# Patient Record
Sex: MALE | Race: OTHER | HISPANIC OR LATINO | URBAN - METROPOLITAN AREA
[De-identification: names, ages, dates, MRNs, and addresses within clinical notes are randomized per-mention and may not be internally consistent; named-entity substitution may affect disease eponyms.]

---

## 2017-05-12 ENCOUNTER — EMERGENCY (EMERGENCY)
Facility: HOSPITAL | Age: 52
LOS: 1 days | Discharge: HOME | End: 2017-05-12
Attending: EMERGENCY MEDICINE
Payer: MEDICARE

## 2017-05-12 VITALS
TEMPERATURE: 98 F | DIASTOLIC BLOOD PRESSURE: 75 MMHG | OXYGEN SATURATION: 96 % | HEART RATE: 95 BPM | SYSTOLIC BLOOD PRESSURE: 115 MMHG | RESPIRATION RATE: 21 BRPM

## 2017-05-12 VITALS
OXYGEN SATURATION: 95 % | HEART RATE: 109 BPM | RESPIRATION RATE: 18 BRPM | TEMPERATURE: 99 F | DIASTOLIC BLOOD PRESSURE: 71 MMHG | SYSTOLIC BLOOD PRESSURE: 112 MMHG | HEIGHT: 70 IN | WEIGHT: 190.04 LBS

## 2017-05-12 DIAGNOSIS — Z91.018 ALLERGY TO OTHER FOODS: ICD-10-CM

## 2017-05-12 DIAGNOSIS — R69 ILLNESS, UNSPECIFIED: ICD-10-CM

## 2017-05-12 DIAGNOSIS — R07.81 PLEURODYNIA: ICD-10-CM

## 2017-05-12 DIAGNOSIS — Z88.6 ALLERGY STATUS TO ANALGESIC AGENT: ICD-10-CM

## 2017-05-12 DIAGNOSIS — I50.9 HEART FAILURE, UNSPECIFIED: ICD-10-CM

## 2017-05-12 DIAGNOSIS — Z91.013 ALLERGY TO SEAFOOD: ICD-10-CM

## 2017-05-12 DIAGNOSIS — F06.2 PSYCHOTIC DISORDER WITH DELUSIONS DUE TO KNOWN PHYSIOLOGICAL CONDITION: ICD-10-CM

## 2017-05-12 LAB
ALBUMIN SERPL ELPH-MCNC: 3.8 G/DL — SIGNIFICANT CHANGE UP (ref 3.3–5.2)
ALP SERPL-CCNC: 97 U/L — SIGNIFICANT CHANGE UP (ref 40–120)
ALT FLD-CCNC: <4 U/L — SIGNIFICANT CHANGE UP
ANION GAP SERPL CALC-SCNC: 12 MMOL/L — SIGNIFICANT CHANGE UP (ref 5–17)
APTT BLD: 27.7 SEC — SIGNIFICANT CHANGE UP (ref 27.5–37.4)
AST SERPL-CCNC: 13 U/L — SIGNIFICANT CHANGE UP
BASOPHILS # BLD AUTO: 0 K/UL — SIGNIFICANT CHANGE UP (ref 0–0.2)
BASOPHILS NFR BLD AUTO: 0.4 % — SIGNIFICANT CHANGE UP (ref 0–2)
BILIRUB SERPL-MCNC: 0.2 MG/DL — LOW (ref 0.4–2)
BUN SERPL-MCNC: 12 MG/DL — SIGNIFICANT CHANGE UP (ref 8–20)
CALCIUM SERPL-MCNC: 9 MG/DL — SIGNIFICANT CHANGE UP (ref 8.6–10.2)
CHLORIDE SERPL-SCNC: 99 MMOL/L — SIGNIFICANT CHANGE UP (ref 98–107)
CK SERPL-CCNC: 72 U/L — SIGNIFICANT CHANGE UP (ref 30–200)
CK SERPL-CCNC: 75 U/L — SIGNIFICANT CHANGE UP (ref 30–200)
CO2 SERPL-SCNC: 29 MMOL/L — SIGNIFICANT CHANGE UP (ref 22–29)
CREAT SERPL-MCNC: 0.8 MG/DL — SIGNIFICANT CHANGE UP (ref 0.5–1.3)
EOSINOPHIL # BLD AUTO: 0.1 K/UL — SIGNIFICANT CHANGE UP (ref 0–0.5)
EOSINOPHIL NFR BLD AUTO: 1.6 % — SIGNIFICANT CHANGE UP (ref 0–5)
GLUCOSE SERPL-MCNC: 118 MG/DL — HIGH (ref 70–115)
HCT VFR BLD CALC: 36 % — LOW (ref 42–52)
HGB BLD-MCNC: 12.2 G/DL — LOW (ref 14–18)
INR BLD: 0.95 RATIO — SIGNIFICANT CHANGE UP (ref 0.88–1.16)
LYMPHOCYTES # BLD AUTO: 1.3 K/UL — SIGNIFICANT CHANGE UP (ref 1–4.8)
LYMPHOCYTES # BLD AUTO: 23.6 % — SIGNIFICANT CHANGE UP (ref 20–55)
MCHC RBC-ENTMCNC: 32.4 PG — HIGH (ref 27–31)
MCHC RBC-ENTMCNC: 33.9 G/DL — SIGNIFICANT CHANGE UP (ref 32–36)
MCV RBC AUTO: 95.7 FL — HIGH (ref 80–94)
MONOCYTES # BLD AUTO: 0.4 K/UL — SIGNIFICANT CHANGE UP (ref 0–0.8)
MONOCYTES NFR BLD AUTO: 7 % — SIGNIFICANT CHANGE UP (ref 3–10)
NEUTROPHILS # BLD AUTO: 3.7 K/UL — SIGNIFICANT CHANGE UP (ref 1.8–8)
NEUTROPHILS NFR BLD AUTO: 67 % — SIGNIFICANT CHANGE UP (ref 37–73)
NT-PROBNP SERPL-SCNC: 26 PG/ML — SIGNIFICANT CHANGE UP (ref 0–300)
PLATELET # BLD AUTO: 200 K/UL — SIGNIFICANT CHANGE UP (ref 150–400)
POTASSIUM SERPL-MCNC: 3.5 MMOL/L — SIGNIFICANT CHANGE UP (ref 3.5–5.3)
POTASSIUM SERPL-SCNC: 3.5 MMOL/L — SIGNIFICANT CHANGE UP (ref 3.5–5.3)
PROT SERPL-MCNC: 6.5 G/DL — LOW (ref 6.6–8.7)
PROTHROM AB SERPL-ACNC: 10.4 SEC — SIGNIFICANT CHANGE UP (ref 9.8–12.7)
RBC # BLD: 3.76 M/UL — LOW (ref 4.6–6.2)
RBC # FLD: 13.4 % — SIGNIFICANT CHANGE UP (ref 11–15.6)
SODIUM SERPL-SCNC: 140 MMOL/L — SIGNIFICANT CHANGE UP (ref 135–145)
TROPONIN T SERPL-MCNC: <0.01 NG/ML — SIGNIFICANT CHANGE UP (ref 0–0.06)
TROPONIN T SERPL-MCNC: <0.01 NG/ML — SIGNIFICANT CHANGE UP (ref 0–0.06)
WBC # BLD: 5.5 K/UL — SIGNIFICANT CHANGE UP (ref 4.8–10.8)
WBC # FLD AUTO: 5.5 K/UL — SIGNIFICANT CHANGE UP (ref 4.8–10.8)

## 2017-05-12 PROCEDURE — 80053 COMPREHEN METABOLIC PANEL: CPT

## 2017-05-12 PROCEDURE — 83880 ASSAY OF NATRIURETIC PEPTIDE: CPT

## 2017-05-12 PROCEDURE — 85610 PROTHROMBIN TIME: CPT

## 2017-05-12 PROCEDURE — 86140 C-REACTIVE PROTEIN: CPT

## 2017-05-12 PROCEDURE — 85027 COMPLETE CBC AUTOMATED: CPT

## 2017-05-12 PROCEDURE — 99285 EMERGENCY DEPT VISIT HI MDM: CPT | Mod: GC

## 2017-05-12 PROCEDURE — 71045 X-RAY EXAM CHEST 1 VIEW: CPT

## 2017-05-12 PROCEDURE — 36000 PLACE NEEDLE IN VEIN: CPT

## 2017-05-12 PROCEDURE — 99284 EMERGENCY DEPT VISIT MOD MDM: CPT

## 2017-05-12 PROCEDURE — 84484 ASSAY OF TROPONIN QUANT: CPT

## 2017-05-12 PROCEDURE — 93010 ELECTROCARDIOGRAM REPORT: CPT

## 2017-05-12 PROCEDURE — 85379 FIBRIN DEGRADATION QUANT: CPT

## 2017-05-12 PROCEDURE — 85730 THROMBOPLASTIN TIME PARTIAL: CPT

## 2017-05-12 PROCEDURE — 71010: CPT | Mod: 26

## 2017-05-12 PROCEDURE — 80061 LIPID PANEL: CPT

## 2017-05-12 PROCEDURE — 93005 ELECTROCARDIOGRAM TRACING: CPT

## 2017-05-12 PROCEDURE — 99283 EMERGENCY DEPT VISIT LOW MDM: CPT | Mod: 25

## 2017-05-12 PROCEDURE — 82550 ASSAY OF CK (CPK): CPT

## 2017-05-12 RX ORDER — CARBIDOPA AND LEVODOPA 25; 100 MG/1; MG/1
1 TABLET ORAL ONCE
Qty: 0 | Refills: 0 | Status: COMPLETED | OUTPATIENT
Start: 2017-05-12 | End: 2017-05-12

## 2017-05-12 RX ORDER — ACETAMINOPHEN 500 MG
650 TABLET ORAL EVERY 6 HOURS
Qty: 0 | Refills: 0 | Status: DISCONTINUED | OUTPATIENT
Start: 2017-05-12 | End: 2017-05-16

## 2017-05-12 RX ORDER — ACETAMINOPHEN 500 MG
650 TABLET ORAL ONCE
Qty: 0 | Refills: 0 | Status: COMPLETED | OUTPATIENT
Start: 2017-05-12 | End: 2017-05-12

## 2017-05-12 RX ORDER — PANTOPRAZOLE SODIUM 20 MG/1
40 TABLET, DELAYED RELEASE ORAL ONCE
Qty: 0 | Refills: 0 | Status: COMPLETED | OUTPATIENT
Start: 2017-05-12 | End: 2017-05-12

## 2017-05-12 RX ORDER — RASAGILINE 0.5 MG/1
1 TABLET ORAL ONCE
Qty: 0 | Refills: 0 | Status: COMPLETED | OUTPATIENT
Start: 2017-05-12 | End: 2017-05-12

## 2017-05-12 RX ADMIN — PANTOPRAZOLE SODIUM 40 MILLIGRAM(S): 20 TABLET, DELAYED RELEASE ORAL at 07:31

## 2017-05-12 RX ADMIN — Medication 650 MILLIGRAM(S): at 02:52

## 2017-05-12 RX ADMIN — Medication 650 MILLIGRAM(S): at 02:22

## 2017-05-12 RX ADMIN — RASAGILINE 1 MILLIGRAM(S): 0.5 TABLET ORAL at 13:06

## 2017-05-12 RX ADMIN — CARBIDOPA AND LEVODOPA 1 TABLET(S): 25; 100 TABLET ORAL at 02:22

## 2017-05-12 RX ADMIN — Medication 650 MILLIGRAM(S): at 06:32

## 2017-05-12 RX ADMIN — CARBIDOPA AND LEVODOPA 1 TABLET(S): 25; 100 TABLET ORAL at 13:06
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
## 2017-05-12 NOTE — ED PROVIDER NOTE - SECONDARY DIAGNOSIS.
Congestive heart failure, unspecified congestive heart failure chronicity, unspecified congestive heart failure type Herniated cervical disc

## 2017-05-12 NOTE — ED PROVIDER NOTE - MUSCULOSKELETAL, MLM
Spine appears normal, range of motion is not limited, no muscle or joint tenderness; +1 pitting edema b/l LE

## 2017-05-12 NOTE — ED PROVIDER NOTE - PMH
Congestive heart failure, unspecified congestive heart failure chronicity, unspecified congestive heart failure type    Herniated cervical disc    Parkinson disease

## 2017-05-12 NOTE — ED ADULT NURSE NOTE - OBJECTIVE STATEMENT
patient states that he was transferred from a nursing home in the [place] to a nursing home in [place]. Patient states that he began to have chest pain while he was going thru the transfer. Patient now states that he has chest pain, has a history of chf
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
## 2017-05-12 NOTE — ED PROVIDER NOTE - CARE PLAN
Principal Discharge DX:	Pleuritic chest pain  Goal:	resolved  Instructions for follow-up, activity and diet:	Continue pain medications as needed and follow up with your PMD; if any problems return to ER  Secondary Diagnosis:	Congestive heart failure, unspecified congestive heart failure chronicity, unspecified congestive heart failure type  Secondary Diagnosis:	Herniated cervical disc

## 2017-05-12 NOTE — ED PROVIDER NOTE - PLAN OF CARE
resolved Continue pain medications as needed and follow up with your PMD; if any problems return to ER
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
## 2017-05-12 NOTE — ED BEHAVIORAL HEALTH ASSESSMENT NOTE - DESCRIPTION
Parkinson's, CHF, and herniated cervical discs workup for MI disabled d/t Parkinson's, lives in nursing home

## 2017-05-12 NOTE — ED BEHAVIORAL HEALTH ASSESSMENT NOTE - SUMMARY
50 yo M here for chest pain.  Patient currently A&0 X 4 and cooperative during our encounter.  He would like to be transferred to nursing home in the [place] to be closer to his siblings when medically cleared for discharge.  Plan for SW to arrange discharge plans with legal guardian. Patient has been determined by court to be incapacitated and guardian appointed for decision making on his behalf.

## 2017-05-12 NOTE — ED PROVIDER NOTE - MEDICAL DECISION MAKING DETAILS
r/o ACS - CP is pleuritic, atypical in nature resolved w/ PO Tylenol, EKG unremarkable, and cardiac enzymes unremarkable x1, and undocumented hx of CHF; pt refusing repeat labs but top differential is atypical CP secondary possible to a URI or GERD.

## 2017-05-12 NOTE — ED BEHAVIORAL HEALTH NOTE - BEHAVIORAL HEALTH NOTE
Van: pt medically cleared for d/c. Transportation assistance requested by pt's nurse(Andra). [facility] called(Chaya) harpal arranged ETA within one hour and a half. No other concerns reported at this moment.

## 2017-05-12 NOTE — ED PROVIDER NOTE - ATTENDING CONTRIBUTION TO CARE
52 yo M presents to ED from NH c/o sharp, L ant chest wall pain which worsens with movement and respiration and reproducible to palpation of chest wall.  On exam pt appears uncomfortable, Cor Reg, Lungs clear b/l, + reproducible L upper ant chest wall.  EKG sinus with no acute changes.  Pt allergic to NSAID's.  Rx Tylenol, chec labs including CE, CXR and re-eval
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
## 2017-06-08 ENCOUNTER — EMERGENCY (EMERGENCY)
Facility: HOSPITAL | Age: 52
LOS: 1 days | Discharge: OTHER INSTITUTION | End: 2017-06-08
Attending: EMERGENCY MEDICINE
Payer: MEDICARE

## 2017-06-08 VITALS
OXYGEN SATURATION: 98 % | DIASTOLIC BLOOD PRESSURE: 85 MMHG | WEIGHT: 190.04 LBS | HEIGHT: 70 IN | RESPIRATION RATE: 18 BRPM | SYSTOLIC BLOOD PRESSURE: 134 MMHG | HEART RATE: 106 BPM | TEMPERATURE: 98 F

## 2017-06-08 VITALS
TEMPERATURE: 99 F | RESPIRATION RATE: 20 BRPM | HEART RATE: 97 BPM | SYSTOLIC BLOOD PRESSURE: 137 MMHG | OXYGEN SATURATION: 98 %

## 2017-06-08 DIAGNOSIS — I50.9 HEART FAILURE, UNSPECIFIED: ICD-10-CM

## 2017-06-08 DIAGNOSIS — Z88.5 ALLERGY STATUS TO NARCOTIC AGENT: ICD-10-CM

## 2017-06-08 DIAGNOSIS — F31.9 BIPOLAR DISORDER, UNSPECIFIED: ICD-10-CM

## 2017-06-08 DIAGNOSIS — Z91.013 ALLERGY TO SEAFOOD: ICD-10-CM

## 2017-06-08 DIAGNOSIS — R07.9 CHEST PAIN, UNSPECIFIED: ICD-10-CM

## 2017-06-08 DIAGNOSIS — Z79.899 OTHER LONG TERM (CURRENT) DRUG THERAPY: ICD-10-CM

## 2017-06-08 DIAGNOSIS — F29 UNSPECIFIED PSYCHOSIS NOT DUE TO A SUBSTANCE OR KNOWN PHYSIOLOGICAL CONDITION: ICD-10-CM

## 2017-06-08 DIAGNOSIS — R10.13 EPIGASTRIC PAIN: ICD-10-CM

## 2017-06-08 DIAGNOSIS — Z88.6 ALLERGY STATUS TO ANALGESIC AGENT: ICD-10-CM

## 2017-06-08 DIAGNOSIS — G20 PARKINSON'S DISEASE: ICD-10-CM

## 2017-06-08 LAB
ALBUMIN SERPL ELPH-MCNC: 4.6 G/DL — SIGNIFICANT CHANGE UP (ref 3.3–5.2)
ALP SERPL-CCNC: 85 U/L — SIGNIFICANT CHANGE UP (ref 40–120)
ALT FLD-CCNC: <4 U/L — SIGNIFICANT CHANGE UP
ANION GAP SERPL CALC-SCNC: 13 MMOL/L — SIGNIFICANT CHANGE UP (ref 5–17)
AST SERPL-CCNC: 13 U/L — SIGNIFICANT CHANGE UP
BILIRUB SERPL-MCNC: 0.4 MG/DL — SIGNIFICANT CHANGE UP (ref 0.4–2)
BUN SERPL-MCNC: 11 MG/DL — SIGNIFICANT CHANGE UP (ref 8–20)
CALCIUM SERPL-MCNC: 9.2 MG/DL — SIGNIFICANT CHANGE UP (ref 8.6–10.2)
CHLORIDE SERPL-SCNC: 103 MMOL/L — SIGNIFICANT CHANGE UP (ref 98–107)
CK MB CFR SERPL CALC: 2.9 NG/ML — SIGNIFICANT CHANGE UP (ref 0–6.7)
CK SERPL-CCNC: 280 U/L — HIGH (ref 30–200)
CO2 SERPL-SCNC: 27 MMOL/L — SIGNIFICANT CHANGE UP (ref 22–29)
CREAT SERPL-MCNC: 0.78 MG/DL — SIGNIFICANT CHANGE UP (ref 0.5–1.3)
GLUCOSE SERPL-MCNC: 94 MG/DL — SIGNIFICANT CHANGE UP (ref 70–115)
HCT VFR BLD CALC: 41.4 % — LOW (ref 42–52)
HGB BLD-MCNC: 13.4 G/DL — LOW (ref 14–18)
LIDOCAIN IGE QN: 21 U/L — LOW (ref 22–51)
MCHC RBC-ENTMCNC: 30.9 PG — SIGNIFICANT CHANGE UP (ref 27–31)
MCHC RBC-ENTMCNC: 32.4 G/DL — SIGNIFICANT CHANGE UP (ref 32–36)
MCV RBC AUTO: 95.6 FL — HIGH (ref 80–94)
PLATELET # BLD AUTO: 207 K/UL — SIGNIFICANT CHANGE UP (ref 150–400)
POTASSIUM SERPL-MCNC: 3.6 MMOL/L — SIGNIFICANT CHANGE UP (ref 3.5–5.3)
POTASSIUM SERPL-SCNC: 3.6 MMOL/L — SIGNIFICANT CHANGE UP (ref 3.5–5.3)
PROT SERPL-MCNC: 7.1 G/DL — SIGNIFICANT CHANGE UP (ref 6.6–8.7)
RBC # BLD: 4.33 M/UL — LOW (ref 4.6–6.2)
RBC # FLD: 13.4 % — SIGNIFICANT CHANGE UP (ref 11–15.6)
SODIUM SERPL-SCNC: 143 MMOL/L — SIGNIFICANT CHANGE UP (ref 135–145)
TROPONIN T SERPL-MCNC: <0.01 NG/ML — SIGNIFICANT CHANGE UP (ref 0–0.06)
WBC # BLD: 7 K/UL — SIGNIFICANT CHANGE UP (ref 4.8–10.8)
WBC # FLD AUTO: 7 K/UL — SIGNIFICANT CHANGE UP (ref 4.8–10.8)

## 2017-06-08 PROCEDURE — 84484 ASSAY OF TROPONIN QUANT: CPT

## 2017-06-08 PROCEDURE — 82553 CREATINE MB FRACTION: CPT

## 2017-06-08 PROCEDURE — 74176 CT ABD & PELVIS W/O CONTRAST: CPT | Mod: 26

## 2017-06-08 PROCEDURE — 93010 ELECTROCARDIOGRAM REPORT: CPT

## 2017-06-08 PROCEDURE — 74176 CT ABD & PELVIS W/O CONTRAST: CPT

## 2017-06-08 PROCEDURE — 80053 COMPREHEN METABOLIC PANEL: CPT

## 2017-06-08 PROCEDURE — 99285 EMERGENCY DEPT VISIT HI MDM: CPT

## 2017-06-08 PROCEDURE — 82550 ASSAY OF CK (CPK): CPT

## 2017-06-08 PROCEDURE — 93005 ELECTROCARDIOGRAM TRACING: CPT

## 2017-06-08 PROCEDURE — 85027 COMPLETE CBC AUTOMATED: CPT

## 2017-06-08 PROCEDURE — 99284 EMERGENCY DEPT VISIT MOD MDM: CPT

## 2017-06-08 PROCEDURE — 83690 ASSAY OF LIPASE: CPT

## 2017-06-08 PROCEDURE — 99285 EMERGENCY DEPT VISIT HI MDM: CPT | Mod: 25

## 2017-06-08 RX ORDER — SODIUM CHLORIDE 9 MG/ML
1000 INJECTION INTRAMUSCULAR; INTRAVENOUS; SUBCUTANEOUS ONCE
Qty: 0 | Refills: 0 | Status: COMPLETED | OUTPATIENT
Start: 2017-06-08 | End: 2017-06-08

## 2017-06-08 RX ORDER — CARBIDOPA, LEVODOPA, AND ENTACAPONE 50; 200; 200 MG/1; MG/1; MG/1
1 TABLET, FILM COATED ORAL THREE TIMES A DAY
Qty: 0 | Refills: 0 | Status: DISCONTINUED | OUTPATIENT
Start: 2017-06-08 | End: 2017-06-08

## 2017-06-08 RX ORDER — RASAGILINE 0.5 MG/1
1 TABLET ORAL DAILY
Qty: 0 | Refills: 0 | Status: DISCONTINUED | OUTPATIENT
Start: 2017-06-08 | End: 2017-06-12

## 2017-06-08 RX ORDER — CARBIDOPA, LEVODOPA, AND ENTACAPONE 50; 200; 200 MG/1; MG/1; MG/1
1 TABLET, FILM COATED ORAL
Qty: 0 | Refills: 0 | Status: DISCONTINUED | OUTPATIENT
Start: 2017-06-08 | End: 2017-06-12

## 2017-06-08 RX ORDER — CARBIDOPA AND LEVODOPA 25; 100 MG/1; MG/1
1 TABLET ORAL
Qty: 0 | Refills: 0 | Status: DISCONTINUED | OUTPATIENT
Start: 2017-06-08 | End: 2017-06-12

## 2017-06-08 RX ADMIN — SODIUM CHLORIDE 500 MILLILITER(S): 9 INJECTION INTRAMUSCULAR; INTRAVENOUS; SUBCUTANEOUS at 02:42

## 2017-06-08 NOTE — ED PROVIDER NOTE - SHIFT CHANGE DETAILS
signed out pending second set ce if neg pt can be cleared for psych eval and dispo signed out pending psych eval and dispo

## 2017-06-08 NOTE — ED PROVIDER NOTE - CARE PLAN
Principal Discharge DX:	Chest pain  Secondary Diagnosis:	Parkinson disease  Secondary Diagnosis:	Bipolar 1 disorder

## 2017-06-08 NOTE — ED ADULT NURSE NOTE - OBJECTIVE STATEMENT
Patient drowsy, asleep, refuses to open eyes & answer questions. Negative obvious pain, distress, or discomfort. Respirations even & unlabored, full ROM x4. Per triage RN, patient was complaining of abdominal pain. From [facility]. Patient drowsy, asleep, arouses to voice, refuses to open eyes & answer questions. Negative obvious pain, distress, or discomfort. Respirations even & unlabored, full ROM x4. Per triage RN, patient was complaining of abdominal pain. From [facility].

## 2017-06-08 NOTE — ED BEHAVIORAL HEALTH ASSESSMENT NOTE - CURRENT MEDICATION
see [facility] list  refusing Seroquel  on sinemet Stalevo Azilect

## 2017-06-08 NOTE — ED BEHAVIORAL HEALTH ASSESSMENT NOTE - SUMMARY
50 y/o male BIBA from [facility] last night c/o cp and abdominal pain. Last night pt experienced recurrent behavior felt to be a danger, became verbally and physically aggressive with staff, danger to self and others. Currently the patient is uncooperative due to sedation of unknown origin.

## 2017-06-08 NOTE — ED ADULT NURSE REASSESSMENT NOTE - GENERAL PATIENT STATE
Refused bloodwork earlier, refused medication again, refused meals, encouraged compliance./resting/sleeping

## 2017-06-08 NOTE — ED ADULT NURSE NOTE - NS ED NURSE RECORDS SENT
Call to patient.  She sounded as if she was crying on the phone.  She states that she is not doing well and has been crying for several hours a day and is having bad thoughts.      Asked if she had thoughts of self harm or harming others and patient stated yes.  Discussed that she is home alone and she is going to call her Dad to come with her.     Crisis # given.  Discussed going to ER and also discussed [facility].     Call to [facility] and they will contact patient directly to address.        Medical Records sent

## 2017-06-08 NOTE — ED PROVIDER NOTE - OBJECTIVE STATEMENT
52 yo male sent from nh for evaluation after he called 911 because he claimed he had cp and abdominal pain  as per transfer noted from nh pt has been combative and threateing to others and staff and himself so sent also for psych eval , pt with h/o bipolar disorder

## 2017-06-08 NOTE — ED BEHAVIORAL HEALTH ASSESSMENT NOTE - DESCRIPTION
Parkinson's, CHF, and herniated cervical discs disabled d/t Parkinson's, lives in nursing home [facility]

## 2017-06-08 NOTE — ED BEHAVIORAL HEALTH ASSESSMENT NOTE - DETAILS
Last night pt had an episode of verbal and physical aggression towards [facility] staff NA insomnia fatigue admissions Humberto OTOOLE

## 2017-06-08 NOTE — ED BEHAVIORAL HEALTH NOTE - BEHAVIORAL HEALTH NOTE
SW Note: Pt will be transferred to [facility] for inpt psychiatric tx. 9.37 legals completed. Accepting MD, Dr. Valenzuela. [facility] and [facility] did not have an appropriate bed for him today. Notified pts legal guardian, Barb Tabares [phone]. Notified [facility]. Spoke with admissions coordinator Taylor [phone]. She also informed me of pts legals guardian as . Pt has medicare and medicaid. No auth needed. Ambulance arranged with [facility]

## 2017-06-08 NOTE — ED ADULT NURSE REASSESSMENT NOTE - NS ED NURSE REASSESS COMMENT FT1
Patient agitated, refusing to allow blood draw, unable to redirect or reassure. Dr. Albarran made aware. Patient calmed down once was notified that there would be no blood draw.
Patient received at 0740, assist of three to transfer to stretcher, eyes closed, no complaints of pain, sleeps when undisturbed. Fall Risk.
Refused all medications
Resumed care from [facility], Report given to [facility]
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
## 2017-06-09 ENCOUNTER — EMERGENCY (EMERGENCY)
Facility: HOSPITAL | Age: 52
LOS: 1 days | Discharge: OTHER INSTITUTION | End: 2017-06-09
Attending: EMERGENCY MEDICINE
Payer: MEDICARE

## 2017-06-09 VITALS
SYSTOLIC BLOOD PRESSURE: 128 MMHG | RESPIRATION RATE: 16 BRPM | HEART RATE: 76 BPM | TEMPERATURE: 98 F | OXYGEN SATURATION: 96 % | DIASTOLIC BLOOD PRESSURE: 82 MMHG | WEIGHT: 244.93 LBS

## 2017-06-09 DIAGNOSIS — F41.9 ANXIETY DISORDER, UNSPECIFIED: ICD-10-CM

## 2017-06-09 DIAGNOSIS — Z91.013 ALLERGY TO SEAFOOD: ICD-10-CM

## 2017-06-09 DIAGNOSIS — Z79.899 OTHER LONG TERM (CURRENT) DRUG THERAPY: ICD-10-CM

## 2017-06-09 DIAGNOSIS — Z88.5 ALLERGY STATUS TO NARCOTIC AGENT: ICD-10-CM

## 2017-06-09 DIAGNOSIS — G20 PARKINSON'S DISEASE: ICD-10-CM

## 2017-06-09 DIAGNOSIS — I50.9 HEART FAILURE, UNSPECIFIED: ICD-10-CM

## 2017-06-09 DIAGNOSIS — R45.1 RESTLESSNESS AND AGITATION: ICD-10-CM

## 2017-06-09 DIAGNOSIS — Z91.018 ALLERGY TO OTHER FOODS: ICD-10-CM

## 2017-06-09 DIAGNOSIS — Z88.8 ALLERGY STATUS TO OTHER DRUGS, MEDICAMENTS AND BIOLOGICAL SUBSTANCES STATUS: ICD-10-CM

## 2017-06-09 LAB
ANION GAP SERPL CALC-SCNC: 14 MMOL/L — SIGNIFICANT CHANGE UP (ref 5–17)
APAP SERPL-MCNC: <7.5 UG/ML — LOW (ref 10–26)
BASOPHILS # BLD AUTO: 0 K/UL — SIGNIFICANT CHANGE UP (ref 0–0.2)
BASOPHILS NFR BLD AUTO: 0.2 % — SIGNIFICANT CHANGE UP (ref 0–2)
BUN SERPL-MCNC: 11 MG/DL — SIGNIFICANT CHANGE UP (ref 8–20)
CALCIUM SERPL-MCNC: 9.1 MG/DL — SIGNIFICANT CHANGE UP (ref 8.6–10.2)
CHLORIDE SERPL-SCNC: 100 MMOL/L — SIGNIFICANT CHANGE UP (ref 98–107)
CO2 SERPL-SCNC: 26 MMOL/L — SIGNIFICANT CHANGE UP (ref 22–29)
CREAT SERPL-MCNC: 0.94 MG/DL — SIGNIFICANT CHANGE UP (ref 0.5–1.3)
EOSINOPHIL # BLD AUTO: 0.1 K/UL — SIGNIFICANT CHANGE UP (ref 0–0.5)
EOSINOPHIL NFR BLD AUTO: 1.5 % — SIGNIFICANT CHANGE UP (ref 0–5)
GLUCOSE SERPL-MCNC: 105 MG/DL — SIGNIFICANT CHANGE UP (ref 70–115)
HCT VFR BLD CALC: 39 % — LOW (ref 42–52)
HGB BLD-MCNC: 12.7 G/DL — LOW (ref 14–18)
LYMPHOCYTES # BLD AUTO: 1.4 K/UL — SIGNIFICANT CHANGE UP (ref 1–4.8)
LYMPHOCYTES # BLD AUTO: 26.1 % — SIGNIFICANT CHANGE UP (ref 20–55)
MCHC RBC-ENTMCNC: 31 PG — SIGNIFICANT CHANGE UP (ref 27–31)
MCHC RBC-ENTMCNC: 32.6 G/DL — SIGNIFICANT CHANGE UP (ref 32–36)
MCV RBC AUTO: 95.1 FL — HIGH (ref 80–94)
MONOCYTES # BLD AUTO: 0.5 K/UL — SIGNIFICANT CHANGE UP (ref 0–0.8)
MONOCYTES NFR BLD AUTO: 9.4 % — SIGNIFICANT CHANGE UP (ref 3–10)
NEUTROPHILS # BLD AUTO: 3.3 K/UL — SIGNIFICANT CHANGE UP (ref 1.8–8)
NEUTROPHILS NFR BLD AUTO: 62.6 % — SIGNIFICANT CHANGE UP (ref 37–73)
PLATELET # BLD AUTO: 202 K/UL — SIGNIFICANT CHANGE UP (ref 150–400)
POTASSIUM SERPL-MCNC: 4.2 MMOL/L — SIGNIFICANT CHANGE UP (ref 3.5–5.3)
POTASSIUM SERPL-SCNC: 4.2 MMOL/L — SIGNIFICANT CHANGE UP (ref 3.5–5.3)
RBC # BLD: 4.1 M/UL — LOW (ref 4.6–6.2)
RBC # FLD: 13.4 % — SIGNIFICANT CHANGE UP (ref 11–15.6)
SALICYLATES SERPL-MCNC: <2 MG/DL — LOW (ref 10–20)
SODIUM SERPL-SCNC: 140 MMOL/L — SIGNIFICANT CHANGE UP (ref 135–145)
TSH SERPL-MCNC: 0.71 UIU/ML — SIGNIFICANT CHANGE UP (ref 0.27–4.2)
WBC # BLD: 5.2 K/UL — SIGNIFICANT CHANGE UP (ref 4.8–10.8)
WBC # FLD AUTO: 5.2 K/UL — SIGNIFICANT CHANGE UP (ref 4.8–10.8)

## 2017-06-09 PROCEDURE — 93010 ELECTROCARDIOGRAM REPORT: CPT

## 2017-06-09 PROCEDURE — 99284 EMERGENCY DEPT VISIT MOD MDM: CPT

## 2017-06-09 PROCEDURE — 99285 EMERGENCY DEPT VISIT HI MDM: CPT

## 2017-06-09 RX ORDER — CARBIDOPA, LEVODOPA, AND ENTACAPONE 50; 200; 200 MG/1; MG/1; MG/1
1 TABLET, FILM COATED ORAL ONCE
Qty: 0 | Refills: 0 | Status: COMPLETED | OUTPATIENT
Start: 2017-06-09 | End: 2017-06-09

## 2017-06-09 RX ORDER — CARBIDOPA AND LEVODOPA 25; 100 MG/1; MG/1
1 TABLET ORAL ONCE
Qty: 0 | Refills: 0 | Status: COMPLETED | OUTPATIENT
Start: 2017-06-09 | End: 2017-06-09

## 2017-06-09 RX ORDER — QUETIAPINE FUMARATE 200 MG/1
50 TABLET, FILM COATED ORAL ONCE
Qty: 0 | Refills: 0 | Status: COMPLETED | OUTPATIENT
Start: 2017-06-09 | End: 2017-06-09

## 2017-06-09 RX ORDER — CARBIDOPA, LEVODOPA, AND ENTACAPONE 50; 200; 200 MG/1; MG/1; MG/1
1 TABLET, FILM COATED ORAL ONCE
Qty: 0 | Refills: 0 | Status: DISCONTINUED | OUTPATIENT
Start: 2017-06-09 | End: 2017-06-09

## 2017-06-09 RX ORDER — ACETAMINOPHEN 500 MG
650 TABLET ORAL ONCE
Qty: 0 | Refills: 0 | Status: COMPLETED | OUTPATIENT
Start: 2017-06-09 | End: 2017-06-09

## 2017-06-09 RX ADMIN — CARBIDOPA, LEVODOPA, AND ENTACAPONE 1 TABLET(S): 50; 200; 200 TABLET, FILM COATED ORAL at 16:01

## 2017-06-09 RX ADMIN — CARBIDOPA, LEVODOPA, AND ENTACAPONE 1 TABLET(S): 50; 200; 200 TABLET, FILM COATED ORAL at 22:39

## 2017-06-09 RX ADMIN — Medication 650 MILLIGRAM(S): at 20:54

## 2017-06-09 RX ADMIN — CARBIDOPA AND LEVODOPA 1 TABLET(S): 25; 100 TABLET ORAL at 16:01

## 2017-06-09 RX ADMIN — CARBIDOPA AND LEVODOPA 1 TABLET(S): 25; 100 TABLET ORAL at 20:53

## 2017-06-09 RX ADMIN — Medication 650 MILLIGRAM(S): at 17:04

## 2017-06-09 NOTE — ED ADULT TRIAGE NOTE - CHIEF COMPLAINT QUOTE
pt was seen here in ed yesterday for psych sent to [place]. patient sent back here because they were unable to take care of patient due to medical comorbidites. rocío elliott aware. patient offers no complaints.
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
## 2017-06-09 NOTE — ED ADULT NURSE REASSESSMENT NOTE - COMFORT CARE
repositioned/meal provided/warm blanket provided/wait time explained/po fluids offered/plan of care explained

## 2017-06-09 NOTE — ED BEHAVIORAL HEALTH NOTE - BEHAVIORAL HEALTH NOTE
Social work note: Worker received phone call from Mary Kate at [facility].  Mary Kate stating that the director of the hospital is no longer accepting outside admissions due to an influx of patients in their emergency room. Social work to follow to obtain bed.

## 2017-06-09 NOTE — ED BEHAVIORAL HEALTH ASSESSMENT NOTE - REASON FOR REFERRAL
sent back from [facility] due to lacking hospital bed required for patient with parkinsons
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
## 2017-06-09 NOTE — ED ADULT NURSE REASSESSMENT NOTE - NS ED NURSE REASSESS COMMENT FT1
Pt given water and 2 packages of cookies.  Pt requested hospital .  Assistant nurse manager, Swati made aware and states administator has been made aware and will the patient.  Swati, RN at bedside to make patient aware.

## 2017-06-09 NOTE — ED BEHAVIORAL HEALTH ASSESSMENT NOTE - SUMMARY
50 y/o male BIBA from [facility] w c/o cp and abdominal pain. Pt experienced recurrent behavior felt to be a danger, became verbally and physically aggressive with staff, danger to self and others. He was accepted and transferred to [facility] but thought to be more suitable for a geriatric psych unit with accommodations for his limitations. Currently the patient is uncooperative, elevated and delusional.

## 2017-06-09 NOTE — ED BEHAVIORAL HEALTH ASSESSMENT NOTE - DETAILS
NA frequent episodes of verbal and physical aggression towards [facility] staff insomnia admissions Humberto OTOOLE

## 2017-06-09 NOTE — ED ADULT NURSE NOTE - CHIEF COMPLAINT QUOTE
pt was seen here in ed yesterday for psych sent to [place]. patient sent back here because they were unable to take care of patient due to medical comorbidites. rocío elliott aware. patient offers no complaints.

## 2017-06-09 NOTE — ED ADULT NURSE REASSESSMENT NOTE - NS ED NURSE REASSESS COMMENT FT1
Pt remains stable.  Refused to eat turkey sandwich and requested tunafish.  Hospitality called multiple times for tuna or vegetarian tray.  Pt experience representative also aware of pt request.  Behavioral health NP at bedside to evaluate.

## 2017-06-09 NOTE — ED PROVIDER NOTE - OBJECTIVE STATEMENT
52 y/o male with a h/o parkinson's and copd and he was  here and sent to [facility] yesterday and returned today because they could not manage his medical complaints Pt is a poor historian and he denies psychiatric illness or meds 50 y/o male with a h/o parkinson's and copd and he was  here and sent to [facility] yesterday for being aggressive and combative at nursing home and returned today because they could not manage his medical complaints Pt is a poor historian and he denies psychiatric illness or meds

## 2017-06-09 NOTE — ED ADULT NURSE REASSESSMENT NOTE - NS ED NURSE REASSESS COMMENT FT1
I spoke with Mr. Daryn Pedro concerning his I spoke with . Daryn Pedro, attempted to address his concerns, He asked to speak with administration concerning his prior visit.  I stated I would have administration come and speak with him regarding his visit.  I had to ask him to hang up his phone in the middle of our conversation with a "Kirby March II".  Patient was reassured, he asked what his plan of care was, it was explained and he was given opportunity to ask questions concerning his plan.  Patient was offered assistance (food, water), he declined at this point
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
## 2017-06-09 NOTE — ED ADULT NURSE REASSESSMENT NOTE - NS ED NURSE REASSESS COMMENT FT1
Dr. Coronado given patient's medication list sent from [facility] [place].  Will medicate as per Cliff PINEDA order.

## 2017-06-10 ENCOUNTER — INPATIENT (INPATIENT)
Facility: HOSPITAL | Age: 52
LOS: 31 days | Discharge: HOME | End: 2017-07-12
Attending: PSYCHIATRY & NEUROLOGY | Admitting: PSYCHIATRY & NEUROLOGY
Payer: MEDICARE

## 2017-06-10 VITALS — WEIGHT: 190.04 LBS | HEIGHT: 70 IN

## 2017-06-10 VITALS
SYSTOLIC BLOOD PRESSURE: 125 MMHG | RESPIRATION RATE: 18 BRPM | DIASTOLIC BLOOD PRESSURE: 75 MMHG | TEMPERATURE: 98 F | OXYGEN SATURATION: 96 % | HEART RATE: 94 BPM

## 2017-06-10 DIAGNOSIS — F29 UNSPECIFIED PSYCHOSIS NOT DUE TO A SUBSTANCE OR KNOWN PHYSIOLOGICAL CONDITION: ICD-10-CM

## 2017-06-10 LAB
AMPHET UR-MCNC: NEGATIVE — SIGNIFICANT CHANGE UP
APPEARANCE UR: CLEAR — SIGNIFICANT CHANGE UP
BARBITURATES UR SCN-MCNC: NEGATIVE — SIGNIFICANT CHANGE UP
BENZODIAZ UR-MCNC: NEGATIVE — SIGNIFICANT CHANGE UP
BILIRUB UR-MCNC: NEGATIVE — SIGNIFICANT CHANGE UP
COCAINE METAB.OTHER UR-MCNC: NEGATIVE — SIGNIFICANT CHANGE UP
COLOR SPEC: YELLOW — SIGNIFICANT CHANGE UP
DIFF PNL FLD: NEGATIVE — SIGNIFICANT CHANGE UP
GLUCOSE UR QL: NEGATIVE MG/DL — SIGNIFICANT CHANGE UP
KETONES UR-MCNC: NEGATIVE — SIGNIFICANT CHANGE UP
LEUKOCYTE ESTERASE UR-ACNC: NEGATIVE — SIGNIFICANT CHANGE UP
METHADONE UR-MCNC: NEGATIVE — SIGNIFICANT CHANGE UP
NITRITE UR-MCNC: NEGATIVE — SIGNIFICANT CHANGE UP
OPIATES UR-MCNC: NEGATIVE — SIGNIFICANT CHANGE UP
PCP SPEC-MCNC: SIGNIFICANT CHANGE UP
PCP UR-MCNC: NEGATIVE — SIGNIFICANT CHANGE UP
PH UR: 6 — SIGNIFICANT CHANGE UP (ref 5–8)
PROT UR-MCNC: NEGATIVE MG/DL — SIGNIFICANT CHANGE UP
SP GR SPEC: 1 — LOW (ref 1.01–1.02)
THC UR QL: NEGATIVE — SIGNIFICANT CHANGE UP
UROBILINOGEN FLD QL: NEGATIVE MG/DL — SIGNIFICANT CHANGE UP

## 2017-06-10 PROCEDURE — 84443 ASSAY THYROID STIM HORMONE: CPT

## 2017-06-10 PROCEDURE — 85027 COMPLETE CBC AUTOMATED: CPT

## 2017-06-10 PROCEDURE — 93005 ELECTROCARDIOGRAM TRACING: CPT

## 2017-06-10 PROCEDURE — 81003 URINALYSIS AUTO W/O SCOPE: CPT

## 2017-06-10 PROCEDURE — 80048 BASIC METABOLIC PNL TOTAL CA: CPT

## 2017-06-10 PROCEDURE — 84484 ASSAY OF TROPONIN QUANT: CPT

## 2017-06-10 PROCEDURE — 80307 DRUG TEST PRSMV CHEM ANLYZR: CPT

## 2017-06-10 PROCEDURE — 99284 EMERGENCY DEPT VISIT MOD MDM: CPT

## 2017-06-10 PROCEDURE — 82550 ASSAY OF CK (CPK): CPT

## 2017-06-10 RX ORDER — RASAGILINE 0.5 MG/1
1 TABLET ORAL DAILY
Qty: 0 | Refills: 0 | Status: DISCONTINUED | OUTPATIENT
Start: 2017-06-10 | End: 2017-07-12

## 2017-06-10 RX ORDER — CARBIDOPA AND LEVODOPA 25; 100 MG/1; MG/1
1 TABLET ORAL
Qty: 0 | Refills: 0 | Status: DISCONTINUED | OUTPATIENT
Start: 2017-06-10 | End: 2017-07-12

## 2017-06-10 RX ORDER — QUETIAPINE FUMARATE 200 MG/1
50 TABLET, FILM COATED ORAL ONCE
Qty: 0 | Refills: 0 | Status: DISCONTINUED | OUTPATIENT
Start: 2017-06-10 | End: 2017-06-10

## 2017-06-10 RX ORDER — CARBIDOPA AND LEVODOPA 25; 100 MG/1; MG/1
1 TABLET ORAL ONCE
Qty: 0 | Refills: 0 | Status: DISCONTINUED | OUTPATIENT
Start: 2017-06-10 | End: 2017-06-13

## 2017-06-10 RX ORDER — ACETAMINOPHEN 500 MG
650 TABLET ORAL ONCE
Qty: 0 | Refills: 0 | Status: COMPLETED | OUTPATIENT
Start: 2017-06-10 | End: 2017-06-10

## 2017-06-10 RX ORDER — QUETIAPINE FUMARATE 200 MG/1
50 TABLET, FILM COATED ORAL EVERY 6 HOURS
Qty: 0 | Refills: 0 | Status: DISCONTINUED | OUTPATIENT
Start: 2017-06-10 | End: 2017-07-12

## 2017-06-10 RX ORDER — CARBIDOPA, LEVODOPA, AND ENTACAPONE 50; 200; 200 MG/1; MG/1; MG/1
1 TABLET, FILM COATED ORAL ONCE
Qty: 0 | Refills: 0 | Status: COMPLETED | OUTPATIENT
Start: 2017-06-10 | End: 2017-06-10

## 2017-06-10 RX ORDER — QUETIAPINE FUMARATE 200 MG/1
25 TABLET, FILM COATED ORAL EVERY 6 HOURS
Qty: 0 | Refills: 0 | Status: DISCONTINUED | OUTPATIENT
Start: 2017-06-10 | End: 2017-06-10

## 2017-06-10 RX ORDER — CARBIDOPA, LEVODOPA, AND ENTACAPONE 50; 200; 200 MG/1; MG/1; MG/1
1 TABLET, FILM COATED ORAL THREE TIMES A DAY
Qty: 0 | Refills: 0 | Status: DISCONTINUED | OUTPATIENT
Start: 2017-06-10 | End: 2017-07-12

## 2017-06-10 RX ORDER — QUETIAPINE FUMARATE 200 MG/1
50 TABLET, FILM COATED ORAL AT BEDTIME
Qty: 0 | Refills: 0 | Status: DISCONTINUED | OUTPATIENT
Start: 2017-06-10 | End: 2017-06-16

## 2017-06-10 RX ADMIN — CARBIDOPA AND LEVODOPA 1 TABLET(S): 25; 100 TABLET ORAL at 21:12

## 2017-06-10 RX ADMIN — Medication 650 MILLIGRAM(S): at 10:58

## 2017-06-10 RX ADMIN — CARBIDOPA AND LEVODOPA 1 TABLET(S): 25; 100 TABLET ORAL at 18:11

## 2017-06-10 RX ADMIN — CARBIDOPA, LEVODOPA, AND ENTACAPONE 1 TABLET(S): 50; 200; 200 TABLET, FILM COATED ORAL at 10:46

## 2017-06-10 RX ADMIN — CARBIDOPA AND LEVODOPA 1 TABLET(S): 25; 100 TABLET ORAL at 15:11

## 2017-06-10 RX ADMIN — CARBIDOPA, LEVODOPA, AND ENTACAPONE 1 TABLET(S): 50; 200; 200 TABLET, FILM COATED ORAL at 21:12

## 2017-06-10 RX ADMIN — CARBIDOPA, LEVODOPA, AND ENTACAPONE 1 TABLET(S): 50; 200; 200 TABLET, FILM COATED ORAL at 15:11

## 2017-06-10 NOTE — ED ADULT NURSE REASSESSMENT NOTE - NS ED NURSE REASSESS COMMENT FT1
Pt becoming cooperative. Pt cooperative with blood draw, EKG, vitals and gave urine sample. Pt wet bed. Bed and gowns changed. Pt cleaned. Pt turned and repositioned. Pt currently resting comfortable. Safety maintained. Will continue to monitor the pt for safety.

## 2017-06-10 NOTE — ED ADULT NURSE REASSESSMENT NOTE - NS ED NURSE REASSESS COMMENT FT1
Patient assisted with eating breakfast which he ate 100%,  awake in bed, educated about pending transfer and plan of care, remains on fall precautions, changing position frequently, no attempts to harm self or others, and safety maintained.

## 2017-06-10 NOTE — CHART NOTE - NSCHARTNOTEFT_GEN_A_CORE
Screening Medical Evaluation  Patient Admitted from: [facility] admitting diagnosis:Non-organic psychosis    PAST MEDICAL & SURGICAL HISTORY:  Diastolic heart failure  Parkinson disease  CHF (congestive heart failure)  GERD (gastroesophageal reflux disease)  Parkinson&#x27;s disease  Migraine  CHF (congestive heart failure)  Parkinson disease  No significant past surgical history  No significant past surgical history        Allergies    ibuprofen (Unknown)  Reglan (Swelling)    Intolerances        Social History:     FAMILY HISTORY:  No pertinent family history in first degree relatives  Family history of prostate cancer in father  Family history of diabetes mellitus  Family history of prostate cancer: FATHER      MEDICATIONS  (STANDING):  rasagiline Tablet 1milliGRAM(s) Oral daily  carbidopa/levodopa CR 50/200 1Tablet(s) Oral four times a day  carbidopa/levodopa/entacapone 50/200/200 1Tablet(s) Oral three times a day  QUEtiapine 50milliGRAM(s) Oral at bedtime    MEDICATIONS  (PRN):  QUEtiapine 50milliGRAM(s) Oral every 6 hours PRN agitation      Vital Signs Last 24 Hrs  T(C): --  HR: --  BP: --  RR: --  SpO2: --  Wt(kg): --  CAPILLARY BLOOD GLUCOSE    I&O's Summary      LABS:                    RADIOLOGY & ADDITIONAL TESTS:    Assessment and Plan: Screening Medical Evaluation  Patient Admitted from: [facility]     [facility] admitting diagnosis:   Non-organic psychosis    PAST MEDICAL & SURGICAL HISTORY:  Diastolic heart failure  Parkinson disease  CHF (congestive heart failure)  GERD (gastroesophageal reflux disease)  Parkinson&#x27;s disease  Migraine  CHF (congestive heart failure)  Parkinson disease  No significant past surgical history  No significant past surgical history        Allergies    ibuprofen (Unknown)  Reglan (Swelling)    Intolerances        Social History:     FAMILY HISTORY:  No pertinent family history in first degree relatives  Family history of prostate cancer in father  Family history of diabetes mellitus  Family history of prostate cancer: FATHER      MEDICATIONS  (STANDING):  rasagiline Tablet 1milliGRAM(s) Oral daily  carbidopa/levodopa CR 50/200 1Tablet(s) Oral four times a day  carbidopa/levodopa/entacapone 50/200/200 1Tablet(s) Oral three times a day  QUEtiapine 50milliGRAM(s) Oral at bedtime    MEDICATIONS  (PRN):  QUEtiapine 50milliGRAM(s) Oral every 6 hours PRN agitation    Physical exam:  Pt non-cooperative and refused physical exam.   Vital Signs Last 24 Hrs - pt refusing vital signs   T(C): --  HR: --  BP: --  RR: --  SpO2: --  Wt(kg): --  CAPILLARY BLOOD GLUCOSE    I&O's Summary      LABS: refusing blood draw    RADIOLOGY & ADDITIONAL TESTS:    Assessment and Plan: 51 year old male patient with history of bipolar d/o and parkinson's disease, mood disorder, paranoia and delusional and aggressive.  .  Pt brought to [facility] by ambulance on 6/7/2017 from [facility].  Had complained of chest pain and abdominal pain. Pt, according to transfer note, was verbally aggressive, physically combative towards staff and others and a danger to himself. He is non-compliant with medication and only accepts Parkinson's meds. Transferred to [facility] for inpatient stability.      Non-organic psychosis:   as per psych recommendations.    Parkinson's Disease:  continue with sinemet, stalero, and azilect.

## 2017-06-10 NOTE — ED ADULT NURSE REASSESSMENT NOTE - STATUS
No available beds in any psychiatric facility. Pt needs a medical bed in a psychiatric facility. Pt staying in  overnight.
awaiting transfer, no change
awaiting transfer, no change

## 2017-06-10 NOTE — ED ADULT NURSE REASSESSMENT NOTE - COMFORT CARE
meal provided/wait time explained/darkened lights/warm blanket provided/plan of care explained/side rails up/repositioned/po fluids offered

## 2017-06-10 NOTE — ED BEHAVIORAL HEALTH NOTE - BEHAVIORAL HEALTH NOTE
Social work note: Pt requiring in patient psychiatric admission at this time.  Worker placed call to [facility] and spoke to Olimpia, currently no beds at [facility].  Worker placed call to [facility] and spoke to doctor on call.  Pt accepted to [facility] with attending being Geremias Evans, involuntary legals complete, and transport arranged.  MD and RN aware of transfer.

## 2017-06-11 PROCEDURE — 99232 SBSQ HOSP IP/OBS MODERATE 35: CPT

## 2017-06-11 PROCEDURE — 93010 ELECTROCARDIOGRAM REPORT: CPT

## 2017-06-11 RX ORDER — OLANZAPINE 15 MG/1
2.5 TABLET, FILM COATED ORAL ONCE
Qty: 0 | Refills: 0 | Status: COMPLETED | OUTPATIENT
Start: 2017-06-11 | End: 2017-06-11

## 2017-06-11 RX ORDER — OLANZAPINE 15 MG/1
2.5 TABLET, FILM COATED ORAL ONCE
Qty: 0 | Refills: 0 | Status: DISCONTINUED | OUTPATIENT
Start: 2017-06-11 | End: 2017-06-11

## 2017-06-11 RX ORDER — DIVALPROEX SODIUM 500 MG/1
500 TABLET, DELAYED RELEASE ORAL
Qty: 0 | Refills: 0 | Status: DISCONTINUED | OUTPATIENT
Start: 2017-06-11 | End: 2017-06-13

## 2017-06-11 RX ADMIN — CARBIDOPA AND LEVODOPA 1 TABLET(S): 25; 100 TABLET ORAL at 13:56

## 2017-06-11 RX ADMIN — CARBIDOPA AND LEVODOPA 1 TABLET(S): 25; 100 TABLET ORAL at 09:59

## 2017-06-11 RX ADMIN — CARBIDOPA AND LEVODOPA 1 TABLET(S): 25; 100 TABLET ORAL at 17:31

## 2017-06-11 RX ADMIN — RASAGILINE 1 MILLIGRAM(S): 0.5 TABLET ORAL at 09:59

## 2017-06-11 RX ADMIN — CARBIDOPA AND LEVODOPA 1 TABLET(S): 25; 100 TABLET ORAL at 22:13

## 2017-06-11 RX ADMIN — CARBIDOPA, LEVODOPA, AND ENTACAPONE 1 TABLET(S): 50; 200; 200 TABLET, FILM COATED ORAL at 09:59

## 2017-06-11 RX ADMIN — OLANZAPINE 2.5 MILLIGRAM(S): 15 TABLET, FILM COATED ORAL at 06:40

## 2017-06-11 RX ADMIN — CARBIDOPA, LEVODOPA, AND ENTACAPONE 1 TABLET(S): 50; 200; 200 TABLET, FILM COATED ORAL at 22:13

## 2017-06-11 RX ADMIN — CARBIDOPA, LEVODOPA, AND ENTACAPONE 1 TABLET(S): 50; 200; 200 TABLET, FILM COATED ORAL at 12:54

## 2017-06-11 NOTE — CHART NOTE - NSCHARTNOTEFT_GEN_A_CORE
Called to evaluate this  50 y/o male with a h/o parkinson's and copd and originally sent to [facility] from nursing home yesterday for being aggressive and combative at nursing home and returned because they could not manage his medical complaints Pt is a poor historian and he denies psychiatric illness or meds Called to evaluate this  50 y/o male with a h/o parkinson's and copd and [facility] home yesterday for being aggressive and combative at nursing home and returned because they could not manage his medical complaints Pt is a poor historian and he denies psychiatric illness or meds.   Pt extremely combative and aggressive towards staff. Pt complaining of chest pain. Unable to describe its intensity.  Refusing physical exam. Pt appears diaphoretic and anxious.  Pt stated that he "was going to have the nurse arrested for giving her a needle".  "He stated that the needle caused this chest to become tight"  Pt received zyprexa IM for anxiety.     ICU Vital Signs Last 24 Hrs  T(C): --  T(F): --97.3  HR: sitting 128 (during anxiety period) - 108 (after calmed down) (pt uanble to stand for orhtostatics)  BP: -- 106/51  BP(mean): --  ABP: --  ABP(mean): --  RR: 96 (96 - 96)  SpO2: 18% (18% - 18%)    Assessment: low suspicion for cardiac event. This appeared to be all anxiety. Recommended ED for further work up earlier but pt adamantly refused.  Pt calmed down eventually.  Stated that his chest pain has improved.  Pt now sleeping in warren chair.     Plan:   Chest pain - EKG - NSR                    Cardiac enzymes - pt refused

## 2017-06-12 PROCEDURE — 99232 SBSQ HOSP IP/OBS MODERATE 35: CPT

## 2017-06-12 RX ORDER — ACETAMINOPHEN 500 MG
650 TABLET ORAL ONCE
Qty: 0 | Refills: 0 | Status: COMPLETED | OUTPATIENT
Start: 2017-06-12 | End: 2017-06-12

## 2017-06-12 RX ORDER — IBUPROFEN 200 MG
600 TABLET ORAL EVERY 6 HOURS
Qty: 0 | Refills: 0 | Status: DISCONTINUED | OUTPATIENT
Start: 2017-06-12 | End: 2017-06-12

## 2017-06-12 RX ORDER — LIDOCAINE 4 G/100G
1 CREAM TOPICAL DAILY
Qty: 0 | Refills: 0 | Status: DISCONTINUED | OUTPATIENT
Start: 2017-06-12 | End: 2017-06-23

## 2017-06-12 RX ORDER — ACETAMINOPHEN 500 MG
650 TABLET ORAL EVERY 6 HOURS
Qty: 0 | Refills: 0 | Status: DISCONTINUED | OUTPATIENT
Start: 2017-06-12 | End: 2017-07-12

## 2017-06-12 RX ADMIN — LIDOCAINE 1 PATCH: 4 CREAM TOPICAL at 16:51

## 2017-06-12 RX ADMIN — CARBIDOPA, LEVODOPA, AND ENTACAPONE 1 TABLET(S): 50; 200; 200 TABLET, FILM COATED ORAL at 21:03

## 2017-06-12 RX ADMIN — Medication 1 MILLIGRAM(S): at 17:23

## 2017-06-12 RX ADMIN — Medication 650 MILLIGRAM(S): at 14:30

## 2017-06-12 RX ADMIN — RASAGILINE 1 MILLIGRAM(S): 0.5 TABLET ORAL at 08:09

## 2017-06-12 RX ADMIN — CARBIDOPA, LEVODOPA, AND ENTACAPONE 1 TABLET(S): 50; 200; 200 TABLET, FILM COATED ORAL at 08:09

## 2017-06-12 RX ADMIN — Medication 650 MILLIGRAM(S): at 18:18

## 2017-06-12 RX ADMIN — Medication 650 MILLIGRAM(S): at 18:48

## 2017-06-12 RX ADMIN — CARBIDOPA AND LEVODOPA 1 TABLET(S): 25; 100 TABLET ORAL at 08:09

## 2017-06-12 RX ADMIN — CARBIDOPA AND LEVODOPA 1 TABLET(S): 25; 100 TABLET ORAL at 16:28

## 2017-06-12 RX ADMIN — Medication 650 MILLIGRAM(S): at 13:58

## 2017-06-12 RX ADMIN — CARBIDOPA AND LEVODOPA 1 TABLET(S): 25; 100 TABLET ORAL at 21:03

## 2017-06-12 RX ADMIN — CARBIDOPA, LEVODOPA, AND ENTACAPONE 1 TABLET(S): 50; 200; 200 TABLET, FILM COATED ORAL at 12:35

## 2017-06-12 RX ADMIN — CARBIDOPA AND LEVODOPA 1 TABLET(S): 25; 100 TABLET ORAL at 12:35

## 2017-06-13 PROCEDURE — 99232 SBSQ HOSP IP/OBS MODERATE 35: CPT

## 2017-06-13 RX ORDER — DIVALPROEX SODIUM 500 MG/1
250 TABLET, DELAYED RELEASE ORAL
Qty: 0 | Refills: 0 | Status: DISCONTINUED | OUTPATIENT
Start: 2017-06-13 | End: 2017-06-16

## 2017-06-13 RX ADMIN — Medication 650 MILLIGRAM(S): at 14:51

## 2017-06-13 RX ADMIN — CARBIDOPA AND LEVODOPA 1 TABLET(S): 25; 100 TABLET ORAL at 20:44

## 2017-06-13 RX ADMIN — CARBIDOPA, LEVODOPA, AND ENTACAPONE 1 TABLET(S): 50; 200; 200 TABLET, FILM COATED ORAL at 13:11

## 2017-06-13 RX ADMIN — CARBIDOPA AND LEVODOPA 1 TABLET(S): 25; 100 TABLET ORAL at 08:55

## 2017-06-13 RX ADMIN — Medication 650 MILLIGRAM(S): at 13:30

## 2017-06-13 RX ADMIN — CARBIDOPA AND LEVODOPA 1 TABLET(S): 25; 100 TABLET ORAL at 16:47

## 2017-06-13 RX ADMIN — CARBIDOPA AND LEVODOPA 1 TABLET(S): 25; 100 TABLET ORAL at 13:11

## 2017-06-13 RX ADMIN — DIVALPROEX SODIUM 250 MILLIGRAM(S): 500 TABLET, DELAYED RELEASE ORAL at 16:47

## 2017-06-13 RX ADMIN — CARBIDOPA, LEVODOPA, AND ENTACAPONE 1 TABLET(S): 50; 200; 200 TABLET, FILM COATED ORAL at 08:55

## 2017-06-13 RX ADMIN — Medication 650 MILLIGRAM(S): at 23:30

## 2017-06-13 RX ADMIN — CARBIDOPA, LEVODOPA, AND ENTACAPONE 1 TABLET(S): 50; 200; 200 TABLET, FILM COATED ORAL at 20:45

## 2017-06-13 RX ADMIN — RASAGILINE 1 MILLIGRAM(S): 0.5 TABLET ORAL at 08:55

## 2017-06-14 PROCEDURE — 99232 SBSQ HOSP IP/OBS MODERATE 35: CPT

## 2017-06-14 RX ADMIN — CARBIDOPA AND LEVODOPA 1 TABLET(S): 25; 100 TABLET ORAL at 21:02

## 2017-06-14 RX ADMIN — RASAGILINE 1 MILLIGRAM(S): 0.5 TABLET ORAL at 07:46

## 2017-06-14 RX ADMIN — CARBIDOPA AND LEVODOPA 1 TABLET(S): 25; 100 TABLET ORAL at 13:14

## 2017-06-14 RX ADMIN — CARBIDOPA AND LEVODOPA 1 TABLET(S): 25; 100 TABLET ORAL at 07:46

## 2017-06-14 RX ADMIN — CARBIDOPA, LEVODOPA, AND ENTACAPONE 1 TABLET(S): 50; 200; 200 TABLET, FILM COATED ORAL at 13:14

## 2017-06-14 RX ADMIN — Medication 650 MILLIGRAM(S): at 12:36

## 2017-06-14 RX ADMIN — CARBIDOPA AND LEVODOPA 1 TABLET(S): 25; 100 TABLET ORAL at 16:47

## 2017-06-14 RX ADMIN — Medication 650 MILLIGRAM(S): at 16:45

## 2017-06-14 RX ADMIN — Medication 650 MILLIGRAM(S): at 07:45

## 2017-06-14 RX ADMIN — CARBIDOPA, LEVODOPA, AND ENTACAPONE 1 TABLET(S): 50; 200; 200 TABLET, FILM COATED ORAL at 21:02

## 2017-06-14 RX ADMIN — Medication 650 MILLIGRAM(S): at 19:25

## 2017-06-14 RX ADMIN — Medication 650 MILLIGRAM(S): at 01:11

## 2017-06-14 RX ADMIN — CARBIDOPA, LEVODOPA, AND ENTACAPONE 1 TABLET(S): 50; 200; 200 TABLET, FILM COATED ORAL at 07:46

## 2017-06-15 PROCEDURE — 99232 SBSQ HOSP IP/OBS MODERATE 35: CPT

## 2017-06-15 RX ADMIN — CARBIDOPA, LEVODOPA, AND ENTACAPONE 1 TABLET(S): 50; 200; 200 TABLET, FILM COATED ORAL at 20:30

## 2017-06-15 RX ADMIN — DIVALPROEX SODIUM 250 MILLIGRAM(S): 500 TABLET, DELAYED RELEASE ORAL at 16:46

## 2017-06-15 RX ADMIN — CARBIDOPA AND LEVODOPA 1 TABLET(S): 25; 100 TABLET ORAL at 12:28

## 2017-06-15 RX ADMIN — CARBIDOPA AND LEVODOPA 1 TABLET(S): 25; 100 TABLET ORAL at 08:43

## 2017-06-15 RX ADMIN — CARBIDOPA, LEVODOPA, AND ENTACAPONE 1 TABLET(S): 50; 200; 200 TABLET, FILM COATED ORAL at 12:28

## 2017-06-15 RX ADMIN — CARBIDOPA AND LEVODOPA 1 TABLET(S): 25; 100 TABLET ORAL at 20:30

## 2017-06-15 RX ADMIN — CARBIDOPA, LEVODOPA, AND ENTACAPONE 1 TABLET(S): 50; 200; 200 TABLET, FILM COATED ORAL at 08:43

## 2017-06-15 RX ADMIN — RASAGILINE 1 MILLIGRAM(S): 0.5 TABLET ORAL at 08:43

## 2017-06-15 RX ADMIN — CARBIDOPA AND LEVODOPA 1 TABLET(S): 25; 100 TABLET ORAL at 16:46

## 2017-06-15 RX ADMIN — Medication 650 MILLIGRAM(S): at 09:27

## 2017-06-16 PROCEDURE — 99232 SBSQ HOSP IP/OBS MODERATE 35: CPT

## 2017-06-16 RX ORDER — DIVALPROEX SODIUM 500 MG/1
125 TABLET, DELAYED RELEASE ORAL
Qty: 0 | Refills: 0 | Status: DISCONTINUED | OUTPATIENT
Start: 2017-06-16 | End: 2017-06-19

## 2017-06-16 RX ORDER — DIVALPROEX SODIUM 500 MG/1
125 TABLET, DELAYED RELEASE ORAL ONCE
Qty: 0 | Refills: 0 | Status: COMPLETED | OUTPATIENT
Start: 2017-06-16 | End: 2017-06-16

## 2017-06-16 RX ADMIN — CARBIDOPA AND LEVODOPA 1 TABLET(S): 25; 100 TABLET ORAL at 13:17

## 2017-06-16 RX ADMIN — CARBIDOPA, LEVODOPA, AND ENTACAPONE 1 TABLET(S): 50; 200; 200 TABLET, FILM COATED ORAL at 09:32

## 2017-06-16 RX ADMIN — CARBIDOPA AND LEVODOPA 1 TABLET(S): 25; 100 TABLET ORAL at 21:06

## 2017-06-16 RX ADMIN — CARBIDOPA AND LEVODOPA 1 TABLET(S): 25; 100 TABLET ORAL at 17:00

## 2017-06-16 RX ADMIN — DIVALPROEX SODIUM 125 MILLIGRAM(S): 500 TABLET, DELAYED RELEASE ORAL at 09:32

## 2017-06-16 RX ADMIN — CARBIDOPA, LEVODOPA, AND ENTACAPONE 1 TABLET(S): 50; 200; 200 TABLET, FILM COATED ORAL at 13:18

## 2017-06-16 RX ADMIN — CARBIDOPA AND LEVODOPA 1 TABLET(S): 25; 100 TABLET ORAL at 09:32

## 2017-06-16 RX ADMIN — RASAGILINE 1 MILLIGRAM(S): 0.5 TABLET ORAL at 12:57

## 2017-06-16 RX ADMIN — DIVALPROEX SODIUM 125 MILLIGRAM(S): 500 TABLET, DELAYED RELEASE ORAL at 21:06

## 2017-06-16 RX ADMIN — CARBIDOPA, LEVODOPA, AND ENTACAPONE 1 TABLET(S): 50; 200; 200 TABLET, FILM COATED ORAL at 21:06

## 2017-06-17 PROCEDURE — 99232 SBSQ HOSP IP/OBS MODERATE 35: CPT

## 2017-06-17 RX ADMIN — CARBIDOPA AND LEVODOPA 1 TABLET(S): 25; 100 TABLET ORAL at 16:45

## 2017-06-17 RX ADMIN — CARBIDOPA, LEVODOPA, AND ENTACAPONE 1 TABLET(S): 50; 200; 200 TABLET, FILM COATED ORAL at 20:56

## 2017-06-17 RX ADMIN — CARBIDOPA AND LEVODOPA 1 TABLET(S): 25; 100 TABLET ORAL at 13:16

## 2017-06-17 RX ADMIN — CARBIDOPA, LEVODOPA, AND ENTACAPONE 1 TABLET(S): 50; 200; 200 TABLET, FILM COATED ORAL at 08:40

## 2017-06-17 RX ADMIN — Medication 650 MILLIGRAM(S): at 16:45

## 2017-06-17 RX ADMIN — CARBIDOPA AND LEVODOPA 1 TABLET(S): 25; 100 TABLET ORAL at 20:56

## 2017-06-17 RX ADMIN — CARBIDOPA, LEVODOPA, AND ENTACAPONE 1 TABLET(S): 50; 200; 200 TABLET, FILM COATED ORAL at 13:16

## 2017-06-17 RX ADMIN — DIVALPROEX SODIUM 125 MILLIGRAM(S): 500 TABLET, DELAYED RELEASE ORAL at 20:56

## 2017-06-17 RX ADMIN — Medication 650 MILLIGRAM(S): at 14:33

## 2017-06-17 RX ADMIN — CARBIDOPA AND LEVODOPA 1 TABLET(S): 25; 100 TABLET ORAL at 08:40

## 2017-06-17 RX ADMIN — RASAGILINE 1 MILLIGRAM(S): 0.5 TABLET ORAL at 08:40

## 2017-06-18 PROCEDURE — 99232 SBSQ HOSP IP/OBS MODERATE 35: CPT

## 2017-06-18 RX ADMIN — CARBIDOPA AND LEVODOPA 1 TABLET(S): 25; 100 TABLET ORAL at 09:16

## 2017-06-18 RX ADMIN — CARBIDOPA AND LEVODOPA 1 TABLET(S): 25; 100 TABLET ORAL at 21:02

## 2017-06-18 RX ADMIN — RASAGILINE 1 MILLIGRAM(S): 0.5 TABLET ORAL at 09:16

## 2017-06-18 RX ADMIN — CARBIDOPA, LEVODOPA, AND ENTACAPONE 1 TABLET(S): 50; 200; 200 TABLET, FILM COATED ORAL at 21:02

## 2017-06-18 RX ADMIN — DIVALPROEX SODIUM 125 MILLIGRAM(S): 500 TABLET, DELAYED RELEASE ORAL at 13:01

## 2017-06-18 RX ADMIN — Medication 650 MILLIGRAM(S): at 17:18

## 2017-06-18 RX ADMIN — CARBIDOPA AND LEVODOPA 1 TABLET(S): 25; 100 TABLET ORAL at 17:18

## 2017-06-18 RX ADMIN — CARBIDOPA AND LEVODOPA 1 TABLET(S): 25; 100 TABLET ORAL at 13:00

## 2017-06-18 RX ADMIN — CARBIDOPA, LEVODOPA, AND ENTACAPONE 1 TABLET(S): 50; 200; 200 TABLET, FILM COATED ORAL at 09:16

## 2017-06-18 RX ADMIN — DIVALPROEX SODIUM 125 MILLIGRAM(S): 500 TABLET, DELAYED RELEASE ORAL at 21:09

## 2017-06-18 RX ADMIN — CARBIDOPA, LEVODOPA, AND ENTACAPONE 1 TABLET(S): 50; 200; 200 TABLET, FILM COATED ORAL at 13:00

## 2017-06-19 PROCEDURE — 99232 SBSQ HOSP IP/OBS MODERATE 35: CPT

## 2017-06-19 RX ORDER — DIVALPROEX SODIUM 500 MG/1
250 TABLET, DELAYED RELEASE ORAL
Qty: 0 | Refills: 0 | Status: DISCONTINUED | OUTPATIENT
Start: 2017-06-19 | End: 2017-06-21

## 2017-06-19 RX ORDER — SIMETHICONE 80 MG/1
80 TABLET, CHEWABLE ORAL
Qty: 0 | Refills: 0 | Status: DISCONTINUED | OUTPATIENT
Start: 2017-06-19 | End: 2017-07-12

## 2017-06-19 RX ORDER — SIMETHICONE 80 MG/1
80 TABLET, CHEWABLE ORAL EVERY 8 HOURS
Qty: 0 | Refills: 0 | Status: DISCONTINUED | OUTPATIENT
Start: 2017-06-19 | End: 2017-06-19

## 2017-06-19 RX ADMIN — CARBIDOPA AND LEVODOPA 1 TABLET(S): 25; 100 TABLET ORAL at 20:50

## 2017-06-19 RX ADMIN — CARBIDOPA, LEVODOPA, AND ENTACAPONE 1 TABLET(S): 50; 200; 200 TABLET, FILM COATED ORAL at 20:50

## 2017-06-19 RX ADMIN — DIVALPROEX SODIUM 250 MILLIGRAM(S): 500 TABLET, DELAYED RELEASE ORAL at 20:50

## 2017-06-19 RX ADMIN — CARBIDOPA, LEVODOPA, AND ENTACAPONE 1 TABLET(S): 50; 200; 200 TABLET, FILM COATED ORAL at 08:40

## 2017-06-19 RX ADMIN — CARBIDOPA AND LEVODOPA 1 TABLET(S): 25; 100 TABLET ORAL at 12:27

## 2017-06-19 RX ADMIN — CARBIDOPA, LEVODOPA, AND ENTACAPONE 1 TABLET(S): 50; 200; 200 TABLET, FILM COATED ORAL at 12:27

## 2017-06-19 RX ADMIN — RASAGILINE 1 MILLIGRAM(S): 0.5 TABLET ORAL at 08:40

## 2017-06-19 RX ADMIN — Medication 650 MILLIGRAM(S): at 23:45

## 2017-06-19 RX ADMIN — CARBIDOPA AND LEVODOPA 1 TABLET(S): 25; 100 TABLET ORAL at 17:13

## 2017-06-19 RX ADMIN — SIMETHICONE 80 MILLIGRAM(S): 80 TABLET, CHEWABLE ORAL at 21:45

## 2017-06-19 RX ADMIN — CARBIDOPA AND LEVODOPA 1 TABLET(S): 25; 100 TABLET ORAL at 08:40

## 2017-06-19 RX ADMIN — DIVALPROEX SODIUM 125 MILLIGRAM(S): 500 TABLET, DELAYED RELEASE ORAL at 17:14

## 2017-06-20 PROCEDURE — 99232 SBSQ HOSP IP/OBS MODERATE 35: CPT

## 2017-06-20 RX ADMIN — RASAGILINE 1 MILLIGRAM(S): 0.5 TABLET ORAL at 08:23

## 2017-06-20 RX ADMIN — CARBIDOPA AND LEVODOPA 1 TABLET(S): 25; 100 TABLET ORAL at 20:34

## 2017-06-20 RX ADMIN — CARBIDOPA, LEVODOPA, AND ENTACAPONE 1 TABLET(S): 50; 200; 200 TABLET, FILM COATED ORAL at 12:34

## 2017-06-20 RX ADMIN — CARBIDOPA AND LEVODOPA 1 TABLET(S): 25; 100 TABLET ORAL at 08:23

## 2017-06-20 RX ADMIN — CARBIDOPA AND LEVODOPA 1 TABLET(S): 25; 100 TABLET ORAL at 17:05

## 2017-06-20 RX ADMIN — DIVALPROEX SODIUM 250 MILLIGRAM(S): 500 TABLET, DELAYED RELEASE ORAL at 21:15

## 2017-06-20 RX ADMIN — CARBIDOPA, LEVODOPA, AND ENTACAPONE 1 TABLET(S): 50; 200; 200 TABLET, FILM COATED ORAL at 20:34

## 2017-06-20 RX ADMIN — Medication 650 MILLIGRAM(S): at 00:25

## 2017-06-20 RX ADMIN — CARBIDOPA, LEVODOPA, AND ENTACAPONE 1 TABLET(S): 50; 200; 200 TABLET, FILM COATED ORAL at 08:23

## 2017-06-20 RX ADMIN — CARBIDOPA AND LEVODOPA 1 TABLET(S): 25; 100 TABLET ORAL at 12:34

## 2017-06-21 PROCEDURE — 99232 SBSQ HOSP IP/OBS MODERATE 35: CPT

## 2017-06-21 RX ORDER — DIVALPROEX SODIUM 500 MG/1
500 TABLET, DELAYED RELEASE ORAL AT BEDTIME
Qty: 0 | Refills: 0 | Status: DISCONTINUED | OUTPATIENT
Start: 2017-06-21 | End: 2017-06-22

## 2017-06-21 RX ADMIN — Medication 650 MILLIGRAM(S): at 12:49

## 2017-06-21 RX ADMIN — CARBIDOPA, LEVODOPA, AND ENTACAPONE 1 TABLET(S): 50; 200; 200 TABLET, FILM COATED ORAL at 12:38

## 2017-06-21 RX ADMIN — CARBIDOPA AND LEVODOPA 1 TABLET(S): 25; 100 TABLET ORAL at 12:38

## 2017-06-21 RX ADMIN — DIVALPROEX SODIUM 500 MILLIGRAM(S): 500 TABLET, DELAYED RELEASE ORAL at 22:30

## 2017-06-21 RX ADMIN — Medication 650 MILLIGRAM(S): at 00:00

## 2017-06-21 RX ADMIN — CARBIDOPA AND LEVODOPA 1 TABLET(S): 25; 100 TABLET ORAL at 22:29

## 2017-06-21 RX ADMIN — CARBIDOPA AND LEVODOPA 1 TABLET(S): 25; 100 TABLET ORAL at 09:37

## 2017-06-21 RX ADMIN — CARBIDOPA, LEVODOPA, AND ENTACAPONE 1 TABLET(S): 50; 200; 200 TABLET, FILM COATED ORAL at 09:37

## 2017-06-21 RX ADMIN — CARBIDOPA AND LEVODOPA 1 TABLET(S): 25; 100 TABLET ORAL at 16:49

## 2017-06-21 RX ADMIN — CARBIDOPA, LEVODOPA, AND ENTACAPONE 1 TABLET(S): 50; 200; 200 TABLET, FILM COATED ORAL at 22:30

## 2017-06-21 RX ADMIN — RASAGILINE 1 MILLIGRAM(S): 0.5 TABLET ORAL at 09:37

## 2017-06-22 PROCEDURE — 99232 SBSQ HOSP IP/OBS MODERATE 35: CPT

## 2017-06-22 RX ORDER — DIVALPROEX SODIUM 500 MG/1
750 TABLET, DELAYED RELEASE ORAL AT BEDTIME
Qty: 0 | Refills: 0 | Status: DISCONTINUED | OUTPATIENT
Start: 2017-06-22 | End: 2017-06-23

## 2017-06-22 RX ADMIN — CARBIDOPA, LEVODOPA, AND ENTACAPONE 1 TABLET(S): 50; 200; 200 TABLET, FILM COATED ORAL at 08:21

## 2017-06-22 RX ADMIN — RASAGILINE 1 MILLIGRAM(S): 0.5 TABLET ORAL at 08:21

## 2017-06-22 RX ADMIN — CARBIDOPA AND LEVODOPA 1 TABLET(S): 25; 100 TABLET ORAL at 12:00

## 2017-06-22 RX ADMIN — Medication 1 MILLIGRAM(S): at 02:39

## 2017-06-22 RX ADMIN — CARBIDOPA AND LEVODOPA 1 TABLET(S): 25; 100 TABLET ORAL at 20:47

## 2017-06-22 RX ADMIN — DIVALPROEX SODIUM 750 MILLIGRAM(S): 500 TABLET, DELAYED RELEASE ORAL at 20:47

## 2017-06-22 RX ADMIN — CARBIDOPA, LEVODOPA, AND ENTACAPONE 1 TABLET(S): 50; 200; 200 TABLET, FILM COATED ORAL at 12:00

## 2017-06-22 RX ADMIN — Medication 650 MILLIGRAM(S): at 02:40

## 2017-06-22 RX ADMIN — Medication 650 MILLIGRAM(S): at 12:36

## 2017-06-22 RX ADMIN — CARBIDOPA, LEVODOPA, AND ENTACAPONE 1 TABLET(S): 50; 200; 200 TABLET, FILM COATED ORAL at 20:47

## 2017-06-22 RX ADMIN — CARBIDOPA AND LEVODOPA 1 TABLET(S): 25; 100 TABLET ORAL at 16:17

## 2017-06-22 RX ADMIN — CARBIDOPA AND LEVODOPA 1 TABLET(S): 25; 100 TABLET ORAL at 08:21

## 2017-06-23 PROCEDURE — 99232 SBSQ HOSP IP/OBS MODERATE 35: CPT

## 2017-06-23 PROCEDURE — 93010 ELECTROCARDIOGRAM REPORT: CPT

## 2017-06-23 RX ORDER — DIVALPROEX SODIUM 500 MG/1
1000 TABLET, DELAYED RELEASE ORAL AT BEDTIME
Qty: 0 | Refills: 0 | Status: DISCONTINUED | OUTPATIENT
Start: 2017-06-23 | End: 2017-06-26

## 2017-06-23 RX ADMIN — CARBIDOPA AND LEVODOPA 1 TABLET(S): 25; 100 TABLET ORAL at 21:01

## 2017-06-23 RX ADMIN — Medication 650 MILLIGRAM(S): at 02:40

## 2017-06-23 RX ADMIN — Medication 650 MILLIGRAM(S): at 15:42

## 2017-06-23 RX ADMIN — RASAGILINE 1 MILLIGRAM(S): 0.5 TABLET ORAL at 10:22

## 2017-06-23 RX ADMIN — Medication 650 MILLIGRAM(S): at 06:06

## 2017-06-23 RX ADMIN — Medication 650 MILLIGRAM(S): at 21:04

## 2017-06-23 RX ADMIN — CARBIDOPA AND LEVODOPA 1 TABLET(S): 25; 100 TABLET ORAL at 16:17

## 2017-06-23 RX ADMIN — Medication 650 MILLIGRAM(S): at 03:30

## 2017-06-23 RX ADMIN — CARBIDOPA, LEVODOPA, AND ENTACAPONE 1 TABLET(S): 50; 200; 200 TABLET, FILM COATED ORAL at 13:09

## 2017-06-23 RX ADMIN — CARBIDOPA AND LEVODOPA 1 TABLET(S): 25; 100 TABLET ORAL at 09:09

## 2017-06-23 RX ADMIN — CARBIDOPA, LEVODOPA, AND ENTACAPONE 1 TABLET(S): 50; 200; 200 TABLET, FILM COATED ORAL at 21:01

## 2017-06-23 RX ADMIN — Medication 650 MILLIGRAM(S): at 09:09

## 2017-06-23 RX ADMIN — Medication 650 MILLIGRAM(S): at 22:01

## 2017-06-23 RX ADMIN — CARBIDOPA AND LEVODOPA 1 TABLET(S): 25; 100 TABLET ORAL at 13:09

## 2017-06-23 RX ADMIN — CARBIDOPA, LEVODOPA, AND ENTACAPONE 1 TABLET(S): 50; 200; 200 TABLET, FILM COATED ORAL at 09:09

## 2017-06-23 NOTE — CHART NOTE - NSCHARTNOTEFT_GEN_A_CORE
52 yr. old male was seen at 3:00 due to c/o of chest pain for the second time tonight. Upon arrival to the unit, the pt. was speaking with the ADN. He did not want to speak to me, get an ECG, or to be examined by me. He refused to answer any questions regarding his chest pain.    A/P: 52 yr old male c/o chest pain. Pt. refused to answer questions or to be examined. The nurses were told to continue to monitor me and to call me if it continues.

## 2017-06-23 NOTE — CHART NOTE - NSCHARTNOTEFT_GEN_A_CORE
52 yr. old male with a PMH of diastolic HF, Migraines, and Parkinson disease was c/o chest pain. At around 8:30PM the pt. informed the nurse that he had chest pain when he spoke. Per the pt. the pain was located in the center of his chest, rated as 8/10, and denied radiation. He refused for me to examine him and the ECG. He stated that he was upset and stressed that he is not going to be able to go to his daughter's high school graduation and that he wanted to be left alone.     Physical:   Vitals: BP: 115/69 HR:90 RR: 18 Sp02: 96 on room air  General: NAD, seated in warren chair   Pt. refused the remainder of the physical and ECG    A/P: 52 yr. old male with possible somatic chest pain. The pt. refused the ECG, physical assessment, and pain medications. The nurses were informed to continue to monitor him and inform me ASAP if he continued to complain or if there is any change in his status.

## 2017-06-24 PROCEDURE — 99231 SBSQ HOSP IP/OBS SF/LOW 25: CPT

## 2017-06-24 RX ORDER — BACITRACIN ZINC 500 UNIT/G
1 OINTMENT IN PACKET (EA) TOPICAL THREE TIMES A DAY
Qty: 0 | Refills: 0 | Status: DISCONTINUED | OUTPATIENT
Start: 2017-06-24 | End: 2017-07-05

## 2017-06-24 RX ADMIN — CARBIDOPA AND LEVODOPA 1 TABLET(S): 25; 100 TABLET ORAL at 09:03

## 2017-06-24 RX ADMIN — CARBIDOPA AND LEVODOPA 1 TABLET(S): 25; 100 TABLET ORAL at 21:19

## 2017-06-24 RX ADMIN — CARBIDOPA AND LEVODOPA 1 TABLET(S): 25; 100 TABLET ORAL at 18:08

## 2017-06-24 RX ADMIN — CARBIDOPA, LEVODOPA, AND ENTACAPONE 1 TABLET(S): 50; 200; 200 TABLET, FILM COATED ORAL at 21:20

## 2017-06-24 RX ADMIN — CARBIDOPA, LEVODOPA, AND ENTACAPONE 1 TABLET(S): 50; 200; 200 TABLET, FILM COATED ORAL at 09:03

## 2017-06-24 RX ADMIN — Medication 1 APPLICATION(S): at 21:22

## 2017-06-24 RX ADMIN — CARBIDOPA AND LEVODOPA 1 TABLET(S): 25; 100 TABLET ORAL at 12:01

## 2017-06-24 RX ADMIN — Medication 650 MILLIGRAM(S): at 14:36

## 2017-06-24 RX ADMIN — CARBIDOPA, LEVODOPA, AND ENTACAPONE 1 TABLET(S): 50; 200; 200 TABLET, FILM COATED ORAL at 12:01

## 2017-06-24 RX ADMIN — RASAGILINE 1 MILLIGRAM(S): 0.5 TABLET ORAL at 11:59

## 2017-06-24 RX ADMIN — Medication 1 APPLICATION(S): at 12:01

## 2017-06-24 RX ADMIN — Medication 650 MILLIGRAM(S): at 14:40

## 2017-06-25 PROCEDURE — 99231 SBSQ HOSP IP/OBS SF/LOW 25: CPT

## 2017-06-25 RX ADMIN — CARBIDOPA AND LEVODOPA 1 TABLET(S): 25; 100 TABLET ORAL at 21:26

## 2017-06-25 RX ADMIN — CARBIDOPA, LEVODOPA, AND ENTACAPONE 1 TABLET(S): 50; 200; 200 TABLET, FILM COATED ORAL at 21:26

## 2017-06-25 RX ADMIN — Medication 650 MILLIGRAM(S): at 22:16

## 2017-06-25 RX ADMIN — Medication 650 MILLIGRAM(S): at 08:16

## 2017-06-25 RX ADMIN — CARBIDOPA, LEVODOPA, AND ENTACAPONE 1 TABLET(S): 50; 200; 200 TABLET, FILM COATED ORAL at 11:49

## 2017-06-25 RX ADMIN — Medication 650 MILLIGRAM(S): at 09:15

## 2017-06-25 RX ADMIN — CARBIDOPA AND LEVODOPA 1 TABLET(S): 25; 100 TABLET ORAL at 08:16

## 2017-06-25 RX ADMIN — CARBIDOPA AND LEVODOPA 1 TABLET(S): 25; 100 TABLET ORAL at 11:49

## 2017-06-25 RX ADMIN — RASAGILINE 1 MILLIGRAM(S): 0.5 TABLET ORAL at 11:50

## 2017-06-25 RX ADMIN — Medication 650 MILLIGRAM(S): at 21:45

## 2017-06-25 RX ADMIN — CARBIDOPA AND LEVODOPA 1 TABLET(S): 25; 100 TABLET ORAL at 17:16

## 2017-06-25 RX ADMIN — CARBIDOPA, LEVODOPA, AND ENTACAPONE 1 TABLET(S): 50; 200; 200 TABLET, FILM COATED ORAL at 08:16

## 2017-06-26 PROCEDURE — 99232 SBSQ HOSP IP/OBS MODERATE 35: CPT

## 2017-06-26 RX ORDER — DIVALPROEX SODIUM 500 MG/1
500 TABLET, DELAYED RELEASE ORAL
Qty: 0 | Refills: 0 | Status: DISCONTINUED | OUTPATIENT
Start: 2017-06-27 | End: 2017-07-03

## 2017-06-26 RX ORDER — DIVALPROEX SODIUM 500 MG/1
500 TABLET, DELAYED RELEASE ORAL AT BEDTIME
Qty: 0 | Refills: 0 | Status: COMPLETED | OUTPATIENT
Start: 2017-06-26 | End: 2017-06-26

## 2017-06-26 RX ADMIN — CARBIDOPA AND LEVODOPA 1 TABLET(S): 25; 100 TABLET ORAL at 17:21

## 2017-06-26 RX ADMIN — CARBIDOPA AND LEVODOPA 1 TABLET(S): 25; 100 TABLET ORAL at 09:31

## 2017-06-26 RX ADMIN — Medication 30 MILLILITER(S): at 09:30

## 2017-06-26 RX ADMIN — CARBIDOPA AND LEVODOPA 1 TABLET(S): 25; 100 TABLET ORAL at 13:04

## 2017-06-26 RX ADMIN — CARBIDOPA, LEVODOPA, AND ENTACAPONE 1 TABLET(S): 50; 200; 200 TABLET, FILM COATED ORAL at 22:25

## 2017-06-26 RX ADMIN — Medication 1 APPLICATION(S): at 22:25

## 2017-06-26 RX ADMIN — CARBIDOPA, LEVODOPA, AND ENTACAPONE 1 TABLET(S): 50; 200; 200 TABLET, FILM COATED ORAL at 09:30

## 2017-06-26 RX ADMIN — CARBIDOPA AND LEVODOPA 1 TABLET(S): 25; 100 TABLET ORAL at 22:25

## 2017-06-26 RX ADMIN — CARBIDOPA, LEVODOPA, AND ENTACAPONE 1 TABLET(S): 50; 200; 200 TABLET, FILM COATED ORAL at 13:04

## 2017-06-26 RX ADMIN — Medication 650 MILLIGRAM(S): at 09:45

## 2017-06-26 RX ADMIN — Medication 650 MILLIGRAM(S): at 13:34

## 2017-06-26 RX ADMIN — Medication 650 MILLIGRAM(S): at 23:15

## 2017-06-26 RX ADMIN — Medication 650 MILLIGRAM(S): at 22:10

## 2017-06-27 PROCEDURE — 99232 SBSQ HOSP IP/OBS MODERATE 35: CPT

## 2017-06-27 RX ORDER — QUETIAPINE FUMARATE 200 MG/1
50 TABLET, FILM COATED ORAL AT BEDTIME
Qty: 0 | Refills: 0 | Status: DISCONTINUED | OUTPATIENT
Start: 2017-06-27 | End: 2017-07-03

## 2017-06-27 RX ORDER — OLANZAPINE 15 MG/1
2.5 TABLET, FILM COATED ORAL ONCE
Qty: 0 | Refills: 0 | Status: DISCONTINUED | OUTPATIENT
Start: 2017-06-27 | End: 2017-07-12

## 2017-06-27 RX ADMIN — DIVALPROEX SODIUM 500 MILLIGRAM(S): 500 TABLET, DELAYED RELEASE ORAL at 22:49

## 2017-06-27 RX ADMIN — CARBIDOPA, LEVODOPA, AND ENTACAPONE 1 TABLET(S): 50; 200; 200 TABLET, FILM COATED ORAL at 12:09

## 2017-06-27 RX ADMIN — CARBIDOPA AND LEVODOPA 1 TABLET(S): 25; 100 TABLET ORAL at 12:09

## 2017-06-27 RX ADMIN — Medication 650 MILLIGRAM(S): at 11:46

## 2017-06-27 RX ADMIN — CARBIDOPA AND LEVODOPA 1 TABLET(S): 25; 100 TABLET ORAL at 17:31

## 2017-06-27 RX ADMIN — Medication 650 MILLIGRAM(S): at 23:15

## 2017-06-27 RX ADMIN — Medication 650 MILLIGRAM(S): at 08:00

## 2017-06-27 RX ADMIN — CARBIDOPA AND LEVODOPA 1 TABLET(S): 25; 100 TABLET ORAL at 07:53

## 2017-06-27 RX ADMIN — CARBIDOPA, LEVODOPA, AND ENTACAPONE 1 TABLET(S): 50; 200; 200 TABLET, FILM COATED ORAL at 22:13

## 2017-06-27 RX ADMIN — CARBIDOPA, LEVODOPA, AND ENTACAPONE 1 TABLET(S): 50; 200; 200 TABLET, FILM COATED ORAL at 07:53

## 2017-06-27 RX ADMIN — Medication 1 APPLICATION(S): at 22:12

## 2017-06-27 RX ADMIN — CARBIDOPA AND LEVODOPA 1 TABLET(S): 25; 100 TABLET ORAL at 22:13

## 2017-06-28 PROCEDURE — 99232 SBSQ HOSP IP/OBS MODERATE 35: CPT

## 2017-06-28 RX ADMIN — CARBIDOPA AND LEVODOPA 1 TABLET(S): 25; 100 TABLET ORAL at 08:58

## 2017-06-28 RX ADMIN — Medication 650 MILLIGRAM(S): at 17:48

## 2017-06-28 RX ADMIN — CARBIDOPA, LEVODOPA, AND ENTACAPONE 1 TABLET(S): 50; 200; 200 TABLET, FILM COATED ORAL at 14:38

## 2017-06-28 RX ADMIN — Medication 650 MILLIGRAM(S): at 06:20

## 2017-06-28 RX ADMIN — Medication 1 APPLICATION(S): at 21:35

## 2017-06-28 RX ADMIN — CARBIDOPA, LEVODOPA, AND ENTACAPONE 1 TABLET(S): 50; 200; 200 TABLET, FILM COATED ORAL at 21:35

## 2017-06-28 RX ADMIN — CARBIDOPA, LEVODOPA, AND ENTACAPONE 1 TABLET(S): 50; 200; 200 TABLET, FILM COATED ORAL at 09:23

## 2017-06-28 RX ADMIN — Medication 1 APPLICATION(S): at 08:58

## 2017-06-28 RX ADMIN — CARBIDOPA AND LEVODOPA 1 TABLET(S): 25; 100 TABLET ORAL at 17:05

## 2017-06-28 RX ADMIN — Medication 650 MILLIGRAM(S): at 18:27

## 2017-06-28 RX ADMIN — Medication 650 MILLIGRAM(S): at 00:21

## 2017-06-28 RX ADMIN — CARBIDOPA AND LEVODOPA 1 TABLET(S): 25; 100 TABLET ORAL at 12:44

## 2017-06-28 RX ADMIN — CARBIDOPA AND LEVODOPA 1 TABLET(S): 25; 100 TABLET ORAL at 21:35

## 2017-06-29 PROCEDURE — 99232 SBSQ HOSP IP/OBS MODERATE 35: CPT

## 2017-06-29 RX ADMIN — Medication 650 MILLIGRAM(S): at 02:09

## 2017-06-29 RX ADMIN — Medication 650 MILLIGRAM(S): at 06:19

## 2017-06-29 RX ADMIN — CARBIDOPA, LEVODOPA, AND ENTACAPONE 1 TABLET(S): 50; 200; 200 TABLET, FILM COATED ORAL at 13:03

## 2017-06-29 RX ADMIN — Medication 650 MILLIGRAM(S): at 16:39

## 2017-06-29 RX ADMIN — CARBIDOPA AND LEVODOPA 1 TABLET(S): 25; 100 TABLET ORAL at 16:39

## 2017-06-29 RX ADMIN — RASAGILINE 1 MILLIGRAM(S): 0.5 TABLET ORAL at 09:26

## 2017-06-29 RX ADMIN — CARBIDOPA AND LEVODOPA 1 TABLET(S): 25; 100 TABLET ORAL at 13:03

## 2017-06-29 RX ADMIN — CARBIDOPA AND LEVODOPA 1 TABLET(S): 25; 100 TABLET ORAL at 22:17

## 2017-06-29 RX ADMIN — CARBIDOPA, LEVODOPA, AND ENTACAPONE 1 TABLET(S): 50; 200; 200 TABLET, FILM COATED ORAL at 22:17

## 2017-06-29 RX ADMIN — CARBIDOPA AND LEVODOPA 1 TABLET(S): 25; 100 TABLET ORAL at 09:26

## 2017-06-29 RX ADMIN — CARBIDOPA, LEVODOPA, AND ENTACAPONE 1 TABLET(S): 50; 200; 200 TABLET, FILM COATED ORAL at 09:26

## 2017-06-29 RX ADMIN — DIVALPROEX SODIUM 500 MILLIGRAM(S): 500 TABLET, DELAYED RELEASE ORAL at 02:16

## 2017-06-29 RX ADMIN — Medication 650 MILLIGRAM(S): at 17:40

## 2017-06-30 PROCEDURE — 99232 SBSQ HOSP IP/OBS MODERATE 35: CPT

## 2017-06-30 RX ADMIN — Medication 650 MILLIGRAM(S): at 10:23

## 2017-06-30 RX ADMIN — CARBIDOPA, LEVODOPA, AND ENTACAPONE 1 TABLET(S): 50; 200; 200 TABLET, FILM COATED ORAL at 08:52

## 2017-06-30 RX ADMIN — CARBIDOPA AND LEVODOPA 1 TABLET(S): 25; 100 TABLET ORAL at 20:19

## 2017-06-30 RX ADMIN — CARBIDOPA, LEVODOPA, AND ENTACAPONE 1 TABLET(S): 50; 200; 200 TABLET, FILM COATED ORAL at 20:19

## 2017-06-30 RX ADMIN — CARBIDOPA AND LEVODOPA 1 TABLET(S): 25; 100 TABLET ORAL at 12:36

## 2017-06-30 RX ADMIN — Medication 650 MILLIGRAM(S): at 09:32

## 2017-06-30 RX ADMIN — DIVALPROEX SODIUM 500 MILLIGRAM(S): 500 TABLET, DELAYED RELEASE ORAL at 22:00

## 2017-06-30 RX ADMIN — CARBIDOPA AND LEVODOPA 1 TABLET(S): 25; 100 TABLET ORAL at 08:52

## 2017-06-30 RX ADMIN — CARBIDOPA AND LEVODOPA 1 TABLET(S): 25; 100 TABLET ORAL at 16:52

## 2017-06-30 RX ADMIN — CARBIDOPA, LEVODOPA, AND ENTACAPONE 1 TABLET(S): 50; 200; 200 TABLET, FILM COATED ORAL at 12:36

## 2017-07-01 PROCEDURE — 99223 1ST HOSP IP/OBS HIGH 75: CPT

## 2017-07-01 RX ADMIN — CARBIDOPA AND LEVODOPA 1 TABLET(S): 25; 100 TABLET ORAL at 08:58

## 2017-07-01 RX ADMIN — Medication 650 MILLIGRAM(S): at 03:15

## 2017-07-01 RX ADMIN — Medication 650 MILLIGRAM(S): at 04:09

## 2017-07-01 RX ADMIN — CARBIDOPA AND LEVODOPA 1 TABLET(S): 25; 100 TABLET ORAL at 16:43

## 2017-07-01 RX ADMIN — Medication 650 MILLIGRAM(S): at 13:51

## 2017-07-01 RX ADMIN — CARBIDOPA, LEVODOPA, AND ENTACAPONE 1 TABLET(S): 50; 200; 200 TABLET, FILM COATED ORAL at 18:10

## 2017-07-01 RX ADMIN — Medication 650 MILLIGRAM(S): at 12:23

## 2017-07-01 RX ADMIN — CARBIDOPA AND LEVODOPA 1 TABLET(S): 25; 100 TABLET ORAL at 12:23

## 2017-07-01 RX ADMIN — RASAGILINE 1 MILLIGRAM(S): 0.5 TABLET ORAL at 08:58

## 2017-07-01 RX ADMIN — CARBIDOPA, LEVODOPA, AND ENTACAPONE 1 TABLET(S): 50; 200; 200 TABLET, FILM COATED ORAL at 08:58

## 2017-07-01 RX ADMIN — CARBIDOPA, LEVODOPA, AND ENTACAPONE 1 TABLET(S): 50; 200; 200 TABLET, FILM COATED ORAL at 12:23

## 2017-07-01 RX ADMIN — CARBIDOPA AND LEVODOPA 1 TABLET(S): 25; 100 TABLET ORAL at 21:58

## 2017-07-01 NOTE — CONSULT NOTE ADULT - ASSESSMENT
52M PD chronic diastolic CHF GERD with psychosis, refusing meds    Plan:  PD with psychosis: To continue current meds as recommended by pt's neurologist. Psychiatrist and neurologist are in agreement that patient will benefit from trial of antipsychotic medications while careful monitoring of PD and EPS side effects while admitted to [facility].    Chronic diastolic CHF: no evidence volume overload, stable off diuretics at this tiime.    GERD: maalox prn, may use pepcid if sx worsen.

## 2017-07-01 NOTE — CONSULT NOTE ADULT - SUBJECTIVE AND OBJECTIVE BOX
HPI: Pt is 52M admitted to [facility] 6/10/17 for management of psychosis.  The patient has severe progressive Parkinson disease. He has been refusing to take antipsychotic medications and is scheduled for a hearing in [facility] [address] for medication over objection. Per Dr Berger he has poor insight into his disease process and lacks decisional capacity to refuse treatments. Dr Berger has discussed his case extensively with his neurologist Dr Carolyn Dewitt.  The patient's PD has required maximal medications and progressed to psychotic symptoms.  She recommended DBS but patient has refused. She also recommended that he see a psychiatrist for years but the patient refused. Reduction in PD med dosing (to reduce psychotic sx) has not been possible due to the severity of his PD sx.  Therefore Dr Berger and Dr Dewitt recommend using antipsychotic medications that are least likely to exacerbate PD sx (quetiapine, depakote, olanzapine) to control psychosis and clsoely monitor for PD sx and EPS.    Today the patient reports that he is not crazy, he is normal, he refuses to take any antipsychotics because they will make PD sx worse. He says several medication doses are incorrect.  He reports that at night a system "in the floor" is activated and that it prevents him from getting out of bed. He says "every hospital has one" (such system).  He says the system in the floor is being used against him against his will and we will all be in trouble for using it on him.    PAST MEDICAL & SURGICAL HISTORY:  Herniated cervical disc  Chronic diastolic Congestive heart failure  Parkinson disease  GERD (gastroesophageal reflux disease)  Migraine    No significant past surgical history      Review of Systems:   CONSTITUTIONAL: No fever, weight loss, or fatigue  EYES: No eye pain, visual disturbances, or discharge  ENMT:  No difficulty hearing, tinnitus, vertigo; No sinus or throat pain  NECK: No pain or stiffness  RESPIRATORY: No cough, wheezing, chills or hemoptysis; No shortness of breath  CARDIOVASCULAR: No chest pain, palpitations, dizziness, or leg swelling  GASTROINTESTINAL: No abdominal or epigastric pain. No nausea, vomiting, or hematemesis; No diarrhea or constipation. No melena or hematochezia.  GENITOURINARY: No dysuria, frequency, hematuria, or incontinence  NEUROLOGICAL: No headaches, memory loss, loss of strength, numbness + tremors +inability to ambulate  SKIN: No itching, burning, rashes, or lesions   LYMPH NODES: No enlarged glands  ENDOCRINE: No heat or cold intolerance; No hair loss  MUSCULOSKELETAL: No joint pain or swelling; No muscle, back, or extremity pain  HEME/LYMPH: No easy bruising, or bleeding gums  ALLERY AND IMMUNOLOGIC: No hives or eczema    Allergies    aspirin (Unknown)  Cheese (Unknown)  ibuprofen (Unknown)  Reglan (Swelling)  Seafood (Unknown)    Intolerances    ibuprofen (Unknown)      Social History: denies etoh/tob/idu    FAMILY HISTORY:  Family history of prostate cancer in father  Family history of diabetes mellitus      MEDICATIONS  (STANDING):  rasagiline Tablet 1 milliGRAM(s) Oral daily  carbidopa/levodopa CR 50/200 1 Tablet(s) Oral four times a day  carbidopa/levodopa/entacapone 50/200/200 1 Tablet(s) Oral three times a day  BACItracin   Ointment 1 Application(s) Topical three times a day  diVALproex  milliGRAM(s) Oral two times a day  QUEtiapine 50 milliGRAM(s) Oral at bedtime    MEDICATIONS  (PRN):  QUEtiapine 50 milliGRAM(s) Oral every 6 hours PRN agitation  acetaminophen   Tablet. 650 milliGRAM(s) Oral every 6 hours PRN Moderate Pain (4 - 6)  simethicone 80 milliGRAM(s) Chew two times a day PRN Dyspepsia  aluminum hydroxide/magnesium hydroxide/simethicone Suspension 30 milliLiter(s) Oral every 4 hours PRN Dyspepsia  LORazepam   Injectable 2 milliGRAM(s) IntraMuscular once PRN severe agitation  LORazepam     Tablet 1 milliGRAM(s) Oral every 6 hours PRN Anxiety  OLANZapine Injectable 2.5 milliGRAM(s) IntraMuscular once PRN severe agitation      Vital Signs Last 24 Hrs  T(C): 36.7 (07-01-17 @ 07:50), Max: 36.7 (07-01-17 @ 07:50)  HR: 103  BP: 118/58  RR: 16      PHYSICAL EXAM:  GENERAL: NAD, well-developed, in chair  HEAD:  Atraumatic, Normocephalic  EYES: EOMI, PERRLA, conjunctiva and sclera clear  NECK:  No JVD  CHEST/LUNG: refused exam, breathing unlabored  HEART: refused exam  ABDOMEN: Nondistended  EXTREMITIES:   No clubbing, cyanosis, or edema  PSYCH: AAOx3  NEUROLOGY: coarse resting tremors  SKIN: No rashes or lesions    LABS:  Complete Blood Count + Automated Diff (06.09.17 @ 22:45)    WBC Count: 5.2 K/uL    RBC Count: 4.10 M/uL    Hemoglobin: 12.7 g/dL    Hematocrit: 39.0 %    Mean Cell Volume: 95.1 fl    Mean Cell Hemoglobin: 31.0 pg    Mean Cell Hemoglobin Conc: 32.6 g/dL    Red Cell Distrib Width: 13.4 %    Platelet Count - Automated: 202 K/uL    Auto Neutrophil #: 3.3 K/uL    Auto Lymphocyte #: 1.4 K/uL    Auto Monocyte #: 0.5 K/uL    Auto Eosinophil #: 0.1 K/uL    Auto Basophil #: 0.0 K/uL    Auto Neutrophil %: 62.6: Differential percentages must be correlated with absolute numbers for  clinical significance. %    Auto Lymphocyte %: 26.1 %    Auto Monocyte %: 9.4 %    Auto Eosinophil %: 1.5 %    Auto Basophil %: 0.2 %    Thyroid Stimulating Hormone, Serum (06.09.17 @ 22:45)    Thyroid Stimulating Hormone, Serum: 0.71 uIU/mL    Basic Metabolic Panel (06.09.17 @ 22:45)    Sodium, Serum: 140 mmol/L    Potassium, Serum: 4.2 mmol/L    Chloride, Serum: 100 mmol/L    Carbon Dioxide, Serum: 26.0 mmol/L    Anion Gap, Serum: 14 mmol/L    Blood Urea Nitrogen, Serum: 11.0 mg/dL    Creatinine, Serum: 0.94 mg/dL    Glucose, Serum: 105 mg/dL    Calcium, Total Serum: 9.1 mg/dL    eGFR if Non : 92    EKG (Personally reviewed):6/11/17  non-specific T wave changes with no changes vs prior EKGs    Care Discussed with Consultants/Other Providers: Dr Berger

## 2017-07-02 PROCEDURE — 99232 SBSQ HOSP IP/OBS MODERATE 35: CPT

## 2017-07-02 RX ADMIN — CARBIDOPA, LEVODOPA, AND ENTACAPONE 1 TABLET(S): 50; 200; 200 TABLET, FILM COATED ORAL at 08:28

## 2017-07-02 RX ADMIN — CARBIDOPA AND LEVODOPA 1 TABLET(S): 25; 100 TABLET ORAL at 20:52

## 2017-07-02 RX ADMIN — CARBIDOPA AND LEVODOPA 1 TABLET(S): 25; 100 TABLET ORAL at 13:17

## 2017-07-02 RX ADMIN — CARBIDOPA AND LEVODOPA 1 TABLET(S): 25; 100 TABLET ORAL at 17:32

## 2017-07-02 RX ADMIN — CARBIDOPA AND LEVODOPA 1 TABLET(S): 25; 100 TABLET ORAL at 08:28

## 2017-07-02 RX ADMIN — Medication 1 APPLICATION(S): at 20:52

## 2017-07-02 RX ADMIN — CARBIDOPA, LEVODOPA, AND ENTACAPONE 1 TABLET(S): 50; 200; 200 TABLET, FILM COATED ORAL at 13:17

## 2017-07-02 RX ADMIN — CARBIDOPA, LEVODOPA, AND ENTACAPONE 1 TABLET(S): 50; 200; 200 TABLET, FILM COATED ORAL at 20:52

## 2017-07-02 RX ADMIN — Medication 650 MILLIGRAM(S): at 09:52

## 2017-07-02 RX ADMIN — DIVALPROEX SODIUM 500 MILLIGRAM(S): 500 TABLET, DELAYED RELEASE ORAL at 22:29

## 2017-07-02 RX ADMIN — Medication 650 MILLIGRAM(S): at 08:27

## 2017-07-03 PROCEDURE — 99232 SBSQ HOSP IP/OBS MODERATE 35: CPT

## 2017-07-03 RX ORDER — DIVALPROEX SODIUM 500 MG/1
500 TABLET, DELAYED RELEASE ORAL AT BEDTIME
Qty: 0 | Refills: 0 | Status: DISCONTINUED | OUTPATIENT
Start: 2017-07-03 | End: 2017-07-12

## 2017-07-03 RX ADMIN — CARBIDOPA AND LEVODOPA 1 TABLET(S): 25; 100 TABLET ORAL at 13:26

## 2017-07-03 RX ADMIN — Medication 1 APPLICATION(S): at 21:04

## 2017-07-03 RX ADMIN — Medication 650 MILLIGRAM(S): at 17:38

## 2017-07-03 RX ADMIN — CARBIDOPA, LEVODOPA, AND ENTACAPONE 1 TABLET(S): 50; 200; 200 TABLET, FILM COATED ORAL at 21:05

## 2017-07-03 RX ADMIN — CARBIDOPA AND LEVODOPA 1 TABLET(S): 25; 100 TABLET ORAL at 21:05

## 2017-07-03 RX ADMIN — CARBIDOPA, LEVODOPA, AND ENTACAPONE 1 TABLET(S): 50; 200; 200 TABLET, FILM COATED ORAL at 08:25

## 2017-07-03 RX ADMIN — CARBIDOPA AND LEVODOPA 1 TABLET(S): 25; 100 TABLET ORAL at 08:24

## 2017-07-03 RX ADMIN — CARBIDOPA AND LEVODOPA 1 TABLET(S): 25; 100 TABLET ORAL at 17:07

## 2017-07-03 RX ADMIN — CARBIDOPA, LEVODOPA, AND ENTACAPONE 1 TABLET(S): 50; 200; 200 TABLET, FILM COATED ORAL at 13:26

## 2017-07-03 RX ADMIN — Medication 650 MILLIGRAM(S): at 17:07

## 2017-07-04 PROCEDURE — 99232 SBSQ HOSP IP/OBS MODERATE 35: CPT

## 2017-07-04 RX ADMIN — Medication 1 APPLICATION(S): at 21:12

## 2017-07-04 RX ADMIN — Medication 650 MILLIGRAM(S): at 16:36

## 2017-07-04 RX ADMIN — CARBIDOPA, LEVODOPA, AND ENTACAPONE 1 TABLET(S): 50; 200; 200 TABLET, FILM COATED ORAL at 12:25

## 2017-07-04 RX ADMIN — Medication 650 MILLIGRAM(S): at 22:45

## 2017-07-04 RX ADMIN — Medication 650 MILLIGRAM(S): at 22:04

## 2017-07-04 RX ADMIN — Medication 30 MILLILITER(S): at 17:30

## 2017-07-04 RX ADMIN — Medication 30 MILLILITER(S): at 22:04

## 2017-07-04 RX ADMIN — CARBIDOPA AND LEVODOPA 1 TABLET(S): 25; 100 TABLET ORAL at 08:35

## 2017-07-04 RX ADMIN — CARBIDOPA AND LEVODOPA 1 TABLET(S): 25; 100 TABLET ORAL at 17:17

## 2017-07-04 RX ADMIN — CARBIDOPA AND LEVODOPA 1 TABLET(S): 25; 100 TABLET ORAL at 12:25

## 2017-07-04 RX ADMIN — CARBIDOPA, LEVODOPA, AND ENTACAPONE 1 TABLET(S): 50; 200; 200 TABLET, FILM COATED ORAL at 08:35

## 2017-07-04 RX ADMIN — CARBIDOPA, LEVODOPA, AND ENTACAPONE 1 TABLET(S): 50; 200; 200 TABLET, FILM COATED ORAL at 21:12

## 2017-07-04 RX ADMIN — Medication 650 MILLIGRAM(S): at 08:00

## 2017-07-04 RX ADMIN — CARBIDOPA AND LEVODOPA 1 TABLET(S): 25; 100 TABLET ORAL at 21:12

## 2017-07-04 RX ADMIN — RASAGILINE 1 MILLIGRAM(S): 0.5 TABLET ORAL at 08:35

## 2017-07-05 PROCEDURE — 99232 SBSQ HOSP IP/OBS MODERATE 35: CPT

## 2017-07-05 RX ADMIN — Medication 650 MILLIGRAM(S): at 08:20

## 2017-07-05 RX ADMIN — CARBIDOPA AND LEVODOPA 1 TABLET(S): 25; 100 TABLET ORAL at 13:01

## 2017-07-05 RX ADMIN — Medication 650 MILLIGRAM(S): at 11:40

## 2017-07-05 RX ADMIN — CARBIDOPA AND LEVODOPA 1 TABLET(S): 25; 100 TABLET ORAL at 17:48

## 2017-07-05 RX ADMIN — CARBIDOPA, LEVODOPA, AND ENTACAPONE 1 TABLET(S): 50; 200; 200 TABLET, FILM COATED ORAL at 18:35

## 2017-07-05 RX ADMIN — Medication 650 MILLIGRAM(S): at 21:02

## 2017-07-05 RX ADMIN — CARBIDOPA AND LEVODOPA 1 TABLET(S): 25; 100 TABLET ORAL at 08:22

## 2017-07-05 RX ADMIN — Medication 650 MILLIGRAM(S): at 21:50

## 2017-07-05 RX ADMIN — CARBIDOPA, LEVODOPA, AND ENTACAPONE 1 TABLET(S): 50; 200; 200 TABLET, FILM COATED ORAL at 08:22

## 2017-07-05 RX ADMIN — CARBIDOPA AND LEVODOPA 1 TABLET(S): 25; 100 TABLET ORAL at 21:01

## 2017-07-05 RX ADMIN — CARBIDOPA, LEVODOPA, AND ENTACAPONE 1 TABLET(S): 50; 200; 200 TABLET, FILM COATED ORAL at 13:01

## 2017-07-06 PROCEDURE — 99231 SBSQ HOSP IP/OBS SF/LOW 25: CPT

## 2017-07-06 RX ADMIN — Medication 650 MILLIGRAM(S): at 09:42

## 2017-07-06 RX ADMIN — CARBIDOPA AND LEVODOPA 1 TABLET(S): 25; 100 TABLET ORAL at 13:30

## 2017-07-06 RX ADMIN — CARBIDOPA AND LEVODOPA 1 TABLET(S): 25; 100 TABLET ORAL at 16:46

## 2017-07-06 RX ADMIN — Medication 650 MILLIGRAM(S): at 22:40

## 2017-07-06 RX ADMIN — CARBIDOPA, LEVODOPA, AND ENTACAPONE 1 TABLET(S): 50; 200; 200 TABLET, FILM COATED ORAL at 09:24

## 2017-07-06 RX ADMIN — CARBIDOPA AND LEVODOPA 1 TABLET(S): 25; 100 TABLET ORAL at 21:33

## 2017-07-06 RX ADMIN — CARBIDOPA AND LEVODOPA 1 TABLET(S): 25; 100 TABLET ORAL at 09:24

## 2017-07-06 RX ADMIN — Medication 650 MILLIGRAM(S): at 21:45

## 2017-07-06 RX ADMIN — Medication 650 MILLIGRAM(S): at 10:03

## 2017-07-06 RX ADMIN — CARBIDOPA, LEVODOPA, AND ENTACAPONE 1 TABLET(S): 50; 200; 200 TABLET, FILM COATED ORAL at 21:33

## 2017-07-06 RX ADMIN — CARBIDOPA, LEVODOPA, AND ENTACAPONE 1 TABLET(S): 50; 200; 200 TABLET, FILM COATED ORAL at 13:30

## 2017-07-07 PROCEDURE — 99231 SBSQ HOSP IP/OBS SF/LOW 25: CPT

## 2017-07-07 RX ORDER — DOCUSATE SODIUM 100 MG
100 CAPSULE ORAL
Qty: 0 | Refills: 0 | Status: DISCONTINUED | OUTPATIENT
Start: 2017-07-08 | End: 2017-07-12

## 2017-07-07 RX ORDER — DOCUSATE SODIUM 100 MG
100 CAPSULE ORAL ONCE
Qty: 0 | Refills: 0 | Status: COMPLETED | OUTPATIENT
Start: 2017-07-07 | End: 2017-07-07

## 2017-07-07 RX ORDER — DOCUSATE SODIUM 100 MG
CAPSULE ORAL
Qty: 0 | Refills: 0 | Status: DISCONTINUED | OUTPATIENT
Start: 2017-07-07 | End: 2017-07-12

## 2017-07-07 RX ADMIN — CARBIDOPA, LEVODOPA, AND ENTACAPONE 1 TABLET(S): 50; 200; 200 TABLET, FILM COATED ORAL at 08:30

## 2017-07-07 RX ADMIN — CARBIDOPA AND LEVODOPA 1 TABLET(S): 25; 100 TABLET ORAL at 21:01

## 2017-07-07 RX ADMIN — CARBIDOPA AND LEVODOPA 1 TABLET(S): 25; 100 TABLET ORAL at 08:30

## 2017-07-07 RX ADMIN — CARBIDOPA AND LEVODOPA 1 TABLET(S): 25; 100 TABLET ORAL at 17:37

## 2017-07-07 RX ADMIN — CARBIDOPA, LEVODOPA, AND ENTACAPONE 1 TABLET(S): 50; 200; 200 TABLET, FILM COATED ORAL at 21:01

## 2017-07-07 RX ADMIN — CARBIDOPA AND LEVODOPA 1 TABLET(S): 25; 100 TABLET ORAL at 12:46

## 2017-07-07 RX ADMIN — CARBIDOPA, LEVODOPA, AND ENTACAPONE 1 TABLET(S): 50; 200; 200 TABLET, FILM COATED ORAL at 12:46

## 2017-07-07 RX ADMIN — Medication 100 MILLIGRAM(S): at 23:24

## 2017-07-08 PROCEDURE — 93010 ELECTROCARDIOGRAM REPORT: CPT

## 2017-07-08 RX ADMIN — CARBIDOPA, LEVODOPA, AND ENTACAPONE 1 TABLET(S): 50; 200; 200 TABLET, FILM COATED ORAL at 08:57

## 2017-07-08 RX ADMIN — CARBIDOPA, LEVODOPA, AND ENTACAPONE 1 TABLET(S): 50; 200; 200 TABLET, FILM COATED ORAL at 12:27

## 2017-07-08 RX ADMIN — Medication 100 MILLIGRAM(S): at 20:38

## 2017-07-08 RX ADMIN — CARBIDOPA AND LEVODOPA 1 TABLET(S): 25; 100 TABLET ORAL at 12:27

## 2017-07-08 RX ADMIN — Medication 30 MILLILITER(S): at 20:38

## 2017-07-08 RX ADMIN — CARBIDOPA, LEVODOPA, AND ENTACAPONE 1 TABLET(S): 50; 200; 200 TABLET, FILM COATED ORAL at 20:38

## 2017-07-08 RX ADMIN — CARBIDOPA AND LEVODOPA 1 TABLET(S): 25; 100 TABLET ORAL at 08:57

## 2017-07-08 RX ADMIN — CARBIDOPA AND LEVODOPA 1 TABLET(S): 25; 100 TABLET ORAL at 17:07

## 2017-07-08 RX ADMIN — CARBIDOPA AND LEVODOPA 1 TABLET(S): 25; 100 TABLET ORAL at 20:38

## 2017-07-09 RX ADMIN — Medication 650 MILLIGRAM(S): at 23:12

## 2017-07-09 RX ADMIN — CARBIDOPA AND LEVODOPA 1 TABLET(S): 25; 100 TABLET ORAL at 20:46

## 2017-07-09 RX ADMIN — CARBIDOPA AND LEVODOPA 1 TABLET(S): 25; 100 TABLET ORAL at 08:37

## 2017-07-09 RX ADMIN — CARBIDOPA, LEVODOPA, AND ENTACAPONE 1 TABLET(S): 50; 200; 200 TABLET, FILM COATED ORAL at 20:46

## 2017-07-09 RX ADMIN — CARBIDOPA, LEVODOPA, AND ENTACAPONE 1 TABLET(S): 50; 200; 200 TABLET, FILM COATED ORAL at 12:20

## 2017-07-09 RX ADMIN — CARBIDOPA, LEVODOPA, AND ENTACAPONE 1 TABLET(S): 50; 200; 200 TABLET, FILM COATED ORAL at 08:37

## 2017-07-09 RX ADMIN — SIMETHICONE 80 MILLIGRAM(S): 80 TABLET, CHEWABLE ORAL at 14:16

## 2017-07-09 RX ADMIN — CARBIDOPA AND LEVODOPA 1 TABLET(S): 25; 100 TABLET ORAL at 16:38

## 2017-07-09 RX ADMIN — CARBIDOPA AND LEVODOPA 1 TABLET(S): 25; 100 TABLET ORAL at 12:20

## 2017-07-09 RX ADMIN — Medication 650 MILLIGRAM(S): at 22:10

## 2017-07-10 PROCEDURE — 99231 SBSQ HOSP IP/OBS SF/LOW 25: CPT

## 2017-07-10 RX ADMIN — CARBIDOPA AND LEVODOPA 1 TABLET(S): 25; 100 TABLET ORAL at 20:40

## 2017-07-10 RX ADMIN — CARBIDOPA, LEVODOPA, AND ENTACAPONE 1 TABLET(S): 50; 200; 200 TABLET, FILM COATED ORAL at 08:27

## 2017-07-10 RX ADMIN — CARBIDOPA AND LEVODOPA 1 TABLET(S): 25; 100 TABLET ORAL at 08:27

## 2017-07-10 RX ADMIN — Medication 650 MILLIGRAM(S): at 14:59

## 2017-07-10 RX ADMIN — CARBIDOPA AND LEVODOPA 1 TABLET(S): 25; 100 TABLET ORAL at 16:25

## 2017-07-10 RX ADMIN — Medication 30 MILLILITER(S): at 00:18

## 2017-07-10 RX ADMIN — Medication 650 MILLIGRAM(S): at 08:25

## 2017-07-10 RX ADMIN — CARBIDOPA AND LEVODOPA 1 TABLET(S): 25; 100 TABLET ORAL at 12:46

## 2017-07-10 RX ADMIN — CARBIDOPA, LEVODOPA, AND ENTACAPONE 1 TABLET(S): 50; 200; 200 TABLET, FILM COATED ORAL at 20:40

## 2017-07-10 RX ADMIN — CARBIDOPA, LEVODOPA, AND ENTACAPONE 1 TABLET(S): 50; 200; 200 TABLET, FILM COATED ORAL at 12:46

## 2017-07-11 VITALS — TEMPERATURE: 97 F | RESPIRATION RATE: 16 BRPM

## 2017-07-11 PROCEDURE — 99231 SBSQ HOSP IP/OBS SF/LOW 25: CPT

## 2017-07-11 RX ORDER — RASAGILINE 0.5 MG/1
1 TABLET ORAL
Qty: 0 | Refills: 0 | COMMUNITY

## 2017-07-11 RX ORDER — PIMAVANSERIN TARTRATE 10 MG/1
2 TABLET, COATED ORAL
Qty: 0 | Refills: 0 | COMMUNITY

## 2017-07-11 RX ORDER — CARBIDOPA AND LEVODOPA 25; 100 MG/1; MG/1
1 TABLET ORAL
Qty: 0 | Refills: 0 | COMMUNITY

## 2017-07-11 RX ORDER — QUETIAPINE FUMARATE 200 MG/1
1 TABLET, FILM COATED ORAL
Qty: 0 | Refills: 0 | COMMUNITY

## 2017-07-11 RX ORDER — CARBIDOPA, LEVODOPA, AND ENTACAPONE 50; 200; 200 MG/1; MG/1; MG/1
1 TABLET, FILM COATED ORAL
Qty: 0 | Refills: 0 | COMMUNITY
Start: 2017-07-11

## 2017-07-11 RX ORDER — RASAGILINE 0.5 MG/1
1 TABLET ORAL
Qty: 0 | Refills: 0 | COMMUNITY
Start: 2017-07-11

## 2017-07-11 RX ORDER — POLYETHYLENE GLYCOL 3350 17 G/17G
0 POWDER, FOR SOLUTION ORAL
Qty: 0 | Refills: 0 | COMMUNITY

## 2017-07-11 RX ORDER — ACETAMINOPHEN 500 MG
2 TABLET ORAL
Qty: 0 | Refills: 0 | COMMUNITY
Start: 2017-07-11

## 2017-07-11 RX ORDER — DIVALPROEX SODIUM 500 MG/1
1 TABLET, DELAYED RELEASE ORAL
Qty: 0 | Refills: 0 | COMMUNITY
Start: 2017-07-11

## 2017-07-11 RX ORDER — OLANZAPINE 15 MG/1
2.5 TABLET, FILM COATED ORAL
Qty: 0 | Refills: 0 | COMMUNITY
Start: 2017-07-11

## 2017-07-11 RX ORDER — QUETIAPINE FUMARATE 200 MG/1
1 TABLET, FILM COATED ORAL
Qty: 0 | Refills: 0 | COMMUNITY
Start: 2017-07-11

## 2017-07-11 RX ORDER — CARBIDOPA, LEVODOPA, AND ENTACAPONE 50; 200; 200 MG/1; MG/1; MG/1
1 TABLET, FILM COATED ORAL
Qty: 0 | Refills: 0 | COMMUNITY

## 2017-07-11 RX ORDER — SIMETHICONE 80 MG/1
1 TABLET, CHEWABLE ORAL
Qty: 0 | Refills: 0 | COMMUNITY
Start: 2017-07-11

## 2017-07-11 RX ORDER — CARBIDOPA AND LEVODOPA 25; 100 MG/1; MG/1
1 TABLET ORAL
Qty: 0 | Refills: 0 | COMMUNITY
Start: 2017-07-11

## 2017-07-11 RX ADMIN — Medication 650 MILLIGRAM(S): at 07:15

## 2017-07-11 RX ADMIN — CARBIDOPA AND LEVODOPA 1 TABLET(S): 25; 100 TABLET ORAL at 21:12

## 2017-07-11 RX ADMIN — CARBIDOPA, LEVODOPA, AND ENTACAPONE 1 TABLET(S): 50; 200; 200 TABLET, FILM COATED ORAL at 13:20

## 2017-07-11 RX ADMIN — CARBIDOPA AND LEVODOPA 1 TABLET(S): 25; 100 TABLET ORAL at 17:20

## 2017-07-11 RX ADMIN — Medication 650 MILLIGRAM(S): at 06:26

## 2017-07-11 RX ADMIN — Medication 30 MILLILITER(S): at 14:47

## 2017-07-11 RX ADMIN — CARBIDOPA AND LEVODOPA 1 TABLET(S): 25; 100 TABLET ORAL at 09:04

## 2017-07-11 RX ADMIN — CARBIDOPA, LEVODOPA, AND ENTACAPONE 1 TABLET(S): 50; 200; 200 TABLET, FILM COATED ORAL at 21:12

## 2017-07-11 RX ADMIN — Medication 650 MILLIGRAM(S): at 17:19

## 2017-07-11 RX ADMIN — CARBIDOPA AND LEVODOPA 1 TABLET(S): 25; 100 TABLET ORAL at 13:20

## 2017-07-11 RX ADMIN — CARBIDOPA, LEVODOPA, AND ENTACAPONE 1 TABLET(S): 50; 200; 200 TABLET, FILM COATED ORAL at 09:04

## 2017-07-12 ENCOUNTER — EMERGENCY (EMERGENCY)
Facility: HOSPITAL | Age: 52
LOS: 1 days | Discharge: PSYCHIATRIC OR SUBSTANCE USE FACILITY | End: 2017-07-12
Attending: EMERGENCY MEDICINE | Admitting: EMERGENCY MEDICINE
Payer: MEDICARE

## 2017-07-12 VITALS
RESPIRATION RATE: 16 BRPM | SYSTOLIC BLOOD PRESSURE: 116 MMHG | OXYGEN SATURATION: 99 % | DIASTOLIC BLOOD PRESSURE: 65 MMHG | HEART RATE: 98 BPM | TEMPERATURE: 98 F

## 2017-07-12 DIAGNOSIS — F06.30 MOOD DISORDER DUE TO KNOWN PHYSIOLOGICAL CONDITION, UNSPECIFIED: ICD-10-CM

## 2017-07-12 PROCEDURE — 99238 HOSP IP/OBS DSCHRG MGMT 30/<: CPT

## 2017-07-12 PROCEDURE — 99285 EMERGENCY DEPT VISIT HI MDM: CPT

## 2017-07-12 PROCEDURE — 99284 EMERGENCY DEPT VISIT MOD MDM: CPT

## 2017-07-12 RX ORDER — RASAGILINE 0.5 MG/1
0.5 TABLET ORAL ONCE
Qty: 0 | Refills: 0 | Status: DISCONTINUED | OUTPATIENT
Start: 2017-07-12 | End: 2017-07-12

## 2017-07-12 RX ORDER — CARBIDOPA, LEVODOPA, AND ENTACAPONE 50; 200; 200 MG/1; MG/1; MG/1
1 TABLET, FILM COATED ORAL THREE TIMES A DAY
Qty: 0 | Refills: 0 | Status: DISCONTINUED | OUTPATIENT
Start: 2017-07-12 | End: 2017-07-16

## 2017-07-12 RX ORDER — RASAGILINE 0.5 MG/1
1 TABLET ORAL DAILY
Qty: 0 | Refills: 0 | Status: DISCONTINUED | OUTPATIENT
Start: 2017-07-12 | End: 2017-07-16

## 2017-07-12 RX ORDER — CARBIDOPA AND LEVODOPA 25; 100 MG/1; MG/1
1 TABLET ORAL
Qty: 0 | Refills: 0 | Status: DISCONTINUED | OUTPATIENT
Start: 2017-07-12 | End: 2017-07-16

## 2017-07-12 RX ORDER — CARBIDOPA AND LEVODOPA 25; 100 MG/1; MG/1
1 TABLET ORAL ONCE
Qty: 0 | Refills: 0 | Status: COMPLETED | OUTPATIENT
Start: 2017-07-12 | End: 2017-07-12

## 2017-07-12 RX ORDER — RASAGILINE 0.5 MG/1
1 TABLET ORAL ONCE
Qty: 0 | Refills: 0 | Status: DISCONTINUED | OUTPATIENT
Start: 2017-07-12 | End: 2017-07-12

## 2017-07-12 RX ADMIN — RASAGILINE 1 MILLIGRAM(S): 0.5 TABLET ORAL at 21:19

## 2017-07-12 RX ADMIN — CARBIDOPA AND LEVODOPA 1 TABLET(S): 25; 100 TABLET ORAL at 16:44

## 2017-07-12 RX ADMIN — CARBIDOPA, LEVODOPA, AND ENTACAPONE 1 TABLET(S): 50; 200; 200 TABLET, FILM COATED ORAL at 08:57

## 2017-07-12 RX ADMIN — CARBIDOPA AND LEVODOPA 1 TABLET(S): 25; 100 TABLET ORAL at 08:57

## 2017-07-12 RX ADMIN — CARBIDOPA, LEVODOPA, AND ENTACAPONE 1 TABLET(S): 50; 200; 200 TABLET, FILM COATED ORAL at 21:18

## 2017-07-12 RX ADMIN — RASAGILINE 1 MILLIGRAM(S): 0.5 TABLET ORAL at 08:57

## 2017-07-12 RX ADMIN — CARBIDOPA AND LEVODOPA 1 TABLET(S): 25; 100 TABLET ORAL at 21:18

## 2017-07-12 NOTE — ED BEHAVIORAL HEALTH ASSESSMENT NOTE - HPI (INCLUDE ILLNESS QUALITY, SEVERITY, DURATION, TIMING, CONTEXT, MODIFYING FACTORS, ASSOCIATED SIGNS AND SYMPTOMS)
51 y/o single white male, unmarried, most recently domiciled at [facility], no dependents, has an adult daughter who is peripherally involved, history of Mood Disorder s/t Parkinson's Disease, history of psychiatric hospitalization at [facility] from 6.10.17-7.12.17, no suicide attempts, no history of SIB, no known violence hx, has a legal guardian per records, no substance use, PMH of Parkinson's Disease, CHF, and herniated cervical discs, BIB EMS from [facility] after patient refused to stay at the facility as he did not like it.    Dr. Hare called the ED & spoke to writer, as well as various other staff members. He reported patient was discharged from [facility] today with plan for patient to go to [facility]. He states that patient was discharged after the inpatient psychiatrist sought medication over objection order, but the  denied the request for psychiatric medications. However, the petition filed by the patient to leave the hospital was denied by the  & patient remained hospitalized until a placement was found for him in the community. Dr. Hare notified writer that patient may be coming to the [facility] ED as he was informed patient did not like the facility he was sent to.    Per notes from CVM written by Dr. Berger, patient was found to have mood lability, paranoid delusions, would frequently confabulate about incidents that were unfounded & lacked insight about his need for treatment and "overall assistance with care". She reported patient's outpatient neurologist Dr. Erwin was contacted & advocated for patient to receive psychiatric care. Dr. Berger reported patient would frequently talk about a scheduled surgery for a deep brain stimulator, but this was inaccurate as per his neurologist. CVM indicates patient was discharged despite being at chronic risk for harm as a safe facility was found in the community for patient.    Patient interviewed in the main ED. He reports that he is here because he didn't like the nursing home he was sent to. He states that it is in a "bad neighborhood" & reports he feels people in the neighborhood recognize him from his prior work for the . He states he wants to be discharged from the hospital and plans to go live with his daughter. He states she told him he can go to her home & stay on a couch in her basement. He states he has no psychiatric illness & does not want psychiatric care. He states that he has Parkinson's Disease & plans to continue his medication for that condition. He is able to list his dosage & the times at which he takes the medications. He reports that when he is at his daughter's home, he will have the medications delivered by the pharmacy to her residence. He denies depressive symptoms, stating he sleeps well at night, eats regularly & denies any sad mood, anhedonia or hopelessness. He denies any suicidal thoughts, plan or intent. He denies any HI, violent thoughts. Patient denies any hallucinations, denies thought insertion/withdrawal and denies referential thought processes. He reports to writer that he has a scheduled surgery coming up for placement of a deep brain stimulator, which per records is not accurate. Patient is mostly linear, though concrete at times. He denies any manic symptoms & none are present during interview. 51 y/o single white male, unmarried, most recently domiciled at [facility], no dependents, has an adult daughter who is peripherally involved, history of Mood Disorder s/t Parkinson's Disease, history of psychiatric hospitalization at [facility] from 6.10.17-7.12.17, no suicide attempts, no history of SIB, no known violence hx, has a legal guardian per records, no substance use, PMH of Parkinson's Disease, CHF, and herniated cervical discs, BIB EMS from [facility] after patient refused to stay at the facility as he did not like it.    Dr. Hare called the ED & spoke to writer, as well as various other staff members. He reported patient was discharged from [facility] today with plan for patient to go to [facility]. He states that patient was discharged after the inpatient psychiatrist sought medication over objection order, but the  denied the request for psychiatric medications. However, the petition filed by the patient to leave the hospital was denied by the  & patient remained hospitalized until a placement was found for him in the community. Dr. Hare notified writer that patient may be coming to the [facility] ED as he was informed patient did not like the facility he was sent to.  Per notes from CVM written by Dr. Berger, patient was found to have mood lability, paranoid delusions, would frequently confabulate about incidents that were unfounded & lacked insight about his need for treatment and "overall assistance with care". She reported patient's outpatient neurologist Dr. Erwin was contacted & advocated for patient to receive psychiatric care. Dr. Berger reported patient would frequently talk about a scheduled surgery for a deep brain stimulator, but this was inaccurate as per his neurologist. CVM indicates patient was discharged despite being at chronic risk for harm as a safe facility was found in the community for patient.  Edyta Solorio spoke with social work team at [facility], who indicated, patient was "referred pt back to [facility], though [place] refused to take pt back...due to numerous complaints pt made to various state agencies as a result of his delusions...sent over 100 referrals throughout the area, and the only facility willing to accept pt was [facility]...pt was d/c’d today to the [facility], and was transported to [place] via Ambulance. Upon getting to [place] pt refused to go into the facility and became combative...due to pt refusal to go into the facility, pt was transferred back to [facility]."    Patient interviewed in the main ED. He reports that he is here because he didn't like the nursing home he was sent to. He states that it is in a "bad neighborhood" & reports he feels people in the neighborhood recognize him from his prior work for the . He states he wants to be discharged from the hospital and plans to go live with his daughter. He states she told him he can go to her home & stay on a couch in her basement. He states he has no psychiatric illness & does not want psychiatric care. He states that he has Parkinson's Disease & plans to continue his medication for that condition. He is able to list his dosage & the times at which he takes the medications. He reports that when he is at his daughter's home, he will have the medications delivered by the pharmacy to her residence. He denies depressive symptoms, stating he sleeps well at night, eats regularly & denies any sad mood, anhedonia or hopelessness. He denies any suicidal thoughts, plan or intent. He denies any HI, violent thoughts. Patient denies any hallucinations, denies thought insertion/withdrawal and denies referential thought processes. He reports to writer that he has a scheduled surgery coming up for placement of a deep brain stimulator, which per records is not accurate. Patient is mostly linear, though concrete at times. He denies any manic symptoms & none are present during interview. 53 y/o single white male, unmarried, most recently domiciled at [facility], no dependents, has an adult daughter who is peripherally involved, history of Mood Disorder s/t Parkinson's Disease, history of psychiatric hospitalization at [facility] from 6.10.17-7.12.17, no suicide attempts, no history of SIB, no known violence hx, has a legal guardian per records, no substance use, PMH of Parkinson's Disease, CHF, and herniated cervical discs, BIB EMS from [facility] after patient refused to stay at the facility as he did not like it.    Dr. Hare called the ED & spoke to writer, as well as various other staff members. He reported patient was discharged from [facility] today with plan for patient to go to [facility]. He states that patient was discharged after the inpatient psychiatrist sought medication over objection order, but the  denied the request for psychiatric medications. However, the petition filed by the patient to leave the hospital was denied by the  & patient remained hospitalized until a placement was found for him in the community. Dr. Hare notified writer that patient may be coming to the [facility] ED as he was informed patient did not like the facility he was sent to.  Per notes from CVM written by Dr. Berger, patient was found to have mood lability, paranoid delusions, would frequently confabulate about incidents that were unfounded & lacked insight about his need for treatment and "overall assistance with care". She reported patient's outpatient neurologist Dr. Erwin was contacted & advocated for patient to receive psychiatric care. Dr. Berger reported patient would frequently talk about a scheduled surgery for a deep brain stimulator, but this was inaccurate as per his neurologist. CVM indicates patient was discharged despite being at chronic risk for harm as a safe facility was found in the community for patient.  Edyta Solorio spoke with social work team at [facility], who indicated, patient was "referred pt back to [facility], though [facility] refused to take pt back...due to numerous complaints pt made to various state agencies as a result of his delusions...sent over 100 referrals throughout the area, and the only facility willing to accept pt was [facility]...pt was d/c’d today to the [facility], and was transported to [place] via Ambulance. Upon getting to [place] pt refused to go into the facility and became combative...due to pt refusal to go into the facility, pt was transferred back to [facility]."    Writer spoke to Barb Tabares, patient's legal guardian, at 9:50pm. She stated that she supports [facility] placing the patient where possible, whether that be in a psychiatric facility or medical hospital.     Patient interviewed in the main ED. He reports that he is here because he didn't like the nursing home he was sent to. He states that it is in a "bad neighborhood" & reports he feels people in the neighborhood recognize him from his prior work for the . He states he wants to be discharged from the hospital and plans to go live with his daughter. He states she told him he can go to her home & stay on a couch in her basement. He states he has no psychiatric illness & does not want psychiatric care. He denies any suicidal thoughts, plan or intent. He denies any HI, violent thoughts. Patient denies any psychotic symptoms. He continues to report that he has a scheduled surgery coming up for placement of a deep brain stimulator, which per psychiatric records is a delusional belief. Patient repeatedly states that he can be discharged to various family member's homes, however, writer called his family members with him present & they did not answer the phone nor return messages left. Patient refused blood work or other medical testing & states, "I feel fine".

## 2017-07-12 NOTE — CHART NOTE - NSCHARTNOTEFT_GEN_A_CORE
52 yr. old male with a PMH of Parkinson, CHF, GERD, and herniated cervical disc was c/o burning and painful legs. I went to evaluate him approximately 10:15PM last night. According to the nurses, this is a common complaint for the patient. He was refusing all medications at the time, including any pain medications. He was unable to quantify the pain but stated that the pain was worse with walking and  and believed that he may have gotten an infection from the bathroom floor.   As per the nurses and the mental health workers, he had been walking without any complaints. There is no h/o neuropathy. He denied loss of sensation, new onset loss of balance, or any other symptoms. He was laughing during his assessment and showed no signs of pain.       Physical:  Vitals: BP:122/84 HR:88 RR:16  General: In Roro chair, NAD   Extremities: Pulse 2+, no edema, no clubbing  MSK: Normal ROM of bilateral ankles and toes   Skin: No lacerations, no signs of infection, no erythema or edema of bilateral feet  Neuro: Normal sensation soft and sharp sensation of bilateral feet and legs. Positive parkinsonian tremors of bilateral hands       A/P: 52 yr. male with no signs of bilateral leg or feet infection. He denied any pain medication including Tylenol or Motrin. He received an ice pack at 9:00PM which he receives every 4 hrs for his complaint and was informed that he would receive it again at 1:00AM.

## 2017-07-12 NOTE — ED BEHAVIORAL HEALTH ASSESSMENT NOTE - PSYCHIATRIC ISSUES AND PLAN (INCLUDE STANDING AND PRN MEDICATION)
Would recommend restarting Depakote ER 500mg q HS for mood stabilization; Zyprexa 2.5mg IM PRN for severe agitation Zyprexa 2.5mg IM PRN for severe agitation

## 2017-07-12 NOTE — ED ADULT NURSE REASSESSMENT NOTE - NS ED NURSE REASSESS COMMENT FT1
pt. awake, oriented x3, loud and anxious stating "my back hurts, my bed is wet." went to see pt, bed is dry, tried to reposition pt but pt. refuses to be turned. pt. wants to urinate, states he can't walk and can't move his legs, urinal given. MD informed of pt's condition. awaits dispo in the morning. will continue to monitor

## 2017-07-12 NOTE — ED BEHAVIORAL HEALTH ASSESSMENT NOTE - DESCRIPTION
seen in the main ED, cooperative with writer, not causing any disturbance in the main ED Parkinson's, CHF, and herniated cervical discs disabled d/t Parkinson's

## 2017-07-12 NOTE — ED ADULT NURSE NOTE - OBJECTIVE STATEMENT
Pt rec'd in 18A, discharged from [facility] into nursing home today, didn't like where he was, so he became agitated and was brought to ED. Pt with history of Parkinsons, noted with tremors. Pt not answering questions, refusing labs, MD made aware.

## 2017-07-12 NOTE — ED ADULT TRIAGE NOTE - CHIEF COMPLAINT QUOTE
Pt discharged from [facility] and was to go to [facility]. Upon arrival pt became uncooperative because he did not want to be there. NH did not accept pt and sent back. Spoke with lobo attg. Pt to be a social admit.

## 2017-07-12 NOTE — ED BEHAVIORAL HEALTH ASSESSMENT NOTE - OTHER PAST PSYCHIATRIC HISTORY (INCLUDE DETAILS REGARDING ONSET, COURSE OF ILLNESS, INPATIENT/OUTPATIENT TREATMENT)
Unknown prior psychiatric treatment. Discharged today from [facility] where he was hospitalized from 6/10-7/12/17 for disorganization and as patient was "verbally aggressive, physically combative towards staff" at nursing home. He was discharged today from [facility] on medical medications. Patient refused psychiatric medications &  denied request for medications over objection.

## 2017-07-12 NOTE — ED BEHAVIORAL HEALTH ASSESSMENT NOTE - RISK ASSESSMENT
Patient is at chronic risk of harm to himself and others given risk factors that include diagnosis, medical conditions, minimal family support, lack of insight & poor impulse control. Protective factors include no substance use, no suicide attempts.

## 2017-07-12 NOTE — ED ADULT NURSE REASSESSMENT NOTE - NS ED NURSE REASSESS COMMENT FT1
rec'd pt. a&ox3, noted tremors on b/l hands, with hx of Parkinsons. denies any pain. refuses to get any tests done. as per pt, "I don't want any tests. I just want my medicines." MD aware. meds given as ordered. pt. wants to call his daughter, unable to reach at this time by VIRAL Sol from Psych. awaits dispo. will continue to monitor

## 2017-07-12 NOTE — ED BEHAVIORAL HEALTH ASSESSMENT NOTE - NS ED BHA PLAN REASON FOR IP CARE DANGER SELF UNABLE TO CARE2 FT
This patient is not safe for discharge to the community given his health condition, lack of safe place to be discharged to & inability to engage in discharge planning.

## 2017-07-12 NOTE — ED BEHAVIORAL HEALTH ASSESSMENT NOTE - DETAILS
Dr. Hare contacted writer discharged on 7/12/17 Dr. Hare contacted the ED Dr. Hare handoff to Dr. Vergara provided

## 2017-07-12 NOTE — ED BEHAVIORAL HEALTH ASSESSMENT NOTE - CASE SUMMARY
52 year old unmarried, currently undomiciled (discharged from [facility] yesterday, refused nursing home placement today) male with Parkinson's disease and mood disorder secondary to medical condition, recent hospitalization at [facility] from 6.10.17-7.12.17, no hx of suicidality or homicidality, presenting with EMS after he became combative and refused to get out of ambulance at new nursing home today. Pt presents paranoid, intermittently agitated, reports that  is "trying to put me away", has fixed beliefs about family taking him and letting him stay with them and refuses all psychiatric care.  According to record review and collateral information, pt currently has no safe disposition (family marginally involved through [facility] admission, refused to take pt then and not answering any calls now). He presents paranoid and refuses all labs and medications in ER. He cannot be safely discharged and is unable to care for himself. Requires admission for safety and stabilization. Potential transfer to unit within medical setting. 52 year old unmarried, currently undomiciled (discharged from [facility] yesterday, refused nursing home placement today) male with Parkinson's disease and mood disorder secondary to medical condition, recent hospitalization at [facility] from 6.10.17-7.12.17, no hx of suicidality or homicidality, presenting with EMS after he became combative and refused to get out of ambulance at new nursing home today. Pt presents paranoid, intermittently agitated, reports that  is "trying to put me away", has fixed beliefs about family taking him and letting him stay with them and refuses all psychiatric care.  According to record review and collateral information, pt currently has no safe disposition (family marginally involved through [facility] admission, refused to take pt then and not answering any calls now). He presents paranoid and refuses all labs and psychiatric medications but did take his medications for treatment of his Parkinson's Disease in the ER. He cannot be safely discharged and is unable to care for himself. Requires admission for safety and stabilization.  As has been discussed with administration and multiple MDs involved, patient requires transfer to an inpatient psychiatric unit within medical hospital with admission to [address] arranged on a 9.27 involuntary legal status.  Patient has been verbally agitated at times during ER setting in the context of his dissatisfaction with being kept in the hospital and has poor insight into his medical needs and his own safety.  Patient did not require any prn medication during psychiatric ER visit.  Patient does not need constant observation in a locked, supervised setting.  I agree with the plan as stated above with EMS transport to [address] with buckle guard for transport and 4 point restraints if needed but patient is currently behaviorally improved after support and reassurance is provided, with care coordinated with security and EMS transport prior to transportation.  Patient was initially resistant but following support provided was expressing agreement to allow for safe transport to [facility].

## 2017-07-12 NOTE — ED BEHAVIORAL HEALTH ASSESSMENT NOTE - OTHER
CVM not assessed, patient in bed nursing home peers older age, medical issues, per records, limited family support dislike of residence he has been assigned to [id number]

## 2017-07-12 NOTE — ED BEHAVIORAL HEALTH ASSESSMENT NOTE - SUMMARY
53 y/o single white male, unmarried, most recently domiciled at [facility], no dependents, has an adult daughter who is peripherally involved, history of Mood Disorder s/t Parkinson's Disease, history of psychiatric hospitalization at [facility] from 6.10.17-7.12.17, no suicide attempts, no history of SIB, no known violence hx, has a legal guardian per records, no substance use, PMH of Parkinson's Disease, CHF, and herniated cervical discs, BIB EMS from [facility] after patient refused to stay at the facility as he did not like it.  In ED, patient is irritable, but generally cooperative. He denies any mood symptoms & denies any suicidal thoughts or homicidal thoughts. He denies psychotic symptoms, but does endorse continued belief that he is going to have a deep brain stimulator placed despite there being evidence to the contrary. Patient refuses to return to [facility] at this time. 53 y/o single white male, unmarried, most recently domiciled at [facility], no dependents, has an adult daughter who is peripherally involved, history of Mood Disorder s/t Parkinson's Disease, history of psychiatric hospitalization at [facility] from 6.10.17-7.12.17, no suicide attempts, no history of SIB, no known violence hx, has a legal guardian per records, no substance use, PMH of Parkinson's Disease, CHF, and herniated cervical discs, BIB EMS from [facility] after patient refused to stay at the facility as he did not like it.  In ED, patient is irritable, but generally cooperative. He denies any mood symptoms & denies any suicidal thoughts or homicidal thoughts. He denies psychotic symptoms, but does endorse continued belief that he is going to have a deep brain stimulator placed by his neurologist despite there being evidence to the contrary. Patient refuses to return to [facility] & extensive efforts have been made by [facility] to place patient at other facilities without success. 53 y/o single white male, unmarried, most recently domiciled at [facility], no dependents, has an adult daughter who is peripherally involved, history of Mood Disorder s/t Parkinson's Disease, history of psychiatric hospitalization at [facility] from 6.10.17-7.12.17, no suicide attempts, no history of SIB, no known violence hx, has a legal guardian, no substance use, PMH of Parkinson's Disease, CHF, and herniated cervical discs, BIB EMS from [facility] after patient refused to stay at the facility as he did not like the location.  In ED, patient is irritable, but generally cooperative. He denies any mood symptoms & denies any suicidal thoughts or homicidal thoughts. He denies psychotic symptoms, but does endorse continued belief that he is going to have a deep brain stimulator placed by his neurologist despite there being evidence to the contrary. Patient refuses to return to [facility] & extensive efforts have been made by [facility] to place patient at other facilities without success. This patient is not safe for discharge to the community given his health condition, lack of safe place to be discharged to & inability to engage in discharge planning.  Patient will board in the ED as administration works to find a placement for patient.

## 2017-07-12 NOTE — ED PROVIDER NOTE - PMH
CHF (congestive heart failure)    Congestive heart failure, unspecified congestive heart failure chronicity, unspecified congestive heart failure type    Diastolic heart failure    GERD (gastroesophageal reflux disease)    Herniated cervical disc    Migraine    Parkinson disease    Parkinson disease

## 2017-07-12 NOTE — ED ADULT NURSE NOTE - CHIEF COMPLAINT QUOTE
Pt discharged from [facility] and was to go to [facility]. Upon arrival pt became uncooperative because he did not want to be there. NH did not accept pt and sent back. Spoke with lobo attg. Pt to be a social admit.
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
## 2017-07-12 NOTE — ED PROVIDER NOTE - OBJECTIVE STATEMENT
53 y/o male with PMhx of Personality Disorder, GERD, CHF, and Parkinson's presents from Nursing Home after recent D/C from [facility]. Pt was admitted to [facility] and then D/C to nursing home. Pt was upset at the area and was taken back the hospital. Pt denies any headache, neck pain, back pain, fever, chills, SOB, chest pain, or abd pain. Pt admits to some increased shaking but notes he has not been given his Carvidopa/Levadopa medication today. Denies any SI, HI, or hallucinations.

## 2017-07-12 NOTE — ED PROVIDER NOTE - MEDICAL DECISION MAKING DETAILS
53 y/o male refusing to go to the nursing home he was sent to since it was in a bad area. Has Parkinson and has not been given his meds today.

## 2017-07-12 NOTE — ED BEHAVIORAL HEALTH ASSESSMENT NOTE - CURRENT MEDICATION
Carbidopa/Entacapone/Levodopa 50mg/200mg/200mg TID; Azilect 1mg qd; Carbidopa/Levodopa 50-200mg four times daily; colace 100mg bid;

## 2017-07-13 ENCOUNTER — INPATIENT (INPATIENT)
Facility: HOSPITAL | Age: 52
LOS: 1 YEAR days | Discharge: HOME | DRG: 884 | End: 2018-08-31
Attending: PSYCHIATRY & NEUROLOGY | Admitting: PSYCHIATRY & NEUROLOGY
Payer: MEDICARE

## 2017-07-13 VITALS
HEART RATE: 68 BPM | RESPIRATION RATE: 20 BRPM | SYSTOLIC BLOOD PRESSURE: 125 MMHG | DIASTOLIC BLOOD PRESSURE: 87 MMHG | OXYGEN SATURATION: 100 %

## 2017-07-13 DIAGNOSIS — F33.2 MAJOR DEPRESSIVE DISORDER, RECURRENT SEVERE WITHOUT PSYCHOTIC FEATURES: ICD-10-CM

## 2017-07-13 DIAGNOSIS — F06.3 MOOD DISORDER DUE TO KNOWN PHYSIOLOGICAL CONDITION: ICD-10-CM

## 2017-07-13 PROCEDURE — 90791 PSYCH DIAGNOSTIC EVALUATION: CPT

## 2017-07-13 RX ORDER — ACETAMINOPHEN 500 MG
650 TABLET ORAL EVERY 6 HOURS
Qty: 0 | Refills: 0 | Status: DISCONTINUED | OUTPATIENT
Start: 2017-07-13 | End: 2017-08-02

## 2017-07-13 RX ORDER — CARBIDOPA AND LEVODOPA 25; 100 MG/1; MG/1
2 TABLET ORAL THREE TIMES A DAY
Qty: 0 | Refills: 0 | Status: DISCONTINUED | OUTPATIENT
Start: 2017-07-13 | End: 2017-07-14

## 2017-07-13 RX ORDER — CARBIDOPA, LEVODOPA, AND ENTACAPONE 50; 200; 200 MG/1; MG/1; MG/1
1 TABLET, FILM COATED ORAL THREE TIMES A DAY
Qty: 0 | Refills: 0 | Status: DISCONTINUED | OUTPATIENT
Start: 2017-07-13 | End: 2017-07-13

## 2017-07-13 RX ORDER — CARBIDOPA AND LEVODOPA 25; 100 MG/1; MG/1
1 TABLET ORAL
Qty: 0 | Refills: 0 | Status: DISCONTINUED | OUTPATIENT
Start: 2017-07-13 | End: 2017-07-13

## 2017-07-13 RX ORDER — CARBIDOPA AND LEVODOPA 25; 100 MG/1; MG/1
2 TABLET ORAL ONCE
Qty: 0 | Refills: 0 | Status: COMPLETED | OUTPATIENT
Start: 2017-07-13 | End: 2017-07-13

## 2017-07-13 RX ORDER — DOCUSATE SODIUM 100 MG
100 CAPSULE ORAL
Qty: 0 | Refills: 0 | Status: DISCONTINUED | OUTPATIENT
Start: 2017-07-13 | End: 2017-10-24

## 2017-07-13 RX ORDER — OLANZAPINE 15 MG/1
2.5 TABLET, FILM COATED ORAL ONCE
Qty: 0 | Refills: 0 | Status: DISCONTINUED | OUTPATIENT
Start: 2017-07-13 | End: 2017-07-13

## 2017-07-13 RX ORDER — CARBIDOPA AND LEVODOPA 25; 100 MG/1; MG/1
1 TABLET ORAL ONCE
Qty: 0 | Refills: 0 | Status: DISCONTINUED | OUTPATIENT
Start: 2017-07-13 | End: 2017-07-13

## 2017-07-13 RX ORDER — RASAGILINE 0.5 MG/1
1 TABLET ORAL DAILY
Qty: 0 | Refills: 0 | Status: DISCONTINUED | OUTPATIENT
Start: 2017-07-13 | End: 2017-07-14

## 2017-07-13 RX ORDER — ENTACAPONE 200 MG/1
200 TABLET, FILM COATED ORAL THREE TIMES A DAY
Qty: 0 | Refills: 0 | Status: DISCONTINUED | OUTPATIENT
Start: 2017-07-13 | End: 2017-07-14

## 2017-07-13 RX ORDER — CARBIDOPA, LEVODOPA, AND ENTACAPONE 50; 200; 200 MG/1; MG/1; MG/1
1 TABLET, FILM COATED ORAL ONCE
Qty: 0 | Refills: 0 | Status: DISCONTINUED | OUTPATIENT
Start: 2017-07-13 | End: 2017-07-13

## 2017-07-13 RX ORDER — CARBIDOPA AND LEVODOPA 25; 100 MG/1; MG/1
2 TABLET ORAL
Qty: 0 | Refills: 0 | Status: DISCONTINUED | OUTPATIENT
Start: 2017-07-13 | End: 2017-07-14

## 2017-07-13 RX ADMIN — CARBIDOPA AND LEVODOPA 2 TABLET(S): 25; 100 TABLET ORAL at 20:35

## 2017-07-13 RX ADMIN — CARBIDOPA, LEVODOPA, AND ENTACAPONE 1 TABLET(S): 50; 200; 200 TABLET, FILM COATED ORAL at 07:26

## 2017-07-13 RX ADMIN — CARBIDOPA AND LEVODOPA 2 TABLET(S): 25; 100 TABLET ORAL at 16:57

## 2017-07-13 RX ADMIN — CARBIDOPA AND LEVODOPA 1 TABLET(S): 25; 100 TABLET ORAL at 06:53

## 2017-07-13 RX ADMIN — CARBIDOPA AND LEVODOPA 2 TABLET(S): 25; 100 TABLET ORAL at 15:04
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
## 2017-07-13 NOTE — ED BEHAVIORAL HEALTH NOTE - BEHAVIORAL HEALTH NOTE
writer was asked by charge psychiatric attending to fax bh assessment, and 2pc to  [facility] for admission.  was asked by charge psychiatric attending to fax bh assessment, and 2pc to [place] for admission.  Charr called [facility] and spoke to Bailee  who provided a fax number .

## 2017-07-13 NOTE — ED ADULT NURSE REASSESSMENT NOTE - NS ED NURSE REASSESS COMMENT FT1
As per Dr. Vergara pt allowed to call his  and family. Pt called  and left message, called sister with no answer.

## 2017-07-13 NOTE — ED ADULT NURSE REASSESSMENT NOTE - NS ED NURSE REASSESS COMMENT FT1
patient highly agitated when informed that he is being transferred to [facility] for medical care on psychiatric unit. As per Dr. Vergara patient is not safe for discharge and can not care for himself.
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
## 2017-07-13 NOTE — ED ADULT NURSE REASSESSMENT NOTE - NS ED NURSE REASSESS COMMENT FT1
pt assisted with urinal by myself and  Billy Zapien. pt currently refusing to take medication and vitals, states he does not trust us here. pt given ginger ale to drink

## 2017-07-13 NOTE — ED ADULT NURSE REASSESSMENT NOTE - NS ED NURSE REASSESS COMMENT FT1
Pt refusing vital signs and medication. States he needs another dose, pt explained med is same dose as previous administration and still refusing, will make MD aware.

## 2017-07-14 LAB
ALBUMIN SERPL ELPH-MCNC: 3.9 G/DL — SIGNIFICANT CHANGE UP (ref 3.3–5)
ALP SERPL-CCNC: 87 U/L — SIGNIFICANT CHANGE UP (ref 30–120)
ALT FLD-CCNC: 11 U/L DA — SIGNIFICANT CHANGE UP (ref 10–60)
ANION GAP SERPL CALC-SCNC: 8 MMOL/L — SIGNIFICANT CHANGE UP (ref 5–17)
AST SERPL-CCNC: 15 U/L — SIGNIFICANT CHANGE UP (ref 10–40)
BILIRUB SERPL-MCNC: 0.6 MG/DL — SIGNIFICANT CHANGE UP (ref 0.2–1.2)
BUN SERPL-MCNC: 15 MG/DL — SIGNIFICANT CHANGE UP (ref 7–23)
CALCIUM SERPL-MCNC: 8.9 MG/DL — SIGNIFICANT CHANGE UP (ref 8.4–10.5)
CHLORIDE SERPL-SCNC: 104 MMOL/L — SIGNIFICANT CHANGE UP (ref 96–108)
CHOLEST SERPL-MCNC: 163 MG/DL — SIGNIFICANT CHANGE UP (ref 10–199)
CO2 SERPL-SCNC: 31 MMOL/L — SIGNIFICANT CHANGE UP (ref 22–31)
CREAT SERPL-MCNC: 0.95 MG/DL — SIGNIFICANT CHANGE UP (ref 0.5–1.3)
GLUCOSE SERPL-MCNC: 93 MG/DL — SIGNIFICANT CHANGE UP (ref 70–99)
HBA1C BLD-MCNC: 5.2 % — SIGNIFICANT CHANGE UP (ref 4–5.6)
HCT VFR BLD CALC: 39 % — SIGNIFICANT CHANGE UP (ref 39–50)
HDLC SERPL-MCNC: 29 MG/DL — LOW (ref 40–125)
HGB BLD-MCNC: 12.8 G/DL — LOW (ref 13–17)
LIPID PNL WITH DIRECT LDL SERPL: 101 MG/DL — SIGNIFICANT CHANGE UP
MCHC RBC-ENTMCNC: 30.7 PG — SIGNIFICANT CHANGE UP (ref 27–34)
MCHC RBC-ENTMCNC: 32.7 GM/DL — SIGNIFICANT CHANGE UP (ref 32–36)
MCV RBC AUTO: 93.8 FL — SIGNIFICANT CHANGE UP (ref 80–100)
PLATELET # BLD AUTO: 192 K/UL — SIGNIFICANT CHANGE UP (ref 150–400)
POTASSIUM SERPL-MCNC: 3.9 MMOL/L — SIGNIFICANT CHANGE UP (ref 3.5–5.3)
POTASSIUM SERPL-SCNC: 3.9 MMOL/L — SIGNIFICANT CHANGE UP (ref 3.5–5.3)
PROT SERPL-MCNC: 7.1 G/DL — SIGNIFICANT CHANGE UP (ref 6–8.3)
RBC # BLD: 4.16 M/UL — LOW (ref 4.2–5.8)
RBC # FLD: 12.5 % — SIGNIFICANT CHANGE UP (ref 10.3–14.5)
SODIUM SERPL-SCNC: 143 MMOL/L — SIGNIFICANT CHANGE UP (ref 135–145)
T PALLIDUM AB TITR SER: NEGATIVE — SIGNIFICANT CHANGE UP
TOTAL CHOLESTEROL/HDL RATIO MEASUREMENT: 5.6 RATIO — SIGNIFICANT CHANGE UP (ref 3.4–9.6)
TRIGL SERPL-MCNC: 167 MG/DL — HIGH (ref 10–149)
TSH SERPL-MCNC: 1.56 UIU/ML — SIGNIFICANT CHANGE UP (ref 0.27–4.2)
WBC # BLD: 5.5 K/UL — SIGNIFICANT CHANGE UP (ref 3.8–10.5)
WBC # FLD AUTO: 5.5 K/UL — SIGNIFICANT CHANGE UP (ref 3.8–10.5)

## 2017-07-14 RX ORDER — VALPROIC ACID (AS SODIUM SALT) 250 MG/5ML
500 SOLUTION, ORAL ORAL AT BEDTIME
Qty: 0 | Refills: 0 | Status: DISCONTINUED | OUTPATIENT
Start: 2017-07-14 | End: 2017-09-21

## 2017-07-14 RX ORDER — OLANZAPINE 15 MG/1
2.5 TABLET, FILM COATED ORAL EVERY 6 HOURS
Qty: 0 | Refills: 0 | Status: DISCONTINUED | OUTPATIENT
Start: 2017-07-14 | End: 2017-10-06

## 2017-07-14 RX ORDER — OLANZAPINE 15 MG/1
5 TABLET, FILM COATED ORAL EVERY 6 HOURS
Qty: 0 | Refills: 0 | Status: DISCONTINUED | OUTPATIENT
Start: 2017-07-14 | End: 2017-10-19

## 2017-07-14 RX ORDER — CARBIDOPA, LEVODOPA, AND ENTACAPONE 50; 200; 200 MG/1; MG/1; MG/1
1 TABLET, FILM COATED ORAL DAILY
Qty: 0 | Refills: 0 | Status: DISCONTINUED | OUTPATIENT
Start: 2017-07-14 | End: 2017-07-19

## 2017-07-14 RX ORDER — RASAGILINE 0.5 MG/1
1.5 TABLET ORAL
Qty: 0 | Refills: 0 | Status: DISCONTINUED | OUTPATIENT
Start: 2017-07-14 | End: 2017-09-21

## 2017-07-14 RX ORDER — CARBIDOPA AND LEVODOPA 25; 100 MG/1; MG/1
1 TABLET ORAL
Qty: 0 | Refills: 0 | Status: DISCONTINUED | OUTPATIENT
Start: 2017-07-14 | End: 2017-07-21

## 2017-07-14 RX ADMIN — CARBIDOPA AND LEVODOPA 1 TABLET(S): 25; 100 TABLET ORAL at 11:04

## 2017-07-14 RX ADMIN — CARBIDOPA, LEVODOPA, AND ENTACAPONE 1 TABLET(S): 50; 200; 200 TABLET, FILM COATED ORAL at 20:39

## 2017-07-14 RX ADMIN — CARBIDOPA AND LEVODOPA 1 TABLET(S): 25; 100 TABLET ORAL at 20:34

## 2017-07-14 RX ADMIN — CARBIDOPA AND LEVODOPA 1 TABLET(S): 25; 100 TABLET ORAL at 16:11

## 2017-07-14 RX ADMIN — Medication 100 MILLIGRAM(S): at 20:34

## 2017-07-14 RX ADMIN — RASAGILINE 1.5 MILLIGRAM(S): 0.5 TABLET ORAL at 08:39

## 2017-07-14 RX ADMIN — RASAGILINE 1.5 MILLIGRAM(S): 0.5 TABLET ORAL at 20:35

## 2017-07-15 RX ADMIN — CARBIDOPA AND LEVODOPA 1 TABLET(S): 25; 100 TABLET ORAL at 10:19

## 2017-07-15 RX ADMIN — CARBIDOPA AND LEVODOPA 1 TABLET(S): 25; 100 TABLET ORAL at 00:01

## 2017-07-15 RX ADMIN — RASAGILINE 1.5 MILLIGRAM(S): 0.5 TABLET ORAL at 20:32

## 2017-07-15 RX ADMIN — CARBIDOPA, LEVODOPA, AND ENTACAPONE 1 TABLET(S): 50; 200; 200 TABLET, FILM COATED ORAL at 10:33

## 2017-07-15 RX ADMIN — CARBIDOPA AND LEVODOPA 1 TABLET(S): 25; 100 TABLET ORAL at 20:31

## 2017-07-15 RX ADMIN — CARBIDOPA AND LEVODOPA 1 TABLET(S): 25; 100 TABLET ORAL at 15:54

## 2017-07-15 RX ADMIN — Medication 100 MILLIGRAM(S): at 20:31

## 2017-07-15 RX ADMIN — CARBIDOPA AND LEVODOPA 1 TABLET(S): 25; 100 TABLET ORAL at 09:12

## 2017-07-15 RX ADMIN — RASAGILINE 1.5 MILLIGRAM(S): 0.5 TABLET ORAL at 10:17

## 2017-07-16 RX ADMIN — CARBIDOPA AND LEVODOPA 1 TABLET(S): 25; 100 TABLET ORAL at 20:38

## 2017-07-16 RX ADMIN — CARBIDOPA, LEVODOPA, AND ENTACAPONE 1 TABLET(S): 50; 200; 200 TABLET, FILM COATED ORAL at 09:15

## 2017-07-16 RX ADMIN — CARBIDOPA AND LEVODOPA 1 TABLET(S): 25; 100 TABLET ORAL at 08:13

## 2017-07-16 RX ADMIN — RASAGILINE 1.5 MILLIGRAM(S): 0.5 TABLET ORAL at 20:38

## 2017-07-16 RX ADMIN — RASAGILINE 1.5 MILLIGRAM(S): 0.5 TABLET ORAL at 09:15

## 2017-07-16 RX ADMIN — Medication 650 MILLIGRAM(S): at 04:24

## 2017-07-16 RX ADMIN — Medication 100 MILLIGRAM(S): at 09:15

## 2017-07-16 RX ADMIN — CARBIDOPA AND LEVODOPA 1 TABLET(S): 25; 100 TABLET ORAL at 00:07

## 2017-07-16 RX ADMIN — CARBIDOPA AND LEVODOPA 1 TABLET(S): 25; 100 TABLET ORAL at 16:38

## 2017-07-16 RX ADMIN — CARBIDOPA AND LEVODOPA 1 TABLET(S): 25; 100 TABLET ORAL at 12:35

## 2017-07-17 PROCEDURE — 99231 SBSQ HOSP IP/OBS SF/LOW 25: CPT

## 2017-07-17 RX ADMIN — CARBIDOPA AND LEVODOPA 1 TABLET(S): 25; 100 TABLET ORAL at 12:09

## 2017-07-17 RX ADMIN — CARBIDOPA AND LEVODOPA 1 TABLET(S): 25; 100 TABLET ORAL at 16:22

## 2017-07-17 RX ADMIN — CARBIDOPA AND LEVODOPA 1 TABLET(S): 25; 100 TABLET ORAL at 20:11

## 2017-07-17 RX ADMIN — CARBIDOPA AND LEVODOPA 1 TABLET(S): 25; 100 TABLET ORAL at 08:22

## 2017-07-17 RX ADMIN — CARBIDOPA AND LEVODOPA 1 TABLET(S): 25; 100 TABLET ORAL at 00:08

## 2017-07-17 RX ADMIN — CARBIDOPA, LEVODOPA, AND ENTACAPONE 1 TABLET(S): 50; 200; 200 TABLET, FILM COATED ORAL at 08:24

## 2017-07-17 RX ADMIN — RASAGILINE 1.5 MILLIGRAM(S): 0.5 TABLET ORAL at 20:11

## 2017-07-18 PROCEDURE — 99231 SBSQ HOSP IP/OBS SF/LOW 25: CPT

## 2017-07-18 RX ADMIN — CARBIDOPA AND LEVODOPA 1 TABLET(S): 25; 100 TABLET ORAL at 21:18

## 2017-07-18 RX ADMIN — CARBIDOPA AND LEVODOPA 1 TABLET(S): 25; 100 TABLET ORAL at 12:14

## 2017-07-18 RX ADMIN — RASAGILINE 1.5 MILLIGRAM(S): 0.5 TABLET ORAL at 21:18

## 2017-07-18 RX ADMIN — CARBIDOPA, LEVODOPA, AND ENTACAPONE 1 TABLET(S): 50; 200; 200 TABLET, FILM COATED ORAL at 08:58

## 2017-07-18 RX ADMIN — CARBIDOPA AND LEVODOPA 1 TABLET(S): 25; 100 TABLET ORAL at 07:53

## 2017-07-18 RX ADMIN — Medication 650 MILLIGRAM(S): at 06:39

## 2017-07-18 RX ADMIN — CARBIDOPA AND LEVODOPA 1 TABLET(S): 25; 100 TABLET ORAL at 00:00

## 2017-07-18 RX ADMIN — CARBIDOPA AND LEVODOPA 1 TABLET(S): 25; 100 TABLET ORAL at 16:40

## 2017-07-19 PROCEDURE — 99231 SBSQ HOSP IP/OBS SF/LOW 25: CPT

## 2017-07-19 RX ORDER — CARBIDOPA, LEVODOPA, AND ENTACAPONE 50; 200; 200 MG/1; MG/1; MG/1
1 TABLET, FILM COATED ORAL
Qty: 0 | Refills: 0 | Status: DISCONTINUED | OUTPATIENT
Start: 2017-07-19 | End: 2017-09-21

## 2017-07-19 RX ORDER — NYSTATIN CREAM 100000 [USP'U]/G
1 CREAM TOPICAL DAILY
Qty: 0 | Refills: 0 | Status: DISCONTINUED | OUTPATIENT
Start: 2017-07-19 | End: 2018-01-31

## 2017-07-19 RX ADMIN — CARBIDOPA AND LEVODOPA 1 TABLET(S): 25; 100 TABLET ORAL at 17:42

## 2017-07-19 RX ADMIN — CARBIDOPA, LEVODOPA, AND ENTACAPONE 1 TABLET(S): 50; 200; 200 TABLET, FILM COATED ORAL at 13:00

## 2017-07-19 RX ADMIN — CARBIDOPA AND LEVODOPA 1 TABLET(S): 25; 100 TABLET ORAL at 20:49

## 2017-07-19 RX ADMIN — CARBIDOPA, LEVODOPA, AND ENTACAPONE 1 TABLET(S): 50; 200; 200 TABLET, FILM COATED ORAL at 20:49

## 2017-07-19 RX ADMIN — Medication 650 MILLIGRAM(S): at 12:07

## 2017-07-19 RX ADMIN — CARBIDOPA AND LEVODOPA 1 TABLET(S): 25; 100 TABLET ORAL at 23:37

## 2017-07-19 RX ADMIN — CARBIDOPA AND LEVODOPA 1 TABLET(S): 25; 100 TABLET ORAL at 12:05

## 2017-07-19 RX ADMIN — CARBIDOPA, LEVODOPA, AND ENTACAPONE 1 TABLET(S): 50; 200; 200 TABLET, FILM COATED ORAL at 09:25

## 2017-07-19 RX ADMIN — RASAGILINE 1.5 MILLIGRAM(S): 0.5 TABLET ORAL at 23:37

## 2017-07-19 RX ADMIN — CARBIDOPA AND LEVODOPA 1 TABLET(S): 25; 100 TABLET ORAL at 08:20

## 2017-07-19 RX ADMIN — Medication 650 MILLIGRAM(S): at 20:49

## 2017-07-19 RX ADMIN — CARBIDOPA AND LEVODOPA 1 TABLET(S): 25; 100 TABLET ORAL at 00:02

## 2017-07-20 PROCEDURE — 99231 SBSQ HOSP IP/OBS SF/LOW 25: CPT

## 2017-07-20 RX ADMIN — CARBIDOPA, LEVODOPA, AND ENTACAPONE 1 TABLET(S): 50; 200; 200 TABLET, FILM COATED ORAL at 11:36

## 2017-07-20 RX ADMIN — CARBIDOPA, LEVODOPA, AND ENTACAPONE 1 TABLET(S): 50; 200; 200 TABLET, FILM COATED ORAL at 08:11

## 2017-07-20 RX ADMIN — CARBIDOPA AND LEVODOPA 1 TABLET(S): 25; 100 TABLET ORAL at 11:36

## 2017-07-20 RX ADMIN — CARBIDOPA, LEVODOPA, AND ENTACAPONE 1 TABLET(S): 50; 200; 200 TABLET, FILM COATED ORAL at 20:54

## 2017-07-20 RX ADMIN — RASAGILINE 1.5 MILLIGRAM(S): 0.5 TABLET ORAL at 08:12

## 2017-07-20 RX ADMIN — CARBIDOPA AND LEVODOPA 1 TABLET(S): 25; 100 TABLET ORAL at 16:21

## 2017-07-20 RX ADMIN — CARBIDOPA AND LEVODOPA 1 TABLET(S): 25; 100 TABLET ORAL at 23:36

## 2017-07-20 RX ADMIN — Medication 650 MILLIGRAM(S): at 14:53

## 2017-07-20 RX ADMIN — CARBIDOPA AND LEVODOPA 1 TABLET(S): 25; 100 TABLET ORAL at 20:53

## 2017-07-20 RX ADMIN — Medication 650 MILLIGRAM(S): at 23:45

## 2017-07-20 RX ADMIN — CARBIDOPA AND LEVODOPA 1 TABLET(S): 25; 100 TABLET ORAL at 08:11

## 2017-07-20 NOTE — CHART NOTE - NSCHARTNOTEFT_GEN_A_CORE
Called regarding a CODE GREY.  Patient was asking for help in the bathroom and was found on the floor on the his knees.  He said he slipped on water but reportedly there was no water on the floor.  Aide, who was near the door and who found him, reported that there were no loud noises.  Aide reported that he did not complain of any pain.  Patient got up on his own power but supposedly became agitated.  He started to curse at staff and was uncooperative.  He started to yell and scream throughout the hallway.  FLORECITA RESENDIZ was called and by the time I arrived, patient was ambulating throughout the hallway, writing down names of staff.  Since the patient reported he slipped, I attempted to exam him but the patient refused my exam.  He told me to "get out and get away from me."  I tried asking him if anything hurts but he still refused to answer my question.  Patient was eventually seen sitting comfortably in no distress, even sleeping at times.

## 2017-07-21 PROCEDURE — 99231 SBSQ HOSP IP/OBS SF/LOW 25: CPT

## 2017-07-21 RX ORDER — CARBIDOPA AND LEVODOPA 25; 100 MG/1; MG/1
1 TABLET ORAL THREE TIMES A DAY
Qty: 0 | Refills: 0 | Status: DISCONTINUED | OUTPATIENT
Start: 2017-07-21 | End: 2017-08-10

## 2017-07-21 RX ADMIN — NYSTATIN CREAM 1 APPLICATION(S): 100000 CREAM TOPICAL at 08:50

## 2017-07-21 RX ADMIN — CARBIDOPA AND LEVODOPA 1 TABLET(S): 25; 100 TABLET ORAL at 08:16

## 2017-07-21 RX ADMIN — CARBIDOPA, LEVODOPA, AND ENTACAPONE 1 TABLET(S): 50; 200; 200 TABLET, FILM COATED ORAL at 18:51

## 2017-07-21 RX ADMIN — CARBIDOPA AND LEVODOPA 1 TABLET(S): 25; 100 TABLET ORAL at 12:46

## 2017-07-21 RX ADMIN — CARBIDOPA, LEVODOPA, AND ENTACAPONE 1 TABLET(S): 50; 200; 200 TABLET, FILM COATED ORAL at 12:46

## 2017-07-21 RX ADMIN — CARBIDOPA, LEVODOPA, AND ENTACAPONE 1 TABLET(S): 50; 200; 200 TABLET, FILM COATED ORAL at 08:50

## 2017-07-21 RX ADMIN — CARBIDOPA AND LEVODOPA 1 TABLET(S): 25; 100 TABLET ORAL at 22:16

## 2017-07-21 RX ADMIN — Medication 650 MILLIGRAM(S): at 09:56

## 2017-07-22 RX ADMIN — CARBIDOPA, LEVODOPA, AND ENTACAPONE 1 TABLET(S): 50; 200; 200 TABLET, FILM COATED ORAL at 18:43

## 2017-07-22 RX ADMIN — Medication 650 MILLIGRAM(S): at 00:43

## 2017-07-22 RX ADMIN — RASAGILINE 1.5 MILLIGRAM(S): 0.5 TABLET ORAL at 21:14

## 2017-07-22 RX ADMIN — CARBIDOPA, LEVODOPA, AND ENTACAPONE 1 TABLET(S): 50; 200; 200 TABLET, FILM COATED ORAL at 13:02

## 2017-07-22 RX ADMIN — CARBIDOPA, LEVODOPA, AND ENTACAPONE 1 TABLET(S): 50; 200; 200 TABLET, FILM COATED ORAL at 06:49

## 2017-07-22 RX ADMIN — CARBIDOPA AND LEVODOPA 1 TABLET(S): 25; 100 TABLET ORAL at 08:35

## 2017-07-22 RX ADMIN — CARBIDOPA AND LEVODOPA 1 TABLET(S): 25; 100 TABLET ORAL at 21:14

## 2017-07-23 RX ORDER — BACITRACIN ZINC 500 UNIT/G
1 OINTMENT IN PACKET (EA) TOPICAL ONCE
Qty: 0 | Refills: 0 | Status: COMPLETED | OUTPATIENT
Start: 2017-07-23 | End: 2017-07-23

## 2017-07-23 RX ADMIN — CARBIDOPA, LEVODOPA, AND ENTACAPONE 1 TABLET(S): 50; 200; 200 TABLET, FILM COATED ORAL at 12:21

## 2017-07-23 RX ADMIN — Medication 650 MILLIGRAM(S): at 13:28

## 2017-07-23 RX ADMIN — RASAGILINE 1.5 MILLIGRAM(S): 0.5 TABLET ORAL at 08:50

## 2017-07-23 RX ADMIN — CARBIDOPA AND LEVODOPA 1 TABLET(S): 25; 100 TABLET ORAL at 08:50

## 2017-07-23 RX ADMIN — CARBIDOPA, LEVODOPA, AND ENTACAPONE 1 TABLET(S): 50; 200; 200 TABLET, FILM COATED ORAL at 06:37

## 2017-07-23 RX ADMIN — CARBIDOPA AND LEVODOPA 1 TABLET(S): 25; 100 TABLET ORAL at 20:13

## 2017-07-23 RX ADMIN — CARBIDOPA AND LEVODOPA 1 TABLET(S): 25; 100 TABLET ORAL at 17:35

## 2017-07-23 RX ADMIN — Medication 650 MILLIGRAM(S): at 00:02

## 2017-07-23 RX ADMIN — Medication 100 MILLIGRAM(S): at 08:50

## 2017-07-23 RX ADMIN — RASAGILINE 1.5 MILLIGRAM(S): 0.5 TABLET ORAL at 20:13

## 2017-07-23 NOTE — CHART NOTE - NSCHARTNOTEFT_GEN_A_CORE
Called to see patient because patient pulled a hangnail out from his left foot 5th toe.  Unclear of how he injured his left foot but supposedly he developed a hangnail after injuring his foot and consequently pulled the nail out which caused some bleeding and trauma to the area.  Went to see patient and patient refused to let me evaluate him.  He again stated "get out, goodbye, I don't want to talk to or see you."  I attempted to tell him that I was here to evaluate his injury but he still said no.  He would not let me get close to him.  I explained to him the risk of refusing evaluation and he still said "I don't want to talk to you, goodbye."

## 2017-07-23 NOTE — CHART NOTE - NSCHARTNOTEFT_GEN_A_CORE
Patient was found on the floor.  I was called to evaluate the patient.  Patient refused to talk to me, and refused to be evaluated.  This was witnessed by Vega hernandez RN.  Please call us again when patient is agreeable

## 2017-07-24 PROCEDURE — 99231 SBSQ HOSP IP/OBS SF/LOW 25: CPT

## 2017-07-24 RX ADMIN — CARBIDOPA, LEVODOPA, AND ENTACAPONE 1 TABLET(S): 50; 200; 200 TABLET, FILM COATED ORAL at 13:19

## 2017-07-24 RX ADMIN — Medication 650 MILLIGRAM(S): at 23:05

## 2017-07-24 RX ADMIN — CARBIDOPA AND LEVODOPA 1 TABLET(S): 25; 100 TABLET ORAL at 15:41

## 2017-07-24 RX ADMIN — CARBIDOPA AND LEVODOPA 1 TABLET(S): 25; 100 TABLET ORAL at 22:55

## 2017-07-24 RX ADMIN — CARBIDOPA AND LEVODOPA 1 TABLET(S): 25; 100 TABLET ORAL at 09:39

## 2017-07-24 RX ADMIN — Medication 100 MILLIGRAM(S): at 09:39

## 2017-07-24 RX ADMIN — CARBIDOPA, LEVODOPA, AND ENTACAPONE 1 TABLET(S): 50; 200; 200 TABLET, FILM COATED ORAL at 06:47

## 2017-07-24 RX ADMIN — RASAGILINE 1.5 MILLIGRAM(S): 0.5 TABLET ORAL at 23:05

## 2017-07-24 RX ADMIN — CARBIDOPA, LEVODOPA, AND ENTACAPONE 1 TABLET(S): 50; 200; 200 TABLET, FILM COATED ORAL at 23:24

## 2017-07-25 PROCEDURE — 99231 SBSQ HOSP IP/OBS SF/LOW 25: CPT

## 2017-07-25 RX ADMIN — CARBIDOPA, LEVODOPA, AND ENTACAPONE 1 TABLET(S): 50; 200; 200 TABLET, FILM COATED ORAL at 12:51

## 2017-07-25 RX ADMIN — CARBIDOPA AND LEVODOPA 1 TABLET(S): 25; 100 TABLET ORAL at 21:03

## 2017-07-25 RX ADMIN — CARBIDOPA AND LEVODOPA 1 TABLET(S): 25; 100 TABLET ORAL at 08:24

## 2017-07-25 RX ADMIN — RASAGILINE 1.5 MILLIGRAM(S): 0.5 TABLET ORAL at 08:24

## 2017-07-25 RX ADMIN — CARBIDOPA, LEVODOPA, AND ENTACAPONE 1 TABLET(S): 50; 200; 200 TABLET, FILM COATED ORAL at 18:57

## 2017-07-25 RX ADMIN — CARBIDOPA, LEVODOPA, AND ENTACAPONE 1 TABLET(S): 50; 200; 200 TABLET, FILM COATED ORAL at 06:57

## 2017-07-25 RX ADMIN — CARBIDOPA AND LEVODOPA 1 TABLET(S): 25; 100 TABLET ORAL at 14:51

## 2017-07-26 PROCEDURE — 99231 SBSQ HOSP IP/OBS SF/LOW 25: CPT

## 2017-07-26 RX ADMIN — CARBIDOPA, LEVODOPA, AND ENTACAPONE 1 TABLET(S): 50; 200; 200 TABLET, FILM COATED ORAL at 18:51

## 2017-07-26 RX ADMIN — CARBIDOPA AND LEVODOPA 1 TABLET(S): 25; 100 TABLET ORAL at 15:31

## 2017-07-26 RX ADMIN — CARBIDOPA AND LEVODOPA 1 TABLET(S): 25; 100 TABLET ORAL at 08:43

## 2017-07-26 RX ADMIN — Medication 650 MILLIGRAM(S): at 10:36

## 2017-07-26 RX ADMIN — CARBIDOPA AND LEVODOPA 1 TABLET(S): 25; 100 TABLET ORAL at 20:58

## 2017-07-26 RX ADMIN — CARBIDOPA, LEVODOPA, AND ENTACAPONE 1 TABLET(S): 50; 200; 200 TABLET, FILM COATED ORAL at 06:42

## 2017-07-26 RX ADMIN — RASAGILINE 1.5 MILLIGRAM(S): 0.5 TABLET ORAL at 08:44

## 2017-07-27 PROCEDURE — 99231 SBSQ HOSP IP/OBS SF/LOW 25: CPT

## 2017-07-27 RX ADMIN — CARBIDOPA, LEVODOPA, AND ENTACAPONE 1 TABLET(S): 50; 200; 200 TABLET, FILM COATED ORAL at 13:19

## 2017-07-27 RX ADMIN — CARBIDOPA, LEVODOPA, AND ENTACAPONE 1 TABLET(S): 50; 200; 200 TABLET, FILM COATED ORAL at 18:25

## 2017-07-27 RX ADMIN — Medication 650 MILLIGRAM(S): at 15:04

## 2017-07-27 RX ADMIN — CARBIDOPA AND LEVODOPA 1 TABLET(S): 25; 100 TABLET ORAL at 09:06

## 2017-07-27 RX ADMIN — CARBIDOPA AND LEVODOPA 1 TABLET(S): 25; 100 TABLET ORAL at 23:20

## 2017-07-27 RX ADMIN — CARBIDOPA AND LEVODOPA 1 TABLET(S): 25; 100 TABLET ORAL at 15:04

## 2017-07-28 PROCEDURE — 99231 SBSQ HOSP IP/OBS SF/LOW 25: CPT

## 2017-07-28 RX ORDER — ACETAMINOPHEN 500 MG
325 TABLET ORAL ONCE
Qty: 0 | Refills: 0 | Status: COMPLETED | OUTPATIENT
Start: 2017-07-28 | End: 2017-07-28

## 2017-07-28 RX ADMIN — Medication 325 MILLIGRAM(S): at 23:14

## 2017-07-28 RX ADMIN — CARBIDOPA, LEVODOPA, AND ENTACAPONE 1 TABLET(S): 50; 200; 200 TABLET, FILM COATED ORAL at 12:34

## 2017-07-28 RX ADMIN — CARBIDOPA AND LEVODOPA 1 TABLET(S): 25; 100 TABLET ORAL at 21:50

## 2017-07-28 RX ADMIN — CARBIDOPA AND LEVODOPA 1 TABLET(S): 25; 100 TABLET ORAL at 14:38

## 2017-07-28 RX ADMIN — Medication 650 MILLIGRAM(S): at 21:00

## 2017-07-28 RX ADMIN — CARBIDOPA, LEVODOPA, AND ENTACAPONE 1 TABLET(S): 50; 200; 200 TABLET, FILM COATED ORAL at 06:29

## 2017-07-28 RX ADMIN — Medication 650 MILLIGRAM(S): at 14:39

## 2017-07-28 RX ADMIN — CARBIDOPA AND LEVODOPA 1 TABLET(S): 25; 100 TABLET ORAL at 09:13

## 2017-07-28 RX ADMIN — CARBIDOPA, LEVODOPA, AND ENTACAPONE 1 TABLET(S): 50; 200; 200 TABLET, FILM COATED ORAL at 17:59

## 2017-07-29 RX ADMIN — CARBIDOPA AND LEVODOPA 1 TABLET(S): 25; 100 TABLET ORAL at 15:20

## 2017-07-29 RX ADMIN — Medication 325 MILLIGRAM(S): at 00:15

## 2017-07-29 RX ADMIN — CARBIDOPA AND LEVODOPA 1 TABLET(S): 25; 100 TABLET ORAL at 21:01

## 2017-07-29 RX ADMIN — Medication 650 MILLIGRAM(S): at 17:24

## 2017-07-29 RX ADMIN — RASAGILINE 1.5 MILLIGRAM(S): 0.5 TABLET ORAL at 08:42

## 2017-07-29 RX ADMIN — CARBIDOPA, LEVODOPA, AND ENTACAPONE 1 TABLET(S): 50; 200; 200 TABLET, FILM COATED ORAL at 21:00

## 2017-07-29 RX ADMIN — CARBIDOPA, LEVODOPA, AND ENTACAPONE 1 TABLET(S): 50; 200; 200 TABLET, FILM COATED ORAL at 13:01

## 2017-07-30 RX ORDER — OLANZAPINE 15 MG/1
5 TABLET, FILM COATED ORAL EVERY 6 HOURS
Qty: 0 | Refills: 0 | Status: DISCONTINUED | OUTPATIENT
Start: 2017-07-30 | End: 2017-07-31

## 2017-07-30 RX ADMIN — CARBIDOPA, LEVODOPA, AND ENTACAPONE 1 TABLET(S): 50; 200; 200 TABLET, FILM COATED ORAL at 06:03

## 2017-07-30 RX ADMIN — CARBIDOPA AND LEVODOPA 1 TABLET(S): 25; 100 TABLET ORAL at 22:46

## 2017-07-30 RX ADMIN — CARBIDOPA, LEVODOPA, AND ENTACAPONE 1 TABLET(S): 50; 200; 200 TABLET, FILM COATED ORAL at 12:55

## 2017-07-30 RX ADMIN — CARBIDOPA AND LEVODOPA 1 TABLET(S): 25; 100 TABLET ORAL at 07:47

## 2017-07-30 RX ADMIN — CARBIDOPA, LEVODOPA, AND ENTACAPONE 1 TABLET(S): 50; 200; 200 TABLET, FILM COATED ORAL at 17:46

## 2017-07-30 RX ADMIN — CARBIDOPA AND LEVODOPA 1 TABLET(S): 25; 100 TABLET ORAL at 15:28

## 2017-07-30 NOTE — CHART NOTE - NSCHARTNOTEFT_GEN_A_CORE
Called to see patient again because the patient is now complaining of chest pain.  Went to see patient and patient seen sitting in his chair.  Vital signs were unable to be obtained because patient refused vitals.  I attempted to assess and exam patient but he continued to refuse my evaluation.  I was able to ask him about the chest pain and he said that it was located in the center of his chest and that it was both stabbing and pressure like.  I tried eliciting more information but he refused to answer, saying that it was someone's fault he has chest pain.  I explained to him that I'm here now and I'm here to evaluate and treat him.  He still refused.  I attempted to explain that he is having chest pain and that he has to allow me to evaluate him so I can treat him and by refusing my evaluation I cannot help him.  Again, he refused.  He would not let me near him or even examine him.  I asked if we could do vitals or even an EKG and he said no.

## 2017-07-30 NOTE — CHART NOTE - NSCHARTNOTEFT_GEN_A_CORE
Called to see patient because patient reportedly fell though RN said that he did not fall. Called to see patient because patient reportedly fell though RN said that he did not fall.  Security was called and was assisting the patient with standing and sitting in the chair.  Patient repeatedly states that he does not want to see a doctor.  He again refused my medical evaluation and would not answer my questions.  He says his back hurts but has chronic back pain.  I was able to visual his back and did not see any gross injuries.  However, he would not let me get near him.  Patient refused all medical evaluation and treatment.

## 2017-07-31 PROCEDURE — 99231 SBSQ HOSP IP/OBS SF/LOW 25: CPT

## 2017-07-31 RX ORDER — METHOCARBAMOL 500 MG/1
750 TABLET, FILM COATED ORAL
Qty: 0 | Refills: 0 | Status: DISCONTINUED | OUTPATIENT
Start: 2017-07-31 | End: 2017-07-31

## 2017-07-31 RX ORDER — METHOCARBAMOL 500 MG/1
750 TABLET, FILM COATED ORAL
Qty: 0 | Refills: 0 | Status: COMPLETED | OUTPATIENT
Start: 2017-07-31 | End: 2017-08-05

## 2017-07-31 RX ADMIN — CARBIDOPA AND LEVODOPA 1 TABLET(S): 25; 100 TABLET ORAL at 20:23

## 2017-07-31 RX ADMIN — CARBIDOPA, LEVODOPA, AND ENTACAPONE 1 TABLET(S): 50; 200; 200 TABLET, FILM COATED ORAL at 18:00

## 2017-07-31 RX ADMIN — CARBIDOPA, LEVODOPA, AND ENTACAPONE 1 TABLET(S): 50; 200; 200 TABLET, FILM COATED ORAL at 06:26

## 2017-07-31 RX ADMIN — CARBIDOPA AND LEVODOPA 1 TABLET(S): 25; 100 TABLET ORAL at 08:00

## 2017-07-31 RX ADMIN — CARBIDOPA AND LEVODOPA 1 TABLET(S): 25; 100 TABLET ORAL at 15:00

## 2017-07-31 RX ADMIN — CARBIDOPA, LEVODOPA, AND ENTACAPONE 1 TABLET(S): 50; 200; 200 TABLET, FILM COATED ORAL at 13:51

## 2017-07-31 RX ADMIN — Medication 325 MILLIGRAM(S): at 22:36

## 2017-07-31 NOTE — CHART NOTE - NSCHARTNOTEFT_GEN_A_CORE
Called again because of a CODE GREY.  Patient reported being violent with staff and uncooperative with care.  Zyprexa IM 5mg was activated and when I went to see him, patient was refusing the zyprexa.  I instructed the patient that he needs to behave and cooperate otherwise he will get the IM.  Patient said he will cooperate.  Security was then able to assist the patient to his bed.  I spoke to Dr. Nino regarding the patient and he suggested zyprexa 5mg IM as well as ativan 2mg IM.  Patient currently is staying in his room and is calm.  Will hold the medication for now.

## 2017-08-01 PROCEDURE — 99231 SBSQ HOSP IP/OBS SF/LOW 25: CPT

## 2017-08-01 RX ADMIN — CARBIDOPA, LEVODOPA, AND ENTACAPONE 1 TABLET(S): 50; 200; 200 TABLET, FILM COATED ORAL at 05:49

## 2017-08-01 RX ADMIN — CARBIDOPA, LEVODOPA, AND ENTACAPONE 1 TABLET(S): 50; 200; 200 TABLET, FILM COATED ORAL at 12:28

## 2017-08-01 RX ADMIN — CARBIDOPA AND LEVODOPA 1 TABLET(S): 25; 100 TABLET ORAL at 08:04

## 2017-08-01 RX ADMIN — CARBIDOPA AND LEVODOPA 1 TABLET(S): 25; 100 TABLET ORAL at 20:39

## 2017-08-01 RX ADMIN — CARBIDOPA AND LEVODOPA 1 TABLET(S): 25; 100 TABLET ORAL at 16:22

## 2017-08-01 RX ADMIN — RASAGILINE 1.5 MILLIGRAM(S): 0.5 TABLET ORAL at 20:39

## 2017-08-01 RX ADMIN — CARBIDOPA, LEVODOPA, AND ENTACAPONE 1 TABLET(S): 50; 200; 200 TABLET, FILM COATED ORAL at 18:00

## 2017-08-02 PROCEDURE — 99231 SBSQ HOSP IP/OBS SF/LOW 25: CPT

## 2017-08-02 RX ORDER — ACETAMINOPHEN 500 MG
325 TABLET ORAL EVERY 6 HOURS
Qty: 0 | Refills: 0 | Status: COMPLETED | OUTPATIENT
Start: 2017-08-02 | End: 2018-07-01

## 2017-08-02 RX ORDER — BACITRACIN ZINC 500 UNIT/G
1 OINTMENT IN PACKET (EA) TOPICAL
Qty: 0 | Refills: 0 | Status: DISCONTINUED | OUTPATIENT
Start: 2017-08-02 | End: 2017-10-06

## 2017-08-02 RX ADMIN — Medication 325 MILLIGRAM(S): at 07:01

## 2017-08-02 RX ADMIN — CARBIDOPA AND LEVODOPA 1 TABLET(S): 25; 100 TABLET ORAL at 16:00

## 2017-08-02 RX ADMIN — Medication 325 MILLIGRAM(S): at 23:38

## 2017-08-02 RX ADMIN — CARBIDOPA AND LEVODOPA 1 TABLET(S): 25; 100 TABLET ORAL at 09:27

## 2017-08-02 RX ADMIN — CARBIDOPA, LEVODOPA, AND ENTACAPONE 1 TABLET(S): 50; 200; 200 TABLET, FILM COATED ORAL at 06:35

## 2017-08-02 RX ADMIN — CARBIDOPA AND LEVODOPA 1 TABLET(S): 25; 100 TABLET ORAL at 20:18

## 2017-08-02 RX ADMIN — CARBIDOPA, LEVODOPA, AND ENTACAPONE 1 TABLET(S): 50; 200; 200 TABLET, FILM COATED ORAL at 12:05

## 2017-08-02 RX ADMIN — CARBIDOPA, LEVODOPA, AND ENTACAPONE 1 TABLET(S): 50; 200; 200 TABLET, FILM COATED ORAL at 18:00

## 2017-08-02 RX ADMIN — RASAGILINE 1.5 MILLIGRAM(S): 0.5 TABLET ORAL at 20:18

## 2017-08-03 PROCEDURE — 99231 SBSQ HOSP IP/OBS SF/LOW 25: CPT

## 2017-08-03 RX ADMIN — CARBIDOPA, LEVODOPA, AND ENTACAPONE 1 TABLET(S): 50; 200; 200 TABLET, FILM COATED ORAL at 17:51

## 2017-08-03 RX ADMIN — CARBIDOPA, LEVODOPA, AND ENTACAPONE 1 TABLET(S): 50; 200; 200 TABLET, FILM COATED ORAL at 13:00

## 2017-08-03 RX ADMIN — CARBIDOPA AND LEVODOPA 1 TABLET(S): 25; 100 TABLET ORAL at 08:07

## 2017-08-03 RX ADMIN — CARBIDOPA, LEVODOPA, AND ENTACAPONE 1 TABLET(S): 50; 200; 200 TABLET, FILM COATED ORAL at 05:51

## 2017-08-03 RX ADMIN — CARBIDOPA AND LEVODOPA 1 TABLET(S): 25; 100 TABLET ORAL at 15:29

## 2017-08-03 RX ADMIN — CARBIDOPA AND LEVODOPA 1 TABLET(S): 25; 100 TABLET ORAL at 21:25

## 2017-08-04 PROCEDURE — 99231 SBSQ HOSP IP/OBS SF/LOW 25: CPT

## 2017-08-04 RX ADMIN — CARBIDOPA, LEVODOPA, AND ENTACAPONE 1 TABLET(S): 50; 200; 200 TABLET, FILM COATED ORAL at 06:01

## 2017-08-04 RX ADMIN — Medication 325 MILLIGRAM(S): at 00:02

## 2017-08-04 RX ADMIN — CARBIDOPA AND LEVODOPA 1 TABLET(S): 25; 100 TABLET ORAL at 14:50

## 2017-08-04 RX ADMIN — CARBIDOPA, LEVODOPA, AND ENTACAPONE 1 TABLET(S): 50; 200; 200 TABLET, FILM COATED ORAL at 12:17

## 2017-08-04 RX ADMIN — CARBIDOPA AND LEVODOPA 1 TABLET(S): 25; 100 TABLET ORAL at 20:30

## 2017-08-04 RX ADMIN — CARBIDOPA AND LEVODOPA 1 TABLET(S): 25; 100 TABLET ORAL at 08:55

## 2017-08-04 RX ADMIN — CARBIDOPA, LEVODOPA, AND ENTACAPONE 1 TABLET(S): 50; 200; 200 TABLET, FILM COATED ORAL at 17:08

## 2017-08-04 RX ADMIN — Medication 325 MILLIGRAM(S): at 01:02

## 2017-08-05 RX ADMIN — Medication 1 APPLICATION(S): at 08:17

## 2017-08-05 RX ADMIN — CARBIDOPA, LEVODOPA, AND ENTACAPONE 1 TABLET(S): 50; 200; 200 TABLET, FILM COATED ORAL at 17:51

## 2017-08-05 RX ADMIN — CARBIDOPA, LEVODOPA, AND ENTACAPONE 1 TABLET(S): 50; 200; 200 TABLET, FILM COATED ORAL at 12:14

## 2017-08-05 RX ADMIN — CARBIDOPA AND LEVODOPA 1 TABLET(S): 25; 100 TABLET ORAL at 20:41

## 2017-08-05 RX ADMIN — CARBIDOPA AND LEVODOPA 1 TABLET(S): 25; 100 TABLET ORAL at 16:49

## 2017-08-05 RX ADMIN — CARBIDOPA AND LEVODOPA 1 TABLET(S): 25; 100 TABLET ORAL at 08:17

## 2017-08-05 RX ADMIN — CARBIDOPA, LEVODOPA, AND ENTACAPONE 1 TABLET(S): 50; 200; 200 TABLET, FILM COATED ORAL at 06:02

## 2017-08-05 RX ADMIN — Medication 1 APPLICATION(S): at 23:59

## 2017-08-05 RX ADMIN — Medication 100 MILLIGRAM(S): at 08:17

## 2017-08-05 RX ADMIN — Medication 325 MILLIGRAM(S): at 06:59

## 2017-08-05 RX ADMIN — Medication 325 MILLIGRAM(S): at 06:02

## 2017-08-06 RX ADMIN — CARBIDOPA, LEVODOPA, AND ENTACAPONE 1 TABLET(S): 50; 200; 200 TABLET, FILM COATED ORAL at 12:18

## 2017-08-06 RX ADMIN — CARBIDOPA, LEVODOPA, AND ENTACAPONE 1 TABLET(S): 50; 200; 200 TABLET, FILM COATED ORAL at 05:33

## 2017-08-06 RX ADMIN — CARBIDOPA AND LEVODOPA 1 TABLET(S): 25; 100 TABLET ORAL at 14:26

## 2017-08-06 RX ADMIN — CARBIDOPA AND LEVODOPA 1 TABLET(S): 25; 100 TABLET ORAL at 21:00

## 2017-08-06 RX ADMIN — CARBIDOPA AND LEVODOPA 1 TABLET(S): 25; 100 TABLET ORAL at 08:10

## 2017-08-06 RX ADMIN — CARBIDOPA, LEVODOPA, AND ENTACAPONE 1 TABLET(S): 50; 200; 200 TABLET, FILM COATED ORAL at 17:09

## 2017-08-06 RX ADMIN — Medication 1 APPLICATION(S): at 21:00

## 2017-08-07 PROCEDURE — 99231 SBSQ HOSP IP/OBS SF/LOW 25: CPT

## 2017-08-07 RX ADMIN — Medication 325 MILLIGRAM(S): at 00:03

## 2017-08-07 RX ADMIN — CARBIDOPA AND LEVODOPA 1 TABLET(S): 25; 100 TABLET ORAL at 20:40

## 2017-08-07 RX ADMIN — CARBIDOPA, LEVODOPA, AND ENTACAPONE 1 TABLET(S): 50; 200; 200 TABLET, FILM COATED ORAL at 11:59

## 2017-08-07 RX ADMIN — CARBIDOPA AND LEVODOPA 1 TABLET(S): 25; 100 TABLET ORAL at 08:17

## 2017-08-07 RX ADMIN — CARBIDOPA, LEVODOPA, AND ENTACAPONE 1 TABLET(S): 50; 200; 200 TABLET, FILM COATED ORAL at 05:51

## 2017-08-07 RX ADMIN — CARBIDOPA AND LEVODOPA 1 TABLET(S): 25; 100 TABLET ORAL at 14:34

## 2017-08-08 PROCEDURE — 99231 SBSQ HOSP IP/OBS SF/LOW 25: CPT

## 2017-08-08 RX ADMIN — CARBIDOPA, LEVODOPA, AND ENTACAPONE 1 TABLET(S): 50; 200; 200 TABLET, FILM COATED ORAL at 17:26

## 2017-08-08 RX ADMIN — Medication 325 MILLIGRAM(S): at 06:34

## 2017-08-08 RX ADMIN — CARBIDOPA, LEVODOPA, AND ENTACAPONE 1 TABLET(S): 50; 200; 200 TABLET, FILM COATED ORAL at 12:05

## 2017-08-08 RX ADMIN — Medication 325 MILLIGRAM(S): at 06:35

## 2017-08-08 RX ADMIN — CARBIDOPA AND LEVODOPA 1 TABLET(S): 25; 100 TABLET ORAL at 08:32

## 2017-08-08 RX ADMIN — Medication 325 MILLIGRAM(S): at 01:14

## 2017-08-08 RX ADMIN — CARBIDOPA AND LEVODOPA 1 TABLET(S): 25; 100 TABLET ORAL at 14:28

## 2017-08-08 RX ADMIN — CARBIDOPA AND LEVODOPA 1 TABLET(S): 25; 100 TABLET ORAL at 20:29

## 2017-08-09 PROCEDURE — 99231 SBSQ HOSP IP/OBS SF/LOW 25: CPT

## 2017-08-09 RX ADMIN — CARBIDOPA, LEVODOPA, AND ENTACAPONE 1 TABLET(S): 50; 200; 200 TABLET, FILM COATED ORAL at 17:05

## 2017-08-09 RX ADMIN — CARBIDOPA, LEVODOPA, AND ENTACAPONE 1 TABLET(S): 50; 200; 200 TABLET, FILM COATED ORAL at 12:00

## 2017-08-09 RX ADMIN — CARBIDOPA AND LEVODOPA 1 TABLET(S): 25; 100 TABLET ORAL at 07:52

## 2017-08-09 RX ADMIN — Medication 325 MILLIGRAM(S): at 07:30

## 2017-08-09 RX ADMIN — CARBIDOPA AND LEVODOPA 1 TABLET(S): 25; 100 TABLET ORAL at 14:14

## 2017-08-09 RX ADMIN — CARBIDOPA, LEVODOPA, AND ENTACAPONE 1 TABLET(S): 50; 200; 200 TABLET, FILM COATED ORAL at 06:28

## 2017-08-09 RX ADMIN — Medication 325 MILLIGRAM(S): at 21:23

## 2017-08-09 RX ADMIN — CARBIDOPA AND LEVODOPA 1 TABLET(S): 25; 100 TABLET ORAL at 20:47

## 2017-08-09 RX ADMIN — Medication 325 MILLIGRAM(S): at 00:30

## 2017-08-09 RX ADMIN — Medication 325 MILLIGRAM(S): at 01:30

## 2017-08-09 RX ADMIN — Medication 325 MILLIGRAM(S): at 06:41
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
## 2017-08-10 PROCEDURE — 12345: CPT | Mod: NC

## 2017-08-10 RX ORDER — CARBIDOPA AND LEVODOPA 25; 100 MG/1; MG/1
1 TABLET ORAL THREE TIMES A DAY
Qty: 0 | Refills: 0 | Status: DISCONTINUED | OUTPATIENT
Start: 2017-08-10 | End: 2017-08-10

## 2017-08-10 RX ORDER — CARBIDOPA AND LEVODOPA 25; 100 MG/1; MG/1
2 TABLET ORAL
Qty: 0 | Refills: 0 | Status: DISCONTINUED | OUTPATIENT
Start: 2017-08-10 | End: 2017-08-18

## 2017-08-10 RX ADMIN — CARBIDOPA AND LEVODOPA 1 TABLET(S): 25; 100 TABLET ORAL at 07:44

## 2017-08-10 RX ADMIN — CARBIDOPA, LEVODOPA, AND ENTACAPONE 1 TABLET(S): 50; 200; 200 TABLET, FILM COATED ORAL at 06:09

## 2017-08-10 RX ADMIN — CARBIDOPA AND LEVODOPA 2 TABLET(S): 25; 100 TABLET ORAL at 23:58

## 2017-08-10 RX ADMIN — CARBIDOPA, LEVODOPA, AND ENTACAPONE 1 TABLET(S): 50; 200; 200 TABLET, FILM COATED ORAL at 16:09

## 2017-08-10 RX ADMIN — CARBIDOPA, LEVODOPA, AND ENTACAPONE 1 TABLET(S): 50; 200; 200 TABLET, FILM COATED ORAL at 11:48

## 2017-08-10 RX ADMIN — Medication 325 MILLIGRAM(S): at 04:02

## 2017-08-11 PROCEDURE — 99231 SBSQ HOSP IP/OBS SF/LOW 25: CPT

## 2017-08-11 RX ADMIN — CARBIDOPA AND LEVODOPA 2 TABLET(S): 25; 100 TABLET ORAL at 21:14

## 2017-08-11 RX ADMIN — CARBIDOPA, LEVODOPA, AND ENTACAPONE 1 TABLET(S): 50; 200; 200 TABLET, FILM COATED ORAL at 06:01

## 2017-08-11 RX ADMIN — CARBIDOPA, LEVODOPA, AND ENTACAPONE 1 TABLET(S): 50; 200; 200 TABLET, FILM COATED ORAL at 12:01

## 2017-08-11 RX ADMIN — CARBIDOPA, LEVODOPA, AND ENTACAPONE 1 TABLET(S): 50; 200; 200 TABLET, FILM COATED ORAL at 17:10

## 2017-08-11 RX ADMIN — CARBIDOPA AND LEVODOPA 2 TABLET(S): 25; 100 TABLET ORAL at 17:00

## 2017-08-11 RX ADMIN — Medication 1 APPLICATION(S): at 20:47

## 2017-08-12 RX ADMIN — CARBIDOPA, LEVODOPA, AND ENTACAPONE 1 TABLET(S): 50; 200; 200 TABLET, FILM COATED ORAL at 07:27

## 2017-08-12 RX ADMIN — CARBIDOPA AND LEVODOPA 2 TABLET(S): 25; 100 TABLET ORAL at 13:58

## 2017-08-12 RX ADMIN — CARBIDOPA, LEVODOPA, AND ENTACAPONE 1 TABLET(S): 50; 200; 200 TABLET, FILM COATED ORAL at 12:04

## 2017-08-12 RX ADMIN — Medication 325 MILLIGRAM(S): at 23:39

## 2017-08-12 RX ADMIN — Medication 1 APPLICATION(S): at 20:47

## 2017-08-12 RX ADMIN — CARBIDOPA AND LEVODOPA 2 TABLET(S): 25; 100 TABLET ORAL at 21:11

## 2017-08-12 RX ADMIN — CARBIDOPA, LEVODOPA, AND ENTACAPONE 1 TABLET(S): 50; 200; 200 TABLET, FILM COATED ORAL at 17:30

## 2017-08-13 RX ADMIN — CARBIDOPA, LEVODOPA, AND ENTACAPONE 1 TABLET(S): 50; 200; 200 TABLET, FILM COATED ORAL at 18:08

## 2017-08-13 RX ADMIN — CARBIDOPA AND LEVODOPA 2 TABLET(S): 25; 100 TABLET ORAL at 21:46

## 2017-08-13 RX ADMIN — CARBIDOPA, LEVODOPA, AND ENTACAPONE 1 TABLET(S): 50; 200; 200 TABLET, FILM COATED ORAL at 16:16

## 2017-08-13 RX ADMIN — CARBIDOPA, LEVODOPA, AND ENTACAPONE 1 TABLET(S): 50; 200; 200 TABLET, FILM COATED ORAL at 06:17

## 2017-08-13 RX ADMIN — CARBIDOPA AND LEVODOPA 2 TABLET(S): 25; 100 TABLET ORAL at 06:17

## 2017-08-13 RX ADMIN — Medication 325 MILLIGRAM(S): at 00:39

## 2017-08-13 RX ADMIN — RASAGILINE 1.5 MILLIGRAM(S): 0.5 TABLET ORAL at 22:01

## 2017-08-13 RX ADMIN — Medication 325 MILLIGRAM(S): at 23:30

## 2017-08-13 RX ADMIN — CARBIDOPA AND LEVODOPA 2 TABLET(S): 25; 100 TABLET ORAL at 12:29

## 2017-08-14 PROCEDURE — 99231 SBSQ HOSP IP/OBS SF/LOW 25: CPT

## 2017-08-14 RX ORDER — ACETAMINOPHEN 500 MG
650 TABLET ORAL ONCE
Qty: 0 | Refills: 0 | Status: COMPLETED | OUTPATIENT
Start: 2017-08-14 | End: 2017-08-14

## 2017-08-14 RX ADMIN — Medication 325 MILLIGRAM(S): at 18:35

## 2017-08-14 RX ADMIN — CARBIDOPA AND LEVODOPA 2 TABLET(S): 25; 100 TABLET ORAL at 06:12

## 2017-08-14 RX ADMIN — CARBIDOPA AND LEVODOPA 2 TABLET(S): 25; 100 TABLET ORAL at 17:37

## 2017-08-14 RX ADMIN — CARBIDOPA AND LEVODOPA 2 TABLET(S): 25; 100 TABLET ORAL at 21:23

## 2017-08-14 RX ADMIN — CARBIDOPA AND LEVODOPA 2 TABLET(S): 25; 100 TABLET ORAL at 13:12

## 2017-08-14 RX ADMIN — Medication 325 MILLIGRAM(S): at 00:30

## 2017-08-14 RX ADMIN — RASAGILINE 1.5 MILLIGRAM(S): 0.5 TABLET ORAL at 21:24

## 2017-08-14 RX ADMIN — CARBIDOPA, LEVODOPA, AND ENTACAPONE 1 TABLET(S): 50; 200; 200 TABLET, FILM COATED ORAL at 06:12

## 2017-08-14 RX ADMIN — CARBIDOPA, LEVODOPA, AND ENTACAPONE 1 TABLET(S): 50; 200; 200 TABLET, FILM COATED ORAL at 13:12

## 2017-08-14 RX ADMIN — Medication 650 MILLIGRAM(S): at 04:21

## 2017-08-14 RX ADMIN — CARBIDOPA, LEVODOPA, AND ENTACAPONE 1 TABLET(S): 50; 200; 200 TABLET, FILM COATED ORAL at 17:37

## 2017-08-14 RX ADMIN — RASAGILINE 1.5 MILLIGRAM(S): 0.5 TABLET ORAL at 14:16

## 2017-08-14 RX ADMIN — Medication 650 MILLIGRAM(S): at 05:21

## 2017-08-15 PROCEDURE — 99231 SBSQ HOSP IP/OBS SF/LOW 25: CPT

## 2017-08-15 RX ADMIN — Medication 325 MILLIGRAM(S): at 05:00

## 2017-08-15 RX ADMIN — Medication 325 MILLIGRAM(S): at 23:45

## 2017-08-15 RX ADMIN — Medication 325 MILLIGRAM(S): at 03:47

## 2017-08-15 RX ADMIN — CARBIDOPA, LEVODOPA, AND ENTACAPONE 1 TABLET(S): 50; 200; 200 TABLET, FILM COATED ORAL at 17:10

## 2017-08-15 RX ADMIN — CARBIDOPA AND LEVODOPA 2 TABLET(S): 25; 100 TABLET ORAL at 17:11

## 2017-08-15 RX ADMIN — Medication 325 MILLIGRAM(S): at 23:05

## 2017-08-15 RX ADMIN — RASAGILINE 1.5 MILLIGRAM(S): 0.5 TABLET ORAL at 21:04

## 2017-08-15 RX ADMIN — CARBIDOPA AND LEVODOPA 2 TABLET(S): 25; 100 TABLET ORAL at 21:04

## 2017-08-15 RX ADMIN — CARBIDOPA, LEVODOPA, AND ENTACAPONE 1 TABLET(S): 50; 200; 200 TABLET, FILM COATED ORAL at 12:01

## 2017-08-15 RX ADMIN — Medication 1 APPLICATION(S): at 06:20

## 2017-08-15 RX ADMIN — CARBIDOPA AND LEVODOPA 2 TABLET(S): 25; 100 TABLET ORAL at 06:05

## 2017-08-15 RX ADMIN — CARBIDOPA, LEVODOPA, AND ENTACAPONE 1 TABLET(S): 50; 200; 200 TABLET, FILM COATED ORAL at 06:05

## 2017-08-15 RX ADMIN — CARBIDOPA AND LEVODOPA 2 TABLET(S): 25; 100 TABLET ORAL at 12:01

## 2017-08-16 PROCEDURE — 99231 SBSQ HOSP IP/OBS SF/LOW 25: CPT

## 2017-08-16 RX ADMIN — CARBIDOPA, LEVODOPA, AND ENTACAPONE 1 TABLET(S): 50; 200; 200 TABLET, FILM COATED ORAL at 17:07

## 2017-08-16 RX ADMIN — Medication 1 APPLICATION(S): at 21:07

## 2017-08-16 RX ADMIN — CARBIDOPA AND LEVODOPA 2 TABLET(S): 25; 100 TABLET ORAL at 12:04

## 2017-08-16 RX ADMIN — Medication 325 MILLIGRAM(S): at 22:50

## 2017-08-16 RX ADMIN — CARBIDOPA, LEVODOPA, AND ENTACAPONE 1 TABLET(S): 50; 200; 200 TABLET, FILM COATED ORAL at 12:04

## 2017-08-16 RX ADMIN — CARBIDOPA, LEVODOPA, AND ENTACAPONE 1 TABLET(S): 50; 200; 200 TABLET, FILM COATED ORAL at 05:07

## 2017-08-16 RX ADMIN — Medication 325 MILLIGRAM(S): at 22:56

## 2017-08-16 RX ADMIN — Medication 100 MILLIGRAM(S): at 21:07

## 2017-08-16 RX ADMIN — CARBIDOPA AND LEVODOPA 2 TABLET(S): 25; 100 TABLET ORAL at 17:07

## 2017-08-16 RX ADMIN — RASAGILINE 1.5 MILLIGRAM(S): 0.5 TABLET ORAL at 22:56

## 2017-08-16 RX ADMIN — CARBIDOPA AND LEVODOPA 2 TABLET(S): 25; 100 TABLET ORAL at 05:07

## 2017-08-16 RX ADMIN — CARBIDOPA AND LEVODOPA 2 TABLET(S): 25; 100 TABLET ORAL at 21:07

## 2017-08-17 PROCEDURE — 99231 SBSQ HOSP IP/OBS SF/LOW 25: CPT

## 2017-08-17 RX ORDER — BACLOFEN 100 %
10 POWDER (GRAM) MISCELLANEOUS
Qty: 0 | Refills: 0 | Status: DISCONTINUED | OUTPATIENT
Start: 2017-08-17 | End: 2017-10-24

## 2017-08-17 RX ADMIN — CARBIDOPA AND LEVODOPA 2 TABLET(S): 25; 100 TABLET ORAL at 21:13

## 2017-08-17 RX ADMIN — CARBIDOPA, LEVODOPA, AND ENTACAPONE 1 TABLET(S): 50; 200; 200 TABLET, FILM COATED ORAL at 12:07

## 2017-08-17 RX ADMIN — RASAGILINE 1.5 MILLIGRAM(S): 0.5 TABLET ORAL at 22:31

## 2017-08-17 RX ADMIN — Medication 325 MILLIGRAM(S): at 21:43

## 2017-08-17 RX ADMIN — CARBIDOPA AND LEVODOPA 2 TABLET(S): 25; 100 TABLET ORAL at 12:08

## 2017-08-17 RX ADMIN — Medication 100 MILLIGRAM(S): at 22:32

## 2017-08-17 RX ADMIN — CARBIDOPA AND LEVODOPA 2 TABLET(S): 25; 100 TABLET ORAL at 17:04

## 2017-08-17 RX ADMIN — CARBIDOPA, LEVODOPA, AND ENTACAPONE 1 TABLET(S): 50; 200; 200 TABLET, FILM COATED ORAL at 17:05

## 2017-08-17 RX ADMIN — Medication 325 MILLIGRAM(S): at 21:13

## 2017-08-17 RX ADMIN — Medication 1 APPLICATION(S): at 21:13

## 2017-08-17 RX ADMIN — CARBIDOPA AND LEVODOPA 2 TABLET(S): 25; 100 TABLET ORAL at 05:42

## 2017-08-17 RX ADMIN — Medication 325 MILLIGRAM(S): at 14:04

## 2017-08-17 RX ADMIN — CARBIDOPA, LEVODOPA, AND ENTACAPONE 1 TABLET(S): 50; 200; 200 TABLET, FILM COATED ORAL at 05:42

## 2017-08-17 RX ADMIN — Medication 325 MILLIGRAM(S): at 12:36

## 2017-08-17 NOTE — PROGRESS NOTE ADULT - ASSESSMENT
This is a 52-year-old with a history of Parkinson's disease.  I was called to evaluate his meds. Pt came on Stalevo 5 times a day. Currently is on 3 times a day. Would increase to 4 times a day. Continue all other meds.

## 2017-08-18 PROCEDURE — 99232 SBSQ HOSP IP/OBS MODERATE 35: CPT

## 2017-08-18 RX ORDER — CARBIDOPA AND LEVODOPA 25; 100 MG/1; MG/1
2 TABLET ORAL
Qty: 0 | Refills: 0 | Status: DISCONTINUED | OUTPATIENT
Start: 2017-08-18 | End: 2017-09-21

## 2017-08-18 RX ADMIN — CARBIDOPA AND LEVODOPA 2 TABLET(S): 25; 100 TABLET ORAL at 05:40

## 2017-08-18 RX ADMIN — CARBIDOPA, LEVODOPA, AND ENTACAPONE 1 TABLET(S): 50; 200; 200 TABLET, FILM COATED ORAL at 05:40

## 2017-08-18 RX ADMIN — Medication 325 MILLIGRAM(S): at 04:27

## 2017-08-18 RX ADMIN — RASAGILINE 1.5 MILLIGRAM(S): 0.5 TABLET ORAL at 23:22

## 2017-08-18 RX ADMIN — CARBIDOPA, LEVODOPA, AND ENTACAPONE 1 TABLET(S): 50; 200; 200 TABLET, FILM COATED ORAL at 13:32

## 2017-08-18 RX ADMIN — CARBIDOPA, LEVODOPA, AND ENTACAPONE 1 TABLET(S): 50; 200; 200 TABLET, FILM COATED ORAL at 17:19

## 2017-08-18 RX ADMIN — CARBIDOPA AND LEVODOPA 2 TABLET(S): 25; 100 TABLET ORAL at 21:17

## 2017-08-18 RX ADMIN — Medication 325 MILLIGRAM(S): at 05:14

## 2017-08-18 RX ADMIN — CARBIDOPA AND LEVODOPA 2 TABLET(S): 25; 100 TABLET ORAL at 13:32

## 2017-08-18 RX ADMIN — Medication 325 MILLIGRAM(S): at 23:27

## 2017-08-18 RX ADMIN — CARBIDOPA AND LEVODOPA 2 TABLET(S): 25; 100 TABLET ORAL at 17:19

## 2017-08-19 RX ADMIN — CARBIDOPA AND LEVODOPA 2 TABLET(S): 25; 100 TABLET ORAL at 16:44

## 2017-08-19 RX ADMIN — CARBIDOPA, LEVODOPA, AND ENTACAPONE 1 TABLET(S): 50; 200; 200 TABLET, FILM COATED ORAL at 16:44

## 2017-08-19 RX ADMIN — CARBIDOPA AND LEVODOPA 2 TABLET(S): 25; 100 TABLET ORAL at 12:01

## 2017-08-19 RX ADMIN — Medication 325 MILLIGRAM(S): at 21:54

## 2017-08-19 RX ADMIN — CARBIDOPA AND LEVODOPA 2 TABLET(S): 25; 100 TABLET ORAL at 23:05

## 2017-08-19 RX ADMIN — Medication 325 MILLIGRAM(S): at 00:50

## 2017-08-19 RX ADMIN — RASAGILINE 1.5 MILLIGRAM(S): 0.5 TABLET ORAL at 23:05

## 2017-08-19 RX ADMIN — CARBIDOPA AND LEVODOPA 2 TABLET(S): 25; 100 TABLET ORAL at 05:34

## 2017-08-19 RX ADMIN — CARBIDOPA, LEVODOPA, AND ENTACAPONE 1 TABLET(S): 50; 200; 200 TABLET, FILM COATED ORAL at 12:03

## 2017-08-19 RX ADMIN — Medication 325 MILLIGRAM(S): at 20:54

## 2017-08-19 RX ADMIN — CARBIDOPA, LEVODOPA, AND ENTACAPONE 1 TABLET(S): 50; 200; 200 TABLET, FILM COATED ORAL at 05:33

## 2017-08-20 RX ADMIN — Medication 325 MILLIGRAM(S): at 05:58

## 2017-08-20 RX ADMIN — CARBIDOPA AND LEVODOPA 2 TABLET(S): 25; 100 TABLET ORAL at 12:33

## 2017-08-20 RX ADMIN — CARBIDOPA AND LEVODOPA 2 TABLET(S): 25; 100 TABLET ORAL at 22:59

## 2017-08-20 RX ADMIN — CARBIDOPA AND LEVODOPA 2 TABLET(S): 25; 100 TABLET ORAL at 17:56

## 2017-08-20 RX ADMIN — CARBIDOPA, LEVODOPA, AND ENTACAPONE 1 TABLET(S): 50; 200; 200 TABLET, FILM COATED ORAL at 17:56

## 2017-08-20 RX ADMIN — CARBIDOPA, LEVODOPA, AND ENTACAPONE 1 TABLET(S): 50; 200; 200 TABLET, FILM COATED ORAL at 06:11

## 2017-08-20 RX ADMIN — CARBIDOPA, LEVODOPA, AND ENTACAPONE 1 TABLET(S): 50; 200; 200 TABLET, FILM COATED ORAL at 12:34

## 2017-08-20 RX ADMIN — Medication 325 MILLIGRAM(S): at 04:58

## 2017-08-20 RX ADMIN — CARBIDOPA AND LEVODOPA 2 TABLET(S): 25; 100 TABLET ORAL at 06:28
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
## 2017-08-21 PROCEDURE — 99231 SBSQ HOSP IP/OBS SF/LOW 25: CPT

## 2017-08-21 RX ORDER — HYDROCORTISONE 1 %
1 OINTMENT (GRAM) TOPICAL DAILY
Qty: 0 | Refills: 0 | Status: DISCONTINUED | OUTPATIENT
Start: 2017-08-21 | End: 2018-06-04

## 2017-08-21 RX ADMIN — CARBIDOPA, LEVODOPA, AND ENTACAPONE 1 TABLET(S): 50; 200; 200 TABLET, FILM COATED ORAL at 17:10

## 2017-08-21 RX ADMIN — Medication 325 MILLIGRAM(S): at 23:28

## 2017-08-21 RX ADMIN — Medication 325 MILLIGRAM(S): at 22:28

## 2017-08-21 RX ADMIN — CARBIDOPA AND LEVODOPA 2 TABLET(S): 25; 100 TABLET ORAL at 06:00

## 2017-08-21 RX ADMIN — CARBIDOPA, LEVODOPA, AND ENTACAPONE 1 TABLET(S): 50; 200; 200 TABLET, FILM COATED ORAL at 11:42

## 2017-08-21 RX ADMIN — Medication 100 MILLIGRAM(S): at 21:04

## 2017-08-21 RX ADMIN — CARBIDOPA AND LEVODOPA 2 TABLET(S): 25; 100 TABLET ORAL at 21:22

## 2017-08-21 RX ADMIN — CARBIDOPA, LEVODOPA, AND ENTACAPONE 1 TABLET(S): 50; 200; 200 TABLET, FILM COATED ORAL at 06:00

## 2017-08-21 RX ADMIN — CARBIDOPA AND LEVODOPA 2 TABLET(S): 25; 100 TABLET ORAL at 17:10

## 2017-08-21 RX ADMIN — Medication 325 MILLIGRAM(S): at 02:00

## 2017-08-21 RX ADMIN — Medication 325 MILLIGRAM(S): at 00:59

## 2017-08-21 RX ADMIN — CARBIDOPA AND LEVODOPA 2 TABLET(S): 25; 100 TABLET ORAL at 11:42

## 2017-08-22 PROCEDURE — 99231 SBSQ HOSP IP/OBS SF/LOW 25: CPT

## 2017-08-22 RX ADMIN — CARBIDOPA, LEVODOPA, AND ENTACAPONE 1 TABLET(S): 50; 200; 200 TABLET, FILM COATED ORAL at 17:11

## 2017-08-22 RX ADMIN — Medication 325 MILLIGRAM(S): at 04:30

## 2017-08-22 RX ADMIN — CARBIDOPA, LEVODOPA, AND ENTACAPONE 1 TABLET(S): 50; 200; 200 TABLET, FILM COATED ORAL at 06:36

## 2017-08-22 RX ADMIN — RASAGILINE 1.5 MILLIGRAM(S): 0.5 TABLET ORAL at 21:31

## 2017-08-22 RX ADMIN — CARBIDOPA AND LEVODOPA 2 TABLET(S): 25; 100 TABLET ORAL at 12:14

## 2017-08-22 RX ADMIN — Medication 325 MILLIGRAM(S): at 05:30

## 2017-08-22 RX ADMIN — CARBIDOPA AND LEVODOPA 2 TABLET(S): 25; 100 TABLET ORAL at 21:31

## 2017-08-22 RX ADMIN — CARBIDOPA AND LEVODOPA 2 TABLET(S): 25; 100 TABLET ORAL at 06:36

## 2017-08-22 RX ADMIN — CARBIDOPA AND LEVODOPA 2 TABLET(S): 25; 100 TABLET ORAL at 17:10

## 2017-08-22 RX ADMIN — Medication 325 MILLIGRAM(S): at 23:18

## 2017-08-22 RX ADMIN — Medication 100 MILLIGRAM(S): at 21:31

## 2017-08-22 RX ADMIN — CARBIDOPA, LEVODOPA, AND ENTACAPONE 1 TABLET(S): 50; 200; 200 TABLET, FILM COATED ORAL at 12:14

## 2017-08-23 PROCEDURE — 99231 SBSQ HOSP IP/OBS SF/LOW 25: CPT

## 2017-08-23 RX ADMIN — Medication 325 MILLIGRAM(S): at 22:57

## 2017-08-23 RX ADMIN — CARBIDOPA AND LEVODOPA 2 TABLET(S): 25; 100 TABLET ORAL at 12:13

## 2017-08-23 RX ADMIN — CARBIDOPA, LEVODOPA, AND ENTACAPONE 1 TABLET(S): 50; 200; 200 TABLET, FILM COATED ORAL at 05:22

## 2017-08-23 RX ADMIN — Medication 325 MILLIGRAM(S): at 00:20

## 2017-08-23 RX ADMIN — CARBIDOPA, LEVODOPA, AND ENTACAPONE 1 TABLET(S): 50; 200; 200 TABLET, FILM COATED ORAL at 17:02

## 2017-08-23 RX ADMIN — CARBIDOPA AND LEVODOPA 2 TABLET(S): 25; 100 TABLET ORAL at 05:21

## 2017-08-23 RX ADMIN — Medication 325 MILLIGRAM(S): at 21:21

## 2017-08-23 RX ADMIN — CARBIDOPA, LEVODOPA, AND ENTACAPONE 1 TABLET(S): 50; 200; 200 TABLET, FILM COATED ORAL at 12:12

## 2017-08-23 RX ADMIN — Medication 100 MILLIGRAM(S): at 22:11

## 2017-08-23 RX ADMIN — CARBIDOPA AND LEVODOPA 2 TABLET(S): 25; 100 TABLET ORAL at 22:10

## 2017-08-23 RX ADMIN — CARBIDOPA AND LEVODOPA 2 TABLET(S): 25; 100 TABLET ORAL at 17:02

## 2017-08-24 RX ADMIN — Medication 325 MILLIGRAM(S): at 21:28

## 2017-08-24 RX ADMIN — RASAGILINE 1.5 MILLIGRAM(S): 0.5 TABLET ORAL at 20:48

## 2017-08-24 RX ADMIN — CARBIDOPA AND LEVODOPA 2 TABLET(S): 25; 100 TABLET ORAL at 12:32

## 2017-08-24 RX ADMIN — CARBIDOPA, LEVODOPA, AND ENTACAPONE 1 TABLET(S): 50; 200; 200 TABLET, FILM COATED ORAL at 12:33

## 2017-08-24 RX ADMIN — CARBIDOPA AND LEVODOPA 2 TABLET(S): 25; 100 TABLET ORAL at 20:48

## 2017-08-24 RX ADMIN — Medication 100 MILLIGRAM(S): at 20:48

## 2017-08-24 RX ADMIN — CARBIDOPA AND LEVODOPA 2 TABLET(S): 25; 100 TABLET ORAL at 17:11

## 2017-08-24 RX ADMIN — CARBIDOPA, LEVODOPA, AND ENTACAPONE 1 TABLET(S): 50; 200; 200 TABLET, FILM COATED ORAL at 05:48

## 2017-08-24 RX ADMIN — CARBIDOPA, LEVODOPA, AND ENTACAPONE 1 TABLET(S): 50; 200; 200 TABLET, FILM COATED ORAL at 17:11

## 2017-08-24 RX ADMIN — CARBIDOPA AND LEVODOPA 2 TABLET(S): 25; 100 TABLET ORAL at 05:48

## 2017-08-25 PROCEDURE — 99231 SBSQ HOSP IP/OBS SF/LOW 25: CPT

## 2017-08-25 RX ADMIN — CARBIDOPA, LEVODOPA, AND ENTACAPONE 1 TABLET(S): 50; 200; 200 TABLET, FILM COATED ORAL at 12:13

## 2017-08-25 RX ADMIN — CARBIDOPA AND LEVODOPA 2 TABLET(S): 25; 100 TABLET ORAL at 21:04

## 2017-08-25 RX ADMIN — Medication 325 MILLIGRAM(S): at 08:00

## 2017-08-25 RX ADMIN — CARBIDOPA, LEVODOPA, AND ENTACAPONE 1 TABLET(S): 50; 200; 200 TABLET, FILM COATED ORAL at 05:32

## 2017-08-25 RX ADMIN — Medication 325 MILLIGRAM(S): at 23:09

## 2017-08-25 RX ADMIN — CARBIDOPA AND LEVODOPA 2 TABLET(S): 25; 100 TABLET ORAL at 05:32

## 2017-08-25 RX ADMIN — CARBIDOPA AND LEVODOPA 2 TABLET(S): 25; 100 TABLET ORAL at 19:47

## 2017-08-25 RX ADMIN — CARBIDOPA, LEVODOPA, AND ENTACAPONE 1 TABLET(S): 50; 200; 200 TABLET, FILM COATED ORAL at 19:47

## 2017-08-25 RX ADMIN — CARBIDOPA AND LEVODOPA 2 TABLET(S): 25; 100 TABLET ORAL at 12:14

## 2017-08-25 RX ADMIN — Medication 325 MILLIGRAM(S): at 07:29

## 2017-08-26 RX ADMIN — CARBIDOPA AND LEVODOPA 2 TABLET(S): 25; 100 TABLET ORAL at 14:15

## 2017-08-26 RX ADMIN — CARBIDOPA AND LEVODOPA 2 TABLET(S): 25; 100 TABLET ORAL at 17:11

## 2017-08-26 RX ADMIN — CARBIDOPA, LEVODOPA, AND ENTACAPONE 1 TABLET(S): 50; 200; 200 TABLET, FILM COATED ORAL at 05:47

## 2017-08-26 RX ADMIN — CARBIDOPA AND LEVODOPA 2 TABLET(S): 25; 100 TABLET ORAL at 05:47

## 2017-08-26 RX ADMIN — CARBIDOPA AND LEVODOPA 2 TABLET(S): 25; 100 TABLET ORAL at 21:52

## 2017-08-26 RX ADMIN — Medication 325 MILLIGRAM(S): at 00:05

## 2017-08-26 RX ADMIN — CARBIDOPA, LEVODOPA, AND ENTACAPONE 1 TABLET(S): 50; 200; 200 TABLET, FILM COATED ORAL at 17:11

## 2017-08-26 RX ADMIN — CARBIDOPA, LEVODOPA, AND ENTACAPONE 1 TABLET(S): 50; 200; 200 TABLET, FILM COATED ORAL at 14:15

## 2017-08-27 RX ADMIN — CARBIDOPA AND LEVODOPA 2 TABLET(S): 25; 100 TABLET ORAL at 13:42

## 2017-08-27 RX ADMIN — Medication 10 MILLIGRAM(S): at 08:46

## 2017-08-27 RX ADMIN — Medication 100 MILLIGRAM(S): at 23:32

## 2017-08-27 RX ADMIN — CARBIDOPA, LEVODOPA, AND ENTACAPONE 1 TABLET(S): 50; 200; 200 TABLET, FILM COATED ORAL at 06:16

## 2017-08-27 RX ADMIN — CARBIDOPA, LEVODOPA, AND ENTACAPONE 1 TABLET(S): 50; 200; 200 TABLET, FILM COATED ORAL at 18:47

## 2017-08-27 RX ADMIN — Medication 1 APPLICATION(S): at 21:55

## 2017-08-27 RX ADMIN — CARBIDOPA AND LEVODOPA 2 TABLET(S): 25; 100 TABLET ORAL at 18:47

## 2017-08-27 RX ADMIN — Medication 325 MILLIGRAM(S): at 01:02

## 2017-08-27 RX ADMIN — Medication 325 MILLIGRAM(S): at 00:02

## 2017-08-27 RX ADMIN — CARBIDOPA, LEVODOPA, AND ENTACAPONE 1 TABLET(S): 50; 200; 200 TABLET, FILM COATED ORAL at 13:42

## 2017-08-27 RX ADMIN — CARBIDOPA AND LEVODOPA 2 TABLET(S): 25; 100 TABLET ORAL at 06:16

## 2017-08-27 RX ADMIN — Medication 10 MILLIGRAM(S): at 23:32

## 2017-08-27 RX ADMIN — CARBIDOPA AND LEVODOPA 2 TABLET(S): 25; 100 TABLET ORAL at 21:55

## 2017-08-28 PROCEDURE — 99231 SBSQ HOSP IP/OBS SF/LOW 25: CPT

## 2017-08-28 RX ADMIN — CARBIDOPA AND LEVODOPA 2 TABLET(S): 25; 100 TABLET ORAL at 21:43

## 2017-08-28 RX ADMIN — Medication 10 MILLIGRAM(S): at 23:53

## 2017-08-28 RX ADMIN — CARBIDOPA AND LEVODOPA 2 TABLET(S): 25; 100 TABLET ORAL at 12:00

## 2017-08-28 RX ADMIN — Medication 10 MILLIGRAM(S): at 10:00

## 2017-08-28 RX ADMIN — Medication 325 MILLIGRAM(S): at 23:23

## 2017-08-28 RX ADMIN — CARBIDOPA AND LEVODOPA 2 TABLET(S): 25; 100 TABLET ORAL at 17:29

## 2017-08-28 RX ADMIN — CARBIDOPA, LEVODOPA, AND ENTACAPONE 1 TABLET(S): 50; 200; 200 TABLET, FILM COATED ORAL at 17:29

## 2017-08-28 RX ADMIN — Medication 325 MILLIGRAM(S): at 22:51

## 2017-08-28 RX ADMIN — CARBIDOPA, LEVODOPA, AND ENTACAPONE 1 TABLET(S): 50; 200; 200 TABLET, FILM COATED ORAL at 06:06

## 2017-08-28 RX ADMIN — CARBIDOPA AND LEVODOPA 2 TABLET(S): 25; 100 TABLET ORAL at 06:06

## 2017-08-28 RX ADMIN — Medication 325 MILLIGRAM(S): at 04:20

## 2017-08-28 RX ADMIN — Medication 325 MILLIGRAM(S): at 03:36

## 2017-08-28 RX ADMIN — CARBIDOPA, LEVODOPA, AND ENTACAPONE 1 TABLET(S): 50; 200; 200 TABLET, FILM COATED ORAL at 12:00

## 2017-08-29 PROCEDURE — 99221 1ST HOSP IP/OBS SF/LOW 40: CPT

## 2017-08-29 RX ADMIN — CARBIDOPA, LEVODOPA, AND ENTACAPONE 1 TABLET(S): 50; 200; 200 TABLET, FILM COATED ORAL at 17:14

## 2017-08-29 RX ADMIN — CARBIDOPA, LEVODOPA, AND ENTACAPONE 1 TABLET(S): 50; 200; 200 TABLET, FILM COATED ORAL at 11:06

## 2017-08-29 RX ADMIN — CARBIDOPA AND LEVODOPA 2 TABLET(S): 25; 100 TABLET ORAL at 17:14

## 2017-08-29 RX ADMIN — CARBIDOPA AND LEVODOPA 2 TABLET(S): 25; 100 TABLET ORAL at 05:49

## 2017-08-29 RX ADMIN — CARBIDOPA, LEVODOPA, AND ENTACAPONE 1 TABLET(S): 50; 200; 200 TABLET, FILM COATED ORAL at 05:49

## 2017-08-29 RX ADMIN — CARBIDOPA AND LEVODOPA 2 TABLET(S): 25; 100 TABLET ORAL at 11:06

## 2017-08-29 RX ADMIN — CARBIDOPA AND LEVODOPA 2 TABLET(S): 25; 100 TABLET ORAL at 22:15

## 2017-08-30 PROCEDURE — 99231 SBSQ HOSP IP/OBS SF/LOW 25: CPT

## 2017-08-30 RX ADMIN — CARBIDOPA AND LEVODOPA 2 TABLET(S): 25; 100 TABLET ORAL at 06:22

## 2017-08-30 RX ADMIN — Medication 325 MILLIGRAM(S): at 23:26

## 2017-08-30 RX ADMIN — CARBIDOPA AND LEVODOPA 2 TABLET(S): 25; 100 TABLET ORAL at 17:09

## 2017-08-30 RX ADMIN — CARBIDOPA AND LEVODOPA 2 TABLET(S): 25; 100 TABLET ORAL at 12:00

## 2017-08-30 RX ADMIN — CARBIDOPA, LEVODOPA, AND ENTACAPONE 1 TABLET(S): 50; 200; 200 TABLET, FILM COATED ORAL at 06:22

## 2017-08-30 RX ADMIN — Medication 325 MILLIGRAM(S): at 23:56

## 2017-08-30 RX ADMIN — Medication 10 MILLIGRAM(S): at 23:25

## 2017-08-30 RX ADMIN — CARBIDOPA, LEVODOPA, AND ENTACAPONE 1 TABLET(S): 50; 200; 200 TABLET, FILM COATED ORAL at 12:00

## 2017-08-30 RX ADMIN — CARBIDOPA AND LEVODOPA 2 TABLET(S): 25; 100 TABLET ORAL at 21:03

## 2017-08-30 RX ADMIN — CARBIDOPA, LEVODOPA, AND ENTACAPONE 1 TABLET(S): 50; 200; 200 TABLET, FILM COATED ORAL at 17:09

## 2017-08-30 NOTE — CHART NOTE - NSCHARTNOTEFT_GEN_A_CORE
Called to see patient because patient reports heart beating fast.  VS was 162/112 and .  However,  RN noted that the patient was anxious and repeat VS after a couple of minutes was 126/61 101.  Attempted to see patient but patient again refused medical evaluation.

## 2017-08-31 PROCEDURE — 99231 SBSQ HOSP IP/OBS SF/LOW 25: CPT

## 2017-08-31 RX ORDER — TRAMADOL HYDROCHLORIDE 50 MG/1
50 TABLET ORAL ONCE
Qty: 0 | Refills: 0 | Status: DISCONTINUED | OUTPATIENT
Start: 2017-08-31 | End: 2017-08-31

## 2017-08-31 RX ADMIN — CARBIDOPA, LEVODOPA, AND ENTACAPONE 1 TABLET(S): 50; 200; 200 TABLET, FILM COATED ORAL at 12:04

## 2017-08-31 RX ADMIN — Medication 10 MILLIGRAM(S): at 20:49

## 2017-08-31 RX ADMIN — CARBIDOPA AND LEVODOPA 2 TABLET(S): 25; 100 TABLET ORAL at 21:28

## 2017-08-31 RX ADMIN — CARBIDOPA AND LEVODOPA 2 TABLET(S): 25; 100 TABLET ORAL at 06:03

## 2017-08-31 RX ADMIN — CARBIDOPA AND LEVODOPA 2 TABLET(S): 25; 100 TABLET ORAL at 16:49

## 2017-08-31 RX ADMIN — CARBIDOPA, LEVODOPA, AND ENTACAPONE 1 TABLET(S): 50; 200; 200 TABLET, FILM COATED ORAL at 06:03

## 2017-08-31 RX ADMIN — CARBIDOPA, LEVODOPA, AND ENTACAPONE 1 TABLET(S): 50; 200; 200 TABLET, FILM COATED ORAL at 16:48

## 2017-08-31 RX ADMIN — CARBIDOPA AND LEVODOPA 2 TABLET(S): 25; 100 TABLET ORAL at 12:04

## 2017-09-01 PROCEDURE — 99231 SBSQ HOSP IP/OBS SF/LOW 25: CPT

## 2017-09-01 RX ORDER — OLANZAPINE 15 MG/1
2.5 TABLET, FILM COATED ORAL ONCE
Qty: 0 | Refills: 0 | Status: DISCONTINUED | OUTPATIENT
Start: 2017-09-01 | End: 2017-10-06

## 2017-09-01 RX ADMIN — CARBIDOPA AND LEVODOPA 2 TABLET(S): 25; 100 TABLET ORAL at 22:46

## 2017-09-01 RX ADMIN — CARBIDOPA AND LEVODOPA 2 TABLET(S): 25; 100 TABLET ORAL at 06:23

## 2017-09-01 RX ADMIN — CARBIDOPA, LEVODOPA, AND ENTACAPONE 1 TABLET(S): 50; 200; 200 TABLET, FILM COATED ORAL at 16:42

## 2017-09-01 RX ADMIN — CARBIDOPA AND LEVODOPA 2 TABLET(S): 25; 100 TABLET ORAL at 16:41

## 2017-09-01 RX ADMIN — CARBIDOPA, LEVODOPA, AND ENTACAPONE 1 TABLET(S): 50; 200; 200 TABLET, FILM COATED ORAL at 06:22

## 2017-09-01 RX ADMIN — CARBIDOPA, LEVODOPA, AND ENTACAPONE 1 TABLET(S): 50; 200; 200 TABLET, FILM COATED ORAL at 11:40

## 2017-09-01 RX ADMIN — Medication 325 MILLIGRAM(S): at 23:01

## 2017-09-01 RX ADMIN — Medication 10 MILLIGRAM(S): at 23:05

## 2017-09-01 RX ADMIN — CARBIDOPA AND LEVODOPA 2 TABLET(S): 25; 100 TABLET ORAL at 11:40

## 2017-09-02 RX ADMIN — Medication 325 MILLIGRAM(S): at 00:39

## 2017-09-02 RX ADMIN — CARBIDOPA, LEVODOPA, AND ENTACAPONE 1 TABLET(S): 50; 200; 200 TABLET, FILM COATED ORAL at 12:54

## 2017-09-02 RX ADMIN — CARBIDOPA AND LEVODOPA 2 TABLET(S): 25; 100 TABLET ORAL at 21:49

## 2017-09-02 RX ADMIN — CARBIDOPA AND LEVODOPA 2 TABLET(S): 25; 100 TABLET ORAL at 05:02

## 2017-09-02 RX ADMIN — CARBIDOPA AND LEVODOPA 2 TABLET(S): 25; 100 TABLET ORAL at 18:04

## 2017-09-02 RX ADMIN — CARBIDOPA AND LEVODOPA 2 TABLET(S): 25; 100 TABLET ORAL at 12:53

## 2017-09-02 RX ADMIN — CARBIDOPA, LEVODOPA, AND ENTACAPONE 1 TABLET(S): 50; 200; 200 TABLET, FILM COATED ORAL at 18:03

## 2017-09-02 RX ADMIN — CARBIDOPA, LEVODOPA, AND ENTACAPONE 1 TABLET(S): 50; 200; 200 TABLET, FILM COATED ORAL at 05:03

## 2017-09-03 RX ADMIN — CARBIDOPA, LEVODOPA, AND ENTACAPONE 1 TABLET(S): 50; 200; 200 TABLET, FILM COATED ORAL at 18:02

## 2017-09-03 RX ADMIN — Medication 325 MILLIGRAM(S): at 03:46

## 2017-09-03 RX ADMIN — CARBIDOPA, LEVODOPA, AND ENTACAPONE 1 TABLET(S): 50; 200; 200 TABLET, FILM COATED ORAL at 05:01

## 2017-09-03 RX ADMIN — CARBIDOPA AND LEVODOPA 2 TABLET(S): 25; 100 TABLET ORAL at 18:02

## 2017-09-03 RX ADMIN — CARBIDOPA AND LEVODOPA 2 TABLET(S): 25; 100 TABLET ORAL at 05:01

## 2017-09-03 RX ADMIN — CARBIDOPA AND LEVODOPA 2 TABLET(S): 25; 100 TABLET ORAL at 12:22

## 2017-09-03 RX ADMIN — CARBIDOPA AND LEVODOPA 2 TABLET(S): 25; 100 TABLET ORAL at 21:11

## 2017-09-03 RX ADMIN — Medication 325 MILLIGRAM(S): at 02:46

## 2017-09-03 RX ADMIN — CARBIDOPA, LEVODOPA, AND ENTACAPONE 1 TABLET(S): 50; 200; 200 TABLET, FILM COATED ORAL at 12:22

## 2017-09-04 RX ADMIN — CARBIDOPA AND LEVODOPA 2 TABLET(S): 25; 100 TABLET ORAL at 17:22

## 2017-09-04 RX ADMIN — Medication 30 MILLILITER(S): at 16:00

## 2017-09-04 RX ADMIN — CARBIDOPA, LEVODOPA, AND ENTACAPONE 1 TABLET(S): 50; 200; 200 TABLET, FILM COATED ORAL at 06:25

## 2017-09-04 RX ADMIN — CARBIDOPA AND LEVODOPA 2 TABLET(S): 25; 100 TABLET ORAL at 11:38

## 2017-09-04 RX ADMIN — CARBIDOPA AND LEVODOPA 2 TABLET(S): 25; 100 TABLET ORAL at 21:48

## 2017-09-04 RX ADMIN — Medication 325 MILLIGRAM(S): at 23:24

## 2017-09-04 RX ADMIN — CARBIDOPA, LEVODOPA, AND ENTACAPONE 1 TABLET(S): 50; 200; 200 TABLET, FILM COATED ORAL at 11:38

## 2017-09-04 RX ADMIN — CARBIDOPA, LEVODOPA, AND ENTACAPONE 1 TABLET(S): 50; 200; 200 TABLET, FILM COATED ORAL at 17:21

## 2017-09-04 RX ADMIN — CARBIDOPA AND LEVODOPA 2 TABLET(S): 25; 100 TABLET ORAL at 06:25

## 2017-09-05 PROCEDURE — 99231 SBSQ HOSP IP/OBS SF/LOW 25: CPT

## 2017-09-05 RX ADMIN — Medication 325 MILLIGRAM(S): at 23:40

## 2017-09-05 RX ADMIN — CARBIDOPA AND LEVODOPA 2 TABLET(S): 25; 100 TABLET ORAL at 22:06

## 2017-09-05 RX ADMIN — Medication 325 MILLIGRAM(S): at 00:38

## 2017-09-05 RX ADMIN — CARBIDOPA AND LEVODOPA 2 TABLET(S): 25; 100 TABLET ORAL at 17:18

## 2017-09-05 RX ADMIN — Medication 325 MILLIGRAM(S): at 23:06

## 2017-09-05 RX ADMIN — CARBIDOPA, LEVODOPA, AND ENTACAPONE 1 TABLET(S): 50; 200; 200 TABLET, FILM COATED ORAL at 17:18

## 2017-09-05 RX ADMIN — CARBIDOPA, LEVODOPA, AND ENTACAPONE 1 TABLET(S): 50; 200; 200 TABLET, FILM COATED ORAL at 12:05

## 2017-09-05 RX ADMIN — CARBIDOPA AND LEVODOPA 2 TABLET(S): 25; 100 TABLET ORAL at 06:41

## 2017-09-05 RX ADMIN — CARBIDOPA, LEVODOPA, AND ENTACAPONE 1 TABLET(S): 50; 200; 200 TABLET, FILM COATED ORAL at 06:41

## 2017-09-05 RX ADMIN — CARBIDOPA AND LEVODOPA 2 TABLET(S): 25; 100 TABLET ORAL at 12:05

## 2017-09-06 PROCEDURE — 99231 SBSQ HOSP IP/OBS SF/LOW 25: CPT

## 2017-09-06 RX ORDER — ACETAMINOPHEN 500 MG
325 TABLET ORAL ONCE
Qty: 0 | Refills: 0 | Status: COMPLETED | OUTPATIENT
Start: 2017-09-06 | End: 2017-09-06

## 2017-09-06 RX ADMIN — CARBIDOPA AND LEVODOPA 2 TABLET(S): 25; 100 TABLET ORAL at 14:28

## 2017-09-06 RX ADMIN — CARBIDOPA, LEVODOPA, AND ENTACAPONE 1 TABLET(S): 50; 200; 200 TABLET, FILM COATED ORAL at 06:32

## 2017-09-06 RX ADMIN — Medication 325 MILLIGRAM(S): at 04:38

## 2017-09-06 RX ADMIN — CARBIDOPA AND LEVODOPA 2 TABLET(S): 25; 100 TABLET ORAL at 21:15

## 2017-09-06 RX ADMIN — CARBIDOPA, LEVODOPA, AND ENTACAPONE 1 TABLET(S): 50; 200; 200 TABLET, FILM COATED ORAL at 14:30

## 2017-09-06 RX ADMIN — Medication 325 MILLIGRAM(S): at 05:00

## 2017-09-06 RX ADMIN — Medication 325 MILLIGRAM(S): at 23:07

## 2017-09-06 RX ADMIN — CARBIDOPA AND LEVODOPA 2 TABLET(S): 25; 100 TABLET ORAL at 17:33

## 2017-09-06 RX ADMIN — Medication 325 MILLIGRAM(S): at 23:50

## 2017-09-06 RX ADMIN — CARBIDOPA, LEVODOPA, AND ENTACAPONE 1 TABLET(S): 50; 200; 200 TABLET, FILM COATED ORAL at 17:33

## 2017-09-07 PROCEDURE — 99231 SBSQ HOSP IP/OBS SF/LOW 25: CPT

## 2017-09-07 RX ADMIN — Medication 325 MILLIGRAM(S): at 19:44

## 2017-09-07 RX ADMIN — Medication 325 MILLIGRAM(S): at 20:13

## 2017-09-07 RX ADMIN — CARBIDOPA, LEVODOPA, AND ENTACAPONE 1 TABLET(S): 50; 200; 200 TABLET, FILM COATED ORAL at 17:11

## 2017-09-07 RX ADMIN — CARBIDOPA AND LEVODOPA 2 TABLET(S): 25; 100 TABLET ORAL at 17:11

## 2017-09-07 RX ADMIN — CARBIDOPA, LEVODOPA, AND ENTACAPONE 1 TABLET(S): 50; 200; 200 TABLET, FILM COATED ORAL at 12:09

## 2017-09-07 RX ADMIN — CARBIDOPA, LEVODOPA, AND ENTACAPONE 1 TABLET(S): 50; 200; 200 TABLET, FILM COATED ORAL at 06:19

## 2017-09-07 RX ADMIN — CARBIDOPA AND LEVODOPA 2 TABLET(S): 25; 100 TABLET ORAL at 12:10

## 2017-09-07 RX ADMIN — CARBIDOPA AND LEVODOPA 2 TABLET(S): 25; 100 TABLET ORAL at 06:19

## 2017-09-07 RX ADMIN — CARBIDOPA AND LEVODOPA 2 TABLET(S): 25; 100 TABLET ORAL at 21:05

## 2017-09-08 LAB
APPEARANCE UR: CLEAR — SIGNIFICANT CHANGE UP
BILIRUB UR-MCNC: NEGATIVE — SIGNIFICANT CHANGE UP
COLOR SPEC: YELLOW — SIGNIFICANT CHANGE UP
DIFF PNL FLD: NEGATIVE — SIGNIFICANT CHANGE UP
GLUCOSE UR QL: NEGATIVE MG/DL — SIGNIFICANT CHANGE UP
KETONES UR-MCNC: NEGATIVE — SIGNIFICANT CHANGE UP
LEUKOCYTE ESTERASE UR-ACNC: NEGATIVE — SIGNIFICANT CHANGE UP
NITRITE UR-MCNC: NEGATIVE — SIGNIFICANT CHANGE UP
PH UR: 7 — SIGNIFICANT CHANGE UP (ref 5–8)
PROT UR-MCNC: 100 MG/DL
SP GR SPEC: 1.01 — SIGNIFICANT CHANGE UP (ref 1.01–1.02)
UROBILINOGEN FLD QL: NEGATIVE MG/DL — SIGNIFICANT CHANGE UP

## 2017-09-08 PROCEDURE — 99231 SBSQ HOSP IP/OBS SF/LOW 25: CPT

## 2017-09-08 RX ADMIN — CARBIDOPA, LEVODOPA, AND ENTACAPONE 1 TABLET(S): 50; 200; 200 TABLET, FILM COATED ORAL at 13:00

## 2017-09-08 RX ADMIN — CARBIDOPA AND LEVODOPA 2 TABLET(S): 25; 100 TABLET ORAL at 20:25

## 2017-09-08 RX ADMIN — CARBIDOPA, LEVODOPA, AND ENTACAPONE 1 TABLET(S): 50; 200; 200 TABLET, FILM COATED ORAL at 05:30

## 2017-09-08 RX ADMIN — Medication 325 MILLIGRAM(S): at 23:30

## 2017-09-08 RX ADMIN — CARBIDOPA AND LEVODOPA 2 TABLET(S): 25; 100 TABLET ORAL at 05:30

## 2017-09-08 RX ADMIN — CARBIDOPA AND LEVODOPA 2 TABLET(S): 25; 100 TABLET ORAL at 21:45

## 2017-09-08 RX ADMIN — CARBIDOPA, LEVODOPA, AND ENTACAPONE 1 TABLET(S): 50; 200; 200 TABLET, FILM COATED ORAL at 20:25

## 2017-09-08 RX ADMIN — RASAGILINE 1.5 MILLIGRAM(S): 0.5 TABLET ORAL at 21:46

## 2017-09-08 RX ADMIN — CARBIDOPA AND LEVODOPA 2 TABLET(S): 25; 100 TABLET ORAL at 13:00

## 2017-09-09 RX ADMIN — Medication 325 MILLIGRAM(S): at 23:28

## 2017-09-09 RX ADMIN — CARBIDOPA AND LEVODOPA 2 TABLET(S): 25; 100 TABLET ORAL at 13:46

## 2017-09-09 RX ADMIN — CARBIDOPA, LEVODOPA, AND ENTACAPONE 1 TABLET(S): 50; 200; 200 TABLET, FILM COATED ORAL at 05:45

## 2017-09-09 RX ADMIN — CARBIDOPA, LEVODOPA, AND ENTACAPONE 1 TABLET(S): 50; 200; 200 TABLET, FILM COATED ORAL at 18:30

## 2017-09-09 RX ADMIN — CARBIDOPA AND LEVODOPA 2 TABLET(S): 25; 100 TABLET ORAL at 21:14

## 2017-09-09 RX ADMIN — Medication 325 MILLIGRAM(S): at 12:41

## 2017-09-09 RX ADMIN — CARBIDOPA AND LEVODOPA 2 TABLET(S): 25; 100 TABLET ORAL at 18:29

## 2017-09-09 RX ADMIN — CARBIDOPA, LEVODOPA, AND ENTACAPONE 1 TABLET(S): 50; 200; 200 TABLET, FILM COATED ORAL at 13:46

## 2017-09-10 RX ADMIN — CARBIDOPA AND LEVODOPA 2 TABLET(S): 25; 100 TABLET ORAL at 06:40

## 2017-09-10 RX ADMIN — CARBIDOPA, LEVODOPA, AND ENTACAPONE 1 TABLET(S): 50; 200; 200 TABLET, FILM COATED ORAL at 11:39

## 2017-09-10 RX ADMIN — Medication 325 MILLIGRAM(S): at 00:28

## 2017-09-10 RX ADMIN — CARBIDOPA, LEVODOPA, AND ENTACAPONE 1 TABLET(S): 50; 200; 200 TABLET, FILM COATED ORAL at 06:41

## 2017-09-10 RX ADMIN — CARBIDOPA AND LEVODOPA 2 TABLET(S): 25; 100 TABLET ORAL at 21:36

## 2017-09-10 RX ADMIN — CARBIDOPA AND LEVODOPA 2 TABLET(S): 25; 100 TABLET ORAL at 11:38

## 2017-09-10 RX ADMIN — CARBIDOPA AND LEVODOPA 2 TABLET(S): 25; 100 TABLET ORAL at 17:10

## 2017-09-10 RX ADMIN — Medication 325 MILLIGRAM(S): at 23:23

## 2017-09-10 RX ADMIN — Medication 325 MILLIGRAM(S): at 23:55

## 2017-09-10 RX ADMIN — CARBIDOPA AND LEVODOPA 2 TABLET(S): 25; 100 TABLET ORAL at 21:18

## 2017-09-10 RX ADMIN — CARBIDOPA, LEVODOPA, AND ENTACAPONE 1 TABLET(S): 50; 200; 200 TABLET, FILM COATED ORAL at 17:10

## 2017-09-11 PROCEDURE — 99231 SBSQ HOSP IP/OBS SF/LOW 25: CPT

## 2017-09-11 RX ADMIN — CARBIDOPA, LEVODOPA, AND ENTACAPONE 1 TABLET(S): 50; 200; 200 TABLET, FILM COATED ORAL at 17:07

## 2017-09-11 RX ADMIN — CARBIDOPA AND LEVODOPA 2 TABLET(S): 25; 100 TABLET ORAL at 21:04

## 2017-09-11 RX ADMIN — CARBIDOPA, LEVODOPA, AND ENTACAPONE 1 TABLET(S): 50; 200; 200 TABLET, FILM COATED ORAL at 05:52

## 2017-09-11 RX ADMIN — CARBIDOPA AND LEVODOPA 2 TABLET(S): 25; 100 TABLET ORAL at 05:53

## 2017-09-11 RX ADMIN — CARBIDOPA AND LEVODOPA 2 TABLET(S): 25; 100 TABLET ORAL at 17:07

## 2017-09-11 RX ADMIN — CARBIDOPA AND LEVODOPA 2 TABLET(S): 25; 100 TABLET ORAL at 13:20

## 2017-09-11 RX ADMIN — CARBIDOPA, LEVODOPA, AND ENTACAPONE 1 TABLET(S): 50; 200; 200 TABLET, FILM COATED ORAL at 13:20

## 2017-09-12 PROCEDURE — 99231 SBSQ HOSP IP/OBS SF/LOW 25: CPT

## 2017-09-12 RX ADMIN — CARBIDOPA AND LEVODOPA 2 TABLET(S): 25; 100 TABLET ORAL at 21:03

## 2017-09-12 RX ADMIN — Medication 325 MILLIGRAM(S): at 01:49

## 2017-09-12 RX ADMIN — CARBIDOPA AND LEVODOPA 2 TABLET(S): 25; 100 TABLET ORAL at 11:36

## 2017-09-12 RX ADMIN — CARBIDOPA AND LEVODOPA 2 TABLET(S): 25; 100 TABLET ORAL at 05:43

## 2017-09-12 RX ADMIN — Medication 325 MILLIGRAM(S): at 23:46

## 2017-09-12 RX ADMIN — CARBIDOPA, LEVODOPA, AND ENTACAPONE 1 TABLET(S): 50; 200; 200 TABLET, FILM COATED ORAL at 12:11

## 2017-09-12 RX ADMIN — Medication 325 MILLIGRAM(S): at 23:47

## 2017-09-12 RX ADMIN — CARBIDOPA AND LEVODOPA 2 TABLET(S): 25; 100 TABLET ORAL at 16:23

## 2017-09-12 RX ADMIN — CARBIDOPA, LEVODOPA, AND ENTACAPONE 1 TABLET(S): 50; 200; 200 TABLET, FILM COATED ORAL at 05:43

## 2017-09-12 RX ADMIN — CARBIDOPA, LEVODOPA, AND ENTACAPONE 1 TABLET(S): 50; 200; 200 TABLET, FILM COATED ORAL at 16:23

## 2017-09-13 PROCEDURE — 99231 SBSQ HOSP IP/OBS SF/LOW 25: CPT

## 2017-09-13 RX ADMIN — CARBIDOPA, LEVODOPA, AND ENTACAPONE 1 TABLET(S): 50; 200; 200 TABLET, FILM COATED ORAL at 17:01

## 2017-09-13 RX ADMIN — CARBIDOPA AND LEVODOPA 2 TABLET(S): 25; 100 TABLET ORAL at 11:38

## 2017-09-13 RX ADMIN — Medication 325 MILLIGRAM(S): at 23:40

## 2017-09-13 RX ADMIN — CARBIDOPA, LEVODOPA, AND ENTACAPONE 1 TABLET(S): 50; 200; 200 TABLET, FILM COATED ORAL at 11:38

## 2017-09-13 RX ADMIN — CARBIDOPA AND LEVODOPA 2 TABLET(S): 25; 100 TABLET ORAL at 06:18

## 2017-09-13 RX ADMIN — CARBIDOPA AND LEVODOPA 2 TABLET(S): 25; 100 TABLET ORAL at 17:01

## 2017-09-13 RX ADMIN — CARBIDOPA AND LEVODOPA 2 TABLET(S): 25; 100 TABLET ORAL at 21:03

## 2017-09-13 RX ADMIN — CARBIDOPA, LEVODOPA, AND ENTACAPONE 1 TABLET(S): 50; 200; 200 TABLET, FILM COATED ORAL at 06:18

## 2017-09-14 PROCEDURE — 99231 SBSQ HOSP IP/OBS SF/LOW 25: CPT

## 2017-09-14 RX ADMIN — Medication 325 MILLIGRAM(S): at 18:50

## 2017-09-14 RX ADMIN — CARBIDOPA AND LEVODOPA 2 TABLET(S): 25; 100 TABLET ORAL at 12:07

## 2017-09-14 RX ADMIN — CARBIDOPA AND LEVODOPA 2 TABLET(S): 25; 100 TABLET ORAL at 05:53

## 2017-09-14 RX ADMIN — CARBIDOPA, LEVODOPA, AND ENTACAPONE 1 TABLET(S): 50; 200; 200 TABLET, FILM COATED ORAL at 17:13

## 2017-09-14 RX ADMIN — CARBIDOPA AND LEVODOPA 2 TABLET(S): 25; 100 TABLET ORAL at 23:23

## 2017-09-14 RX ADMIN — CARBIDOPA, LEVODOPA, AND ENTACAPONE 1 TABLET(S): 50; 200; 200 TABLET, FILM COATED ORAL at 05:53

## 2017-09-14 RX ADMIN — CARBIDOPA AND LEVODOPA 2 TABLET(S): 25; 100 TABLET ORAL at 17:11

## 2017-09-14 RX ADMIN — CARBIDOPA, LEVODOPA, AND ENTACAPONE 1 TABLET(S): 50; 200; 200 TABLET, FILM COATED ORAL at 12:07

## 2017-09-14 RX ADMIN — Medication 325 MILLIGRAM(S): at 00:08

## 2017-09-15 PROCEDURE — 99231 SBSQ HOSP IP/OBS SF/LOW 25: CPT

## 2017-09-15 RX ADMIN — CARBIDOPA AND LEVODOPA 2 TABLET(S): 25; 100 TABLET ORAL at 21:27

## 2017-09-15 RX ADMIN — CARBIDOPA, LEVODOPA, AND ENTACAPONE 1 TABLET(S): 50; 200; 200 TABLET, FILM COATED ORAL at 18:34

## 2017-09-15 RX ADMIN — RASAGILINE 1.5 MILLIGRAM(S): 0.5 TABLET ORAL at 13:00

## 2017-09-15 RX ADMIN — CARBIDOPA AND LEVODOPA 2 TABLET(S): 25; 100 TABLET ORAL at 16:33

## 2017-09-15 RX ADMIN — CARBIDOPA AND LEVODOPA 2 TABLET(S): 25; 100 TABLET ORAL at 13:00

## 2017-09-15 RX ADMIN — CARBIDOPA AND LEVODOPA 2 TABLET(S): 25; 100 TABLET ORAL at 06:45

## 2017-09-15 RX ADMIN — Medication 325 MILLIGRAM(S): at 23:22

## 2017-09-15 RX ADMIN — CARBIDOPA, LEVODOPA, AND ENTACAPONE 1 TABLET(S): 50; 200; 200 TABLET, FILM COATED ORAL at 13:00

## 2017-09-15 RX ADMIN — Medication 30 MILLILITER(S): at 19:57

## 2017-09-15 RX ADMIN — CARBIDOPA, LEVODOPA, AND ENTACAPONE 1 TABLET(S): 50; 200; 200 TABLET, FILM COATED ORAL at 06:45

## 2017-09-16 RX ADMIN — Medication 1 APPLICATION(S): at 11:56

## 2017-09-16 RX ADMIN — Medication 325 MILLIGRAM(S): at 02:25

## 2017-09-16 RX ADMIN — CARBIDOPA AND LEVODOPA 2 TABLET(S): 25; 100 TABLET ORAL at 18:00

## 2017-09-16 RX ADMIN — CARBIDOPA, LEVODOPA, AND ENTACAPONE 1 TABLET(S): 50; 200; 200 TABLET, FILM COATED ORAL at 06:36

## 2017-09-16 RX ADMIN — CARBIDOPA, LEVODOPA, AND ENTACAPONE 1 TABLET(S): 50; 200; 200 TABLET, FILM COATED ORAL at 18:01

## 2017-09-16 RX ADMIN — Medication 325 MILLIGRAM(S): at 19:48

## 2017-09-16 RX ADMIN — CARBIDOPA AND LEVODOPA 2 TABLET(S): 25; 100 TABLET ORAL at 21:01

## 2017-09-16 RX ADMIN — Medication 325 MILLIGRAM(S): at 20:24

## 2017-09-16 RX ADMIN — CARBIDOPA, LEVODOPA, AND ENTACAPONE 1 TABLET(S): 50; 200; 200 TABLET, FILM COATED ORAL at 13:11

## 2017-09-16 RX ADMIN — RASAGILINE 1.5 MILLIGRAM(S): 0.5 TABLET ORAL at 11:57

## 2017-09-16 RX ADMIN — CARBIDOPA AND LEVODOPA 2 TABLET(S): 25; 100 TABLET ORAL at 13:11

## 2017-09-16 RX ADMIN — CARBIDOPA AND LEVODOPA 2 TABLET(S): 25; 100 TABLET ORAL at 06:36

## 2017-09-17 RX ADMIN — Medication 325 MILLIGRAM(S): at 23:18

## 2017-09-17 RX ADMIN — CARBIDOPA, LEVODOPA, AND ENTACAPONE 1 TABLET(S): 50; 200; 200 TABLET, FILM COATED ORAL at 16:10

## 2017-09-17 RX ADMIN — CARBIDOPA, LEVODOPA, AND ENTACAPONE 1 TABLET(S): 50; 200; 200 TABLET, FILM COATED ORAL at 06:42

## 2017-09-17 RX ADMIN — Medication 325 MILLIGRAM(S): at 00:36

## 2017-09-17 RX ADMIN — CARBIDOPA AND LEVODOPA 2 TABLET(S): 25; 100 TABLET ORAL at 20:17

## 2017-09-17 RX ADMIN — CARBIDOPA, LEVODOPA, AND ENTACAPONE 1 TABLET(S): 50; 200; 200 TABLET, FILM COATED ORAL at 13:04

## 2017-09-17 RX ADMIN — CARBIDOPA AND LEVODOPA 2 TABLET(S): 25; 100 TABLET ORAL at 13:03

## 2017-09-17 RX ADMIN — CARBIDOPA AND LEVODOPA 2 TABLET(S): 25; 100 TABLET ORAL at 16:11

## 2017-09-17 RX ADMIN — CARBIDOPA AND LEVODOPA 2 TABLET(S): 25; 100 TABLET ORAL at 06:42

## 2017-09-18 PROCEDURE — 99231 SBSQ HOSP IP/OBS SF/LOW 25: CPT

## 2017-09-18 RX ADMIN — CARBIDOPA AND LEVODOPA 2 TABLET(S): 25; 100 TABLET ORAL at 05:26

## 2017-09-18 RX ADMIN — CARBIDOPA, LEVODOPA, AND ENTACAPONE 1 TABLET(S): 50; 200; 200 TABLET, FILM COATED ORAL at 12:23

## 2017-09-18 RX ADMIN — CARBIDOPA AND LEVODOPA 2 TABLET(S): 25; 100 TABLET ORAL at 17:51

## 2017-09-18 RX ADMIN — CARBIDOPA, LEVODOPA, AND ENTACAPONE 1 TABLET(S): 50; 200; 200 TABLET, FILM COATED ORAL at 17:51

## 2017-09-18 RX ADMIN — CARBIDOPA, LEVODOPA, AND ENTACAPONE 1 TABLET(S): 50; 200; 200 TABLET, FILM COATED ORAL at 05:26

## 2017-09-18 RX ADMIN — Medication 325 MILLIGRAM(S): at 23:40

## 2017-09-18 RX ADMIN — CARBIDOPA AND LEVODOPA 2 TABLET(S): 25; 100 TABLET ORAL at 21:07

## 2017-09-18 RX ADMIN — CARBIDOPA AND LEVODOPA 2 TABLET(S): 25; 100 TABLET ORAL at 12:23

## 2017-09-19 PROCEDURE — 99231 SBSQ HOSP IP/OBS SF/LOW 25: CPT

## 2017-09-19 PROCEDURE — 12345: CPT | Mod: NC

## 2017-09-19 RX ORDER — OLANZAPINE 15 MG/1
5 TABLET, FILM COATED ORAL EVERY 6 HOURS
Qty: 0 | Refills: 0 | Status: DISCONTINUED | OUTPATIENT
Start: 2017-09-19 | End: 2017-09-19

## 2017-09-19 RX ORDER — OLANZAPINE 15 MG/1
5 TABLET, FILM COATED ORAL EVERY 6 HOURS
Qty: 0 | Refills: 0 | Status: COMPLETED | OUTPATIENT
Start: 2017-09-19 | End: 2017-09-19

## 2017-09-19 RX ADMIN — CARBIDOPA, LEVODOPA, AND ENTACAPONE 1 TABLET(S): 50; 200; 200 TABLET, FILM COATED ORAL at 11:30

## 2017-09-19 RX ADMIN — Medication 325 MILLIGRAM(S): at 03:52

## 2017-09-19 RX ADMIN — CARBIDOPA AND LEVODOPA 2 TABLET(S): 25; 100 TABLET ORAL at 06:39

## 2017-09-19 RX ADMIN — CARBIDOPA AND LEVODOPA 2 TABLET(S): 25; 100 TABLET ORAL at 11:30

## 2017-09-19 RX ADMIN — CARBIDOPA, LEVODOPA, AND ENTACAPONE 1 TABLET(S): 50; 200; 200 TABLET, FILM COATED ORAL at 06:39

## 2017-09-19 RX ADMIN — OLANZAPINE 5 MILLIGRAM(S): 15 TABLET, FILM COATED ORAL at 21:13

## 2017-09-19 RX ADMIN — CARBIDOPA, LEVODOPA, AND ENTACAPONE 1 TABLET(S): 50; 200; 200 TABLET, FILM COATED ORAL at 16:53

## 2017-09-19 RX ADMIN — CARBIDOPA AND LEVODOPA 2 TABLET(S): 25; 100 TABLET ORAL at 16:53

## 2017-09-19 NOTE — CHART NOTE - NSCHARTNOTEFT_GEN_A_CORE
Called regarding extreme agitation and aggressive behavior.  Per RN, patient was refusing medication because they were "compressed" and didn't like how they looked.  RN explained to the patient that it has been the same medication but patient still refused and was becoming uncooperative.  Patient then started to become aggressive and started to become verbally abusive to staff.  Staff tried to assist patient back to his room but patient became physically abusive, attempting to hit and even bite staff.  Patient was heard yelling and screaming throughout the unit.  By the time I arrived, patient was in his room but still was yelling at staff.  Physical exam was deferred due to threat of violence.  Zyprexa 5mg IM PRN was given for severe agitation.  A manual hold restraint was also ordered so the IM can be given.  Patient was injected in his left upper thigh.     Time of evaluation:  2050    Called to evaluate patient for manual hold: 2048    Behavior:  aggressive and violent behavior    Other interventions attempted:  re-direction, diversionary techniques    REVIEW OF SYSTEMS:  [X] Unable to perform ROS due to:  refusing to answer    PAST MEDICAL & SURGICAL HISTORY:  Herniated cervical disc  Congestive heart failure, unspecified congestive heart failure chronicity, unspecified congestive heart failure type  Parkinson disease  Diastolic heart failure  GERD (gastroesophageal reflux disease)  Migraine  CHF (congestive heart failure)  Parkinson disease  No significant past surgical history  No significant past surgical history    MEDICATIONS  (STANDING):  docusate sodium 100 milliGRAM(s) Oral two times a day  rasagiline Tablet 1.5 milliGRAM(s) Oral two times a day  valproic  acid Syrup 500 milliGRAM(s) Oral at bedtime  carbidopa/levodopa/entacapone 50/200/200 1 Tablet(s) Oral <User Schedule>  nystatin Powder 1 Application(s) Topical daily  BACItracin   Ointment 1 Application(s) Topical two times a day  Nuplazid 17mg tablet 2 Tablet(s) 2 Tablet(s) Oral daily  baclofen 10 milliGRAM(s) Oral two times a day  carbidopa/levodopa CR 25/100 2 Tablet(s) Oral <User Schedule>  hydrocortisone 2.5% Rectal Cream 1 Application(s) Rectal daily  OLANZapine Injectable 2.5 milliGRAM(s) IntraMuscular once    MEDICATIONS  (PRN):  OLANZapine 2.5 milliGRAM(s) Oral every 6 hours PRN agitation  OLANZapine Injectable 5 milliGRAM(s) IntraMuscular every 6 hours PRN severe agitation ./ aggressive, threatening behavior  acetaminophen   Tablet. 325 milliGRAM(s) Oral every 6 hours PRN Moderate Pain (4 - 6)  aluminum hydroxide/magnesium hydroxide/simethicone Suspension 30 milliLiter(s) Oral every 6 hours PRN Dyspepsia  OLANZapine Injectable 5 milliGRAM(s) IntraMuscular every 6 hours PRN severe agitation ./ aggressive, threatening behavior    Vital Signs Last 24 Hrs  [X] Unable to obtain secondary to violent nature of patient     Physical Exam:  [X] Unable to perform physical exam due to violent nature of patient     [X] I have evaluated this patient and have determined that a manual hold are warranted to optimize medical care. Patient was assessed for current physical and psychological risk factors as well as special needs. There are no medical conditions or limitations that would place this patient at risk while in restraints.    Type of restraint:  manual hold    Behavioral criteria for discontinuation of restraint: [X] See order    Attending notified:  Dr. Galdamez to be notified in the morning

## 2017-09-20 PROCEDURE — 99231 SBSQ HOSP IP/OBS SF/LOW 25: CPT

## 2017-09-20 RX ADMIN — CARBIDOPA, LEVODOPA, AND ENTACAPONE 1 TABLET(S): 50; 200; 200 TABLET, FILM COATED ORAL at 12:04

## 2017-09-20 RX ADMIN — CARBIDOPA AND LEVODOPA 2 TABLET(S): 25; 100 TABLET ORAL at 05:51

## 2017-09-20 RX ADMIN — CARBIDOPA AND LEVODOPA 2 TABLET(S): 25; 100 TABLET ORAL at 22:18

## 2017-09-20 RX ADMIN — CARBIDOPA AND LEVODOPA 2 TABLET(S): 25; 100 TABLET ORAL at 12:05

## 2017-09-20 RX ADMIN — CARBIDOPA, LEVODOPA, AND ENTACAPONE 1 TABLET(S): 50; 200; 200 TABLET, FILM COATED ORAL at 05:50

## 2017-09-20 RX ADMIN — CARBIDOPA AND LEVODOPA 2 TABLET(S): 25; 100 TABLET ORAL at 16:42

## 2017-09-20 RX ADMIN — Medication 325 MILLIGRAM(S): at 23:20

## 2017-09-20 RX ADMIN — Medication 325 MILLIGRAM(S): at 20:50

## 2017-09-20 RX ADMIN — CARBIDOPA, LEVODOPA, AND ENTACAPONE 1 TABLET(S): 50; 200; 200 TABLET, FILM COATED ORAL at 16:42

## 2017-09-21 PROCEDURE — 99231 SBSQ HOSP IP/OBS SF/LOW 25: CPT

## 2017-09-21 RX ORDER — CARBIDOPA, LEVODOPA, AND ENTACAPONE 50; 200; 200 MG/1; MG/1; MG/1
1 TABLET, FILM COATED ORAL
Qty: 0 | Refills: 0 | Status: DISCONTINUED | OUTPATIENT
Start: 2017-09-21 | End: 2017-09-21

## 2017-09-21 RX ORDER — CARBIDOPA AND LEVODOPA 25; 100 MG/1; MG/1
2 TABLET ORAL
Qty: 0 | Refills: 0 | Status: DISCONTINUED | OUTPATIENT
Start: 2017-09-21 | End: 2017-09-27

## 2017-09-21 RX ORDER — CARBIDOPA, LEVODOPA, AND ENTACAPONE 50; 200; 200 MG/1; MG/1; MG/1
1 TABLET, FILM COATED ORAL
Qty: 0 | Refills: 0 | Status: DISCONTINUED | OUTPATIENT
Start: 2017-09-21 | End: 2018-07-27

## 2017-09-21 RX ADMIN — CARBIDOPA, LEVODOPA, AND ENTACAPONE 1 TABLET(S): 50; 200; 200 TABLET, FILM COATED ORAL at 17:55

## 2017-09-21 RX ADMIN — CARBIDOPA AND LEVODOPA 2 TABLET(S): 25; 100 TABLET ORAL at 17:55

## 2017-09-21 RX ADMIN — Medication 100 MILLIGRAM(S): at 21:12

## 2017-09-21 RX ADMIN — Medication 325 MILLIGRAM(S): at 20:41

## 2017-09-21 RX ADMIN — CARBIDOPA AND LEVODOPA 2 TABLET(S): 25; 100 TABLET ORAL at 21:12

## 2017-09-21 RX ADMIN — CARBIDOPA, LEVODOPA, AND ENTACAPONE 1 TABLET(S): 50; 200; 200 TABLET, FILM COATED ORAL at 12:00

## 2017-09-21 RX ADMIN — Medication 30 MILLILITER(S): at 14:54

## 2017-09-21 RX ADMIN — Medication 325 MILLIGRAM(S): at 16:24

## 2017-09-21 RX ADMIN — CARBIDOPA AND LEVODOPA 2 TABLET(S): 25; 100 TABLET ORAL at 05:38

## 2017-09-21 RX ADMIN — Medication 325 MILLIGRAM(S): at 23:15

## 2017-09-21 RX ADMIN — CARBIDOPA AND LEVODOPA 2 TABLET(S): 25; 100 TABLET ORAL at 12:00

## 2017-09-21 RX ADMIN — CARBIDOPA, LEVODOPA, AND ENTACAPONE 1 TABLET(S): 50; 200; 200 TABLET, FILM COATED ORAL at 05:38

## 2017-09-21 RX ADMIN — Medication 325 MILLIGRAM(S): at 23:45

## 2017-09-22 RX ORDER — CYCLOBENZAPRINE HYDROCHLORIDE 10 MG/1
10 TABLET, FILM COATED ORAL THREE TIMES A DAY
Qty: 0 | Refills: 0 | Status: DISCONTINUED | OUTPATIENT
Start: 2017-09-22 | End: 2018-08-18

## 2017-09-22 RX ADMIN — CARBIDOPA AND LEVODOPA 2 TABLET(S): 25; 100 TABLET ORAL at 05:03

## 2017-09-22 RX ADMIN — CARBIDOPA AND LEVODOPA 2 TABLET(S): 25; 100 TABLET ORAL at 17:23

## 2017-09-22 RX ADMIN — CARBIDOPA, LEVODOPA, AND ENTACAPONE 1 TABLET(S): 50; 200; 200 TABLET, FILM COATED ORAL at 11:41

## 2017-09-22 RX ADMIN — CARBIDOPA, LEVODOPA, AND ENTACAPONE 1 TABLET(S): 50; 200; 200 TABLET, FILM COATED ORAL at 17:23

## 2017-09-22 RX ADMIN — CARBIDOPA, LEVODOPA, AND ENTACAPONE 1 TABLET(S): 50; 200; 200 TABLET, FILM COATED ORAL at 05:03

## 2017-09-22 RX ADMIN — CARBIDOPA AND LEVODOPA 2 TABLET(S): 25; 100 TABLET ORAL at 21:32

## 2017-09-22 RX ADMIN — CARBIDOPA AND LEVODOPA 2 TABLET(S): 25; 100 TABLET ORAL at 14:00

## 2017-09-23 RX ADMIN — CARBIDOPA AND LEVODOPA 2 TABLET(S): 25; 100 TABLET ORAL at 21:22

## 2017-09-23 RX ADMIN — CARBIDOPA, LEVODOPA, AND ENTACAPONE 1 TABLET(S): 50; 200; 200 TABLET, FILM COATED ORAL at 17:25

## 2017-09-23 RX ADMIN — CARBIDOPA AND LEVODOPA 2 TABLET(S): 25; 100 TABLET ORAL at 17:25

## 2017-09-23 RX ADMIN — CARBIDOPA, LEVODOPA, AND ENTACAPONE 1 TABLET(S): 50; 200; 200 TABLET, FILM COATED ORAL at 06:08

## 2017-09-23 RX ADMIN — CARBIDOPA AND LEVODOPA 2 TABLET(S): 25; 100 TABLET ORAL at 06:08

## 2017-09-23 RX ADMIN — CYCLOBENZAPRINE HYDROCHLORIDE 10 MILLIGRAM(S): 10 TABLET, FILM COATED ORAL at 13:33

## 2017-09-23 RX ADMIN — CARBIDOPA, LEVODOPA, AND ENTACAPONE 1 TABLET(S): 50; 200; 200 TABLET, FILM COATED ORAL at 11:12

## 2017-09-23 RX ADMIN — CARBIDOPA AND LEVODOPA 2 TABLET(S): 25; 100 TABLET ORAL at 13:33

## 2017-09-24 RX ADMIN — CARBIDOPA AND LEVODOPA 2 TABLET(S): 25; 100 TABLET ORAL at 21:26

## 2017-09-24 RX ADMIN — CARBIDOPA AND LEVODOPA 2 TABLET(S): 25; 100 TABLET ORAL at 17:11

## 2017-09-24 RX ADMIN — CARBIDOPA, LEVODOPA, AND ENTACAPONE 1 TABLET(S): 50; 200; 200 TABLET, FILM COATED ORAL at 06:00

## 2017-09-24 RX ADMIN — CARBIDOPA AND LEVODOPA 2 TABLET(S): 25; 100 TABLET ORAL at 12:04

## 2017-09-24 RX ADMIN — CARBIDOPA, LEVODOPA, AND ENTACAPONE 1 TABLET(S): 50; 200; 200 TABLET, FILM COATED ORAL at 11:15

## 2017-09-24 RX ADMIN — CYCLOBENZAPRINE HYDROCHLORIDE 10 MILLIGRAM(S): 10 TABLET, FILM COATED ORAL at 22:39

## 2017-09-24 RX ADMIN — CARBIDOPA, LEVODOPA, AND ENTACAPONE 1 TABLET(S): 50; 200; 200 TABLET, FILM COATED ORAL at 17:11

## 2017-09-24 RX ADMIN — CYCLOBENZAPRINE HYDROCHLORIDE 10 MILLIGRAM(S): 10 TABLET, FILM COATED ORAL at 12:34

## 2017-09-25 PROCEDURE — 99231 SBSQ HOSP IP/OBS SF/LOW 25: CPT

## 2017-09-25 RX ADMIN — CARBIDOPA AND LEVODOPA 2 TABLET(S): 25; 100 TABLET ORAL at 13:56

## 2017-09-25 RX ADMIN — CARBIDOPA AND LEVODOPA 2 TABLET(S): 25; 100 TABLET ORAL at 16:26

## 2017-09-25 RX ADMIN — CARBIDOPA AND LEVODOPA 2 TABLET(S): 25; 100 TABLET ORAL at 21:25

## 2017-09-25 RX ADMIN — CARBIDOPA, LEVODOPA, AND ENTACAPONE 1 TABLET(S): 50; 200; 200 TABLET, FILM COATED ORAL at 05:56

## 2017-09-25 RX ADMIN — CARBIDOPA, LEVODOPA, AND ENTACAPONE 1 TABLET(S): 50; 200; 200 TABLET, FILM COATED ORAL at 16:26

## 2017-09-25 RX ADMIN — CARBIDOPA AND LEVODOPA 2 TABLET(S): 25; 100 TABLET ORAL at 05:57

## 2017-09-25 RX ADMIN — CYCLOBENZAPRINE HYDROCHLORIDE 10 MILLIGRAM(S): 10 TABLET, FILM COATED ORAL at 21:26

## 2017-09-25 RX ADMIN — CARBIDOPA, LEVODOPA, AND ENTACAPONE 1 TABLET(S): 50; 200; 200 TABLET, FILM COATED ORAL at 11:54

## 2017-09-26 LAB
APPEARANCE UR: CLEAR — SIGNIFICANT CHANGE UP
BILIRUB UR-MCNC: NEGATIVE — SIGNIFICANT CHANGE UP
COLOR SPEC: YELLOW — SIGNIFICANT CHANGE UP
DIFF PNL FLD: ABNORMAL
GLUCOSE UR QL: NEGATIVE — SIGNIFICANT CHANGE UP
KETONES UR-MCNC: NEGATIVE — SIGNIFICANT CHANGE UP
LEUKOCYTE ESTERASE UR-ACNC: NEGATIVE — SIGNIFICANT CHANGE UP
NITRITE UR-MCNC: NEGATIVE — SIGNIFICANT CHANGE UP
PH UR: 6 — SIGNIFICANT CHANGE UP (ref 5–8)
PROT UR-MCNC: NEGATIVE — SIGNIFICANT CHANGE UP
SP GR SPEC: 1.02 — SIGNIFICANT CHANGE UP (ref 1.01–1.02)
UROBILINOGEN FLD QL: NEGATIVE — SIGNIFICANT CHANGE UP

## 2017-09-26 PROCEDURE — 99231 SBSQ HOSP IP/OBS SF/LOW 25: CPT

## 2017-09-26 RX ADMIN — CARBIDOPA AND LEVODOPA 2 TABLET(S): 25; 100 TABLET ORAL at 20:59

## 2017-09-26 RX ADMIN — CARBIDOPA, LEVODOPA, AND ENTACAPONE 1 TABLET(S): 50; 200; 200 TABLET, FILM COATED ORAL at 17:02

## 2017-09-26 RX ADMIN — CARBIDOPA AND LEVODOPA 2 TABLET(S): 25; 100 TABLET ORAL at 13:30

## 2017-09-26 RX ADMIN — CARBIDOPA AND LEVODOPA 2 TABLET(S): 25; 100 TABLET ORAL at 17:02

## 2017-09-26 RX ADMIN — CARBIDOPA, LEVODOPA, AND ENTACAPONE 1 TABLET(S): 50; 200; 200 TABLET, FILM COATED ORAL at 11:27

## 2017-09-26 RX ADMIN — CARBIDOPA, LEVODOPA, AND ENTACAPONE 1 TABLET(S): 50; 200; 200 TABLET, FILM COATED ORAL at 05:38

## 2017-09-26 RX ADMIN — CARBIDOPA AND LEVODOPA 2 TABLET(S): 25; 100 TABLET ORAL at 05:38

## 2017-09-27 PROCEDURE — 99231 SBSQ HOSP IP/OBS SF/LOW 25: CPT

## 2017-09-27 RX ORDER — CARBIDOPA AND LEVODOPA 25; 100 MG/1; MG/1
1 TABLET ORAL
Qty: 0 | Refills: 0 | Status: DISCONTINUED | OUTPATIENT
Start: 2017-09-27 | End: 2018-07-27

## 2017-09-27 RX ORDER — CARBIDOPA AND LEVODOPA 25; 100 MG/1; MG/1
1 TABLET ORAL
Qty: 0 | Refills: 0 | Status: DISCONTINUED | OUTPATIENT
Start: 2017-09-27 | End: 2017-09-27

## 2017-09-27 RX ORDER — CARBIDOPA, LEVODOPA, AND ENTACAPONE 50; 200; 200 MG/1; MG/1; MG/1
1 TABLET, FILM COATED ORAL ONCE
Qty: 0 | Refills: 0 | Status: COMPLETED | OUTPATIENT
Start: 2017-09-27 | End: 2017-09-27

## 2017-09-27 RX ADMIN — Medication 325 MILLIGRAM(S): at 23:37

## 2017-09-27 RX ADMIN — CARBIDOPA AND LEVODOPA 1 TABLET(S): 25; 100 TABLET ORAL at 17:13

## 2017-09-27 RX ADMIN — CARBIDOPA, LEVODOPA, AND ENTACAPONE 1 TABLET(S): 50; 200; 200 TABLET, FILM COATED ORAL at 09:47

## 2017-09-27 RX ADMIN — CARBIDOPA AND LEVODOPA 1 TABLET(S): 25; 100 TABLET ORAL at 12:13

## 2017-09-27 RX ADMIN — CARBIDOPA, LEVODOPA, AND ENTACAPONE 1 TABLET(S): 50; 200; 200 TABLET, FILM COATED ORAL at 17:13

## 2017-09-27 RX ADMIN — Medication 325 MILLIGRAM(S): at 23:07

## 2017-09-27 RX ADMIN — CYCLOBENZAPRINE HYDROCHLORIDE 10 MILLIGRAM(S): 10 TABLET, FILM COATED ORAL at 00:15

## 2017-09-27 RX ADMIN — Medication 325 MILLIGRAM(S): at 00:15

## 2017-09-27 RX ADMIN — CARBIDOPA, LEVODOPA, AND ENTACAPONE 1 TABLET(S): 50; 200; 200 TABLET, FILM COATED ORAL at 05:48

## 2017-09-27 RX ADMIN — CARBIDOPA AND LEVODOPA 2 TABLET(S): 25; 100 TABLET ORAL at 08:35

## 2017-09-27 RX ADMIN — Medication 325 MILLIGRAM(S): at 00:50

## 2017-09-27 RX ADMIN — CARBIDOPA AND LEVODOPA 1 TABLET(S): 25; 100 TABLET ORAL at 21:20

## 2017-09-27 RX ADMIN — CARBIDOPA, LEVODOPA, AND ENTACAPONE 1 TABLET(S): 50; 200; 200 TABLET, FILM COATED ORAL at 12:14

## 2017-09-28 PROCEDURE — 99231 SBSQ HOSP IP/OBS SF/LOW 25: CPT

## 2017-09-28 RX ADMIN — CARBIDOPA AND LEVODOPA 1 TABLET(S): 25; 100 TABLET ORAL at 17:34

## 2017-09-28 RX ADMIN — CARBIDOPA AND LEVODOPA 1 TABLET(S): 25; 100 TABLET ORAL at 13:00

## 2017-09-28 RX ADMIN — Medication 30 MILLILITER(S): at 16:43

## 2017-09-28 RX ADMIN — Medication 325 MILLIGRAM(S): at 21:15

## 2017-09-28 RX ADMIN — CARBIDOPA, LEVODOPA, AND ENTACAPONE 1 TABLET(S): 50; 200; 200 TABLET, FILM COATED ORAL at 13:00

## 2017-09-28 RX ADMIN — CARBIDOPA AND LEVODOPA 1 TABLET(S): 25; 100 TABLET ORAL at 20:57

## 2017-09-28 RX ADMIN — CARBIDOPA, LEVODOPA, AND ENTACAPONE 1 TABLET(S): 50; 200; 200 TABLET, FILM COATED ORAL at 05:12

## 2017-09-28 RX ADMIN — CARBIDOPA, LEVODOPA, AND ENTACAPONE 1 TABLET(S): 50; 200; 200 TABLET, FILM COATED ORAL at 17:33

## 2017-09-28 RX ADMIN — CARBIDOPA AND LEVODOPA 1 TABLET(S): 25; 100 TABLET ORAL at 05:12

## 2017-09-28 NOTE — CHART NOTE - NSCHARTNOTEFT_GEN_A_CORE
Called to see patient regarding chest pain.  Patient states the temperature in his room is giving him chest pain because it "sets off his Parkinson's."  Has been applying ice packs to his chest which he says relieves some of the pain.  I attempted to exam patient and, again, patient refused my exam and even refused vitals.

## 2017-09-29 PROCEDURE — 99231 SBSQ HOSP IP/OBS SF/LOW 25: CPT

## 2017-09-29 RX ADMIN — CYCLOBENZAPRINE HYDROCHLORIDE 10 MILLIGRAM(S): 10 TABLET, FILM COATED ORAL at 22:07

## 2017-09-29 RX ADMIN — CYCLOBENZAPRINE HYDROCHLORIDE 10 MILLIGRAM(S): 10 TABLET, FILM COATED ORAL at 17:51

## 2017-09-29 RX ADMIN — CARBIDOPA AND LEVODOPA 1 TABLET(S): 25; 100 TABLET ORAL at 06:31

## 2017-09-29 RX ADMIN — CARBIDOPA AND LEVODOPA 1 TABLET(S): 25; 100 TABLET ORAL at 16:30

## 2017-09-29 RX ADMIN — CARBIDOPA AND LEVODOPA 1 TABLET(S): 25; 100 TABLET ORAL at 11:44

## 2017-09-29 RX ADMIN — Medication 325 MILLIGRAM(S): at 14:15

## 2017-09-29 RX ADMIN — Medication 325 MILLIGRAM(S): at 23:54

## 2017-09-29 RX ADMIN — CARBIDOPA, LEVODOPA, AND ENTACAPONE 1 TABLET(S): 50; 200; 200 TABLET, FILM COATED ORAL at 06:31

## 2017-09-29 RX ADMIN — CARBIDOPA, LEVODOPA, AND ENTACAPONE 1 TABLET(S): 50; 200; 200 TABLET, FILM COATED ORAL at 11:44

## 2017-09-29 RX ADMIN — CARBIDOPA, LEVODOPA, AND ENTACAPONE 1 TABLET(S): 50; 200; 200 TABLET, FILM COATED ORAL at 17:44

## 2017-09-29 RX ADMIN — Medication 325 MILLIGRAM(S): at 23:05

## 2017-09-29 RX ADMIN — Medication 325 MILLIGRAM(S): at 12:32

## 2017-09-29 RX ADMIN — CARBIDOPA AND LEVODOPA 1 TABLET(S): 25; 100 TABLET ORAL at 21:32

## 2017-09-30 RX ORDER — BACITRACIN ZINC 500 UNIT/G
1 OINTMENT IN PACKET (EA) TOPICAL
Qty: 0 | Refills: 0 | Status: COMPLETED | OUTPATIENT
Start: 2017-09-30 | End: 2017-10-05

## 2017-09-30 RX ORDER — TUBERCULIN PURIFIED PROTEIN DERIVATIVE 5 [IU]/.1ML
5 INJECTION, SOLUTION INTRADERMAL ONCE
Qty: 0 | Refills: 0 | Status: COMPLETED | OUTPATIENT
Start: 2017-10-01 | End: 2017-10-01

## 2017-09-30 RX ADMIN — CARBIDOPA, LEVODOPA, AND ENTACAPONE 1 TABLET(S): 50; 200; 200 TABLET, FILM COATED ORAL at 11:12

## 2017-09-30 RX ADMIN — Medication 325 MILLIGRAM(S): at 22:32

## 2017-09-30 RX ADMIN — CARBIDOPA, LEVODOPA, AND ENTACAPONE 1 TABLET(S): 50; 200; 200 TABLET, FILM COATED ORAL at 17:24

## 2017-09-30 RX ADMIN — Medication 325 MILLIGRAM(S): at 21:56

## 2017-09-30 RX ADMIN — CARBIDOPA AND LEVODOPA 1 TABLET(S): 25; 100 TABLET ORAL at 21:43

## 2017-09-30 RX ADMIN — CARBIDOPA AND LEVODOPA 1 TABLET(S): 25; 100 TABLET ORAL at 15:52

## 2017-09-30 RX ADMIN — CARBIDOPA AND LEVODOPA 1 TABLET(S): 25; 100 TABLET ORAL at 05:36

## 2017-09-30 RX ADMIN — CARBIDOPA AND LEVODOPA 1 TABLET(S): 25; 100 TABLET ORAL at 11:12

## 2017-09-30 RX ADMIN — CARBIDOPA, LEVODOPA, AND ENTACAPONE 1 TABLET(S): 50; 200; 200 TABLET, FILM COATED ORAL at 05:36

## 2017-09-30 RX ADMIN — CYCLOBENZAPRINE HYDROCHLORIDE 10 MILLIGRAM(S): 10 TABLET, FILM COATED ORAL at 21:49

## 2017-10-01 RX ORDER — OLANZAPINE 15 MG/1
5 TABLET, FILM COATED ORAL EVERY 6 HOURS
Qty: 0 | Refills: 0 | Status: COMPLETED | OUTPATIENT
Start: 2017-10-01 | End: 2017-10-01

## 2017-10-01 RX ADMIN — CARBIDOPA AND LEVODOPA 1 TABLET(S): 25; 100 TABLET ORAL at 05:25

## 2017-10-01 RX ADMIN — CARBIDOPA, LEVODOPA, AND ENTACAPONE 1 TABLET(S): 50; 200; 200 TABLET, FILM COATED ORAL at 11:50

## 2017-10-01 RX ADMIN — Medication 325 MILLIGRAM(S): at 20:52

## 2017-10-01 RX ADMIN — OLANZAPINE 5 MILLIGRAM(S): 15 TABLET, FILM COATED ORAL at 11:30

## 2017-10-01 RX ADMIN — CARBIDOPA AND LEVODOPA 1 TABLET(S): 25; 100 TABLET ORAL at 20:23

## 2017-10-01 RX ADMIN — CARBIDOPA AND LEVODOPA 1 TABLET(S): 25; 100 TABLET ORAL at 11:50

## 2017-10-01 RX ADMIN — CARBIDOPA AND LEVODOPA 1 TABLET(S): 25; 100 TABLET ORAL at 16:54

## 2017-10-01 RX ADMIN — CARBIDOPA, LEVODOPA, AND ENTACAPONE 1 TABLET(S): 50; 200; 200 TABLET, FILM COATED ORAL at 16:52

## 2017-10-01 RX ADMIN — Medication 325 MILLIGRAM(S): at 22:32

## 2017-10-01 RX ADMIN — CARBIDOPA, LEVODOPA, AND ENTACAPONE 1 TABLET(S): 50; 200; 200 TABLET, FILM COATED ORAL at 05:25

## 2017-10-01 RX ADMIN — CYCLOBENZAPRINE HYDROCHLORIDE 10 MILLIGRAM(S): 10 TABLET, FILM COATED ORAL at 20:24

## 2017-10-02 LAB
ALBUMIN SERPL ELPH-MCNC: 3.9 G/DL — SIGNIFICANT CHANGE UP (ref 3.3–5)
ALP SERPL-CCNC: 90 U/L — SIGNIFICANT CHANGE UP (ref 30–120)
ALT FLD-CCNC: 10 U/L DA — SIGNIFICANT CHANGE UP (ref 10–60)
ANION GAP SERPL CALC-SCNC: 8 MMOL/L — SIGNIFICANT CHANGE UP (ref 5–17)
AST SERPL-CCNC: 17 U/L — SIGNIFICANT CHANGE UP (ref 10–40)
BILIRUB SERPL-MCNC: 0.6 MG/DL — SIGNIFICANT CHANGE UP (ref 0.2–1.2)
BUN SERPL-MCNC: 13 MG/DL — SIGNIFICANT CHANGE UP (ref 7–23)
CALCIUM SERPL-MCNC: 9 MG/DL — SIGNIFICANT CHANGE UP (ref 8.4–10.5)
CHLORIDE SERPL-SCNC: 104 MMOL/L — SIGNIFICANT CHANGE UP (ref 96–108)
CK SERPL-CCNC: 231 U/L — SIGNIFICANT CHANGE UP (ref 39–308)
CO2 SERPL-SCNC: 27 MMOL/L — SIGNIFICANT CHANGE UP (ref 22–31)
CREAT SERPL-MCNC: 0.93 MG/DL — SIGNIFICANT CHANGE UP (ref 0.5–1.3)
GLUCOSE SERPL-MCNC: 124 MG/DL — HIGH (ref 70–99)
HBV SURFACE AB SER-ACNC: REACTIVE
HBV SURFACE AG SER-ACNC: SIGNIFICANT CHANGE UP
HCT VFR BLD CALC: 42.8 % — SIGNIFICANT CHANGE UP (ref 39–50)
HCV AB S/CO SERPL IA: 0.12 S/CO — SIGNIFICANT CHANGE UP
HCV AB SERPL-IMP: SIGNIFICANT CHANGE UP
HGB BLD-MCNC: 13.8 G/DL — SIGNIFICANT CHANGE UP (ref 13–17)
HIV 1+2 AB+HIV1 P24 AG SERPL QL IA: SIGNIFICANT CHANGE UP
MCHC RBC-ENTMCNC: 31.4 PG — SIGNIFICANT CHANGE UP (ref 27–34)
MCHC RBC-ENTMCNC: 32.3 GM/DL — SIGNIFICANT CHANGE UP (ref 32–36)
MCV RBC AUTO: 97 FL — SIGNIFICANT CHANGE UP (ref 80–100)
PLATELET # BLD AUTO: 178 K/UL — SIGNIFICANT CHANGE UP (ref 150–400)
POTASSIUM SERPL-MCNC: 4 MMOL/L — SIGNIFICANT CHANGE UP (ref 3.5–5.3)
POTASSIUM SERPL-SCNC: 4 MMOL/L — SIGNIFICANT CHANGE UP (ref 3.5–5.3)
PROT SERPL-MCNC: 7.3 G/DL — SIGNIFICANT CHANGE UP (ref 6–8.3)
RBC # BLD: 4.41 M/UL — SIGNIFICANT CHANGE UP (ref 4.2–5.8)
RBC # FLD: 12.4 % — SIGNIFICANT CHANGE UP (ref 10.3–14.5)
SODIUM SERPL-SCNC: 139 MMOL/L — SIGNIFICANT CHANGE UP (ref 135–145)
T PALLIDUM AB TITR SER: NEGATIVE — SIGNIFICANT CHANGE UP
TSH SERPL-MCNC: 0.96 UIU/ML — SIGNIFICANT CHANGE UP (ref 0.27–4.2)
WBC # BLD: 5.8 K/UL — SIGNIFICANT CHANGE UP (ref 3.8–10.5)
WBC # FLD AUTO: 5.8 K/UL — SIGNIFICANT CHANGE UP (ref 3.8–10.5)

## 2017-10-02 PROCEDURE — 99231 SBSQ HOSP IP/OBS SF/LOW 25: CPT

## 2017-10-02 RX ORDER — TUBERCULIN PURIFIED PROTEIN DERIVATIVE 5 [IU]/.1ML
5 INJECTION, SOLUTION INTRADERMAL ONCE
Qty: 0 | Refills: 0 | Status: COMPLETED | OUTPATIENT
Start: 2017-10-02 | End: 2017-10-02

## 2017-10-02 RX ADMIN — CARBIDOPA, LEVODOPA, AND ENTACAPONE 1 TABLET(S): 50; 200; 200 TABLET, FILM COATED ORAL at 11:31

## 2017-10-02 RX ADMIN — Medication 325 MILLIGRAM(S): at 23:10

## 2017-10-02 RX ADMIN — CARBIDOPA AND LEVODOPA 1 TABLET(S): 25; 100 TABLET ORAL at 21:00

## 2017-10-02 RX ADMIN — CARBIDOPA AND LEVODOPA 1 TABLET(S): 25; 100 TABLET ORAL at 11:31

## 2017-10-02 RX ADMIN — CARBIDOPA AND LEVODOPA 1 TABLET(S): 25; 100 TABLET ORAL at 17:02

## 2017-10-02 RX ADMIN — CARBIDOPA AND LEVODOPA 1 TABLET(S): 25; 100 TABLET ORAL at 06:00

## 2017-10-02 RX ADMIN — CYCLOBENZAPRINE HYDROCHLORIDE 10 MILLIGRAM(S): 10 TABLET, FILM COATED ORAL at 21:42

## 2017-10-02 RX ADMIN — CARBIDOPA, LEVODOPA, AND ENTACAPONE 1 TABLET(S): 50; 200; 200 TABLET, FILM COATED ORAL at 17:02

## 2017-10-02 RX ADMIN — CARBIDOPA, LEVODOPA, AND ENTACAPONE 1 TABLET(S): 50; 200; 200 TABLET, FILM COATED ORAL at 06:00

## 2017-10-03 PROCEDURE — 99233 SBSQ HOSP IP/OBS HIGH 50: CPT

## 2017-10-03 RX ORDER — BACITRACIN ZINC 500 UNIT/G
1 OINTMENT IN PACKET (EA) TOPICAL
Qty: 0 | Refills: 0 | Status: COMPLETED | OUTPATIENT
Start: 2017-10-03 | End: 2017-10-08

## 2017-10-03 RX ADMIN — CARBIDOPA AND LEVODOPA 1 TABLET(S): 25; 100 TABLET ORAL at 06:04

## 2017-10-03 RX ADMIN — CYCLOBENZAPRINE HYDROCHLORIDE 10 MILLIGRAM(S): 10 TABLET, FILM COATED ORAL at 16:10

## 2017-10-03 RX ADMIN — Medication 10 MILLIGRAM(S): at 22:05

## 2017-10-03 RX ADMIN — Medication 325 MILLIGRAM(S): at 00:36

## 2017-10-03 RX ADMIN — CARBIDOPA, LEVODOPA, AND ENTACAPONE 1 TABLET(S): 50; 200; 200 TABLET, FILM COATED ORAL at 17:10

## 2017-10-03 RX ADMIN — CARBIDOPA AND LEVODOPA 1 TABLET(S): 25; 100 TABLET ORAL at 11:48

## 2017-10-03 RX ADMIN — CARBIDOPA, LEVODOPA, AND ENTACAPONE 1 TABLET(S): 50; 200; 200 TABLET, FILM COATED ORAL at 06:04

## 2017-10-03 RX ADMIN — CARBIDOPA, LEVODOPA, AND ENTACAPONE 1 TABLET(S): 50; 200; 200 TABLET, FILM COATED ORAL at 11:48

## 2017-10-03 RX ADMIN — CARBIDOPA AND LEVODOPA 1 TABLET(S): 25; 100 TABLET ORAL at 20:26

## 2017-10-03 RX ADMIN — CARBIDOPA AND LEVODOPA 1 TABLET(S): 25; 100 TABLET ORAL at 16:05

## 2017-10-04 PROCEDURE — 99231 SBSQ HOSP IP/OBS SF/LOW 25: CPT

## 2017-10-04 RX ADMIN — CARBIDOPA AND LEVODOPA 1 TABLET(S): 25; 100 TABLET ORAL at 11:10

## 2017-10-04 RX ADMIN — CYCLOBENZAPRINE HYDROCHLORIDE 10 MILLIGRAM(S): 10 TABLET, FILM COATED ORAL at 22:08

## 2017-10-04 RX ADMIN — CARBIDOPA, LEVODOPA, AND ENTACAPONE 1 TABLET(S): 50; 200; 200 TABLET, FILM COATED ORAL at 17:46

## 2017-10-04 RX ADMIN — CARBIDOPA AND LEVODOPA 1 TABLET(S): 25; 100 TABLET ORAL at 20:19

## 2017-10-04 RX ADMIN — CARBIDOPA, LEVODOPA, AND ENTACAPONE 1 TABLET(S): 50; 200; 200 TABLET, FILM COATED ORAL at 06:23

## 2017-10-04 RX ADMIN — CARBIDOPA, LEVODOPA, AND ENTACAPONE 1 TABLET(S): 50; 200; 200 TABLET, FILM COATED ORAL at 11:10

## 2017-10-04 RX ADMIN — CARBIDOPA AND LEVODOPA 1 TABLET(S): 25; 100 TABLET ORAL at 16:22

## 2017-10-04 RX ADMIN — CARBIDOPA AND LEVODOPA 1 TABLET(S): 25; 100 TABLET ORAL at 06:23

## 2017-10-05 PROCEDURE — 99231 SBSQ HOSP IP/OBS SF/LOW 25: CPT

## 2017-10-05 RX ADMIN — CARBIDOPA, LEVODOPA, AND ENTACAPONE 1 TABLET(S): 50; 200; 200 TABLET, FILM COATED ORAL at 17:57

## 2017-10-05 RX ADMIN — CARBIDOPA, LEVODOPA, AND ENTACAPONE 1 TABLET(S): 50; 200; 200 TABLET, FILM COATED ORAL at 06:31

## 2017-10-05 RX ADMIN — CARBIDOPA AND LEVODOPA 1 TABLET(S): 25; 100 TABLET ORAL at 20:19

## 2017-10-05 RX ADMIN — CARBIDOPA AND LEVODOPA 1 TABLET(S): 25; 100 TABLET ORAL at 12:08

## 2017-10-05 RX ADMIN — CARBIDOPA AND LEVODOPA 1 TABLET(S): 25; 100 TABLET ORAL at 06:31

## 2017-10-05 RX ADMIN — CARBIDOPA, LEVODOPA, AND ENTACAPONE 1 TABLET(S): 50; 200; 200 TABLET, FILM COATED ORAL at 12:09

## 2017-10-05 RX ADMIN — CARBIDOPA AND LEVODOPA 1 TABLET(S): 25; 100 TABLET ORAL at 17:57

## 2017-10-06 PROCEDURE — 99231 SBSQ HOSP IP/OBS SF/LOW 25: CPT

## 2017-10-06 RX ADMIN — CYCLOBENZAPRINE HYDROCHLORIDE 10 MILLIGRAM(S): 10 TABLET, FILM COATED ORAL at 14:06

## 2017-10-06 RX ADMIN — CARBIDOPA AND LEVODOPA 1 TABLET(S): 25; 100 TABLET ORAL at 16:39

## 2017-10-06 RX ADMIN — CARBIDOPA AND LEVODOPA 1 TABLET(S): 25; 100 TABLET ORAL at 11:56

## 2017-10-06 RX ADMIN — Medication 325 MILLIGRAM(S): at 20:12

## 2017-10-06 RX ADMIN — CARBIDOPA, LEVODOPA, AND ENTACAPONE 1 TABLET(S): 50; 200; 200 TABLET, FILM COATED ORAL at 16:40

## 2017-10-06 RX ADMIN — CARBIDOPA AND LEVODOPA 1 TABLET(S): 25; 100 TABLET ORAL at 20:13

## 2017-10-06 RX ADMIN — CARBIDOPA, LEVODOPA, AND ENTACAPONE 1 TABLET(S): 50; 200; 200 TABLET, FILM COATED ORAL at 11:56

## 2017-10-06 RX ADMIN — CARBIDOPA AND LEVODOPA 1 TABLET(S): 25; 100 TABLET ORAL at 06:00

## 2017-10-06 RX ADMIN — CARBIDOPA, LEVODOPA, AND ENTACAPONE 1 TABLET(S): 50; 200; 200 TABLET, FILM COATED ORAL at 06:01

## 2017-10-06 RX ADMIN — Medication 325 MILLIGRAM(S): at 17:21

## 2017-10-07 RX ADMIN — CARBIDOPA AND LEVODOPA 1 TABLET(S): 25; 100 TABLET ORAL at 06:14

## 2017-10-07 RX ADMIN — CARBIDOPA AND LEVODOPA 1 TABLET(S): 25; 100 TABLET ORAL at 17:42

## 2017-10-07 RX ADMIN — CARBIDOPA, LEVODOPA, AND ENTACAPONE 1 TABLET(S): 50; 200; 200 TABLET, FILM COATED ORAL at 06:14

## 2017-10-07 RX ADMIN — CARBIDOPA, LEVODOPA, AND ENTACAPONE 1 TABLET(S): 50; 200; 200 TABLET, FILM COATED ORAL at 17:43

## 2017-10-07 RX ADMIN — CARBIDOPA, LEVODOPA, AND ENTACAPONE 1 TABLET(S): 50; 200; 200 TABLET, FILM COATED ORAL at 11:49

## 2017-10-07 RX ADMIN — CYCLOBENZAPRINE HYDROCHLORIDE 10 MILLIGRAM(S): 10 TABLET, FILM COATED ORAL at 12:13

## 2017-10-07 RX ADMIN — CARBIDOPA AND LEVODOPA 1 TABLET(S): 25; 100 TABLET ORAL at 11:49

## 2017-10-07 RX ADMIN — Medication 325 MILLIGRAM(S): at 23:48

## 2017-10-07 RX ADMIN — CARBIDOPA AND LEVODOPA 1 TABLET(S): 25; 100 TABLET ORAL at 21:02

## 2017-10-08 RX ADMIN — CARBIDOPA, LEVODOPA, AND ENTACAPONE 1 TABLET(S): 50; 200; 200 TABLET, FILM COATED ORAL at 17:30

## 2017-10-08 RX ADMIN — CARBIDOPA AND LEVODOPA 1 TABLET(S): 25; 100 TABLET ORAL at 06:22

## 2017-10-08 RX ADMIN — Medication 325 MILLIGRAM(S): at 01:25

## 2017-10-08 RX ADMIN — CARBIDOPA, LEVODOPA, AND ENTACAPONE 1 TABLET(S): 50; 200; 200 TABLET, FILM COATED ORAL at 06:22

## 2017-10-08 RX ADMIN — CARBIDOPA, LEVODOPA, AND ENTACAPONE 1 TABLET(S): 50; 200; 200 TABLET, FILM COATED ORAL at 12:12

## 2017-10-08 RX ADMIN — Medication 325 MILLIGRAM(S): at 23:05

## 2017-10-08 RX ADMIN — CARBIDOPA AND LEVODOPA 1 TABLET(S): 25; 100 TABLET ORAL at 12:12

## 2017-10-08 RX ADMIN — CARBIDOPA AND LEVODOPA 1 TABLET(S): 25; 100 TABLET ORAL at 21:37

## 2017-10-08 RX ADMIN — CARBIDOPA AND LEVODOPA 1 TABLET(S): 25; 100 TABLET ORAL at 17:30

## 2017-10-09 PROCEDURE — 99231 SBSQ HOSP IP/OBS SF/LOW 25: CPT

## 2017-10-09 RX ADMIN — CYCLOBENZAPRINE HYDROCHLORIDE 10 MILLIGRAM(S): 10 TABLET, FILM COATED ORAL at 21:46

## 2017-10-09 RX ADMIN — CARBIDOPA, LEVODOPA, AND ENTACAPONE 1 TABLET(S): 50; 200; 200 TABLET, FILM COATED ORAL at 06:00

## 2017-10-09 RX ADMIN — Medication 325 MILLIGRAM(S): at 23:40

## 2017-10-09 RX ADMIN — CARBIDOPA AND LEVODOPA 1 TABLET(S): 25; 100 TABLET ORAL at 12:02

## 2017-10-09 RX ADMIN — Medication 100 MILLIGRAM(S): at 21:44

## 2017-10-09 RX ADMIN — CARBIDOPA AND LEVODOPA 1 TABLET(S): 25; 100 TABLET ORAL at 21:45

## 2017-10-09 RX ADMIN — CARBIDOPA, LEVODOPA, AND ENTACAPONE 1 TABLET(S): 50; 200; 200 TABLET, FILM COATED ORAL at 17:00

## 2017-10-09 RX ADMIN — CARBIDOPA AND LEVODOPA 1 TABLET(S): 25; 100 TABLET ORAL at 06:01

## 2017-10-09 RX ADMIN — CARBIDOPA, LEVODOPA, AND ENTACAPONE 1 TABLET(S): 50; 200; 200 TABLET, FILM COATED ORAL at 12:02

## 2017-10-09 RX ADMIN — Medication 325 MILLIGRAM(S): at 00:31

## 2017-10-09 RX ADMIN — CARBIDOPA AND LEVODOPA 1 TABLET(S): 25; 100 TABLET ORAL at 17:00

## 2017-10-09 RX ADMIN — Medication 325 MILLIGRAM(S): at 23:09

## 2017-10-10 PROCEDURE — 99222 1ST HOSP IP/OBS MODERATE 55: CPT

## 2017-10-10 RX ORDER — NITROGLYCERIN 6.5 MG
0.4 CAPSULE, EXTENDED RELEASE ORAL
Qty: 0 | Refills: 0 | Status: DISCONTINUED | OUTPATIENT
Start: 2017-10-10 | End: 2018-08-18

## 2017-10-10 RX ADMIN — CARBIDOPA, LEVODOPA, AND ENTACAPONE 1 TABLET(S): 50; 200; 200 TABLET, FILM COATED ORAL at 11:39

## 2017-10-10 RX ADMIN — CARBIDOPA AND LEVODOPA 1 TABLET(S): 25; 100 TABLET ORAL at 16:24

## 2017-10-10 RX ADMIN — CARBIDOPA AND LEVODOPA 1 TABLET(S): 25; 100 TABLET ORAL at 06:33

## 2017-10-10 RX ADMIN — CYCLOBENZAPRINE HYDROCHLORIDE 10 MILLIGRAM(S): 10 TABLET, FILM COATED ORAL at 14:52

## 2017-10-10 RX ADMIN — CARBIDOPA AND LEVODOPA 1 TABLET(S): 25; 100 TABLET ORAL at 11:39

## 2017-10-10 RX ADMIN — CYCLOBENZAPRINE HYDROCHLORIDE 10 MILLIGRAM(S): 10 TABLET, FILM COATED ORAL at 21:01

## 2017-10-10 RX ADMIN — CARBIDOPA, LEVODOPA, AND ENTACAPONE 1 TABLET(S): 50; 200; 200 TABLET, FILM COATED ORAL at 06:33

## 2017-10-10 RX ADMIN — CARBIDOPA, LEVODOPA, AND ENTACAPONE 1 TABLET(S): 50; 200; 200 TABLET, FILM COATED ORAL at 16:24

## 2017-10-10 RX ADMIN — CARBIDOPA AND LEVODOPA 1 TABLET(S): 25; 100 TABLET ORAL at 21:01

## 2017-10-10 NOTE — CONSULT NOTE ADULT - ASSESSMENT
53 yo M with atypical chest pain, tender on palpation, no risk factors for CAD,     check EKG stat however pt refused it, check vitals, pt refused also,   I ordered CE times 2 stat and in 6 h, to r/o any ACS.   less likely pt has cardiac chest pain, most likely its musculoskeletal pain,  will start nitro SL PRN for 3 doses, IF EKG and CE negative, stop it and only use Tylenol or ibuprofen for this musculoskeletal chest pain. will monitor pt and call me for the results.   Thank john alaniz for consulting me and call me for any concerns and question. 53 yo M with atypical chest pain, tender on palpation, no risk factors for CAD,     check EKG stat however pt refused it, check vitals, pt refused also,   I ordered CE times 2 stat and in 6 h, to r/o any ACS. check stat BNP if elevated  will check CXR, check pulse  ox also.   less likely pt has cardiac chest pain, most likely its musculoskeletal pain,  will start nitro SL PRN for 3 doses, IF EKG and CE negative, stop it and only use Tylenol or ibuprofen for this musculoskeletal chest pain. will monitor pt and call me for the results.   Thank john alaniz for consulting me and call me for any concerns and question.

## 2017-10-11 PROCEDURE — 99231 SBSQ HOSP IP/OBS SF/LOW 25: CPT

## 2017-10-11 RX ADMIN — Medication 325 MILLIGRAM(S): at 07:10

## 2017-10-11 RX ADMIN — CARBIDOPA, LEVODOPA, AND ENTACAPONE 1 TABLET(S): 50; 200; 200 TABLET, FILM COATED ORAL at 11:56

## 2017-10-11 RX ADMIN — CARBIDOPA AND LEVODOPA 1 TABLET(S): 25; 100 TABLET ORAL at 21:01

## 2017-10-11 RX ADMIN — CARBIDOPA AND LEVODOPA 1 TABLET(S): 25; 100 TABLET ORAL at 16:21

## 2017-10-11 RX ADMIN — Medication 325 MILLIGRAM(S): at 06:47

## 2017-10-11 RX ADMIN — CARBIDOPA AND LEVODOPA 1 TABLET(S): 25; 100 TABLET ORAL at 06:09

## 2017-10-11 RX ADMIN — CARBIDOPA, LEVODOPA, AND ENTACAPONE 1 TABLET(S): 50; 200; 200 TABLET, FILM COATED ORAL at 16:21

## 2017-10-11 RX ADMIN — CARBIDOPA AND LEVODOPA 1 TABLET(S): 25; 100 TABLET ORAL at 11:56

## 2017-10-11 RX ADMIN — CARBIDOPA, LEVODOPA, AND ENTACAPONE 1 TABLET(S): 50; 200; 200 TABLET, FILM COATED ORAL at 06:09

## 2017-10-11 RX ADMIN — CYCLOBENZAPRINE HYDROCHLORIDE 10 MILLIGRAM(S): 10 TABLET, FILM COATED ORAL at 21:01

## 2017-10-11 RX ADMIN — Medication 10 MILLIGRAM(S): at 11:55

## 2017-10-11 RX ADMIN — Medication 325 MILLIGRAM(S): at 23:40

## 2017-10-11 RX ADMIN — Medication 325 MILLIGRAM(S): at 23:12

## 2017-10-12 PROCEDURE — 99231 SBSQ HOSP IP/OBS SF/LOW 25: CPT

## 2017-10-12 RX ADMIN — CARBIDOPA, LEVODOPA, AND ENTACAPONE 1 TABLET(S): 50; 200; 200 TABLET, FILM COATED ORAL at 12:01

## 2017-10-12 RX ADMIN — CARBIDOPA AND LEVODOPA 1 TABLET(S): 25; 100 TABLET ORAL at 17:15

## 2017-10-12 RX ADMIN — Medication 325 MILLIGRAM(S): at 22:33

## 2017-10-12 RX ADMIN — CYCLOBENZAPRINE HYDROCHLORIDE 10 MILLIGRAM(S): 10 TABLET, FILM COATED ORAL at 23:43

## 2017-10-12 RX ADMIN — CARBIDOPA, LEVODOPA, AND ENTACAPONE 1 TABLET(S): 50; 200; 200 TABLET, FILM COATED ORAL at 06:39

## 2017-10-12 RX ADMIN — CARBIDOPA, LEVODOPA, AND ENTACAPONE 1 TABLET(S): 50; 200; 200 TABLET, FILM COATED ORAL at 17:15

## 2017-10-12 RX ADMIN — CARBIDOPA AND LEVODOPA 1 TABLET(S): 25; 100 TABLET ORAL at 12:01

## 2017-10-12 RX ADMIN — CARBIDOPA AND LEVODOPA 1 TABLET(S): 25; 100 TABLET ORAL at 06:39

## 2017-10-12 RX ADMIN — Medication 325 MILLIGRAM(S): at 23:02

## 2017-10-12 RX ADMIN — CARBIDOPA AND LEVODOPA 1 TABLET(S): 25; 100 TABLET ORAL at 22:32

## 2017-10-13 PROCEDURE — 99231 SBSQ HOSP IP/OBS SF/LOW 25: CPT

## 2017-10-13 RX ADMIN — CARBIDOPA, LEVODOPA, AND ENTACAPONE 1 TABLET(S): 50; 200; 200 TABLET, FILM COATED ORAL at 11:58

## 2017-10-13 RX ADMIN — CARBIDOPA AND LEVODOPA 1 TABLET(S): 25; 100 TABLET ORAL at 11:58

## 2017-10-13 RX ADMIN — CARBIDOPA AND LEVODOPA 1 TABLET(S): 25; 100 TABLET ORAL at 20:27

## 2017-10-13 RX ADMIN — CARBIDOPA, LEVODOPA, AND ENTACAPONE 1 TABLET(S): 50; 200; 200 TABLET, FILM COATED ORAL at 16:26

## 2017-10-13 RX ADMIN — CARBIDOPA AND LEVODOPA 1 TABLET(S): 25; 100 TABLET ORAL at 16:26

## 2017-10-13 RX ADMIN — CARBIDOPA, LEVODOPA, AND ENTACAPONE 1 TABLET(S): 50; 200; 200 TABLET, FILM COATED ORAL at 06:16

## 2017-10-13 RX ADMIN — Medication 325 MILLIGRAM(S): at 14:04

## 2017-10-13 RX ADMIN — CARBIDOPA AND LEVODOPA 1 TABLET(S): 25; 100 TABLET ORAL at 06:17

## 2017-10-13 RX ADMIN — Medication 325 MILLIGRAM(S): at 16:00

## 2017-10-14 RX ADMIN — CARBIDOPA AND LEVODOPA 1 TABLET(S): 25; 100 TABLET ORAL at 06:17

## 2017-10-14 RX ADMIN — Medication 325 MILLIGRAM(S): at 00:47

## 2017-10-14 RX ADMIN — Medication 325 MILLIGRAM(S): at 18:35

## 2017-10-14 RX ADMIN — CARBIDOPA, LEVODOPA, AND ENTACAPONE 1 TABLET(S): 50; 200; 200 TABLET, FILM COATED ORAL at 16:30

## 2017-10-14 RX ADMIN — Medication 325 MILLIGRAM(S): at 07:45

## 2017-10-14 RX ADMIN — CARBIDOPA AND LEVODOPA 1 TABLET(S): 25; 100 TABLET ORAL at 21:14

## 2017-10-14 RX ADMIN — CARBIDOPA, LEVODOPA, AND ENTACAPONE 1 TABLET(S): 50; 200; 200 TABLET, FILM COATED ORAL at 06:17

## 2017-10-14 RX ADMIN — CARBIDOPA AND LEVODOPA 1 TABLET(S): 25; 100 TABLET ORAL at 16:30

## 2017-10-14 RX ADMIN — CYCLOBENZAPRINE HYDROCHLORIDE 10 MILLIGRAM(S): 10 TABLET, FILM COATED ORAL at 22:59

## 2017-10-14 RX ADMIN — CARBIDOPA AND LEVODOPA 1 TABLET(S): 25; 100 TABLET ORAL at 11:24

## 2017-10-14 RX ADMIN — CARBIDOPA, LEVODOPA, AND ENTACAPONE 1 TABLET(S): 50; 200; 200 TABLET, FILM COATED ORAL at 11:24

## 2017-10-15 RX ADMIN — CYCLOBENZAPRINE HYDROCHLORIDE 10 MILLIGRAM(S): 10 TABLET, FILM COATED ORAL at 23:07

## 2017-10-15 RX ADMIN — CARBIDOPA, LEVODOPA, AND ENTACAPONE 1 TABLET(S): 50; 200; 200 TABLET, FILM COATED ORAL at 06:34

## 2017-10-15 RX ADMIN — Medication 325 MILLIGRAM(S): at 00:45

## 2017-10-15 RX ADMIN — CARBIDOPA, LEVODOPA, AND ENTACAPONE 1 TABLET(S): 50; 200; 200 TABLET, FILM COATED ORAL at 10:54

## 2017-10-15 RX ADMIN — CARBIDOPA AND LEVODOPA 1 TABLET(S): 25; 100 TABLET ORAL at 10:54

## 2017-10-15 RX ADMIN — CARBIDOPA, LEVODOPA, AND ENTACAPONE 1 TABLET(S): 50; 200; 200 TABLET, FILM COATED ORAL at 16:49

## 2017-10-15 RX ADMIN — Medication 325 MILLIGRAM(S): at 21:30

## 2017-10-15 RX ADMIN — Medication 325 MILLIGRAM(S): at 01:15

## 2017-10-15 RX ADMIN — CARBIDOPA AND LEVODOPA 1 TABLET(S): 25; 100 TABLET ORAL at 20:34

## 2017-10-15 RX ADMIN — Medication 325 MILLIGRAM(S): at 20:34

## 2017-10-15 RX ADMIN — CARBIDOPA AND LEVODOPA 1 TABLET(S): 25; 100 TABLET ORAL at 06:34

## 2017-10-15 RX ADMIN — CARBIDOPA AND LEVODOPA 1 TABLET(S): 25; 100 TABLET ORAL at 16:49

## 2017-10-16 PROCEDURE — 99231 SBSQ HOSP IP/OBS SF/LOW 25: CPT

## 2017-10-16 RX ADMIN — CARBIDOPA AND LEVODOPA 1 TABLET(S): 25; 100 TABLET ORAL at 20:40

## 2017-10-16 RX ADMIN — CARBIDOPA AND LEVODOPA 1 TABLET(S): 25; 100 TABLET ORAL at 05:14

## 2017-10-16 RX ADMIN — CARBIDOPA AND LEVODOPA 1 TABLET(S): 25; 100 TABLET ORAL at 16:41

## 2017-10-16 RX ADMIN — CARBIDOPA, LEVODOPA, AND ENTACAPONE 1 TABLET(S): 50; 200; 200 TABLET, FILM COATED ORAL at 16:41

## 2017-10-16 RX ADMIN — Medication 325 MILLIGRAM(S): at 22:25

## 2017-10-16 RX ADMIN — Medication 325 MILLIGRAM(S): at 22:53

## 2017-10-16 RX ADMIN — CARBIDOPA AND LEVODOPA 1 TABLET(S): 25; 100 TABLET ORAL at 11:13

## 2017-10-16 RX ADMIN — CARBIDOPA, LEVODOPA, AND ENTACAPONE 1 TABLET(S): 50; 200; 200 TABLET, FILM COATED ORAL at 11:13

## 2017-10-16 RX ADMIN — CYCLOBENZAPRINE HYDROCHLORIDE 10 MILLIGRAM(S): 10 TABLET, FILM COATED ORAL at 23:02

## 2017-10-16 RX ADMIN — CARBIDOPA, LEVODOPA, AND ENTACAPONE 1 TABLET(S): 50; 200; 200 TABLET, FILM COATED ORAL at 05:15

## 2017-10-17 PROCEDURE — 99231 SBSQ HOSP IP/OBS SF/LOW 25: CPT

## 2017-10-17 RX ORDER — OLANZAPINE 15 MG/1
2.5 TABLET, FILM COATED ORAL ONCE
Qty: 0 | Refills: 0 | Status: DISCONTINUED | OUTPATIENT
Start: 2017-10-17 | End: 2017-10-20

## 2017-10-17 RX ADMIN — CARBIDOPA, LEVODOPA, AND ENTACAPONE 1 TABLET(S): 50; 200; 200 TABLET, FILM COATED ORAL at 16:21

## 2017-10-17 RX ADMIN — CARBIDOPA AND LEVODOPA 1 TABLET(S): 25; 100 TABLET ORAL at 16:21

## 2017-10-17 RX ADMIN — CYCLOBENZAPRINE HYDROCHLORIDE 10 MILLIGRAM(S): 10 TABLET, FILM COATED ORAL at 19:38

## 2017-10-17 RX ADMIN — CARBIDOPA AND LEVODOPA 1 TABLET(S): 25; 100 TABLET ORAL at 20:42

## 2017-10-17 RX ADMIN — CARBIDOPA AND LEVODOPA 1 TABLET(S): 25; 100 TABLET ORAL at 06:17

## 2017-10-17 RX ADMIN — CARBIDOPA, LEVODOPA, AND ENTACAPONE 1 TABLET(S): 50; 200; 200 TABLET, FILM COATED ORAL at 06:17

## 2017-10-17 RX ADMIN — CARBIDOPA AND LEVODOPA 1 TABLET(S): 25; 100 TABLET ORAL at 11:54

## 2017-10-17 RX ADMIN — CARBIDOPA, LEVODOPA, AND ENTACAPONE 1 TABLET(S): 50; 200; 200 TABLET, FILM COATED ORAL at 11:54

## 2017-10-18 LAB
BASOPHILS # BLD AUTO: 0 K/UL — SIGNIFICANT CHANGE UP (ref 0–0.2)
BASOPHILS NFR BLD AUTO: 0.9 % — SIGNIFICANT CHANGE UP (ref 0–2)
EOSINOPHIL # BLD AUTO: 0.1 K/UL — SIGNIFICANT CHANGE UP (ref 0–0.5)
EOSINOPHIL NFR BLD AUTO: 1.8 % — SIGNIFICANT CHANGE UP (ref 0–6)
HCT VFR BLD CALC: 41.1 % — SIGNIFICANT CHANGE UP (ref 39–50)
HGB BLD-MCNC: 13.7 G/DL — SIGNIFICANT CHANGE UP (ref 13–17)
LYMPHOCYTES # BLD AUTO: 1.3 K/UL — SIGNIFICANT CHANGE UP (ref 1–3.3)
LYMPHOCYTES # BLD AUTO: 26.1 % — SIGNIFICANT CHANGE UP (ref 13–44)
MCHC RBC-ENTMCNC: 32.1 PG — SIGNIFICANT CHANGE UP (ref 27–34)
MCHC RBC-ENTMCNC: 33.4 GM/DL — SIGNIFICANT CHANGE UP (ref 32–36)
MCV RBC AUTO: 96.1 FL — SIGNIFICANT CHANGE UP (ref 80–100)
MONOCYTES # BLD AUTO: 0.4 K/UL — SIGNIFICANT CHANGE UP (ref 0–0.9)
MONOCYTES NFR BLD AUTO: 8.5 % — SIGNIFICANT CHANGE UP (ref 2–14)
NEUTROPHILS # BLD AUTO: 3.2 K/UL — SIGNIFICANT CHANGE UP (ref 1.8–7.4)
NEUTROPHILS NFR BLD AUTO: 62.7 % — SIGNIFICANT CHANGE UP (ref 43–77)
PLATELET # BLD AUTO: 193 K/UL — SIGNIFICANT CHANGE UP (ref 150–400)
RBC # BLD: 4.27 M/UL — SIGNIFICANT CHANGE UP (ref 4.2–5.8)
RBC # FLD: 12.6 % — SIGNIFICANT CHANGE UP (ref 10.3–14.5)
WBC # BLD: 5.1 K/UL — SIGNIFICANT CHANGE UP (ref 3.8–10.5)
WBC # FLD AUTO: 5.1 K/UL — SIGNIFICANT CHANGE UP (ref 3.8–10.5)

## 2017-10-18 PROCEDURE — 99231 SBSQ HOSP IP/OBS SF/LOW 25: CPT

## 2017-10-18 RX ORDER — CLOZAPINE 150 MG/1
12.5 TABLET, ORALLY DISINTEGRATING ORAL AT BEDTIME
Qty: 0 | Refills: 0 | Status: DISCONTINUED | OUTPATIENT
Start: 2017-10-18 | End: 2017-10-19

## 2017-10-18 RX ADMIN — CARBIDOPA AND LEVODOPA 1 TABLET(S): 25; 100 TABLET ORAL at 11:57

## 2017-10-18 RX ADMIN — CARBIDOPA AND LEVODOPA 1 TABLET(S): 25; 100 TABLET ORAL at 21:03

## 2017-10-18 RX ADMIN — CARBIDOPA, LEVODOPA, AND ENTACAPONE 1 TABLET(S): 50; 200; 200 TABLET, FILM COATED ORAL at 16:14

## 2017-10-18 RX ADMIN — CLOZAPINE 12.5 MILLIGRAM(S): 150 TABLET, ORALLY DISINTEGRATING ORAL at 21:03

## 2017-10-18 RX ADMIN — CARBIDOPA, LEVODOPA, AND ENTACAPONE 1 TABLET(S): 50; 200; 200 TABLET, FILM COATED ORAL at 06:01

## 2017-10-18 RX ADMIN — CARBIDOPA AND LEVODOPA 1 TABLET(S): 25; 100 TABLET ORAL at 06:01

## 2017-10-18 RX ADMIN — CARBIDOPA, LEVODOPA, AND ENTACAPONE 1 TABLET(S): 50; 200; 200 TABLET, FILM COATED ORAL at 11:57

## 2017-10-18 RX ADMIN — CARBIDOPA AND LEVODOPA 1 TABLET(S): 25; 100 TABLET ORAL at 16:14

## 2017-10-19 PROCEDURE — 99231 SBSQ HOSP IP/OBS SF/LOW 25: CPT

## 2017-10-19 RX ORDER — CLOZAPINE 150 MG/1
12.5 TABLET, ORALLY DISINTEGRATING ORAL DAILY
Qty: 0 | Refills: 0 | Status: DISCONTINUED | OUTPATIENT
Start: 2017-10-19 | End: 2017-10-24

## 2017-10-19 RX ADMIN — CLOZAPINE 12.5 MILLIGRAM(S): 150 TABLET, ORALLY DISINTEGRATING ORAL at 11:11

## 2017-10-19 RX ADMIN — CARBIDOPA AND LEVODOPA 1 TABLET(S): 25; 100 TABLET ORAL at 16:41

## 2017-10-19 RX ADMIN — CARBIDOPA AND LEVODOPA 1 TABLET(S): 25; 100 TABLET ORAL at 11:09

## 2017-10-19 RX ADMIN — CARBIDOPA, LEVODOPA, AND ENTACAPONE 1 TABLET(S): 50; 200; 200 TABLET, FILM COATED ORAL at 11:08

## 2017-10-19 RX ADMIN — CARBIDOPA, LEVODOPA, AND ENTACAPONE 1 TABLET(S): 50; 200; 200 TABLET, FILM COATED ORAL at 16:41

## 2017-10-19 RX ADMIN — Medication 325 MILLIGRAM(S): at 23:00

## 2017-10-19 RX ADMIN — CARBIDOPA, LEVODOPA, AND ENTACAPONE 1 TABLET(S): 50; 200; 200 TABLET, FILM COATED ORAL at 05:39

## 2017-10-19 RX ADMIN — CARBIDOPA AND LEVODOPA 1 TABLET(S): 25; 100 TABLET ORAL at 20:48

## 2017-10-19 RX ADMIN — Medication 325 MILLIGRAM(S): at 22:58

## 2017-10-19 RX ADMIN — CARBIDOPA AND LEVODOPA 1 TABLET(S): 25; 100 TABLET ORAL at 05:39

## 2017-10-19 RX ADMIN — Medication 30 MILLILITER(S): at 23:13

## 2017-10-20 PROCEDURE — 99232 SBSQ HOSP IP/OBS MODERATE 35: CPT

## 2017-10-20 RX ORDER — OLANZAPINE 15 MG/1
2.5 TABLET, FILM COATED ORAL ONCE
Qty: 0 | Refills: 0 | Status: DISCONTINUED | OUTPATIENT
Start: 2017-10-20 | End: 2018-04-09

## 2017-10-20 RX ADMIN — CARBIDOPA AND LEVODOPA 1 TABLET(S): 25; 100 TABLET ORAL at 16:27

## 2017-10-20 RX ADMIN — CARBIDOPA AND LEVODOPA 1 TABLET(S): 25; 100 TABLET ORAL at 11:06

## 2017-10-20 RX ADMIN — CLOZAPINE 12.5 MILLIGRAM(S): 150 TABLET, ORALLY DISINTEGRATING ORAL at 09:50

## 2017-10-20 RX ADMIN — CARBIDOPA, LEVODOPA, AND ENTACAPONE 1 TABLET(S): 50; 200; 200 TABLET, FILM COATED ORAL at 11:06

## 2017-10-20 RX ADMIN — CARBIDOPA, LEVODOPA, AND ENTACAPONE 1 TABLET(S): 50; 200; 200 TABLET, FILM COATED ORAL at 16:27

## 2017-10-20 RX ADMIN — CARBIDOPA AND LEVODOPA 1 TABLET(S): 25; 100 TABLET ORAL at 20:25

## 2017-10-20 RX ADMIN — CARBIDOPA AND LEVODOPA 1 TABLET(S): 25; 100 TABLET ORAL at 06:07

## 2017-10-20 RX ADMIN — CARBIDOPA, LEVODOPA, AND ENTACAPONE 1 TABLET(S): 50; 200; 200 TABLET, FILM COATED ORAL at 06:07

## 2017-10-20 RX ADMIN — Medication 325 MILLIGRAM(S): at 23:03

## 2017-10-21 VITALS — HEIGHT: 65.94 IN

## 2017-10-21 RX ADMIN — CARBIDOPA, LEVODOPA, AND ENTACAPONE 1 TABLET(S): 50; 200; 200 TABLET, FILM COATED ORAL at 16:47

## 2017-10-21 RX ADMIN — CARBIDOPA, LEVODOPA, AND ENTACAPONE 1 TABLET(S): 50; 200; 200 TABLET, FILM COATED ORAL at 11:44

## 2017-10-21 RX ADMIN — Medication 325 MILLIGRAM(S): at 00:03

## 2017-10-21 RX ADMIN — CARBIDOPA AND LEVODOPA 1 TABLET(S): 25; 100 TABLET ORAL at 20:21

## 2017-10-21 RX ADMIN — CARBIDOPA AND LEVODOPA 1 TABLET(S): 25; 100 TABLET ORAL at 06:19

## 2017-10-21 RX ADMIN — Medication 325 MILLIGRAM(S): at 23:07

## 2017-10-21 RX ADMIN — CLOZAPINE 12.5 MILLIGRAM(S): 150 TABLET, ORALLY DISINTEGRATING ORAL at 10:00

## 2017-10-21 RX ADMIN — CARBIDOPA AND LEVODOPA 1 TABLET(S): 25; 100 TABLET ORAL at 11:44

## 2017-10-21 RX ADMIN — CARBIDOPA, LEVODOPA, AND ENTACAPONE 1 TABLET(S): 50; 200; 200 TABLET, FILM COATED ORAL at 06:19

## 2017-10-21 RX ADMIN — CARBIDOPA AND LEVODOPA 1 TABLET(S): 25; 100 TABLET ORAL at 16:47

## 2017-10-22 RX ADMIN — CLOZAPINE 12.5 MILLIGRAM(S): 150 TABLET, ORALLY DISINTEGRATING ORAL at 11:24

## 2017-10-22 RX ADMIN — CARBIDOPA, LEVODOPA, AND ENTACAPONE 1 TABLET(S): 50; 200; 200 TABLET, FILM COATED ORAL at 16:50

## 2017-10-22 RX ADMIN — Medication 325 MILLIGRAM(S): at 23:18

## 2017-10-22 RX ADMIN — CARBIDOPA, LEVODOPA, AND ENTACAPONE 1 TABLET(S): 50; 200; 200 TABLET, FILM COATED ORAL at 13:16

## 2017-10-22 RX ADMIN — CARBIDOPA, LEVODOPA, AND ENTACAPONE 1 TABLET(S): 50; 200; 200 TABLET, FILM COATED ORAL at 06:36

## 2017-10-22 RX ADMIN — CARBIDOPA AND LEVODOPA 1 TABLET(S): 25; 100 TABLET ORAL at 20:42

## 2017-10-22 RX ADMIN — CARBIDOPA AND LEVODOPA 1 TABLET(S): 25; 100 TABLET ORAL at 13:16

## 2017-10-22 RX ADMIN — Medication 325 MILLIGRAM(S): at 00:07

## 2017-10-22 RX ADMIN — CARBIDOPA AND LEVODOPA 1 TABLET(S): 25; 100 TABLET ORAL at 16:50

## 2017-10-22 RX ADMIN — CARBIDOPA AND LEVODOPA 1 TABLET(S): 25; 100 TABLET ORAL at 06:36

## 2017-10-23 PROCEDURE — 99233 SBSQ HOSP IP/OBS HIGH 50: CPT

## 2017-10-23 RX ORDER — OLANZAPINE 15 MG/1
5 TABLET, FILM COATED ORAL ONCE
Qty: 0 | Refills: 0 | Status: COMPLETED | OUTPATIENT
Start: 2017-10-23 | End: 2017-10-23

## 2017-10-23 RX ADMIN — Medication 325 MILLIGRAM(S): at 00:18

## 2017-10-23 RX ADMIN — CARBIDOPA, LEVODOPA, AND ENTACAPONE 1 TABLET(S): 50; 200; 200 TABLET, FILM COATED ORAL at 12:56

## 2017-10-23 RX ADMIN — CARBIDOPA AND LEVODOPA 1 TABLET(S): 25; 100 TABLET ORAL at 17:15

## 2017-10-23 RX ADMIN — Medication 2 MILLIGRAM(S): at 12:13

## 2017-10-23 RX ADMIN — CARBIDOPA AND LEVODOPA 1 TABLET(S): 25; 100 TABLET ORAL at 12:56

## 2017-10-23 RX ADMIN — CARBIDOPA, LEVODOPA, AND ENTACAPONE 1 TABLET(S): 50; 200; 200 TABLET, FILM COATED ORAL at 07:32

## 2017-10-23 RX ADMIN — Medication 325 MILLIGRAM(S): at 23:22

## 2017-10-23 RX ADMIN — CARBIDOPA AND LEVODOPA 1 TABLET(S): 25; 100 TABLET ORAL at 21:28

## 2017-10-23 RX ADMIN — CLOZAPINE 12.5 MILLIGRAM(S): 150 TABLET, ORALLY DISINTEGRATING ORAL at 13:48

## 2017-10-23 RX ADMIN — CARBIDOPA AND LEVODOPA 1 TABLET(S): 25; 100 TABLET ORAL at 07:33

## 2017-10-23 RX ADMIN — CARBIDOPA, LEVODOPA, AND ENTACAPONE 1 TABLET(S): 50; 200; 200 TABLET, FILM COATED ORAL at 17:15

## 2017-10-23 RX ADMIN — OLANZAPINE 5 MILLIGRAM(S): 15 TABLET, FILM COATED ORAL at 10:46

## 2017-10-24 PROCEDURE — 99232 SBSQ HOSP IP/OBS MODERATE 35: CPT

## 2017-10-24 RX ORDER — BACLOFEN 100 %
10 POWDER (GRAM) MISCELLANEOUS
Qty: 0 | Refills: 0 | Status: DISCONTINUED | OUTPATIENT
Start: 2017-10-24 | End: 2018-04-04

## 2017-10-24 RX ORDER — DOCUSATE SODIUM 100 MG
100 CAPSULE ORAL
Qty: 0 | Refills: 0 | Status: DISCONTINUED | OUTPATIENT
Start: 2017-10-24 | End: 2018-04-04

## 2017-10-24 RX ORDER — CLOZAPINE 150 MG/1
25 TABLET, ORALLY DISINTEGRATING ORAL DAILY
Qty: 0 | Refills: 0 | Status: DISCONTINUED | OUTPATIENT
Start: 2017-10-24 | End: 2017-10-24

## 2017-10-24 RX ORDER — CLOZAPINE 150 MG/1
25 TABLET, ORALLY DISINTEGRATING ORAL
Qty: 0 | Refills: 0 | Status: DISCONTINUED | OUTPATIENT
Start: 2017-10-24 | End: 2017-10-26

## 2017-10-24 RX ADMIN — CARBIDOPA AND LEVODOPA 1 TABLET(S): 25; 100 TABLET ORAL at 06:28

## 2017-10-24 RX ADMIN — CARBIDOPA, LEVODOPA, AND ENTACAPONE 1 TABLET(S): 50; 200; 200 TABLET, FILM COATED ORAL at 11:26

## 2017-10-24 RX ADMIN — CARBIDOPA AND LEVODOPA 1 TABLET(S): 25; 100 TABLET ORAL at 11:26

## 2017-10-24 RX ADMIN — CARBIDOPA AND LEVODOPA 1 TABLET(S): 25; 100 TABLET ORAL at 16:17

## 2017-10-24 RX ADMIN — CARBIDOPA, LEVODOPA, AND ENTACAPONE 1 TABLET(S): 50; 200; 200 TABLET, FILM COATED ORAL at 06:28

## 2017-10-24 RX ADMIN — CLOZAPINE 12.5 MILLIGRAM(S): 150 TABLET, ORALLY DISINTEGRATING ORAL at 11:24

## 2017-10-24 RX ADMIN — CARBIDOPA, LEVODOPA, AND ENTACAPONE 1 TABLET(S): 50; 200; 200 TABLET, FILM COATED ORAL at 16:17

## 2017-10-24 RX ADMIN — CARBIDOPA AND LEVODOPA 1 TABLET(S): 25; 100 TABLET ORAL at 21:09

## 2017-10-24 RX ADMIN — Medication 30 MILLILITER(S): at 19:00

## 2017-10-24 RX ADMIN — Medication 325 MILLIGRAM(S): at 00:37

## 2017-10-25 LAB
BASOPHILS # BLD AUTO: 0.1 K/UL — SIGNIFICANT CHANGE UP (ref 0–0.2)
BASOPHILS NFR BLD AUTO: 0.9 % — SIGNIFICANT CHANGE UP (ref 0–2)
EOSINOPHIL # BLD AUTO: 0.1 K/UL — SIGNIFICANT CHANGE UP (ref 0–0.5)
EOSINOPHIL NFR BLD AUTO: 1.9 % — SIGNIFICANT CHANGE UP (ref 0–6)
HCT VFR BLD CALC: 38.2 % — LOW (ref 39–50)
HGB BLD-MCNC: 13.1 G/DL — SIGNIFICANT CHANGE UP (ref 13–17)
LYMPHOCYTES # BLD AUTO: 1.2 K/UL — SIGNIFICANT CHANGE UP (ref 1–3.3)
LYMPHOCYTES # BLD AUTO: 17.6 % — SIGNIFICANT CHANGE UP (ref 13–44)
MCHC RBC-ENTMCNC: 32.8 PG — SIGNIFICANT CHANGE UP (ref 27–34)
MCHC RBC-ENTMCNC: 34.4 GM/DL — SIGNIFICANT CHANGE UP (ref 32–36)
MCV RBC AUTO: 95.2 FL — SIGNIFICANT CHANGE UP (ref 80–100)
MONOCYTES # BLD AUTO: 0.6 K/UL — SIGNIFICANT CHANGE UP (ref 0–0.9)
MONOCYTES NFR BLD AUTO: 9 % — SIGNIFICANT CHANGE UP (ref 2–14)
NEUTROPHILS # BLD AUTO: 4.9 K/UL — SIGNIFICANT CHANGE UP (ref 1.8–7.4)
NEUTROPHILS NFR BLD AUTO: 70.7 % — SIGNIFICANT CHANGE UP (ref 43–77)
PLATELET # BLD AUTO: 183 K/UL — SIGNIFICANT CHANGE UP (ref 150–400)
RBC # BLD: 4.01 M/UL — LOW (ref 4.2–5.8)
RBC # FLD: 12.8 % — SIGNIFICANT CHANGE UP (ref 10.3–14.5)
WBC # BLD: 7 K/UL — SIGNIFICANT CHANGE UP (ref 3.8–10.5)
WBC # FLD AUTO: 7 K/UL — SIGNIFICANT CHANGE UP (ref 3.8–10.5)

## 2017-10-25 PROCEDURE — 99231 SBSQ HOSP IP/OBS SF/LOW 25: CPT

## 2017-10-25 RX ORDER — ACETAMINOPHEN 500 MG
650 TABLET ORAL ONCE
Qty: 0 | Refills: 0 | Status: COMPLETED | OUTPATIENT
Start: 2017-10-25 | End: 2017-10-25

## 2017-10-25 RX ADMIN — CARBIDOPA AND LEVODOPA 1 TABLET(S): 25; 100 TABLET ORAL at 16:11

## 2017-10-25 RX ADMIN — CLOZAPINE 25 MILLIGRAM(S): 150 TABLET, ORALLY DISINTEGRATING ORAL at 11:00

## 2017-10-25 RX ADMIN — CARBIDOPA AND LEVODOPA 1 TABLET(S): 25; 100 TABLET ORAL at 20:14

## 2017-10-25 RX ADMIN — CARBIDOPA, LEVODOPA, AND ENTACAPONE 1 TABLET(S): 50; 200; 200 TABLET, FILM COATED ORAL at 16:11

## 2017-10-25 RX ADMIN — CARBIDOPA AND LEVODOPA 1 TABLET(S): 25; 100 TABLET ORAL at 06:17

## 2017-10-25 RX ADMIN — Medication 325 MILLIGRAM(S): at 23:21

## 2017-10-25 RX ADMIN — CARBIDOPA, LEVODOPA, AND ENTACAPONE 1 TABLET(S): 50; 200; 200 TABLET, FILM COATED ORAL at 11:00

## 2017-10-25 RX ADMIN — CARBIDOPA AND LEVODOPA 1 TABLET(S): 25; 100 TABLET ORAL at 11:00

## 2017-10-25 RX ADMIN — CARBIDOPA, LEVODOPA, AND ENTACAPONE 1 TABLET(S): 50; 200; 200 TABLET, FILM COATED ORAL at 06:17

## 2017-10-25 RX ADMIN — Medication 325 MILLIGRAM(S): at 23:45

## 2017-10-25 RX ADMIN — CYCLOBENZAPRINE HYDROCHLORIDE 10 MILLIGRAM(S): 10 TABLET, FILM COATED ORAL at 23:34

## 2017-10-26 PROCEDURE — 99232 SBSQ HOSP IP/OBS MODERATE 35: CPT

## 2017-10-26 RX ORDER — CLOZAPINE 150 MG/1
25 TABLET, ORALLY DISINTEGRATING ORAL
Qty: 0 | Refills: 0 | Status: DISCONTINUED | OUTPATIENT
Start: 2017-10-26 | End: 2017-12-14

## 2017-10-26 RX ADMIN — CLOZAPINE 25 MILLIGRAM(S): 150 TABLET, ORALLY DISINTEGRATING ORAL at 10:55

## 2017-10-26 RX ADMIN — CARBIDOPA, LEVODOPA, AND ENTACAPONE 1 TABLET(S): 50; 200; 200 TABLET, FILM COATED ORAL at 06:14

## 2017-10-26 RX ADMIN — CARBIDOPA AND LEVODOPA 1 TABLET(S): 25; 100 TABLET ORAL at 06:15

## 2017-10-26 RX ADMIN — CARBIDOPA, LEVODOPA, AND ENTACAPONE 1 TABLET(S): 50; 200; 200 TABLET, FILM COATED ORAL at 11:16

## 2017-10-26 RX ADMIN — CARBIDOPA AND LEVODOPA 1 TABLET(S): 25; 100 TABLET ORAL at 11:16

## 2017-10-26 RX ADMIN — CARBIDOPA AND LEVODOPA 1 TABLET(S): 25; 100 TABLET ORAL at 20:53

## 2017-10-26 RX ADMIN — Medication 325 MILLIGRAM(S): at 23:14

## 2017-10-26 RX ADMIN — CARBIDOPA AND LEVODOPA 1 TABLET(S): 25; 100 TABLET ORAL at 16:26

## 2017-10-26 RX ADMIN — CARBIDOPA, LEVODOPA, AND ENTACAPONE 1 TABLET(S): 50; 200; 200 TABLET, FILM COATED ORAL at 16:26

## 2017-10-27 PROCEDURE — 99232 SBSQ HOSP IP/OBS MODERATE 35: CPT

## 2017-10-27 RX ADMIN — CARBIDOPA AND LEVODOPA 1 TABLET(S): 25; 100 TABLET ORAL at 16:33

## 2017-10-27 RX ADMIN — CARBIDOPA AND LEVODOPA 1 TABLET(S): 25; 100 TABLET ORAL at 10:59

## 2017-10-27 RX ADMIN — Medication 325 MILLIGRAM(S): at 22:56

## 2017-10-27 RX ADMIN — CARBIDOPA, LEVODOPA, AND ENTACAPONE 1 TABLET(S): 50; 200; 200 TABLET, FILM COATED ORAL at 05:51

## 2017-10-27 RX ADMIN — CARBIDOPA, LEVODOPA, AND ENTACAPONE 1 TABLET(S): 50; 200; 200 TABLET, FILM COATED ORAL at 16:33

## 2017-10-27 RX ADMIN — CARBIDOPA AND LEVODOPA 1 TABLET(S): 25; 100 TABLET ORAL at 20:36

## 2017-10-27 RX ADMIN — CARBIDOPA, LEVODOPA, AND ENTACAPONE 1 TABLET(S): 50; 200; 200 TABLET, FILM COATED ORAL at 10:59

## 2017-10-27 RX ADMIN — CLOZAPINE 25 MILLIGRAM(S): 150 TABLET, ORALLY DISINTEGRATING ORAL at 09:57

## 2017-10-27 RX ADMIN — Medication 325 MILLIGRAM(S): at 00:15

## 2017-10-27 RX ADMIN — CARBIDOPA AND LEVODOPA 1 TABLET(S): 25; 100 TABLET ORAL at 05:51

## 2017-10-28 RX ADMIN — CARBIDOPA, LEVODOPA, AND ENTACAPONE 1 TABLET(S): 50; 200; 200 TABLET, FILM COATED ORAL at 16:51

## 2017-10-28 RX ADMIN — CARBIDOPA, LEVODOPA, AND ENTACAPONE 1 TABLET(S): 50; 200; 200 TABLET, FILM COATED ORAL at 06:01

## 2017-10-28 RX ADMIN — CARBIDOPA AND LEVODOPA 1 TABLET(S): 25; 100 TABLET ORAL at 11:03

## 2017-10-28 RX ADMIN — Medication 325 MILLIGRAM(S): at 20:00

## 2017-10-28 RX ADMIN — CARBIDOPA, LEVODOPA, AND ENTACAPONE 1 TABLET(S): 50; 200; 200 TABLET, FILM COATED ORAL at 11:03

## 2017-10-28 RX ADMIN — Medication 325 MILLIGRAM(S): at 21:00

## 2017-10-28 RX ADMIN — CLOZAPINE 25 MILLIGRAM(S): 150 TABLET, ORALLY DISINTEGRATING ORAL at 11:00

## 2017-10-28 RX ADMIN — CARBIDOPA AND LEVODOPA 1 TABLET(S): 25; 100 TABLET ORAL at 06:01

## 2017-10-28 RX ADMIN — CARBIDOPA AND LEVODOPA 1 TABLET(S): 25; 100 TABLET ORAL at 16:51

## 2017-10-28 RX ADMIN — Medication 325 MILLIGRAM(S): at 01:00

## 2017-10-28 RX ADMIN — CARBIDOPA AND LEVODOPA 1 TABLET(S): 25; 100 TABLET ORAL at 20:01

## 2017-10-29 RX ADMIN — Medication 325 MILLIGRAM(S): at 20:02

## 2017-10-29 RX ADMIN — CARBIDOPA AND LEVODOPA 1 TABLET(S): 25; 100 TABLET ORAL at 07:19

## 2017-10-29 RX ADMIN — CARBIDOPA, LEVODOPA, AND ENTACAPONE 1 TABLET(S): 50; 200; 200 TABLET, FILM COATED ORAL at 11:10

## 2017-10-29 RX ADMIN — CARBIDOPA AND LEVODOPA 1 TABLET(S): 25; 100 TABLET ORAL at 20:02

## 2017-10-29 RX ADMIN — Medication 325 MILLIGRAM(S): at 21:02

## 2017-10-29 RX ADMIN — CARBIDOPA AND LEVODOPA 1 TABLET(S): 25; 100 TABLET ORAL at 16:08

## 2017-10-29 RX ADMIN — CARBIDOPA, LEVODOPA, AND ENTACAPONE 1 TABLET(S): 50; 200; 200 TABLET, FILM COATED ORAL at 07:19

## 2017-10-29 RX ADMIN — CARBIDOPA, LEVODOPA, AND ENTACAPONE 1 TABLET(S): 50; 200; 200 TABLET, FILM COATED ORAL at 16:08

## 2017-10-29 RX ADMIN — CARBIDOPA AND LEVODOPA 1 TABLET(S): 25; 100 TABLET ORAL at 11:11

## 2017-10-29 RX ADMIN — CLOZAPINE 25 MILLIGRAM(S): 150 TABLET, ORALLY DISINTEGRATING ORAL at 10:03

## 2017-10-30 RX ORDER — POTASSIUM CHLORIDE 20 MEQ
10 PACKET (EA) ORAL DAILY
Qty: 0 | Refills: 0 | Status: COMPLETED | OUTPATIENT
Start: 2017-10-30 | End: 2017-11-01

## 2017-10-30 RX ORDER — FUROSEMIDE 40 MG
20 TABLET ORAL DAILY
Qty: 0 | Refills: 0 | Status: COMPLETED | OUTPATIENT
Start: 2017-10-30 | End: 2017-11-01

## 2017-10-30 RX ADMIN — CARBIDOPA AND LEVODOPA 1 TABLET(S): 25; 100 TABLET ORAL at 05:49

## 2017-10-30 RX ADMIN — CARBIDOPA, LEVODOPA, AND ENTACAPONE 1 TABLET(S): 50; 200; 200 TABLET, FILM COATED ORAL at 05:49

## 2017-10-30 RX ADMIN — CARBIDOPA, LEVODOPA, AND ENTACAPONE 1 TABLET(S): 50; 200; 200 TABLET, FILM COATED ORAL at 11:37

## 2017-10-30 RX ADMIN — CARBIDOPA AND LEVODOPA 1 TABLET(S): 25; 100 TABLET ORAL at 11:37

## 2017-10-30 RX ADMIN — CLOZAPINE 25 MILLIGRAM(S): 150 TABLET, ORALLY DISINTEGRATING ORAL at 11:10

## 2017-10-30 RX ADMIN — CARBIDOPA, LEVODOPA, AND ENTACAPONE 1 TABLET(S): 50; 200; 200 TABLET, FILM COATED ORAL at 17:06

## 2017-10-30 RX ADMIN — CARBIDOPA AND LEVODOPA 1 TABLET(S): 25; 100 TABLET ORAL at 17:06

## 2017-10-30 RX ADMIN — Medication 325 MILLIGRAM(S): at 23:06

## 2017-10-30 RX ADMIN — CARBIDOPA AND LEVODOPA 1 TABLET(S): 25; 100 TABLET ORAL at 20:30

## 2017-10-30 RX ADMIN — Medication 325 MILLIGRAM(S): at 23:46

## 2017-10-31 LAB
ANION GAP SERPL CALC-SCNC: 8 MMOL/L — SIGNIFICANT CHANGE UP (ref 5–17)
BASOPHILS # BLD AUTO: 0 K/UL — SIGNIFICANT CHANGE UP (ref 0–0.2)
BASOPHILS NFR BLD AUTO: 0.4 % — SIGNIFICANT CHANGE UP (ref 0–2)
BUN SERPL-MCNC: 15 MG/DL — SIGNIFICANT CHANGE UP (ref 7–23)
CALCIUM SERPL-MCNC: 8.6 MG/DL — SIGNIFICANT CHANGE UP (ref 8.4–10.5)
CHLORIDE SERPL-SCNC: 104 MMOL/L — SIGNIFICANT CHANGE UP (ref 96–108)
CO2 SERPL-SCNC: 28 MMOL/L — SIGNIFICANT CHANGE UP (ref 22–31)
CREAT SERPL-MCNC: 0.9 MG/DL — SIGNIFICANT CHANGE UP (ref 0.5–1.3)
EOSINOPHIL # BLD AUTO: 0.2 K/UL — SIGNIFICANT CHANGE UP (ref 0–0.5)
EOSINOPHIL NFR BLD AUTO: 2.2 % — SIGNIFICANT CHANGE UP (ref 0–6)
GLUCOSE SERPL-MCNC: 123 MG/DL — HIGH (ref 70–99)
HCT VFR BLD CALC: 43.4 % — SIGNIFICANT CHANGE UP (ref 39–50)
HGB BLD-MCNC: 13.8 G/DL — SIGNIFICANT CHANGE UP (ref 13–17)
LYMPHOCYTES # BLD AUTO: 2.7 K/UL — SIGNIFICANT CHANGE UP (ref 1–3.3)
LYMPHOCYTES # BLD AUTO: 34.7 % — SIGNIFICANT CHANGE UP (ref 13–44)
MCHC RBC-ENTMCNC: 31.6 PG — SIGNIFICANT CHANGE UP (ref 27–34)
MCHC RBC-ENTMCNC: 31.8 GM/DL — LOW (ref 32–36)
MCV RBC AUTO: 99.1 FL — SIGNIFICANT CHANGE UP (ref 80–100)
MONOCYTES # BLD AUTO: 0.5 K/UL — SIGNIFICANT CHANGE UP (ref 0–0.9)
MONOCYTES NFR BLD AUTO: 6.9 % — SIGNIFICANT CHANGE UP (ref 2–14)
NEUTROPHILS # BLD AUTO: 4.4 K/UL — SIGNIFICANT CHANGE UP (ref 1.8–7.4)
NEUTROPHILS NFR BLD AUTO: 56 % — SIGNIFICANT CHANGE UP (ref 43–77)
PLATELET # BLD AUTO: 224 K/UL — SIGNIFICANT CHANGE UP (ref 150–400)
POTASSIUM SERPL-MCNC: 4.2 MMOL/L — SIGNIFICANT CHANGE UP (ref 3.5–5.3)
POTASSIUM SERPL-SCNC: 4.2 MMOL/L — SIGNIFICANT CHANGE UP (ref 3.5–5.3)
RBC # BLD: 4.37 M/UL — SIGNIFICANT CHANGE UP (ref 4.2–5.8)
RBC # FLD: 13.4 % — SIGNIFICANT CHANGE UP (ref 10.3–14.5)
SODIUM SERPL-SCNC: 140 MMOL/L — SIGNIFICANT CHANGE UP (ref 135–145)
WBC # BLD: 7.8 K/UL — SIGNIFICANT CHANGE UP (ref 3.8–10.5)
WBC # FLD AUTO: 7.8 K/UL — SIGNIFICANT CHANGE UP (ref 3.8–10.5)

## 2017-10-31 PROCEDURE — 99231 SBSQ HOSP IP/OBS SF/LOW 25: CPT

## 2017-10-31 RX ADMIN — Medication 325 MILLIGRAM(S): at 23:09

## 2017-10-31 RX ADMIN — Medication 325 MILLIGRAM(S): at 17:41

## 2017-10-31 RX ADMIN — CARBIDOPA AND LEVODOPA 1 TABLET(S): 25; 100 TABLET ORAL at 11:10

## 2017-10-31 RX ADMIN — CARBIDOPA, LEVODOPA, AND ENTACAPONE 1 TABLET(S): 50; 200; 200 TABLET, FILM COATED ORAL at 11:11

## 2017-10-31 RX ADMIN — CARBIDOPA AND LEVODOPA 1 TABLET(S): 25; 100 TABLET ORAL at 22:15

## 2017-10-31 RX ADMIN — CLOZAPINE 25 MILLIGRAM(S): 150 TABLET, ORALLY DISINTEGRATING ORAL at 11:05

## 2017-10-31 RX ADMIN — CARBIDOPA, LEVODOPA, AND ENTACAPONE 1 TABLET(S): 50; 200; 200 TABLET, FILM COATED ORAL at 17:05

## 2017-10-31 RX ADMIN — CARBIDOPA AND LEVODOPA 1 TABLET(S): 25; 100 TABLET ORAL at 06:23

## 2017-10-31 RX ADMIN — CARBIDOPA, LEVODOPA, AND ENTACAPONE 1 TABLET(S): 50; 200; 200 TABLET, FILM COATED ORAL at 06:23

## 2017-10-31 RX ADMIN — Medication 325 MILLIGRAM(S): at 17:13

## 2017-10-31 RX ADMIN — CARBIDOPA AND LEVODOPA 1 TABLET(S): 25; 100 TABLET ORAL at 17:05

## 2017-11-01 PROCEDURE — 99232 SBSQ HOSP IP/OBS MODERATE 35: CPT

## 2017-11-01 RX ADMIN — CARBIDOPA AND LEVODOPA 1 TABLET(S): 25; 100 TABLET ORAL at 20:41

## 2017-11-01 RX ADMIN — Medication 325 MILLIGRAM(S): at 23:06

## 2017-11-01 RX ADMIN — CARBIDOPA, LEVODOPA, AND ENTACAPONE 1 TABLET(S): 50; 200; 200 TABLET, FILM COATED ORAL at 16:58

## 2017-11-01 RX ADMIN — CARBIDOPA AND LEVODOPA 1 TABLET(S): 25; 100 TABLET ORAL at 11:35

## 2017-11-01 RX ADMIN — CARBIDOPA, LEVODOPA, AND ENTACAPONE 1 TABLET(S): 50; 200; 200 TABLET, FILM COATED ORAL at 06:26

## 2017-11-01 RX ADMIN — CARBIDOPA AND LEVODOPA 1 TABLET(S): 25; 100 TABLET ORAL at 16:58

## 2017-11-01 RX ADMIN — Medication 20 MILLIGRAM(S): at 10:10

## 2017-11-01 RX ADMIN — CARBIDOPA AND LEVODOPA 1 TABLET(S): 25; 100 TABLET ORAL at 06:26

## 2017-11-01 RX ADMIN — CLOZAPINE 25 MILLIGRAM(S): 150 TABLET, ORALLY DISINTEGRATING ORAL at 10:09

## 2017-11-01 RX ADMIN — CARBIDOPA, LEVODOPA, AND ENTACAPONE 1 TABLET(S): 50; 200; 200 TABLET, FILM COATED ORAL at 11:34

## 2017-11-01 RX ADMIN — Medication 325 MILLIGRAM(S): at 23:08

## 2017-11-02 PROCEDURE — 99232 SBSQ HOSP IP/OBS MODERATE 35: CPT

## 2017-11-02 RX ADMIN — CARBIDOPA, LEVODOPA, AND ENTACAPONE 1 TABLET(S): 50; 200; 200 TABLET, FILM COATED ORAL at 05:15

## 2017-11-02 RX ADMIN — CARBIDOPA AND LEVODOPA 1 TABLET(S): 25; 100 TABLET ORAL at 05:15

## 2017-11-02 RX ADMIN — Medication 325 MILLIGRAM(S): at 23:25

## 2017-11-02 RX ADMIN — CARBIDOPA AND LEVODOPA 1 TABLET(S): 25; 100 TABLET ORAL at 11:34

## 2017-11-02 RX ADMIN — CLOZAPINE 25 MILLIGRAM(S): 150 TABLET, ORALLY DISINTEGRATING ORAL at 09:57

## 2017-11-02 RX ADMIN — CARBIDOPA, LEVODOPA, AND ENTACAPONE 1 TABLET(S): 50; 200; 200 TABLET, FILM COATED ORAL at 11:34

## 2017-11-02 RX ADMIN — CARBIDOPA AND LEVODOPA 1 TABLET(S): 25; 100 TABLET ORAL at 17:03

## 2017-11-02 RX ADMIN — CARBIDOPA AND LEVODOPA 1 TABLET(S): 25; 100 TABLET ORAL at 20:33

## 2017-11-02 RX ADMIN — CARBIDOPA, LEVODOPA, AND ENTACAPONE 1 TABLET(S): 50; 200; 200 TABLET, FILM COATED ORAL at 17:03

## 2017-11-02 RX ADMIN — Medication 325 MILLIGRAM(S): at 22:52

## 2017-11-03 PROCEDURE — 99232 SBSQ HOSP IP/OBS MODERATE 35: CPT

## 2017-11-03 RX ADMIN — CARBIDOPA AND LEVODOPA 1 TABLET(S): 25; 100 TABLET ORAL at 06:00

## 2017-11-03 RX ADMIN — CLOZAPINE 25 MILLIGRAM(S): 150 TABLET, ORALLY DISINTEGRATING ORAL at 09:58

## 2017-11-03 RX ADMIN — CARBIDOPA AND LEVODOPA 1 TABLET(S): 25; 100 TABLET ORAL at 11:32

## 2017-11-03 RX ADMIN — CARBIDOPA, LEVODOPA, AND ENTACAPONE 1 TABLET(S): 50; 200; 200 TABLET, FILM COATED ORAL at 06:00

## 2017-11-03 RX ADMIN — CARBIDOPA, LEVODOPA, AND ENTACAPONE 1 TABLET(S): 50; 200; 200 TABLET, FILM COATED ORAL at 16:24

## 2017-11-03 RX ADMIN — CARBIDOPA AND LEVODOPA 1 TABLET(S): 25; 100 TABLET ORAL at 20:12

## 2017-11-03 RX ADMIN — CARBIDOPA AND LEVODOPA 1 TABLET(S): 25; 100 TABLET ORAL at 16:24

## 2017-11-03 RX ADMIN — CARBIDOPA, LEVODOPA, AND ENTACAPONE 1 TABLET(S): 50; 200; 200 TABLET, FILM COATED ORAL at 11:32

## 2017-11-04 RX ADMIN — CARBIDOPA AND LEVODOPA 1 TABLET(S): 25; 100 TABLET ORAL at 11:01

## 2017-11-04 RX ADMIN — CARBIDOPA AND LEVODOPA 1 TABLET(S): 25; 100 TABLET ORAL at 20:45

## 2017-11-04 RX ADMIN — CARBIDOPA, LEVODOPA, AND ENTACAPONE 1 TABLET(S): 50; 200; 200 TABLET, FILM COATED ORAL at 11:01

## 2017-11-04 RX ADMIN — CARBIDOPA, LEVODOPA, AND ENTACAPONE 1 TABLET(S): 50; 200; 200 TABLET, FILM COATED ORAL at 16:08

## 2017-11-04 RX ADMIN — CARBIDOPA AND LEVODOPA 1 TABLET(S): 25; 100 TABLET ORAL at 16:08

## 2017-11-04 RX ADMIN — CARBIDOPA AND LEVODOPA 1 TABLET(S): 25; 100 TABLET ORAL at 06:10

## 2017-11-04 RX ADMIN — Medication 325 MILLIGRAM(S): at 23:00

## 2017-11-04 RX ADMIN — CARBIDOPA, LEVODOPA, AND ENTACAPONE 1 TABLET(S): 50; 200; 200 TABLET, FILM COATED ORAL at 06:11

## 2017-11-04 RX ADMIN — CLOZAPINE 25 MILLIGRAM(S): 150 TABLET, ORALLY DISINTEGRATING ORAL at 10:38

## 2017-11-04 RX ADMIN — Medication 325 MILLIGRAM(S): at 23:01

## 2017-11-05 LAB
ALBUMIN SERPL ELPH-MCNC: 3.4 G/DL — SIGNIFICANT CHANGE UP (ref 3.3–5)
ALP SERPL-CCNC: 117 U/L — SIGNIFICANT CHANGE UP (ref 30–120)
ALT FLD-CCNC: 20 U/L DA — SIGNIFICANT CHANGE UP (ref 10–60)
ANION GAP SERPL CALC-SCNC: 7 MMOL/L — SIGNIFICANT CHANGE UP (ref 5–17)
AST SERPL-CCNC: 28 U/L — SIGNIFICANT CHANGE UP (ref 10–40)
BILIRUB SERPL-MCNC: 0.6 MG/DL — SIGNIFICANT CHANGE UP (ref 0.2–1.2)
BUN SERPL-MCNC: 13 MG/DL — SIGNIFICANT CHANGE UP (ref 7–23)
CALCIUM SERPL-MCNC: 8 MG/DL — LOW (ref 8.4–10.5)
CHLORIDE SERPL-SCNC: 102 MMOL/L — SIGNIFICANT CHANGE UP (ref 96–108)
CO2 SERPL-SCNC: 29 MMOL/L — SIGNIFICANT CHANGE UP (ref 22–31)
CREAT SERPL-MCNC: 0.99 MG/DL — SIGNIFICANT CHANGE UP (ref 0.5–1.3)
GLUCOSE SERPL-MCNC: 147 MG/DL — HIGH (ref 70–99)
HCT VFR BLD CALC: 40.9 % — SIGNIFICANT CHANGE UP (ref 39–50)
HGB BLD-MCNC: 13.6 G/DL — SIGNIFICANT CHANGE UP (ref 13–17)
MCHC RBC-ENTMCNC: 31.4 PG — SIGNIFICANT CHANGE UP (ref 27–34)
MCHC RBC-ENTMCNC: 33.1 GM/DL — SIGNIFICANT CHANGE UP (ref 32–36)
MCV RBC AUTO: 94.8 FL — SIGNIFICANT CHANGE UP (ref 80–100)
NT-PROBNP SERPL-SCNC: 15 PG/ML — SIGNIFICANT CHANGE UP (ref 0–125)
PLATELET # BLD AUTO: 237 K/UL — SIGNIFICANT CHANGE UP (ref 150–400)
POTASSIUM SERPL-MCNC: 4 MMOL/L — SIGNIFICANT CHANGE UP (ref 3.5–5.3)
POTASSIUM SERPL-SCNC: 4 MMOL/L — SIGNIFICANT CHANGE UP (ref 3.5–5.3)
PROT SERPL-MCNC: 6.8 G/DL — SIGNIFICANT CHANGE UP (ref 6–8.3)
RBC # BLD: 4.31 M/UL — SIGNIFICANT CHANGE UP (ref 4.2–5.8)
RBC # FLD: 12.8 % — SIGNIFICANT CHANGE UP (ref 10.3–14.5)
SODIUM SERPL-SCNC: 138 MMOL/L — SIGNIFICANT CHANGE UP (ref 135–145)
WBC # BLD: 6.6 K/UL — SIGNIFICANT CHANGE UP (ref 3.8–10.5)
WBC # FLD AUTO: 6.6 K/UL — SIGNIFICANT CHANGE UP (ref 3.8–10.5)

## 2017-11-05 PROCEDURE — 99231 SBSQ HOSP IP/OBS SF/LOW 25: CPT

## 2017-11-05 RX ORDER — ACETAMINOPHEN 500 MG
650 TABLET ORAL ONCE
Qty: 0 | Refills: 0 | Status: COMPLETED | OUTPATIENT
Start: 2017-11-05 | End: 2017-11-05

## 2017-11-05 RX ORDER — FUROSEMIDE 40 MG
20 TABLET ORAL ONCE
Qty: 0 | Refills: 0 | Status: COMPLETED | OUTPATIENT
Start: 2017-11-05 | End: 2017-11-05

## 2017-11-05 RX ADMIN — Medication 650 MILLIGRAM(S): at 01:10

## 2017-11-05 RX ADMIN — CARBIDOPA AND LEVODOPA 1 TABLET(S): 25; 100 TABLET ORAL at 20:23

## 2017-11-05 RX ADMIN — CARBIDOPA AND LEVODOPA 1 TABLET(S): 25; 100 TABLET ORAL at 06:49

## 2017-11-05 RX ADMIN — Medication 650 MILLIGRAM(S): at 00:37

## 2017-11-05 RX ADMIN — CARBIDOPA, LEVODOPA, AND ENTACAPONE 1 TABLET(S): 50; 200; 200 TABLET, FILM COATED ORAL at 06:48

## 2017-11-05 RX ADMIN — CARBIDOPA AND LEVODOPA 1 TABLET(S): 25; 100 TABLET ORAL at 16:00

## 2017-11-05 RX ADMIN — CARBIDOPA, LEVODOPA, AND ENTACAPONE 1 TABLET(S): 50; 200; 200 TABLET, FILM COATED ORAL at 16:00

## 2017-11-05 RX ADMIN — CARBIDOPA AND LEVODOPA 1 TABLET(S): 25; 100 TABLET ORAL at 12:09

## 2017-11-05 RX ADMIN — CLOZAPINE 25 MILLIGRAM(S): 150 TABLET, ORALLY DISINTEGRATING ORAL at 10:09

## 2017-11-05 RX ADMIN — CARBIDOPA, LEVODOPA, AND ENTACAPONE 1 TABLET(S): 50; 200; 200 TABLET, FILM COATED ORAL at 12:09

## 2017-11-05 RX ADMIN — Medication 20 MILLIGRAM(S): at 15:57

## 2017-11-05 NOTE — CHART NOTE - NSCHARTNOTEFT_GEN_A_CORE
requested by RN to evaluate patient for leg swelling.    pertinent exam: loss of hair both lower legs suggesting chronic circulation problem.  moderate swelling both lower legs  no calf tenderness  no evidence of cellulitis    Impression/Plan:  probably chronic venous insufficiency: lasix 20 mg 1 dose if pt allows. check BMP. limb elevation when in bed.    check duplex LE r/o DVT due to limited mobility.    no chest pain or shortness of breath.    remainder of care as per primary team.    d/w RN plan of care.    Dr. Mendez to continue with routine medical care starting tomorrow.

## 2017-11-06 DIAGNOSIS — G20 PARKINSON'S DISEASE: ICD-10-CM

## 2017-11-06 DIAGNOSIS — F60.9 PERSONALITY DISORDER, UNSPECIFIED: ICD-10-CM

## 2017-11-06 PROCEDURE — 99232 SBSQ HOSP IP/OBS MODERATE 35: CPT

## 2017-11-06 RX ADMIN — CARBIDOPA, LEVODOPA, AND ENTACAPONE 1 TABLET(S): 50; 200; 200 TABLET, FILM COATED ORAL at 06:26

## 2017-11-06 RX ADMIN — CARBIDOPA AND LEVODOPA 1 TABLET(S): 25; 100 TABLET ORAL at 20:22

## 2017-11-06 RX ADMIN — CARBIDOPA, LEVODOPA, AND ENTACAPONE 1 TABLET(S): 50; 200; 200 TABLET, FILM COATED ORAL at 12:42

## 2017-11-06 RX ADMIN — CARBIDOPA, LEVODOPA, AND ENTACAPONE 1 TABLET(S): 50; 200; 200 TABLET, FILM COATED ORAL at 16:07

## 2017-11-06 RX ADMIN — CLOZAPINE 25 MILLIGRAM(S): 150 TABLET, ORALLY DISINTEGRATING ORAL at 10:59

## 2017-11-06 RX ADMIN — Medication 325 MILLIGRAM(S): at 23:19

## 2017-11-06 RX ADMIN — CARBIDOPA AND LEVODOPA 1 TABLET(S): 25; 100 TABLET ORAL at 06:26

## 2017-11-06 RX ADMIN — CARBIDOPA AND LEVODOPA 1 TABLET(S): 25; 100 TABLET ORAL at 16:07

## 2017-11-06 RX ADMIN — CARBIDOPA AND LEVODOPA 1 TABLET(S): 25; 100 TABLET ORAL at 12:42

## 2017-11-06 RX ADMIN — Medication 325 MILLIGRAM(S): at 23:18

## 2017-11-06 NOTE — PROGRESS NOTE BEHAVIORAL HEALTH - SUMMARY
Patient is 51 yo with Parkinson’s disease diagnosed in 2006, previously living at 4 prior nursing homes in 8 months, all resulting in discharge due to behaviors), who was transferred to [place] ED for aggression with staff and refusing medications, with brief < 1 day stay at [facility] (d/c for ‘lack of appropriate medical bed”), transferred to [facility] Unit 2S on 6/12/17 where he manifested severe mood lability, paranoia and confabulation with poor insight and judgment including making > 40 [employer] complaints. 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 Patient was accepted to [facility] in [place] and discharged from [facility] on 7/12/17.  Upon getting to [facility], Patient refused to go into the facility and became combative. He was thus transferred back to [facility] ED which resulted in Service Line Chiefs’ of Service involvement given the history/issues and ultimate transfer to [address].

## 2017-11-06 NOTE — PROGRESS NOTE BEHAVIORAL HEALTH - OTHER
superficially cooperative limited upper extremity tremors at times festimating gait but stable poor pronouciation of words at times at times labile, at times angry, at times euthymic at times disorganized and circumstantial at times paranoia towards certain staff + neck favoring one side, dressed in hospital gowns, adequate grooming and hygiene. +  psychomotor agitation at times (chronic and seemingly intentionaly as he can stop it when redirected).

## 2017-11-06 NOTE — PROGRESS NOTE BEHAVIORAL HEALTH - NSBHFUPINTERVALHXFT_PSY_A_CORE
Patient seen for paranoia, disorganized behavior. Chart reviewed. Significant interval events – was more activated this morning,. refusing medications initially then later took them, in the meantime yelling by the nurses station and complaining about certain staff members, demanding to see the Medical Director, , etc. Ultimately calmed down.  Otherwise, Patient is maintain the same clinical presentation. Patient seen for paranoia, disorganized behavior. Chart reviewed. Significant interval events – was more activated this morning,. refusing medications initially then later took them, in the meantime yelling by the nurses station and complaining about certain staff members, demanding to see the Medical Director, , etc. Writer spent some time validating Patient's feelings and he ultimately calmed down.  Otherwise, Patient is maintain the same clinical presentation.

## 2017-11-07 PROCEDURE — 99232 SBSQ HOSP IP/OBS MODERATE 35: CPT

## 2017-11-07 RX ADMIN — CARBIDOPA, LEVODOPA, AND ENTACAPONE 1 TABLET(S): 50; 200; 200 TABLET, FILM COATED ORAL at 06:56

## 2017-11-07 RX ADMIN — CLOZAPINE 25 MILLIGRAM(S): 150 TABLET, ORALLY DISINTEGRATING ORAL at 10:35

## 2017-11-07 RX ADMIN — CARBIDOPA, LEVODOPA, AND ENTACAPONE 1 TABLET(S): 50; 200; 200 TABLET, FILM COATED ORAL at 16:24

## 2017-11-07 RX ADMIN — CARBIDOPA AND LEVODOPA 1 TABLET(S): 25; 100 TABLET ORAL at 11:32

## 2017-11-07 RX ADMIN — CARBIDOPA AND LEVODOPA 1 TABLET(S): 25; 100 TABLET ORAL at 16:24

## 2017-11-07 RX ADMIN — CARBIDOPA, LEVODOPA, AND ENTACAPONE 1 TABLET(S): 50; 200; 200 TABLET, FILM COATED ORAL at 11:32

## 2017-11-07 RX ADMIN — Medication 325 MILLIGRAM(S): at 22:58

## 2017-11-07 RX ADMIN — CARBIDOPA AND LEVODOPA 1 TABLET(S): 25; 100 TABLET ORAL at 20:47

## 2017-11-07 RX ADMIN — CARBIDOPA AND LEVODOPA 1 TABLET(S): 25; 100 TABLET ORAL at 06:56

## 2017-11-07 NOTE — PROGRESS NOTE BEHAVIORAL HEALTH - SUMMARY
Patient is 53 yo with Parkinson’s disease diagnosed in 2006, previously living at 4 prior nursing homes in 8 months, all resulting in discharge due to behaviors), who was transferred to [place] ED for aggression with staff and refusing medications, with brief < 1 day stay at [facility] (d/c for ‘lack of appropriate medical bed”), transferred to [facility] Unit 2S on 6/12/17 where he manifested severe mood lability, paranoia and confabulation with poor insight and judgment including making > 40 [employer] center complaints. 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 Patient was accepted to [facility] in [place] and discharged from [facility] on 7/12/17.  Upon getting to [facility], Patient refused to go into the facility and became combative. He was thus transferred back to [facility] ED which resulted in Service Line Chiefs’ of Service involvement given the history/issues and ultimate transfer to [place] [facility]

## 2017-11-07 NOTE — PROGRESS NOTE BEHAVIORAL HEALTH - OTHER
+ neck favoring one side, dressed in hospital gowns, adequate grooming and hygiene. +  psychomotor agitation at times (chronic and seemingly intentionaly as he can stop it when redirected). superficially cooperative limited upper extremity tremors at times festimating gait but stable poor pronouciation of words at times at times labile, at times angry, at times euthymic at times disorganized and circumstantial at times paranoia towards certain staff

## 2017-11-08 LAB
BASOPHILS # BLD AUTO: 0 K/UL — SIGNIFICANT CHANGE UP (ref 0–0.2)
BASOPHILS NFR BLD AUTO: 0.6 % — SIGNIFICANT CHANGE UP (ref 0–2)
EOSINOPHIL # BLD AUTO: 0.1 K/UL — SIGNIFICANT CHANGE UP (ref 0–0.5)
EOSINOPHIL NFR BLD AUTO: 2.9 % — SIGNIFICANT CHANGE UP (ref 0–6)
HCT VFR BLD CALC: 38 % — LOW (ref 39–50)
HGB BLD-MCNC: 12.6 G/DL — LOW (ref 13–17)
LYMPHOCYTES # BLD AUTO: 1.6 K/UL — SIGNIFICANT CHANGE UP (ref 1–3.3)
LYMPHOCYTES # BLD AUTO: 32.9 % — SIGNIFICANT CHANGE UP (ref 13–44)
MCHC RBC-ENTMCNC: 31.9 PG — SIGNIFICANT CHANGE UP (ref 27–34)
MCHC RBC-ENTMCNC: 33.1 GM/DL — SIGNIFICANT CHANGE UP (ref 32–36)
MCV RBC AUTO: 96.4 FL — SIGNIFICANT CHANGE UP (ref 80–100)
MONOCYTES # BLD AUTO: 0.4 K/UL — SIGNIFICANT CHANGE UP (ref 0–0.9)
MONOCYTES NFR BLD AUTO: 8 % — SIGNIFICANT CHANGE UP (ref 2–14)
NEUTROPHILS # BLD AUTO: 2.6 K/UL — SIGNIFICANT CHANGE UP (ref 1.8–7.4)
NEUTROPHILS NFR BLD AUTO: 55.6 % — SIGNIFICANT CHANGE UP (ref 43–77)
PLATELET # BLD AUTO: 191 K/UL — SIGNIFICANT CHANGE UP (ref 150–400)
RBC # BLD: 3.94 M/UL — LOW (ref 4.2–5.8)
RBC # FLD: 12.7 % — SIGNIFICANT CHANGE UP (ref 10.3–14.5)
WBC # BLD: 4.7 K/UL — SIGNIFICANT CHANGE UP (ref 3.8–10.5)
WBC # FLD AUTO: 4.7 K/UL — SIGNIFICANT CHANGE UP (ref 3.8–10.5)

## 2017-11-08 PROCEDURE — 99232 SBSQ HOSP IP/OBS MODERATE 35: CPT

## 2017-11-08 RX ADMIN — CARBIDOPA, LEVODOPA, AND ENTACAPONE 1 TABLET(S): 50; 200; 200 TABLET, FILM COATED ORAL at 05:59

## 2017-11-08 RX ADMIN — CARBIDOPA AND LEVODOPA 1 TABLET(S): 25; 100 TABLET ORAL at 16:09

## 2017-11-08 RX ADMIN — CARBIDOPA AND LEVODOPA 1 TABLET(S): 25; 100 TABLET ORAL at 20:13

## 2017-11-08 RX ADMIN — CARBIDOPA, LEVODOPA, AND ENTACAPONE 1 TABLET(S): 50; 200; 200 TABLET, FILM COATED ORAL at 16:09

## 2017-11-08 RX ADMIN — CLOZAPINE 25 MILLIGRAM(S): 150 TABLET, ORALLY DISINTEGRATING ORAL at 10:03

## 2017-11-08 RX ADMIN — CARBIDOPA, LEVODOPA, AND ENTACAPONE 1 TABLET(S): 50; 200; 200 TABLET, FILM COATED ORAL at 11:01

## 2017-11-08 RX ADMIN — Medication 325 MILLIGRAM(S): at 00:56

## 2017-11-08 RX ADMIN — CARBIDOPA AND LEVODOPA 1 TABLET(S): 25; 100 TABLET ORAL at 05:59

## 2017-11-08 RX ADMIN — CARBIDOPA AND LEVODOPA 1 TABLET(S): 25; 100 TABLET ORAL at 11:01

## 2017-11-08 NOTE — PROGRESS NOTE BEHAVIORAL HEALTH - OTHER
at times disorganized and circumstantial at times paranoia towards certain staff limited + neck favoring one side, dressed in hospital gowns, adequate grooming and hygiene. +  psychomotor agitation at times (chronic and seemingly intentionaly as he can stop it when redirected). superficially cooperative upper extremity tremors at times festimating gait but stable poor pronouciation of words at times at times labile, at times angry, at times euthymic

## 2017-11-08 NOTE — PROGRESS NOTE BEHAVIORAL HEALTH - NSBHFUPINTERVALHXFT_PSY_A_CORE
Patient seen for paranoia, disorganized behavior. Chart reviewed. No significant interval events – no incidents in last few days. Patient was talking about the missing [employer] computer game he liked to play; Writer said she will look into it. He is eating his meals with good appetite, spends time in the dayroom during the day usually observing groups, and sleeping at night for several hours. Otherwise, Patient is maintain the same clinical presentation.

## 2017-11-08 NOTE — PROGRESS NOTE BEHAVIORAL HEALTH - SUMMARY
53 yo with Parkinson’s disease diagnosed in 2006, previously living at 4 prior nursing homes in 8 months, all resulting in discharge due to behaviors), hx of refusing medications,  transferred to [facility] Unit 2S on 6/12/17 where he manifested severe mood lability, paranoia and confabulation with poor insight and judgment including making > 40 [employer] center complaints. 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 Patient was accepted to [facility] in [place] and discharged from [facility] on 7/12/17.  Upon getting to [facility], Patient refused to go into the facility and became combative. He was thus transferred back to [facility] ED which resulted in Service Line Chiefs’ of Service involvement given the history/issues and ultimate transfer to [address]. Patient has been on Unit 1[address] since.

## 2017-11-09 PROCEDURE — 99231 SBSQ HOSP IP/OBS SF/LOW 25: CPT

## 2017-11-09 RX ADMIN — Medication 325 MILLIGRAM(S): at 16:06

## 2017-11-09 RX ADMIN — CARBIDOPA AND LEVODOPA 1 TABLET(S): 25; 100 TABLET ORAL at 21:24

## 2017-11-09 RX ADMIN — CARBIDOPA AND LEVODOPA 1 TABLET(S): 25; 100 TABLET ORAL at 11:49

## 2017-11-09 RX ADMIN — Medication 325 MILLIGRAM(S): at 15:12

## 2017-11-09 RX ADMIN — CARBIDOPA, LEVODOPA, AND ENTACAPONE 1 TABLET(S): 50; 200; 200 TABLET, FILM COATED ORAL at 16:10

## 2017-11-09 RX ADMIN — Medication 325 MILLIGRAM(S): at 22:00

## 2017-11-09 RX ADMIN — CARBIDOPA AND LEVODOPA 1 TABLET(S): 25; 100 TABLET ORAL at 07:43

## 2017-11-09 RX ADMIN — Medication 325 MILLIGRAM(S): at 21:28

## 2017-11-09 RX ADMIN — CARBIDOPA AND LEVODOPA 1 TABLET(S): 25; 100 TABLET ORAL at 16:10

## 2017-11-09 RX ADMIN — CARBIDOPA, LEVODOPA, AND ENTACAPONE 1 TABLET(S): 50; 200; 200 TABLET, FILM COATED ORAL at 11:49

## 2017-11-09 RX ADMIN — CARBIDOPA, LEVODOPA, AND ENTACAPONE 1 TABLET(S): 50; 200; 200 TABLET, FILM COATED ORAL at 07:43

## 2017-11-09 RX ADMIN — CLOZAPINE 25 MILLIGRAM(S): 150 TABLET, ORALLY DISINTEGRATING ORAL at 10:12

## 2017-11-10 PROCEDURE — 99232 SBSQ HOSP IP/OBS MODERATE 35: CPT

## 2017-11-10 RX ADMIN — CARBIDOPA, LEVODOPA, AND ENTACAPONE 1 TABLET(S): 50; 200; 200 TABLET, FILM COATED ORAL at 08:13

## 2017-11-10 RX ADMIN — CARBIDOPA AND LEVODOPA 1 TABLET(S): 25; 100 TABLET ORAL at 08:12

## 2017-11-10 RX ADMIN — CARBIDOPA AND LEVODOPA 1 TABLET(S): 25; 100 TABLET ORAL at 16:30

## 2017-11-10 RX ADMIN — CLOZAPINE 25 MILLIGRAM(S): 150 TABLET, ORALLY DISINTEGRATING ORAL at 11:30

## 2017-11-10 RX ADMIN — CARBIDOPA AND LEVODOPA 1 TABLET(S): 25; 100 TABLET ORAL at 20:10

## 2017-11-10 RX ADMIN — CARBIDOPA AND LEVODOPA 1 TABLET(S): 25; 100 TABLET ORAL at 12:53

## 2017-11-10 RX ADMIN — CARBIDOPA, LEVODOPA, AND ENTACAPONE 1 TABLET(S): 50; 200; 200 TABLET, FILM COATED ORAL at 16:30

## 2017-11-10 RX ADMIN — CARBIDOPA, LEVODOPA, AND ENTACAPONE 1 TABLET(S): 50; 200; 200 TABLET, FILM COATED ORAL at 12:54

## 2017-11-10 NOTE — PROGRESS NOTE BEHAVIORAL HEALTH - NSBHFUPINTERVALHXFT_PSY_A_CORE
Patient seen for paranoia, disorganized behavior. Chart reviewed. No significant interval events – no major incidents this weel. w days. He is eating his meals with good appetite, spends time in the dayroom during the day usually observing groups, and sleeping at night for several hours. Otherwise, Patient is maintain the same clinical presentation.

## 2017-11-10 NOTE — PROGRESS NOTE BEHAVIORAL HEALTH - SUMMARY
51 yo with Parkinson’s disease diagnosed in 2006, previously living at 4 prior nursing homes in 8 months, all resulting in discharge due to behaviors), hx of refusing medications,  transferred to [facility] Unit 2S on 6/12/17 where he manifested severe mood lability, paranoia and confabulation with poor insight and judgment including making > 40 [employer] center complaints. 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 Patient was accepted to [facility] in [place] and discharged from [facility] on 7/12/17.  Upon getting to [facility], Patient refused to go into the facility and became combative. He was thus transferred back to [facility] ED which resulted in Service Line Chiefs’ of Service involvement given the history/issues and ultimate transfer to [address]. Patient has been on Unit 1N since. At this time, referral has been sent to  [facility].

## 2017-11-11 RX ADMIN — CARBIDOPA AND LEVODOPA 1 TABLET(S): 25; 100 TABLET ORAL at 16:36

## 2017-11-11 RX ADMIN — CARBIDOPA AND LEVODOPA 1 TABLET(S): 25; 100 TABLET ORAL at 06:11

## 2017-11-11 RX ADMIN — CARBIDOPA, LEVODOPA, AND ENTACAPONE 1 TABLET(S): 50; 200; 200 TABLET, FILM COATED ORAL at 16:36

## 2017-11-11 RX ADMIN — CARBIDOPA, LEVODOPA, AND ENTACAPONE 1 TABLET(S): 50; 200; 200 TABLET, FILM COATED ORAL at 06:11

## 2017-11-11 RX ADMIN — CARBIDOPA AND LEVODOPA 1 TABLET(S): 25; 100 TABLET ORAL at 20:23

## 2017-11-11 RX ADMIN — CLOZAPINE 25 MILLIGRAM(S): 150 TABLET, ORALLY DISINTEGRATING ORAL at 10:11

## 2017-11-11 RX ADMIN — CARBIDOPA AND LEVODOPA 1 TABLET(S): 25; 100 TABLET ORAL at 12:42

## 2017-11-11 RX ADMIN — CARBIDOPA, LEVODOPA, AND ENTACAPONE 1 TABLET(S): 50; 200; 200 TABLET, FILM COATED ORAL at 12:42

## 2017-11-11 NOTE — PROGRESS NOTE BEHAVIORAL HEALTH - SUMMARY
53 yo with Parkinson’s disease diagnosed in 2006, previously living at 4 prior nursing homes in 8 months, all resulting in discharge due to behaviors), hx of refusing medications,  transferred to [facility] Unit 2S on 6/12/17 where he manifested severe mood lability, paranoia and confabulation with poor insight and judgment including making > 40 [employer] center complaints. 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 Patient was accepted to [facility] in [place] and discharged from [facility] on 7/12/17.  Upon getting to [facility], Patient refused to go into the facility and became combative. He was thus transferred back to [facility] ED which resulted in Service Line Chiefs’ of Service involvement given the history/issues and ultimate transfer to [address]. Patient has been on Unit 1N since. At this time, referral has been sent to  [facility].

## 2017-11-11 NOTE — PROGRESS NOTE BEHAVIORAL HEALTH - NSBHFUPINTERVALHXFT_PSY_A_CORE
According to staff, the patient had several behavioral episodes last night, including one instance or urinating in a garbage can. The patient did not receive any psychotropic PRNs overnight. On interview this morning, he endorses complaints about the staff, mentioning that he is planning on filing a lawsuit for perceived mistreatment. He denies any acute suicidality or homicidality.

## 2017-11-12 RX ADMIN — CARBIDOPA AND LEVODOPA 1 TABLET(S): 25; 100 TABLET ORAL at 12:33

## 2017-11-12 RX ADMIN — CARBIDOPA, LEVODOPA, AND ENTACAPONE 1 TABLET(S): 50; 200; 200 TABLET, FILM COATED ORAL at 10:54

## 2017-11-12 RX ADMIN — CARBIDOPA AND LEVODOPA 1 TABLET(S): 25; 100 TABLET ORAL at 20:07

## 2017-11-12 RX ADMIN — CARBIDOPA, LEVODOPA, AND ENTACAPONE 1 TABLET(S): 50; 200; 200 TABLET, FILM COATED ORAL at 16:33

## 2017-11-12 RX ADMIN — CARBIDOPA, LEVODOPA, AND ENTACAPONE 1 TABLET(S): 50; 200; 200 TABLET, FILM COATED ORAL at 06:18

## 2017-11-12 RX ADMIN — CARBIDOPA AND LEVODOPA 1 TABLET(S): 25; 100 TABLET ORAL at 16:33

## 2017-11-12 RX ADMIN — CARBIDOPA AND LEVODOPA 1 TABLET(S): 25; 100 TABLET ORAL at 06:18

## 2017-11-12 RX ADMIN — Medication 325 MILLIGRAM(S): at 01:38

## 2017-11-12 RX ADMIN — CLOZAPINE 25 MILLIGRAM(S): 150 TABLET, ORALLY DISINTEGRATING ORAL at 10:07

## 2017-11-12 RX ADMIN — Medication 325 MILLIGRAM(S): at 23:33

## 2017-11-13 PROCEDURE — 99231 SBSQ HOSP IP/OBS SF/LOW 25: CPT

## 2017-11-13 RX ADMIN — CARBIDOPA, LEVODOPA, AND ENTACAPONE 1 TABLET(S): 50; 200; 200 TABLET, FILM COATED ORAL at 10:56

## 2017-11-13 RX ADMIN — CARBIDOPA AND LEVODOPA 1 TABLET(S): 25; 100 TABLET ORAL at 16:12

## 2017-11-13 RX ADMIN — Medication 325 MILLIGRAM(S): at 23:30

## 2017-11-13 RX ADMIN — CLOZAPINE 25 MILLIGRAM(S): 150 TABLET, ORALLY DISINTEGRATING ORAL at 10:12

## 2017-11-13 RX ADMIN — Medication 325 MILLIGRAM(S): at 00:00

## 2017-11-13 RX ADMIN — CARBIDOPA AND LEVODOPA 1 TABLET(S): 25; 100 TABLET ORAL at 11:12

## 2017-11-13 RX ADMIN — CARBIDOPA AND LEVODOPA 1 TABLET(S): 25; 100 TABLET ORAL at 06:05

## 2017-11-13 RX ADMIN — Medication 325 MILLIGRAM(S): at 22:57

## 2017-11-13 RX ADMIN — CYCLOBENZAPRINE HYDROCHLORIDE 10 MILLIGRAM(S): 10 TABLET, FILM COATED ORAL at 22:57

## 2017-11-13 RX ADMIN — CARBIDOPA, LEVODOPA, AND ENTACAPONE 1 TABLET(S): 50; 200; 200 TABLET, FILM COATED ORAL at 16:12

## 2017-11-13 RX ADMIN — CARBIDOPA, LEVODOPA, AND ENTACAPONE 1 TABLET(S): 50; 200; 200 TABLET, FILM COATED ORAL at 06:05

## 2017-11-13 RX ADMIN — CARBIDOPA AND LEVODOPA 1 TABLET(S): 25; 100 TABLET ORAL at 22:58

## 2017-11-13 NOTE — PROGRESS NOTE BEHAVIORAL HEALTH - NSBHADMITMEDEDUDETAILS_A_CORE FT
Pt advised of potential benefits and SE of Clozapine and need for lab work to be drawn for safey..pt rpts he will comply with lab work on Wed. and verbalizes understanding of teaching

## 2017-11-13 NOTE — PROGRESS NOTE BEHAVIORAL HEALTH - NSBHFUPINTERVALCCFT_PSY_A_CORE
Pt continues paranoid and targeting staff.  Pt rpts "I feel ok" and rpts mood as "good"  Pt is using humor appropriately today.

## 2017-11-13 NOTE — PROGRESS NOTE BEHAVIORAL HEALTH - OTHER
More cooperative this AM, but cooperation is variable Tenuous Festinating concrete Suspiciousness   paranoid at times Limited

## 2017-11-13 NOTE — PROGRESS NOTE BEHAVIORAL HEALTH - SUMMARY
Pt is variably compliant with Rx, and his behavior and level of paranoia and cooperation are variable.  He has not been attending groups recently, despite staff encouragement.  Pt encouraged to discuss his feelings and frustrations about his physical problems and hospitalization, and pt does this superficially.  Pt rpts he is ok with being transferred to an SNF in the [place]

## 2017-11-14 PROCEDURE — 99231 SBSQ HOSP IP/OBS SF/LOW 25: CPT

## 2017-11-14 RX ADMIN — CARBIDOPA AND LEVODOPA 1 TABLET(S): 25; 100 TABLET ORAL at 21:01

## 2017-11-14 RX ADMIN — CARBIDOPA, LEVODOPA, AND ENTACAPONE 1 TABLET(S): 50; 200; 200 TABLET, FILM COATED ORAL at 06:53

## 2017-11-14 RX ADMIN — CARBIDOPA AND LEVODOPA 1 TABLET(S): 25; 100 TABLET ORAL at 11:51

## 2017-11-14 RX ADMIN — CARBIDOPA AND LEVODOPA 1 TABLET(S): 25; 100 TABLET ORAL at 17:19

## 2017-11-14 RX ADMIN — CARBIDOPA, LEVODOPA, AND ENTACAPONE 1 TABLET(S): 50; 200; 200 TABLET, FILM COATED ORAL at 11:51

## 2017-11-14 RX ADMIN — CARBIDOPA AND LEVODOPA 1 TABLET(S): 25; 100 TABLET ORAL at 06:54

## 2017-11-14 RX ADMIN — CARBIDOPA, LEVODOPA, AND ENTACAPONE 1 TABLET(S): 50; 200; 200 TABLET, FILM COATED ORAL at 17:19

## 2017-11-14 RX ADMIN — CLOZAPINE 25 MILLIGRAM(S): 150 TABLET, ORALLY DISINTEGRATING ORAL at 10:07

## 2017-11-14 NOTE — PROGRESS NOTE BEHAVIORAL HEALTH - NSBHADMITMEDEDUDETAILS_A_CORE FT
Pt teaching done re: potential benefits and SE of clozapine, and lab work to be drawn tomorrow so Rx can be given safely with teachback verbalized.  Pt agrees to lab work on 11/15

## 2017-11-14 NOTE — PROGRESS NOTE BEHAVIORAL HEALTH - OTHER
concrete Suspiciousness   paranoid at times Limited More cooperative this AM, but cooperation is variable, and pt can be verbally hostile at times Tenuous Festinating   uses walker to ambulate

## 2017-11-14 NOTE — PROGRESS NOTE BEHAVIORAL HEALTH - SUMMARY
Pt has adjusted to unit in a variable manner, he is angry at being on an inpatient psych unit, and is manipulative to try to regain some control in his life.  He participates in milieu rarely, and tends to isolate to his room  because "I am not a psycho".  Pt continues with paranoid ideation, and targeting staff.  He wants to go to a facility in the [place] to be near his family.  Pt encouraged to discuss his frustrations and support given

## 2017-11-14 NOTE — PROGRESS NOTE BEHAVIORAL HEALTH - NSBHFUPINTERVALCCFT_PSY_A_CORE
"I am not too good today"  Pt is verbal and ambulating with walker, and discussed that he would like to be transferred to a nursing home, where he would have " a better life"  Pt rpts he has been in the hospital too long.  Encouraged pt to discuss his behaviors, but he cont to focus on and target staff in a paranoid manner.  pt continues variably compliant with Rx, does take Parkinson's Rx and Clozapine.  Pt has had improved behavioral control on clozapine

## 2017-11-15 LAB
BASOPHILS # BLD AUTO: 0 K/UL — SIGNIFICANT CHANGE UP (ref 0–0.2)
BASOPHILS NFR BLD AUTO: 0.8 % — SIGNIFICANT CHANGE UP (ref 0–2)
EOSINOPHIL # BLD AUTO: 0.1 K/UL — SIGNIFICANT CHANGE UP (ref 0–0.5)
EOSINOPHIL NFR BLD AUTO: 1.8 % — SIGNIFICANT CHANGE UP (ref 0–6)
HCT VFR BLD CALC: 37.6 % — LOW (ref 39–50)
HGB BLD-MCNC: 12 G/DL — LOW (ref 13–17)
LYMPHOCYTES # BLD AUTO: 1.2 K/UL — SIGNIFICANT CHANGE UP (ref 1–3.3)
LYMPHOCYTES # BLD AUTO: 23.4 % — SIGNIFICANT CHANGE UP (ref 13–44)
MCHC RBC-ENTMCNC: 30.8 PG — SIGNIFICANT CHANGE UP (ref 27–34)
MCHC RBC-ENTMCNC: 32.1 GM/DL — SIGNIFICANT CHANGE UP (ref 32–36)
MCV RBC AUTO: 96.1 FL — SIGNIFICANT CHANGE UP (ref 80–100)
MONOCYTES # BLD AUTO: 0.4 K/UL — SIGNIFICANT CHANGE UP (ref 0–0.9)
MONOCYTES NFR BLD AUTO: 8 % — SIGNIFICANT CHANGE UP (ref 2–14)
NEUTROPHILS # BLD AUTO: 3.5 K/UL — SIGNIFICANT CHANGE UP (ref 1.8–7.4)
NEUTROPHILS NFR BLD AUTO: 66.1 % — SIGNIFICANT CHANGE UP (ref 43–77)
PLATELET # BLD AUTO: 184 K/UL — SIGNIFICANT CHANGE UP (ref 150–400)
RBC # BLD: 3.91 M/UL — LOW (ref 4.2–5.8)
RBC # FLD: 12.9 % — SIGNIFICANT CHANGE UP (ref 10.3–14.5)
WBC # BLD: 5.3 K/UL — SIGNIFICANT CHANGE UP (ref 3.8–10.5)
WBC # FLD AUTO: 5.3 K/UL — SIGNIFICANT CHANGE UP (ref 3.8–10.5)

## 2017-11-15 PROCEDURE — 99231 SBSQ HOSP IP/OBS SF/LOW 25: CPT

## 2017-11-15 RX ORDER — OLANZAPINE 15 MG/1
2.5 TABLET, FILM COATED ORAL ONCE
Qty: 0 | Refills: 0 | Status: COMPLETED | OUTPATIENT
Start: 2017-11-15 | End: 2017-11-15

## 2017-11-15 RX ADMIN — CARBIDOPA AND LEVODOPA 1 TABLET(S): 25; 100 TABLET ORAL at 06:01

## 2017-11-15 RX ADMIN — Medication 325 MILLIGRAM(S): at 06:00

## 2017-11-15 RX ADMIN — Medication 2 MILLIGRAM(S): at 16:45

## 2017-11-15 RX ADMIN — CARBIDOPA, LEVODOPA, AND ENTACAPONE 1 TABLET(S): 50; 200; 200 TABLET, FILM COATED ORAL at 06:00

## 2017-11-15 RX ADMIN — CARBIDOPA AND LEVODOPA 1 TABLET(S): 25; 100 TABLET ORAL at 11:44

## 2017-11-15 RX ADMIN — CARBIDOPA AND LEVODOPA 1 TABLET(S): 25; 100 TABLET ORAL at 20:17

## 2017-11-15 RX ADMIN — CARBIDOPA, LEVODOPA, AND ENTACAPONE 1 TABLET(S): 50; 200; 200 TABLET, FILM COATED ORAL at 11:44

## 2017-11-15 RX ADMIN — Medication 325 MILLIGRAM(S): at 07:28

## 2017-11-15 RX ADMIN — CARBIDOPA AND LEVODOPA 1 TABLET(S): 25; 100 TABLET ORAL at 16:50

## 2017-11-15 RX ADMIN — CARBIDOPA, LEVODOPA, AND ENTACAPONE 1 TABLET(S): 50; 200; 200 TABLET, FILM COATED ORAL at 16:50

## 2017-11-15 NOTE — PROGRESS NOTE BEHAVIORAL HEALTH - SUMMARY
Pt has adjusted to unit in a variable manner, he is angry at being on an inpatient psych unit, and is manipulative to try to regain some control in his life.  He participates in milieu rarely, and tends to isolate to his room  because "I am not a psycho".  Pt continues with paranoid ideation, and targeting staff.  He wants to go to a facility in the [place] to be near his family.  Pt encouraged to discuss his frustrations and support given. Pt has adjusted to unit in a variable manner, he is angry at being on an inpatient psych unit, and is manipulative to try to regain some control in his life.  He participates in milieu rarely, and tends to isolate to his room  because "I am not a psycho".  Pt continues with paranoid ideation, and targeting staff.  He wants to go to a facility in the [place] to be near his family.  Pt encouraged to discuss his frustrations and support given.  At approx 1630, pt became verbally agitated, yelling and threatening physical harm to staff.  Ativan 2mg given IM by staff.  Pt resting quietly at this time.

## 2017-11-15 NOTE — PROGRESS NOTE BEHAVIORAL HEALTH - NSBHFUPINTERVALHXFT_PSY_A_CORE
Pt continues to be paranoid, and target staff.  Pt is in variable behavioral control, variable compliance with staff direction.  Initially resistive to taking clozaril, then did take it with much staff encouragement.

## 2017-11-15 NOTE — PROGRESS NOTE BEHAVIORAL HEALTH - NSBHFUPINTERVALCCFT_PSY_A_CORE
"I am not too good today"  Pt is verbal and ambulating with walker, and discussed that he would like to be transferred to a nursing home, where he would have " a better life"  Pt rpts he has been in the hospital too long.  Encouraged pt to discuss his behaviors, but he cont to focus on and target staff in a paranoid manner. Pt continues paranoid, rpts staff is filming him for "porn movies"  pt continues variably compliant with Rx, does take Parkinson's Rx and Clozapine.  Pt has had improved behavioral control on clozapine

## 2017-11-15 NOTE — PROGRESS NOTE BEHAVIORAL HEALTH - OTHER
More cooperative this AM, but cooperation is variable, and pt can be verbally hostile at times Tenuous Festinating   uses walker to ambulate concrete Suspiciousness   paranoid at times Limited

## 2017-11-16 PROCEDURE — 99231 SBSQ HOSP IP/OBS SF/LOW 25: CPT

## 2017-11-16 RX ORDER — ACETAMINOPHEN 500 MG
650 TABLET ORAL ONCE
Qty: 0 | Refills: 0 | Status: COMPLETED | OUTPATIENT
Start: 2017-11-16 | End: 2017-11-16

## 2017-11-16 RX ADMIN — CARBIDOPA AND LEVODOPA 1 TABLET(S): 25; 100 TABLET ORAL at 19:59

## 2017-11-16 RX ADMIN — CARBIDOPA AND LEVODOPA 1 TABLET(S): 25; 100 TABLET ORAL at 06:15

## 2017-11-16 RX ADMIN — CARBIDOPA AND LEVODOPA 1 TABLET(S): 25; 100 TABLET ORAL at 15:48

## 2017-11-16 RX ADMIN — Medication 650 MILLIGRAM(S): at 21:49

## 2017-11-16 RX ADMIN — CARBIDOPA, LEVODOPA, AND ENTACAPONE 1 TABLET(S): 50; 200; 200 TABLET, FILM COATED ORAL at 06:15

## 2017-11-16 RX ADMIN — Medication 325 MILLIGRAM(S): at 20:30

## 2017-11-16 RX ADMIN — Medication 325 MILLIGRAM(S): at 19:59

## 2017-11-16 RX ADMIN — Medication 650 MILLIGRAM(S): at 22:20

## 2017-11-16 RX ADMIN — CARBIDOPA, LEVODOPA, AND ENTACAPONE 1 TABLET(S): 50; 200; 200 TABLET, FILM COATED ORAL at 15:49

## 2017-11-16 NOTE — PROGRESS NOTE BEHAVIORAL HEALTH - SUMMARY
Pt has adjusted to unit in a variable manner, he is angry at being on an inpatient psych unit, and is manipulative to try to regain some control in his life.  He participates in milieu rarely, and tends to isolate to his room  because "I am not a psycho".  Pt continues with paranoid ideation, and targeting staff.  He wants to go to a facility in the [place] to be near his family.  Pt encouraged to discuss his frustrations and support given.  At approx 1630, pt became verbally agitated, yelling and threatening physical harm to staff.  Ativan 2mg given IM by staff.  Pt resting quietly at this time.

## 2017-11-16 NOTE — PROGRESS NOTE BEHAVIORAL HEALTH - NSBHFUPINTERVALHXFT_PSY_A_CORE
Pt continues to be paranoid, and target staff.  Pt is in variable behavioral control, variable compliance with staff direction.  taking Clozaril, and takes it with much staff encouragement. He has delusions about having the ADA  advising him about lawsuits and threatening that some staff members will be in a police wagon before he leaves. We discussed good behaviors will help him to work towards discharge and placements but didn't want to hear about.

## 2017-11-17 PROCEDURE — 99232 SBSQ HOSP IP/OBS MODERATE 35: CPT

## 2017-11-17 RX ADMIN — Medication 325 MILLIGRAM(S): at 23:25

## 2017-11-17 RX ADMIN — CARBIDOPA, LEVODOPA, AND ENTACAPONE 1 TABLET(S): 50; 200; 200 TABLET, FILM COATED ORAL at 06:31

## 2017-11-17 RX ADMIN — CARBIDOPA, LEVODOPA, AND ENTACAPONE 1 TABLET(S): 50; 200; 200 TABLET, FILM COATED ORAL at 17:51

## 2017-11-17 RX ADMIN — CLOZAPINE 25 MILLIGRAM(S): 150 TABLET, ORALLY DISINTEGRATING ORAL at 11:00

## 2017-11-17 RX ADMIN — CARBIDOPA AND LEVODOPA 1 TABLET(S): 25; 100 TABLET ORAL at 12:00

## 2017-11-17 RX ADMIN — CARBIDOPA AND LEVODOPA 1 TABLET(S): 25; 100 TABLET ORAL at 06:31

## 2017-11-17 RX ADMIN — CARBIDOPA AND LEVODOPA 1 TABLET(S): 25; 100 TABLET ORAL at 21:47

## 2017-11-17 RX ADMIN — CARBIDOPA AND LEVODOPA 1 TABLET(S): 25; 100 TABLET ORAL at 17:49

## 2017-11-17 RX ADMIN — Medication 325 MILLIGRAM(S): at 23:23

## 2017-11-17 RX ADMIN — CARBIDOPA, LEVODOPA, AND ENTACAPONE 1 TABLET(S): 50; 200; 200 TABLET, FILM COATED ORAL at 12:00

## 2017-11-17 NOTE — PROGRESS NOTE BEHAVIORAL HEALTH - SUMMARY
Pt has adjusted to unit in a variable manner, he is angry at being on an inpatient psych unit, and is manipulative to try to regain some control in his life.  He participates in milieu rarely, and tends to isolate to his room  because "I am not a psycho".  Pt continues with paranoid ideation, and targeting staff.  He wants to go to a facility in the [place] to be near his family.  Pt encouraged to discuss his frustrations and support given.

## 2017-11-17 NOTE — PROGRESS NOTE BEHAVIORAL HEALTH - OTHER
somewhat at times Tenuous Festinating   uses walker to ambulate concrete Suspiciousness   paranoid Limited

## 2017-11-17 NOTE — PROGRESS NOTE BEHAVIORAL HEALTH - NSBHADMITIPREASONDETAILS_PSY_A_CORE FT
Pt is physically debilitated, and paranoid
psychotic and labile mood
Pt is physically debilitated, and paranoid

## 2017-11-18 RX ADMIN — Medication 325 MILLIGRAM(S): at 23:53

## 2017-11-18 RX ADMIN — CARBIDOPA, LEVODOPA, AND ENTACAPONE 1 TABLET(S): 50; 200; 200 TABLET, FILM COATED ORAL at 13:10

## 2017-11-18 RX ADMIN — CYCLOBENZAPRINE HYDROCHLORIDE 10 MILLIGRAM(S): 10 TABLET, FILM COATED ORAL at 13:30

## 2017-11-18 RX ADMIN — Medication 325 MILLIGRAM(S): at 22:52

## 2017-11-18 RX ADMIN — CARBIDOPA, LEVODOPA, AND ENTACAPONE 1 TABLET(S): 50; 200; 200 TABLET, FILM COATED ORAL at 06:02

## 2017-11-18 RX ADMIN — CARBIDOPA, LEVODOPA, AND ENTACAPONE 1 TABLET(S): 50; 200; 200 TABLET, FILM COATED ORAL at 16:25

## 2017-11-18 RX ADMIN — CARBIDOPA AND LEVODOPA 1 TABLET(S): 25; 100 TABLET ORAL at 13:11

## 2017-11-18 RX ADMIN — CARBIDOPA AND LEVODOPA 1 TABLET(S): 25; 100 TABLET ORAL at 20:41

## 2017-11-18 RX ADMIN — CLOZAPINE 25 MILLIGRAM(S): 150 TABLET, ORALLY DISINTEGRATING ORAL at 13:11

## 2017-11-18 RX ADMIN — CYCLOBENZAPRINE HYDROCHLORIDE 10 MILLIGRAM(S): 10 TABLET, FILM COATED ORAL at 02:53

## 2017-11-18 RX ADMIN — CARBIDOPA AND LEVODOPA 1 TABLET(S): 25; 100 TABLET ORAL at 16:25

## 2017-11-18 RX ADMIN — CARBIDOPA AND LEVODOPA 1 TABLET(S): 25; 100 TABLET ORAL at 06:02

## 2017-11-18 RX ADMIN — CYCLOBENZAPRINE HYDROCHLORIDE 10 MILLIGRAM(S): 10 TABLET, FILM COATED ORAL at 21:37

## 2017-11-19 RX ORDER — ACETAMINOPHEN 500 MG
325 TABLET ORAL ONCE
Qty: 0 | Refills: 0 | Status: COMPLETED | OUTPATIENT
Start: 2017-11-19 | End: 2017-11-19

## 2017-11-19 RX ADMIN — CARBIDOPA AND LEVODOPA 1 TABLET(S): 25; 100 TABLET ORAL at 16:18

## 2017-11-19 RX ADMIN — CLOZAPINE 25 MILLIGRAM(S): 150 TABLET, ORALLY DISINTEGRATING ORAL at 10:29

## 2017-11-19 RX ADMIN — CARBIDOPA AND LEVODOPA 1 TABLET(S): 25; 100 TABLET ORAL at 06:17

## 2017-11-19 RX ADMIN — CARBIDOPA AND LEVODOPA 1 TABLET(S): 25; 100 TABLET ORAL at 12:16

## 2017-11-19 RX ADMIN — CARBIDOPA, LEVODOPA, AND ENTACAPONE 1 TABLET(S): 50; 200; 200 TABLET, FILM COATED ORAL at 06:17

## 2017-11-19 RX ADMIN — CARBIDOPA AND LEVODOPA 1 TABLET(S): 25; 100 TABLET ORAL at 21:14

## 2017-11-19 RX ADMIN — CARBIDOPA, LEVODOPA, AND ENTACAPONE 1 TABLET(S): 50; 200; 200 TABLET, FILM COATED ORAL at 16:18

## 2017-11-19 RX ADMIN — Medication 325 MILLIGRAM(S): at 23:33

## 2017-11-19 RX ADMIN — CARBIDOPA, LEVODOPA, AND ENTACAPONE 1 TABLET(S): 50; 200; 200 TABLET, FILM COATED ORAL at 12:17

## 2017-11-19 RX ADMIN — Medication 325 MILLIGRAM(S): at 23:09

## 2017-11-20 PROCEDURE — 99232 SBSQ HOSP IP/OBS MODERATE 35: CPT

## 2017-11-20 PROCEDURE — 93010 ELECTROCARDIOGRAM REPORT: CPT

## 2017-11-20 RX ORDER — POTASSIUM CHLORIDE 20 MEQ
10 PACKET (EA) ORAL DAILY
Qty: 0 | Refills: 0 | Status: COMPLETED | OUTPATIENT
Start: 2017-11-20 | End: 2017-11-23

## 2017-11-20 RX ORDER — ACETAMINOPHEN 500 MG
650 TABLET ORAL ONCE
Qty: 0 | Refills: 0 | Status: COMPLETED | OUTPATIENT
Start: 2017-11-20 | End: 2017-11-20

## 2017-11-20 RX ORDER — FUROSEMIDE 40 MG
20 TABLET ORAL DAILY
Qty: 0 | Refills: 0 | Status: COMPLETED | OUTPATIENT
Start: 2017-11-20 | End: 2017-11-23

## 2017-11-20 RX ADMIN — CYCLOBENZAPRINE HYDROCHLORIDE 10 MILLIGRAM(S): 10 TABLET, FILM COATED ORAL at 23:08

## 2017-11-20 RX ADMIN — Medication 325 MILLIGRAM(S): at 22:13

## 2017-11-20 RX ADMIN — Medication 650 MILLIGRAM(S): at 05:25

## 2017-11-20 RX ADMIN — CARBIDOPA, LEVODOPA, AND ENTACAPONE 1 TABLET(S): 50; 200; 200 TABLET, FILM COATED ORAL at 06:23

## 2017-11-20 RX ADMIN — Medication 30 MILLILITER(S): at 05:24

## 2017-11-20 RX ADMIN — CARBIDOPA, LEVODOPA, AND ENTACAPONE 1 TABLET(S): 50; 200; 200 TABLET, FILM COATED ORAL at 11:13

## 2017-11-20 RX ADMIN — CARBIDOPA AND LEVODOPA 1 TABLET(S): 25; 100 TABLET ORAL at 11:13

## 2017-11-20 RX ADMIN — CARBIDOPA AND LEVODOPA 1 TABLET(S): 25; 100 TABLET ORAL at 16:11

## 2017-11-20 RX ADMIN — Medication 325 MILLIGRAM(S): at 00:00

## 2017-11-20 RX ADMIN — Medication 650 MILLIGRAM(S): at 06:25

## 2017-11-20 RX ADMIN — CARBIDOPA AND LEVODOPA 1 TABLET(S): 25; 100 TABLET ORAL at 22:14

## 2017-11-20 RX ADMIN — Medication 325 MILLIGRAM(S): at 22:44

## 2017-11-20 RX ADMIN — CLOZAPINE 25 MILLIGRAM(S): 150 TABLET, ORALLY DISINTEGRATING ORAL at 11:13

## 2017-11-20 RX ADMIN — CARBIDOPA, LEVODOPA, AND ENTACAPONE 1 TABLET(S): 50; 200; 200 TABLET, FILM COATED ORAL at 16:11

## 2017-11-20 RX ADMIN — CARBIDOPA AND LEVODOPA 1 TABLET(S): 25; 100 TABLET ORAL at 06:23

## 2017-11-20 NOTE — PROGRESS NOTE BEHAVIORAL HEALTH - SUMMARY
51 yo with Parkinson’s disease diagnosed in 2006, previously living at 4 prior nursing homes in 8 months, all resulting in discharge due to behaviors), hx of refusing medications,  transferred to [facility] Unit [facility] on 6/12/17 where he manifested severe mood lability, paranoia and confabulation with poor insight and judgment including making > 40 Justice center complaints. 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 Patient was accepted to [facility] in [place] and discharged from [facility] on 7/12/17.  Upon getting to [facility], Patient refused to go into the facility and became combative. He was thus transferred back to [facility] ED which resulted in Service Line Chiefs’ of Service involvement given the history/issues and ultimate transfer to [address]. Patient has been on [address] since. At this time, referral has been sent to  [facility].

## 2017-11-20 NOTE — PROGRESS NOTE BEHAVIORAL HEALTH - NSBHFUPINTERVALHXFT_PSY_A_CORE
Patient seen for paranoia, disorganized behavior. Chart reviewed. No significant interval events over the weekend – no major incidents this weel. w days. He is eating his meals with good appetite, spends time in the dayroom during the day usually observing groups, and sleeping at night for several hours. Otherwise, Patient is maintain the same clinical presentation.

## 2017-11-20 NOTE — CHART NOTE - NSCHARTNOTEFT_GEN_A_CORE
Called by RN for chest pain. Seen & examined at bedside.  AAO X3, calm, resting in recliner, describes pain as burning left precordial, without radiation, no SOB or palpitations, no dizziness, but reports "heart burn" when he swallows, with dry mouth.   Vitals : HR 84/min, / 66, Temp 98 F, RR 18/min, SPO2 96%.  O/E:   PMI 5th ICS at MCL, tenderness over left precordial area, Heart: normal S1 & S2, no murmurs or extra sounds. Lungs: fair air entry B/L equal, no adventitious sounds.  EKG: shows SR at 84/min, normal WI & QTc intervals, normal QRS voltage, duration, and axis (+45), with normal transition, no ST-T abnormality.   A/P: non cardiac musculoskeletal pain, Tylenol 650 mg Po X1 dose now, also Maalox 30 ml PO now for dyspepsia, explained to patient, he understands & agrees.

## 2017-11-20 NOTE — PROGRESS NOTE BEHAVIORAL HEALTH - OTHER
at times labile, at times euthymic at times disorganized and circumstantial at times paranoia towards certain staff Limited + neck favoring one side, dressed in hospital gowns, adequate grooming and hygiene. +  psychomotor agitation at times (chronic and seemingly intentionaly as he can stop it when redirected). superficially cooperative low end of fair chronically limited festinating gait but stable at times louder

## 2017-11-21 PROCEDURE — 99232 SBSQ HOSP IP/OBS MODERATE 35: CPT

## 2017-11-21 RX ADMIN — CARBIDOPA AND LEVODOPA 1 TABLET(S): 25; 100 TABLET ORAL at 16:16

## 2017-11-21 RX ADMIN — CARBIDOPA AND LEVODOPA 1 TABLET(S): 25; 100 TABLET ORAL at 20:14

## 2017-11-21 RX ADMIN — Medication 325 MILLIGRAM(S): at 20:45

## 2017-11-21 RX ADMIN — CARBIDOPA, LEVODOPA, AND ENTACAPONE 1 TABLET(S): 50; 200; 200 TABLET, FILM COATED ORAL at 06:14

## 2017-11-21 RX ADMIN — CLOZAPINE 25 MILLIGRAM(S): 150 TABLET, ORALLY DISINTEGRATING ORAL at 11:25

## 2017-11-21 RX ADMIN — CARBIDOPA AND LEVODOPA 1 TABLET(S): 25; 100 TABLET ORAL at 06:14

## 2017-11-21 RX ADMIN — CYCLOBENZAPRINE HYDROCHLORIDE 10 MILLIGRAM(S): 10 TABLET, FILM COATED ORAL at 23:21

## 2017-11-21 RX ADMIN — CARBIDOPA AND LEVODOPA 1 TABLET(S): 25; 100 TABLET ORAL at 11:28

## 2017-11-21 RX ADMIN — Medication 20 MILLIGRAM(S): at 11:25

## 2017-11-21 RX ADMIN — CARBIDOPA, LEVODOPA, AND ENTACAPONE 1 TABLET(S): 50; 200; 200 TABLET, FILM COATED ORAL at 11:24

## 2017-11-21 RX ADMIN — CARBIDOPA, LEVODOPA, AND ENTACAPONE 1 TABLET(S): 50; 200; 200 TABLET, FILM COATED ORAL at 16:16

## 2017-11-21 RX ADMIN — Medication 325 MILLIGRAM(S): at 22:18

## 2017-11-21 NOTE — PROGRESS NOTE BEHAVIORAL HEALTH - NSBHFUPINTERVALHXFT_PSY_A_CORE
Patient seen for paranoia, disorganized behavior. Chart reviewed. No significant interval events – no major incidents thus far this week. Patient visible on the Unit (sits in his chair observing groups), he is eating his meals with good appetite, spends time in the dayroom during the day usually observing groups, and sleeping at night for several hours. Otherwise, Patient is maintain the same clinical presentation.

## 2017-11-21 NOTE — PROGRESS NOTE BEHAVIORAL HEALTH - OTHER
+ neck favoring one side, dressed in hospital gowns, adequate grooming and hygiene. +  psychomotor agitation at times (chronic and seemingly intentionaly as he can stop it when redirected). superficially cooperative low end of fair chronically limited festinating gait but stable at times louder at times labile, at times euthymic at times disorganized and circumstantial at times paranoia towards certain staff Limited

## 2017-11-21 NOTE — PROGRESS NOTE BEHAVIORAL HEALTH - SUMMARY
51 yo with Parkinson’s disease diagnosed in 2006, previously living at 4 prior nursing homes in 8 months, all resulting in discharge due to behaviors), hx of refusing medications,  transferred to [facility] Unit 2S on 6/12/17 where he manifested severe mood lability, paranoia and confabulation with poor insight and judgment including making > 40 Justice center complaints. 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 Patient was accepted to [facility] in [place] and discharged from [facility] on 7/12/17.  Upon getting to [facility], Patient refused to go into the facility and became combative. He was thus transferred back to [facility] ED which resulted in Service Line Chiefs’ of Service involvement given the history/issues and ultimate transfer to [address]. Patient has been on Unit 1N since. At this time, referral has been sent to  [facility].

## 2017-11-22 LAB
ANION GAP SERPL CALC-SCNC: 11 MMOL/L — SIGNIFICANT CHANGE UP (ref 5–17)
BASOPHILS # BLD AUTO: 0 K/UL — SIGNIFICANT CHANGE UP (ref 0–0.2)
BASOPHILS NFR BLD AUTO: 0.9 % — SIGNIFICANT CHANGE UP (ref 0–2)
BUN SERPL-MCNC: 16 MG/DL — SIGNIFICANT CHANGE UP (ref 7–23)
CALCIUM SERPL-MCNC: 8.3 MG/DL — LOW (ref 8.4–10.5)
CHLORIDE SERPL-SCNC: 102 MMOL/L — SIGNIFICANT CHANGE UP (ref 96–108)
CO2 SERPL-SCNC: 26 MMOL/L — SIGNIFICANT CHANGE UP (ref 22–31)
CREAT SERPL-MCNC: 0.98 MG/DL — SIGNIFICANT CHANGE UP (ref 0.5–1.3)
EOSINOPHIL # BLD AUTO: 0.1 K/UL — SIGNIFICANT CHANGE UP (ref 0–0.5)
EOSINOPHIL NFR BLD AUTO: 2.8 % — SIGNIFICANT CHANGE UP (ref 0–6)
GLUCOSE SERPL-MCNC: 128 MG/DL — HIGH (ref 70–99)
HCT VFR BLD CALC: 39 % — SIGNIFICANT CHANGE UP (ref 39–50)
HGB BLD-MCNC: 13.1 G/DL — SIGNIFICANT CHANGE UP (ref 13–17)
LYMPHOCYTES # BLD AUTO: 1.3 K/UL — SIGNIFICANT CHANGE UP (ref 1–3.3)
LYMPHOCYTES # BLD AUTO: 28.1 % — SIGNIFICANT CHANGE UP (ref 13–44)
MCHC RBC-ENTMCNC: 32.3 PG — SIGNIFICANT CHANGE UP (ref 27–34)
MCHC RBC-ENTMCNC: 33.5 GM/DL — SIGNIFICANT CHANGE UP (ref 32–36)
MCV RBC AUTO: 96.3 FL — SIGNIFICANT CHANGE UP (ref 80–100)
MONOCYTES # BLD AUTO: 0.4 K/UL — SIGNIFICANT CHANGE UP (ref 0–0.9)
MONOCYTES NFR BLD AUTO: 8.5 % — SIGNIFICANT CHANGE UP (ref 2–14)
NEUTROPHILS # BLD AUTO: 2.8 K/UL — SIGNIFICANT CHANGE UP (ref 1.8–7.4)
NEUTROPHILS NFR BLD AUTO: 59.6 % — SIGNIFICANT CHANGE UP (ref 43–77)
PLATELET # BLD AUTO: 186 K/UL — SIGNIFICANT CHANGE UP (ref 150–400)
POTASSIUM SERPL-MCNC: 3.5 MMOL/L — SIGNIFICANT CHANGE UP (ref 3.5–5.3)
POTASSIUM SERPL-SCNC: 3.5 MMOL/L — SIGNIFICANT CHANGE UP (ref 3.5–5.3)
RBC # BLD: 4.05 M/UL — LOW (ref 4.2–5.8)
RBC # FLD: 12.7 % — SIGNIFICANT CHANGE UP (ref 10.3–14.5)
SODIUM SERPL-SCNC: 139 MMOL/L — SIGNIFICANT CHANGE UP (ref 135–145)
WBC # BLD: 4.7 K/UL — SIGNIFICANT CHANGE UP (ref 3.8–10.5)
WBC # FLD AUTO: 4.7 K/UL — SIGNIFICANT CHANGE UP (ref 3.8–10.5)

## 2017-11-22 PROCEDURE — 99232 SBSQ HOSP IP/OBS MODERATE 35: CPT

## 2017-11-22 RX ADMIN — CARBIDOPA, LEVODOPA, AND ENTACAPONE 1 TABLET(S): 50; 200; 200 TABLET, FILM COATED ORAL at 10:54

## 2017-11-22 RX ADMIN — CARBIDOPA AND LEVODOPA 1 TABLET(S): 25; 100 TABLET ORAL at 06:07

## 2017-11-22 RX ADMIN — CARBIDOPA, LEVODOPA, AND ENTACAPONE 1 TABLET(S): 50; 200; 200 TABLET, FILM COATED ORAL at 06:07

## 2017-11-22 RX ADMIN — CLOZAPINE 25 MILLIGRAM(S): 150 TABLET, ORALLY DISINTEGRATING ORAL at 10:54

## 2017-11-22 RX ADMIN — CARBIDOPA AND LEVODOPA 1 TABLET(S): 25; 100 TABLET ORAL at 10:55

## 2017-11-22 RX ADMIN — CARBIDOPA AND LEVODOPA 1 TABLET(S): 25; 100 TABLET ORAL at 20:26

## 2017-11-22 RX ADMIN — CARBIDOPA AND LEVODOPA 1 TABLET(S): 25; 100 TABLET ORAL at 16:25

## 2017-11-22 RX ADMIN — CARBIDOPA, LEVODOPA, AND ENTACAPONE 1 TABLET(S): 50; 200; 200 TABLET, FILM COATED ORAL at 16:25

## 2017-11-22 NOTE — PROGRESS NOTE BEHAVIORAL HEALTH - SUMMARY
51 yo with Parkinson’s disease diagnosed in 2006, previously living at 4 prior nursing homes in 8 months, all resulting in discharge due to behaviors), hx of refusing medications,  transferred to [facility] Unit 2S on 6/12/17 where he manifested severe mood lability, paranoia and confabulation with poor insight and judgment including making > 40 Justice center complaints. 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 Patient was accepted to [facility] in [place] and discharged from [facility] on 7/12/17.  Upon getting to [facility], Patient refused to go into the facility and became combative. He was thus transferred back to [facility] ED which resulted in Service Line Chiefs’ of Service involvement given the history/issues and ultimate transfer to [address]. Patient has been on Unit 1N since. At this time, referral has been sent to  [facility].

## 2017-11-22 NOTE — PROGRESS NOTE BEHAVIORAL HEALTH - NSBHFUPINTERVALHXFT_PSY_A_CORE
Patient seen for paranoia, disorganized behavior. Chart reviewed. No significant interval events – no major incidents thus far this week. Patient asked Writer if there is a way he could get a haircut so he looks nice for Thanksgiving. He is visible on the Unit (sits in his chair observing groups), he is eating his meals with good appetite, spends time in the dayroom during the day usually observing groups, and sleeping at night for several hours. Otherwise, Patient is maintain the same clinical presentation.

## 2017-11-23 RX ADMIN — CARBIDOPA AND LEVODOPA 1 TABLET(S): 25; 100 TABLET ORAL at 06:08

## 2017-11-23 RX ADMIN — Medication 325 MILLIGRAM(S): at 23:42

## 2017-11-23 RX ADMIN — CARBIDOPA, LEVODOPA, AND ENTACAPONE 1 TABLET(S): 50; 200; 200 TABLET, FILM COATED ORAL at 16:03

## 2017-11-23 RX ADMIN — CARBIDOPA AND LEVODOPA 1 TABLET(S): 25; 100 TABLET ORAL at 11:11

## 2017-11-23 RX ADMIN — CARBIDOPA, LEVODOPA, AND ENTACAPONE 1 TABLET(S): 50; 200; 200 TABLET, FILM COATED ORAL at 11:11

## 2017-11-23 RX ADMIN — CLOZAPINE 25 MILLIGRAM(S): 150 TABLET, ORALLY DISINTEGRATING ORAL at 10:05

## 2017-11-23 RX ADMIN — CARBIDOPA, LEVODOPA, AND ENTACAPONE 1 TABLET(S): 50; 200; 200 TABLET, FILM COATED ORAL at 06:08

## 2017-11-23 RX ADMIN — CARBIDOPA AND LEVODOPA 1 TABLET(S): 25; 100 TABLET ORAL at 20:20

## 2017-11-23 RX ADMIN — CARBIDOPA AND LEVODOPA 1 TABLET(S): 25; 100 TABLET ORAL at 16:02

## 2017-11-24 PROCEDURE — 99232 SBSQ HOSP IP/OBS MODERATE 35: CPT

## 2017-11-24 RX ADMIN — CARBIDOPA, LEVODOPA, AND ENTACAPONE 1 TABLET(S): 50; 200; 200 TABLET, FILM COATED ORAL at 06:14

## 2017-11-24 RX ADMIN — CARBIDOPA AND LEVODOPA 1 TABLET(S): 25; 100 TABLET ORAL at 16:16

## 2017-11-24 RX ADMIN — CARBIDOPA AND LEVODOPA 1 TABLET(S): 25; 100 TABLET ORAL at 20:08

## 2017-11-24 RX ADMIN — CARBIDOPA, LEVODOPA, AND ENTACAPONE 1 TABLET(S): 50; 200; 200 TABLET, FILM COATED ORAL at 11:00

## 2017-11-24 RX ADMIN — CARBIDOPA, LEVODOPA, AND ENTACAPONE 1 TABLET(S): 50; 200; 200 TABLET, FILM COATED ORAL at 16:16

## 2017-11-24 RX ADMIN — CARBIDOPA AND LEVODOPA 1 TABLET(S): 25; 100 TABLET ORAL at 11:00

## 2017-11-24 RX ADMIN — CARBIDOPA AND LEVODOPA 1 TABLET(S): 25; 100 TABLET ORAL at 06:14

## 2017-11-24 RX ADMIN — Medication 325 MILLIGRAM(S): at 02:53

## 2017-11-24 RX ADMIN — CLOZAPINE 25 MILLIGRAM(S): 150 TABLET, ORALLY DISINTEGRATING ORAL at 11:00

## 2017-11-24 NOTE — PROGRESS NOTE BEHAVIORAL HEALTH - SUMMARY
51 yo with Parkinson’s disease diagnosed in 2006, previously living at 4 prior nursing homes in 8 months, all resulting in discharge due to behaviors), hx of refusing medications,  transferred to [facility] Unit 2S on 6/12/17 where he manifested severe mood lability, paranoia and confabulation with poor insight and judgment including making > 40 Justice center complaints. 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 Patient was accepted to [facility] in [place] and discharged from [facility] on 7/12/17.  Upon getting to [facility], Patient refused to go into the facility and became combative. He was thus transferred back to [facility] ED which resulted in Service Line Chiefs’ of Service involvement given the history/issues and ultimate transfer to [address]. Patient has been on Unit 1N since. At this time, referral has been sent to  [facility] and awaiting review/answer.

## 2017-11-24 NOTE — PROGRESS NOTE BEHAVIORAL HEALTH - NSBHFUPINTERVALHXFT_PSY_A_CORE
Patient seen for paranoia, disorganized behavior. Chart reviewed. No significant interval events – Neurology came to follow-up with Patient today. Consult appreciated; no new recommendations. No major behavioral incidents this week. Patient continues to focus on discharge and wanting to go live alone in assisted living. same clinical presentation. denies adverse medication side effects.

## 2017-11-25 RX ADMIN — CARBIDOPA, LEVODOPA, AND ENTACAPONE 1 TABLET(S): 50; 200; 200 TABLET, FILM COATED ORAL at 06:40

## 2017-11-25 RX ADMIN — Medication 325 MILLIGRAM(S): at 23:30

## 2017-11-25 RX ADMIN — CARBIDOPA AND LEVODOPA 1 TABLET(S): 25; 100 TABLET ORAL at 20:25

## 2017-11-25 RX ADMIN — CARBIDOPA AND LEVODOPA 1 TABLET(S): 25; 100 TABLET ORAL at 11:03

## 2017-11-25 RX ADMIN — CARBIDOPA, LEVODOPA, AND ENTACAPONE 1 TABLET(S): 50; 200; 200 TABLET, FILM COATED ORAL at 16:12

## 2017-11-25 RX ADMIN — CLOZAPINE 25 MILLIGRAM(S): 150 TABLET, ORALLY DISINTEGRATING ORAL at 11:03

## 2017-11-25 RX ADMIN — CARBIDOPA, LEVODOPA, AND ENTACAPONE 1 TABLET(S): 50; 200; 200 TABLET, FILM COATED ORAL at 11:03

## 2017-11-25 RX ADMIN — CARBIDOPA AND LEVODOPA 1 TABLET(S): 25; 100 TABLET ORAL at 16:12

## 2017-11-25 RX ADMIN — CARBIDOPA AND LEVODOPA 1 TABLET(S): 25; 100 TABLET ORAL at 06:40

## 2017-11-25 RX ADMIN — Medication 325 MILLIGRAM(S): at 22:59

## 2017-11-25 RX ADMIN — CYCLOBENZAPRINE HYDROCHLORIDE 10 MILLIGRAM(S): 10 TABLET, FILM COATED ORAL at 23:14

## 2017-11-26 RX ORDER — ACETAMINOPHEN 500 MG
650 TABLET ORAL ONCE
Qty: 0 | Refills: 0 | Status: COMPLETED | OUTPATIENT
Start: 2017-11-26 | End: 2017-11-26

## 2017-11-26 RX ADMIN — CARBIDOPA AND LEVODOPA 1 TABLET(S): 25; 100 TABLET ORAL at 11:07

## 2017-11-26 RX ADMIN — CARBIDOPA, LEVODOPA, AND ENTACAPONE 1 TABLET(S): 50; 200; 200 TABLET, FILM COATED ORAL at 11:07

## 2017-11-26 RX ADMIN — CARBIDOPA AND LEVODOPA 1 TABLET(S): 25; 100 TABLET ORAL at 20:29

## 2017-11-26 RX ADMIN — Medication 325 MILLIGRAM(S): at 19:40

## 2017-11-26 RX ADMIN — Medication 650 MILLIGRAM(S): at 04:07

## 2017-11-26 RX ADMIN — Medication 650 MILLIGRAM(S): at 04:40

## 2017-11-26 RX ADMIN — CARBIDOPA AND LEVODOPA 1 TABLET(S): 25; 100 TABLET ORAL at 16:17

## 2017-11-26 RX ADMIN — CLOZAPINE 25 MILLIGRAM(S): 150 TABLET, ORALLY DISINTEGRATING ORAL at 09:58

## 2017-11-26 RX ADMIN — Medication 325 MILLIGRAM(S): at 20:22

## 2017-11-26 RX ADMIN — CARBIDOPA, LEVODOPA, AND ENTACAPONE 1 TABLET(S): 50; 200; 200 TABLET, FILM COATED ORAL at 16:17

## 2017-11-26 RX ADMIN — CARBIDOPA, LEVODOPA, AND ENTACAPONE 1 TABLET(S): 50; 200; 200 TABLET, FILM COATED ORAL at 06:32

## 2017-11-26 RX ADMIN — CARBIDOPA AND LEVODOPA 1 TABLET(S): 25; 100 TABLET ORAL at 06:32

## 2017-11-27 PROCEDURE — 99232 SBSQ HOSP IP/OBS MODERATE 35: CPT

## 2017-11-27 RX ADMIN — CARBIDOPA AND LEVODOPA 1 TABLET(S): 25; 100 TABLET ORAL at 16:31

## 2017-11-27 RX ADMIN — CARBIDOPA AND LEVODOPA 1 TABLET(S): 25; 100 TABLET ORAL at 06:19

## 2017-11-27 RX ADMIN — CARBIDOPA AND LEVODOPA 1 TABLET(S): 25; 100 TABLET ORAL at 11:25

## 2017-11-27 RX ADMIN — CARBIDOPA, LEVODOPA, AND ENTACAPONE 1 TABLET(S): 50; 200; 200 TABLET, FILM COATED ORAL at 11:25

## 2017-11-27 RX ADMIN — CARBIDOPA, LEVODOPA, AND ENTACAPONE 1 TABLET(S): 50; 200; 200 TABLET, FILM COATED ORAL at 16:31

## 2017-11-27 RX ADMIN — CLOZAPINE 25 MILLIGRAM(S): 150 TABLET, ORALLY DISINTEGRATING ORAL at 11:25

## 2017-11-27 RX ADMIN — CARBIDOPA AND LEVODOPA 1 TABLET(S): 25; 100 TABLET ORAL at 22:07

## 2017-11-27 RX ADMIN — CARBIDOPA, LEVODOPA, AND ENTACAPONE 1 TABLET(S): 50; 200; 200 TABLET, FILM COATED ORAL at 06:18

## 2017-11-27 NOTE — PROGRESS NOTE BEHAVIORAL HEALTH - SUMMARY
53 yo male with early onset Parkinson’s disease x 11 years, discharged from 4 prior nursing homes in 8 months due to his behaviors, hx of refusing medications,  transferred to [facility] Unit 2S on 6/12/17 where he manifested severe mood lability, paranoia and confabulation with poor insight and judgment including making > 40 Justice center complaints. 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 Patient was accepted to [facility] in [place] and discharged from [facility] on 7/12/17.  Upon getting to [facility], Patient refused to go into the facility and became combative. He was thus transferred back to [facility] ED which resulted in Service Line Chiefs’ of Service involvement given the history/issues and ultimate transfer to [address]. Patient has been on Unit 1N since. At this time, referral has been sent to  [facility] and awaiting review/answer.

## 2017-11-27 NOTE — PROGRESS NOTE BEHAVIORAL HEALTH - NSBHFUPINTERVALHXFT_PSY_A_CORE
Patient seen for paranoia, disorganized behavior. Chart reviewed. Significant interval events over the weekend - again more avery an irritable, wanting to "fire staff, refusing some medications, etc. Patient still wants a haircut and consented to have a particular male staff member cut his hair (who regularly provides patients this service). Patient continues to focus on discharge and wanting to go live alone in assisted living. same clinical presentation. Patient denies adverse medication side effects

## 2017-11-27 NOTE — PROGRESS NOTE BEHAVIORAL HEALTH - OTHER
+ neck favoring one side, dressed in hospital gowns, adequate grooming and hygiene. +  psychomotor agitation at times (chronic and seemingly intentionaly as he can stop it when redirected). superficially cooperative low end of fair chronically limited festinating gait but stable at times louder "ok" at times labile, at times euthymic at times disorganized and circumstantial at times paranoia towards certain staff Limited

## 2017-11-28 PROCEDURE — 99232 SBSQ HOSP IP/OBS MODERATE 35: CPT

## 2017-11-28 RX ADMIN — CARBIDOPA AND LEVODOPA 1 TABLET(S): 25; 100 TABLET ORAL at 17:07

## 2017-11-28 RX ADMIN — Medication 325 MILLIGRAM(S): at 19:42

## 2017-11-28 RX ADMIN — CARBIDOPA, LEVODOPA, AND ENTACAPONE 1 TABLET(S): 50; 200; 200 TABLET, FILM COATED ORAL at 06:18

## 2017-11-28 RX ADMIN — Medication 325 MILLIGRAM(S): at 20:53

## 2017-11-28 RX ADMIN — CARBIDOPA AND LEVODOPA 1 TABLET(S): 25; 100 TABLET ORAL at 06:18

## 2017-11-28 RX ADMIN — CARBIDOPA AND LEVODOPA 1 TABLET(S): 25; 100 TABLET ORAL at 12:01

## 2017-11-28 RX ADMIN — CLOZAPINE 25 MILLIGRAM(S): 150 TABLET, ORALLY DISINTEGRATING ORAL at 12:01

## 2017-11-28 RX ADMIN — CARBIDOPA, LEVODOPA, AND ENTACAPONE 1 TABLET(S): 50; 200; 200 TABLET, FILM COATED ORAL at 17:07

## 2017-11-28 RX ADMIN — CARBIDOPA AND LEVODOPA 1 TABLET(S): 25; 100 TABLET ORAL at 19:42

## 2017-11-28 RX ADMIN — CARBIDOPA, LEVODOPA, AND ENTACAPONE 1 TABLET(S): 50; 200; 200 TABLET, FILM COATED ORAL at 12:01
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
## 2017-11-28 NOTE — PROGRESS NOTE BEHAVIORAL HEALTH - OTHER
+ neck favoring one side, dressed in hospital gowns, adequate grooming and hygiene. +  psychomotor agitation at times (chronic and seemingly intentionaly as he can stop it when redirected). superficially cooperative low end of fair chronically limited festinating gait but stable at times louder Limited "ok" at times labile, at times euthymic at times disorganized and circumstantial at times paranoia towards certain staff

## 2017-11-28 NOTE — PROGRESS NOTE BEHAVIORAL HEALTH - SUMMARY
53 yo male with early onset Parkinson’s disease x 11 years, discharged from 4 prior nursing homes in 8 months due to his behaviors, hx of refusing medications,  transferred to [facility] Unit 2S on 6/12/17 where he manifested severe mood lability, paranoia and confabulation with poor insight and judgment including making > 40 Justice center complaints. 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 Patient was accepted to [facility] in [place] and discharged from [facility] on 7/12/17.  Upon getting to [facility], Patient refused to go into the facility and became combative. He was thus transferred back to [facility] ED which resulted in Service Line Chiefs’ of Service involvement given the history/issues and ultimate transfer to [address]. Patient has been on Unit 1N since. At this time, referral has been sent to [facility] and awaiting review/answer.

## 2017-11-28 NOTE — PROGRESS NOTE BEHAVIORAL HEALTH - DETAILS
c./o right leg swelling (chronic); reportedly feels like it is worst. c./o right leg swelling (chronic); reportedly feels like it is worst. Internist to be made aware

## 2017-11-29 LAB
BASOPHILS # BLD AUTO: 0 K/UL — SIGNIFICANT CHANGE UP (ref 0–0.2)
BASOPHILS NFR BLD AUTO: 0.8 % — SIGNIFICANT CHANGE UP (ref 0–2)
EOSINOPHIL # BLD AUTO: 0.1 K/UL — SIGNIFICANT CHANGE UP (ref 0–0.5)
EOSINOPHIL NFR BLD AUTO: 2.6 % — SIGNIFICANT CHANGE UP (ref 0–6)
HCT VFR BLD CALC: 37.9 % — LOW (ref 39–50)
HGB BLD-MCNC: 12.9 G/DL — LOW (ref 13–17)
LYMPHOCYTES # BLD AUTO: 1.2 K/UL — SIGNIFICANT CHANGE UP (ref 1–3.3)
LYMPHOCYTES # BLD AUTO: 26.4 % — SIGNIFICANT CHANGE UP (ref 13–44)
MCHC RBC-ENTMCNC: 32.4 PG — SIGNIFICANT CHANGE UP (ref 27–34)
MCHC RBC-ENTMCNC: 34 GM/DL — SIGNIFICANT CHANGE UP (ref 32–36)
MCV RBC AUTO: 95.2 FL — SIGNIFICANT CHANGE UP (ref 80–100)
MONOCYTES # BLD AUTO: 0.4 K/UL — SIGNIFICANT CHANGE UP (ref 0–0.9)
MONOCYTES NFR BLD AUTO: 8.7 % — SIGNIFICANT CHANGE UP (ref 2–14)
NEUTROPHILS # BLD AUTO: 2.7 K/UL — SIGNIFICANT CHANGE UP (ref 1.8–7.4)
NEUTROPHILS NFR BLD AUTO: 61.4 % — SIGNIFICANT CHANGE UP (ref 43–77)
PLATELET # BLD AUTO: 173 K/UL — SIGNIFICANT CHANGE UP (ref 150–400)
RBC # BLD: 3.98 M/UL — LOW (ref 4.2–5.8)
RBC # FLD: 12.9 % — SIGNIFICANT CHANGE UP (ref 10.3–14.5)
WBC # BLD: 4.4 K/UL — SIGNIFICANT CHANGE UP (ref 3.8–10.5)
WBC # FLD AUTO: 4.4 K/UL — SIGNIFICANT CHANGE UP (ref 3.8–10.5)

## 2017-11-29 PROCEDURE — 99232 SBSQ HOSP IP/OBS MODERATE 35: CPT

## 2017-11-29 RX ORDER — POTASSIUM CHLORIDE 20 MEQ
10 PACKET (EA) ORAL DAILY
Qty: 0 | Refills: 0 | Status: DISCONTINUED | OUTPATIENT
Start: 2017-11-29 | End: 2017-11-29

## 2017-11-29 RX ORDER — POTASSIUM CHLORIDE 20 MEQ
10 PACKET (EA) ORAL DAILY
Qty: 0 | Refills: 0 | Status: COMPLETED | OUTPATIENT
Start: 2017-11-29 | End: 2017-12-02

## 2017-11-29 RX ORDER — FUROSEMIDE 40 MG
20 TABLET ORAL DAILY
Qty: 0 | Refills: 0 | Status: COMPLETED | OUTPATIENT
Start: 2017-11-29 | End: 2017-12-02

## 2017-11-29 RX ADMIN — CARBIDOPA AND LEVODOPA 1 TABLET(S): 25; 100 TABLET ORAL at 21:39

## 2017-11-29 RX ADMIN — CYCLOBENZAPRINE HYDROCHLORIDE 10 MILLIGRAM(S): 10 TABLET, FILM COATED ORAL at 03:41

## 2017-11-29 RX ADMIN — CARBIDOPA AND LEVODOPA 1 TABLET(S): 25; 100 TABLET ORAL at 11:45

## 2017-11-29 RX ADMIN — CLOZAPINE 25 MILLIGRAM(S): 150 TABLET, ORALLY DISINTEGRATING ORAL at 10:31

## 2017-11-29 RX ADMIN — CARBIDOPA, LEVODOPA, AND ENTACAPONE 1 TABLET(S): 50; 200; 200 TABLET, FILM COATED ORAL at 11:45

## 2017-11-29 RX ADMIN — CARBIDOPA AND LEVODOPA 1 TABLET(S): 25; 100 TABLET ORAL at 06:15

## 2017-11-29 RX ADMIN — Medication 325 MILLIGRAM(S): at 22:58

## 2017-11-29 RX ADMIN — CARBIDOPA, LEVODOPA, AND ENTACAPONE 1 TABLET(S): 50; 200; 200 TABLET, FILM COATED ORAL at 06:15

## 2017-11-29 RX ADMIN — CARBIDOPA, LEVODOPA, AND ENTACAPONE 1 TABLET(S): 50; 200; 200 TABLET, FILM COATED ORAL at 17:16

## 2017-11-29 RX ADMIN — CARBIDOPA AND LEVODOPA 1 TABLET(S): 25; 100 TABLET ORAL at 17:16

## 2017-11-29 NOTE — PROGRESS NOTE BEHAVIORAL HEALTH - SUMMARY
53 yo male with early onset Parkinson’s disease x 11 years, discharged from 4 prior nursing homes in 8 months due to his behaviors, hx of refusing medications,  transferred to [facility] Unit 2S on 6/12/17 where he manifested severe mood lability, paranoia and confabulation with poor insight and judgment including making > 40 Justice center complaints. 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 Patient was accepted to [facility] in [place] and discharged from [facility] on 7/12/17.  Upon getting to [facility], Patient refused to go into the facility and became combative. He was thus transferred back to [facility] ED which resulted in Service Line Chiefs’ of Service involvement given the history/issues and ultimate transfer to [address]. Patient has been on Unit 1N since. At this time, referral has been sent to [facility] and awaiting review/answer. Alternatively, nursing homes referrals were made but none have responded positively yet.

## 2017-11-29 NOTE — PROGRESS NOTE BEHAVIORAL HEALTH - OTHER
+ neck favoring one side, dressed in hospital gowns, adequate grooming and hygiene. +  psychomotor agitation at times (chronic and seemingly intentionaly as he can stop it when redirected). superficially cooperative today low end of fair at times disorganized and circumstantial at times paranoia towards certain staff Limited chronically limited festinating gait but stable at times louder "ok" at times labile, at times euthymic
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
## 2017-11-29 NOTE — PROGRESS NOTE BEHAVIORAL HEALTH - NSBHFUPINTERVALCCFT_PSY_A_CORE
" They need to treat me with respect! I need to get out of here by Xmas or my baby mama is gonna go to family court and get me out of here!

## 2017-11-30 PROCEDURE — 99232 SBSQ HOSP IP/OBS MODERATE 35: CPT

## 2017-11-30 RX ADMIN — Medication 20 MILLIGRAM(S): at 08:19

## 2017-11-30 RX ADMIN — CARBIDOPA AND LEVODOPA 1 TABLET(S): 25; 100 TABLET ORAL at 22:16

## 2017-11-30 RX ADMIN — CARBIDOPA, LEVODOPA, AND ENTACAPONE 1 TABLET(S): 50; 200; 200 TABLET, FILM COATED ORAL at 16:45

## 2017-11-30 RX ADMIN — CARBIDOPA AND LEVODOPA 1 TABLET(S): 25; 100 TABLET ORAL at 11:29

## 2017-11-30 RX ADMIN — CLOZAPINE 25 MILLIGRAM(S): 150 TABLET, ORALLY DISINTEGRATING ORAL at 11:29

## 2017-11-30 RX ADMIN — CARBIDOPA AND LEVODOPA 1 TABLET(S): 25; 100 TABLET ORAL at 16:45

## 2017-11-30 RX ADMIN — Medication 325 MILLIGRAM(S): at 08:49

## 2017-11-30 RX ADMIN — CARBIDOPA, LEVODOPA, AND ENTACAPONE 1 TABLET(S): 50; 200; 200 TABLET, FILM COATED ORAL at 11:29

## 2017-11-30 RX ADMIN — CARBIDOPA AND LEVODOPA 1 TABLET(S): 25; 100 TABLET ORAL at 06:00

## 2017-11-30 RX ADMIN — Medication 325 MILLIGRAM(S): at 23:25

## 2017-11-30 RX ADMIN — CARBIDOPA, LEVODOPA, AND ENTACAPONE 1 TABLET(S): 50; 200; 200 TABLET, FILM COATED ORAL at 06:00

## 2017-11-30 RX ADMIN — Medication 325 MILLIGRAM(S): at 08:19

## 2017-11-30 NOTE — PROGRESS NOTE BEHAVIORAL HEALTH - OTHER
+ neck favoring one side, dressed in hospital gowns, adequate grooming and hygiene. +  psychomotor agitation at times (chronic and seemingly intentionaly as he can stop it when redirected). cooperative today low end of fair chronically limited at times paranoia towards certain staff Limited festinating gait but stable at times louder "ok" at times labile, at times euthymic at times disorganized and circumstantial

## 2017-11-30 NOTE — PROGRESS NOTE BEHAVIORAL HEALTH - SUMMARY
51 yo male with early onset Parkinson’s disease x 11 years, discharged from 4 prior nursing homes in 8 months due to his behaviors, hx of refusing medications,  transferred to [facility] Unit 2S on 6/12/17 where he manifested severe mood lability, paranoia and confabulation with poor insight and judgment including making > 40 Justice center complaints. 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 Patient was accepted to [facility] in [place] and discharged from [facility] on 7/12/17.  Upon getting to [facility], Patient refused to go into the facility and became combative. He was thus transferred back to [facility] ED which resulted in Service Line Chiefs’ of Service involvement given the history/issues and ultimate transfer to [address]. Patient has been on Unit 1N since. At this time, referral has been sent to [facility] and awaiting review/answer. Court hearing scheduled for 12/19/17 for expansion of guardian del castillo including placement.

## 2017-11-30 NOTE — PROGRESS NOTE BEHAVIORAL HEALTH - NSBHFUPINTERVALHXFT_PSY_A_CORE
Patient seen for paranoia, disorganized behavior. Chart reviewed. No significant interval events  - no subsequent behavioral issues/incidents.  Internal medicine saw Patient and prescribed Lasix 20mg PO x 3 days for his right leg swelling, and potassium supplementation x 3 doses. Internal medicine consultation appreciated. Otherwise same clinical presentation. Patient denies adverse medication side effects

## 2017-12-01 PROCEDURE — 99232 SBSQ HOSP IP/OBS MODERATE 35: CPT

## 2017-12-01 RX ADMIN — CARBIDOPA AND LEVODOPA 1 TABLET(S): 25; 100 TABLET ORAL at 16:18

## 2017-12-01 RX ADMIN — CLOZAPINE 25 MILLIGRAM(S): 150 TABLET, ORALLY DISINTEGRATING ORAL at 10:16

## 2017-12-01 RX ADMIN — CARBIDOPA, LEVODOPA, AND ENTACAPONE 1 TABLET(S): 50; 200; 200 TABLET, FILM COATED ORAL at 16:18

## 2017-12-01 RX ADMIN — Medication 325 MILLIGRAM(S): at 00:07

## 2017-12-01 RX ADMIN — CARBIDOPA, LEVODOPA, AND ENTACAPONE 1 TABLET(S): 50; 200; 200 TABLET, FILM COATED ORAL at 06:29

## 2017-12-01 RX ADMIN — CARBIDOPA, LEVODOPA, AND ENTACAPONE 1 TABLET(S): 50; 200; 200 TABLET, FILM COATED ORAL at 11:02

## 2017-12-01 RX ADMIN — Medication 325 MILLIGRAM(S): at 23:27

## 2017-12-01 RX ADMIN — Medication 30 MILLILITER(S): at 16:19

## 2017-12-01 RX ADMIN — CARBIDOPA AND LEVODOPA 1 TABLET(S): 25; 100 TABLET ORAL at 06:29

## 2017-12-01 RX ADMIN — CARBIDOPA AND LEVODOPA 1 TABLET(S): 25; 100 TABLET ORAL at 11:03

## 2017-12-01 RX ADMIN — CARBIDOPA AND LEVODOPA 1 TABLET(S): 25; 100 TABLET ORAL at 20:17

## 2017-12-01 NOTE — PROGRESS NOTE BEHAVIORAL HEALTH - NSBHFUPINTERVALHXFT_PSY_A_CORE
Patient seen for paranoia, disorganized behavior. Chart reviewed. No significant interval events  - no subsequent behavioral issues/incidents though had a more attention-seeking, complaining day yesterday. Patient continued to have to be encouraged to keep his leg elevated as per Internal medicine recommendations which he usually does not follow.  Otherwise same clinical presentation. Patient denies adverse medication side effect

## 2017-12-01 NOTE — PROGRESS NOTE BEHAVIORAL HEALTH - SUMMARY
53 yo male with early onset Parkinson’s disease x 11 years, discharged from 4 prior nursing homes in 8 months due to his behaviors, hx of refusing medications,  transferred to [facility] Unit 2S on 6/12/17 where he manifested severe mood lability, paranoia and confabulation with poor insight and judgment including making > 40 Justice center complaints. 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 Patient was accepted to [facility] in [place] and discharged from [facility] on 7/12/17.  Upon getting to [facility], Patient refused to go into the facility and became combative. He was thus transferred back to [facility] ED which resulted in Service Line Chiefs’ of Service involvement given the history/issues and ultimate transfer to [address]. Patient has been on Unit 1N since. At this time, referral has been sent to [facility] and awaiting review/answer. Court hearing scheduled for 12/19/17 for expansion of guardian del castillo including placement.

## 2017-12-01 NOTE — PROGRESS NOTE BEHAVIORAL HEALTH - OTHER
+ neck favoring one side, dressed in hospital gowns, adequate grooming and hygiene. +  psychomotor agitation at times (chronic and seemingly intentionaly as he can stop it when redirected). cooperative today Limited low end of fair chronically limited festinating gait but stable at times louder "ok" at times labile, at times euthymic at times disorganized and circumstantial at times paranoia towards certain staff

## 2017-12-02 RX ADMIN — Medication 325 MILLIGRAM(S): at 00:27

## 2017-12-02 RX ADMIN — Medication 325 MILLIGRAM(S): at 20:39

## 2017-12-02 RX ADMIN — Medication 325 MILLIGRAM(S): at 21:11

## 2017-12-02 RX ADMIN — CARBIDOPA AND LEVODOPA 1 TABLET(S): 25; 100 TABLET ORAL at 16:55

## 2017-12-02 RX ADMIN — CARBIDOPA, LEVODOPA, AND ENTACAPONE 1 TABLET(S): 50; 200; 200 TABLET, FILM COATED ORAL at 06:13

## 2017-12-02 RX ADMIN — CARBIDOPA, LEVODOPA, AND ENTACAPONE 1 TABLET(S): 50; 200; 200 TABLET, FILM COATED ORAL at 16:55

## 2017-12-02 RX ADMIN — CARBIDOPA AND LEVODOPA 1 TABLET(S): 25; 100 TABLET ORAL at 06:13

## 2017-12-02 RX ADMIN — CARBIDOPA AND LEVODOPA 1 TABLET(S): 25; 100 TABLET ORAL at 20:08

## 2017-12-02 RX ADMIN — CARBIDOPA AND LEVODOPA 1 TABLET(S): 25; 100 TABLET ORAL at 11:01

## 2017-12-02 RX ADMIN — CLOZAPINE 25 MILLIGRAM(S): 150 TABLET, ORALLY DISINTEGRATING ORAL at 10:57

## 2017-12-02 RX ADMIN — CARBIDOPA, LEVODOPA, AND ENTACAPONE 1 TABLET(S): 50; 200; 200 TABLET, FILM COATED ORAL at 11:00

## 2017-12-03 RX ADMIN — CARBIDOPA, LEVODOPA, AND ENTACAPONE 1 TABLET(S): 50; 200; 200 TABLET, FILM COATED ORAL at 11:02

## 2017-12-03 RX ADMIN — Medication 325 MILLIGRAM(S): at 23:19

## 2017-12-03 RX ADMIN — CYCLOBENZAPRINE HYDROCHLORIDE 10 MILLIGRAM(S): 10 TABLET, FILM COATED ORAL at 15:40

## 2017-12-03 RX ADMIN — Medication 325 MILLIGRAM(S): at 15:33

## 2017-12-03 RX ADMIN — Medication 325 MILLIGRAM(S): at 14:41

## 2017-12-03 RX ADMIN — CARBIDOPA AND LEVODOPA 1 TABLET(S): 25; 100 TABLET ORAL at 18:45

## 2017-12-03 RX ADMIN — CLOZAPINE 25 MILLIGRAM(S): 150 TABLET, ORALLY DISINTEGRATING ORAL at 11:01

## 2017-12-03 RX ADMIN — CARBIDOPA, LEVODOPA, AND ENTACAPONE 1 TABLET(S): 50; 200; 200 TABLET, FILM COATED ORAL at 18:45

## 2017-12-03 RX ADMIN — CARBIDOPA AND LEVODOPA 1 TABLET(S): 25; 100 TABLET ORAL at 06:38

## 2017-12-03 RX ADMIN — CARBIDOPA AND LEVODOPA 1 TABLET(S): 25; 100 TABLET ORAL at 21:55

## 2017-12-03 RX ADMIN — CARBIDOPA AND LEVODOPA 1 TABLET(S): 25; 100 TABLET ORAL at 11:02

## 2017-12-03 RX ADMIN — CARBIDOPA, LEVODOPA, AND ENTACAPONE 1 TABLET(S): 50; 200; 200 TABLET, FILM COATED ORAL at 06:38

## 2017-12-04 LAB
ANION GAP SERPL CALC-SCNC: 10 MMOL/L — SIGNIFICANT CHANGE UP (ref 5–17)
BUN SERPL-MCNC: 17 MG/DL — SIGNIFICANT CHANGE UP (ref 7–23)
CALCIUM SERPL-MCNC: 8.7 MG/DL — SIGNIFICANT CHANGE UP (ref 8.4–10.5)
CHLORIDE SERPL-SCNC: 102 MMOL/L — SIGNIFICANT CHANGE UP (ref 96–108)
CO2 SERPL-SCNC: 28 MMOL/L — SIGNIFICANT CHANGE UP (ref 22–31)
CREAT SERPL-MCNC: 0.93 MG/DL — SIGNIFICANT CHANGE UP (ref 0.5–1.3)
GLUCOSE SERPL-MCNC: 116 MG/DL — HIGH (ref 70–99)
HCT VFR BLD CALC: 39.2 % — SIGNIFICANT CHANGE UP (ref 39–50)
HGB BLD-MCNC: 13.3 G/DL — SIGNIFICANT CHANGE UP (ref 13–17)
MCHC RBC-ENTMCNC: 32 PG — SIGNIFICANT CHANGE UP (ref 27–34)
MCHC RBC-ENTMCNC: 34 GM/DL — SIGNIFICANT CHANGE UP (ref 32–36)
MCV RBC AUTO: 94 FL — SIGNIFICANT CHANGE UP (ref 80–100)
PLATELET # BLD AUTO: 191 K/UL — SIGNIFICANT CHANGE UP (ref 150–400)
POTASSIUM SERPL-MCNC: 3.7 MMOL/L — SIGNIFICANT CHANGE UP (ref 3.5–5.3)
POTASSIUM SERPL-SCNC: 3.7 MMOL/L — SIGNIFICANT CHANGE UP (ref 3.5–5.3)
RBC # BLD: 4.17 M/UL — LOW (ref 4.2–5.8)
RBC # FLD: 13 % — SIGNIFICANT CHANGE UP (ref 10.3–14.5)
SODIUM SERPL-SCNC: 140 MMOL/L — SIGNIFICANT CHANGE UP (ref 135–145)
WBC # BLD: 6.1 K/UL — SIGNIFICANT CHANGE UP (ref 3.8–10.5)
WBC # FLD AUTO: 6.1 K/UL — SIGNIFICANT CHANGE UP (ref 3.8–10.5)

## 2017-12-04 PROCEDURE — 99232 SBSQ HOSP IP/OBS MODERATE 35: CPT

## 2017-12-04 RX ADMIN — CARBIDOPA, LEVODOPA, AND ENTACAPONE 1 TABLET(S): 50; 200; 200 TABLET, FILM COATED ORAL at 06:22

## 2017-12-04 RX ADMIN — CLOZAPINE 25 MILLIGRAM(S): 150 TABLET, ORALLY DISINTEGRATING ORAL at 11:02

## 2017-12-04 RX ADMIN — CARBIDOPA AND LEVODOPA 1 TABLET(S): 25; 100 TABLET ORAL at 11:03

## 2017-12-04 RX ADMIN — CARBIDOPA AND LEVODOPA 1 TABLET(S): 25; 100 TABLET ORAL at 06:22

## 2017-12-04 RX ADMIN — CARBIDOPA, LEVODOPA, AND ENTACAPONE 1 TABLET(S): 50; 200; 200 TABLET, FILM COATED ORAL at 11:03

## 2017-12-04 RX ADMIN — CARBIDOPA AND LEVODOPA 1 TABLET(S): 25; 100 TABLET ORAL at 20:25

## 2017-12-04 RX ADMIN — CARBIDOPA, LEVODOPA, AND ENTACAPONE 1 TABLET(S): 50; 200; 200 TABLET, FILM COATED ORAL at 16:05

## 2017-12-04 RX ADMIN — Medication 325 MILLIGRAM(S): at 16:55

## 2017-12-04 RX ADMIN — Medication 325 MILLIGRAM(S): at 20:27

## 2017-12-04 RX ADMIN — Medication 30 MILLILITER(S): at 21:08

## 2017-12-04 RX ADMIN — CARBIDOPA AND LEVODOPA 1 TABLET(S): 25; 100 TABLET ORAL at 16:05

## 2017-12-04 NOTE — PROGRESS NOTE BEHAVIORAL HEALTH - SUMMARY
53 yo male with early onset Parkinson’s disease x 11 years, discharged from 4 prior nursing homes in 8 months due to his behaviors, hx of refusing medications,  transferred to [facility] Unit 2S on 6/12/17 where he manifested severe mood lability, paranoia and confabulation with poor insight and judgment including making > 40 Justice center complaints. 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 Patient was accepted to [facility] in [place] and discharged from [facility] on 7/12/17.  Upon getting to [facility], Patient refused to go into the facility and became combative. He was thus transferred back to [facility] ED which resulted in Service Line Chiefs’ of Service involvement given the history/issues and ultimate transfer to [address]. Patient has been on Unit 1N since. UPDATE: [facility] declined to accept Patient; awaiting review/answer to other places applied to. Court hearing scheduled for 12/19/17 for expansion of guardian del castillo including placement.

## 2017-12-04 NOTE — PROGRESS NOTE BEHAVIORAL HEALTH - OTHER
+ neck favoring one side, dressed in hospital gowns, adequate grooming and hygiene. +  psychomotor agitation at times (chronic and seemingly intentionaly as he can stop it when redirected). at times paranoia towards certain staff Limited cooperative today low end of fair chronically limited festinating gait but stable at times louder "ok" at times labile, at times euthymic at times disorganized and circumstantial
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
## 2017-12-05 PROCEDURE — 99231 SBSQ HOSP IP/OBS SF/LOW 25: CPT

## 2017-12-05 RX ADMIN — Medication 325 MILLIGRAM(S): at 06:47

## 2017-12-05 RX ADMIN — CARBIDOPA, LEVODOPA, AND ENTACAPONE 1 TABLET(S): 50; 200; 200 TABLET, FILM COATED ORAL at 16:20

## 2017-12-05 RX ADMIN — CARBIDOPA, LEVODOPA, AND ENTACAPONE 1 TABLET(S): 50; 200; 200 TABLET, FILM COATED ORAL at 06:13

## 2017-12-05 RX ADMIN — CARBIDOPA AND LEVODOPA 1 TABLET(S): 25; 100 TABLET ORAL at 10:43

## 2017-12-05 RX ADMIN — Medication 325 MILLIGRAM(S): at 06:13

## 2017-12-05 RX ADMIN — CARBIDOPA AND LEVODOPA 1 TABLET(S): 25; 100 TABLET ORAL at 22:03

## 2017-12-05 RX ADMIN — CARBIDOPA, LEVODOPA, AND ENTACAPONE 1 TABLET(S): 50; 200; 200 TABLET, FILM COATED ORAL at 10:43

## 2017-12-05 RX ADMIN — CARBIDOPA AND LEVODOPA 1 TABLET(S): 25; 100 TABLET ORAL at 16:20

## 2017-12-05 RX ADMIN — CLOZAPINE 25 MILLIGRAM(S): 150 TABLET, ORALLY DISINTEGRATING ORAL at 10:43

## 2017-12-05 RX ADMIN — CARBIDOPA AND LEVODOPA 1 TABLET(S): 25; 100 TABLET ORAL at 06:13

## 2017-12-05 NOTE — PROGRESS NOTE BEHAVIORAL HEALTH - NSBHFUPINTERVALHXFT_PSY_A_CORE
Patient seen for paranoia, disorganized behavior. Chart reviewed. No significant interval events. Continues to be irritabel with staff, demanding and attention seeking (chronic). Patient continues to target certain staff members and make paranoid accusations towards them such as "he is giving me ECT at night when I sleep" or "she is calling the [place] telling them not to take me." (also chronic) Otherwise, same clinical presentation. Patient denies adverse medication side effect. Continues to be encouraged to elevate his legs for the LE edema.

## 2017-12-05 NOTE — PROGRESS NOTE BEHAVIORAL HEALTH - OTHER
cooperative today low end of fair chronically limited festinating gait but stable at times louder "ok" at times labile, at times euthymic at times disorganized and circumstantial at times paranoia towards certain staff Limited + neck favoring one side, dressed in hospital gowns, adequate grooming and hygiene. +  psychomotor agitation at times (chronic and seemingly intentionaly as he can stop it when redirected).

## 2017-12-05 NOTE — PROGRESS NOTE BEHAVIORAL HEALTH - SUMMARY
53 yo male with early onset Parkinson’s disease x 11 years, discharged from 4 prior nursing homes in 8 months due to his behaviors, hx of refusing medications,  transferred to [facility] [address] on 6/12/17 where he manifested severe mood lability, paranoia and confabulation with poor insight and judgment including making > 40 [facility] complaints. 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 Patient was accepted to [facility] in [place] and discharged from [facility] on 7/12/17.  Upon getting to [facility], Patient refused to go into the facility and became combative. He was thus transferred back to [facility] ED which resulted in Service Line Chiefs’ of Service involvement given the history/issues and ultimate transfer to [address]. Patient has been on [address] since. UPDATE: [facility] declined to accept Patient; awaiting review/answer to other places applied to. Court hearing scheduled for 12/19/17 for expansion of guardian del castillo including placement.

## 2017-12-06 PROCEDURE — 99231 SBSQ HOSP IP/OBS SF/LOW 25: CPT

## 2017-12-06 RX ORDER — POTASSIUM CHLORIDE 20 MEQ
10 PACKET (EA) ORAL DAILY
Qty: 0 | Refills: 0 | Status: COMPLETED | OUTPATIENT
Start: 2017-12-06 | End: 2017-12-12

## 2017-12-06 RX ORDER — FUROSEMIDE 40 MG
20 TABLET ORAL DAILY
Qty: 0 | Refills: 0 | Status: COMPLETED | OUTPATIENT
Start: 2017-12-06 | End: 2017-12-13

## 2017-12-06 RX ADMIN — Medication 325 MILLIGRAM(S): at 20:00

## 2017-12-06 RX ADMIN — CARBIDOPA AND LEVODOPA 1 TABLET(S): 25; 100 TABLET ORAL at 16:15

## 2017-12-06 RX ADMIN — Medication 325 MILLIGRAM(S): at 18:59

## 2017-12-06 RX ADMIN — CARBIDOPA, LEVODOPA, AND ENTACAPONE 1 TABLET(S): 50; 200; 200 TABLET, FILM COATED ORAL at 16:15

## 2017-12-06 RX ADMIN — CARBIDOPA AND LEVODOPA 1 TABLET(S): 25; 100 TABLET ORAL at 06:30

## 2017-12-06 RX ADMIN — CARBIDOPA, LEVODOPA, AND ENTACAPONE 1 TABLET(S): 50; 200; 200 TABLET, FILM COATED ORAL at 10:58

## 2017-12-06 RX ADMIN — CARBIDOPA AND LEVODOPA 1 TABLET(S): 25; 100 TABLET ORAL at 20:27

## 2017-12-06 RX ADMIN — CARBIDOPA AND LEVODOPA 1 TABLET(S): 25; 100 TABLET ORAL at 10:58

## 2017-12-06 RX ADMIN — CLOZAPINE 25 MILLIGRAM(S): 150 TABLET, ORALLY DISINTEGRATING ORAL at 10:57

## 2017-12-06 RX ADMIN — CARBIDOPA, LEVODOPA, AND ENTACAPONE 1 TABLET(S): 50; 200; 200 TABLET, FILM COATED ORAL at 06:30

## 2017-12-06 NOTE — PROGRESS NOTE BEHAVIORAL HEALTH - OTHER
+ neck favoring one side, dressed in hospital gowns, adequate grooming and hygiene. +  psychomotor agitation at times (chronic and seemingly intentionaly as he can stop it when redirected). cooperative today low end of fair chronically limited festinating gait but stable at times louder "ok" at times labile, at times euthymic at times disorganized and circumstantial at times paranoia towards certain staff Limited

## 2017-12-06 NOTE — PROGRESS NOTE BEHAVIORAL HEALTH - NSBHFUPINTERVALHXFT_PSY_A_CORE
Patient seen for paranoia, disorganized behavior. Chart reviewed. No significant interval events - same clinical presentation and behavior. This morning complained about certain staff from last night who he chronically seems to have issues with staff due to personality differences and again made [facility] call. This has been going on for months, including at [facility]. Patient denies adverse medication side effect. Continues to be encouraged to elevate his legs for the LE edema.

## 2017-12-06 NOTE — PROGRESS NOTE BEHAVIORAL HEALTH - SUMMARY
53 yo male with early onset Parkinson’s disease x 11 years, discharged from 4 prior nursing homes in 8 months due to his behaviors, hx of refusing medications,  transferred to [facility] Unit [address] on 6/12/17 where he manifested severe mood lability, paranoia and confabulation with poor insight and judgment including making > 40 [facility] complaints. 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 Patient was accepted to [facility] in [place] and discharged from [facility] on 7/12/17.  Upon getting to [facility], Patient refused to go into the facility and became combative. He was thus transferred back to [facility] ED which resulted in Service Line Chiefs’ of Service involvement given the history/issues and ultimate transfer to [address]. Patient has been on [address] since. UPDATE: [facility] declined to accept Patient; awaiting review/answer to other places applied to. Court hearing scheduled for 12/19/17 for expansion of guardian del castillo including placement.

## 2017-12-07 PROCEDURE — 99231 SBSQ HOSP IP/OBS SF/LOW 25: CPT

## 2017-12-07 RX ORDER — ACETAMINOPHEN 500 MG
650 TABLET ORAL ONCE
Qty: 0 | Refills: 0 | Status: COMPLETED | OUTPATIENT
Start: 2017-12-07 | End: 2017-12-07

## 2017-12-07 RX ADMIN — CARBIDOPA AND LEVODOPA 1 TABLET(S): 25; 100 TABLET ORAL at 16:31

## 2017-12-07 RX ADMIN — CARBIDOPA AND LEVODOPA 1 TABLET(S): 25; 100 TABLET ORAL at 20:43

## 2017-12-07 RX ADMIN — Medication 325 MILLIGRAM(S): at 00:12

## 2017-12-07 RX ADMIN — CARBIDOPA, LEVODOPA, AND ENTACAPONE 1 TABLET(S): 50; 200; 200 TABLET, FILM COATED ORAL at 16:31

## 2017-12-07 RX ADMIN — CLOZAPINE 25 MILLIGRAM(S): 150 TABLET, ORALLY DISINTEGRATING ORAL at 09:01

## 2017-12-07 RX ADMIN — CARBIDOPA, LEVODOPA, AND ENTACAPONE 1 TABLET(S): 50; 200; 200 TABLET, FILM COATED ORAL at 11:10

## 2017-12-07 RX ADMIN — CARBIDOPA AND LEVODOPA 1 TABLET(S): 25; 100 TABLET ORAL at 05:50

## 2017-12-07 RX ADMIN — Medication 325 MILLIGRAM(S): at 22:08

## 2017-12-07 RX ADMIN — Medication 650 MILLIGRAM(S): at 23:17

## 2017-12-07 RX ADMIN — CARBIDOPA, LEVODOPA, AND ENTACAPONE 1 TABLET(S): 50; 200; 200 TABLET, FILM COATED ORAL at 05:50

## 2017-12-07 RX ADMIN — CARBIDOPA AND LEVODOPA 1 TABLET(S): 25; 100 TABLET ORAL at 11:09

## 2017-12-07 RX ADMIN — Medication 325 MILLIGRAM(S): at 20:43

## 2017-12-07 NOTE — PROGRESS NOTE BEHAVIORAL HEALTH - OTHER
cooperative today low end of fair chronically limited festinating gait but stable at times louder "ok" at times labile, at times euthymic at times disorganized and circumstantial at times paranoia towards certain staff + neck favoring one side, dressed in hospital gowns, adequate grooming and hygiene. +  psychomotor agitation at times (chronic and seemingly intentionaly as he can stop it when redirected). Limited

## 2017-12-07 NOTE — PROGRESS NOTE BEHAVIORAL HEALTH - SUMMARY
51 yo male with early onset Parkinson’s disease x 11 years, discharged from 4 prior nursing homes in 8 months due to his behaviors, hx of refusing medications,  transferred to [facility] Unit [facility] on 6/12/17 where he manifested severe mood lability, paranoia and confabulation with poor insight and judgment including making > 40 [facility] complaints. 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 Patient was accepted to [facility] in [place] and discharged from [facility] on 7/12/17.  Upon getting to [facility], Patient refused to go into the facility and became combative. He was thus transferred back to [facility] ED which resulted in Service Line Chiefs’ of Service involvement given the history/issues and ultimate transfer to [address]. Patient has been on [address] since. UPDATE: [facility] declined to accept Patient; awaiting review/answer to other places applied to. Court hearing scheduled for 12/19/17 for expansion of guardian del castillo including placement.

## 2017-12-08 PROCEDURE — 99231 SBSQ HOSP IP/OBS SF/LOW 25: CPT

## 2017-12-08 RX ADMIN — CLOZAPINE 25 MILLIGRAM(S): 150 TABLET, ORALLY DISINTEGRATING ORAL at 11:13

## 2017-12-08 RX ADMIN — Medication 325 MILLIGRAM(S): at 22:17

## 2017-12-08 RX ADMIN — Medication 10 MILLIEQUIVALENT(S): at 13:16

## 2017-12-08 RX ADMIN — CARBIDOPA AND LEVODOPA 1 TABLET(S): 25; 100 TABLET ORAL at 05:41

## 2017-12-08 RX ADMIN — Medication 20 MILLIGRAM(S): at 13:16

## 2017-12-08 RX ADMIN — CARBIDOPA, LEVODOPA, AND ENTACAPONE 1 TABLET(S): 50; 200; 200 TABLET, FILM COATED ORAL at 11:12

## 2017-12-08 RX ADMIN — Medication 325 MILLIGRAM(S): at 17:43

## 2017-12-08 RX ADMIN — Medication 650 MILLIGRAM(S): at 00:43

## 2017-12-08 RX ADMIN — CARBIDOPA AND LEVODOPA 1 TABLET(S): 25; 100 TABLET ORAL at 16:12

## 2017-12-08 RX ADMIN — CARBIDOPA, LEVODOPA, AND ENTACAPONE 1 TABLET(S): 50; 200; 200 TABLET, FILM COATED ORAL at 16:12

## 2017-12-08 RX ADMIN — Medication 325 MILLIGRAM(S): at 21:32

## 2017-12-08 RX ADMIN — CARBIDOPA AND LEVODOPA 1 TABLET(S): 25; 100 TABLET ORAL at 11:12

## 2017-12-08 RX ADMIN — CARBIDOPA AND LEVODOPA 1 TABLET(S): 25; 100 TABLET ORAL at 20:51

## 2017-12-08 RX ADMIN — CARBIDOPA, LEVODOPA, AND ENTACAPONE 1 TABLET(S): 50; 200; 200 TABLET, FILM COATED ORAL at 05:41

## 2017-12-08 RX ADMIN — Medication 325 MILLIGRAM(S): at 15:35

## 2017-12-08 NOTE — PROGRESS NOTE BEHAVIORAL HEALTH - NSBHFUPINTERVALHXFT_PSY_A_CORE
Patient seen for paranoia, disorganized behavior. Chart reviewed. No significant interval events - same clinical presentation. Patient seen by visiting mental Hygiene  again and case reviewed. He is scheduled for Court and he expressed wish to go. Patient denies adverse medication side effect. Continues to be encouraged to elevate his legs for the LE edema.

## 2017-12-08 NOTE — PROGRESS NOTE BEHAVIORAL HEALTH - SUMMARY
53 yo male with early onset Parkinson’s disease x 11 years, discharged from 4 prior nursing homes in 8 months due to his behaviors, hx of refusing medications,  transferred to [facility] Unit [facility] on 6/12/17 where he manifested severe mood lability, paranoia and confabulation with poor insight and judgment including making > 40 [facility] complaints. 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 Patient was accepted to [facility] in [place] and discharged from [facility] on 7/12/17.  Upon getting to [facility], Patient refused to go into the facility and became combative. He was thus transferred back to [facility] ED which resulted in Service Line Chiefs’ of Service involvement given the history/issues and ultimate transfer to [address]. Patient has been on [address] since. UPDATE: [facility] declined to accept Patient; awaiting review/answer to other places applied to. Court hearing scheduled for 12/19/17 for expansion of guardian del castillo including placement.

## 2017-12-09 RX ADMIN — CLOZAPINE 25 MILLIGRAM(S): 150 TABLET, ORALLY DISINTEGRATING ORAL at 11:00

## 2017-12-09 RX ADMIN — CARBIDOPA AND LEVODOPA 1 TABLET(S): 25; 100 TABLET ORAL at 20:26

## 2017-12-09 RX ADMIN — CARBIDOPA, LEVODOPA, AND ENTACAPONE 1 TABLET(S): 50; 200; 200 TABLET, FILM COATED ORAL at 07:33

## 2017-12-09 RX ADMIN — CARBIDOPA AND LEVODOPA 1 TABLET(S): 25; 100 TABLET ORAL at 12:24

## 2017-12-09 RX ADMIN — CARBIDOPA AND LEVODOPA 1 TABLET(S): 25; 100 TABLET ORAL at 16:38

## 2017-12-09 RX ADMIN — CARBIDOPA, LEVODOPA, AND ENTACAPONE 1 TABLET(S): 50; 200; 200 TABLET, FILM COATED ORAL at 12:24

## 2017-12-09 RX ADMIN — CARBIDOPA, LEVODOPA, AND ENTACAPONE 1 TABLET(S): 50; 200; 200 TABLET, FILM COATED ORAL at 16:38

## 2017-12-09 RX ADMIN — CARBIDOPA AND LEVODOPA 1 TABLET(S): 25; 100 TABLET ORAL at 07:33

## 2017-12-10 RX ADMIN — Medication 325 MILLIGRAM(S): at 21:36

## 2017-12-10 RX ADMIN — CARBIDOPA AND LEVODOPA 1 TABLET(S): 25; 100 TABLET ORAL at 19:48

## 2017-12-10 RX ADMIN — CARBIDOPA, LEVODOPA, AND ENTACAPONE 1 TABLET(S): 50; 200; 200 TABLET, FILM COATED ORAL at 10:49

## 2017-12-10 RX ADMIN — CLOZAPINE 25 MILLIGRAM(S): 150 TABLET, ORALLY DISINTEGRATING ORAL at 10:50

## 2017-12-10 RX ADMIN — CARBIDOPA AND LEVODOPA 1 TABLET(S): 25; 100 TABLET ORAL at 05:37

## 2017-12-10 RX ADMIN — Medication 325 MILLIGRAM(S): at 05:36

## 2017-12-10 RX ADMIN — Medication 325 MILLIGRAM(S): at 19:48

## 2017-12-10 RX ADMIN — Medication 325 MILLIGRAM(S): at 05:38

## 2017-12-10 RX ADMIN — CARBIDOPA AND LEVODOPA 1 TABLET(S): 25; 100 TABLET ORAL at 10:50

## 2017-12-10 RX ADMIN — CARBIDOPA, LEVODOPA, AND ENTACAPONE 1 TABLET(S): 50; 200; 200 TABLET, FILM COATED ORAL at 16:33

## 2017-12-10 RX ADMIN — CARBIDOPA AND LEVODOPA 1 TABLET(S): 25; 100 TABLET ORAL at 16:33

## 2017-12-10 RX ADMIN — CARBIDOPA, LEVODOPA, AND ENTACAPONE 1 TABLET(S): 50; 200; 200 TABLET, FILM COATED ORAL at 05:37

## 2017-12-11 PROCEDURE — 99231 SBSQ HOSP IP/OBS SF/LOW 25: CPT

## 2017-12-11 RX ADMIN — CARBIDOPA, LEVODOPA, AND ENTACAPONE 1 TABLET(S): 50; 200; 200 TABLET, FILM COATED ORAL at 16:00

## 2017-12-11 RX ADMIN — CARBIDOPA AND LEVODOPA 1 TABLET(S): 25; 100 TABLET ORAL at 15:59

## 2017-12-11 RX ADMIN — Medication 325 MILLIGRAM(S): at 22:37

## 2017-12-11 RX ADMIN — Medication 325 MILLIGRAM(S): at 22:38

## 2017-12-11 RX ADMIN — CARBIDOPA AND LEVODOPA 1 TABLET(S): 25; 100 TABLET ORAL at 11:03

## 2017-12-11 RX ADMIN — CLOZAPINE 25 MILLIGRAM(S): 150 TABLET, ORALLY DISINTEGRATING ORAL at 11:04

## 2017-12-11 RX ADMIN — CARBIDOPA AND LEVODOPA 1 TABLET(S): 25; 100 TABLET ORAL at 06:24

## 2017-12-11 RX ADMIN — CARBIDOPA AND LEVODOPA 1 TABLET(S): 25; 100 TABLET ORAL at 20:09

## 2017-12-11 RX ADMIN — CARBIDOPA, LEVODOPA, AND ENTACAPONE 1 TABLET(S): 50; 200; 200 TABLET, FILM COATED ORAL at 11:03

## 2017-12-11 RX ADMIN — CARBIDOPA, LEVODOPA, AND ENTACAPONE 1 TABLET(S): 50; 200; 200 TABLET, FILM COATED ORAL at 06:24

## 2017-12-11 NOTE — PROGRESS NOTE BEHAVIORAL HEALTH - SUMMARY
51 yo male with early onset Parkinson’s disease x 11 years, discharged from 4 prior nursing homes in 8 months due to his behaviors, hx of refusing medications,  transferred to [facility] Unit [facility] on 6/12/17 where he manifested severe mood lability, paranoia and confabulation with poor insight and judgment including making > 40 [facility] complaints. 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 Patient was accepted to [facility] in [place] and discharged from [facility] on 7/12/17.  Upon getting to [facility], Patient refused to go into the facility and became combative. He was thus transferred back to [facility] ED which resulted in Service Line Chiefs’ of Service involvement given the history/issues and ultimate transfer to [address]. Patient has been on [address] since. UPDATE: [facility] declined to accept Patient; awaiting review/answer to other places applied to. Court hearing scheduled for 12/19/17 for expansion of guardian del castillo including placement.

## 2017-12-11 NOTE — PROGRESS NOTE BEHAVIORAL HEALTH - NSBHFUPINTERVALHXFT_PSY_A_CORE
Patient seen for paranoia, disorganized behavior. Chart reviewed. No significant interval events over the weekend - same clinical presentation. Patient seen by visiting mental Hygiene  again and case reviewed. He is scheduled for Court and he expressed wish to go. Patient denies adverse medication side effect. Continues to be encouraged to elevate his legs for the LE edema which he usually does not do.

## 2017-12-11 NOTE — PROGRESS NOTE BEHAVIORAL HEALTH - OTHER
at times louder "ok" at times labile, at times euthymic at times disorganized and circumstantial at times paranoia towards certain staff Limited + neck favoring one side, dressed in hospital gowns, adequate grooming and hygiene. +  psychomotor agitation at times (chronic and seemingly intentionaly as he can stop it when redirected). cooperative today low end of fair chronically limited festinating gait but stable

## 2017-12-12 PROCEDURE — 99231 SBSQ HOSP IP/OBS SF/LOW 25: CPT

## 2017-12-12 RX ADMIN — CARBIDOPA AND LEVODOPA 1 TABLET(S): 25; 100 TABLET ORAL at 20:11

## 2017-12-12 RX ADMIN — CARBIDOPA AND LEVODOPA 1 TABLET(S): 25; 100 TABLET ORAL at 11:13

## 2017-12-12 RX ADMIN — Medication 325 MILLIGRAM(S): at 22:00

## 2017-12-12 RX ADMIN — CARBIDOPA AND LEVODOPA 1 TABLET(S): 25; 100 TABLET ORAL at 06:05

## 2017-12-12 RX ADMIN — CLOZAPINE 25 MILLIGRAM(S): 150 TABLET, ORALLY DISINTEGRATING ORAL at 11:13

## 2017-12-12 RX ADMIN — CARBIDOPA, LEVODOPA, AND ENTACAPONE 1 TABLET(S): 50; 200; 200 TABLET, FILM COATED ORAL at 11:13

## 2017-12-12 RX ADMIN — CARBIDOPA, LEVODOPA, AND ENTACAPONE 1 TABLET(S): 50; 200; 200 TABLET, FILM COATED ORAL at 06:05

## 2017-12-12 RX ADMIN — Medication 325 MILLIGRAM(S): at 06:40

## 2017-12-12 RX ADMIN — Medication 325 MILLIGRAM(S): at 06:07

## 2017-12-12 RX ADMIN — CARBIDOPA AND LEVODOPA 1 TABLET(S): 25; 100 TABLET ORAL at 16:11

## 2017-12-12 RX ADMIN — CARBIDOPA, LEVODOPA, AND ENTACAPONE 1 TABLET(S): 50; 200; 200 TABLET, FILM COATED ORAL at 16:11

## 2017-12-12 RX ADMIN — Medication 325 MILLIGRAM(S): at 20:56

## 2017-12-12 NOTE — PROGRESS NOTE BEHAVIORAL HEALTH - NSBHFUPINTERVALHXFT_PSY_A_CORE
Patient seen for paranoia, disorganized behavior. Chart reviewed. No significant interval events. Patient has the same psychiatric clinical presentation and continues to be encouraged to elevate his legs for the LE edema which he usually does not do.

## 2017-12-12 NOTE — PROGRESS NOTE BEHAVIORAL HEALTH - OTHER
"ok" at times labile, at times euthymic at times disorganized and circumstantial at times paranoia towards certain staff Limited + neck favoring one side, dressed in hospital gowns, adequate grooming and hygiene. +  psychomotor agitation at times (chronic and seemingly intentionaly as he can stop it when redirected). cooperative today low end of fair chronically limited festinating gait but stable at times louder

## 2017-12-12 NOTE — PROGRESS NOTE BEHAVIORAL HEALTH - SUMMARY
51 yo male with early onset Parkinson’s disease x 11 years, discharged from 4 prior nursing homes in 8 months due to his behaviors, hx of refusing medications,  transferred to [facility] Unit [facility] on 6/12/17 where he manifested severe mood lability, paranoia and confabulation with poor insight and judgment including making > 40 [facility] complaints. 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 Patient was accepted to [facility] in [place] and discharged from [facility] on 7/12/17.  Upon getting to [facility], Patient refused to go into the facility and became combative. He was thus transferred back to [facility] ED which resulted in Service Line Chiefs’ of Service involvement given the history/issues and ultimate transfer to [address]. Patient has been on [address] since. UPDATE: [facility] declined to accept Patient; awaiting review/answer to other places applied to. Court hearing scheduled for 12/19/17 for expansion of guardian del castillo including placement.

## 2017-12-13 LAB
ANION GAP SERPL CALC-SCNC: 9 MMOL/L — SIGNIFICANT CHANGE UP (ref 5–17)
BASOPHILS # BLD AUTO: 0 K/UL — SIGNIFICANT CHANGE UP (ref 0–0.2)
BASOPHILS NFR BLD AUTO: 0.8 % — SIGNIFICANT CHANGE UP (ref 0–2)
BUN SERPL-MCNC: 15 MG/DL — SIGNIFICANT CHANGE UP (ref 7–23)
CALCIUM SERPL-MCNC: 8.5 MG/DL — SIGNIFICANT CHANGE UP (ref 8.4–10.5)
CHLORIDE SERPL-SCNC: 104 MMOL/L — SIGNIFICANT CHANGE UP (ref 96–108)
CO2 SERPL-SCNC: 26 MMOL/L — SIGNIFICANT CHANGE UP (ref 22–31)
CREAT SERPL-MCNC: 0.93 MG/DL — SIGNIFICANT CHANGE UP (ref 0.5–1.3)
EOSINOPHIL # BLD AUTO: 0.1 K/UL — SIGNIFICANT CHANGE UP (ref 0–0.5)
EOSINOPHIL NFR BLD AUTO: 2.8 % — SIGNIFICANT CHANGE UP (ref 0–6)
GLUCOSE SERPL-MCNC: 119 MG/DL — HIGH (ref 70–99)
HCT VFR BLD CALC: 37.2 % — LOW (ref 39–50)
HGB BLD-MCNC: 12.8 G/DL — LOW (ref 13–17)
LYMPHOCYTES # BLD AUTO: 1.3 K/UL — SIGNIFICANT CHANGE UP (ref 1–3.3)
LYMPHOCYTES # BLD AUTO: 27.2 % — SIGNIFICANT CHANGE UP (ref 13–44)
MCHC RBC-ENTMCNC: 32.4 PG — SIGNIFICANT CHANGE UP (ref 27–34)
MCHC RBC-ENTMCNC: 34.5 GM/DL — SIGNIFICANT CHANGE UP (ref 32–36)
MCV RBC AUTO: 94.1 FL — SIGNIFICANT CHANGE UP (ref 80–100)
MONOCYTES # BLD AUTO: 0.4 K/UL — SIGNIFICANT CHANGE UP (ref 0–0.9)
MONOCYTES NFR BLD AUTO: 8.4 % — SIGNIFICANT CHANGE UP (ref 2–14)
NEUTROPHILS # BLD AUTO: 3 K/UL — SIGNIFICANT CHANGE UP (ref 1.8–7.4)
NEUTROPHILS NFR BLD AUTO: 60.9 % — SIGNIFICANT CHANGE UP (ref 43–77)
PLATELET # BLD AUTO: 159 K/UL — SIGNIFICANT CHANGE UP (ref 150–400)
POTASSIUM SERPL-MCNC: 3.5 MMOL/L — SIGNIFICANT CHANGE UP (ref 3.5–5.3)
POTASSIUM SERPL-SCNC: 3.5 MMOL/L — SIGNIFICANT CHANGE UP (ref 3.5–5.3)
RBC # BLD: 3.95 M/UL — LOW (ref 4.2–5.8)
RBC # FLD: 12.7 % — SIGNIFICANT CHANGE UP (ref 10.3–14.5)
SODIUM SERPL-SCNC: 139 MMOL/L — SIGNIFICANT CHANGE UP (ref 135–145)
WBC # BLD: 5 K/UL — SIGNIFICANT CHANGE UP (ref 3.8–10.5)
WBC # FLD AUTO: 5 K/UL — SIGNIFICANT CHANGE UP (ref 3.8–10.5)

## 2017-12-13 PROCEDURE — 99231 SBSQ HOSP IP/OBS SF/LOW 25: CPT

## 2017-12-13 RX ADMIN — CARBIDOPA AND LEVODOPA 1 TABLET(S): 25; 100 TABLET ORAL at 06:00

## 2017-12-13 RX ADMIN — CARBIDOPA, LEVODOPA, AND ENTACAPONE 1 TABLET(S): 50; 200; 200 TABLET, FILM COATED ORAL at 10:37

## 2017-12-13 RX ADMIN — CARBIDOPA AND LEVODOPA 1 TABLET(S): 25; 100 TABLET ORAL at 16:49

## 2017-12-13 RX ADMIN — CARBIDOPA AND LEVODOPA 1 TABLET(S): 25; 100 TABLET ORAL at 20:13

## 2017-12-13 RX ADMIN — Medication 20 MILLIGRAM(S): at 10:33

## 2017-12-13 RX ADMIN — Medication 325 MILLIGRAM(S): at 08:44

## 2017-12-13 RX ADMIN — CARBIDOPA, LEVODOPA, AND ENTACAPONE 1 TABLET(S): 50; 200; 200 TABLET, FILM COATED ORAL at 06:00

## 2017-12-13 RX ADMIN — Medication 325 MILLIGRAM(S): at 20:14

## 2017-12-13 RX ADMIN — CARBIDOPA AND LEVODOPA 1 TABLET(S): 25; 100 TABLET ORAL at 10:37

## 2017-12-13 RX ADMIN — CARBIDOPA, LEVODOPA, AND ENTACAPONE 1 TABLET(S): 50; 200; 200 TABLET, FILM COATED ORAL at 16:49

## 2017-12-13 RX ADMIN — Medication 325 MILLIGRAM(S): at 21:05

## 2017-12-13 RX ADMIN — Medication 325 MILLIGRAM(S): at 07:54

## 2017-12-13 RX ADMIN — CLOZAPINE 25 MILLIGRAM(S): 150 TABLET, ORALLY DISINTEGRATING ORAL at 10:37

## 2017-12-13 NOTE — PROGRESS NOTE BEHAVIORAL HEALTH - NSBHFUPINTERVALCCFT_PSY_A_CORE
"I was just thinking about my daughter"  Pt becomes tearful when describing missing his daughter, and that he wishes she would visit him.

## 2017-12-13 NOTE — PROGRESS NOTE BEHAVIORAL HEALTH - SUMMARY
51 yo male with early onset Parkinson’s disease x 11 years, discharged from 4 prior nursing homes in 8 months due to his behaviors, hx of refusing medications,  transferred to [facility] Unit [facility] on 6/12/17 where he manifested severe mood lability, paranoia and confabulation with poor insight and judgment including making > 40 [facility] complaints. 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 Patient was accepted to [facility] in [place] and discharged from [facility] on 7/12/17.  Upon getting to [facility], Patient refused to go into the facility and became combative. He was thus transferred back to [facility] ED which resulted in Service Line Chiefs’ of Service involvement given the history/issues and ultimate transfer to [address]. Patient has been on [address] 1[address] since. UPDATE: [facility] declined to accept Patient; awaiting review/answer to other places applied to. Court hearing scheduled for 12/19/17 for expansion of guardian del castillo including placement.
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
## 2017-12-13 NOTE — PROGRESS NOTE BEHAVIORAL HEALTH - NSBHADMITMEDEDUDETAILS_A_CORE FT
Pt teaching done re: potential benefits/SE of Clozaril with some teachback verbalized.  Pt denies any SE, and no SE/sx TD/EPS are noted /rptd

## 2017-12-14 LAB — HBA1C BLD-MCNC: 5.3 % — SIGNIFICANT CHANGE UP (ref 4–5.6)

## 2017-12-14 PROCEDURE — 99232 SBSQ HOSP IP/OBS MODERATE 35: CPT

## 2017-12-14 RX ORDER — CLOZAPINE 150 MG/1
50 TABLET, ORALLY DISINTEGRATING ORAL
Qty: 0 | Refills: 0 | Status: DISCONTINUED | OUTPATIENT
Start: 2017-12-14 | End: 2018-05-24

## 2017-12-14 RX ADMIN — CLOZAPINE 25 MILLIGRAM(S): 150 TABLET, ORALLY DISINTEGRATING ORAL at 09:03

## 2017-12-14 RX ADMIN — CARBIDOPA AND LEVODOPA 1 TABLET(S): 25; 100 TABLET ORAL at 16:05

## 2017-12-14 RX ADMIN — CARBIDOPA AND LEVODOPA 1 TABLET(S): 25; 100 TABLET ORAL at 20:50

## 2017-12-14 RX ADMIN — CARBIDOPA AND LEVODOPA 1 TABLET(S): 25; 100 TABLET ORAL at 13:04

## 2017-12-14 RX ADMIN — Medication 325 MILLIGRAM(S): at 05:55

## 2017-12-14 RX ADMIN — CARBIDOPA, LEVODOPA, AND ENTACAPONE 1 TABLET(S): 50; 200; 200 TABLET, FILM COATED ORAL at 16:05

## 2017-12-14 RX ADMIN — CARBIDOPA AND LEVODOPA 1 TABLET(S): 25; 100 TABLET ORAL at 05:48

## 2017-12-14 RX ADMIN — CARBIDOPA, LEVODOPA, AND ENTACAPONE 1 TABLET(S): 50; 200; 200 TABLET, FILM COATED ORAL at 05:48

## 2017-12-14 RX ADMIN — Medication 325 MILLIGRAM(S): at 06:04

## 2017-12-14 RX ADMIN — CARBIDOPA, LEVODOPA, AND ENTACAPONE 1 TABLET(S): 50; 200; 200 TABLET, FILM COATED ORAL at 13:04

## 2017-12-14 NOTE — PROGRESS NOTE BEHAVIORAL HEALTH - SUMMARY
53 yo male with early onset Parkinson’s disease x 11 years, discharged from 4 prior nursing homes in 8 months due to his behaviors, hx of refusing medications,  transferred to [facility] Unit 2S on 6/12/17 where he manifested severe mood lability, paranoia and confabulation with poor insight and judgment including making > 40 [facility] complaints. 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 Patient was accepted to [facility] in [place] and discharged from [facility] on 7/12/17.  Upon getting to [facility], Patient refused to go into the facility and became combative. He was thus transferred back to [facility] ED which resulted in Service Line Chiefs’ of Service involvement given the history/issues and ultimate transfer to [address]. Patient has been on [address] 1[address] since. UPDATE: [facility] declined to accept Patient; awaiting review/answer to other places applied to. Court hearing scheduled for 12/19/17 for expansion of guardian del castillo including placement.

## 2017-12-14 NOTE — PROGRESS NOTE BEHAVIORAL HEALTH - NSBHADMITMEDEDUDETAILS_A_CORE FT
trying increased dose of the clozapine - from 25mg PO qd to 50mg PO qd for seemingly worsening paranoia
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
## 2017-12-15 PROCEDURE — 99231 SBSQ HOSP IP/OBS SF/LOW 25: CPT

## 2017-12-15 RX ADMIN — CARBIDOPA, LEVODOPA, AND ENTACAPONE 1 TABLET(S): 50; 200; 200 TABLET, FILM COATED ORAL at 16:39

## 2017-12-15 RX ADMIN — CARBIDOPA AND LEVODOPA 1 TABLET(S): 25; 100 TABLET ORAL at 06:12

## 2017-12-15 RX ADMIN — CARBIDOPA, LEVODOPA, AND ENTACAPONE 1 TABLET(S): 50; 200; 200 TABLET, FILM COATED ORAL at 11:02

## 2017-12-15 RX ADMIN — Medication 325 MILLIGRAM(S): at 02:51

## 2017-12-15 RX ADMIN — CARBIDOPA, LEVODOPA, AND ENTACAPONE 1 TABLET(S): 50; 200; 200 TABLET, FILM COATED ORAL at 06:12

## 2017-12-15 RX ADMIN — CLOZAPINE 50 MILLIGRAM(S): 150 TABLET, ORALLY DISINTEGRATING ORAL at 10:10

## 2017-12-15 RX ADMIN — Medication 325 MILLIGRAM(S): at 20:49

## 2017-12-15 RX ADMIN — CARBIDOPA AND LEVODOPA 1 TABLET(S): 25; 100 TABLET ORAL at 16:39

## 2017-12-15 RX ADMIN — CARBIDOPA AND LEVODOPA 1 TABLET(S): 25; 100 TABLET ORAL at 11:02

## 2017-12-15 RX ADMIN — CARBIDOPA AND LEVODOPA 1 TABLET(S): 25; 100 TABLET ORAL at 20:13

## 2017-12-15 RX ADMIN — Medication 325 MILLIGRAM(S): at 20:56

## 2017-12-15 RX ADMIN — Medication 325 MILLIGRAM(S): at 03:20

## 2017-12-15 NOTE — PROGRESS NOTE BEHAVIORAL HEALTH - SUMMARY
53 yo male with early onset Parkinson’s disease x 11 years, discharged from 4 prior nursing homes in 8 months due to his behaviors, hx of refusing medications,  transferred to [facility] Unit 2S on 6/12/17 where he manifested severe mood lability, paranoia and confabulation with poor insight and judgment including making > 40 [facility] complaints. 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 Patient was accepted to [facility] in [place] and discharged from [facility] on 7/12/17.  Upon getting to [facility], Patient refused to go into the facility and became combative. He was thus transferred back to [facility] ED which resulted in Service Line Chiefs’ of Service involvement given the history/issues and ultimate transfer to [address]. Patient has been on [address] 1[address] since. UPDATE: [facility] declined to accept Patient; awaiting review/answer to other places applied to. Court hearing scheduled for 12/19/17 for expansion of guardian del castillo including placement and Patient is scheduled to be transported there.

## 2017-12-15 NOTE — PROGRESS NOTE BEHAVIORAL HEALTH - NSBHFUPINTERVALHXFT_PSY_A_CORE
Patient seen for paranoia, disorganized behavior. Chart reviewed. No significant interval events. Patient is scheduled to be transported to Court for hearing on 12/19. Staff trying to get his clothing here from his previous nursing home which should help his morale. Patient more low key today, looks down when asking about his discharge saying he has been here for a long time. Today was the first day he received the higher dose of clozapine - monitoring.  Patient has the same psychiatric clinical presentation and continues to be encouraged to elevate his legs for the LE edema which he usually does not do but he does independently get up and walk around the unit, in and out of his room throughout the day.
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
## 2017-12-16 RX ADMIN — CARBIDOPA, LEVODOPA, AND ENTACAPONE 1 TABLET(S): 50; 200; 200 TABLET, FILM COATED ORAL at 06:31

## 2017-12-16 RX ADMIN — Medication 325 MILLIGRAM(S): at 17:59

## 2017-12-16 RX ADMIN — CARBIDOPA AND LEVODOPA 1 TABLET(S): 25; 100 TABLET ORAL at 06:31

## 2017-12-16 RX ADMIN — Medication 325 MILLIGRAM(S): at 08:56

## 2017-12-16 RX ADMIN — CARBIDOPA AND LEVODOPA 1 TABLET(S): 25; 100 TABLET ORAL at 21:01

## 2017-12-16 RX ADMIN — Medication 325 MILLIGRAM(S): at 09:45

## 2017-12-16 RX ADMIN — CARBIDOPA, LEVODOPA, AND ENTACAPONE 1 TABLET(S): 50; 200; 200 TABLET, FILM COATED ORAL at 16:09

## 2017-12-16 RX ADMIN — Medication 325 MILLIGRAM(S): at 18:45

## 2017-12-16 RX ADMIN — CLOZAPINE 50 MILLIGRAM(S): 150 TABLET, ORALLY DISINTEGRATING ORAL at 11:53

## 2017-12-16 RX ADMIN — CARBIDOPA AND LEVODOPA 1 TABLET(S): 25; 100 TABLET ORAL at 11:53

## 2017-12-16 RX ADMIN — CARBIDOPA AND LEVODOPA 1 TABLET(S): 25; 100 TABLET ORAL at 16:09

## 2017-12-16 RX ADMIN — CARBIDOPA, LEVODOPA, AND ENTACAPONE 1 TABLET(S): 50; 200; 200 TABLET, FILM COATED ORAL at 11:53

## 2017-12-17 RX ORDER — ACETAMINOPHEN 500 MG
650 TABLET ORAL ONCE
Qty: 0 | Refills: 0 | Status: COMPLETED | OUTPATIENT
Start: 2017-12-17 | End: 2017-12-17

## 2017-12-17 RX ADMIN — Medication 325 MILLIGRAM(S): at 04:42

## 2017-12-17 RX ADMIN — CARBIDOPA, LEVODOPA, AND ENTACAPONE 1 TABLET(S): 50; 200; 200 TABLET, FILM COATED ORAL at 05:21

## 2017-12-17 RX ADMIN — CARBIDOPA AND LEVODOPA 1 TABLET(S): 25; 100 TABLET ORAL at 20:11

## 2017-12-17 RX ADMIN — CARBIDOPA AND LEVODOPA 1 TABLET(S): 25; 100 TABLET ORAL at 11:37

## 2017-12-17 RX ADMIN — Medication 325 MILLIGRAM(S): at 11:35

## 2017-12-17 RX ADMIN — CLOZAPINE 50 MILLIGRAM(S): 150 TABLET, ORALLY DISINTEGRATING ORAL at 11:35

## 2017-12-17 RX ADMIN — Medication 325 MILLIGRAM(S): at 12:15

## 2017-12-17 RX ADMIN — Medication 325 MILLIGRAM(S): at 03:42

## 2017-12-17 RX ADMIN — CYCLOBENZAPRINE HYDROCHLORIDE 10 MILLIGRAM(S): 10 TABLET, FILM COATED ORAL at 23:38

## 2017-12-17 RX ADMIN — CARBIDOPA AND LEVODOPA 1 TABLET(S): 25; 100 TABLET ORAL at 16:22

## 2017-12-17 RX ADMIN — Medication 650 MILLIGRAM(S): at 23:38

## 2017-12-17 RX ADMIN — CARBIDOPA, LEVODOPA, AND ENTACAPONE 1 TABLET(S): 50; 200; 200 TABLET, FILM COATED ORAL at 16:22

## 2017-12-17 RX ADMIN — CARBIDOPA, LEVODOPA, AND ENTACAPONE 1 TABLET(S): 50; 200; 200 TABLET, FILM COATED ORAL at 11:37

## 2017-12-17 RX ADMIN — CARBIDOPA AND LEVODOPA 1 TABLET(S): 25; 100 TABLET ORAL at 05:20

## 2017-12-18 PROCEDURE — 99231 SBSQ HOSP IP/OBS SF/LOW 25: CPT

## 2017-12-18 RX ORDER — ENOXAPARIN SODIUM 100 MG/ML
40 INJECTION SUBCUTANEOUS DAILY
Qty: 0 | Refills: 0 | Status: DISCONTINUED | OUTPATIENT
Start: 2017-12-18 | End: 2017-12-26

## 2017-12-18 RX ADMIN — CARBIDOPA AND LEVODOPA 1 TABLET(S): 25; 100 TABLET ORAL at 16:04

## 2017-12-18 RX ADMIN — Medication 325 MILLIGRAM(S): at 21:09

## 2017-12-18 RX ADMIN — CARBIDOPA, LEVODOPA, AND ENTACAPONE 1 TABLET(S): 50; 200; 200 TABLET, FILM COATED ORAL at 16:04

## 2017-12-18 RX ADMIN — CARBIDOPA, LEVODOPA, AND ENTACAPONE 1 TABLET(S): 50; 200; 200 TABLET, FILM COATED ORAL at 12:06

## 2017-12-18 RX ADMIN — CARBIDOPA AND LEVODOPA 1 TABLET(S): 25; 100 TABLET ORAL at 12:06

## 2017-12-18 RX ADMIN — CARBIDOPA, LEVODOPA, AND ENTACAPONE 1 TABLET(S): 50; 200; 200 TABLET, FILM COATED ORAL at 05:38

## 2017-12-18 RX ADMIN — CARBIDOPA AND LEVODOPA 1 TABLET(S): 25; 100 TABLET ORAL at 20:26

## 2017-12-18 RX ADMIN — Medication 325 MILLIGRAM(S): at 22:08

## 2017-12-18 RX ADMIN — Medication 325 MILLIGRAM(S): at 16:05

## 2017-12-18 RX ADMIN — CLOZAPINE 50 MILLIGRAM(S): 150 TABLET, ORALLY DISINTEGRATING ORAL at 12:06

## 2017-12-18 RX ADMIN — Medication 650 MILLIGRAM(S): at 00:15

## 2017-12-18 RX ADMIN — Medication 325 MILLIGRAM(S): at 14:33

## 2017-12-18 RX ADMIN — CARBIDOPA AND LEVODOPA 1 TABLET(S): 25; 100 TABLET ORAL at 05:37

## 2017-12-18 NOTE — PROGRESS NOTE BEHAVIORAL HEALTH - SUMMARY
53 yo male with early onset Parkinson’s disease x 11 years, discharged from 4 prior nursing homes in 8 months due to his behaviors, hx of refusing medications,  transferred to [facility] Unit [facility]S on 6/12/17 where he manifested severe mood lability, paranoia and confabulation with poor insight and judgment including making > 40 Justice center complaints. 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 Patient was accepted to [facility] in [place] and discharged from [facility] on 7/12/17.  Upon getting to [facility], Patient refused to go into the facility and became combative. He was thus transferred back to [facility] ED which resulted in Service Line Chiefs’ of Service involvement given the history/issues and ultimate transfer to [address]. Patient has been on Unit 1N since. UPDATE: [facility] declined to accept Patient; awaiting review/answer to other places applied to. Court hearing scheduled for tomorrow (12/19/17) for expansion of guardian del castillo including placement and Patient is scheduled to be transported there.

## 2017-12-18 NOTE — PROGRESS NOTE BEHAVIORAL HEALTH - NSBHFUPINTERVALHXFT_PSY_A_CORE
Patient seen for paranoia, disorganized behavior. Chart reviewed.  Significant interval events: Patient refused his medications this morning and was yelling at his nurse; will try again later as he has before done this and then later changed his mind and took meds. As per staff, his guardian reportedly sent some clothing for him to the Unit to wear for this Court hearing tomorrow. Awaiting package.  Patient has the same psychiatric clinical presentation and continues to be encouraged to elevate his legs for the LE edema which he usually does not do but he does independently get up and walk around the unit, in and out of his room throughout the day.

## 2017-12-19 PROCEDURE — 12345: CPT | Mod: NC

## 2017-12-19 RX ADMIN — CARBIDOPA AND LEVODOPA 1 TABLET(S): 25; 100 TABLET ORAL at 21:33

## 2017-12-19 RX ADMIN — CARBIDOPA, LEVODOPA, AND ENTACAPONE 1 TABLET(S): 50; 200; 200 TABLET, FILM COATED ORAL at 11:05

## 2017-12-19 RX ADMIN — CLOZAPINE 50 MILLIGRAM(S): 150 TABLET, ORALLY DISINTEGRATING ORAL at 11:05

## 2017-12-19 RX ADMIN — CARBIDOPA AND LEVODOPA 1 TABLET(S): 25; 100 TABLET ORAL at 06:06

## 2017-12-19 RX ADMIN — Medication 325 MILLIGRAM(S): at 15:51

## 2017-12-19 RX ADMIN — CARBIDOPA, LEVODOPA, AND ENTACAPONE 1 TABLET(S): 50; 200; 200 TABLET, FILM COATED ORAL at 06:06

## 2017-12-19 RX ADMIN — CARBIDOPA AND LEVODOPA 1 TABLET(S): 25; 100 TABLET ORAL at 11:05

## 2017-12-19 RX ADMIN — CARBIDOPA, LEVODOPA, AND ENTACAPONE 1 TABLET(S): 50; 200; 200 TABLET, FILM COATED ORAL at 16:23

## 2017-12-19 RX ADMIN — CARBIDOPA AND LEVODOPA 1 TABLET(S): 25; 100 TABLET ORAL at 16:23

## 2017-12-19 RX ADMIN — Medication 325 MILLIGRAM(S): at 22:04

## 2017-12-19 NOTE — PROGRESS NOTE BEHAVIORAL HEALTH - SUMMARY
53 yo male with early onset Parkinson’s disease x 11 years, discharged from 4 prior nursing homes in 8 months due to his behaviors, hx of refusing medications,  transferred to [facility] Unit 2S on 6/12/17 where he manifested severe mood lability, paranoia and confabulation with poor insight and judgment including making > 40 Justice center complaints. 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 Patient was accepted to [facility] in [place] and discharged from [facility] on 7/12/17.  Upon getting to [facility], Patient refused to go into the facility and became combative. He was thus transferred back to [facility] ED which resulted in Service Line Chiefs’ of Service involvement given the history/issues and ultimate transfer to [address]. Patient has been on Unit 1N since. UPDATE: [facility] declined to accept Patient; awaiting review/answer to other places applied to.   Court hearing rescheduled for  (01/09/17) for expansion of guardian del castillo including placement

## 2017-12-19 NOTE — CHART NOTE - NSCHARTNOTEFT_GEN_A_CORE
Requested by staff to fill out EMS sheet for 4 point restraints for transport.  Indication: patient and staff safety.  Paperwork filled out.  Discussed with staff.  In the end, patient too agitated and transport cancelled.  Further mgmt per Psych team.

## 2017-12-20 LAB
BASOPHILS # BLD AUTO: 0 K/UL — SIGNIFICANT CHANGE UP (ref 0–0.2)
BASOPHILS NFR BLD AUTO: 0.9 % — SIGNIFICANT CHANGE UP (ref 0–2)
EOSINOPHIL # BLD AUTO: 0.1 K/UL — SIGNIFICANT CHANGE UP (ref 0–0.5)
EOSINOPHIL NFR BLD AUTO: 2.5 % — SIGNIFICANT CHANGE UP (ref 0–6)
HCT VFR BLD CALC: 38 % — LOW (ref 39–50)
HGB BLD-MCNC: 12.6 G/DL — LOW (ref 13–17)
LYMPHOCYTES # BLD AUTO: 1.1 K/UL — SIGNIFICANT CHANGE UP (ref 1–3.3)
LYMPHOCYTES # BLD AUTO: 21.9 % — SIGNIFICANT CHANGE UP (ref 13–44)
MCHC RBC-ENTMCNC: 31.4 PG — SIGNIFICANT CHANGE UP (ref 27–34)
MCHC RBC-ENTMCNC: 33 GM/DL — SIGNIFICANT CHANGE UP (ref 32–36)
MCV RBC AUTO: 95 FL — SIGNIFICANT CHANGE UP (ref 80–100)
MONOCYTES # BLD AUTO: 0.5 K/UL — SIGNIFICANT CHANGE UP (ref 0–0.9)
MONOCYTES NFR BLD AUTO: 9.1 % — SIGNIFICANT CHANGE UP (ref 2–14)
NEUTROPHILS # BLD AUTO: 3.3 K/UL — SIGNIFICANT CHANGE UP (ref 1.8–7.4)
NEUTROPHILS NFR BLD AUTO: 65.7 % — SIGNIFICANT CHANGE UP (ref 43–77)
PLATELET # BLD AUTO: 173 K/UL — SIGNIFICANT CHANGE UP (ref 150–400)
RBC # BLD: 4 M/UL — LOW (ref 4.2–5.8)
RBC # FLD: 13 % — SIGNIFICANT CHANGE UP (ref 10.3–14.5)
WBC # BLD: 5.1 K/UL — SIGNIFICANT CHANGE UP (ref 3.8–10.5)
WBC # FLD AUTO: 5.1 K/UL — SIGNIFICANT CHANGE UP (ref 3.8–10.5)

## 2017-12-20 PROCEDURE — 99232 SBSQ HOSP IP/OBS MODERATE 35: CPT

## 2017-12-20 RX ADMIN — CARBIDOPA AND LEVODOPA 1 TABLET(S): 25; 100 TABLET ORAL at 16:09

## 2017-12-20 RX ADMIN — CARBIDOPA, LEVODOPA, AND ENTACAPONE 1 TABLET(S): 50; 200; 200 TABLET, FILM COATED ORAL at 11:01

## 2017-12-20 RX ADMIN — CYCLOBENZAPRINE HYDROCHLORIDE 10 MILLIGRAM(S): 10 TABLET, FILM COATED ORAL at 21:03

## 2017-12-20 RX ADMIN — CARBIDOPA, LEVODOPA, AND ENTACAPONE 1 TABLET(S): 50; 200; 200 TABLET, FILM COATED ORAL at 16:09

## 2017-12-20 RX ADMIN — CARBIDOPA, LEVODOPA, AND ENTACAPONE 1 TABLET(S): 50; 200; 200 TABLET, FILM COATED ORAL at 06:20

## 2017-12-20 RX ADMIN — CARBIDOPA AND LEVODOPA 1 TABLET(S): 25; 100 TABLET ORAL at 06:20

## 2017-12-20 RX ADMIN — CLOZAPINE 50 MILLIGRAM(S): 150 TABLET, ORALLY DISINTEGRATING ORAL at 11:01

## 2017-12-20 RX ADMIN — CARBIDOPA AND LEVODOPA 1 TABLET(S): 25; 100 TABLET ORAL at 11:01

## 2017-12-20 RX ADMIN — Medication 325 MILLIGRAM(S): at 19:21

## 2017-12-20 RX ADMIN — CARBIDOPA AND LEVODOPA 1 TABLET(S): 25; 100 TABLET ORAL at 20:22

## 2017-12-20 RX ADMIN — Medication 325 MILLIGRAM(S): at 19:39

## 2017-12-20 NOTE — PROGRESS NOTE BEHAVIORAL HEALTH - SUMMARY
53 yo male with early onset Parkinson’s disease x 11 years, discharged from 4 prior nursing homes in 8 months due to his behaviors, hx of refusing medications,  transferred to [facility] Unit [facility] on 6/12/17 where he manifested severe mood lability, paranoia and confabulation with poor insight and judgment including making > 40 Justice center complaints. 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 Patient was accepted to [facility] in [place] and discharged from [facility] on 7/12/17.  Upon getting to [facility], Patient refused to go into the facility and became combative. He was thus transferred back to [facility] ED which resulted in Service Line Chiefs’ of Service involvement given the history/issues and ultimate transfer to [address]. Patient has been on Unit 1N since. UPDATE: [facility] declined to accept Patient; awaiting review/answer to other places applied to. Court hearing scheduled for tomorrow (12/19/17) for expansion of guardian del castillo including placement and Patient is scheduled to be transported there.
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
## 2017-12-21 PROCEDURE — 12345: CPT | Mod: NC

## 2017-12-21 PROCEDURE — 99231 SBSQ HOSP IP/OBS SF/LOW 25: CPT

## 2017-12-21 RX ORDER — ACETAMINOPHEN 500 MG
325 TABLET ORAL ONCE
Qty: 0 | Refills: 0 | Status: COMPLETED | OUTPATIENT
Start: 2017-12-21 | End: 2017-12-21

## 2017-12-21 RX ADMIN — CARBIDOPA AND LEVODOPA 1 TABLET(S): 25; 100 TABLET ORAL at 20:22

## 2017-12-21 RX ADMIN — Medication 325 MILLIGRAM(S): at 19:42

## 2017-12-21 RX ADMIN — CARBIDOPA, LEVODOPA, AND ENTACAPONE 1 TABLET(S): 50; 200; 200 TABLET, FILM COATED ORAL at 16:05

## 2017-12-21 RX ADMIN — Medication 325 MILLIGRAM(S): at 20:42

## 2017-12-21 RX ADMIN — CARBIDOPA AND LEVODOPA 1 TABLET(S): 25; 100 TABLET ORAL at 11:20

## 2017-12-21 RX ADMIN — CARBIDOPA AND LEVODOPA 1 TABLET(S): 25; 100 TABLET ORAL at 06:36

## 2017-12-21 RX ADMIN — CARBIDOPA, LEVODOPA, AND ENTACAPONE 1 TABLET(S): 50; 200; 200 TABLET, FILM COATED ORAL at 11:20

## 2017-12-21 RX ADMIN — Medication 325 MILLIGRAM(S): at 21:48

## 2017-12-21 RX ADMIN — CYCLOBENZAPRINE HYDROCHLORIDE 10 MILLIGRAM(S): 10 TABLET, FILM COATED ORAL at 19:43

## 2017-12-21 RX ADMIN — CARBIDOPA, LEVODOPA, AND ENTACAPONE 1 TABLET(S): 50; 200; 200 TABLET, FILM COATED ORAL at 06:36

## 2017-12-21 RX ADMIN — CLOZAPINE 50 MILLIGRAM(S): 150 TABLET, ORALLY DISINTEGRATING ORAL at 10:19

## 2017-12-21 RX ADMIN — CARBIDOPA AND LEVODOPA 1 TABLET(S): 25; 100 TABLET ORAL at 16:05

## 2017-12-21 NOTE — PROGRESS NOTE BEHAVIORAL HEALTH - SUMMARY
53 yo male with early onset Parkinson’s disease x 11 years, discharged from 4 prior nursing homes in 8 months due to his behaviors, hx of refusing medications,  transferred to [facility] Unit 2S on 6/12/17 where he manifested severe mood lability, paranoia and confabulation with poor insight and judgment including making > 40 Justice center complaints. 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 Patient was accepted to [facility] in [place] and discharged from [facility] on 7/12/17.  Upon getting to [facility], Patient refused to go into the facility and became combative. He was thus transferred back to [facility] ED which resulted in Service Line Chiefs’ of Service involvement given the history/issues and ultimate transfer to [address]. Patient has been on Unit 1N since. UPDATE: [facility] declined to accept Patient; awaiting review/answer to other places applied to. Court hearing scheduled for tomorrow (12/19/17) for expansion of guardian del castillo including placement and Patient is scheduled to be transported there.

## 2017-12-21 NOTE — PROGRESS NOTE BEHAVIORAL HEALTH - NSBHFUPINTERVALHXFT_PSY_A_CORE
Patient seen for paranoia, disorganized behavior. Chart reviewed.  Sitting in chair outside his room this morning yelling about the room being hot. He was told to move to a cooler section - refused. Patient later urinated on the floor in front of his chair then yelling "because you did not give a urinal." Same psychiatric clinical presentation and continues to be encouraged to elevate his legs for the LE edema which he usually does not do but he does independently get up and walk around the unit, in and out of his room throughout the day. Seems to be tolerating the increased clozapine dose; no noted adverse side effects.
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
## 2017-12-22 RX ADMIN — CARBIDOPA AND LEVODOPA 1 TABLET(S): 25; 100 TABLET ORAL at 06:26

## 2017-12-22 RX ADMIN — Medication 325 MILLIGRAM(S): at 16:30

## 2017-12-22 RX ADMIN — CARBIDOPA AND LEVODOPA 1 TABLET(S): 25; 100 TABLET ORAL at 11:31

## 2017-12-22 RX ADMIN — Medication 325 MILLIGRAM(S): at 23:00

## 2017-12-22 RX ADMIN — CARBIDOPA, LEVODOPA, AND ENTACAPONE 1 TABLET(S): 50; 200; 200 TABLET, FILM COATED ORAL at 11:31

## 2017-12-22 RX ADMIN — Medication 325 MILLIGRAM(S): at 15:41

## 2017-12-22 RX ADMIN — CARBIDOPA AND LEVODOPA 1 TABLET(S): 25; 100 TABLET ORAL at 20:28

## 2017-12-22 RX ADMIN — CARBIDOPA, LEVODOPA, AND ENTACAPONE 1 TABLET(S): 50; 200; 200 TABLET, FILM COATED ORAL at 06:26

## 2017-12-22 RX ADMIN — CARBIDOPA, LEVODOPA, AND ENTACAPONE 1 TABLET(S): 50; 200; 200 TABLET, FILM COATED ORAL at 16:24

## 2017-12-22 RX ADMIN — Medication 325 MILLIGRAM(S): at 21:51

## 2017-12-22 RX ADMIN — CLOZAPINE 50 MILLIGRAM(S): 150 TABLET, ORALLY DISINTEGRATING ORAL at 11:31

## 2017-12-22 RX ADMIN — CARBIDOPA AND LEVODOPA 1 TABLET(S): 25; 100 TABLET ORAL at 16:24

## 2017-12-22 NOTE — PROGRESS NOTE BEHAVIORAL HEALTH - NSBHFUPINTERVALHXFT_PSY_A_CORE
Patient seen for paranoia, disorganized behavior. Chart reviewed.  Refused Lovenox again today despite extensive psychoeducation and explanation of risks and benefits. Internist informed. Seems to be tolerating the increased clozapine dose; no noted adverse side effects.

## 2017-12-22 NOTE — PROGRESS NOTE BEHAVIORAL HEALTH - SUMMARY
51 yo male with early onset Parkinson’s disease x 11 years, discharged from 4 prior nursing homes in 8 months due to his behaviors, hx of refusing medications,  transferred to [facility] Unit 2S on 6/12/17 where he manifested severe mood lability, paranoia and confabulation with poor insight and judgment including making > 40 Justice center complaints. 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 Patient was accepted to [facility] in [place] and discharged from [facility] on 7/12/17.  Upon getting to [facility], Patient refused to go into the facility and became combative. He was thus transferred back to [facility] ED which resulted in Service Line Chiefs’ of Service involvement given the history/issues and ultimate transfer to [address]. Patient has been on Unit 1N since. UPDATE: [facility] declined to accept Patient; awaiting review/answer to other places applied to. Court hearing scheduled for tomorrow (12/19/17) for expansion of guardian del castillo including placement and Patient is scheduled to be transported there.

## 2017-12-22 NOTE — PROGRESS NOTE BEHAVIORAL HEALTH - NSBHADMITMEDEDUDETAILS_A_CORE FT
continue clozapine 50mg PO qd for seemingly worsening paranoia. follow up with Internist regarding possible alternative to SQ LOvenox, maybe something PO

## 2017-12-23 RX ADMIN — CLOZAPINE 50 MILLIGRAM(S): 150 TABLET, ORALLY DISINTEGRATING ORAL at 11:14

## 2017-12-23 RX ADMIN — CARBIDOPA AND LEVODOPA 1 TABLET(S): 25; 100 TABLET ORAL at 11:14

## 2017-12-23 RX ADMIN — CARBIDOPA, LEVODOPA, AND ENTACAPONE 1 TABLET(S): 50; 200; 200 TABLET, FILM COATED ORAL at 16:18

## 2017-12-23 RX ADMIN — Medication 325 MILLIGRAM(S): at 22:47

## 2017-12-23 RX ADMIN — CARBIDOPA, LEVODOPA, AND ENTACAPONE 1 TABLET(S): 50; 200; 200 TABLET, FILM COATED ORAL at 06:00

## 2017-12-23 RX ADMIN — CARBIDOPA AND LEVODOPA 1 TABLET(S): 25; 100 TABLET ORAL at 21:16

## 2017-12-23 RX ADMIN — Medication 325 MILLIGRAM(S): at 23:47

## 2017-12-23 RX ADMIN — Medication 325 MILLIGRAM(S): at 00:16

## 2017-12-23 RX ADMIN — CARBIDOPA, LEVODOPA, AND ENTACAPONE 1 TABLET(S): 50; 200; 200 TABLET, FILM COATED ORAL at 11:14

## 2017-12-23 RX ADMIN — Medication 30 MILLILITER(S): at 15:08

## 2017-12-23 RX ADMIN — CARBIDOPA AND LEVODOPA 1 TABLET(S): 25; 100 TABLET ORAL at 06:00

## 2017-12-23 RX ADMIN — CARBIDOPA AND LEVODOPA 1 TABLET(S): 25; 100 TABLET ORAL at 16:18

## 2017-12-24 RX ADMIN — Medication 325 MILLIGRAM(S): at 23:30

## 2017-12-24 RX ADMIN — CARBIDOPA AND LEVODOPA 1 TABLET(S): 25; 100 TABLET ORAL at 21:33

## 2017-12-24 RX ADMIN — CARBIDOPA, LEVODOPA, AND ENTACAPONE 1 TABLET(S): 50; 200; 200 TABLET, FILM COATED ORAL at 06:00

## 2017-12-24 RX ADMIN — CARBIDOPA, LEVODOPA, AND ENTACAPONE 1 TABLET(S): 50; 200; 200 TABLET, FILM COATED ORAL at 11:10

## 2017-12-24 RX ADMIN — Medication 325 MILLIGRAM(S): at 22:59

## 2017-12-24 RX ADMIN — CARBIDOPA, LEVODOPA, AND ENTACAPONE 1 TABLET(S): 50; 200; 200 TABLET, FILM COATED ORAL at 16:11

## 2017-12-24 RX ADMIN — CARBIDOPA AND LEVODOPA 1 TABLET(S): 25; 100 TABLET ORAL at 11:10

## 2017-12-24 RX ADMIN — CLOZAPINE 50 MILLIGRAM(S): 150 TABLET, ORALLY DISINTEGRATING ORAL at 11:10

## 2017-12-24 RX ADMIN — CARBIDOPA AND LEVODOPA 1 TABLET(S): 25; 100 TABLET ORAL at 06:00

## 2017-12-24 RX ADMIN — CARBIDOPA AND LEVODOPA 1 TABLET(S): 25; 100 TABLET ORAL at 16:11

## 2017-12-25 RX ADMIN — CARBIDOPA, LEVODOPA, AND ENTACAPONE 1 TABLET(S): 50; 200; 200 TABLET, FILM COATED ORAL at 05:36

## 2017-12-25 RX ADMIN — CARBIDOPA, LEVODOPA, AND ENTACAPONE 1 TABLET(S): 50; 200; 200 TABLET, FILM COATED ORAL at 16:42

## 2017-12-25 RX ADMIN — CARBIDOPA AND LEVODOPA 1 TABLET(S): 25; 100 TABLET ORAL at 10:49

## 2017-12-25 RX ADMIN — Medication 325 MILLIGRAM(S): at 22:56

## 2017-12-25 RX ADMIN — CARBIDOPA, LEVODOPA, AND ENTACAPONE 1 TABLET(S): 50; 200; 200 TABLET, FILM COATED ORAL at 10:49

## 2017-12-25 RX ADMIN — CARBIDOPA AND LEVODOPA 1 TABLET(S): 25; 100 TABLET ORAL at 05:36

## 2017-12-25 RX ADMIN — CARBIDOPA AND LEVODOPA 1 TABLET(S): 25; 100 TABLET ORAL at 16:43

## 2017-12-25 RX ADMIN — CLOZAPINE 50 MILLIGRAM(S): 150 TABLET, ORALLY DISINTEGRATING ORAL at 13:17

## 2017-12-25 RX ADMIN — Medication 325 MILLIGRAM(S): at 06:00

## 2017-12-25 RX ADMIN — Medication 325 MILLIGRAM(S): at 23:56

## 2017-12-25 RX ADMIN — CARBIDOPA AND LEVODOPA 1 TABLET(S): 25; 100 TABLET ORAL at 21:20

## 2017-12-25 RX ADMIN — Medication 325 MILLIGRAM(S): at 05:36

## 2017-12-26 PROCEDURE — 99231 SBSQ HOSP IP/OBS SF/LOW 25: CPT

## 2017-12-26 RX ORDER — APIXABAN 2.5 MG/1
5 TABLET, FILM COATED ORAL EVERY 12 HOURS
Qty: 0 | Refills: 0 | Status: DISCONTINUED | OUTPATIENT
Start: 2017-12-26 | End: 2017-12-27

## 2017-12-26 RX ADMIN — CARBIDOPA, LEVODOPA, AND ENTACAPONE 1 TABLET(S): 50; 200; 200 TABLET, FILM COATED ORAL at 06:24

## 2017-12-26 RX ADMIN — CYCLOBENZAPRINE HYDROCHLORIDE 10 MILLIGRAM(S): 10 TABLET, FILM COATED ORAL at 16:48

## 2017-12-26 RX ADMIN — Medication 325 MILLIGRAM(S): at 15:42

## 2017-12-26 RX ADMIN — CARBIDOPA AND LEVODOPA 1 TABLET(S): 25; 100 TABLET ORAL at 06:24

## 2017-12-26 RX ADMIN — CLOZAPINE 50 MILLIGRAM(S): 150 TABLET, ORALLY DISINTEGRATING ORAL at 12:59

## 2017-12-26 RX ADMIN — CARBIDOPA AND LEVODOPA 1 TABLET(S): 25; 100 TABLET ORAL at 11:18

## 2017-12-26 RX ADMIN — CARBIDOPA, LEVODOPA, AND ENTACAPONE 1 TABLET(S): 50; 200; 200 TABLET, FILM COATED ORAL at 11:18

## 2017-12-26 RX ADMIN — Medication 325 MILLIGRAM(S): at 23:05

## 2017-12-26 RX ADMIN — CARBIDOPA AND LEVODOPA 1 TABLET(S): 25; 100 TABLET ORAL at 16:16

## 2017-12-26 RX ADMIN — CARBIDOPA AND LEVODOPA 1 TABLET(S): 25; 100 TABLET ORAL at 21:24

## 2017-12-26 RX ADMIN — Medication 325 MILLIGRAM(S): at 14:58

## 2017-12-26 RX ADMIN — CARBIDOPA, LEVODOPA, AND ENTACAPONE 1 TABLET(S): 50; 200; 200 TABLET, FILM COATED ORAL at 16:17

## 2017-12-26 NOTE — PROGRESS NOTE BEHAVIORAL HEALTH - SUMMARY
51 yo male with early onset Parkinson’s disease x 11 years, discharged from 4 prior nursing homes in 8 months due to his behaviors, hx of refusing medications,  transferred to [facility] Unit 2S on 6/12/17 where he manifested severe mood lability, paranoia and confabulation with poor insight and judgment including making > 40 Justice center complaints. 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 Patient was accepted to [facility] in [place] and discharged from [facility] on 7/12/17.  Upon getting to [facility], Patient refused to go into the facility and became combative. He was thus transferred back to [facility] ED which resulted in Service Line Chiefs’ of Service involvement given the history/issues and ultimate transfer to [address]. Patient has been on Unit 1N since. UPDATE: [facility] declined to accept Patient; awaiting review/answer to other places applied to. Court that was scheduled on 12/19/17 was deferred for a later date in January as Patient refused to go.
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
## 2017-12-26 NOTE — PROGRESS NOTE BEHAVIORAL HEALTH - NSBHFUPINTERVALHXFT_PSY_A_CORE
Patient seen for paranoia, disorganized behavior. Chart reviewed.  Refused Lovenox again, Internist aware. Encouraging ambulation which he does intermittently throughout the day. He still has to be encouraged to elevate his lower extremity. Again, extensive psychoeducation and explanation of risks and benefits. Seems to be tolerating the increased clozapine dose; no noted adverse side effects.

## 2017-12-27 PROCEDURE — 99231 SBSQ HOSP IP/OBS SF/LOW 25: CPT

## 2017-12-27 RX ORDER — POTASSIUM CHLORIDE 20 MEQ
10 PACKET (EA) ORAL DAILY
Qty: 0 | Refills: 0 | Status: COMPLETED | OUTPATIENT
Start: 2017-12-27 | End: 2017-12-30

## 2017-12-27 RX ORDER — FUROSEMIDE 40 MG
20 TABLET ORAL DAILY
Qty: 0 | Refills: 0 | Status: DISCONTINUED | OUTPATIENT
Start: 2017-12-27 | End: 2017-12-27

## 2017-12-27 RX ORDER — FUROSEMIDE 40 MG
20 TABLET ORAL
Qty: 0 | Refills: 0 | Status: COMPLETED | OUTPATIENT
Start: 2017-12-28 | End: 2017-12-30

## 2017-12-27 RX ADMIN — Medication 325 MILLIGRAM(S): at 00:29

## 2017-12-27 RX ADMIN — Medication 325 MILLIGRAM(S): at 22:34

## 2017-12-27 RX ADMIN — CARBIDOPA AND LEVODOPA 1 TABLET(S): 25; 100 TABLET ORAL at 22:34

## 2017-12-27 RX ADMIN — CARBIDOPA, LEVODOPA, AND ENTACAPONE 1 TABLET(S): 50; 200; 200 TABLET, FILM COATED ORAL at 06:01

## 2017-12-27 RX ADMIN — Medication 325 MILLIGRAM(S): at 23:30

## 2017-12-27 RX ADMIN — CARBIDOPA, LEVODOPA, AND ENTACAPONE 1 TABLET(S): 50; 200; 200 TABLET, FILM COATED ORAL at 11:03

## 2017-12-27 RX ADMIN — CARBIDOPA, LEVODOPA, AND ENTACAPONE 1 TABLET(S): 50; 200; 200 TABLET, FILM COATED ORAL at 16:21

## 2017-12-27 RX ADMIN — CARBIDOPA AND LEVODOPA 1 TABLET(S): 25; 100 TABLET ORAL at 06:01

## 2017-12-27 RX ADMIN — CARBIDOPA AND LEVODOPA 1 TABLET(S): 25; 100 TABLET ORAL at 16:21

## 2017-12-27 RX ADMIN — CLOZAPINE 50 MILLIGRAM(S): 150 TABLET, ORALLY DISINTEGRATING ORAL at 11:03

## 2017-12-27 RX ADMIN — CARBIDOPA AND LEVODOPA 1 TABLET(S): 25; 100 TABLET ORAL at 11:03

## 2017-12-27 NOTE — PROGRESS NOTE BEHAVIORAL HEALTH - SUMMARY
53 yo male with early onset Parkinson’s disease x 11 years, discharged from 4 prior nursing homes in 8 months due to his behaviors, hx of refusing medications,  transferred to [facility] Unit 2S on 6/12/17 where he manifested severe mood lability, paranoia and confabulation with poor insight and judgment including making > 40 Justice center complaints. 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 Patient was accepted to [facility] in [place] and discharged from [facility] on 7/12/17.  Upon getting to [facility], Patient refused to go into the facility and became combative. He was thus transferred back to [facility] ED which resulted in Service Line Chiefs’ of Service involvement given the history/issues and ultimate transfer to [address]. Patient has been on Unit 1N since. UPDATE: [facility] declined to accept Patient; awaiting review/answer to other places applied to. Court that was scheduled on 12/19/17 was deferred for a later date in January as Patient refused to go.

## 2017-12-27 NOTE — PROGRESS NOTE BEHAVIORAL HEALTH - NSBHFUPINTERVALHXFT_PSY_A_CORE
Patient seen for paranoia, disorganized behavior. Chart reviewed.  Refused Lovenox, then changed aspirin which he refused then Xarelto which he again refused saying he does not want more pills. The stroke risk explicitly explained to Patient who seemingly understood. Plan; Encouraging ambulation more frequently and to offer compression stockings. Continue to encourage to elevate his lower extremity. Again, extensive psychoeducation and explanation of risks and benefits. Seems to be tolerating the increased clozapine dose; no noted adverse side effects.

## 2017-12-28 PROCEDURE — 99231 SBSQ HOSP IP/OBS SF/LOW 25: CPT

## 2017-12-28 RX ADMIN — CARBIDOPA, LEVODOPA, AND ENTACAPONE 1 TABLET(S): 50; 200; 200 TABLET, FILM COATED ORAL at 06:24

## 2017-12-28 RX ADMIN — CARBIDOPA AND LEVODOPA 1 TABLET(S): 25; 100 TABLET ORAL at 06:23

## 2017-12-28 RX ADMIN — Medication 325 MILLIGRAM(S): at 04:04

## 2017-12-28 RX ADMIN — CARBIDOPA, LEVODOPA, AND ENTACAPONE 1 TABLET(S): 50; 200; 200 TABLET, FILM COATED ORAL at 16:37

## 2017-12-28 RX ADMIN — CARBIDOPA AND LEVODOPA 1 TABLET(S): 25; 100 TABLET ORAL at 16:37

## 2017-12-28 RX ADMIN — CARBIDOPA AND LEVODOPA 1 TABLET(S): 25; 100 TABLET ORAL at 21:25

## 2017-12-28 RX ADMIN — Medication 325 MILLIGRAM(S): at 19:30

## 2017-12-28 RX ADMIN — CLOZAPINE 50 MILLIGRAM(S): 150 TABLET, ORALLY DISINTEGRATING ORAL at 11:43

## 2017-12-28 RX ADMIN — Medication 325 MILLIGRAM(S): at 18:23

## 2017-12-28 RX ADMIN — Medication 20 MILLIGRAM(S): at 06:23

## 2017-12-28 RX ADMIN — CYCLOBENZAPRINE HYDROCHLORIDE 10 MILLIGRAM(S): 10 TABLET, FILM COATED ORAL at 21:25

## 2017-12-28 RX ADMIN — CARBIDOPA, LEVODOPA, AND ENTACAPONE 1 TABLET(S): 50; 200; 200 TABLET, FILM COATED ORAL at 11:43

## 2017-12-28 RX ADMIN — CARBIDOPA AND LEVODOPA 1 TABLET(S): 25; 100 TABLET ORAL at 11:43

## 2017-12-28 NOTE — PROGRESS NOTE BEHAVIORAL HEALTH - SUMMARY
51 yo male with early onset Parkinson’s disease x 11 years, discharged from 4 prior nursing homes in 8 months due to his behaviors, hx of refusing medications,  transferred to [facility] Unit 2S on 6/12/17 where he manifested severe mood lability, paranoia and confabulation with poor insight and judgment including making > 40 Justice center complaints. 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 Patient was accepted to [facility] in [place] and discharged from [facility] on 7/12/17.  Upon getting to [facility], Patient refused to go into the facility and became combative. He was thus transferred back to [facility] ED which resulted in Service Line Chiefs’ of Service involvement given the history/issues and ultimate transfer to [address]. Patient has been on Unit 1N since. UPDATE: [facility] declined to accept Patient; awaiting review/answer to other places applied to. Court that was scheduled on 12/19/17 was deferred for a later date in January as Patient refused to go. His niece from [place] did not call back/contact Treatment Team.

## 2017-12-28 NOTE — PROGRESS NOTE BEHAVIORAL HEALTH - NSBHFUPINTERVALHXFT_PSY_A_CORE
Patient seen for paranoia, disorganized behavior. Chart reviewed.  Patient finally agreed to compression stockings and was out on yesterday. He started Lasix today. Continue encourage ambulation more frequently and to offer compression stockings. Continue to encourage to elevate his lower extremity. Again, extensive psychoeducation and explanation of risks and benefits. Seems to be tolerating the increased clozapine dose; no noted adverse side effects.

## 2017-12-29 PROCEDURE — 99231 SBSQ HOSP IP/OBS SF/LOW 25: CPT

## 2017-12-29 RX ADMIN — Medication 325 MILLIGRAM(S): at 21:00

## 2017-12-29 RX ADMIN — CARBIDOPA, LEVODOPA, AND ENTACAPONE 1 TABLET(S): 50; 200; 200 TABLET, FILM COATED ORAL at 06:23

## 2017-12-29 RX ADMIN — CLOZAPINE 50 MILLIGRAM(S): 150 TABLET, ORALLY DISINTEGRATING ORAL at 10:29

## 2017-12-29 RX ADMIN — CARBIDOPA AND LEVODOPA 1 TABLET(S): 25; 100 TABLET ORAL at 20:25

## 2017-12-29 RX ADMIN — CARBIDOPA, LEVODOPA, AND ENTACAPONE 1 TABLET(S): 50; 200; 200 TABLET, FILM COATED ORAL at 16:44

## 2017-12-29 RX ADMIN — CYCLOBENZAPRINE HYDROCHLORIDE 10 MILLIGRAM(S): 10 TABLET, FILM COATED ORAL at 22:51

## 2017-12-29 RX ADMIN — CARBIDOPA AND LEVODOPA 1 TABLET(S): 25; 100 TABLET ORAL at 11:01

## 2017-12-29 RX ADMIN — CARBIDOPA AND LEVODOPA 1 TABLET(S): 25; 100 TABLET ORAL at 06:23

## 2017-12-29 RX ADMIN — Medication 325 MILLIGRAM(S): at 20:25

## 2017-12-29 RX ADMIN — CARBIDOPA AND LEVODOPA 1 TABLET(S): 25; 100 TABLET ORAL at 16:44

## 2017-12-29 RX ADMIN — Medication 325 MILLIGRAM(S): at 11:27

## 2017-12-29 RX ADMIN — Medication 20 MILLIGRAM(S): at 06:24

## 2017-12-29 RX ADMIN — CARBIDOPA, LEVODOPA, AND ENTACAPONE 1 TABLET(S): 50; 200; 200 TABLET, FILM COATED ORAL at 11:01

## 2017-12-29 RX ADMIN — Medication 325 MILLIGRAM(S): at 10:49

## 2017-12-29 NOTE — PROGRESS NOTE BEHAVIORAL HEALTH - NSBHFUPINTERVALHXFT_PSY_A_CORE
Patient seen for paranoia, disorganized behavior. Chart reviewed.  Patient continues to be encouraged the compression stocking; he refused the potassium supplement but is eating a banana daily. Continue encourage ambulation more frequently and to offer compression stockings. Continue to encourage to elevate his lower extremity. Again, extensive psychoeducation and explanation of risks and benefits. Seems to be tolerating the increased clozapine dose; no noted adverse side effects.

## 2017-12-29 NOTE — PROGRESS NOTE BEHAVIORAL HEALTH - SUMMARY
53 yo male with early onset Parkinson’s disease x 11 years, discharged from 4 prior nursing homes in 8 months due to his behaviors, hx of refusing medications,  transferred to [facility] Unit 2S on 6/12/17 where he manifested severe mood lability, paranoia and confabulation with poor insight and judgment including making > 40 Justice center complaints. 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 Patient was accepted to [facility] in [place] and discharged from [facility] on 7/12/17.  Upon getting to [facility], Patient refused to go into the facility and became combative. He was thus transferred back to [facility] ED which resulted in Service Line Chiefs’ of Service involvement given the history/issues and ultimate transfer to [address]. Patient has been on Unit 1N since. UPDATE: [facility] declined to accept Patient; awaiting review/answer to other places applied to. Court that was scheduled on 12/19/17 was deferred for a later date in January as Patient refused to go. His niece from [place] did not call back/contact Treatment Team.

## 2017-12-30 RX ORDER — ACETAMINOPHEN 500 MG
325 TABLET ORAL ONCE
Qty: 0 | Refills: 0 | Status: COMPLETED | OUTPATIENT
Start: 2017-12-30 | End: 2017-12-30

## 2017-12-30 RX ADMIN — Medication 325 MILLIGRAM(S): at 05:15

## 2017-12-30 RX ADMIN — CARBIDOPA AND LEVODOPA 1 TABLET(S): 25; 100 TABLET ORAL at 16:12

## 2017-12-30 RX ADMIN — Medication 20 MILLIGRAM(S): at 06:09

## 2017-12-30 RX ADMIN — Medication 325 MILLIGRAM(S): at 22:48

## 2017-12-30 RX ADMIN — CARBIDOPA AND LEVODOPA 1 TABLET(S): 25; 100 TABLET ORAL at 20:12

## 2017-12-30 RX ADMIN — Medication 325 MILLIGRAM(S): at 04:42

## 2017-12-30 RX ADMIN — Medication 325 MILLIGRAM(S): at 19:43

## 2017-12-30 RX ADMIN — Medication 325 MILLIGRAM(S): at 23:01

## 2017-12-30 RX ADMIN — Medication 325 MILLIGRAM(S): at 20:12

## 2017-12-30 RX ADMIN — CARBIDOPA AND LEVODOPA 1 TABLET(S): 25; 100 TABLET ORAL at 11:28

## 2017-12-30 RX ADMIN — CARBIDOPA, LEVODOPA, AND ENTACAPONE 1 TABLET(S): 50; 200; 200 TABLET, FILM COATED ORAL at 16:12

## 2017-12-30 RX ADMIN — CLOZAPINE 50 MILLIGRAM(S): 150 TABLET, ORALLY DISINTEGRATING ORAL at 09:00

## 2017-12-30 RX ADMIN — CARBIDOPA AND LEVODOPA 1 TABLET(S): 25; 100 TABLET ORAL at 06:10

## 2017-12-30 RX ADMIN — CARBIDOPA, LEVODOPA, AND ENTACAPONE 1 TABLET(S): 50; 200; 200 TABLET, FILM COATED ORAL at 11:28

## 2017-12-30 RX ADMIN — CARBIDOPA, LEVODOPA, AND ENTACAPONE 1 TABLET(S): 50; 200; 200 TABLET, FILM COATED ORAL at 06:09

## 2017-12-31 RX ADMIN — CLOZAPINE 50 MILLIGRAM(S): 150 TABLET, ORALLY DISINTEGRATING ORAL at 11:26

## 2017-12-31 RX ADMIN — Medication 325 MILLIGRAM(S): at 12:15

## 2017-12-31 RX ADMIN — CARBIDOPA, LEVODOPA, AND ENTACAPONE 1 TABLET(S): 50; 200; 200 TABLET, FILM COATED ORAL at 16:11

## 2017-12-31 RX ADMIN — CARBIDOPA, LEVODOPA, AND ENTACAPONE 1 TABLET(S): 50; 200; 200 TABLET, FILM COATED ORAL at 05:10

## 2017-12-31 RX ADMIN — Medication 325 MILLIGRAM(S): at 20:41

## 2017-12-31 RX ADMIN — CARBIDOPA, LEVODOPA, AND ENTACAPONE 1 TABLET(S): 50; 200; 200 TABLET, FILM COATED ORAL at 11:28

## 2017-12-31 RX ADMIN — Medication 325 MILLIGRAM(S): at 11:28

## 2017-12-31 RX ADMIN — CARBIDOPA AND LEVODOPA 1 TABLET(S): 25; 100 TABLET ORAL at 11:28

## 2017-12-31 RX ADMIN — CARBIDOPA AND LEVODOPA 1 TABLET(S): 25; 100 TABLET ORAL at 05:10

## 2017-12-31 RX ADMIN — Medication 325 MILLIGRAM(S): at 16:38

## 2017-12-31 RX ADMIN — CARBIDOPA AND LEVODOPA 1 TABLET(S): 25; 100 TABLET ORAL at 16:11

## 2017-12-31 RX ADMIN — CARBIDOPA AND LEVODOPA 1 TABLET(S): 25; 100 TABLET ORAL at 20:39

## 2018-01-01 RX ORDER — BACITRACIN ZINC 500 UNIT/G
1 OINTMENT IN PACKET (EA) TOPICAL
Qty: 0 | Refills: 0 | Status: DISCONTINUED | OUTPATIENT
Start: 2018-01-01 | End: 2018-01-31

## 2018-01-01 RX ADMIN — Medication 325 MILLIGRAM(S): at 13:43

## 2018-01-01 RX ADMIN — CARBIDOPA, LEVODOPA, AND ENTACAPONE 1 TABLET(S): 50; 200; 200 TABLET, FILM COATED ORAL at 12:10

## 2018-01-01 RX ADMIN — CARBIDOPA AND LEVODOPA 1 TABLET(S): 25; 100 TABLET ORAL at 12:10

## 2018-01-01 RX ADMIN — CARBIDOPA AND LEVODOPA 1 TABLET(S): 25; 100 TABLET ORAL at 16:24

## 2018-01-01 RX ADMIN — CLOZAPINE 50 MILLIGRAM(S): 150 TABLET, ORALLY DISINTEGRATING ORAL at 09:07

## 2018-01-01 RX ADMIN — CARBIDOPA, LEVODOPA, AND ENTACAPONE 1 TABLET(S): 50; 200; 200 TABLET, FILM COATED ORAL at 16:24

## 2018-01-01 RX ADMIN — CARBIDOPA, LEVODOPA, AND ENTACAPONE 1 TABLET(S): 50; 200; 200 TABLET, FILM COATED ORAL at 06:09

## 2018-01-01 RX ADMIN — CARBIDOPA AND LEVODOPA 1 TABLET(S): 25; 100 TABLET ORAL at 19:47

## 2018-01-01 RX ADMIN — CARBIDOPA AND LEVODOPA 1 TABLET(S): 25; 100 TABLET ORAL at 06:09

## 2018-01-02 RX ADMIN — CARBIDOPA AND LEVODOPA 1 TABLET(S): 25; 100 TABLET ORAL at 16:14

## 2018-01-02 RX ADMIN — Medication 1 APPLICATION(S): at 12:00

## 2018-01-02 RX ADMIN — CARBIDOPA, LEVODOPA, AND ENTACAPONE 1 TABLET(S): 50; 200; 200 TABLET, FILM COATED ORAL at 16:14

## 2018-01-02 RX ADMIN — CARBIDOPA AND LEVODOPA 1 TABLET(S): 25; 100 TABLET ORAL at 20:12

## 2018-01-02 RX ADMIN — CLOZAPINE 50 MILLIGRAM(S): 150 TABLET, ORALLY DISINTEGRATING ORAL at 11:46

## 2018-01-02 RX ADMIN — Medication 1 APPLICATION(S): at 20:47

## 2018-01-02 RX ADMIN — CARBIDOPA, LEVODOPA, AND ENTACAPONE 1 TABLET(S): 50; 200; 200 TABLET, FILM COATED ORAL at 06:07

## 2018-01-02 RX ADMIN — CARBIDOPA AND LEVODOPA 1 TABLET(S): 25; 100 TABLET ORAL at 11:47

## 2018-01-02 RX ADMIN — CARBIDOPA AND LEVODOPA 1 TABLET(S): 25; 100 TABLET ORAL at 06:07

## 2018-01-02 RX ADMIN — CARBIDOPA, LEVODOPA, AND ENTACAPONE 1 TABLET(S): 50; 200; 200 TABLET, FILM COATED ORAL at 11:46

## 2018-01-02 RX ADMIN — Medication 1 APPLICATION(S): at 00:32

## 2018-01-02 RX ADMIN — Medication 325 MILLIGRAM(S): at 22:56

## 2018-01-02 NOTE — PROGRESS NOTE BEHAVIORAL HEALTH - NSBHFUPINTERVALHXFT_PSY_A_CORE
Patient seen for paranoia, disorganized behavior. Chart reviewed. Same clinical presentation - at baseline. Patient continues to be encouraged the compression stocking.  Continue encourage ambulation more frequently and to offer compression stockings. Continue to encourage to elevate his lower extremity. Again, extensive psychoeducation and explanation of risks and benefits. Seems to be tolerating the increased clozapine dose; no noted adverse side effects.

## 2018-01-03 LAB
HCT VFR BLD CALC: 37.2 % — LOW (ref 39–50)
HGB BLD-MCNC: 12 G/DL — LOW (ref 13–17)
MCHC RBC-ENTMCNC: 30.6 PG — SIGNIFICANT CHANGE UP (ref 27–34)
MCHC RBC-ENTMCNC: 32.2 GM/DL — SIGNIFICANT CHANGE UP (ref 32–36)
MCV RBC AUTO: 95.3 FL — SIGNIFICANT CHANGE UP (ref 80–100)
PLATELET # BLD AUTO: 176 K/UL — SIGNIFICANT CHANGE UP (ref 150–400)
RBC # BLD: 3.9 M/UL — LOW (ref 4.2–5.8)
RBC # FLD: 12.9 % — SIGNIFICANT CHANGE UP (ref 10.3–14.5)
WBC # BLD: 4.6 K/UL — SIGNIFICANT CHANGE UP (ref 3.8–10.5)
WBC # FLD AUTO: 4.6 K/UL — SIGNIFICANT CHANGE UP (ref 3.8–10.5)

## 2018-01-03 PROCEDURE — 99231 SBSQ HOSP IP/OBS SF/LOW 25: CPT

## 2018-01-03 RX ADMIN — Medication 1 APPLICATION(S): at 10:58

## 2018-01-03 RX ADMIN — CLOZAPINE 50 MILLIGRAM(S): 150 TABLET, ORALLY DISINTEGRATING ORAL at 10:58

## 2018-01-03 RX ADMIN — Medication 325 MILLIGRAM(S): at 22:32

## 2018-01-03 RX ADMIN — CARBIDOPA AND LEVODOPA 1 TABLET(S): 25; 100 TABLET ORAL at 06:30

## 2018-01-03 RX ADMIN — CARBIDOPA, LEVODOPA, AND ENTACAPONE 1 TABLET(S): 50; 200; 200 TABLET, FILM COATED ORAL at 06:30

## 2018-01-03 RX ADMIN — CARBIDOPA AND LEVODOPA 1 TABLET(S): 25; 100 TABLET ORAL at 10:50

## 2018-01-03 RX ADMIN — CARBIDOPA, LEVODOPA, AND ENTACAPONE 1 TABLET(S): 50; 200; 200 TABLET, FILM COATED ORAL at 18:07

## 2018-01-03 RX ADMIN — Medication 325 MILLIGRAM(S): at 14:32

## 2018-01-03 RX ADMIN — CARBIDOPA, LEVODOPA, AND ENTACAPONE 1 TABLET(S): 50; 200; 200 TABLET, FILM COATED ORAL at 10:58

## 2018-01-03 RX ADMIN — CARBIDOPA AND LEVODOPA 1 TABLET(S): 25; 100 TABLET ORAL at 21:03

## 2018-01-03 RX ADMIN — CARBIDOPA AND LEVODOPA 1 TABLET(S): 25; 100 TABLET ORAL at 18:07

## 2018-01-03 RX ADMIN — Medication 325 MILLIGRAM(S): at 22:56

## 2018-01-03 NOTE — PROGRESS NOTE BEHAVIORAL HEALTH - NSBHFUPINTERVALHXFT_PSY_A_CORE
Patient seen for paranoia, disorganized behavior. Chart reviewed. Same clinical presentation - at baseline. Patient continues to be encouraged to use the compression stocking.  Continue encourage ambulation more frequently and to offer compression stockings.  Patient is ambulating on unit at times. Continue to encourage to elevate his lower extremity. Again, extensive psychoeducation and explanation of risks and benefits. Seems to be tolerating the increased clozapine dose; no noted adverse side effects.  Patient is cheerful and in improved control on 1/3..  Went to AM group.  No Rx SE/sx TD/EPS are noted or reported

## 2018-01-03 NOTE — PROGRESS NOTE BEHAVIORAL HEALTH - SUMMARY
53 yo male with early onset Parkinson’s disease x 11 years, discharged from 4 prior nursing homes in 8 months due to his behaviors, hx of refusing medications,  transferred to [facility] Unit 2S on 6/12/17 where he manifested severe mood lability, paranoia and confabulation with poor insight and judgment including making > 40 Justice center complaints. 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 Patient was accepted to [facility] in [place] and discharged from [facility] on 7/12/17.  Upon getting to [facility], Patient refused to go into the facility and became combative. He was thus transferred back to [facility] ED which resulted in Service Line Chiefs’ of Service involvement given the history/issues and ultimate transfer to [address]. Patient has been on Unit 1N since. UPDATE: [facility] declined to accept Patient; awaiting review/answer to other places applied to. Court that was scheduled on 12/19/17 was deferred for a later date in January as Patient refused to go. His niece from [place] did not call back/contact Treatment Team re: nursing homes in [place] for patient.  Patient is in improved behavioral control, better able to respond to verbal limits and support today 1/3.
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
## 2018-01-03 NOTE — PROGRESS NOTE BEHAVIORAL HEALTH - NSBHADMITMEDEDUDETAILS_A_CORE FT
continue clozapine 50mg PO qd   Patient teaching done re: need for RX adherence for sx management and relapse prevention with some  teachback verbalized.

## 2018-01-04 DIAGNOSIS — F29 UNSPECIFIED PSYCHOSIS NOT DUE TO A SUBSTANCE OR KNOWN PHYSIOLOGICAL CONDITION: ICD-10-CM

## 2018-01-04 PROCEDURE — 99232 SBSQ HOSP IP/OBS MODERATE 35: CPT

## 2018-01-04 RX ADMIN — CARBIDOPA AND LEVODOPA 1 TABLET(S): 25; 100 TABLET ORAL at 06:00

## 2018-01-04 RX ADMIN — CARBIDOPA, LEVODOPA, AND ENTACAPONE 1 TABLET(S): 50; 200; 200 TABLET, FILM COATED ORAL at 06:00

## 2018-01-04 RX ADMIN — Medication 325 MILLIGRAM(S): at 22:08

## 2018-01-04 RX ADMIN — CARBIDOPA, LEVODOPA, AND ENTACAPONE 1 TABLET(S): 50; 200; 200 TABLET, FILM COATED ORAL at 10:56

## 2018-01-04 RX ADMIN — CARBIDOPA, LEVODOPA, AND ENTACAPONE 1 TABLET(S): 50; 200; 200 TABLET, FILM COATED ORAL at 15:57

## 2018-01-04 RX ADMIN — Medication 325 MILLIGRAM(S): at 21:36

## 2018-01-04 RX ADMIN — CARBIDOPA AND LEVODOPA 1 TABLET(S): 25; 100 TABLET ORAL at 10:55

## 2018-01-04 RX ADMIN — Medication 1 APPLICATION(S): at 09:22

## 2018-01-04 RX ADMIN — Medication 325 MILLIGRAM(S): at 10:58

## 2018-01-04 RX ADMIN — CARBIDOPA AND LEVODOPA 1 TABLET(S): 25; 100 TABLET ORAL at 20:48

## 2018-01-04 RX ADMIN — CARBIDOPA AND LEVODOPA 1 TABLET(S): 25; 100 TABLET ORAL at 15:57

## 2018-01-04 RX ADMIN — Medication 325 MILLIGRAM(S): at 10:02

## 2018-01-04 RX ADMIN — CLOZAPINE 50 MILLIGRAM(S): 150 TABLET, ORALLY DISINTEGRATING ORAL at 10:53

## 2018-01-04 NOTE — PROGRESS NOTE BEHAVIORAL HEALTH - SUMMARY
51 yo male with early onset Parkinson’s disease x 11 years, discharged from 4 prior nursing homes in 8 months due to his behaviors, hx of refusing medications,  transferred to [facility] Unit 2S on 6/12/17 where he manifested severe mood lability, paranoia and confabulation with poor insight and judgment including making > 40 Justice center complaints. 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 Patient was accepted to [facility] in [place] and discharged from [facility] on 7/12/17.  Upon getting to [facility], Patient refused to go into the facility and became combative. He was thus transferred back to [facility] ED which resulted in Service Line Chiefs’ of Service involvement given the history/issues and ultimate transfer to [address]. Patient has been on Unit 1N since. UPDATE: [facility] declined to accept Patient; awaiting review/answer to other places applied to. Court that was scheduled on 12/19/17 was deferred for a later date in January as Patient refused to go. His niece from [place] did not call back/contact Treatment Team re: nursing homes in [place] for patient.  Patient is in improved behavioral control, better able to respond to verbal limits and support today 1/3.
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
## 2018-01-05 PROCEDURE — 99232 SBSQ HOSP IP/OBS MODERATE 35: CPT

## 2018-01-05 RX ADMIN — Medication 325 MILLIGRAM(S): at 21:43

## 2018-01-05 RX ADMIN — Medication 325 MILLIGRAM(S): at 04:22

## 2018-01-05 RX ADMIN — CARBIDOPA AND LEVODOPA 1 TABLET(S): 25; 100 TABLET ORAL at 20:20

## 2018-01-05 RX ADMIN — CARBIDOPA AND LEVODOPA 1 TABLET(S): 25; 100 TABLET ORAL at 06:05

## 2018-01-05 RX ADMIN — CARBIDOPA AND LEVODOPA 1 TABLET(S): 25; 100 TABLET ORAL at 15:51

## 2018-01-05 RX ADMIN — CARBIDOPA, LEVODOPA, AND ENTACAPONE 1 TABLET(S): 50; 200; 200 TABLET, FILM COATED ORAL at 15:51

## 2018-01-05 RX ADMIN — CARBIDOPA, LEVODOPA, AND ENTACAPONE 1 TABLET(S): 50; 200; 200 TABLET, FILM COATED ORAL at 06:05

## 2018-01-05 RX ADMIN — CARBIDOPA, LEVODOPA, AND ENTACAPONE 1 TABLET(S): 50; 200; 200 TABLET, FILM COATED ORAL at 10:49

## 2018-01-05 RX ADMIN — Medication 325 MILLIGRAM(S): at 06:36

## 2018-01-05 RX ADMIN — CLOZAPINE 50 MILLIGRAM(S): 150 TABLET, ORALLY DISINTEGRATING ORAL at 10:49

## 2018-01-05 RX ADMIN — CARBIDOPA AND LEVODOPA 1 TABLET(S): 25; 100 TABLET ORAL at 10:50

## 2018-01-05 RX ADMIN — Medication 325 MILLIGRAM(S): at 21:44

## 2018-01-05 NOTE — PROGRESS NOTE BEHAVIORAL HEALTH - SUMMARY
53 yo male with early onset Parkinson’s disease x 11 years, discharged from 4 prior nursing homes in 8 months due to his behaviors, hx of refusing medications,  transferred to [facility] Unit [facility] on 6/12/17 where he manifested severe mood lability, paranoia and confabulation with poor insight and judgment including making > 40 Justice center complaints. 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 Patient was accepted to [facility] in [place] and discharged from [facility] on 7/12/17.  Upon getting to [facility], Patient refused to go into the facility and became combative. He was thus transferred back to [facility] ED which resulted in Service Line Chiefs’ of Service involvement given the history/issues and ultimate transfer to [address]. Patient has been on Unit 1[address] since. UPDATE: [facility] declined to accept Patient; awaiting review/answer to other places applied to. Court that was scheduled on 12/19/17 was deferred for a later date in January as Patient refused to go. His niece from [place] did not call back/contact Treatment Team re: nursing homes in [place] for patient.  Patient is in improved behavioral control, able to respond to verbal limits and support sometimes on 1/5.  Possible increase in sx due to anxiety about upcoming court date 1/9

## 2018-01-05 NOTE — PROGRESS NOTE BEHAVIORAL HEALTH - OTHER
at times louder more irritable today at times labile, at times euthymic at times disorganized and circumstantial at times paranoia towards certain staff, Limited + neck favoring one side, dressed in hospital gowns, adequate grooming and hygiene no psychomotor agitation at times (chronic and seemingly intentionaly as he can stop it when redirected). cooperative today at times low end of fair, using sense of humor at times chronically limited, but no psychomotor agitation noted on 1/3 festinating gait but stable
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
## 2018-01-05 NOTE — PROGRESS NOTE BEHAVIORAL HEALTH - ESTIMATED DISCHARGE DATE
15-Nov-2017
01-Dec-2017
27-Nov-2017
31-Jan-2018
14-Dec-2017
27-Dec-2017
17-Nov-2017
21-Nov-2017
01-Dec-2017
20-Nov-2017
21-Nov-2017
21-Nov-2017
28-Nov-2017
31-Jan-2018
27-Nov-2017
14-Nov-2017
15-Dec-2017
25-Dec-2017
17-Nov-2017
04-Dec-2017

## 2018-01-06 RX ADMIN — CARBIDOPA, LEVODOPA, AND ENTACAPONE 1 TABLET(S): 50; 200; 200 TABLET, FILM COATED ORAL at 11:18

## 2018-01-06 RX ADMIN — CLOZAPINE 50 MILLIGRAM(S): 150 TABLET, ORALLY DISINTEGRATING ORAL at 11:18

## 2018-01-06 RX ADMIN — CARBIDOPA AND LEVODOPA 1 TABLET(S): 25; 100 TABLET ORAL at 17:01

## 2018-01-06 RX ADMIN — CARBIDOPA AND LEVODOPA 1 TABLET(S): 25; 100 TABLET ORAL at 21:27

## 2018-01-06 RX ADMIN — CARBIDOPA AND LEVODOPA 1 TABLET(S): 25; 100 TABLET ORAL at 11:19

## 2018-01-06 RX ADMIN — CARBIDOPA AND LEVODOPA 1 TABLET(S): 25; 100 TABLET ORAL at 06:11

## 2018-01-06 RX ADMIN — Medication 325 MILLIGRAM(S): at 11:19

## 2018-01-06 RX ADMIN — CARBIDOPA, LEVODOPA, AND ENTACAPONE 1 TABLET(S): 50; 200; 200 TABLET, FILM COATED ORAL at 17:01

## 2018-01-06 RX ADMIN — CARBIDOPA, LEVODOPA, AND ENTACAPONE 1 TABLET(S): 50; 200; 200 TABLET, FILM COATED ORAL at 06:11

## 2018-01-07 RX ADMIN — CARBIDOPA AND LEVODOPA 1 TABLET(S): 25; 100 TABLET ORAL at 11:09

## 2018-01-07 RX ADMIN — CARBIDOPA AND LEVODOPA 1 TABLET(S): 25; 100 TABLET ORAL at 22:54

## 2018-01-07 RX ADMIN — CARBIDOPA, LEVODOPA, AND ENTACAPONE 1 TABLET(S): 50; 200; 200 TABLET, FILM COATED ORAL at 06:18

## 2018-01-07 RX ADMIN — Medication 325 MILLIGRAM(S): at 23:29

## 2018-01-07 RX ADMIN — Medication 325 MILLIGRAM(S): at 02:11

## 2018-01-07 RX ADMIN — CARBIDOPA, LEVODOPA, AND ENTACAPONE 1 TABLET(S): 50; 200; 200 TABLET, FILM COATED ORAL at 11:09

## 2018-01-07 RX ADMIN — CARBIDOPA AND LEVODOPA 1 TABLET(S): 25; 100 TABLET ORAL at 20:13

## 2018-01-07 RX ADMIN — CLOZAPINE 50 MILLIGRAM(S): 150 TABLET, ORALLY DISINTEGRATING ORAL at 11:09

## 2018-01-07 RX ADMIN — Medication 325 MILLIGRAM(S): at 22:55

## 2018-01-07 RX ADMIN — CARBIDOPA AND LEVODOPA 1 TABLET(S): 25; 100 TABLET ORAL at 06:18

## 2018-01-07 RX ADMIN — CARBIDOPA, LEVODOPA, AND ENTACAPONE 1 TABLET(S): 50; 200; 200 TABLET, FILM COATED ORAL at 20:13

## 2018-01-07 RX ADMIN — Medication 325 MILLIGRAM(S): at 06:19

## 2018-01-08 PROCEDURE — 99231 SBSQ HOSP IP/OBS SF/LOW 25: CPT

## 2018-01-08 RX ORDER — ACETAMINOPHEN 500 MG
325 TABLET ORAL ONCE
Qty: 0 | Refills: 0 | Status: COMPLETED | OUTPATIENT
Start: 2018-01-08 | End: 2018-01-08

## 2018-01-08 RX ADMIN — CLOZAPINE 50 MILLIGRAM(S): 150 TABLET, ORALLY DISINTEGRATING ORAL at 10:51

## 2018-01-08 RX ADMIN — CARBIDOPA, LEVODOPA, AND ENTACAPONE 1 TABLET(S): 50; 200; 200 TABLET, FILM COATED ORAL at 06:09

## 2018-01-08 RX ADMIN — Medication 325 MILLIGRAM(S): at 23:40

## 2018-01-08 RX ADMIN — CARBIDOPA AND LEVODOPA 1 TABLET(S): 25; 100 TABLET ORAL at 16:18

## 2018-01-08 RX ADMIN — CARBIDOPA AND LEVODOPA 1 TABLET(S): 25; 100 TABLET ORAL at 11:07

## 2018-01-08 RX ADMIN — CARBIDOPA AND LEVODOPA 1 TABLET(S): 25; 100 TABLET ORAL at 06:09

## 2018-01-08 RX ADMIN — Medication 325 MILLIGRAM(S): at 23:26

## 2018-01-08 RX ADMIN — CARBIDOPA, LEVODOPA, AND ENTACAPONE 1 TABLET(S): 50; 200; 200 TABLET, FILM COATED ORAL at 16:18

## 2018-01-08 RX ADMIN — CARBIDOPA, LEVODOPA, AND ENTACAPONE 1 TABLET(S): 50; 200; 200 TABLET, FILM COATED ORAL at 11:06

## 2018-01-08 RX ADMIN — CARBIDOPA AND LEVODOPA 1 TABLET(S): 25; 100 TABLET ORAL at 20:22

## 2018-01-08 NOTE — PROGRESS NOTE BEHAVIORAL HEALTH - OTHER
low end of fair, using sense of humor at times chronically limited, but no psychomotor agitation noted on 1/3 festinating gait but stable at times louder more irritable today at times labile, at times euthymic at times disorganized and circumstantial at times paranoia towards certain staff, Limited + neck favoring one side, dressed in hospital gowns, adequate grooming and hygiene no psychomotor agitation at times (chronic and seemingly intentionaly as he can stop it when redirected). cooperative today at times

## 2018-01-08 NOTE — PROGRESS NOTE BEHAVIORAL HEALTH - SUMMARY
51 yo male with early onset Parkinson’s disease x 11 years, discharged from 4 prior nursing homes in 8 months due to his behaviors, hx of refusing medications,  transferred to [facility] Unit 2S on 6/12/17 where he manifested severe mood lability, paranoia and confabulation with poor insight and judgment including making > 40 Justice center complaints. 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 Patient was accepted to [facility] in [place] and discharged from [facility] on 7/12/17.  Upon getting to [facility], Patient refused to go into the facility and became combative. He was thus transferred back to [facility] ED which resulted in Service Line Chiefs’ of Service involvement given the history/issues and ultimate transfer to [address]. Patient has been on Unit 1N since. UPDATE: [facility] declined to accept Patient; awaiting review/answer to other places applied to. Court that was scheduled on 12/19/17 was deferred as Patient refused to go and new date set for tomorrow. Still no active family involvement

## 2018-01-08 NOTE — PROGRESS NOTE BEHAVIORAL HEALTH - NSBHFUPINTERVALHXFT_PSY_A_CORE
Patient seen for paranoia, disorganized behavior. Chart reviewed. Same clinical presentation. Patient continues to be encouraged the compression stocking.  Continue encourage ambulation more frequently and to offer compression stockings. Continue to encourage to elevate his lower extremity. Again, extensive psychoeducation and explanation of risks and benefits. Seems to be tolerating the increased clozapine dose; no noted adverse side effects. Court date set for tomorrow with scheduled  on the Unit with 2:2 CO staff going with Patient to Court.

## 2018-01-09 PROCEDURE — 99231 SBSQ HOSP IP/OBS SF/LOW 25: CPT

## 2018-01-09 RX ADMIN — CARBIDOPA AND LEVODOPA 1 TABLET(S): 25; 100 TABLET ORAL at 22:31

## 2018-01-09 RX ADMIN — Medication 325 MILLIGRAM(S): at 00:20

## 2018-01-09 RX ADMIN — CARBIDOPA AND LEVODOPA 1 TABLET(S): 25; 100 TABLET ORAL at 19:02

## 2018-01-09 RX ADMIN — CARBIDOPA, LEVODOPA, AND ENTACAPONE 1 TABLET(S): 50; 200; 200 TABLET, FILM COATED ORAL at 06:13

## 2018-01-09 RX ADMIN — CLOZAPINE 50 MILLIGRAM(S): 150 TABLET, ORALLY DISINTEGRATING ORAL at 11:00

## 2018-01-09 RX ADMIN — Medication 325 MILLIGRAM(S): at 21:40

## 2018-01-09 RX ADMIN — CARBIDOPA, LEVODOPA, AND ENTACAPONE 1 TABLET(S): 50; 200; 200 TABLET, FILM COATED ORAL at 19:02

## 2018-01-09 RX ADMIN — CARBIDOPA AND LEVODOPA 1 TABLET(S): 25; 100 TABLET ORAL at 11:01

## 2018-01-09 RX ADMIN — Medication 325 MILLIGRAM(S): at 20:44

## 2018-01-09 RX ADMIN — CARBIDOPA AND LEVODOPA 1 TABLET(S): 25; 100 TABLET ORAL at 06:13

## 2018-01-09 RX ADMIN — CARBIDOPA, LEVODOPA, AND ENTACAPONE 1 TABLET(S): 50; 200; 200 TABLET, FILM COATED ORAL at 11:01

## 2018-01-09 NOTE — PROGRESS NOTE BEHAVIORAL HEALTH - OTHER
+ neck favoring one side, dressed in hospital gowns, adequate grooming and hygiene no psychomotor agitation at times (chronic and seemingly intentionaly as he can stop it when redirected). cooperative today at times low end of fair, using sense of humor at times chronically limited, but no psychomotor agitation noted on 1/3 festinating gait but stable at times louder more irritable today at times labile, at times euthymic at times disorganized and circumstantial at times paranoia towards certain staff, Limited

## 2018-01-09 NOTE — PROGRESS NOTE BEHAVIORAL HEALTH - SUMMARY
53 yo male with early onset Parkinson’s disease x 11 years, discharged from 4 prior nursing homes in 8 months due to his behaviors, hx of refusing medications,  transferred to [facility] Unit 2S on 6/12/17 where he manifested severe mood lability, paranoia and confabulation with poor insight and judgment including making > 40 Justice center complaints. 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 Patient was accepted to [facility] in [place] and discharged from [facility] on 7/12/17.  Upon getting to [facility], Patient refused to go into the facility and became combative. He was thus transferred back to [facility] ED which resulted in Service Line Chiefs’ of Service involvement given the history/issues and ultimate transfer to [address]. Patient has been on Unit 1N since. UPDATE: [facility] declined to accept Patient; awaiting review/answer to other places applied to. Court that was scheduled on 12/19/17 was deferred as Patient refused to go and new date set for today and patient again refused to go and to cooperate with staff/transportation. Still no active family involvement

## 2018-01-09 NOTE — PROGRESS NOTE BEHAVIORAL HEALTH - NSBHFUPINTERVALHXFT_PSY_A_CORE
Patient seen for paranoia, disorganized behavior. Chart reviewed. Significant interval events - Patient was scheduled to be taken to [place] for [place] hearing regarding [employer] expansion today. Patient refused to dress/cooperate with nursing and staff. He was yelling, cursing at people, agitated and did not seem to be willing to go to Court today (same as last time). Continue encourage ambulation more frequently and to offer compression stockings and to continue to encourage to elevate his lower extremity. Again, extensive psychoeducation and explanation of risks and benefits. Seems to be tolerating the increased clozapine dose; no noted adverse side effects.

## 2018-01-10 LAB
BASOPHILS # BLD AUTO: 0 K/UL — SIGNIFICANT CHANGE UP (ref 0–0.2)
BASOPHILS NFR BLD AUTO: 0.9 % — SIGNIFICANT CHANGE UP (ref 0–2)
EOSINOPHIL # BLD AUTO: 0.1 K/UL — SIGNIFICANT CHANGE UP (ref 0–0.5)
EOSINOPHIL NFR BLD AUTO: 2.2 % — SIGNIFICANT CHANGE UP (ref 0–6)
HCT VFR BLD CALC: 37.1 % — LOW (ref 39–50)
HGB BLD-MCNC: 12.4 G/DL — LOW (ref 13–17)
LYMPHOCYTES # BLD AUTO: 1.2 K/UL — SIGNIFICANT CHANGE UP (ref 1–3.3)
LYMPHOCYTES # BLD AUTO: 27.4 % — SIGNIFICANT CHANGE UP (ref 13–44)
MCHC RBC-ENTMCNC: 31.6 PG — SIGNIFICANT CHANGE UP (ref 27–34)
MCHC RBC-ENTMCNC: 33.4 GM/DL — SIGNIFICANT CHANGE UP (ref 32–36)
MCV RBC AUTO: 94.5 FL — SIGNIFICANT CHANGE UP (ref 80–100)
MONOCYTES # BLD AUTO: 0.5 K/UL — SIGNIFICANT CHANGE UP (ref 0–0.9)
MONOCYTES NFR BLD AUTO: 10.6 % — SIGNIFICANT CHANGE UP (ref 2–14)
NEUTROPHILS # BLD AUTO: 2.6 K/UL — SIGNIFICANT CHANGE UP (ref 1.8–7.4)
NEUTROPHILS NFR BLD AUTO: 59 % — SIGNIFICANT CHANGE UP (ref 43–77)
PLATELET # BLD AUTO: 149 K/UL — LOW (ref 150–400)
RBC # BLD: 3.92 M/UL — LOW (ref 4.2–5.8)
RBC # FLD: 12.7 % — SIGNIFICANT CHANGE UP (ref 10.3–14.5)
WBC # BLD: 4.3 K/UL — SIGNIFICANT CHANGE UP (ref 3.8–10.5)
WBC # FLD AUTO: 4.3 K/UL — SIGNIFICANT CHANGE UP (ref 3.8–10.5)

## 2018-01-10 PROCEDURE — 99231 SBSQ HOSP IP/OBS SF/LOW 25: CPT

## 2018-01-10 RX ADMIN — Medication 325 MILLIGRAM(S): at 22:02

## 2018-01-10 RX ADMIN — CARBIDOPA AND LEVODOPA 1 TABLET(S): 25; 100 TABLET ORAL at 06:13

## 2018-01-10 RX ADMIN — CARBIDOPA, LEVODOPA, AND ENTACAPONE 1 TABLET(S): 50; 200; 200 TABLET, FILM COATED ORAL at 06:13

## 2018-01-10 RX ADMIN — CARBIDOPA, LEVODOPA, AND ENTACAPONE 1 TABLET(S): 50; 200; 200 TABLET, FILM COATED ORAL at 12:09

## 2018-01-10 RX ADMIN — CARBIDOPA AND LEVODOPA 1 TABLET(S): 25; 100 TABLET ORAL at 17:04

## 2018-01-10 RX ADMIN — Medication 325 MILLIGRAM(S): at 21:59

## 2018-01-10 RX ADMIN — CARBIDOPA AND LEVODOPA 1 TABLET(S): 25; 100 TABLET ORAL at 20:45

## 2018-01-10 RX ADMIN — CARBIDOPA AND LEVODOPA 1 TABLET(S): 25; 100 TABLET ORAL at 12:08

## 2018-01-10 RX ADMIN — Medication 325 MILLIGRAM(S): at 06:49

## 2018-01-10 RX ADMIN — CARBIDOPA, LEVODOPA, AND ENTACAPONE 1 TABLET(S): 50; 200; 200 TABLET, FILM COATED ORAL at 17:04

## 2018-01-10 NOTE — PROGRESS NOTE BEHAVIORAL HEALTH - NSBHFUPINTERVALHXFT_PSY_A_CORE
Patient seen for paranoia, disorganized behavior. Chart reviewed. Significant interval events - Patient's sister came to the Unit yesterday after the Court hearing patient refused to go to. Sister still not actively involved to the point that she would consider having Patient go live with her. Same clinical presentation otherwise; continue encourage ambulation more frequently and to offer compression stockings and to continue to encourage to elevate his lower extremity. Again, extensive psychoeducation and explanation of risks and benefits.  no noted medication adverse side effects.

## 2018-01-10 NOTE — PROGRESS NOTE BEHAVIORAL HEALTH - SUMMARY
51 yo male with early onset Parkinson’s disease x 11 years, discharged from 4 prior nursing homes in 8 months due to his behaviors, hx of refusing medications,  transferred to [facility] Unit 2S on 6/12/17 where he manifested severe mood lability, paranoia and confabulation with poor insight and judgment including making > 40 Justice center complaints. 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 Patient was accepted to [facility] in [place] and discharged from [facility] on 7/12/17.  Upon getting to [facility], Patient refused to go into the facility and became combative. He was thus transferred back to [facility] ED which resulted in Service Line Chiefs’ of Service involvement given the history/issues and ultimate transfer to [address]. Patient has been on Unit 1N since. UPDATE: [facility] declined to accept Patient; awaiting review/answer to other places applied to. Court that was scheduled on 12/19/17 was deferred as Patient refused to go and new date set for yesterday and patient again refused to go and to cooperate with staff/transportation. Still no active family involvement

## 2018-01-11 PROCEDURE — 99231 SBSQ HOSP IP/OBS SF/LOW 25: CPT

## 2018-01-11 RX ORDER — HALOPERIDOL DECANOATE 100 MG/ML
5 INJECTION INTRAMUSCULAR ONCE
Qty: 0 | Refills: 0 | Status: COMPLETED | OUTPATIENT
Start: 2018-01-11 | End: 2018-01-11

## 2018-01-11 RX ADMIN — Medication 1 APPLICATION(S): at 20:04

## 2018-01-11 RX ADMIN — CARBIDOPA AND LEVODOPA 1 TABLET(S): 25; 100 TABLET ORAL at 05:20

## 2018-01-11 RX ADMIN — CARBIDOPA AND LEVODOPA 1 TABLET(S): 25; 100 TABLET ORAL at 16:55

## 2018-01-11 RX ADMIN — CARBIDOPA, LEVODOPA, AND ENTACAPONE 1 TABLET(S): 50; 200; 200 TABLET, FILM COATED ORAL at 16:55

## 2018-01-11 RX ADMIN — CARBIDOPA AND LEVODOPA 1 TABLET(S): 25; 100 TABLET ORAL at 11:58

## 2018-01-11 RX ADMIN — CARBIDOPA, LEVODOPA, AND ENTACAPONE 1 TABLET(S): 50; 200; 200 TABLET, FILM COATED ORAL at 11:58

## 2018-01-11 RX ADMIN — CARBIDOPA AND LEVODOPA 1 TABLET(S): 25; 100 TABLET ORAL at 20:04

## 2018-01-11 RX ADMIN — HALOPERIDOL DECANOATE 5 MILLIGRAM(S): 100 INJECTION INTRAMUSCULAR at 22:09

## 2018-01-11 RX ADMIN — Medication 2 MILLIGRAM(S): at 22:09

## 2018-01-11 RX ADMIN — CARBIDOPA, LEVODOPA, AND ENTACAPONE 1 TABLET(S): 50; 200; 200 TABLET, FILM COATED ORAL at 05:20

## 2018-01-11 NOTE — PROGRESS NOTE BEHAVIORAL HEALTH - SUMMARY
53 yo male with early onset Parkinson’s disease x 11 years, discharged from 4 prior nursing homes in 8 months due to his behaviors, hx of refusing medications,  transferred to [facility] Unit 2S on 6/12/17 where he manifested severe mood lability, paranoia and confabulation with poor insight and judgment including making > 40 Justice center complaints. 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 Patient was accepted to [facility] in [place] and discharged from [facility] on 7/12/17.  Upon getting to [facility], Patient refused to go into the facility and became combative. He was thus transferred back to [facility] ED which resulted in Service Line Chiefs’ of Service involvement given the history/issues and ultimate transfer to [address]. Patient has been on Unit 1N since. UPDATE: [facility] declined to accept Patient; awaiting review/answer to other places applied to. Court that was scheduled on 12/19/17 was deferred as Patient refused to go and new date set for yesterday and patient again refused to go and to cooperate with staff/transportation. Still no active family involvement

## 2018-01-11 NOTE — PROGRESS NOTE BEHAVIORAL HEALTH - NSBHFUPINTERVALHXFT_PSY_A_CORE
Patient seen for paranoia, disorganized behavior. Chart reviewed. No significant interval events - again refused morning medications (which he does nearly everyday and then takes later). Same clinical presentation otherwise; continue encourage ambulation more frequently and to offer compression stockings and to continue to encourage to elevate his lower extremity. Again, extensive psychoeducation and explanation of risks and benefits.  no noted medication adverse side effects.

## 2018-01-12 PROCEDURE — 99232 SBSQ HOSP IP/OBS MODERATE 35: CPT

## 2018-01-12 RX ORDER — OLANZAPINE 15 MG/1
5 TABLET, FILM COATED ORAL DAILY
Qty: 0 | Refills: 0 | Status: DISCONTINUED | OUTPATIENT
Start: 2018-01-12 | End: 2018-04-09

## 2018-01-12 RX ORDER — HALOPERIDOL DECANOATE 100 MG/ML
5 INJECTION INTRAMUSCULAR EVERY 6 HOURS
Qty: 0 | Refills: 0 | Status: DISCONTINUED | OUTPATIENT
Start: 2018-01-12 | End: 2018-08-18

## 2018-01-12 RX ADMIN — CARBIDOPA AND LEVODOPA 1 TABLET(S): 25; 100 TABLET ORAL at 17:35

## 2018-01-12 RX ADMIN — CARBIDOPA AND LEVODOPA 1 TABLET(S): 25; 100 TABLET ORAL at 20:50

## 2018-01-12 RX ADMIN — CLOZAPINE 50 MILLIGRAM(S): 150 TABLET, ORALLY DISINTEGRATING ORAL at 10:30

## 2018-01-12 RX ADMIN — CARBIDOPA AND LEVODOPA 1 TABLET(S): 25; 100 TABLET ORAL at 14:32

## 2018-01-12 RX ADMIN — CARBIDOPA AND LEVODOPA 1 TABLET(S): 25; 100 TABLET ORAL at 06:33

## 2018-01-12 RX ADMIN — CARBIDOPA, LEVODOPA, AND ENTACAPONE 1 TABLET(S): 50; 200; 200 TABLET, FILM COATED ORAL at 06:33

## 2018-01-12 RX ADMIN — Medication 1 APPLICATION(S): at 20:50

## 2018-01-12 RX ADMIN — Medication 100 MILLIGRAM(S): at 10:30

## 2018-01-12 RX ADMIN — Medication 30 MILLILITER(S): at 20:53

## 2018-01-12 RX ADMIN — CARBIDOPA, LEVODOPA, AND ENTACAPONE 1 TABLET(S): 50; 200; 200 TABLET, FILM COATED ORAL at 17:35

## 2018-01-12 RX ADMIN — CARBIDOPA, LEVODOPA, AND ENTACAPONE 1 TABLET(S): 50; 200; 200 TABLET, FILM COATED ORAL at 14:32

## 2018-01-12 NOTE — PROGRESS NOTE BEHAVIORAL HEALTH - OTHER
cooperative today at times low end of fair, using sense of humor at times chronically limited festinating gait but stable at times louder "I am leaving this place!!!" oscillating between anger, irritability, annoyance and then euthymic at times disorganized and circumstantial at times paranoia towards certain staff (chronic) Limited + neck favoring one side, dressed in hospital gowns, adequate grooming and hygiene no psychomotor agitation at times (chronic and seemingly intentionaly as he can stop it when redirected).
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
## 2018-01-12 NOTE — PROGRESS NOTE BEHAVIORAL HEALTH - SUMMARY
53 yo male with early onset Parkinson’s disease x 11 years, discharged from 4 prior nursing homes in 8 months due to his behaviors, hx of refusing medications,  transferred to [facility] Unit 2S on 6/12/17 where he manifested severe mood lability, paranoia and confabulation with poor insight and judgment including making > 40 Justice center complaints. 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 Patient was accepted to [facility] in [place] and discharged from [facility] on 7/12/17.  Upon getting to [facility], Patient refused to go into the facility and became combative. He was thus transferred back to [facility] ED which resulted in Service Line Chiefs’ of Service involvement given the history/issues and ultimate transfer to [address]. Patient has been on Unit 1N since. UPDATE: [facility] declined to accept Patient; awaiting review/answer to other places applied to. Court that was scheduled on 12/19/17 was deferred as Patient refused to go. he again refused to go to he new Court date.

## 2018-01-13 RX ADMIN — CARBIDOPA, LEVODOPA, AND ENTACAPONE 1 TABLET(S): 50; 200; 200 TABLET, FILM COATED ORAL at 11:28

## 2018-01-13 RX ADMIN — CARBIDOPA AND LEVODOPA 1 TABLET(S): 25; 100 TABLET ORAL at 11:28

## 2018-01-13 RX ADMIN — CARBIDOPA AND LEVODOPA 1 TABLET(S): 25; 100 TABLET ORAL at 20:14

## 2018-01-13 RX ADMIN — Medication 325 MILLIGRAM(S): at 06:39

## 2018-01-13 RX ADMIN — Medication 325 MILLIGRAM(S): at 12:00

## 2018-01-13 RX ADMIN — Medication 325 MILLIGRAM(S): at 11:12

## 2018-01-13 RX ADMIN — CARBIDOPA, LEVODOPA, AND ENTACAPONE 1 TABLET(S): 50; 200; 200 TABLET, FILM COATED ORAL at 06:38

## 2018-01-13 RX ADMIN — CARBIDOPA, LEVODOPA, AND ENTACAPONE 1 TABLET(S): 50; 200; 200 TABLET, FILM COATED ORAL at 16:22

## 2018-01-13 RX ADMIN — CLOZAPINE 50 MILLIGRAM(S): 150 TABLET, ORALLY DISINTEGRATING ORAL at 10:15

## 2018-01-13 RX ADMIN — CARBIDOPA AND LEVODOPA 1 TABLET(S): 25; 100 TABLET ORAL at 06:38

## 2018-01-13 RX ADMIN — Medication 325 MILLIGRAM(S): at 22:00

## 2018-01-13 RX ADMIN — CARBIDOPA AND LEVODOPA 1 TABLET(S): 25; 100 TABLET ORAL at 16:22

## 2018-01-13 RX ADMIN — Medication 325 MILLIGRAM(S): at 20:59

## 2018-01-13 RX ADMIN — Medication 325 MILLIGRAM(S): at 07:24

## 2018-01-14 RX ORDER — ACETAMINOPHEN 500 MG
650 TABLET ORAL ONCE
Qty: 0 | Refills: 0 | Status: COMPLETED | OUTPATIENT
Start: 2018-01-14 | End: 2018-01-14

## 2018-01-14 RX ADMIN — Medication 325 MILLIGRAM(S): at 12:54

## 2018-01-14 RX ADMIN — CARBIDOPA AND LEVODOPA 1 TABLET(S): 25; 100 TABLET ORAL at 06:26

## 2018-01-14 RX ADMIN — Medication 325 MILLIGRAM(S): at 17:00

## 2018-01-14 RX ADMIN — Medication 650 MILLIGRAM(S): at 22:23

## 2018-01-14 RX ADMIN — CARBIDOPA, LEVODOPA, AND ENTACAPONE 1 TABLET(S): 50; 200; 200 TABLET, FILM COATED ORAL at 11:01

## 2018-01-14 RX ADMIN — CLOZAPINE 50 MILLIGRAM(S): 150 TABLET, ORALLY DISINTEGRATING ORAL at 11:02

## 2018-01-14 RX ADMIN — CARBIDOPA, LEVODOPA, AND ENTACAPONE 1 TABLET(S): 50; 200; 200 TABLET, FILM COATED ORAL at 06:26

## 2018-01-14 RX ADMIN — Medication 325 MILLIGRAM(S): at 09:48

## 2018-01-14 RX ADMIN — CARBIDOPA AND LEVODOPA 1 TABLET(S): 25; 100 TABLET ORAL at 22:02

## 2018-01-14 RX ADMIN — CARBIDOPA AND LEVODOPA 1 TABLET(S): 25; 100 TABLET ORAL at 11:02

## 2018-01-14 RX ADMIN — CARBIDOPA, LEVODOPA, AND ENTACAPONE 1 TABLET(S): 50; 200; 200 TABLET, FILM COATED ORAL at 16:13

## 2018-01-14 RX ADMIN — Medication 325 MILLIGRAM(S): at 04:00

## 2018-01-14 RX ADMIN — Medication 650 MILLIGRAM(S): at 23:20

## 2018-01-14 RX ADMIN — Medication 325 MILLIGRAM(S): at 03:03

## 2018-01-14 RX ADMIN — CARBIDOPA AND LEVODOPA 1 TABLET(S): 25; 100 TABLET ORAL at 16:13

## 2018-01-14 RX ADMIN — Medication 325 MILLIGRAM(S): at 18:09

## 2018-01-15 PROCEDURE — 12345: CPT | Mod: NC

## 2018-01-15 PROCEDURE — 93970 EXTREMITY STUDY: CPT | Mod: 26

## 2018-01-15 RX ORDER — ENOXAPARIN SODIUM 100 MG/ML
40 INJECTION SUBCUTANEOUS DAILY
Qty: 0 | Refills: 0 | Status: DISCONTINUED | OUTPATIENT
Start: 2018-01-15 | End: 2018-01-19

## 2018-01-15 RX ORDER — ACETAMINOPHEN 500 MG
650 TABLET ORAL ONCE
Qty: 0 | Refills: 0 | Status: COMPLETED | OUTPATIENT
Start: 2018-01-15 | End: 2018-01-15

## 2018-01-15 RX ADMIN — CARBIDOPA AND LEVODOPA 1 TABLET(S): 25; 100 TABLET ORAL at 06:44

## 2018-01-15 RX ADMIN — CARBIDOPA, LEVODOPA, AND ENTACAPONE 1 TABLET(S): 50; 200; 200 TABLET, FILM COATED ORAL at 06:43

## 2018-01-15 RX ADMIN — CARBIDOPA AND LEVODOPA 1 TABLET(S): 25; 100 TABLET ORAL at 16:02

## 2018-01-15 RX ADMIN — Medication 325 MILLIGRAM(S): at 11:00

## 2018-01-15 RX ADMIN — CARBIDOPA AND LEVODOPA 1 TABLET(S): 25; 100 TABLET ORAL at 21:12

## 2018-01-15 RX ADMIN — Medication 325 MILLIGRAM(S): at 09:50

## 2018-01-15 RX ADMIN — Medication 325 MILLIGRAM(S): at 17:14

## 2018-01-15 RX ADMIN — Medication 325 MILLIGRAM(S): at 17:51

## 2018-01-15 RX ADMIN — CARBIDOPA AND LEVODOPA 1 TABLET(S): 25; 100 TABLET ORAL at 12:37

## 2018-01-15 RX ADMIN — CLOZAPINE 50 MILLIGRAM(S): 150 TABLET, ORALLY DISINTEGRATING ORAL at 13:13

## 2018-01-15 RX ADMIN — CYCLOBENZAPRINE HYDROCHLORIDE 10 MILLIGRAM(S): 10 TABLET, FILM COATED ORAL at 23:35

## 2018-01-15 RX ADMIN — CYCLOBENZAPRINE HYDROCHLORIDE 10 MILLIGRAM(S): 10 TABLET, FILM COATED ORAL at 13:13

## 2018-01-15 RX ADMIN — Medication 650 MILLIGRAM(S): at 23:06

## 2018-01-15 RX ADMIN — Medication 650 MILLIGRAM(S): at 23:34

## 2018-01-15 RX ADMIN — CYCLOBENZAPRINE HYDROCHLORIDE 10 MILLIGRAM(S): 10 TABLET, FILM COATED ORAL at 01:02

## 2018-01-15 RX ADMIN — CARBIDOPA, LEVODOPA, AND ENTACAPONE 1 TABLET(S): 50; 200; 200 TABLET, FILM COATED ORAL at 16:02

## 2018-01-15 RX ADMIN — CARBIDOPA, LEVODOPA, AND ENTACAPONE 1 TABLET(S): 50; 200; 200 TABLET, FILM COATED ORAL at 12:38

## 2018-01-15 NOTE — CHART NOTE - NSCHARTNOTEFT_GEN_A_CORE
requested by RN to evaluate patient for chest pain.  pt refused EKG  pertinent exam: comfortable in recliner. no SOB. reproducible left sided chest pain  Impression/Plan: costochrondritis - give tylenol 650mg once. pls call hospitalist team back if chest pain persists.  pls notify us when EKG done if and when pt allows.  no current sign of distress.  continue clinical monitoring.  d/w RN

## 2018-01-16 PROCEDURE — 99232 SBSQ HOSP IP/OBS MODERATE 35: CPT | Mod: GT

## 2018-01-16 RX ADMIN — Medication 325 MILLIGRAM(S): at 21:17

## 2018-01-16 RX ADMIN — CARBIDOPA, LEVODOPA, AND ENTACAPONE 1 TABLET(S): 50; 200; 200 TABLET, FILM COATED ORAL at 16:13

## 2018-01-16 RX ADMIN — CARBIDOPA AND LEVODOPA 1 TABLET(S): 25; 100 TABLET ORAL at 05:57

## 2018-01-16 RX ADMIN — Medication 325 MILLIGRAM(S): at 06:00

## 2018-01-16 RX ADMIN — CYCLOBENZAPRINE HYDROCHLORIDE 10 MILLIGRAM(S): 10 TABLET, FILM COATED ORAL at 09:50

## 2018-01-16 RX ADMIN — Medication 325 MILLIGRAM(S): at 05:57

## 2018-01-16 RX ADMIN — CARBIDOPA AND LEVODOPA 1 TABLET(S): 25; 100 TABLET ORAL at 11:08

## 2018-01-16 RX ADMIN — CLOZAPINE 50 MILLIGRAM(S): 150 TABLET, ORALLY DISINTEGRATING ORAL at 11:08

## 2018-01-16 RX ADMIN — CARBIDOPA, LEVODOPA, AND ENTACAPONE 1 TABLET(S): 50; 200; 200 TABLET, FILM COATED ORAL at 11:09

## 2018-01-16 RX ADMIN — CARBIDOPA, LEVODOPA, AND ENTACAPONE 1 TABLET(S): 50; 200; 200 TABLET, FILM COATED ORAL at 05:57

## 2018-01-16 RX ADMIN — Medication 325 MILLIGRAM(S): at 12:41

## 2018-01-16 RX ADMIN — Medication 325 MILLIGRAM(S): at 21:50

## 2018-01-16 RX ADMIN — CYCLOBENZAPRINE HYDROCHLORIDE 10 MILLIGRAM(S): 10 TABLET, FILM COATED ORAL at 23:06

## 2018-01-16 RX ADMIN — CARBIDOPA AND LEVODOPA 1 TABLET(S): 25; 100 TABLET ORAL at 21:16

## 2018-01-16 RX ADMIN — CARBIDOPA AND LEVODOPA 1 TABLET(S): 25; 100 TABLET ORAL at 16:13

## 2018-01-17 LAB
BASOPHILS # BLD AUTO: 0 K/UL — SIGNIFICANT CHANGE UP (ref 0–0.2)
BASOPHILS NFR BLD AUTO: 0.9 % — SIGNIFICANT CHANGE UP (ref 0–2)
EOSINOPHIL # BLD AUTO: 0.1 K/UL — SIGNIFICANT CHANGE UP (ref 0–0.5)
EOSINOPHIL NFR BLD AUTO: 2.4 % — SIGNIFICANT CHANGE UP (ref 0–6)
HCT VFR BLD CALC: 36.9 % — LOW (ref 39–50)
HGB BLD-MCNC: 11.9 G/DL — LOW (ref 13–17)
LYMPHOCYTES # BLD AUTO: 1.2 K/UL — SIGNIFICANT CHANGE UP (ref 1–3.3)
LYMPHOCYTES # BLD AUTO: 26.1 % — SIGNIFICANT CHANGE UP (ref 13–44)
MCHC RBC-ENTMCNC: 30 PG — SIGNIFICANT CHANGE UP (ref 27–34)
MCHC RBC-ENTMCNC: 32.4 GM/DL — SIGNIFICANT CHANGE UP (ref 32–36)
MCV RBC AUTO: 92.6 FL — SIGNIFICANT CHANGE UP (ref 80–100)
MONOCYTES # BLD AUTO: 0.4 K/UL — SIGNIFICANT CHANGE UP (ref 0–0.9)
MONOCYTES NFR BLD AUTO: 8.1 % — SIGNIFICANT CHANGE UP (ref 2–14)
NEUTROPHILS # BLD AUTO: 3 K/UL — SIGNIFICANT CHANGE UP (ref 1.8–7.4)
NEUTROPHILS NFR BLD AUTO: 62.5 % — SIGNIFICANT CHANGE UP (ref 43–77)
PLATELET # BLD AUTO: 165 K/UL — SIGNIFICANT CHANGE UP (ref 150–400)
RBC # BLD: 3.98 M/UL — LOW (ref 4.2–5.8)
RBC # FLD: 12.4 % — SIGNIFICANT CHANGE UP (ref 10.3–14.5)
WBC # BLD: 4.7 K/UL — SIGNIFICANT CHANGE UP (ref 3.8–10.5)
WBC # FLD AUTO: 4.7 K/UL — SIGNIFICANT CHANGE UP (ref 3.8–10.5)

## 2018-01-17 PROCEDURE — 99231 SBSQ HOSP IP/OBS SF/LOW 25: CPT

## 2018-01-17 RX ADMIN — CLOZAPINE 50 MILLIGRAM(S): 150 TABLET, ORALLY DISINTEGRATING ORAL at 11:02

## 2018-01-17 RX ADMIN — CARBIDOPA AND LEVODOPA 1 TABLET(S): 25; 100 TABLET ORAL at 06:18

## 2018-01-17 RX ADMIN — CARBIDOPA, LEVODOPA, AND ENTACAPONE 1 TABLET(S): 50; 200; 200 TABLET, FILM COATED ORAL at 16:26

## 2018-01-17 RX ADMIN — CARBIDOPA AND LEVODOPA 1 TABLET(S): 25; 100 TABLET ORAL at 16:26

## 2018-01-17 RX ADMIN — CARBIDOPA AND LEVODOPA 1 TABLET(S): 25; 100 TABLET ORAL at 11:02

## 2018-01-17 RX ADMIN — Medication 325 MILLIGRAM(S): at 23:00

## 2018-01-17 RX ADMIN — CYCLOBENZAPRINE HYDROCHLORIDE 10 MILLIGRAM(S): 10 TABLET, FILM COATED ORAL at 06:16

## 2018-01-17 RX ADMIN — CARBIDOPA AND LEVODOPA 1 TABLET(S): 25; 100 TABLET ORAL at 20:52

## 2018-01-17 RX ADMIN — CARBIDOPA, LEVODOPA, AND ENTACAPONE 1 TABLET(S): 50; 200; 200 TABLET, FILM COATED ORAL at 06:16

## 2018-01-17 RX ADMIN — CARBIDOPA, LEVODOPA, AND ENTACAPONE 1 TABLET(S): 50; 200; 200 TABLET, FILM COATED ORAL at 11:02

## 2018-01-17 RX ADMIN — CYCLOBENZAPRINE HYDROCHLORIDE 10 MILLIGRAM(S): 10 TABLET, FILM COATED ORAL at 15:25

## 2018-01-17 NOTE — PROGRESS NOTE BEHAVIORAL HEALTH - SUMMARY
53 yo male with early onset Parkinson’s disease x 11 years, discharged from 4 prior nursing homes in 8 months due to his behaviors, hx of refusing medications,  transferred to [facility] Unit 2S on 6/12/17 where he manifested severe mood lability, paranoia and confabulation with poor insight and judgment including making > 40 Justice center complaints. 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 Patient was accepted to [facility] in [place] and discharged from [facility] on 7/12/17.  Upon getting to [facility], Patient refused to go into the facility and became combative. He was thus transferred back to [facility] ED which resulted in Service Line Chiefs’ of Service involvement given the history/issues and ultimate transfer to [address]. Patient has been on Unit 1N since. UPDATE: [facility] declined to accept Patient; awaiting review/answer to other places applied to. Court that was scheduled on 12/19/17 was deferred as Patient refused to go. he again refused to go to he new Court date.

## 2018-01-17 NOTE — PROGRESS NOTE BEHAVIORAL HEALTH - NSBHFUPINTERVALHXFT_PSY_A_CORE
Patient seen for paranoia, disorganized behavior. Chart reviewed. No significant interval events - no subsequent episodes of agitation. Same clinical presentation as usual.

## 2018-01-17 NOTE — PROGRESS NOTE BEHAVIORAL HEALTH - OTHER
+ neck favoring one side, dressed in hospital gowns, adequate grooming and hygiene no psychomotor agitation at times (chronic and seemingly intentionaly as he can stop it when redirected). cooperative today at times low end of fair, using sense of humor at times chronically limited festinating gait but stable at times louder "I am leaving this place!!!" oscillating between anger, irritability, annoyance and then euthymic at times disorganized and circumstantial at times paranoia towards certain staff (chronic) Limited

## 2018-01-18 PROCEDURE — 99231 SBSQ HOSP IP/OBS SF/LOW 25: CPT

## 2018-01-18 RX ADMIN — Medication 325 MILLIGRAM(S): at 00:00

## 2018-01-18 RX ADMIN — Medication 325 MILLIGRAM(S): at 08:53

## 2018-01-18 RX ADMIN — CYCLOBENZAPRINE HYDROCHLORIDE 10 MILLIGRAM(S): 10 TABLET, FILM COATED ORAL at 23:44

## 2018-01-18 RX ADMIN — CARBIDOPA AND LEVODOPA 1 TABLET(S): 25; 100 TABLET ORAL at 12:40

## 2018-01-18 RX ADMIN — Medication 325 MILLIGRAM(S): at 09:54

## 2018-01-18 RX ADMIN — CARBIDOPA AND LEVODOPA 1 TABLET(S): 25; 100 TABLET ORAL at 20:28

## 2018-01-18 RX ADMIN — Medication 325 MILLIGRAM(S): at 19:06

## 2018-01-18 RX ADMIN — CARBIDOPA, LEVODOPA, AND ENTACAPONE 1 TABLET(S): 50; 200; 200 TABLET, FILM COATED ORAL at 06:14

## 2018-01-18 RX ADMIN — CARBIDOPA AND LEVODOPA 1 TABLET(S): 25; 100 TABLET ORAL at 06:15

## 2018-01-18 RX ADMIN — CARBIDOPA, LEVODOPA, AND ENTACAPONE 1 TABLET(S): 50; 200; 200 TABLET, FILM COATED ORAL at 16:16

## 2018-01-18 RX ADMIN — CARBIDOPA AND LEVODOPA 1 TABLET(S): 25; 100 TABLET ORAL at 16:16

## 2018-01-18 RX ADMIN — CLOZAPINE 50 MILLIGRAM(S): 150 TABLET, ORALLY DISINTEGRATING ORAL at 10:54

## 2018-01-18 RX ADMIN — Medication 325 MILLIGRAM(S): at 20:48

## 2018-01-18 RX ADMIN — CARBIDOPA, LEVODOPA, AND ENTACAPONE 1 TABLET(S): 50; 200; 200 TABLET, FILM COATED ORAL at 12:40

## 2018-01-18 NOTE — PROGRESS NOTE BEHAVIORAL HEALTH - SUMMARY
53 yo male with early onset Parkinson’s disease x 11 years, discharged from 4 prior nursing homes in 8 months due to his behaviors, hx of refusing medications,  transferred to [facility] Unit 2S on 6/12/17 where he manifested severe mood lability, paranoia and confabulation with poor insight and judgment including making > 40 Justice center complaints. 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 Patient was accepted to [facility] in [place] and discharged from [facility] on 7/12/17.  Upon getting to [facility], Patient refused to go into the facility and became combative. He was thus transferred back to [facility] ED which resulted in Service Line Chiefs’ of Service involvement given the history/issues and ultimate transfer to [address]. Patient has been on Unit 1N since. UPDATE: [facility] declined to accept Patient; awaiting review/answer to other places applied to. Court that was scheduled on 12/19/17 was deferred as Patient refused to go. he again refused to go to he new Court date.

## 2018-01-19 PROCEDURE — 99231 SBSQ HOSP IP/OBS SF/LOW 25: CPT

## 2018-01-19 RX ADMIN — CARBIDOPA AND LEVODOPA 1 TABLET(S): 25; 100 TABLET ORAL at 05:35

## 2018-01-19 RX ADMIN — CARBIDOPA, LEVODOPA, AND ENTACAPONE 1 TABLET(S): 50; 200; 200 TABLET, FILM COATED ORAL at 16:44

## 2018-01-19 RX ADMIN — CARBIDOPA, LEVODOPA, AND ENTACAPONE 1 TABLET(S): 50; 200; 200 TABLET, FILM COATED ORAL at 05:35

## 2018-01-19 RX ADMIN — CARBIDOPA AND LEVODOPA 1 TABLET(S): 25; 100 TABLET ORAL at 16:44

## 2018-01-19 RX ADMIN — Medication 10 MILLIGRAM(S): at 12:24

## 2018-01-19 RX ADMIN — CARBIDOPA AND LEVODOPA 1 TABLET(S): 25; 100 TABLET ORAL at 11:35

## 2018-01-19 RX ADMIN — CLOZAPINE 50 MILLIGRAM(S): 150 TABLET, ORALLY DISINTEGRATING ORAL at 14:01

## 2018-01-19 RX ADMIN — CARBIDOPA AND LEVODOPA 1 TABLET(S): 25; 100 TABLET ORAL at 20:35

## 2018-01-19 RX ADMIN — CARBIDOPA, LEVODOPA, AND ENTACAPONE 1 TABLET(S): 50; 200; 200 TABLET, FILM COATED ORAL at 11:34

## 2018-01-19 NOTE — PROGRESS NOTE BEHAVIORAL HEALTH - SUMMARY
51 yo male with early onset Parkinson’s disease x 11 years, discharged from 4 prior nursing homes in 8 months due to his behaviors, hx of refusing medications,  transferred to [facility] Unit 2S on 6/12/17 where he manifested severe mood lability, paranoia and confabulation with poor insight and judgment including making > 40 Justice center complaints. 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 Patient was accepted to [facility] in [place] and discharged from [facility] on 7/12/17.  Upon getting to [facility], Patient refused to go into the facility and became combative. He was thus transferred back to [facility] ED which resulted in Service Line Chiefs’ of Service involvement given the history/issues and ultimate transfer to [address]. Patient has been on Unit 1N since. UPDATE: [facility] declined to accept Patient; awaiting review/answer to other places applied to. Court that was scheduled on 12/19/17 was deferred as Patient refused to go. he again refused to go to he new Court date.

## 2018-01-20 RX ADMIN — CLOZAPINE 50 MILLIGRAM(S): 150 TABLET, ORALLY DISINTEGRATING ORAL at 10:32

## 2018-01-20 RX ADMIN — CARBIDOPA AND LEVODOPA 1 TABLET(S): 25; 100 TABLET ORAL at 11:03

## 2018-01-20 RX ADMIN — CARBIDOPA AND LEVODOPA 1 TABLET(S): 25; 100 TABLET ORAL at 15:55

## 2018-01-20 RX ADMIN — CARBIDOPA, LEVODOPA, AND ENTACAPONE 1 TABLET(S): 50; 200; 200 TABLET, FILM COATED ORAL at 11:03

## 2018-01-20 RX ADMIN — CARBIDOPA, LEVODOPA, AND ENTACAPONE 1 TABLET(S): 50; 200; 200 TABLET, FILM COATED ORAL at 15:53

## 2018-01-20 RX ADMIN — CARBIDOPA AND LEVODOPA 1 TABLET(S): 25; 100 TABLET ORAL at 05:31

## 2018-01-20 RX ADMIN — CYCLOBENZAPRINE HYDROCHLORIDE 10 MILLIGRAM(S): 10 TABLET, FILM COATED ORAL at 20:27

## 2018-01-20 RX ADMIN — CARBIDOPA, LEVODOPA, AND ENTACAPONE 1 TABLET(S): 50; 200; 200 TABLET, FILM COATED ORAL at 05:31

## 2018-01-20 RX ADMIN — CARBIDOPA AND LEVODOPA 1 TABLET(S): 25; 100 TABLET ORAL at 20:27

## 2018-01-21 RX ADMIN — CARBIDOPA AND LEVODOPA 1 TABLET(S): 25; 100 TABLET ORAL at 06:37

## 2018-01-21 RX ADMIN — CARBIDOPA AND LEVODOPA 1 TABLET(S): 25; 100 TABLET ORAL at 16:11

## 2018-01-21 RX ADMIN — CARBIDOPA AND LEVODOPA 1 TABLET(S): 25; 100 TABLET ORAL at 20:31

## 2018-01-21 RX ADMIN — CARBIDOPA, LEVODOPA, AND ENTACAPONE 1 TABLET(S): 50; 200; 200 TABLET, FILM COATED ORAL at 16:11

## 2018-01-21 RX ADMIN — CARBIDOPA, LEVODOPA, AND ENTACAPONE 1 TABLET(S): 50; 200; 200 TABLET, FILM COATED ORAL at 06:37

## 2018-01-21 RX ADMIN — Medication 325 MILLIGRAM(S): at 10:59

## 2018-01-21 RX ADMIN — Medication 325 MILLIGRAM(S): at 08:17

## 2018-01-21 RX ADMIN — CLOZAPINE 50 MILLIGRAM(S): 150 TABLET, ORALLY DISINTEGRATING ORAL at 10:57

## 2018-01-21 RX ADMIN — Medication 325 MILLIGRAM(S): at 17:25

## 2018-01-21 RX ADMIN — Medication 325 MILLIGRAM(S): at 21:59

## 2018-01-22 PROCEDURE — 99231 SBSQ HOSP IP/OBS SF/LOW 25: CPT

## 2018-01-22 PROCEDURE — 12345: CPT | Mod: NC

## 2018-01-22 RX ADMIN — Medication 325 MILLIGRAM(S): at 08:09

## 2018-01-22 RX ADMIN — Medication 325 MILLIGRAM(S): at 09:00

## 2018-01-22 RX ADMIN — CARBIDOPA, LEVODOPA, AND ENTACAPONE 1 TABLET(S): 50; 200; 200 TABLET, FILM COATED ORAL at 17:22

## 2018-01-22 RX ADMIN — CARBIDOPA AND LEVODOPA 1 TABLET(S): 25; 100 TABLET ORAL at 17:22

## 2018-01-22 RX ADMIN — CARBIDOPA AND LEVODOPA 1 TABLET(S): 25; 100 TABLET ORAL at 21:41

## 2018-01-22 RX ADMIN — CARBIDOPA, LEVODOPA, AND ENTACAPONE 1 TABLET(S): 50; 200; 200 TABLET, FILM COATED ORAL at 11:15

## 2018-01-22 RX ADMIN — CARBIDOPA AND LEVODOPA 1 TABLET(S): 25; 100 TABLET ORAL at 06:10

## 2018-01-22 RX ADMIN — CARBIDOPA, LEVODOPA, AND ENTACAPONE 1 TABLET(S): 50; 200; 200 TABLET, FILM COATED ORAL at 06:10

## 2018-01-22 RX ADMIN — CARBIDOPA AND LEVODOPA 1 TABLET(S): 25; 100 TABLET ORAL at 11:15

## 2018-01-22 RX ADMIN — CLOZAPINE 50 MILLIGRAM(S): 150 TABLET, ORALLY DISINTEGRATING ORAL at 11:15

## 2018-01-22 RX ADMIN — Medication 325 MILLIGRAM(S): at 17:34
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
## 2018-01-22 NOTE — PROGRESS NOTE BEHAVIORAL HEALTH - SUMMARY
53 yo male with early onset Parkinson’s disease x 11 years, discharged from 4 prior nursing homes in 8 months due to his behaviors, hx of refusing medications,  transferred to [facility] Unit 2S on 6/12/17 where he manifested severe mood lability, paranoia and confabulation with poor insight and judgment including making > 40 Justice center complaints. 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 Patient was accepted to [facility] in [place] and discharged from [facility] on 7/12/17.  Upon getting to [facility], Patient refused to go into the facility and became combative. He was thus transferred back to [facility] ED which resulted in Service Line Chiefs’ of Service involvement given the history/issues and ultimate transfer to [address]. Patient has been on Unit 1N since. UPDATE: [facility] declined to accept Patient; awaiting review/answer to other places applied to. Court that was scheduled on 12/19/17 was deferred as Patient refused to go. he again refused to go to he new Court date.

## 2018-01-22 NOTE — CHART NOTE - NSCHARTNOTEFT_GEN_A_CORE
Patient reported to the staff that he fell on wet floor.  I was asked to evaluate the patient, but patient refused to be seen.  I Would be happy to evaluate the patient once he is agreeable to be evaluated.    Patient reported that he cannot talk to me or be evaluated as per his 's advice

## 2018-01-23 PROCEDURE — 99231 SBSQ HOSP IP/OBS SF/LOW 25: CPT

## 2018-01-23 RX ORDER — ACETAMINOPHEN 500 MG
650 TABLET ORAL EVERY 6 HOURS
Qty: 0 | Refills: 0 | Status: DISCONTINUED | OUTPATIENT
Start: 2018-01-23 | End: 2018-07-11

## 2018-01-23 RX ADMIN — Medication 325 MILLIGRAM(S): at 23:31

## 2018-01-23 RX ADMIN — CARBIDOPA, LEVODOPA, AND ENTACAPONE 1 TABLET(S): 50; 200; 200 TABLET, FILM COATED ORAL at 11:35

## 2018-01-23 RX ADMIN — CARBIDOPA AND LEVODOPA 1 TABLET(S): 25; 100 TABLET ORAL at 16:11

## 2018-01-23 RX ADMIN — CARBIDOPA, LEVODOPA, AND ENTACAPONE 1 TABLET(S): 50; 200; 200 TABLET, FILM COATED ORAL at 16:11

## 2018-01-23 RX ADMIN — CARBIDOPA AND LEVODOPA 1 TABLET(S): 25; 100 TABLET ORAL at 05:37

## 2018-01-23 RX ADMIN — CARBIDOPA AND LEVODOPA 1 TABLET(S): 25; 100 TABLET ORAL at 22:59

## 2018-01-23 RX ADMIN — CARBIDOPA, LEVODOPA, AND ENTACAPONE 1 TABLET(S): 50; 200; 200 TABLET, FILM COATED ORAL at 05:37

## 2018-01-23 RX ADMIN — Medication 325 MILLIGRAM(S): at 11:35

## 2018-01-23 RX ADMIN — CLOZAPINE 50 MILLIGRAM(S): 150 TABLET, ORALLY DISINTEGRATING ORAL at 13:17

## 2018-01-23 RX ADMIN — CARBIDOPA AND LEVODOPA 1 TABLET(S): 25; 100 TABLET ORAL at 11:35

## 2018-01-23 NOTE — PROGRESS NOTE BEHAVIORAL HEALTH - NSBHFUPINTERVALHXFT_PSY_A_CORE
Patient seen for paranoia, disorganized behavior. Chart reviewed. No significant interval events. This morning was demanding to use the large bathroom down the hallway and not the smaller one, albeit that's the one that has arm handles etc. Chronic issue. Same clinical presentation as usual.

## 2018-01-23 NOTE — PROGRESS NOTE BEHAVIORAL HEALTH - SUMMARY
51 yo male with early onset Parkinson’s disease x 11 years, discharged from 4 prior nursing homes in 8 months due to his behaviors, hx of refusing medications,  transferred to [facility] Unit [facility] on 6/12/17 where he manifested severe mood lability, paranoia and confabulation with poor insight and judgment including making > 40 Justice center complaints. 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 Patient was accepted to [facility] in [place] and discharged from [facility] on 7/12/17.  Upon getting to [facility], Patient refused to go into the facility and became combative. He was thus transferred back to [facility] ED which resulted in Service Line Chiefs’ of Service involvement given the history/issues and ultimate transfer to [address]. Patient has been on [address] since. UPDATE: [facility] declined to accept Patient; awaiting review/answer to other places applied to. Court that was scheduled on 12/19/17 was deferred as Patient refused to go. he again refused to go to he new Court date.

## 2018-01-24 LAB
BASOPHILS # BLD AUTO: 0.1 K/UL — SIGNIFICANT CHANGE UP (ref 0–0.2)
BASOPHILS NFR BLD AUTO: 1.2 % — SIGNIFICANT CHANGE UP (ref 0–2)
EOSINOPHIL # BLD AUTO: 0.1 K/UL — SIGNIFICANT CHANGE UP (ref 0–0.5)
EOSINOPHIL NFR BLD AUTO: 1.9 % — SIGNIFICANT CHANGE UP (ref 0–6)
HCT VFR BLD CALC: 38.2 % — LOW (ref 39–50)
HGB BLD-MCNC: 12.9 G/DL — LOW (ref 13–17)
LYMPHOCYTES # BLD AUTO: 1.4 K/UL — SIGNIFICANT CHANGE UP (ref 1–3.3)
LYMPHOCYTES # BLD AUTO: 26.8 % — SIGNIFICANT CHANGE UP (ref 13–44)
MCHC RBC-ENTMCNC: 31.4 PG — SIGNIFICANT CHANGE UP (ref 27–34)
MCHC RBC-ENTMCNC: 33.9 GM/DL — SIGNIFICANT CHANGE UP (ref 32–36)
MCV RBC AUTO: 92.9 FL — SIGNIFICANT CHANGE UP (ref 80–100)
MONOCYTES # BLD AUTO: 0.5 K/UL — SIGNIFICANT CHANGE UP (ref 0–0.9)
MONOCYTES NFR BLD AUTO: 8.9 % — SIGNIFICANT CHANGE UP (ref 2–14)
NEUTROPHILS # BLD AUTO: 3.1 K/UL — SIGNIFICANT CHANGE UP (ref 1.8–7.4)
NEUTROPHILS NFR BLD AUTO: 61.2 % — SIGNIFICANT CHANGE UP (ref 43–77)
PLATELET # BLD AUTO: 179 K/UL — SIGNIFICANT CHANGE UP (ref 150–400)
RBC # BLD: 4.11 M/UL — LOW (ref 4.2–5.8)
RBC # FLD: 13.2 % — SIGNIFICANT CHANGE UP (ref 10.3–14.5)
WBC # BLD: 5.1 K/UL — SIGNIFICANT CHANGE UP (ref 3.8–10.5)
WBC # FLD AUTO: 5.1 K/UL — SIGNIFICANT CHANGE UP (ref 3.8–10.5)

## 2018-01-24 PROCEDURE — 99232 SBSQ HOSP IP/OBS MODERATE 35: CPT

## 2018-01-24 RX ADMIN — CLOZAPINE 50 MILLIGRAM(S): 150 TABLET, ORALLY DISINTEGRATING ORAL at 11:45

## 2018-01-24 RX ADMIN — Medication 325 MILLIGRAM(S): at 17:37

## 2018-01-24 RX ADMIN — CARBIDOPA AND LEVODOPA 1 TABLET(S): 25; 100 TABLET ORAL at 05:37

## 2018-01-24 RX ADMIN — Medication 325 MILLIGRAM(S): at 17:01

## 2018-01-24 RX ADMIN — CARBIDOPA, LEVODOPA, AND ENTACAPONE 1 TABLET(S): 50; 200; 200 TABLET, FILM COATED ORAL at 05:37

## 2018-01-24 RX ADMIN — Medication 325 MILLIGRAM(S): at 19:30

## 2018-01-24 RX ADMIN — CARBIDOPA AND LEVODOPA 1 TABLET(S): 25; 100 TABLET ORAL at 11:45

## 2018-01-24 RX ADMIN — Medication 325 MILLIGRAM(S): at 06:51

## 2018-01-24 RX ADMIN — CARBIDOPA AND LEVODOPA 1 TABLET(S): 25; 100 TABLET ORAL at 20:37

## 2018-01-24 RX ADMIN — CARBIDOPA, LEVODOPA, AND ENTACAPONE 1 TABLET(S): 50; 200; 200 TABLET, FILM COATED ORAL at 17:00

## 2018-01-24 RX ADMIN — CARBIDOPA AND LEVODOPA 1 TABLET(S): 25; 100 TABLET ORAL at 17:00

## 2018-01-24 RX ADMIN — CARBIDOPA, LEVODOPA, AND ENTACAPONE 1 TABLET(S): 50; 200; 200 TABLET, FILM COATED ORAL at 11:45

## 2018-01-24 RX ADMIN — Medication 325 MILLIGRAM(S): at 00:10

## 2018-01-24 RX ADMIN — Medication 325 MILLIGRAM(S): at 23:18

## 2018-01-24 NOTE — PROGRESS NOTE BEHAVIORAL HEALTH - NSBHFUPINTERVALHXFT_PSY_A_CORE
Patient seen for paranoia, disorganized behavior. Chart reviewed. No significant interval events. This morning was demanding to use the large bathroom down the hallway and not the smaller one, albeit that's the one that has arm handles etc. Chronic issue. Same clinical presentation as usual. Patient seen for paranoia, disorganized behavior. Chart reviewed. No significant interval events. This morning was demanding to use the large bathroom down the hallway and not the smaller one, albeit that's the one that has arm handles etc. Chronic issue. Same clinical presentation as usual. Patient insisted Writer call his outpatient Neurologist Dr Carloyn Dewitt MD at [phone]. After 15 minutes on hold,  informed Writer that she is with a patient so Writer left her cell # and asked for a call back.

## 2018-01-24 NOTE — PROGRESS NOTE BEHAVIORAL HEALTH - SUMMARY
53 yo male with early onset Parkinson’s disease x 11 years, discharged from 4 prior nursing homes in 8 months due to his behaviors, hx of refusing medications,  transferred to [facility] Unit 2S on 6/12/17 where he manifested severe mood lability, paranoia and confabulation with poor insight and judgment including making > 40 Justice center complaints. 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 Patient was accepted to [facility] in [place] and discharged from [facility] on 7/12/17.  Upon getting to [facility], Patient refused to go into the facility and became combative. He was thus transferred back to [facility] ED which resulted in Service Line Chiefs’ of Service involvement given the history/issues and ultimate transfer to [address]. Patient has been on Unit 1[address] since. UPDATE: [facility] declined to accept Patient; awaiting review/answer to other places applied to. Court that was scheduled on 12/19/17 was deferred as Patient refused to go. he again refused to go to he new Court date. There was a potential opening of a male bed terri  facility which was reported that it was lost as Patient's guardian did not submit requested paperwork to the facility on time.

## 2018-01-25 PROCEDURE — 99231 SBSQ HOSP IP/OBS SF/LOW 25: CPT

## 2018-01-25 PROCEDURE — 12345: CPT | Mod: NC

## 2018-01-25 RX ADMIN — CARBIDOPA, LEVODOPA, AND ENTACAPONE 1 TABLET(S): 50; 200; 200 TABLET, FILM COATED ORAL at 06:16

## 2018-01-25 RX ADMIN — CARBIDOPA AND LEVODOPA 1 TABLET(S): 25; 100 TABLET ORAL at 06:16

## 2018-01-25 RX ADMIN — CARBIDOPA AND LEVODOPA 1 TABLET(S): 25; 100 TABLET ORAL at 15:58

## 2018-01-25 RX ADMIN — CARBIDOPA AND LEVODOPA 1 TABLET(S): 25; 100 TABLET ORAL at 11:04

## 2018-01-25 RX ADMIN — CARBIDOPA, LEVODOPA, AND ENTACAPONE 1 TABLET(S): 50; 200; 200 TABLET, FILM COATED ORAL at 11:04

## 2018-01-25 RX ADMIN — CLOZAPINE 50 MILLIGRAM(S): 150 TABLET, ORALLY DISINTEGRATING ORAL at 11:04

## 2018-01-25 RX ADMIN — Medication 325 MILLIGRAM(S): at 23:40

## 2018-01-25 RX ADMIN — CARBIDOPA AND LEVODOPA 1 TABLET(S): 25; 100 TABLET ORAL at 20:33

## 2018-01-25 RX ADMIN — Medication 325 MILLIGRAM(S): at 00:10

## 2018-01-25 RX ADMIN — CARBIDOPA, LEVODOPA, AND ENTACAPONE 1 TABLET(S): 50; 200; 200 TABLET, FILM COATED ORAL at 15:59

## 2018-01-25 RX ADMIN — Medication 325 MILLIGRAM(S): at 22:58

## 2018-01-25 NOTE — PROGRESS NOTE BEHAVIORAL HEALTH - SUMMARY
51 yo male with early onset Parkinson’s disease x 11 years, discharged from 4 prior nursing homes in 8 months due to his behaviors, hx of refusing medications,  transferred to [facility] Unit 2S on 6/12/17 where he manifested severe mood lability, paranoia and confabulation with poor insight and judgment including making > 40 Justice center complaints. 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 Patient was accepted to [facility] in [place] and discharged from [facility] on 7/12/17.  Upon getting to [facility], Patient refused to go into the facility and became combative. He was thus transferred back to [facility] ED which resulted in Service Line Chiefs’ of Service involvement given the history/issues and ultimate transfer to [address]. Patient has been on [address] 1[address] since. UPDATE: [facility] declined to accept Patient. Hundreds of nursing home applications wee sent out and all declined to accept patient. Court scheduled on 12/19/17 was deferred as Patient refused to go; he then again refused to go to the new Court date.

## 2018-01-25 NOTE — PROGRESS NOTE BEHAVIORAL HEALTH - NSBHFUPINTERVALHXFT_PSY_A_CORE
Patient seen for paranoia, disorganized behavior. Chart reviewed. No significant interval events. Patient complaining about not wanting to go to anywhere but the [facility]; seemingly not comprehending that all the places applied to reject his application. Same clinical presentation as usual. Writer called his outpatient Neurologist Dr Carolyn Dewitt MD at [phone] yesterday, left her cell # and asked for call back. Awaiting call back.

## 2018-01-25 NOTE — PROGRESS NOTE BEHAVIORAL HEALTH - OTHER
at times paranoia towards certain staff (chronic) Limited + neck favoring one side, dressed in hospital gowns, adequate grooming and hygiene no psychomotor agitation at times (chronic and seemingly intentionaly as he can stop it when redirected). cooperative today at times low end of fair, using sense of humor at times chronically limited festinating gait but stable at times louder "I am leaving this place!!!" oscillating between anger, irritability, annoyance and then euthymic (baseline) at times linear, at times disorganized and circumstantial

## 2018-01-25 NOTE — CHART NOTE - NSCHARTNOTEFT_GEN_A_CORE
Patient reports that he was given a pill from another patient for his back pain, but patient spit the pill since it did not taste good.  Patient denies chewing on the pill, or swallowing the pill.  Offers no complaints.      Constitutional: No fever, fatigue or weight loss.  Skin: No rash.  Eyes: No recent vision problems or eye pain.  ENT: No congestion, ear pain, or sore throat.  Endocrine: No thyroid problems.  Cardiovascular: No chest pain or palpation.  Respiratory: No cough, shortness of breath, congestion, or wheezing.  Gastrointestinal: No abdominal pain, nausea, vomiting, or diarrhea.  Genitourinary: No dysuria.  Musculoskeletal: No joint swelling.  Neurologic: No headache.      VSS, afebrile    PHYSICAL EXAM-  GENERAL: NAD  HEAD:  Atraumatic, Normocephalic  EYES: EOMI, PERRLA, conjunctiva and sclera clear  NECK: Supple, No JVD, Normal thyroid  NERVOUS SYSTEM:  Alert moving all extremities.  chronically confused.  ambulating ok with a walker.    CHEST/LUNG: Clear to percussion bilaterally; No rales, rhonchi, wheezing, or rubs  HEART: Regular rate and rhythm; No murmurs, rubs, or gallops  ABDOMEN: Soft, Nontender, Nondistended; Bowel sounds     A/P ingestion of unknown medications, ??Suboxone.  Recommended to monitor patient closely for sedation, or any new symptoms or concerns Patient reports that he was given a pill from another patient for his back pain, but patient spit the pill since it did not taste good.  Patient denies chewing on the pill, or swallowing the pill.  Offers no complaints.      Constitutional: No fever, fatigue or weight loss.  Skin: No rash.  Eyes: No recent vision problems or eye pain.  ENT: No congestion, ear pain, or sore throat.  Endocrine: No thyroid problems.  Cardiovascular: No chest pain or palpation.  Respiratory: No cough, shortness of breath, congestion, or wheezing.  Gastrointestinal: No abdominal pain, nausea, vomiting, or diarrhea.  Genitourinary: No dysuria.  Musculoskeletal: No joint swelling.  Neurologic: No headache.      VSS, afebrile    PHYSICAL EXAM-  GENERAL: NAD  HEAD:  Atraumatic, Normocephalic  EYES: EOMI, PERRLA, conjunctiva and sclera clear  NECK: Supple, No JVD, Normal thyroid  NERVOUS SYSTEM:  Alert moving all extremities.  chronically confused.  ambulating ok with a walker.    CHEST/LUNG: Clear to percussion bilaterally; No rales, rhonchi, wheezing, or rubs  HEART: Regular rate and rhythm; No murmurs, rubs, or gallops  ABDOMEN: Soft, Nontender, Nondistended; Bowel sounds     A/P ingestion of unknown medications, ??Suboxone.  Recommended to monitor patient closely for sedation, or any new symptoms or concerns  Discussed with RN, and staff

## 2018-01-26 PROCEDURE — 99231 SBSQ HOSP IP/OBS SF/LOW 25: CPT

## 2018-01-26 RX ADMIN — CLOZAPINE 50 MILLIGRAM(S): 150 TABLET, ORALLY DISINTEGRATING ORAL at 10:02

## 2018-01-26 RX ADMIN — CARBIDOPA AND LEVODOPA 1 TABLET(S): 25; 100 TABLET ORAL at 05:57

## 2018-01-26 RX ADMIN — CARBIDOPA AND LEVODOPA 1 TABLET(S): 25; 100 TABLET ORAL at 20:39

## 2018-01-26 RX ADMIN — CARBIDOPA AND LEVODOPA 1 TABLET(S): 25; 100 TABLET ORAL at 16:52

## 2018-01-26 RX ADMIN — Medication 30 MILLILITER(S): at 01:31

## 2018-01-26 RX ADMIN — CARBIDOPA AND LEVODOPA 1 TABLET(S): 25; 100 TABLET ORAL at 12:57

## 2018-01-26 RX ADMIN — Medication 325 MILLIGRAM(S): at 21:46

## 2018-01-26 RX ADMIN — Medication 325 MILLIGRAM(S): at 20:40

## 2018-01-26 RX ADMIN — CARBIDOPA, LEVODOPA, AND ENTACAPONE 1 TABLET(S): 50; 200; 200 TABLET, FILM COATED ORAL at 12:58

## 2018-01-26 RX ADMIN — Medication 325 MILLIGRAM(S): at 05:58

## 2018-01-26 RX ADMIN — CARBIDOPA, LEVODOPA, AND ENTACAPONE 1 TABLET(S): 50; 200; 200 TABLET, FILM COATED ORAL at 05:57

## 2018-01-26 RX ADMIN — CARBIDOPA, LEVODOPA, AND ENTACAPONE 1 TABLET(S): 50; 200; 200 TABLET, FILM COATED ORAL at 16:52

## 2018-01-26 RX ADMIN — Medication 325 MILLIGRAM(S): at 20:21

## 2018-01-26 RX ADMIN — Medication 325 MILLIGRAM(S): at 07:00

## 2018-01-26 NOTE — PROGRESS NOTE BEHAVIORAL HEALTH - SUMMARY
53 yo male with early onset Parkinson’s disease x 11 years, discharged from 4 prior nursing homes in 8 months due to his behaviors, hx of refusing medications,  transferred to [facility] Unit 2S on 6/12/17 where he manifested severe mood lability, paranoia and confabulation with poor insight and judgment including making > 40 Justice center complaints. 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 Patient was accepted to [facility] in [place] and discharged from [facility] on 7/12/17.  Upon getting to [facility], Patient refused to go into the facility and became combative. He was thus transferred back to [facility] ED which resulted in Service Line Chiefs’ of Service involvement given the history/issues and ultimate transfer to [address]. Patient has been on Unit 1N since. UPDATE: [facility] declined to accept Patient. Hundreds of nursing home applications wee sent out and all declined to accept patient. Court scheduled on 12/19/17 was deferred as Patient refused to go; he then again refused to go to the new Court date.

## 2018-01-26 NOTE — PROGRESS NOTE BEHAVIORAL HEALTH - OTHER
+ neck favoring one side, dressed in hospital gowns, adequate grooming and hygiene no psychomotor agitation at times (chronic and seemingly intentionaly as he can stop it when redirected). cooperative today at times low end of fair, using sense of humor at times chronically limited Limited festinating gait but stable at times louder at times euthymic & friendly, at times irritable and upset (baseline) oscillating between anger, irritability, annoyance and then euthymic (baseline) at times linear, at times disorganized and circumstantial at times paranoia towards certain staff (chronic)

## 2018-01-26 NOTE — PROGRESS NOTE BEHAVIORAL HEALTH - NSBHFUPINTERVALHXFT_PSY_A_CORE
Patient seen for paranoia, disorganized behavior. Chart reviewed. Please see "event note" from yesterday. Patient complaining about not wanting to go to anywhere but the [facility]; seemingly not comprehending that all the places applied to reject his application. Same clinical presentation as usual. Patient's outpatient Neurologist Dr Carolyn Dewitt MD at [phone] still has not called yet.

## 2018-01-27 RX ADMIN — Medication 325 MILLIGRAM(S): at 06:44

## 2018-01-27 RX ADMIN — CARBIDOPA AND LEVODOPA 1 TABLET(S): 25; 100 TABLET ORAL at 06:07

## 2018-01-27 RX ADMIN — CARBIDOPA AND LEVODOPA 1 TABLET(S): 25; 100 TABLET ORAL at 16:53

## 2018-01-27 RX ADMIN — CARBIDOPA, LEVODOPA, AND ENTACAPONE 1 TABLET(S): 50; 200; 200 TABLET, FILM COATED ORAL at 16:52

## 2018-01-27 RX ADMIN — Medication 325 MILLIGRAM(S): at 21:56

## 2018-01-27 RX ADMIN — Medication 325 MILLIGRAM(S): at 08:00

## 2018-01-27 RX ADMIN — CARBIDOPA AND LEVODOPA 1 TABLET(S): 25; 100 TABLET ORAL at 11:11

## 2018-01-27 RX ADMIN — CARBIDOPA AND LEVODOPA 1 TABLET(S): 25; 100 TABLET ORAL at 20:10

## 2018-01-27 RX ADMIN — CARBIDOPA, LEVODOPA, AND ENTACAPONE 1 TABLET(S): 50; 200; 200 TABLET, FILM COATED ORAL at 06:07

## 2018-01-27 RX ADMIN — CARBIDOPA, LEVODOPA, AND ENTACAPONE 1 TABLET(S): 50; 200; 200 TABLET, FILM COATED ORAL at 11:11

## 2018-01-27 RX ADMIN — CLOZAPINE 50 MILLIGRAM(S): 150 TABLET, ORALLY DISINTEGRATING ORAL at 11:11

## 2018-01-28 RX ADMIN — CARBIDOPA AND LEVODOPA 1 TABLET(S): 25; 100 TABLET ORAL at 20:20

## 2018-01-28 RX ADMIN — CARBIDOPA, LEVODOPA, AND ENTACAPONE 1 TABLET(S): 50; 200; 200 TABLET, FILM COATED ORAL at 11:20

## 2018-01-28 RX ADMIN — CARBIDOPA, LEVODOPA, AND ENTACAPONE 1 TABLET(S): 50; 200; 200 TABLET, FILM COATED ORAL at 06:22

## 2018-01-28 RX ADMIN — CLOZAPINE 50 MILLIGRAM(S): 150 TABLET, ORALLY DISINTEGRATING ORAL at 11:20

## 2018-01-28 RX ADMIN — Medication 325 MILLIGRAM(S): at 23:33

## 2018-01-28 RX ADMIN — CARBIDOPA AND LEVODOPA 1 TABLET(S): 25; 100 TABLET ORAL at 11:20

## 2018-01-28 RX ADMIN — CARBIDOPA AND LEVODOPA 1 TABLET(S): 25; 100 TABLET ORAL at 06:22

## 2018-01-28 RX ADMIN — CARBIDOPA, LEVODOPA, AND ENTACAPONE 1 TABLET(S): 50; 200; 200 TABLET, FILM COATED ORAL at 16:35

## 2018-01-28 RX ADMIN — CARBIDOPA AND LEVODOPA 1 TABLET(S): 25; 100 TABLET ORAL at 16:35

## 2018-01-29 PROCEDURE — 99231 SBSQ HOSP IP/OBS SF/LOW 25: CPT

## 2018-01-29 RX ADMIN — CARBIDOPA, LEVODOPA, AND ENTACAPONE 1 TABLET(S): 50; 200; 200 TABLET, FILM COATED ORAL at 16:17

## 2018-01-29 RX ADMIN — CLOZAPINE 50 MILLIGRAM(S): 150 TABLET, ORALLY DISINTEGRATING ORAL at 11:04

## 2018-01-29 RX ADMIN — CARBIDOPA AND LEVODOPA 1 TABLET(S): 25; 100 TABLET ORAL at 16:17

## 2018-01-29 RX ADMIN — CARBIDOPA AND LEVODOPA 1 TABLET(S): 25; 100 TABLET ORAL at 20:12

## 2018-01-29 RX ADMIN — CARBIDOPA, LEVODOPA, AND ENTACAPONE 1 TABLET(S): 50; 200; 200 TABLET, FILM COATED ORAL at 11:03

## 2018-01-29 RX ADMIN — Medication 30 MILLILITER(S): at 06:49

## 2018-01-29 RX ADMIN — CARBIDOPA AND LEVODOPA 1 TABLET(S): 25; 100 TABLET ORAL at 05:57

## 2018-01-29 RX ADMIN — CARBIDOPA, LEVODOPA, AND ENTACAPONE 1 TABLET(S): 50; 200; 200 TABLET, FILM COATED ORAL at 05:57

## 2018-01-29 RX ADMIN — Medication 325 MILLIGRAM(S): at 12:28

## 2018-01-29 RX ADMIN — Medication 325 MILLIGRAM(S): at 00:05

## 2018-01-29 RX ADMIN — CARBIDOPA AND LEVODOPA 1 TABLET(S): 25; 100 TABLET ORAL at 11:04

## 2018-01-29 RX ADMIN — Medication 325 MILLIGRAM(S): at 11:03

## 2018-01-29 RX ADMIN — Medication 325 MILLIGRAM(S): at 23:15

## 2018-01-29 NOTE — PROGRESS NOTE BEHAVIORAL HEALTH - OTHER
low end of fair, using sense of humor at times chronically limited festinating gait but stable at times louder at times euthymic & friendly, at times irritable and upset (baseline) oscillating between anger, irritability, annoyance and then euthymic (baseline) at times linear, at times disorganized and circumstantial at times paranoia towards certain staff (chronic) Limited + neck favoring one side, dressed in hospital gowns, adequate grooming and hygiene no psychomotor agitation at times (chronic and seemingly intentionaly as he can stop it when redirected). cooperative today at times

## 2018-01-29 NOTE — PROGRESS NOTE BEHAVIORAL HEALTH - NSBHFUPINTERVALHXFT_PSY_A_CORE
Patient seen for paranoia, disorganized behavior. Chart reviewed. No significant interval events over the weekend.  Same clinical presentation as usual. No phone call back from Patient's former outpatient Neurologist Dr Carolyn Dewitt MD at [phone].

## 2018-01-29 NOTE — PROGRESS NOTE BEHAVIORAL HEALTH - SUMMARY
51 yo male with early onset Parkinson’s disease x 11 years, discharged from 4 prior nursing homes in 8 months due to his behaviors, hx of refusing medications,  transferred to [facility] Unit 2S on 6/12/17 where he manifested severe mood lability, paranoia and confabulation with poor insight and judgment including making > 40 Justice center complaints. 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 Patient was accepted to [facility] in [place] and discharged from [facility] on 7/12/17.  Upon getting to [facility], Patient refused to go into the facility and became combative. He was thus transferred back to [facility] ED which resulted in Service Line Chiefs’ of Service involvement given the history/issues and ultimate transfer to [address]. Patient has been on Unit 1N since. UPDATE: [facility] declined to accept Patient. Hundreds of nursing home applications wee sent out and all declined to accept patient. Court scheduled on 12/19/17 was deferred as Patient refused to go; he then again refused to go to the new Court date.

## 2018-01-30 PROCEDURE — 99231 SBSQ HOSP IP/OBS SF/LOW 25: CPT

## 2018-01-30 RX ADMIN — CARBIDOPA AND LEVODOPA 1 TABLET(S): 25; 100 TABLET ORAL at 19:41

## 2018-01-30 RX ADMIN — CLOZAPINE 50 MILLIGRAM(S): 150 TABLET, ORALLY DISINTEGRATING ORAL at 11:01

## 2018-01-30 RX ADMIN — Medication 325 MILLIGRAM(S): at 23:36

## 2018-01-30 RX ADMIN — CARBIDOPA, LEVODOPA, AND ENTACAPONE 1 TABLET(S): 50; 200; 200 TABLET, FILM COATED ORAL at 11:00

## 2018-01-30 RX ADMIN — CYCLOBENZAPRINE HYDROCHLORIDE 10 MILLIGRAM(S): 10 TABLET, FILM COATED ORAL at 20:45

## 2018-01-30 RX ADMIN — CARBIDOPA, LEVODOPA, AND ENTACAPONE 1 TABLET(S): 50; 200; 200 TABLET, FILM COATED ORAL at 06:06

## 2018-01-30 RX ADMIN — Medication 325 MILLIGRAM(S): at 23:00

## 2018-01-30 RX ADMIN — CARBIDOPA AND LEVODOPA 1 TABLET(S): 25; 100 TABLET ORAL at 16:01

## 2018-01-30 RX ADMIN — CARBIDOPA AND LEVODOPA 1 TABLET(S): 25; 100 TABLET ORAL at 11:00

## 2018-01-30 RX ADMIN — CARBIDOPA, LEVODOPA, AND ENTACAPONE 1 TABLET(S): 50; 200; 200 TABLET, FILM COATED ORAL at 16:01

## 2018-01-30 RX ADMIN — CARBIDOPA AND LEVODOPA 1 TABLET(S): 25; 100 TABLET ORAL at 06:06

## 2018-01-30 NOTE — PROGRESS NOTE BEHAVIORAL HEALTH - SUMMARY
51 yo male with early onset Parkinson’s disease x 11 years, discharged from 4 prior nursing homes in 8 months due to his behaviors, hx of refusing medications,  transferred to [facility] Unit 2S on 6/12/17 where he manifested severe mood lability, paranoia and confabulation with poor insight and judgment including making > 40 [facility] center complaints. 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 Patient was accepted to [facility] in [place] and discharged from [facility] on 7/12/17.  Upon getting to [facility], Patient refused to go into the facility and became combative. He was thus transferred back to [facility] ED which resulted in Service Line Chiefs’ of Service involvement given the history/issues and ultimate transfer to [address]. Patient has been on Unit 1N since. UPDATE: [facility] declined to accept Patient. Hundreds of nursing home applications wee sent out and all declined to accept patient. Court scheduled on 12/19/17 was deferred as Patient refused to go; he then again refused to go to the new Court date.

## 2018-01-30 NOTE — PROGRESS NOTE BEHAVIORAL HEALTH - OTHER
+ neck favoring one side, dressed in hospital gowns, adequate grooming and hygiene no psychomotor agitation at times (chronic and seemingly intentionaly as he can stop it when redirected). cooperative today at times low end of fair, using sense of humor at times chronically limited festinating gait but stable at times louder at times euthymic & friendly, at times irritable and upset (baseline) oscillating between anger, irritability, annoyance and then euthymic (baseline) at times linear, at times disorganized and circumstantial at times paranoia towards certain staff (chronic) Limited

## 2018-01-30 NOTE — PROGRESS NOTE BEHAVIORAL HEALTH - NSBHFUPINTERVALHXFT_PSY_A_CORE
Patient seen for paranoia, disorganized behavior. Chart reviewed. No significant interval events. He had a better day yesterday and was in a positive, upbeat mood.  Same clinical presentation as usual.

## 2018-01-31 LAB
BASOPHILS # BLD AUTO: 0.1 K/UL — SIGNIFICANT CHANGE UP (ref 0–0.2)
BASOPHILS NFR BLD AUTO: 1.1 % — SIGNIFICANT CHANGE UP (ref 0–2)
EOSINOPHIL # BLD AUTO: 0.1 K/UL — SIGNIFICANT CHANGE UP (ref 0–0.5)
EOSINOPHIL NFR BLD AUTO: 1.9 % — SIGNIFICANT CHANGE UP (ref 0–6)
HCT VFR BLD CALC: 38 % — LOW (ref 39–50)
HGB BLD-MCNC: 12.8 G/DL — LOW (ref 13–17)
LYMPHOCYTES # BLD AUTO: 1.6 K/UL — SIGNIFICANT CHANGE UP (ref 1–3.3)
LYMPHOCYTES # BLD AUTO: 27.3 % — SIGNIFICANT CHANGE UP (ref 13–44)
MCHC RBC-ENTMCNC: 31.2 PG — SIGNIFICANT CHANGE UP (ref 27–34)
MCHC RBC-ENTMCNC: 33.8 GM/DL — SIGNIFICANT CHANGE UP (ref 32–36)
MCV RBC AUTO: 92.3 FL — SIGNIFICANT CHANGE UP (ref 80–100)
MONOCYTES # BLD AUTO: 0.5 K/UL — SIGNIFICANT CHANGE UP (ref 0–0.9)
MONOCYTES NFR BLD AUTO: 8.9 % — SIGNIFICANT CHANGE UP (ref 2–14)
NEUTROPHILS # BLD AUTO: 3.6 K/UL — SIGNIFICANT CHANGE UP (ref 1.8–7.4)
NEUTROPHILS NFR BLD AUTO: 60.8 % — SIGNIFICANT CHANGE UP (ref 43–77)
PLATELET # BLD AUTO: 187 K/UL — SIGNIFICANT CHANGE UP (ref 150–400)
RBC # BLD: 4.12 M/UL — LOW (ref 4.2–5.8)
RBC # FLD: 12.8 % — SIGNIFICANT CHANGE UP (ref 10.3–14.5)
WBC # BLD: 5.9 K/UL — SIGNIFICANT CHANGE UP (ref 3.8–10.5)
WBC # FLD AUTO: 5.9 K/UL — SIGNIFICANT CHANGE UP (ref 3.8–10.5)

## 2018-01-31 PROCEDURE — 99231 SBSQ HOSP IP/OBS SF/LOW 25: CPT

## 2018-01-31 RX ADMIN — CARBIDOPA, LEVODOPA, AND ENTACAPONE 1 TABLET(S): 50; 200; 200 TABLET, FILM COATED ORAL at 16:34

## 2018-01-31 RX ADMIN — CARBIDOPA, LEVODOPA, AND ENTACAPONE 1 TABLET(S): 50; 200; 200 TABLET, FILM COATED ORAL at 10:48

## 2018-01-31 RX ADMIN — CARBIDOPA, LEVODOPA, AND ENTACAPONE 1 TABLET(S): 50; 200; 200 TABLET, FILM COATED ORAL at 05:59

## 2018-01-31 RX ADMIN — CARBIDOPA AND LEVODOPA 1 TABLET(S): 25; 100 TABLET ORAL at 05:58

## 2018-01-31 RX ADMIN — CARBIDOPA AND LEVODOPA 1 TABLET(S): 25; 100 TABLET ORAL at 10:49

## 2018-01-31 RX ADMIN — Medication 30 MILLILITER(S): at 07:17

## 2018-01-31 RX ADMIN — Medication 325 MILLIGRAM(S): at 22:56

## 2018-01-31 RX ADMIN — CLOZAPINE 50 MILLIGRAM(S): 150 TABLET, ORALLY DISINTEGRATING ORAL at 10:48

## 2018-01-31 RX ADMIN — CARBIDOPA AND LEVODOPA 1 TABLET(S): 25; 100 TABLET ORAL at 20:54

## 2018-01-31 RX ADMIN — CARBIDOPA AND LEVODOPA 1 TABLET(S): 25; 100 TABLET ORAL at 16:34

## 2018-01-31 NOTE — PROGRESS NOTE BEHAVIORAL HEALTH - SUMMARY
51 yo male with early onset Parkinson’s disease x 11 years, discharged from 4 prior nursing homes in 8 months due to his behaviors, hx of refusing medications,  transferred to [facility] Unit 2S on 6/12/17 where he manifested severe mood lability, paranoia and confabulation with poor insight and judgment including making > 40 [facility] center complaints. 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 Patient was accepted to [facility] in [place] and discharged from [facility] on 7/12/17.  Upon getting to [facility], Patient refused to go into the facility and became combative. He was thus transferred back to [facility] ED which resulted in Service Line Chiefs’ of Service involvement given the history/issues and ultimate transfer to [address]. Patient has been on Unit 1N since. UPDATE: [facility] declined to accept Patient. Hundreds of nursing home applications wee sent out and all declined to accept patient. Court scheduled on 12/19/17 was deferred as Patient refused to go; he then again refused to go to the new Court date.

## 2018-01-31 NOTE — PROGRESS NOTE BEHAVIORAL HEALTH - NSBHFUPINTERVALHXFT_PSY_A_CORE
Patient seen for paranoia, disorganized behavior. Chart reviewed. No significant interval events. He again had a better day yesterday and was in a positive, upbeat mood - two days in a row.  Same clinical presentation as usual.

## 2018-02-01 PROCEDURE — 99231 SBSQ HOSP IP/OBS SF/LOW 25: CPT

## 2018-02-01 RX ADMIN — CARBIDOPA, LEVODOPA, AND ENTACAPONE 1 TABLET(S): 50; 200; 200 TABLET, FILM COATED ORAL at 05:38

## 2018-02-01 RX ADMIN — CARBIDOPA, LEVODOPA, AND ENTACAPONE 1 TABLET(S): 50; 200; 200 TABLET, FILM COATED ORAL at 11:03

## 2018-02-01 RX ADMIN — CARBIDOPA AND LEVODOPA 1 TABLET(S): 25; 100 TABLET ORAL at 05:38

## 2018-02-01 RX ADMIN — Medication 325 MILLIGRAM(S): at 08:29

## 2018-02-01 RX ADMIN — CARBIDOPA AND LEVODOPA 1 TABLET(S): 25; 100 TABLET ORAL at 20:24

## 2018-02-01 RX ADMIN — Medication 325 MILLIGRAM(S): at 18:46

## 2018-02-01 RX ADMIN — CARBIDOPA AND LEVODOPA 1 TABLET(S): 25; 100 TABLET ORAL at 16:25

## 2018-02-01 RX ADMIN — Medication 325 MILLIGRAM(S): at 07:14

## 2018-02-01 RX ADMIN — CARBIDOPA, LEVODOPA, AND ENTACAPONE 1 TABLET(S): 50; 200; 200 TABLET, FILM COATED ORAL at 16:25

## 2018-02-01 RX ADMIN — CARBIDOPA AND LEVODOPA 1 TABLET(S): 25; 100 TABLET ORAL at 11:02

## 2018-02-01 RX ADMIN — CLOZAPINE 50 MILLIGRAM(S): 150 TABLET, ORALLY DISINTEGRATING ORAL at 11:03

## 2018-02-01 RX ADMIN — Medication 325 MILLIGRAM(S): at 19:30

## 2018-02-01 RX ADMIN — CYCLOBENZAPRINE HYDROCHLORIDE 10 MILLIGRAM(S): 10 TABLET, FILM COATED ORAL at 22:20

## 2018-02-01 NOTE — PROGRESS NOTE BEHAVIORAL HEALTH - SUMMARY
53 yo male with early onset Parkinson’s disease x 11 years, discharged from 4 prior nursing homes in 8 months due to his behaviors, hx of refusing medications,  transferred to [facility] Unit 2S on 6/12/17 where he manifested severe mood lability, paranoia and confabulation with poor insight and judgment including making > 40 Justice center complaints. 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 Patient was accepted to [facility] in [place] and discharged from [facility] on 7/12/17.  Upon getting to [facility], Patient refused to go into the facility and became combative. He was thus transferred back to [facility] ED which resulted in Service Line Chiefs’ of Service involvement given the history/issues and ultimate transfer to [address]. Patient has been on Unit 1N since. UPDATE: [facility] declined to accept Patient. Hundreds of nursing home applications wee sent out and all declined to accept patient. Court scheduled on 12/19/17 was deferred as Patient refused to go; he then again refused to go to the new Court date.

## 2018-02-01 NOTE — PROGRESS NOTE BEHAVIORAL HEALTH - OTHER
at times louder at times euthymic & friendly, at times irritable and upset (baseline) oscillating between anger, irritability, annoyance and then euthymic (baseline) at times linear, at times disorganized and circumstantial at times paranoia towards certain staff (chronic) + neck favoring one side, dressed in hospital gowns, adequate grooming and hygiene no psychomotor agitation at times (chronic and seemingly intentionaly as he can stop it when redirected). cooperative today at times low end of fair, using sense of humor at times chronically limited festinating gait but stable Limited

## 2018-02-02 PROCEDURE — 99231 SBSQ HOSP IP/OBS SF/LOW 25: CPT

## 2018-02-02 RX ADMIN — CARBIDOPA, LEVODOPA, AND ENTACAPONE 1 TABLET(S): 50; 200; 200 TABLET, FILM COATED ORAL at 13:42

## 2018-02-02 RX ADMIN — CARBIDOPA AND LEVODOPA 1 TABLET(S): 25; 100 TABLET ORAL at 20:28

## 2018-02-02 RX ADMIN — CARBIDOPA AND LEVODOPA 1 TABLET(S): 25; 100 TABLET ORAL at 13:42

## 2018-02-02 RX ADMIN — CARBIDOPA, LEVODOPA, AND ENTACAPONE 1 TABLET(S): 50; 200; 200 TABLET, FILM COATED ORAL at 16:40

## 2018-02-02 RX ADMIN — CARBIDOPA AND LEVODOPA 1 TABLET(S): 25; 100 TABLET ORAL at 06:05

## 2018-02-02 RX ADMIN — CARBIDOPA AND LEVODOPA 1 TABLET(S): 25; 100 TABLET ORAL at 16:40

## 2018-02-02 RX ADMIN — CARBIDOPA, LEVODOPA, AND ENTACAPONE 1 TABLET(S): 50; 200; 200 TABLET, FILM COATED ORAL at 06:05

## 2018-02-02 RX ADMIN — CLOZAPINE 50 MILLIGRAM(S): 150 TABLET, ORALLY DISINTEGRATING ORAL at 13:42

## 2018-02-02 NOTE — PROGRESS NOTE BEHAVIORAL HEALTH - SUMMARY
53 yo male with early onset Parkinson’s disease x 11 years, discharged from 4 prior nursing homes in 8 months due to his behaviors, hx of refusing medications,  transferred to [facility] Unit 2S on 6/12/17 where he manifested severe mood lability, paranoia and confabulation with poor insight and judgment including making > 40 Justice center complaints. 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 Patient was accepted to [facility] in [place] and discharged from [facility] on 7/12/17.  Upon getting to [facility], Patient refused to go into the facility and became combative. He was thus transferred back to [facility] ED which resulted in Service Line Chiefs’ of Service involvement given the history/issues and ultimate transfer to [address]. Patient has been on Unit 1N since. UPDATE: [facility] declined to accept Patient. Hundreds of nursing home applications wee sent out and all declined to accept patient. Court scheduled on 12/19/17 was deferred as Patient refused to go; he then again refused to go to the new Court date.

## 2018-02-03 PROCEDURE — 12345: CPT | Mod: NC

## 2018-02-03 RX ORDER — ACETAMINOPHEN 500 MG
650 TABLET ORAL ONCE
Qty: 0 | Refills: 0 | Status: COMPLETED | OUTPATIENT
Start: 2018-02-03 | End: 2018-02-03

## 2018-02-03 RX ADMIN — CARBIDOPA AND LEVODOPA 1 TABLET(S): 25; 100 TABLET ORAL at 16:14

## 2018-02-03 RX ADMIN — CARBIDOPA, LEVODOPA, AND ENTACAPONE 1 TABLET(S): 50; 200; 200 TABLET, FILM COATED ORAL at 16:14

## 2018-02-03 RX ADMIN — CARBIDOPA AND LEVODOPA 1 TABLET(S): 25; 100 TABLET ORAL at 11:15

## 2018-02-03 RX ADMIN — CARBIDOPA, LEVODOPA, AND ENTACAPONE 1 TABLET(S): 50; 200; 200 TABLET, FILM COATED ORAL at 06:18

## 2018-02-03 RX ADMIN — CLOZAPINE 50 MILLIGRAM(S): 150 TABLET, ORALLY DISINTEGRATING ORAL at 11:16

## 2018-02-03 RX ADMIN — CARBIDOPA AND LEVODOPA 1 TABLET(S): 25; 100 TABLET ORAL at 20:15

## 2018-02-03 RX ADMIN — CARBIDOPA AND LEVODOPA 1 TABLET(S): 25; 100 TABLET ORAL at 06:18

## 2018-02-03 RX ADMIN — CARBIDOPA, LEVODOPA, AND ENTACAPONE 1 TABLET(S): 50; 200; 200 TABLET, FILM COATED ORAL at 11:15

## 2018-02-04 RX ADMIN — CARBIDOPA AND LEVODOPA 1 TABLET(S): 25; 100 TABLET ORAL at 16:09

## 2018-02-04 RX ADMIN — Medication 325 MILLIGRAM(S): at 14:22

## 2018-02-04 RX ADMIN — CARBIDOPA, LEVODOPA, AND ENTACAPONE 1 TABLET(S): 50; 200; 200 TABLET, FILM COATED ORAL at 16:08

## 2018-02-04 RX ADMIN — CARBIDOPA AND LEVODOPA 1 TABLET(S): 25; 100 TABLET ORAL at 20:50

## 2018-02-04 RX ADMIN — CARBIDOPA, LEVODOPA, AND ENTACAPONE 1 TABLET(S): 50; 200; 200 TABLET, FILM COATED ORAL at 06:09

## 2018-02-04 RX ADMIN — CLOZAPINE 50 MILLIGRAM(S): 150 TABLET, ORALLY DISINTEGRATING ORAL at 10:12

## 2018-02-04 RX ADMIN — Medication 325 MILLIGRAM(S): at 22:56

## 2018-02-04 RX ADMIN — Medication 325 MILLIGRAM(S): at 13:53

## 2018-02-04 RX ADMIN — CARBIDOPA, LEVODOPA, AND ENTACAPONE 1 TABLET(S): 50; 200; 200 TABLET, FILM COATED ORAL at 11:22

## 2018-02-04 RX ADMIN — CARBIDOPA AND LEVODOPA 1 TABLET(S): 25; 100 TABLET ORAL at 06:09

## 2018-02-04 RX ADMIN — CARBIDOPA AND LEVODOPA 1 TABLET(S): 25; 100 TABLET ORAL at 11:22

## 2018-02-05 PROCEDURE — 99231 SBSQ HOSP IP/OBS SF/LOW 25: CPT

## 2018-02-05 RX ADMIN — CARBIDOPA, LEVODOPA, AND ENTACAPONE 1 TABLET(S): 50; 200; 200 TABLET, FILM COATED ORAL at 16:20

## 2018-02-05 RX ADMIN — CARBIDOPA AND LEVODOPA 1 TABLET(S): 25; 100 TABLET ORAL at 16:20

## 2018-02-05 RX ADMIN — CARBIDOPA AND LEVODOPA 1 TABLET(S): 25; 100 TABLET ORAL at 11:15

## 2018-02-05 RX ADMIN — Medication 325 MILLIGRAM(S): at 21:37

## 2018-02-05 RX ADMIN — CYCLOBENZAPRINE HYDROCHLORIDE 10 MILLIGRAM(S): 10 TABLET, FILM COATED ORAL at 22:43

## 2018-02-05 RX ADMIN — CARBIDOPA AND LEVODOPA 1 TABLET(S): 25; 100 TABLET ORAL at 20:15

## 2018-02-05 RX ADMIN — CLOZAPINE 50 MILLIGRAM(S): 150 TABLET, ORALLY DISINTEGRATING ORAL at 10:42

## 2018-02-05 RX ADMIN — CARBIDOPA, LEVODOPA, AND ENTACAPONE 1 TABLET(S): 50; 200; 200 TABLET, FILM COATED ORAL at 05:46

## 2018-02-05 RX ADMIN — CARBIDOPA, LEVODOPA, AND ENTACAPONE 1 TABLET(S): 50; 200; 200 TABLET, FILM COATED ORAL at 11:15

## 2018-02-05 RX ADMIN — Medication 325 MILLIGRAM(S): at 21:03

## 2018-02-05 RX ADMIN — Medication 325 MILLIGRAM(S): at 00:57

## 2018-02-05 RX ADMIN — CARBIDOPA AND LEVODOPA 1 TABLET(S): 25; 100 TABLET ORAL at 05:46

## 2018-02-05 NOTE — PROGRESS NOTE BEHAVIORAL HEALTH - OTHER
+ neck favoring one side, dressed in hospital gowns, adequate grooming and hygiene no psychomotor agitation at times (chronic and seemingly intentionaly as he can stop it when redirected). cooperative today at times low end of fair, using sense of humor at times chronically limited festinating gait but stable at times louder at times euthymic & friendly, at times irritable and upset (baseline) oscillating between anger, irritability, annoyance and then euthymic (baseline); friendly with Writer at times linear, at times disorganized and circumstantial at times paranoia towards certain staff (chronic) Limited

## 2018-02-05 NOTE — PROGRESS NOTE BEHAVIORAL HEALTH - NSBHFUPINTERVALHXFT_PSY_A_CORE
Patient seen for paranoia, disorganized behavior. Chart reviewed. No significant interval events over the weekend. Did watch the Superbowl. Patient had a better week overall last week - overall more in a positive, upbeat mood.  Same clinical presentation as usual.

## 2018-02-05 NOTE — PROGRESS NOTE BEHAVIORAL HEALTH - SUMMARY
51 yo male with early onset Parkinson’s disease x 11 years, discharged from 4 prior nursing homes in 8 months due to his behaviors, hx of refusing medications,  transferred to [facility] Unit [address] on 6/12/17 where he manifested severe mood lability, paranoia and confabulation with poor insight and judgment including making > 40 Justice center complaints. 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 Patient was accepted to [facility] in [place] and discharged from [facility] on 7/12/17.  Upon getting to [facility], Patient refused to go into the facility and became combative. He was thus transferred back to [facility] ED which resulted in Service Line Chiefs’ of Service involvement given the history/issues and ultimate transfer to [address]. Patient has been on [address] since. UPDATE: [facility] declined to accept Patient. Hundreds of nursing home applications wee sent out and all declined to accept patient. Court scheduled on 12/19/17 was deferred as Patient refused to go; he then again refused to go to the new Court date.

## 2018-02-06 PROCEDURE — 99231 SBSQ HOSP IP/OBS SF/LOW 25: CPT

## 2018-02-06 RX ADMIN — CARBIDOPA AND LEVODOPA 1 TABLET(S): 25; 100 TABLET ORAL at 10:58

## 2018-02-06 RX ADMIN — CARBIDOPA, LEVODOPA, AND ENTACAPONE 1 TABLET(S): 50; 200; 200 TABLET, FILM COATED ORAL at 10:57

## 2018-02-06 RX ADMIN — CARBIDOPA, LEVODOPA, AND ENTACAPONE 1 TABLET(S): 50; 200; 200 TABLET, FILM COATED ORAL at 06:20

## 2018-02-06 RX ADMIN — Medication 30 MILLILITER(S): at 07:50

## 2018-02-06 RX ADMIN — CLOZAPINE 50 MILLIGRAM(S): 150 TABLET, ORALLY DISINTEGRATING ORAL at 10:55

## 2018-02-06 RX ADMIN — CARBIDOPA AND LEVODOPA 1 TABLET(S): 25; 100 TABLET ORAL at 20:20

## 2018-02-06 RX ADMIN — CARBIDOPA AND LEVODOPA 1 TABLET(S): 25; 100 TABLET ORAL at 06:20

## 2018-02-06 RX ADMIN — CARBIDOPA AND LEVODOPA 1 TABLET(S): 25; 100 TABLET ORAL at 16:27

## 2018-02-06 RX ADMIN — CARBIDOPA, LEVODOPA, AND ENTACAPONE 1 TABLET(S): 50; 200; 200 TABLET, FILM COATED ORAL at 16:27

## 2018-02-06 NOTE — PROGRESS NOTE BEHAVIORAL HEALTH - SUMMARY
51 yo male with early onset Parkinson’s disease x 11 years, discharged from 4 prior nursing homes in 8 months due to his behaviors, hx of refusing medications,  transferred to [facility] Unit 2S on 6/12/17 where he manifested severe mood lability, paranoia and confabulation with poor insight and judgment including making > 40 Justice center complaints. 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 Patient was accepted to [facility] in [place] and discharged from [facility] on 7/12/17.  Upon getting to [facility], Patient refused to go into the facility and became combative. He was thus transferred back to [facility] ED which resulted in Service Line Chiefs’ of Service involvement given the history/issues and ultimate transfer to [address]. Patient has been on Unit 1N since. UPDATE: [facility] declined to accept Patient. Hundreds of nursing home applications wee sent out and all declined to accept patient. Court scheduled on 12/19/17 was deferred as Patient refused to go; he then again refused to go to the new Court date.

## 2018-02-06 NOTE — PROGRESS NOTE BEHAVIORAL HEALTH - NSBHFUPINTERVALHXFT_PSY_A_CORE
Patient seen for paranoia, disorganized behavior. Chart reviewed. No significant interval events. same chornic issue regarding taking the clozapine - refuses it in the morning, pushes it back several hours to take, usually requesting something with it like a muffin, at times accusing staff of changing and threatening to call his . (chronic).  Same clinical presentation as usual.

## 2018-02-07 LAB
BASOPHILS # BLD AUTO: 0.1 K/UL — SIGNIFICANT CHANGE UP (ref 0–0.2)
BASOPHILS NFR BLD AUTO: 1.1 % — SIGNIFICANT CHANGE UP (ref 0–2)
EOSINOPHIL # BLD AUTO: 0.1 K/UL — SIGNIFICANT CHANGE UP (ref 0–0.5)
EOSINOPHIL NFR BLD AUTO: 1.8 % — SIGNIFICANT CHANGE UP (ref 0–6)
HCT VFR BLD CALC: 39.9 % — SIGNIFICANT CHANGE UP (ref 39–50)
HGB BLD-MCNC: 13.2 G/DL — SIGNIFICANT CHANGE UP (ref 13–17)
LYMPHOCYTES # BLD AUTO: 2.1 K/UL — SIGNIFICANT CHANGE UP (ref 1–3.3)
LYMPHOCYTES # BLD AUTO: 36 % — SIGNIFICANT CHANGE UP (ref 13–44)
MCHC RBC-ENTMCNC: 30.7 PG — SIGNIFICANT CHANGE UP (ref 27–34)
MCHC RBC-ENTMCNC: 33.1 GM/DL — SIGNIFICANT CHANGE UP (ref 32–36)
MCV RBC AUTO: 92.7 FL — SIGNIFICANT CHANGE UP (ref 80–100)
MONOCYTES # BLD AUTO: 0.4 K/UL — SIGNIFICANT CHANGE UP (ref 0–0.9)
MONOCYTES NFR BLD AUTO: 6.8 % — SIGNIFICANT CHANGE UP (ref 2–14)
NEUTROPHILS # BLD AUTO: 3.1 K/UL — SIGNIFICANT CHANGE UP (ref 1.8–7.4)
NEUTROPHILS NFR BLD AUTO: 54.3 % — SIGNIFICANT CHANGE UP (ref 43–77)
PLATELET # BLD AUTO: 190 K/UL — SIGNIFICANT CHANGE UP (ref 150–400)
RBC # BLD: 4.3 M/UL — SIGNIFICANT CHANGE UP (ref 4.2–5.8)
RBC # FLD: 13 % — SIGNIFICANT CHANGE UP (ref 10.3–14.5)
WBC # BLD: 5.7 K/UL — SIGNIFICANT CHANGE UP (ref 3.8–10.5)
WBC # FLD AUTO: 5.7 K/UL — SIGNIFICANT CHANGE UP (ref 3.8–10.5)

## 2018-02-07 PROCEDURE — 99231 SBSQ HOSP IP/OBS SF/LOW 25: CPT

## 2018-02-07 RX ADMIN — CARBIDOPA, LEVODOPA, AND ENTACAPONE 1 TABLET(S): 50; 200; 200 TABLET, FILM COATED ORAL at 16:14

## 2018-02-07 RX ADMIN — CARBIDOPA, LEVODOPA, AND ENTACAPONE 1 TABLET(S): 50; 200; 200 TABLET, FILM COATED ORAL at 11:01

## 2018-02-07 RX ADMIN — CARBIDOPA, LEVODOPA, AND ENTACAPONE 1 TABLET(S): 50; 200; 200 TABLET, FILM COATED ORAL at 05:35

## 2018-02-07 RX ADMIN — CARBIDOPA AND LEVODOPA 1 TABLET(S): 25; 100 TABLET ORAL at 16:14

## 2018-02-07 RX ADMIN — CARBIDOPA AND LEVODOPA 1 TABLET(S): 25; 100 TABLET ORAL at 05:35

## 2018-02-07 RX ADMIN — CARBIDOPA AND LEVODOPA 1 TABLET(S): 25; 100 TABLET ORAL at 20:21

## 2018-02-07 RX ADMIN — Medication 650 MILLIGRAM(S): at 05:00

## 2018-02-07 RX ADMIN — CARBIDOPA AND LEVODOPA 1 TABLET(S): 25; 100 TABLET ORAL at 11:02

## 2018-02-07 RX ADMIN — CLOZAPINE 50 MILLIGRAM(S): 150 TABLET, ORALLY DISINTEGRATING ORAL at 11:02

## 2018-02-07 RX ADMIN — Medication 650 MILLIGRAM(S): at 04:04

## 2018-02-07 NOTE — PROGRESS NOTE BEHAVIORAL HEALTH - NSBHFUPINTERVALHXFT_PSY_A_CORE
Patient seen for paranoia, disorganized behavior. Chart reviewed. No significant interval events. same chronic issue regarding taking the clozapine. Same clinical presentation as usual.

## 2018-02-07 NOTE — PROGRESS NOTE BEHAVIORAL HEALTH - SUMMARY
53 yo male with early onset Parkinson’s disease x 11 years, discharged from 4 prior nursing homes in 8 months due to his behaviors, hx of refusing medications,  transferred to [facility] Unit 2S on 6/12/17 where he manifested severe mood lability, paranoia and confabulation with poor insight and judgment including making > 40 Justice center complaints. 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 Patient was accepted to [facility] in [place] and discharged from [facility] on 7/12/17.  Upon getting to [facility], Patient refused to go into the facility and became combative. He was thus transferred back to [facility] ED which resulted in Service Line Chiefs’ of Service involvement given the history/issues and ultimate transfer to [address]. Patient has been on Unit 1N since. UPDATE: [facility] declined to accept Patient. Hundreds of nursing home applications wee sent out and all declined to accept patient. Court scheduled on 12/19/17 was deferred as Patient refused to go; he then again refused to go to the new Court date.

## 2018-02-08 PROCEDURE — 99231 SBSQ HOSP IP/OBS SF/LOW 25: CPT

## 2018-02-08 RX ADMIN — Medication 325 MILLIGRAM(S): at 06:37

## 2018-02-08 RX ADMIN — CARBIDOPA AND LEVODOPA 1 TABLET(S): 25; 100 TABLET ORAL at 17:03

## 2018-02-08 RX ADMIN — CARBIDOPA, LEVODOPA, AND ENTACAPONE 1 TABLET(S): 50; 200; 200 TABLET, FILM COATED ORAL at 12:00

## 2018-02-08 RX ADMIN — CARBIDOPA AND LEVODOPA 1 TABLET(S): 25; 100 TABLET ORAL at 12:00

## 2018-02-08 RX ADMIN — CARBIDOPA, LEVODOPA, AND ENTACAPONE 1 TABLET(S): 50; 200; 200 TABLET, FILM COATED ORAL at 06:07

## 2018-02-08 RX ADMIN — CARBIDOPA, LEVODOPA, AND ENTACAPONE 1 TABLET(S): 50; 200; 200 TABLET, FILM COATED ORAL at 17:03

## 2018-02-08 RX ADMIN — CARBIDOPA AND LEVODOPA 1 TABLET(S): 25; 100 TABLET ORAL at 06:07

## 2018-02-08 RX ADMIN — Medication 325 MILLIGRAM(S): at 21:36

## 2018-02-08 RX ADMIN — Medication 325 MILLIGRAM(S): at 06:09

## 2018-02-08 RX ADMIN — CARBIDOPA AND LEVODOPA 1 TABLET(S): 25; 100 TABLET ORAL at 20:27

## 2018-02-08 RX ADMIN — CLOZAPINE 50 MILLIGRAM(S): 150 TABLET, ORALLY DISINTEGRATING ORAL at 15:06

## 2018-02-08 NOTE — PROGRESS NOTE BEHAVIORAL HEALTH - SUMMARY
53 yo male with early onset Parkinson’s disease x 11 years, discharged from 4 prior nursing homes in 8 months due to his behaviors, hx of refusing medications,  transferred to [facility] Unit 2S on 6/12/17 where he manifested severe mood lability, paranoia and confabulation with poor insight and judgment including making > 40 Justice center complaints. 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 Patient was accepted to [facility] in [place] and discharged from [facility] on 7/12/17.  Upon getting to [facility], Patient refused to go into the facility and became combative. He was thus transferred back to [facility] ED which resulted in Service Line Chiefs’ of Service involvement given the history/issues and ultimate transfer to [address]. Patient has been on Unit 1N since. UPDATE: [facility] declined to accept Patient. Hundreds of nursing home applications wee sent out and all declined to accept patient. Court scheduled on 12/19/17 was deferred as Patient refused to go; he then again refused to go to the new Court date. Continuing to search for placement.

## 2018-02-09 RX ADMIN — CARBIDOPA, LEVODOPA, AND ENTACAPONE 1 TABLET(S): 50; 200; 200 TABLET, FILM COATED ORAL at 11:19

## 2018-02-09 RX ADMIN — Medication 325 MILLIGRAM(S): at 17:34

## 2018-02-09 RX ADMIN — CARBIDOPA, LEVODOPA, AND ENTACAPONE 1 TABLET(S): 50; 200; 200 TABLET, FILM COATED ORAL at 05:59

## 2018-02-09 RX ADMIN — CLOZAPINE 50 MILLIGRAM(S): 150 TABLET, ORALLY DISINTEGRATING ORAL at 13:42

## 2018-02-09 RX ADMIN — CARBIDOPA, LEVODOPA, AND ENTACAPONE 1 TABLET(S): 50; 200; 200 TABLET, FILM COATED ORAL at 16:26

## 2018-02-09 RX ADMIN — CARBIDOPA AND LEVODOPA 1 TABLET(S): 25; 100 TABLET ORAL at 16:26

## 2018-02-09 RX ADMIN — CARBIDOPA AND LEVODOPA 1 TABLET(S): 25; 100 TABLET ORAL at 05:59

## 2018-02-09 RX ADMIN — Medication 325 MILLIGRAM(S): at 17:11

## 2018-02-09 RX ADMIN — CARBIDOPA AND LEVODOPA 1 TABLET(S): 25; 100 TABLET ORAL at 23:30

## 2018-02-09 RX ADMIN — CARBIDOPA AND LEVODOPA 1 TABLET(S): 25; 100 TABLET ORAL at 11:19

## 2018-02-09 NOTE — PROGRESS NOTE BEHAVIORAL HEALTH - SUMMARY
53 yo male with early onset Parkinson’s disease x 11 years, discharged from 4 prior nursing homes in 8 months due to his behaviors, hx of refusing medications,  transferred to [facility] Unit 2S on 6/12/17 where he manifested severe mood lability, paranoia and confabulation with poor insight and judgment including making > 40 Justice center complaints. 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 Patient was accepted to [facility] in [place] and discharged from [facility] on 7/12/17.  Upon getting to [facility], Patient refused to go into the facility and became combative. He was thus transferred back to [facility] ED which resulted in Service Line Chiefs’ of Service involvement given the history/issues and ultimate transfer to [address]. Patient has been on Unit 1N since. UPDATE: [facility] declined to accept Patient. Hundreds of nursing home applications wee sent out and all declined to accept patient. Court scheduled on 12/19/17 was deferred as Patient refused to go; he then again refused to go to the new Court date. Continuing to search for placement.

## 2018-02-09 NOTE — PROGRESS NOTE BEHAVIORAL HEALTH - OTHER
+ neck favoring one side, dressed in hospital gowns, adequate grooming and hygiene no psychomotor agitation at times (chronic and seemingly intentionaly as he can stop it when redirected). low end of fair, using sense of humor at times chronically limited festinating gait but stable at times louder "I need to talk to you about last night with staff!" varying (chronic) oscillating between anger, irritability, annoyance and then euthymic (baseline); friendly with Writer at times linear, at times disorganized and circumstantial at times paranoia towards certain staff (chronic) Limited

## 2018-02-10 RX ADMIN — CARBIDOPA, LEVODOPA, AND ENTACAPONE 1 TABLET(S): 50; 200; 200 TABLET, FILM COATED ORAL at 16:55

## 2018-02-10 RX ADMIN — CARBIDOPA AND LEVODOPA 1 TABLET(S): 25; 100 TABLET ORAL at 06:38

## 2018-02-10 RX ADMIN — CARBIDOPA AND LEVODOPA 1 TABLET(S): 25; 100 TABLET ORAL at 20:12

## 2018-02-10 RX ADMIN — CLOZAPINE 50 MILLIGRAM(S): 150 TABLET, ORALLY DISINTEGRATING ORAL at 12:17

## 2018-02-10 RX ADMIN — Medication 325 MILLIGRAM(S): at 01:04

## 2018-02-10 RX ADMIN — CARBIDOPA AND LEVODOPA 1 TABLET(S): 25; 100 TABLET ORAL at 16:55

## 2018-02-10 RX ADMIN — Medication 325 MILLIGRAM(S): at 00:37

## 2018-02-10 RX ADMIN — CARBIDOPA, LEVODOPA, AND ENTACAPONE 1 TABLET(S): 50; 200; 200 TABLET, FILM COATED ORAL at 06:38

## 2018-02-10 RX ADMIN — CARBIDOPA, LEVODOPA, AND ENTACAPONE 1 TABLET(S): 50; 200; 200 TABLET, FILM COATED ORAL at 12:17

## 2018-02-10 RX ADMIN — Medication 325 MILLIGRAM(S): at 22:46

## 2018-02-10 RX ADMIN — Medication 30 MILLILITER(S): at 07:35

## 2018-02-10 RX ADMIN — CARBIDOPA AND LEVODOPA 1 TABLET(S): 25; 100 TABLET ORAL at 12:17

## 2018-02-10 RX ADMIN — Medication 325 MILLIGRAM(S): at 23:00

## 2018-02-11 RX ADMIN — CYCLOBENZAPRINE HYDROCHLORIDE 10 MILLIGRAM(S): 10 TABLET, FILM COATED ORAL at 23:22

## 2018-02-11 RX ADMIN — CARBIDOPA, LEVODOPA, AND ENTACAPONE 1 TABLET(S): 50; 200; 200 TABLET, FILM COATED ORAL at 13:29

## 2018-02-11 RX ADMIN — CLOZAPINE 50 MILLIGRAM(S): 150 TABLET, ORALLY DISINTEGRATING ORAL at 13:29

## 2018-02-11 RX ADMIN — CARBIDOPA, LEVODOPA, AND ENTACAPONE 1 TABLET(S): 50; 200; 200 TABLET, FILM COATED ORAL at 06:24

## 2018-02-11 RX ADMIN — CARBIDOPA AND LEVODOPA 1 TABLET(S): 25; 100 TABLET ORAL at 16:17

## 2018-02-11 RX ADMIN — CARBIDOPA AND LEVODOPA 1 TABLET(S): 25; 100 TABLET ORAL at 06:24

## 2018-02-11 RX ADMIN — CARBIDOPA AND LEVODOPA 1 TABLET(S): 25; 100 TABLET ORAL at 20:25

## 2018-02-11 RX ADMIN — CARBIDOPA AND LEVODOPA 1 TABLET(S): 25; 100 TABLET ORAL at 13:31

## 2018-02-11 RX ADMIN — CARBIDOPA, LEVODOPA, AND ENTACAPONE 1 TABLET(S): 50; 200; 200 TABLET, FILM COATED ORAL at 16:17

## 2018-02-12 PROCEDURE — 99231 SBSQ HOSP IP/OBS SF/LOW 25: CPT

## 2018-02-12 PROCEDURE — 12345: CPT | Mod: NC

## 2018-02-12 RX ADMIN — CARBIDOPA, LEVODOPA, AND ENTACAPONE 1 TABLET(S): 50; 200; 200 TABLET, FILM COATED ORAL at 06:22

## 2018-02-12 RX ADMIN — CARBIDOPA, LEVODOPA, AND ENTACAPONE 1 TABLET(S): 50; 200; 200 TABLET, FILM COATED ORAL at 16:03

## 2018-02-12 RX ADMIN — CARBIDOPA, LEVODOPA, AND ENTACAPONE 1 TABLET(S): 50; 200; 200 TABLET, FILM COATED ORAL at 10:49

## 2018-02-12 RX ADMIN — CARBIDOPA AND LEVODOPA 1 TABLET(S): 25; 100 TABLET ORAL at 10:49

## 2018-02-12 RX ADMIN — CARBIDOPA AND LEVODOPA 1 TABLET(S): 25; 100 TABLET ORAL at 06:23

## 2018-02-12 RX ADMIN — CARBIDOPA AND LEVODOPA 1 TABLET(S): 25; 100 TABLET ORAL at 21:00

## 2018-02-12 RX ADMIN — CLOZAPINE 50 MILLIGRAM(S): 150 TABLET, ORALLY DISINTEGRATING ORAL at 12:16

## 2018-02-12 RX ADMIN — CARBIDOPA AND LEVODOPA 1 TABLET(S): 25; 100 TABLET ORAL at 16:03

## 2018-02-12 NOTE — PROGRESS NOTE BEHAVIORAL HEALTH - SUMMARY
53 yo male with early onset Parkinson’s disease x 11 years, discharged from 4 prior nursing homes in 8 months due to his behaviors, hx of refusing medications,  transferred to [facility] Unit 2S on 6/12/17 where he manifested severe mood lability, paranoia and confabulation with poor insight and judgment including making > 40 Justice center complaints. 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 Patient was accepted to [facility] in [place] and discharged from [facility] on 7/12/17.  Upon getting to [facility], Patient refused to go into the facility and became combative. He was thus transferred back to [facility] ED which resulted in Service Line Chiefs’ of Service involvement given the history/issues and ultimate transfer to [address]. Patient has been on Unit 1N since. UPDATE: [facility] declined to accept Patient. Hundreds of nursing home applications wee sent out and all declined to accept patient. Court scheduled on 12/19/17 was deferred as Patient refused to go; he then again refused to go to the new Court date. Continuing to search for placement.

## 2018-02-12 NOTE — PROGRESS NOTE BEHAVIORAL HEALTH - NSBHFUPINTERVALHXFT_PSY_A_CORE
Patient seen for paranoia, disorganized behavior. Chart reviewed. No significant interval events over the weekend. Same usual complaints otherwise including smell of floor cleaning solution, temperature in this room etc.

## 2018-02-12 NOTE — PROGRESS NOTE BEHAVIORAL HEALTH - OTHER
"I need to talk to you about last night with staff!" varying (chronic) oscillating between anger, irritability, annoyance and then euthymic (baseline); friendly with Writer at times linear, at times disorganized and circumstantial at times paranoia towards certain staff (chronic) Limited + neck favoring one side, dressed in hospital gowns, adequate grooming and hygiene no psychomotor agitation at times (chronic and seemingly intentionaly as he can stop it when redirected). low end of fair, using sense of humor at times chronically limited festinating gait but stable at times louder

## 2018-02-13 PROCEDURE — 99231 SBSQ HOSP IP/OBS SF/LOW 25: CPT

## 2018-02-13 RX ADMIN — CARBIDOPA, LEVODOPA, AND ENTACAPONE 1 TABLET(S): 50; 200; 200 TABLET, FILM COATED ORAL at 05:55

## 2018-02-13 RX ADMIN — CARBIDOPA, LEVODOPA, AND ENTACAPONE 1 TABLET(S): 50; 200; 200 TABLET, FILM COATED ORAL at 10:38

## 2018-02-13 RX ADMIN — CLOZAPINE 50 MILLIGRAM(S): 150 TABLET, ORALLY DISINTEGRATING ORAL at 12:29

## 2018-02-13 RX ADMIN — CARBIDOPA, LEVODOPA, AND ENTACAPONE 1 TABLET(S): 50; 200; 200 TABLET, FILM COATED ORAL at 15:48

## 2018-02-13 RX ADMIN — CARBIDOPA AND LEVODOPA 1 TABLET(S): 25; 100 TABLET ORAL at 15:48

## 2018-02-13 RX ADMIN — CARBIDOPA AND LEVODOPA 1 TABLET(S): 25; 100 TABLET ORAL at 10:38

## 2018-02-13 RX ADMIN — CARBIDOPA AND LEVODOPA 1 TABLET(S): 25; 100 TABLET ORAL at 05:55

## 2018-02-13 RX ADMIN — CARBIDOPA AND LEVODOPA 1 TABLET(S): 25; 100 TABLET ORAL at 20:09

## 2018-02-13 NOTE — PROGRESS NOTE BEHAVIORAL HEALTH - SUMMARY
53 yo male with early onset Parkinson’s disease x 11 years, discharged from 4 prior nursing homes in 8 months due to his behaviors, hx of refusing medications,  transferred to [facility] Unit 2S on 6/12/17 where he manifested severe mood lability, paranoia and confabulation with poor insight and judgment including making > 40 Justice center complaints. 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 Patient was accepted to [facility] in [place] and discharged from [facility] on 7/12/17.  Upon getting to [facility], Patient refused to go into the facility and became combative. He was thus transferred back to [facility] ED which resulted in Service Line Chiefs’ of Service involvement given the history/issues and ultimate transfer to [address]. Patient has been on Unit 1N since. UPDATE: [facility] declined to accept Patient. Hundreds of nursing home applications wee sent out and all declined to accept patient. Court scheduled on 12/19/17 was deferred as Patient refused to go; he then again refused to go to the new Court date. Continuing to search for placement.

## 2018-02-13 NOTE — PROGRESS NOTE BEHAVIORAL HEALTH - NSBHFUPINTERVALHXFT_PSY_A_CORE
Patient seen for paranoia, disorganized behavior. Chart reviewed. No significant interval events; requested to see the Neurologist again to ask of he needs an increased dose of his Parkinson's medication. Neurology follow-up requested. Otherwise, same usual complaints otherwise including smell of floor cleaning solution, temperature in this room etc. Same clinical presentation.

## 2018-02-14 LAB
BASOPHILS # BLD AUTO: 0 K/UL — SIGNIFICANT CHANGE UP (ref 0–0.2)
BASOPHILS NFR BLD AUTO: 0.9 % — SIGNIFICANT CHANGE UP (ref 0–2)
EOSINOPHIL # BLD AUTO: 0.1 K/UL — SIGNIFICANT CHANGE UP (ref 0–0.5)
EOSINOPHIL NFR BLD AUTO: 1.9 % — SIGNIFICANT CHANGE UP (ref 0–6)
HCT VFR BLD CALC: 36.9 % — LOW (ref 39–50)
HGB BLD-MCNC: 12.5 G/DL — LOW (ref 13–17)
LYMPHOCYTES # BLD AUTO: 1.4 K/UL — SIGNIFICANT CHANGE UP (ref 1–3.3)
LYMPHOCYTES # BLD AUTO: 26.5 % — SIGNIFICANT CHANGE UP (ref 13–44)
MCHC RBC-ENTMCNC: 32.2 PG — SIGNIFICANT CHANGE UP (ref 27–34)
MCHC RBC-ENTMCNC: 33.8 GM/DL — SIGNIFICANT CHANGE UP (ref 32–36)
MCV RBC AUTO: 95.4 FL — SIGNIFICANT CHANGE UP (ref 80–100)
MONOCYTES # BLD AUTO: 0.5 K/UL — SIGNIFICANT CHANGE UP (ref 0–0.9)
MONOCYTES NFR BLD AUTO: 9 % — SIGNIFICANT CHANGE UP (ref 2–14)
NEUTROPHILS # BLD AUTO: 3.2 K/UL — SIGNIFICANT CHANGE UP (ref 1.8–7.4)
NEUTROPHILS NFR BLD AUTO: 61.7 % — SIGNIFICANT CHANGE UP (ref 43–77)
PLATELET # BLD AUTO: 189 K/UL — SIGNIFICANT CHANGE UP (ref 150–400)
RBC # BLD: 3.87 M/UL — LOW (ref 4.2–5.8)
RBC # FLD: 12.9 % — SIGNIFICANT CHANGE UP (ref 10.3–14.5)
WBC # BLD: 5.2 K/UL — SIGNIFICANT CHANGE UP (ref 3.8–10.5)
WBC # FLD AUTO: 5.2 K/UL — SIGNIFICANT CHANGE UP (ref 3.8–10.5)

## 2018-02-14 PROCEDURE — 99231 SBSQ HOSP IP/OBS SF/LOW 25: CPT

## 2018-02-14 RX ORDER — CARBIDOPA AND LEVODOPA 25; 100 MG/1; MG/1
1 TABLET ORAL
Qty: 0 | Refills: 0 | Status: DISCONTINUED | OUTPATIENT
Start: 2018-02-14 | End: 2018-07-27

## 2018-02-14 RX ADMIN — CARBIDOPA AND LEVODOPA 1 TABLET(S): 25; 100 TABLET ORAL at 11:03

## 2018-02-14 RX ADMIN — CARBIDOPA, LEVODOPA, AND ENTACAPONE 1 TABLET(S): 50; 200; 200 TABLET, FILM COATED ORAL at 05:37

## 2018-02-14 RX ADMIN — CLOZAPINE 50 MILLIGRAM(S): 150 TABLET, ORALLY DISINTEGRATING ORAL at 15:08

## 2018-02-14 RX ADMIN — CARBIDOPA AND LEVODOPA 1 TABLET(S): 25; 100 TABLET ORAL at 20:27

## 2018-02-14 RX ADMIN — CARBIDOPA AND LEVODOPA 1 TABLET(S): 25; 100 TABLET ORAL at 16:53

## 2018-02-14 RX ADMIN — CARBIDOPA, LEVODOPA, AND ENTACAPONE 1 TABLET(S): 50; 200; 200 TABLET, FILM COATED ORAL at 11:03

## 2018-02-14 RX ADMIN — CARBIDOPA, LEVODOPA, AND ENTACAPONE 1 TABLET(S): 50; 200; 200 TABLET, FILM COATED ORAL at 16:53

## 2018-02-14 RX ADMIN — Medication 30 MILLILITER(S): at 17:47

## 2018-02-14 RX ADMIN — CARBIDOPA AND LEVODOPA 1 TABLET(S): 25; 100 TABLET ORAL at 05:38

## 2018-02-14 NOTE — CONSULT NOTE ADULT - ASSESSMENT
ANALYSIS AND PLAN:   This is a 52-year-old with a history of Parkinson's disease.  For history of Parkinson's disease,  when seeing keisha at present he appears to have dyskinesia rather than tremors -- change story in regards how he fell-- recommend if he truly does have increase in tremors as per pt at night at  can give 1 tablets of regular sinemet  and 1 tablet of 10/100   fall precautions

## 2018-02-14 NOTE — PROGRESS NOTE BEHAVIORAL HEALTH - NSBHFUPINTERVALHXFT_PSY_A_CORE
Patient seen for paranoia, disorganized behavior. Chart reviewed. No significant interval events. Neurology consult follow up recommendation noted and much appreciated. Same usual complaints otherwise including smell of floor cleaning solution, temperature in this room etc. Same clinical presentation.

## 2018-02-14 NOTE — PROGRESS NOTE BEHAVIORAL HEALTH - SUMMARY
53 yo male with early onset Parkinson’s disease x 11 years, discharged from 4 prior nursing homes in 8 months due to his behaviors, hx of refusing medications,  transferred to [facility] Unit 2S on 6/12/17 where he manifested severe mood lability, paranoia and confabulation with poor insight and judgment including making > 40 Justice center complaints. 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 Patient was accepted to [facility] in [place] and discharged from [facility] on 7/12/17.  Upon getting to [facility], Patient refused to go into the facility and became combative. He was thus transferred back to [facility] ED which resulted in Service Line Chiefs’ of Service involvement given the history/issues and ultimate transfer to [address]. Patient has been on Unit 1N since. UPDATE: [facility] declined to accept Patient. Hundreds of nursing home applications wee sent out and all declined to accept patient. Court scheduled on 12/19/17 was deferred as Patient refused to go; he then again refused to go to the new Court date. Continuing to search for placement.

## 2018-02-14 NOTE — PROGRESS NOTE BEHAVIORAL HEALTH - OTHER
+ neck favoring one side, dressed in hospital gowns, adequate grooming and hygiene no psychomotor agitation at times (chronic and seemingly intentionaly as he can stop it when redirected). low end of fair, using sense of humor at times chronically limited festinating gait but stable at times louder "okay" varying (chronic) reactive overall linear, at times disorganized and circumstantial at times paranoia towards certain staff (chronic) Limited

## 2018-02-14 NOTE — CONSULT NOTE ADULT - SUBJECTIVE AND OBJECTIVE BOX
Patient evaluation and consult
.
Patient is a 52y old  Male who presents with a chief complaint of     HPI: 53 yo m with hx of CHF, parkinson dx, GERD, who is in psych unit for psychiatric problem, under the care of psychiatrist, complains of having chest pain since last night at 1 :30 am, constant, not resolved yet, pain is like  stabbing pain, radiating  to his shoulder and neck, pt is very anxious and agitated. He denies any nausea and vomiting, mild sob. pain was at rest and no change with activity.     PAST MEDICAL & SURGICAL HISTORY:  Herniated cervical disc  Congestive heart failure, unspecified congestive heart failure chronicity, unspecified congestive heart failure type  Parkinson disease  Diastolic heart failure  GERD (gastroesophageal reflux disease)  Migraine  CHF (congestive heart failure)  Parkinson disease  No significant past surgical history  No significant past surgical history      REVIEW OF SYSTEMS:    CONSTITUTIONAL: No fever, weight loss, or fatigue  EYES: No eye pain, visual disturbances, or discharge  ENMT:  No difficulty hearing, tinnitus, vertigo; No sinus or throat pain  NECK: No pain or stiffness  BREASTS: No pain, masses, or nipple discharge  RESPIRATORY: No cough, wheezing, chills or hemoptysis; + shortness of breath  CARDIOVASCULAR: + chest pain, palpitations, dizziness, or leg swelling  GASTROINTESTINAL: No abdominal or epigastric pain. No nausea, vomiting, or hematemesis; No diarrhea or constipation. No melena or hematochezia.  GENITOURINARY: No dysuria, frequency, hematuria, or incontinence  NEUROLOGICAL: No headaches, memory loss, loss of strength, numbness, or tremors  SKIN: No itching, burning, rashes, or lesions   LYMPH NODES: No enlarged glands  ENDOCRINE: No heat or cold intolerance; No hair loss  MUSCULOSKELETAL: No muscle or back pain  PSYCHIATRIC: No depression, anxiety, mood swings, or difficulty sleeping  HEME/LYMPH: No easy bruising, or bleeding gums  ALLERGY AND IMMUNOLOGIC: No hives or eczema      MEDICATIONS  (STANDING):  baclofen 10 milliGRAM(s) Oral two times a day  carbidopa/levodopa  25/250 1 Tablet(s) Oral <User Schedule>  carbidopa/levodopa/entacapone 50/200/200 1 Tablet(s) Oral <User Schedule>  docusate sodium 100 milliGRAM(s) Oral two times a day  hydrocortisone 2.5% Rectal Cream 1 Application(s) Rectal daily  Nuplazid (pimavanserin) 17 milliGRAM(s) 17 milliGRAM(s) Oral <User Schedule>  nystatin Powder 1 Application(s) Topical daily  PPD  5 Tuberculin Unit(s) Injectable 5 Unit(s) IntraDermal once    MEDICATIONS  (PRN):  acetaminophen   Tablet. 325 milliGRAM(s) Oral every 6 hours PRN Moderate Pain (4 - 6)  aluminum hydroxide/magnesium hydroxide/simethicone Suspension 30 milliLiter(s) Oral every 6 hours PRN Dyspepsia  cyclobenzaprine 10 milliGRAM(s) Oral three times a day PRN Muscle Spasm  LORazepam   Injectable 2 milliGRAM(s) IntraMuscular every 4 hours PRN Agitation /severe anxiety  nitroglycerin     SubLingual 0.4 milliGRAM(s) SubLingual every 5 minutes PRN Chest Pain  OLANZapine Injectable 5 milliGRAM(s) IntraMuscular every 6 hours PRN severe agitation ./ aggressive, threatening behavior      Allergies    aspirin (Unknown)  Cheese (Unknown)  ibuprofen (Unknown)  Reglan (Swelling)    Intolerances    ibuprofen (Unknown)      SOCIAL HISTORY:  Alochol: Denied  Smoking: Nonsmoker  Drug Use: Denied  Marital Status:           FAMILY HISTORY:  No pertinent family history in first degree relatives  No pertinent family history in first degree relatives  Family history of prostate cancer in father  Family history of diabetes mellitus      Vital Signs Last 24 Hrs, pt refused to check vitals   T(C): --  T(F): --  HR: --  BP: --  BP(mean): --  RR: --  SpO2: --    PHYSICAL EXAM:    GENERAL: NAD, well-groomed, well-developed  HEAD:  Atraumatic, Normocephalic  EYES: EOMI, PERRLA, conjunctiva and sclera clear  ENMT: No tonsillar erythema, exudates, or enlargement; Moist mucous membranes, Good dentition, No lesions  NECK: Supple, No JVD, Normal thyroid  NERVOUS SYSTEM:  Alert & Oriented X3, Good concentration; Motor Strength 5/5 B/L upper and lower extremities; DTRs 2+ intact and symmetric  CHEST/LUNG: Clear to percussion bilaterally; No rales, rhonchi, wheezing, or rubs, pt has local tenderness on left upper chest.   HEART: Regular rate and rhythm; No murmurs, rubs, or gallops  ABDOMEN: Soft, Nontender, Nondistended; Bowel sounds present  EXTREMITIES:  2+ Peripheral Pulses, No clubbing, cyanosis, or edema  LYMPH: No lymphadenopathy   SKIN, intact      LABS: pt refused any lab work              CAPILLARY BLOOD GLUCOSE          RADIOLOGY & ADDITIONAL STUDIES:

## 2018-02-15 PROCEDURE — 99231 SBSQ HOSP IP/OBS SF/LOW 25: CPT

## 2018-02-15 RX ORDER — IBUPROFEN 200 MG
400 TABLET ORAL ONCE
Qty: 0 | Refills: 0 | Status: COMPLETED | OUTPATIENT
Start: 2018-02-15 | End: 2018-02-15

## 2018-02-15 RX ADMIN — CLOZAPINE 50 MILLIGRAM(S): 150 TABLET, ORALLY DISINTEGRATING ORAL at 15:47

## 2018-02-15 RX ADMIN — CARBIDOPA, LEVODOPA, AND ENTACAPONE 1 TABLET(S): 50; 200; 200 TABLET, FILM COATED ORAL at 05:52

## 2018-02-15 RX ADMIN — CARBIDOPA, LEVODOPA, AND ENTACAPONE 1 TABLET(S): 50; 200; 200 TABLET, FILM COATED ORAL at 10:29

## 2018-02-15 RX ADMIN — CARBIDOPA AND LEVODOPA 1 TABLET(S): 25; 100 TABLET ORAL at 16:27

## 2018-02-15 RX ADMIN — CARBIDOPA AND LEVODOPA 1 TABLET(S): 25; 100 TABLET ORAL at 20:27

## 2018-02-15 RX ADMIN — CARBIDOPA, LEVODOPA, AND ENTACAPONE 1 TABLET(S): 50; 200; 200 TABLET, FILM COATED ORAL at 16:27

## 2018-02-15 RX ADMIN — CARBIDOPA AND LEVODOPA 1 TABLET(S): 25; 100 TABLET ORAL at 10:29

## 2018-02-15 RX ADMIN — Medication 400 MILLIGRAM(S): at 22:41

## 2018-02-15 RX ADMIN — CARBIDOPA AND LEVODOPA 1 TABLET(S): 25; 100 TABLET ORAL at 05:52

## 2018-02-15 NOTE — PROGRESS NOTE BEHAVIORAL HEALTH - NSBHFUPINTERVALHXFT_PSY_A_CORE
Patient seen for paranoia, disorganized behavior. Chart reviewed. No significant interval events. Patient asked about his Parkinson's medications and a print out of his medications was given to him as he questioned the doses. Same clinical presentation - got an additional ice pack which made him happy.

## 2018-02-15 NOTE — PROGRESS NOTE BEHAVIORAL HEALTH - OTHER
Limited low end of fair, using sense of humor at times chronically limited but better since admission festinating gait but stable at times louder "okay" varying (chronic) reactive overall linear, at times disorganized and circumstantial at times paranoia towards certain staff (chronic) + neck favoring one side, dressed in hospital gowns, adequate grooming and hygiene no psychomotor agitation at times (chronic and seemingly intentionaly as he can stop it when redirected). chronically impaired but improved since admission

## 2018-02-15 NOTE — PROGRESS NOTE BEHAVIORAL HEALTH - SUMMARY
51 yo male with early onset Parkinson’s disease x 11 years, discharged from 4 prior nursing homes in 8 months due to his behaviors, hx of refusing medications,  transferred to [facility] Unit 2S on 6/12/17 where he manifested severe mood lability, paranoia and confabulation with poor insight and judgment including making > 40 Justice center complaints. 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 Patient was accepted to [facility] in [place] and discharged from [facility] on 7/12/17.  Upon getting to [facility], Patient refused to go into the facility and became combative. He was thus transferred back to [facility] ED which resulted in Service Line Chiefs’ of Service involvement given the history/issues and ultimate transfer to [address]. Patient has been on Unit 1N since. UPDATE: [facility] declined to accept Patient. Hundreds of nursing home applications wee sent out and all declined to accept patient. Court scheduled on 12/19/17 was deferred as Patient refused to go; he then again refused to go to the new Court date. Continuing to search for placement.

## 2018-02-16 PROCEDURE — 99232 SBSQ HOSP IP/OBS MODERATE 35: CPT

## 2018-02-16 PROCEDURE — 12345: CPT | Mod: NC

## 2018-02-16 RX ORDER — ACETAMINOPHEN 500 MG
650 TABLET ORAL ONCE
Qty: 0 | Refills: 0 | Status: COMPLETED | OUTPATIENT
Start: 2018-02-16 | End: 2018-02-16

## 2018-02-16 RX ADMIN — CARBIDOPA AND LEVODOPA 1 TABLET(S): 25; 100 TABLET ORAL at 22:07

## 2018-02-16 RX ADMIN — CARBIDOPA, LEVODOPA, AND ENTACAPONE 1 TABLET(S): 50; 200; 200 TABLET, FILM COATED ORAL at 11:15

## 2018-02-16 RX ADMIN — CARBIDOPA, LEVODOPA, AND ENTACAPONE 1 TABLET(S): 50; 200; 200 TABLET, FILM COATED ORAL at 05:39

## 2018-02-16 RX ADMIN — CARBIDOPA AND LEVODOPA 1 TABLET(S): 25; 100 TABLET ORAL at 16:09

## 2018-02-16 RX ADMIN — CLOZAPINE 50 MILLIGRAM(S): 150 TABLET, ORALLY DISINTEGRATING ORAL at 14:12

## 2018-02-16 RX ADMIN — CARBIDOPA AND LEVODOPA 1 TABLET(S): 25; 100 TABLET ORAL at 11:16

## 2018-02-16 RX ADMIN — Medication 30 MILLILITER(S): at 06:24

## 2018-02-16 RX ADMIN — CARBIDOPA, LEVODOPA, AND ENTACAPONE 1 TABLET(S): 50; 200; 200 TABLET, FILM COATED ORAL at 16:09

## 2018-02-16 RX ADMIN — CARBIDOPA AND LEVODOPA 1 TABLET(S): 25; 100 TABLET ORAL at 05:39

## 2018-02-16 NOTE — PROGRESS NOTE BEHAVIORAL HEALTH - OTHER
+ neck favoring one side, dressed in hospital gowns, adequate grooming and hygiene no psychomotor agitation at times (chronic and seemingly intentionaly as he can stop it when redirected). low end of fair, using sense of humor at times chronically limited but better since admission festinating gait but stable at times louder "okay" varying (chronic) reactive overall linear, at times disorganized and circumstantial at times paranoia towards certain staff (chronic) chronically impaired but improved since admission Limited

## 2018-02-16 NOTE — PROGRESS NOTE BEHAVIORAL HEALTH - SUMMARY
51 yo male with early onset Parkinson’s disease x 11 years, discharged from 4 prior nursing homes in 8 months due to his behaviors, hx of refusing medications,  transferred to [facility] on 6/12/17 where he manifested severe mood lability, paranoia and confabulation with poor insight and judgment including making > 40 [facility] center complaints. 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 Patient was accepted to [facility] in [place] and discharged from [facility] on 7/12/17.  Upon getting to [facility], Patient refused to go into the facility and became combative. He was thus transferred back to [facility] ED which resulted in Service Line Chiefs’ of Service involvement given the history/issues and ultimate transfer to [address]. Patient has been on [address] since. UPDATE: [facility] declined to accept Patient. Hundreds of nursing home applications wee sent out and all declined to accept patient. Court scheduled on 12/19/17 was deferred as Patient refused to go; he then again refused to go to the new Court date. Continuing to search for placement.

## 2018-02-16 NOTE — PROGRESS NOTE BEHAVIORAL HEALTH - NSBHFUPINTERVALHXFT_PSY_A_CORE
Patient seen for paranoia, disorganized behavior. Chart reviewed. No significant interval events.  Same clinical presentation. He continues to state that he only can go to the [facility] and that his family has it all set up for him. To date this has not been done yet.

## 2018-02-17 RX ADMIN — Medication 325 MILLIGRAM(S): at 22:38

## 2018-02-17 RX ADMIN — CARBIDOPA, LEVODOPA, AND ENTACAPONE 1 TABLET(S): 50; 200; 200 TABLET, FILM COATED ORAL at 16:40

## 2018-02-17 RX ADMIN — CARBIDOPA AND LEVODOPA 1 TABLET(S): 25; 100 TABLET ORAL at 16:40

## 2018-02-17 RX ADMIN — Medication 650 MILLIGRAM(S): at 13:14

## 2018-02-17 RX ADMIN — CLOZAPINE 50 MILLIGRAM(S): 150 TABLET, ORALLY DISINTEGRATING ORAL at 10:43

## 2018-02-17 RX ADMIN — CARBIDOPA AND LEVODOPA 1 TABLET(S): 25; 100 TABLET ORAL at 21:08

## 2018-02-17 RX ADMIN — CARBIDOPA AND LEVODOPA 1 TABLET(S): 25; 100 TABLET ORAL at 05:37

## 2018-02-17 RX ADMIN — CARBIDOPA AND LEVODOPA 1 TABLET(S): 25; 100 TABLET ORAL at 11:03

## 2018-02-17 RX ADMIN — Medication 650 MILLIGRAM(S): at 11:48

## 2018-02-17 RX ADMIN — Medication 30 MILLILITER(S): at 05:37

## 2018-02-17 RX ADMIN — CARBIDOPA, LEVODOPA, AND ENTACAPONE 1 TABLET(S): 50; 200; 200 TABLET, FILM COATED ORAL at 11:04

## 2018-02-17 RX ADMIN — CARBIDOPA, LEVODOPA, AND ENTACAPONE 1 TABLET(S): 50; 200; 200 TABLET, FILM COATED ORAL at 05:37

## 2018-02-17 RX ADMIN — Medication 325 MILLIGRAM(S): at 23:38

## 2018-02-18 RX ADMIN — CARBIDOPA AND LEVODOPA 1 TABLET(S): 25; 100 TABLET ORAL at 16:11

## 2018-02-18 RX ADMIN — CARBIDOPA AND LEVODOPA 1 TABLET(S): 25; 100 TABLET ORAL at 10:16

## 2018-02-18 RX ADMIN — Medication 10 MILLIGRAM(S): at 11:20

## 2018-02-18 RX ADMIN — Medication 325 MILLIGRAM(S): at 10:58

## 2018-02-18 RX ADMIN — CLOZAPINE 50 MILLIGRAM(S): 150 TABLET, ORALLY DISINTEGRATING ORAL at 12:38

## 2018-02-18 RX ADMIN — CARBIDOPA AND LEVODOPA 1 TABLET(S): 25; 100 TABLET ORAL at 06:26

## 2018-02-18 RX ADMIN — Medication 10 MILLIGRAM(S): at 21:39

## 2018-02-18 RX ADMIN — CARBIDOPA AND LEVODOPA 1 TABLET(S): 25; 100 TABLET ORAL at 20:11

## 2018-02-18 RX ADMIN — CARBIDOPA, LEVODOPA, AND ENTACAPONE 1 TABLET(S): 50; 200; 200 TABLET, FILM COATED ORAL at 16:11

## 2018-02-18 RX ADMIN — CARBIDOPA, LEVODOPA, AND ENTACAPONE 1 TABLET(S): 50; 200; 200 TABLET, FILM COATED ORAL at 06:26

## 2018-02-18 RX ADMIN — CARBIDOPA, LEVODOPA, AND ENTACAPONE 1 TABLET(S): 50; 200; 200 TABLET, FILM COATED ORAL at 10:16

## 2018-02-18 RX ADMIN — Medication 325 MILLIGRAM(S): at 11:45

## 2018-02-19 RX ADMIN — CARBIDOPA, LEVODOPA, AND ENTACAPONE 1 TABLET(S): 50; 200; 200 TABLET, FILM COATED ORAL at 05:41

## 2018-02-19 RX ADMIN — CARBIDOPA AND LEVODOPA 1 TABLET(S): 25; 100 TABLET ORAL at 10:29

## 2018-02-19 RX ADMIN — CLOZAPINE 50 MILLIGRAM(S): 150 TABLET, ORALLY DISINTEGRATING ORAL at 12:48

## 2018-02-19 RX ADMIN — CARBIDOPA AND LEVODOPA 1 TABLET(S): 25; 100 TABLET ORAL at 05:41

## 2018-02-19 RX ADMIN — CARBIDOPA, LEVODOPA, AND ENTACAPONE 1 TABLET(S): 50; 200; 200 TABLET, FILM COATED ORAL at 16:08

## 2018-02-19 RX ADMIN — CARBIDOPA AND LEVODOPA 1 TABLET(S): 25; 100 TABLET ORAL at 20:35

## 2018-02-19 RX ADMIN — Medication 10 MILLIGRAM(S): at 10:27

## 2018-02-19 RX ADMIN — CARBIDOPA AND LEVODOPA 1 TABLET(S): 25; 100 TABLET ORAL at 16:08

## 2018-02-19 RX ADMIN — CARBIDOPA, LEVODOPA, AND ENTACAPONE 1 TABLET(S): 50; 200; 200 TABLET, FILM COATED ORAL at 10:30

## 2018-02-20 RX ORDER — IBUPROFEN 200 MG
400 TABLET ORAL DAILY
Qty: 0 | Refills: 0 | Status: DISCONTINUED | OUTPATIENT
Start: 2018-02-20 | End: 2018-03-13

## 2018-02-20 RX ADMIN — CARBIDOPA AND LEVODOPA 1 TABLET(S): 25; 100 TABLET ORAL at 06:23

## 2018-02-20 RX ADMIN — CARBIDOPA AND LEVODOPA 1 TABLET(S): 25; 100 TABLET ORAL at 20:18

## 2018-02-20 RX ADMIN — CARBIDOPA AND LEVODOPA 1 TABLET(S): 25; 100 TABLET ORAL at 16:16

## 2018-02-20 RX ADMIN — Medication 10 MILLIGRAM(S): at 11:01

## 2018-02-20 RX ADMIN — CARBIDOPA, LEVODOPA, AND ENTACAPONE 1 TABLET(S): 50; 200; 200 TABLET, FILM COATED ORAL at 16:16

## 2018-02-20 RX ADMIN — CARBIDOPA, LEVODOPA, AND ENTACAPONE 1 TABLET(S): 50; 200; 200 TABLET, FILM COATED ORAL at 11:01

## 2018-02-20 RX ADMIN — CLOZAPINE 50 MILLIGRAM(S): 150 TABLET, ORALLY DISINTEGRATING ORAL at 13:21

## 2018-02-20 RX ADMIN — CARBIDOPA AND LEVODOPA 1 TABLET(S): 25; 100 TABLET ORAL at 11:01

## 2018-02-20 RX ADMIN — CARBIDOPA, LEVODOPA, AND ENTACAPONE 1 TABLET(S): 50; 200; 200 TABLET, FILM COATED ORAL at 06:23

## 2018-02-20 RX ADMIN — Medication 400 MILLIGRAM(S): at 21:45

## 2018-02-20 RX ADMIN — Medication 400 MILLIGRAM(S): at 20:18

## 2018-02-20 NOTE — PROGRESS NOTE BEHAVIORAL HEALTH - OTHER
varying (chronic) reactive overall linear, at times disorganized and circumstantial at times paranoia towards certain staff (chronic) chronically impaired but improved since admission Limited + neck favoring one side, dressed in hospital gowns, adequate grooming and hygiene no psychomotor agitation at times (chronic and seemingly intentionaly as he can stop it when redirected). low end of fair, using sense of humor at times chronically limited but better since admission festinating gait but stable at times louder "okay"

## 2018-02-20 NOTE — PROGRESS NOTE BEHAVIORAL HEALTH - SUMMARY
51 yo male with early onset Parkinson’s disease x 11 years, discharged from 4 prior nursing homes in 8 months due to his behaviors, hx of refusing medications,  transferred to [facility] Unit [facility] on 6/12/17 where he manifested severe mood lability, paranoia and confabulation with poor insight and judgment including making > 40 [facility] center complaints. 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 Patient was accepted to [facility] in [place] and discharged from [facility] on 7/12/17.  Upon getting to [facility], Patient refused to go into the facility and became combative. He was thus transferred back to [facility] ED which resulted in Service Line Chiefs’ of Service involvement given the history/issues and ultimate transfer to [address]. Patient has been on [address] 1[address] since. UPDATE: [facility] declined to accept Patient. Hundreds of nursing home applications wee sent out and all declined to accept patient. Court scheduled on 12/19/17 was deferred as Patient refused to go; he then again refused to go to the new Court date. Continuing to search for placement.

## 2018-02-20 NOTE — PROGRESS NOTE BEHAVIORAL HEALTH - NSBHFUPINTERVALHXFT_PSY_A_CORE
Patient seen for paranoia, disorganized behavior. Chart reviewed. No significant interval events over the weekend. Patient asked about his Parkinson's medications and a print out of his medications was given to him as he questioned the doses. Same clinical presentation - got an additional ice pack which made him happy.

## 2018-02-21 LAB
BASOPHILS # BLD AUTO: 0 K/UL — SIGNIFICANT CHANGE UP (ref 0–0.2)
BASOPHILS NFR BLD AUTO: 0.6 % — SIGNIFICANT CHANGE UP (ref 0–2)
EOSINOPHIL # BLD AUTO: 0.1 K/UL — SIGNIFICANT CHANGE UP (ref 0–0.5)
EOSINOPHIL NFR BLD AUTO: 2.2 % — SIGNIFICANT CHANGE UP (ref 0–6)
HCT VFR BLD CALC: 36.3 % — LOW (ref 39–50)
HGB BLD-MCNC: 12.2 G/DL — LOW (ref 13–17)
LYMPHOCYTES # BLD AUTO: 1.7 K/UL — SIGNIFICANT CHANGE UP (ref 1–3.3)
LYMPHOCYTES # BLD AUTO: 33.6 % — SIGNIFICANT CHANGE UP (ref 13–44)
MCHC RBC-ENTMCNC: 30.7 PG — SIGNIFICANT CHANGE UP (ref 27–34)
MCHC RBC-ENTMCNC: 33.6 GM/DL — SIGNIFICANT CHANGE UP (ref 32–36)
MCV RBC AUTO: 91.4 FL — SIGNIFICANT CHANGE UP (ref 80–100)
MONOCYTES # BLD AUTO: 0.4 K/UL — SIGNIFICANT CHANGE UP (ref 0–0.9)
MONOCYTES NFR BLD AUTO: 8.2 % — SIGNIFICANT CHANGE UP (ref 2–14)
NEUTROPHILS # BLD AUTO: 2.8 K/UL — SIGNIFICANT CHANGE UP (ref 1.8–7.4)
NEUTROPHILS NFR BLD AUTO: 55.3 % — SIGNIFICANT CHANGE UP (ref 43–77)
PLATELET # BLD AUTO: 178 K/UL — SIGNIFICANT CHANGE UP (ref 150–400)
RBC # BLD: 3.98 M/UL — LOW (ref 4.2–5.8)
RBC # FLD: 12.8 % — SIGNIFICANT CHANGE UP (ref 10.3–14.5)
WBC # BLD: 5.1 K/UL — SIGNIFICANT CHANGE UP (ref 3.8–10.5)
WBC # FLD AUTO: 5.1 K/UL — SIGNIFICANT CHANGE UP (ref 3.8–10.5)

## 2018-02-21 RX ADMIN — CARBIDOPA AND LEVODOPA 1 TABLET(S): 25; 100 TABLET ORAL at 20:27

## 2018-02-21 RX ADMIN — CLOZAPINE 50 MILLIGRAM(S): 150 TABLET, ORALLY DISINTEGRATING ORAL at 17:00

## 2018-02-21 RX ADMIN — Medication 400 MILLIGRAM(S): at 08:08

## 2018-02-21 RX ADMIN — CARBIDOPA, LEVODOPA, AND ENTACAPONE 1 TABLET(S): 50; 200; 200 TABLET, FILM COATED ORAL at 06:13

## 2018-02-21 RX ADMIN — CARBIDOPA, LEVODOPA, AND ENTACAPONE 1 TABLET(S): 50; 200; 200 TABLET, FILM COATED ORAL at 11:10

## 2018-02-21 RX ADMIN — CARBIDOPA AND LEVODOPA 1 TABLET(S): 25; 100 TABLET ORAL at 20:48

## 2018-02-21 RX ADMIN — CARBIDOPA AND LEVODOPA 1 TABLET(S): 25; 100 TABLET ORAL at 06:13

## 2018-02-21 RX ADMIN — CARBIDOPA AND LEVODOPA 1 TABLET(S): 25; 100 TABLET ORAL at 11:10

## 2018-02-21 RX ADMIN — CARBIDOPA AND LEVODOPA 1 TABLET(S): 25; 100 TABLET ORAL at 20:49

## 2018-02-21 RX ADMIN — CARBIDOPA, LEVODOPA, AND ENTACAPONE 1 TABLET(S): 50; 200; 200 TABLET, FILM COATED ORAL at 15:00

## 2018-02-21 NOTE — PROGRESS NOTE BEHAVIORAL HEALTH - OTHER
+ neck favoring one side, dressed in hospital gowns, adequate grooming and hygiene no psychomotor agitation at times (chronic and seemingly intentionaly as he can stop it when redirected). low end of fair, using sense of humor at times chronically limited but better since admission festinating gait but stable at times louder "I need to talk to you!" varying (chronic) reactive overall linear, at times disorganized and circumstantial at times paranoia towards certain staff (chronic) chronically impaired but improved since admission Limited

## 2018-02-21 NOTE — PROGRESS NOTE BEHAVIORAL HEALTH - NSBHADMITMEDEDUDETAILS_A_CORE FT
continue current regimen; Parkinson's medication adjustment recommendations much appreciated by consulting Neurologist

## 2018-02-21 NOTE — PROGRESS NOTE BEHAVIORAL HEALTH - SUMMARY
53 yo male with early onset Parkinson’s disease x 11 years, discharged from 4 prior nursing homes in 8 months due to his behaviors, hx of refusing medications,  transferred to [facility] Unit [facility] on 6/12/17 where he manifested severe mood lability, paranoia and confabulation with poor insight and judgment including making > 40 [facility] center complaints. 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 Patient was accepted to [facility] in [place] and discharged from [facility] on 7/12/17.  Upon getting to [facility], Patient refused to go into the facility and became combative. He was thus transferred back to [facility] ED which resulted in Service Line Chiefs’ of Service involvement given the history/issues and ultimate transfer to [address]. Patient has been on [address] 1[address] since. UPDATE: [facility] declined to accept Patient. Hundreds of nursing home applications wee sent out and all declined to accept patient. Court scheduled on 12/19/17 was deferred as Patient refused to go; he then again refused to go to the new Court date. Continuing to search for placement which have been unsuccessful; family does not want him living with them.

## 2018-02-22 PROCEDURE — 99231 SBSQ HOSP IP/OBS SF/LOW 25: CPT

## 2018-02-22 RX ADMIN — CARBIDOPA AND LEVODOPA 1 TABLET(S): 25; 100 TABLET ORAL at 17:00

## 2018-02-22 RX ADMIN — Medication 400 MILLIGRAM(S): at 00:09

## 2018-02-22 RX ADMIN — CARBIDOPA AND LEVODOPA 1 TABLET(S): 25; 100 TABLET ORAL at 11:00

## 2018-02-22 RX ADMIN — CARBIDOPA AND LEVODOPA 1 TABLET(S): 25; 100 TABLET ORAL at 05:42

## 2018-02-22 RX ADMIN — CARBIDOPA AND LEVODOPA 1 TABLET(S): 25; 100 TABLET ORAL at 20:53

## 2018-02-22 RX ADMIN — CLOZAPINE 50 MILLIGRAM(S): 150 TABLET, ORALLY DISINTEGRATING ORAL at 17:30

## 2018-02-22 RX ADMIN — CARBIDOPA, LEVODOPA, AND ENTACAPONE 1 TABLET(S): 50; 200; 200 TABLET, FILM COATED ORAL at 17:00

## 2018-02-22 RX ADMIN — CARBIDOPA, LEVODOPA, AND ENTACAPONE 1 TABLET(S): 50; 200; 200 TABLET, FILM COATED ORAL at 05:42

## 2018-02-22 RX ADMIN — CARBIDOPA, LEVODOPA, AND ENTACAPONE 1 TABLET(S): 50; 200; 200 TABLET, FILM COATED ORAL at 11:00

## 2018-02-22 RX ADMIN — Medication 400 MILLIGRAM(S): at 01:00

## 2018-02-22 NOTE — PROGRESS NOTE BEHAVIORAL HEALTH - SUMMARY
51 yo male with early onset Parkinson’s disease x 11 years, discharged from 4 prior nursing homes in 8 months due to his behaviors, hx of refusing medications,  transferred to [facility] Unit 2S on 6/12/17 where he manifested severe mood lability, paranoia and confabulation with poor insight and judgment including making > 40 [facility] center complaints. 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 Patient was accepted to [facility] in [place] and discharged from [facility] on 7/12/17.  Upon getting to [facility], Patient refused to go into the facility and became combative. He was thus transferred back to [facility] ED which resulted in Service Line Chiefs’ of Service involvement given the history/issues and ultimate transfer to [address]. Patient has been on Unit 1N since. UPDATE: [facility] declined to accept Patient. Hundreds of nursing home applications wee sent out and all declined to accept patient. Court scheduled on 12/19/17 was deferred as Patient refused to go; he then again refused to go to the new Court date. Continuing to search for placement which have been unsuccessful; family does not want him living with them.

## 2018-02-22 NOTE — PROGRESS NOTE BEHAVIORAL HEALTH - NSBHFUPINTERVALCCFT_PSY_A_CORE
" I am gonna shut this unit down" Initially irritable and angry, then became calmer and using humor appropriately.

## 2018-02-22 NOTE — PROGRESS NOTE BEHAVIORAL HEALTH - NSBHADMITMEDEDUDETAILS_A_CORE FT
continue current regimen; Parkinson's medication adjustment recommendations much appreciated by consulting Neurologist..Patient teaching done re: need for Rx compliance for sx management with teachback verbalized.

## 2018-02-22 NOTE — PROGRESS NOTE BEHAVIORAL HEALTH - NSBHFUPINTERVALHXFT_PSY_A_CORE
Patient seen for paranoia, disorganized behavior. Chart reviewed. No significant interval events.   Same clinical presentation - got an additional ice pack which made him happy.  Continues to be labile, and responds well to redirection at times.  Can be irritable and threatening at other times  No Rx SE, sx TD/EPS are noted or reported

## 2018-02-22 NOTE — PROGRESS NOTE BEHAVIORAL HEALTH - OTHER
+ neck favoring one side, dressed in hospital gowns, adequate grooming and hygiene no psychomotor agitation at times (chronic and seemingly intentionaly as he can stop it when redirected). needs staff assist for hygiene low end of fair, using sense of humor at times, can be threatening at times when frustrated chronically limited but better since admission  more able to respond to redirection festinating gait but stable at times louder varying (chronic) reactive overall linear, at times disorganized and circumstantial at times paranoia towards certain staff (chronic) chronically impaired but improved since admission Limited

## 2018-02-23 PROCEDURE — 99232 SBSQ HOSP IP/OBS MODERATE 35: CPT

## 2018-02-23 RX ADMIN — CLOZAPINE 50 MILLIGRAM(S): 150 TABLET, ORALLY DISINTEGRATING ORAL at 13:47

## 2018-02-23 RX ADMIN — CARBIDOPA AND LEVODOPA 1 TABLET(S): 25; 100 TABLET ORAL at 20:19

## 2018-02-23 RX ADMIN — Medication 10 MILLIGRAM(S): at 21:13

## 2018-02-23 RX ADMIN — Medication 325 MILLIGRAM(S): at 02:48

## 2018-02-23 RX ADMIN — CARBIDOPA, LEVODOPA, AND ENTACAPONE 1 TABLET(S): 50; 200; 200 TABLET, FILM COATED ORAL at 10:58

## 2018-02-23 RX ADMIN — CARBIDOPA AND LEVODOPA 1 TABLET(S): 25; 100 TABLET ORAL at 10:58

## 2018-02-23 RX ADMIN — CARBIDOPA, LEVODOPA, AND ENTACAPONE 1 TABLET(S): 50; 200; 200 TABLET, FILM COATED ORAL at 16:02

## 2018-02-23 RX ADMIN — Medication 325 MILLIGRAM(S): at 03:20

## 2018-02-23 RX ADMIN — CARBIDOPA AND LEVODOPA 1 TABLET(S): 25; 100 TABLET ORAL at 16:02

## 2018-02-23 RX ADMIN — CARBIDOPA, LEVODOPA, AND ENTACAPONE 1 TABLET(S): 50; 200; 200 TABLET, FILM COATED ORAL at 05:25

## 2018-02-23 RX ADMIN — CARBIDOPA AND LEVODOPA 1 TABLET(S): 25; 100 TABLET ORAL at 05:25

## 2018-02-23 NOTE — PROGRESS NOTE BEHAVIORAL HEALTH - OTHER
+ neck favoring one side, dressed in hospital gowns, adequate grooming and hygiene no psychomotor agitation at times (chronic and seemingly intentionaly as he can stop it when redirected). chronically impaired but improved since admission Limited low end of fair, using sense of humor at times, can be threatening at times when frustrated chronically limited but better since admission  more able to respond to redirection festinating gait but stable at times louder "Any news for me?" varying (chronic) reactive overall linear, at times disorganized and circumstantial at times paranoia towards certain staff (chronic)

## 2018-02-23 NOTE — PROGRESS NOTE BEHAVIORAL HEALTH - NSBHFUPINTERVALHXFT_PSY_A_CORE
Patient seen for paranoia, disorganized behavior. Chart reviewed. No significant interval events. Same clinical presentation - got an additional ice pack which made him happy. Continues to have intermittent episodes of making unfounded accusations against staff memebrs, he has a hx of targeting the same ones over and over again, usually evening and overnight staff.

## 2018-02-23 NOTE — PROGRESS NOTE BEHAVIORAL HEALTH - SUMMARY
53 yo male with early onset Parkinson’s disease x 11 years, discharged from 4 prior nursing homes in 8 months due to his behaviors, hx of refusing medications,  transferred to [facility] Unit 2S on 6/12/17 where he manifested severe mood lability, paranoia and confabulation with poor insight and judgment including making > 40 [facility] center complaints. 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 Patient was accepted to [facility] in [place] and discharged from [facility] on 7/12/17.  Upon getting to [facility], Patient refused to go into the facility and became combative. He was thus transferred back to [facility] ED which resulted in Service Line Chiefs’ of Service involvement given the history/issues and ultimate transfer to [address]. Patient has been on Unit 1N since. UPDATE: [facility] declined to accept Patient. Hundreds of nursing home applications wee sent out and all declined to accept patient. Court scheduled on 12/19/17 was deferred as Patient refused to go; he then again refused to go to the new Court date. Continuing to search for placement which have been unsuccessful; family does not want him living with them.

## 2018-02-24 RX ADMIN — CARBIDOPA AND LEVODOPA 1 TABLET(S): 25; 100 TABLET ORAL at 11:00

## 2018-02-24 RX ADMIN — CARBIDOPA AND LEVODOPA 1 TABLET(S): 25; 100 TABLET ORAL at 21:05

## 2018-02-24 RX ADMIN — CARBIDOPA AND LEVODOPA 1 TABLET(S): 25; 100 TABLET ORAL at 16:58

## 2018-02-24 RX ADMIN — CARBIDOPA, LEVODOPA, AND ENTACAPONE 1 TABLET(S): 50; 200; 200 TABLET, FILM COATED ORAL at 11:00

## 2018-02-24 RX ADMIN — Medication 325 MILLIGRAM(S): at 05:55

## 2018-02-24 RX ADMIN — CARBIDOPA, LEVODOPA, AND ENTACAPONE 1 TABLET(S): 50; 200; 200 TABLET, FILM COATED ORAL at 05:55

## 2018-02-24 RX ADMIN — CLOZAPINE 50 MILLIGRAM(S): 150 TABLET, ORALLY DISINTEGRATING ORAL at 14:00

## 2018-02-24 RX ADMIN — Medication 325 MILLIGRAM(S): at 06:30

## 2018-02-24 RX ADMIN — CARBIDOPA AND LEVODOPA 1 TABLET(S): 25; 100 TABLET ORAL at 21:06

## 2018-02-24 RX ADMIN — Medication 10 MILLIGRAM(S): at 21:55

## 2018-02-24 RX ADMIN — CARBIDOPA, LEVODOPA, AND ENTACAPONE 1 TABLET(S): 50; 200; 200 TABLET, FILM COATED ORAL at 16:58

## 2018-02-24 RX ADMIN — Medication 400 MILLIGRAM(S): at 18:19

## 2018-02-24 RX ADMIN — Medication 400 MILLIGRAM(S): at 19:19

## 2018-02-24 RX ADMIN — CARBIDOPA AND LEVODOPA 1 TABLET(S): 25; 100 TABLET ORAL at 05:54

## 2018-02-24 RX ADMIN — Medication 30 MILLILITER(S): at 07:57

## 2018-02-25 RX ADMIN — CLOZAPINE 50 MILLIGRAM(S): 150 TABLET, ORALLY DISINTEGRATING ORAL at 13:29

## 2018-02-25 RX ADMIN — CARBIDOPA AND LEVODOPA 1 TABLET(S): 25; 100 TABLET ORAL at 10:21

## 2018-02-25 RX ADMIN — CARBIDOPA, LEVODOPA, AND ENTACAPONE 1 TABLET(S): 50; 200; 200 TABLET, FILM COATED ORAL at 10:21

## 2018-02-25 RX ADMIN — Medication 10 MILLIGRAM(S): at 21:02

## 2018-02-25 RX ADMIN — CARBIDOPA AND LEVODOPA 1 TABLET(S): 25; 100 TABLET ORAL at 06:45

## 2018-02-25 RX ADMIN — CARBIDOPA AND LEVODOPA 1 TABLET(S): 25; 100 TABLET ORAL at 20:37

## 2018-02-25 RX ADMIN — Medication 325 MILLIGRAM(S): at 02:23

## 2018-02-25 RX ADMIN — CARBIDOPA, LEVODOPA, AND ENTACAPONE 1 TABLET(S): 50; 200; 200 TABLET, FILM COATED ORAL at 16:29

## 2018-02-25 RX ADMIN — Medication 30 MILLILITER(S): at 22:26

## 2018-02-25 RX ADMIN — CARBIDOPA, LEVODOPA, AND ENTACAPONE 1 TABLET(S): 50; 200; 200 TABLET, FILM COATED ORAL at 06:45

## 2018-02-25 RX ADMIN — CARBIDOPA AND LEVODOPA 1 TABLET(S): 25; 100 TABLET ORAL at 16:29

## 2018-02-25 RX ADMIN — Medication 400 MILLIGRAM(S): at 16:03

## 2018-02-25 RX ADMIN — Medication 400 MILLIGRAM(S): at 16:56

## 2018-02-25 RX ADMIN — Medication 325 MILLIGRAM(S): at 01:23

## 2018-02-26 PROCEDURE — 99231 SBSQ HOSP IP/OBS SF/LOW 25: CPT

## 2018-02-26 RX ADMIN — CARBIDOPA, LEVODOPA, AND ENTACAPONE 1 TABLET(S): 50; 200; 200 TABLET, FILM COATED ORAL at 11:11

## 2018-02-26 RX ADMIN — CARBIDOPA AND LEVODOPA 1 TABLET(S): 25; 100 TABLET ORAL at 20:26

## 2018-02-26 RX ADMIN — Medication 10 MILLIGRAM(S): at 10:57

## 2018-02-26 RX ADMIN — CARBIDOPA, LEVODOPA, AND ENTACAPONE 1 TABLET(S): 50; 200; 200 TABLET, FILM COATED ORAL at 06:32

## 2018-02-26 RX ADMIN — CARBIDOPA AND LEVODOPA 1 TABLET(S): 25; 100 TABLET ORAL at 11:11

## 2018-02-26 RX ADMIN — CLOZAPINE 50 MILLIGRAM(S): 150 TABLET, ORALLY DISINTEGRATING ORAL at 10:57

## 2018-02-26 RX ADMIN — CARBIDOPA, LEVODOPA, AND ENTACAPONE 1 TABLET(S): 50; 200; 200 TABLET, FILM COATED ORAL at 17:35

## 2018-02-26 RX ADMIN — CARBIDOPA AND LEVODOPA 1 TABLET(S): 25; 100 TABLET ORAL at 17:35

## 2018-02-26 RX ADMIN — CARBIDOPA AND LEVODOPA 1 TABLET(S): 25; 100 TABLET ORAL at 06:32

## 2018-02-26 NOTE — PROGRESS NOTE BEHAVIORAL HEALTH - OTHER
+ neck favoring one side, dressed in hospital gowns, adequate grooming and hygiene no psychomotor agitation at times (chronic and seemingly intentionaly as he can stop it when redirected). low end of fair, using sense of humor at times, can be threatening at times when frustrated chronically limited but better since admission  more able to respond to redirection festinating gait but stable at times louder "Any news for me?" varying (chronic) reactive overall linear, at times disorganized and circumstantial at times paranoia towards certain staff (chronic) chronically impaired but improved since admission Limited

## 2018-02-26 NOTE — PROGRESS NOTE BEHAVIORAL HEALTH - SUMMARY
53 yo male with early onset Parkinson’s disease x 11 years, discharged from 4 prior nursing homes in 8 months due to his behaviors, hx of refusing medications,  transferred to [facility] Unit 2S on 6/12/17 where he manifested severe mood lability, paranoia and confabulation with poor insight and judgment including making > 40 [facility] center complaints. 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 Patient was accepted to [facility] in [place] and discharged from [facility] on 7/12/17.  Upon getting to [facility], Patient refused to go into the facility and became combative. He was thus transferred back to [facility] ED which resulted in Service Line Chiefs’ of Service involvement given the history/issues and ultimate transfer to [address]. Patient has been on [address] 1N since. UPDATE: [facility] declined to accept Patient. Hundreds of nursing home applications wee sent out and all declined to accept patient. Court scheduled on 12/19/17 was deferred as Patient refused to go; he then again refused to go to the new Court date. Continuing to search for placement which have been unsuccessful; family does not want him living with them.

## 2018-02-26 NOTE — PROGRESS NOTE BEHAVIORAL HEALTH - NSBHFUPINTERVALHXFT_PSY_A_CORE
Patient seen for paranoia, disorganized behavior. Chart reviewed. No significant interval events over the weekend. Same clinical presentation. Continues to have intermittent episodes of making unfounded accusations against staff members, he has a hx of targeting the same ones over and over again, usually evening and overnight staff. CBC with diff have been stable and within normal limits.

## 2018-02-27 PROCEDURE — 99232 SBSQ HOSP IP/OBS MODERATE 35: CPT

## 2018-02-27 PROCEDURE — 73130 X-RAY EXAM OF HAND: CPT | Mod: 26,LT

## 2018-02-27 RX ADMIN — Medication 2 MILLIGRAM(S): at 20:51

## 2018-02-27 RX ADMIN — CARBIDOPA, LEVODOPA, AND ENTACAPONE 1 TABLET(S): 50; 200; 200 TABLET, FILM COATED ORAL at 16:06

## 2018-02-27 RX ADMIN — CARBIDOPA AND LEVODOPA 1 TABLET(S): 25; 100 TABLET ORAL at 20:26

## 2018-02-27 RX ADMIN — CARBIDOPA AND LEVODOPA 1 TABLET(S): 25; 100 TABLET ORAL at 11:46

## 2018-02-27 RX ADMIN — CARBIDOPA, LEVODOPA, AND ENTACAPONE 1 TABLET(S): 50; 200; 200 TABLET, FILM COATED ORAL at 05:41

## 2018-02-27 RX ADMIN — CARBIDOPA, LEVODOPA, AND ENTACAPONE 1 TABLET(S): 50; 200; 200 TABLET, FILM COATED ORAL at 11:46

## 2018-02-27 RX ADMIN — Medication 10 MILLIGRAM(S): at 09:04

## 2018-02-27 RX ADMIN — CARBIDOPA AND LEVODOPA 1 TABLET(S): 25; 100 TABLET ORAL at 16:06

## 2018-02-27 RX ADMIN — Medication 10 MILLIGRAM(S): at 22:13

## 2018-02-27 RX ADMIN — CLOZAPINE 50 MILLIGRAM(S): 150 TABLET, ORALLY DISINTEGRATING ORAL at 11:45

## 2018-02-27 RX ADMIN — CARBIDOPA AND LEVODOPA 1 TABLET(S): 25; 100 TABLET ORAL at 05:41

## 2018-02-27 NOTE — PROGRESS NOTE BEHAVIORAL HEALTH - NSBHADMITMEDEDUDETAILS_A_CORE FT
Psychoeducation done re: need for Rx compliance for sx management, with some teachback verbalized.  Patient reports he does not need the colace or hydrocortisone cream, and has no sx of constipation or rectal issues

## 2018-02-27 NOTE — PROGRESS NOTE BEHAVIORAL HEALTH - NSBHFUPINTERVALHXFT_PSY_A_CORE
Patient seen for paranoia, disorganized behavior. Chart reviewed. No significant interval events over the weekend. Same clinical presentation. Continues to have intermittent episodes of making unfounded accusations against staff members, he has a hx of targeting the same ones over and over again, usually evening and overnight staff. CBC with diff have been stable and within normal limits.  Patient had episode of verbal acting out and threatening today, but did respond well to staff verbal limits.  Patient is more irritable possibly due to upcoming family meeting this week.,  Refusing colace and hydrocortisone rectal cream, has no c/o constipation or rectal problems

## 2018-02-27 NOTE — PROGRESS NOTE BEHAVIORAL HEALTH - NSBHFUPINTERVALCCFT_PSY_A_CORE
" I am going to report all of you.  It is my right"  Patient got into verbal altercation with another patient, and when staff attempted to have patient leave the area for safety, patient threatened to call the Justice Center.  "You people are doing this to put me in a porn movie"

## 2018-02-27 NOTE — PROGRESS NOTE BEHAVIORAL HEALTH - OTHER
+ neck favoring one side, dressed in hospital gowns, adequate grooming and hygiene no psychomotor agitation at times (chronic and seemingly intentionaly as he can stop it when redirected). verbally threatening staff today, cooperative at other times low end of fair, using sense of humor at times, can be threatening at times when frustrated chronically limited but better since admission  more able to respond to redirection festinating gait but stable at times louder more irritable today varying (chronic) reactive overall linear, at times disorganized and circumstantial at times paranoia towards certain staff (chronic) chronically impaired but improved since admission Limited

## 2018-02-27 NOTE — PROGRESS NOTE BEHAVIORAL HEALTH - SUMMARY
53 yo male with early onset Parkinson’s disease x 11 years, discharged from 4 prior nursing homes in 8 months due to his behaviors, hx of refusing medications,  transferred to [facility] Unit [facility]S on 6/12/17 where he manifested severe mood lability, paranoia and confabulation with poor insight and judgment including making > 40 [facility] center complaints. 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 Patient was accepted to [facility] in [place] and discharged from [facility] on 7/12/17.  Upon getting to [facility], Patient refused to go into the facility and became combative. He was thus transferred back to [facility] ED which resulted in Service Line Chiefs’ of Service involvement given the history/issues and ultimate transfer to [address]. Patient has been on Unit 1N since. UPDATE: [facility] declined to accept Patient. Hundreds of nursing home applications wee sent out and all declined to accept patient. Court scheduled on 12/19/17 was deferred as Patient refused to go; he then again refused to go to the new Court date. Continuing to search for placement which have been unsuccessful; family does not want him living with them.  Neurological facility in [place] has accepted patient, and legal guardian is involved in transfer issues.

## 2018-02-28 LAB
BASOPHILS # BLD AUTO: 0 K/UL — SIGNIFICANT CHANGE UP (ref 0–0.2)
BASOPHILS NFR BLD AUTO: 0.7 % — SIGNIFICANT CHANGE UP (ref 0–2)
EOSINOPHIL # BLD AUTO: 0.1 K/UL — SIGNIFICANT CHANGE UP (ref 0–0.5)
EOSINOPHIL NFR BLD AUTO: 1.7 % — SIGNIFICANT CHANGE UP (ref 0–6)
HCT VFR BLD CALC: 37.8 % — LOW (ref 39–50)
HGB BLD-MCNC: 12.5 G/DL — LOW (ref 13–17)
LYMPHOCYTES # BLD AUTO: 1.3 K/UL — SIGNIFICANT CHANGE UP (ref 1–3.3)
LYMPHOCYTES # BLD AUTO: 24.1 % — SIGNIFICANT CHANGE UP (ref 13–44)
MCHC RBC-ENTMCNC: 31.1 PG — SIGNIFICANT CHANGE UP (ref 27–34)
MCHC RBC-ENTMCNC: 33.1 GM/DL — SIGNIFICANT CHANGE UP (ref 32–36)
MCV RBC AUTO: 94 FL — SIGNIFICANT CHANGE UP (ref 80–100)
MONOCYTES # BLD AUTO: 0.6 K/UL — SIGNIFICANT CHANGE UP (ref 0–0.9)
MONOCYTES NFR BLD AUTO: 10.1 % — SIGNIFICANT CHANGE UP (ref 2–14)
NEUTROPHILS # BLD AUTO: 3.5 K/UL — SIGNIFICANT CHANGE UP (ref 1.8–7.4)
NEUTROPHILS NFR BLD AUTO: 63.4 % — SIGNIFICANT CHANGE UP (ref 43–77)
PLATELET # BLD AUTO: 198 K/UL — SIGNIFICANT CHANGE UP (ref 150–400)
RBC # BLD: 4.02 M/UL — LOW (ref 4.2–5.8)
RBC # FLD: 13 % — SIGNIFICANT CHANGE UP (ref 10.3–14.5)
WBC # BLD: 5.6 K/UL — SIGNIFICANT CHANGE UP (ref 3.8–10.5)
WBC # FLD AUTO: 5.6 K/UL — SIGNIFICANT CHANGE UP (ref 3.8–10.5)

## 2018-02-28 PROCEDURE — 99231 SBSQ HOSP IP/OBS SF/LOW 25: CPT

## 2018-02-28 PROCEDURE — 12345: CPT | Mod: NC

## 2018-02-28 RX ADMIN — CARBIDOPA, LEVODOPA, AND ENTACAPONE 1 TABLET(S): 50; 200; 200 TABLET, FILM COATED ORAL at 05:27

## 2018-02-28 RX ADMIN — CARBIDOPA, LEVODOPA, AND ENTACAPONE 1 TABLET(S): 50; 200; 200 TABLET, FILM COATED ORAL at 11:10

## 2018-02-28 RX ADMIN — Medication 10 MILLIGRAM(S): at 05:27

## 2018-02-28 RX ADMIN — Medication 10 MILLIGRAM(S): at 21:40

## 2018-02-28 RX ADMIN — CARBIDOPA AND LEVODOPA 1 TABLET(S): 25; 100 TABLET ORAL at 11:10

## 2018-02-28 RX ADMIN — CARBIDOPA AND LEVODOPA 1 TABLET(S): 25; 100 TABLET ORAL at 05:27

## 2018-02-28 RX ADMIN — Medication 400 MILLIGRAM(S): at 22:48

## 2018-02-28 RX ADMIN — CARBIDOPA AND LEVODOPA 1 TABLET(S): 25; 100 TABLET ORAL at 20:27

## 2018-02-28 RX ADMIN — CARBIDOPA, LEVODOPA, AND ENTACAPONE 1 TABLET(S): 50; 200; 200 TABLET, FILM COATED ORAL at 16:51

## 2018-02-28 RX ADMIN — CLOZAPINE 50 MILLIGRAM(S): 150 TABLET, ORALLY DISINTEGRATING ORAL at 11:11

## 2018-02-28 RX ADMIN — CARBIDOPA AND LEVODOPA 1 TABLET(S): 25; 100 TABLET ORAL at 16:51

## 2018-02-28 NOTE — PROGRESS NOTE BEHAVIORAL HEALTH - NSBHFUPINTERVALHXFT_PSY_A_CORE
Patient seen for paranoia, disorganized behavior. Chart reviewed. No significant interval events other then making another unfounded Justice center call yesterday (making this about 100 such calls counting [facility] calls as well). CBC with diff have been stable and within normal limits.  Patient had episode of verbal acting out and threatening today, but did respond well to staff verbal limits.  Patient is more irritable possibly due to upcoming family meeting this week. Patient seen for paranoia, disorganized behavior. Chart reviewed. No significant interval events other then making another unfounded Justice center call yesterday (making this about 100 such calls counting [facility] calls as well). CBC with diff have been stable and within normal limits.  Patient had episode of verbal acting out and threatening today, but did respond well to staff verbal limits.  Patient is more irritable possibly due to upcoming family meeting this week. Possible cheeking of clozapine - as per staff, whole clozapine tablet found on the floor near patient recently.

## 2018-02-28 NOTE — PROGRESS NOTE BEHAVIORAL HEALTH - OTHER
+ neck favoring one side, dressed in hospital gowns, adequate grooming and hygiene no psychomotor agitation at times (chronic and seemingly intentionaly as he can stop it when redirected). verbally threatening staff today, cooperative at other times chronically limited but better since admission  more able to respond to redirection festinating gait but stable at times louder more irritable today varying (chronic) reactive overall linear, at times disorganized and circumstantial at times paranoia towards certain staff (chronic) chronically impaired but improved since admission Limited

## 2018-03-01 PROCEDURE — 99231 SBSQ HOSP IP/OBS SF/LOW 25: CPT

## 2018-03-01 RX ADMIN — CARBIDOPA AND LEVODOPA 1 TABLET(S): 25; 100 TABLET ORAL at 20:19

## 2018-03-01 RX ADMIN — Medication 325 MILLIGRAM(S): at 22:49

## 2018-03-01 RX ADMIN — CARBIDOPA, LEVODOPA, AND ENTACAPONE 1 TABLET(S): 50; 200; 200 TABLET, FILM COATED ORAL at 17:16

## 2018-03-01 RX ADMIN — CLOZAPINE 50 MILLIGRAM(S): 150 TABLET, ORALLY DISINTEGRATING ORAL at 12:50

## 2018-03-01 RX ADMIN — CARBIDOPA AND LEVODOPA 1 TABLET(S): 25; 100 TABLET ORAL at 05:21

## 2018-03-01 RX ADMIN — Medication 325 MILLIGRAM(S): at 23:09

## 2018-03-01 RX ADMIN — CARBIDOPA AND LEVODOPA 1 TABLET(S): 25; 100 TABLET ORAL at 17:16

## 2018-03-01 RX ADMIN — CARBIDOPA AND LEVODOPA 1 TABLET(S): 25; 100 TABLET ORAL at 11:07

## 2018-03-01 RX ADMIN — CARBIDOPA, LEVODOPA, AND ENTACAPONE 1 TABLET(S): 50; 200; 200 TABLET, FILM COATED ORAL at 11:07

## 2018-03-01 RX ADMIN — CARBIDOPA, LEVODOPA, AND ENTACAPONE 1 TABLET(S): 50; 200; 200 TABLET, FILM COATED ORAL at 05:21

## 2018-03-01 NOTE — PROGRESS NOTE BEHAVIORAL HEALTH - SUMMARY
51 yo male with early onset Parkinson’s disease x 11 years, discharged from 4 prior nursing homes in 8 months due to his behaviors, hx of refusing medications,  transferred to [facility] Unit 2S on 6/12/17 where he manifested severe mood lability, paranoia and confabulation with poor insight and judgment including making > 40 [facility] complaints. 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 Patient was accepted to [facility] in [place] and discharged from [facility] on 7/12/17.  Upon getting to [facility], Patient refused to go into the facility and became combative. He was thus transferred back to [facility] ED which resulted in Service Line Chiefs’ of Service involvement given the history/issues and ultimate transfer to [address]. Patient has been on Unit 1N since. UPDATE: [facility] declined to accept Patient. Hundreds of nursing home applications wee sent out and all declined to accept patient. Court scheduled on 12/19/17 was deferred as Patient refused to go; he then again refused to go to the new Court date. Continuing to search for placement which have been unsuccessful; family does not want him living with them.  Neurological facility in [place] has accepted patient, and legal guardian is involved in transfer issues. Tentative family meeting scheduled for tomorrow at 11am.

## 2018-03-01 NOTE — PROGRESS NOTE BEHAVIORAL HEALTH - NSBHFUPINTERVALHXFT_PSY_A_CORE
Patient seen for paranoia, disorganized behavior. Chart reviewed. Neurologist came for follow up with Patient yesterday - consult recommendations much appreciated. Patient (as usual) has been requesting increased doses of his Parkinson's medications for what he perceives as worsening movements which are not apparent to anyone else. Increase in already high doses are not clinically indicated.   No significant interval events - unremarkable day yesterday with better behavioral control.

## 2018-03-01 NOTE — PROGRESS NOTE BEHAVIORAL HEALTH - OTHER
overall linear, at times disorganized and circumstantial at times paranoia towards certain staff (chronic) chronically impaired but improved since admission Limited + neck favoring one side, dressed in hospital gowns, adequate grooming and hygiene no psychomotor agitation at times (chronic and seemingly intentionaly as he can stop it when redirected). verbally threatening staff today, cooperative at other times chronically limited but better since admission  more able to respond to redirection festinating gait but stable at times louder less irritable today and more euthymic varying (chronic) reactive

## 2018-03-02 RX ADMIN — CARBIDOPA AND LEVODOPA 1 TABLET(S): 25; 100 TABLET ORAL at 06:03

## 2018-03-02 RX ADMIN — CARBIDOPA AND LEVODOPA 1 TABLET(S): 25; 100 TABLET ORAL at 20:26

## 2018-03-02 RX ADMIN — Medication 325 MILLIGRAM(S): at 22:32

## 2018-03-02 RX ADMIN — Medication 325 MILLIGRAM(S): at 23:32

## 2018-03-02 RX ADMIN — CARBIDOPA, LEVODOPA, AND ENTACAPONE 1 TABLET(S): 50; 200; 200 TABLET, FILM COATED ORAL at 06:03

## 2018-03-02 RX ADMIN — Medication 400 MILLIGRAM(S): at 20:16

## 2018-03-02 RX ADMIN — CARBIDOPA, LEVODOPA, AND ENTACAPONE 1 TABLET(S): 50; 200; 200 TABLET, FILM COATED ORAL at 11:41

## 2018-03-02 RX ADMIN — CLOZAPINE 50 MILLIGRAM(S): 150 TABLET, ORALLY DISINTEGRATING ORAL at 09:56

## 2018-03-02 RX ADMIN — CARBIDOPA AND LEVODOPA 1 TABLET(S): 25; 100 TABLET ORAL at 16:07

## 2018-03-02 RX ADMIN — Medication 30 MILLILITER(S): at 06:03

## 2018-03-02 RX ADMIN — Medication 400 MILLIGRAM(S): at 18:17

## 2018-03-02 RX ADMIN — CARBIDOPA, LEVODOPA, AND ENTACAPONE 1 TABLET(S): 50; 200; 200 TABLET, FILM COATED ORAL at 16:07

## 2018-03-02 RX ADMIN — CARBIDOPA AND LEVODOPA 1 TABLET(S): 25; 100 TABLET ORAL at 11:41

## 2018-03-02 NOTE — PROGRESS NOTE BEHAVIORAL HEALTH - NSBHFUPINTERVALHXFT_PSY_A_CORE
Patient seen for paranoia, disorganized behavior. Chart reviewed. Tentative family meeting today on the Unit. Otherwise no significant interval events - other then the usual complaints about certain staff, making unfounded allegations (chronic)

## 2018-03-02 NOTE — PROGRESS NOTE BEHAVIORAL HEALTH - SUMMARY
53 yo male with early onset Parkinson’s disease x 11 years, discharged from 4 prior nursing homes in 8 months due to his behaviors, hx of refusing medications,  transferred to [facility] Unit 2S on 6/12/17 where he manifested severe mood lability, paranoia and confabulation with poor insight and judgment including making > 40 Justice center complaints. 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 Patient was accepted to [facility] in [place] and discharged from [facility] on 7/12/17.  Upon getting to [facility], Patient refused to go into the facility and became combative. He was thus transferred back to [facility] ED which resulted in Service Line Chiefs’ of Service involvement given the history/issues and ultimate transfer to [address]. Patient has been on Unit 1N since. UPDATE: [facility] declined to accept Patient. Hundreds of nursing home applications wee sent out and all declined to accept patient. Court scheduled on 12/19/17 was deferred as Patient refused to go; he then again refused to go to the new Court date. Continuing to search for placement which have been unsuccessful; family does not want him living with them.  Neurological facility in [place] has accepted patient, and legal guardian is involved in transfer issues. Tentative family meeting scheduled for today at 11am.

## 2018-03-02 NOTE — PROGRESS NOTE BEHAVIORAL HEALTH - OTHER
+ neck favoring one side, dressed in hospital gowns, adequate grooming and hygiene no psychomotor agitation at times (chronic and seemingly intentionaly as he can stop it when redirected). verbally threatening staff today, cooperative at other times chronically limited but better since admission  more able to respond to redirection festinating gait but stable at times louder "I am telling you! He will be fired!!" varying (chronic) reactive overall linear, at times disorganized and circumstantial at times paranoia towards certain staff (chronic) chronically impaired but improved since admission Limited

## 2018-03-03 RX ADMIN — CARBIDOPA, LEVODOPA, AND ENTACAPONE 1 TABLET(S): 50; 200; 200 TABLET, FILM COATED ORAL at 05:55

## 2018-03-03 RX ADMIN — Medication 30 MILLILITER(S): at 09:21

## 2018-03-03 RX ADMIN — Medication 325 MILLIGRAM(S): at 04:33

## 2018-03-03 RX ADMIN — Medication 325 MILLIGRAM(S): at 05:40

## 2018-03-03 RX ADMIN — Medication 10 MILLIGRAM(S): at 22:02

## 2018-03-03 RX ADMIN — CARBIDOPA, LEVODOPA, AND ENTACAPONE 1 TABLET(S): 50; 200; 200 TABLET, FILM COATED ORAL at 16:25

## 2018-03-03 RX ADMIN — CARBIDOPA AND LEVODOPA 1 TABLET(S): 25; 100 TABLET ORAL at 16:25

## 2018-03-03 RX ADMIN — CARBIDOPA AND LEVODOPA 1 TABLET(S): 25; 100 TABLET ORAL at 20:08

## 2018-03-03 RX ADMIN — CARBIDOPA, LEVODOPA, AND ENTACAPONE 1 TABLET(S): 50; 200; 200 TABLET, FILM COATED ORAL at 12:02

## 2018-03-03 RX ADMIN — CARBIDOPA AND LEVODOPA 1 TABLET(S): 25; 100 TABLET ORAL at 20:44

## 2018-03-03 RX ADMIN — CARBIDOPA AND LEVODOPA 1 TABLET(S): 25; 100 TABLET ORAL at 05:55

## 2018-03-03 RX ADMIN — CARBIDOPA AND LEVODOPA 1 TABLET(S): 25; 100 TABLET ORAL at 12:02

## 2018-03-03 RX ADMIN — Medication 400 MILLIGRAM(S): at 22:02

## 2018-03-03 RX ADMIN — CLOZAPINE 50 MILLIGRAM(S): 150 TABLET, ORALLY DISINTEGRATING ORAL at 12:02

## 2018-03-04 RX ADMIN — CARBIDOPA AND LEVODOPA 1 TABLET(S): 25; 100 TABLET ORAL at 06:25

## 2018-03-04 RX ADMIN — CARBIDOPA, LEVODOPA, AND ENTACAPONE 1 TABLET(S): 50; 200; 200 TABLET, FILM COATED ORAL at 11:00

## 2018-03-04 RX ADMIN — CARBIDOPA AND LEVODOPA 1 TABLET(S): 25; 100 TABLET ORAL at 20:24

## 2018-03-04 RX ADMIN — CARBIDOPA AND LEVODOPA 1 TABLET(S): 25; 100 TABLET ORAL at 20:23

## 2018-03-04 RX ADMIN — CLOZAPINE 50 MILLIGRAM(S): 150 TABLET, ORALLY DISINTEGRATING ORAL at 13:41

## 2018-03-04 RX ADMIN — CARBIDOPA, LEVODOPA, AND ENTACAPONE 1 TABLET(S): 50; 200; 200 TABLET, FILM COATED ORAL at 06:24

## 2018-03-04 RX ADMIN — CARBIDOPA AND LEVODOPA 1 TABLET(S): 25; 100 TABLET ORAL at 11:00

## 2018-03-04 RX ADMIN — CARBIDOPA, LEVODOPA, AND ENTACAPONE 1 TABLET(S): 50; 200; 200 TABLET, FILM COATED ORAL at 16:12

## 2018-03-04 RX ADMIN — Medication 10 MILLIGRAM(S): at 23:22

## 2018-03-04 RX ADMIN — CARBIDOPA AND LEVODOPA 1 TABLET(S): 25; 100 TABLET ORAL at 16:12

## 2018-03-05 PROCEDURE — 99231 SBSQ HOSP IP/OBS SF/LOW 25: CPT

## 2018-03-05 RX ADMIN — CARBIDOPA AND LEVODOPA 1 TABLET(S): 25; 100 TABLET ORAL at 21:34

## 2018-03-05 RX ADMIN — Medication 10 MILLIGRAM(S): at 21:34

## 2018-03-05 RX ADMIN — CARBIDOPA, LEVODOPA, AND ENTACAPONE 1 TABLET(S): 50; 200; 200 TABLET, FILM COATED ORAL at 16:01

## 2018-03-05 RX ADMIN — CLOZAPINE 50 MILLIGRAM(S): 150 TABLET, ORALLY DISINTEGRATING ORAL at 12:58

## 2018-03-05 RX ADMIN — CARBIDOPA AND LEVODOPA 1 TABLET(S): 25; 100 TABLET ORAL at 21:33

## 2018-03-05 RX ADMIN — CARBIDOPA AND LEVODOPA 1 TABLET(S): 25; 100 TABLET ORAL at 10:33

## 2018-03-05 RX ADMIN — Medication 10 MILLIGRAM(S): at 10:32

## 2018-03-05 RX ADMIN — Medication 10 MILLIGRAM(S): at 10:03

## 2018-03-05 RX ADMIN — CARBIDOPA, LEVODOPA, AND ENTACAPONE 1 TABLET(S): 50; 200; 200 TABLET, FILM COATED ORAL at 10:03

## 2018-03-05 RX ADMIN — CARBIDOPA, LEVODOPA, AND ENTACAPONE 1 TABLET(S): 50; 200; 200 TABLET, FILM COATED ORAL at 05:59

## 2018-03-05 RX ADMIN — CARBIDOPA AND LEVODOPA 1 TABLET(S): 25; 100 TABLET ORAL at 05:59

## 2018-03-05 RX ADMIN — CARBIDOPA AND LEVODOPA 1 TABLET(S): 25; 100 TABLET ORAL at 16:01

## 2018-03-05 NOTE — PROGRESS NOTE BEHAVIORAL HEALTH - SUMMARY
53 yo male with early onset Parkinson’s disease x 11 years, discharged from 4 prior nursing homes in 8 months due to his behaviors, hx of refusing medications,  transferred to [facility] Unit 2S on 6/12/17 where he manifested severe mood lability, paranoia and confabulation with poor insight and judgment including making > 40 Justice center complaints. 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 Patient was accepted to [facility] in [place] and discharged from [facility] on 7/12/17.  Upon getting to [facility], Patient refused to go into the facility and became combative. He was thus transferred back to [facility] ED which resulted in Service Line Chiefs’ of Service involvement given the history/issues and ultimate transfer to [address]. Patient has been on Unit 1N since. UPDATE: [facility] declined to accept Patient. Hundreds of nursing home applications wee sent out and all declined to accept patient. Court scheduled on 12/19/17 was deferred as Patient refused to go; he then again refused to go to the new Court date. Continuing to search for placement which have been unsuccessful; family does not want him living with them.  Neurological facility in [place] has accepted patient, and legal guardian is involved in transfer issues. Tentative family meeting scheduled for today at 11am.

## 2018-03-05 NOTE — PROGRESS NOTE BEHAVIORAL HEALTH - NSBHFUPINTERVALHXFT_PSY_A_CORE
Patient seen for paranoia, disorganized behavior. Chart reviewed. He was at his usual baseline. He is generally under control but has baseline delusions about going to the supreme court in front of 8 judges. Otherwise no significant interval events - other then the usual complaints about certain staff, making unfounded allegations (chronic). He had the family meeting last week in which it was agreed that the parties involved would have a conversation with the  who will determine whether he goes to [place] or release money for a home in the [place].

## 2018-03-05 NOTE — PROGRESS NOTE BEHAVIORAL HEALTH - OTHER
at times louder "I am telling you! He will be fired!!" varying (chronic) reactive overall linear, at times disorganized and circumstantial at times paranoia towards certain staff (chronic) chronically impaired but improved since admission + neck favoring one side, dressed in hospital gowns, adequate grooming and hygiene no psychomotor agitation at times (chronic and seemingly intentionaly as he can stop it when redirected). verbally threatening staff today, cooperative at other times chronically limited but better since admission  more able to respond to redirection festinating gait but stable Limited

## 2018-03-06 PROCEDURE — 99231 SBSQ HOSP IP/OBS SF/LOW 25: CPT

## 2018-03-06 RX ADMIN — CARBIDOPA AND LEVODOPA 1 TABLET(S): 25; 100 TABLET ORAL at 16:27

## 2018-03-06 RX ADMIN — Medication 10 MILLIGRAM(S): at 13:13

## 2018-03-06 RX ADMIN — CARBIDOPA AND LEVODOPA 1 TABLET(S): 25; 100 TABLET ORAL at 20:54

## 2018-03-06 RX ADMIN — Medication 400 MILLIGRAM(S): at 06:20

## 2018-03-06 RX ADMIN — CARBIDOPA AND LEVODOPA 1 TABLET(S): 25; 100 TABLET ORAL at 05:45

## 2018-03-06 RX ADMIN — CARBIDOPA AND LEVODOPA 1 TABLET(S): 25; 100 TABLET ORAL at 10:53

## 2018-03-06 RX ADMIN — Medication 325 MILLIGRAM(S): at 23:15

## 2018-03-06 RX ADMIN — CLOZAPINE 50 MILLIGRAM(S): 150 TABLET, ORALLY DISINTEGRATING ORAL at 13:22

## 2018-03-06 RX ADMIN — Medication 325 MILLIGRAM(S): at 22:13

## 2018-03-06 RX ADMIN — CARBIDOPA, LEVODOPA, AND ENTACAPONE 1 TABLET(S): 50; 200; 200 TABLET, FILM COATED ORAL at 05:45

## 2018-03-06 RX ADMIN — CARBIDOPA, LEVODOPA, AND ENTACAPONE 1 TABLET(S): 50; 200; 200 TABLET, FILM COATED ORAL at 16:27

## 2018-03-06 RX ADMIN — Medication 400 MILLIGRAM(S): at 05:46

## 2018-03-06 RX ADMIN — CARBIDOPA, LEVODOPA, AND ENTACAPONE 1 TABLET(S): 50; 200; 200 TABLET, FILM COATED ORAL at 10:53

## 2018-03-06 NOTE — PROGRESS NOTE BEHAVIORAL HEALTH - NSBHFUPINTERVALHXFT_PSY_A_CORE
Patient seen for paranoia, disorganized behavior. Chart reviewed. He was at his usual baseline. He is generally under control . Otherwise no significant interval events -

## 2018-03-06 NOTE — PROGRESS NOTE BEHAVIORAL HEALTH - SUMMARY
51 yo male with early onset Parkinson’s disease x 11 years, discharged from 4 prior nursing homes in 8 months due to his behaviors, hx of refusing medications,  transferred to [facility] Unit 2S on 6/12/17 where he manifested severe mood lability, paranoia and confabulation with poor insight and judgment including making > 40 Justice center complaints. 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 Patient was accepted to [facility] in [place] and discharged from [facility] on 7/12/17.  Upon getting to [facility], Patient refused to go into the facility and became combative. He was thus transferred back to [facility] ED which resulted in Service Line Chiefs’ of Service involvement given the history/issues and ultimate transfer to [address]. Patient has been on Unit 1N since. UPDATE: [facility] declined to accept Patient. Hundreds of nursing home applications wee sent out and all declined to accept patient. Court scheduled on 12/19/17 was deferred as Patient refused to go; he then again refused to go to the new Court date. Continuing to search for placement which have been unsuccessful; family does not want him living with them.  Neurological facility in [place] has accepted patient, and legal guardian is involved in transfer issues. Tentative family meeting scheduled for today at 11am. 51 yo male with early onset Parkinson’s disease x 11 years, discharged from 4 prior nursing homes in 8 months due to his behaviors, hx of refusing medications,  transferred to [facility] Unit 2S on 6/12/17 where he manifested severe mood lability, paranoia and confabulation with poor insight and judgment including making > 40 Justice center complaints. 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 Patient was accepted to [facility] in [place] and discharged from [facility] on 7/12/17.  Upon getting to [facility], Patient refused to go into the facility and became combative. He was thus transferred back to [facility] ED which resulted in Service Line Chiefs’ of Service involvement given the history/issues and ultimate transfer to [address]. Patient has been on Unit 1N since. UPDATE: [facility] declined to accept Patient. Hundreds of nursing home applications wee sent out and all declined to accept patient. Court scheduled on 12/19/17 was deferred as Patient refused to go; he then again refused to go to the new Court date. Continuing to search for placement which have been unsuccessful; family does not want him living with them.  Neurological facility in [place] has accepted patient, and legal guardian is involved in transfer issues.

## 2018-03-07 LAB
BASOPHILS # BLD AUTO: 0 K/UL — SIGNIFICANT CHANGE UP (ref 0–0.2)
BASOPHILS NFR BLD AUTO: 0.7 % — SIGNIFICANT CHANGE UP (ref 0–2)
EOSINOPHIL # BLD AUTO: 0.1 K/UL — SIGNIFICANT CHANGE UP (ref 0–0.5)
EOSINOPHIL NFR BLD AUTO: 2 % — SIGNIFICANT CHANGE UP (ref 0–6)
HBA1C BLD-MCNC: 5.4 % — SIGNIFICANT CHANGE UP (ref 4–5.6)
HCT VFR BLD CALC: 36.6 % — LOW (ref 39–50)
HGB BLD-MCNC: 12.2 G/DL — LOW (ref 13–17)
LYMPHOCYTES # BLD AUTO: 1.2 K/UL — SIGNIFICANT CHANGE UP (ref 1–3.3)
LYMPHOCYTES # BLD AUTO: 22.9 % — SIGNIFICANT CHANGE UP (ref 13–44)
MCHC RBC-ENTMCNC: 31.6 PG — SIGNIFICANT CHANGE UP (ref 27–34)
MCHC RBC-ENTMCNC: 33.3 GM/DL — SIGNIFICANT CHANGE UP (ref 32–36)
MCV RBC AUTO: 94.8 FL — SIGNIFICANT CHANGE UP (ref 80–100)
MONOCYTES # BLD AUTO: 0.4 K/UL — SIGNIFICANT CHANGE UP (ref 0–0.9)
MONOCYTES NFR BLD AUTO: 8.4 % — SIGNIFICANT CHANGE UP (ref 2–14)
NEUTROPHILS # BLD AUTO: 3.4 K/UL — SIGNIFICANT CHANGE UP (ref 1.8–7.4)
NEUTROPHILS NFR BLD AUTO: 66 % — SIGNIFICANT CHANGE UP (ref 43–77)
PLATELET # BLD AUTO: 181 K/UL — SIGNIFICANT CHANGE UP (ref 150–400)
RBC # BLD: 3.86 M/UL — LOW (ref 4.2–5.8)
RBC # FLD: 12.6 % — SIGNIFICANT CHANGE UP (ref 10.3–14.5)
WBC # BLD: 5.1 K/UL — SIGNIFICANT CHANGE UP (ref 3.8–10.5)
WBC # FLD AUTO: 5.1 K/UL — SIGNIFICANT CHANGE UP (ref 3.8–10.5)

## 2018-03-07 PROCEDURE — 99231 SBSQ HOSP IP/OBS SF/LOW 25: CPT

## 2018-03-07 RX ADMIN — Medication 325 MILLIGRAM(S): at 23:28

## 2018-03-07 RX ADMIN — CARBIDOPA, LEVODOPA, AND ENTACAPONE 1 TABLET(S): 50; 200; 200 TABLET, FILM COATED ORAL at 06:17

## 2018-03-07 RX ADMIN — CARBIDOPA AND LEVODOPA 1 TABLET(S): 25; 100 TABLET ORAL at 20:16

## 2018-03-07 RX ADMIN — CLOZAPINE 50 MILLIGRAM(S): 150 TABLET, ORALLY DISINTEGRATING ORAL at 10:46

## 2018-03-07 RX ADMIN — CARBIDOPA, LEVODOPA, AND ENTACAPONE 1 TABLET(S): 50; 200; 200 TABLET, FILM COATED ORAL at 11:57

## 2018-03-07 RX ADMIN — CARBIDOPA AND LEVODOPA 1 TABLET(S): 25; 100 TABLET ORAL at 16:24

## 2018-03-07 RX ADMIN — CARBIDOPA, LEVODOPA, AND ENTACAPONE 1 TABLET(S): 50; 200; 200 TABLET, FILM COATED ORAL at 16:24

## 2018-03-07 RX ADMIN — CARBIDOPA AND LEVODOPA 1 TABLET(S): 25; 100 TABLET ORAL at 20:15

## 2018-03-07 RX ADMIN — CARBIDOPA AND LEVODOPA 1 TABLET(S): 25; 100 TABLET ORAL at 06:17

## 2018-03-07 RX ADMIN — CARBIDOPA AND LEVODOPA 1 TABLET(S): 25; 100 TABLET ORAL at 11:58

## 2018-03-07 NOTE — PROGRESS NOTE BEHAVIORAL HEALTH - SUMMARY
53 yo male with early onset Parkinson’s disease x 11 years, discharged from 4 prior nursing homes in 8 months due to his behaviors, hx of refusing medications,  transferred to [facility] Unit 2S on 6/12/17 where he manifested severe mood lability, paranoia and confabulation with poor insight and judgment including making > 40 Justice center complaints. 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 Patient was accepted to [facility] in [place] and discharged from [facility] on 7/12/17.  Upon getting to [facility], Patient refused to go into the facility and became combative. He was thus transferred back to [facility] ED which resulted in Service Line Chiefs’ of Service involvement given the history/issues and ultimate transfer to [address]. Patient has been on Unit 1N since. UPDATE: [facility] declined to accept Patient. Hundreds of nursing home applications wee sent out and all declined to accept patient. Court scheduled on 12/19/17 was deferred as Patient refused to go; he then again refused to go to the new Court date. Continuing to search for placement which have been unsuccessful; family does not want him living with them.  Neurological facility in [place] has accepted patient, and legal guardian is involved in transfer issues. He is a awaiting a meeting between the guardians and the MHLS / family with the guardianship .

## 2018-03-07 NOTE — PROGRESS NOTE BEHAVIORAL HEALTH - NSBHFUPINTERVALHXFT_PSY_A_CORE
Patient seen for paranoia, disorganized behavior. Chart reviewed. He was at his usual baseline. He is generally under control. Otherwise no significant interval events. He verbalized his concern to stay close to his family in the [place] and believes that his niece will get him out. I explained again that it is up to the  to decide and that they have not met yet with the . He has not been physically agitated and has been compliant with meds.

## 2018-03-08 PROCEDURE — 99231 SBSQ HOSP IP/OBS SF/LOW 25: CPT

## 2018-03-08 RX ADMIN — CARBIDOPA AND LEVODOPA 1 TABLET(S): 25; 100 TABLET ORAL at 11:00

## 2018-03-08 RX ADMIN — Medication 325 MILLIGRAM(S): at 00:04

## 2018-03-08 RX ADMIN — Medication 400 MILLIGRAM(S): at 19:42

## 2018-03-08 RX ADMIN — Medication 400 MILLIGRAM(S): at 17:11

## 2018-03-08 RX ADMIN — CARBIDOPA, LEVODOPA, AND ENTACAPONE 1 TABLET(S): 50; 200; 200 TABLET, FILM COATED ORAL at 11:00

## 2018-03-08 RX ADMIN — CARBIDOPA AND LEVODOPA 1 TABLET(S): 25; 100 TABLET ORAL at 16:01

## 2018-03-08 RX ADMIN — Medication 10 MILLIGRAM(S): at 12:08

## 2018-03-08 RX ADMIN — CARBIDOPA AND LEVODOPA 1 TABLET(S): 25; 100 TABLET ORAL at 20:31

## 2018-03-08 RX ADMIN — CARBIDOPA, LEVODOPA, AND ENTACAPONE 1 TABLET(S): 50; 200; 200 TABLET, FILM COATED ORAL at 16:01

## 2018-03-08 RX ADMIN — CLOZAPINE 50 MILLIGRAM(S): 150 TABLET, ORALLY DISINTEGRATING ORAL at 09:53

## 2018-03-08 RX ADMIN — CARBIDOPA AND LEVODOPA 1 TABLET(S): 25; 100 TABLET ORAL at 20:29

## 2018-03-08 RX ADMIN — CARBIDOPA, LEVODOPA, AND ENTACAPONE 1 TABLET(S): 50; 200; 200 TABLET, FILM COATED ORAL at 06:24

## 2018-03-08 RX ADMIN — CARBIDOPA AND LEVODOPA 1 TABLET(S): 25; 100 TABLET ORAL at 06:24

## 2018-03-08 RX ADMIN — Medication 10 MILLIGRAM(S): at 20:30

## 2018-03-08 RX ADMIN — Medication 325 MILLIGRAM(S): at 22:29

## 2018-03-08 RX ADMIN — Medication 325 MILLIGRAM(S): at 21:56

## 2018-03-09 PROCEDURE — 99231 SBSQ HOSP IP/OBS SF/LOW 25: CPT

## 2018-03-09 RX ADMIN — Medication 400 MILLIGRAM(S): at 10:49

## 2018-03-09 RX ADMIN — CARBIDOPA AND LEVODOPA 1 TABLET(S): 25; 100 TABLET ORAL at 21:10

## 2018-03-09 RX ADMIN — Medication 650 MILLIGRAM(S): at 22:43

## 2018-03-09 RX ADMIN — Medication 10 MILLIGRAM(S): at 21:10

## 2018-03-09 RX ADMIN — CARBIDOPA, LEVODOPA, AND ENTACAPONE 1 TABLET(S): 50; 200; 200 TABLET, FILM COATED ORAL at 06:00

## 2018-03-09 RX ADMIN — Medication 650 MILLIGRAM(S): at 23:15

## 2018-03-09 RX ADMIN — CARBIDOPA AND LEVODOPA 1 TABLET(S): 25; 100 TABLET ORAL at 10:21

## 2018-03-09 RX ADMIN — CARBIDOPA AND LEVODOPA 1 TABLET(S): 25; 100 TABLET ORAL at 06:00

## 2018-03-09 RX ADMIN — CLOZAPINE 50 MILLIGRAM(S): 150 TABLET, ORALLY DISINTEGRATING ORAL at 13:33

## 2018-03-09 RX ADMIN — Medication 325 MILLIGRAM(S): at 04:16

## 2018-03-09 RX ADMIN — CARBIDOPA, LEVODOPA, AND ENTACAPONE 1 TABLET(S): 50; 200; 200 TABLET, FILM COATED ORAL at 10:21

## 2018-03-09 RX ADMIN — CARBIDOPA AND LEVODOPA 1 TABLET(S): 25; 100 TABLET ORAL at 16:00

## 2018-03-09 RX ADMIN — CARBIDOPA, LEVODOPA, AND ENTACAPONE 1 TABLET(S): 50; 200; 200 TABLET, FILM COATED ORAL at 16:00

## 2018-03-09 RX ADMIN — Medication 325 MILLIGRAM(S): at 05:18

## 2018-03-09 RX ADMIN — Medication 400 MILLIGRAM(S): at 10:45

## 2018-03-09 NOTE — PROGRESS NOTE BEHAVIORAL HEALTH - NSBHFUPINTERVALHXFT_PSY_A_CORE
Patient seen for paranoia, disorganized behavior. Chart reviewed. Interval events: Neurology follow up again this past Friday as per Patient's continued request for increasing his Parkinson's medications. He was again examined; no clinical indication for further increase of already higher amounts of Parkinson's medications. Same as before. Neurology consultation much appreciated. Otherwise no significant interval events - other then the usual complaints about certain staff, making unfounded allegations (chronic) This morning, told Writer that he has not showered since she has been away on vacation this past week which was untrue and he has just earlier today showered.

## 2018-03-09 NOTE — PROGRESS NOTE BEHAVIORAL HEALTH - SUMMARY
51 yo male with early onset Parkinson’s disease x 11 years, discharged from 4 prior nursing homes in 8 months due to his behaviors, hx of refusing medications,  transferred to [facility] Unit 2S on 6/12/17 where he manifested severe mood lability, paranoia and confabulation with poor insight and judgment including making > 40 Justice center complaints. 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 Patient was accepted to [facility] in [place] and discharged from [facility] on 7/12/17.  Upon getting to [facility], Patient refused to go into the facility and became combative. He was thus transferred back to [facility] ED which resulted in Service Line Chiefs’ of Service involvement given the history/issues and ultimate transfer to [address]. Patient has been on Unit 1N since. UPDATE: [facility] declined to accept Patient. Hundreds of nursing home applications wee sent out and all declined to accept patient. Court scheduled on 12/19/17 was deferred as Patient refused to go; he then again refused to go to the new Court date. Continuing to search for placement which have been unsuccessful; family does not want him living with them.  Neurological facility in [place] has accepted patient, and legal guardian is involved in transfer issues.

## 2018-03-09 NOTE — PROGRESS NOTE BEHAVIORAL HEALTH - OTHER
+ neck favoring one side, dressed in hospital gowns, adequate grooming and hygiene no psychomotor agitation at times (chronic and seemingly intentionaly as he can stop it when redirected). appropriate chronically limited but better since admission  more able to respond to redirection festinating gait but stable at times louder; at times with dramatic flair/ prosody "I need to talk to you!!!" varying (chronic) reactive overall linear, at times disorganized and circumstantial at times paranoia towards certain staff (chronic) chronically impaired but improved since admission Limited

## 2018-03-10 RX ADMIN — CARBIDOPA AND LEVODOPA 1 TABLET(S): 25; 100 TABLET ORAL at 11:00

## 2018-03-10 RX ADMIN — CARBIDOPA AND LEVODOPA 1 TABLET(S): 25; 100 TABLET ORAL at 20:06

## 2018-03-10 RX ADMIN — CARBIDOPA AND LEVODOPA 1 TABLET(S): 25; 100 TABLET ORAL at 16:00

## 2018-03-10 RX ADMIN — Medication 100 MILLIGRAM(S): at 21:25

## 2018-03-10 RX ADMIN — CLOZAPINE 50 MILLIGRAM(S): 150 TABLET, ORALLY DISINTEGRATING ORAL at 14:15

## 2018-03-10 RX ADMIN — CARBIDOPA AND LEVODOPA 1 TABLET(S): 25; 100 TABLET ORAL at 06:21

## 2018-03-10 RX ADMIN — CARBIDOPA, LEVODOPA, AND ENTACAPONE 1 TABLET(S): 50; 200; 200 TABLET, FILM COATED ORAL at 11:00

## 2018-03-10 RX ADMIN — Medication 10 MILLIGRAM(S): at 21:25

## 2018-03-10 RX ADMIN — CARBIDOPA, LEVODOPA, AND ENTACAPONE 1 TABLET(S): 50; 200; 200 TABLET, FILM COATED ORAL at 06:21

## 2018-03-10 RX ADMIN — CARBIDOPA, LEVODOPA, AND ENTACAPONE 1 TABLET(S): 50; 200; 200 TABLET, FILM COATED ORAL at 16:00

## 2018-03-10 RX ADMIN — CYCLOBENZAPRINE HYDROCHLORIDE 10 MILLIGRAM(S): 10 TABLET, FILM COATED ORAL at 02:42

## 2018-03-10 RX ADMIN — Medication 10 MILLIGRAM(S): at 12:13

## 2018-03-11 RX ADMIN — Medication 400 MILLIGRAM(S): at 12:45

## 2018-03-11 RX ADMIN — CARBIDOPA AND LEVODOPA 1 TABLET(S): 25; 100 TABLET ORAL at 21:20

## 2018-03-11 RX ADMIN — Medication 650 MILLIGRAM(S): at 03:13

## 2018-03-11 RX ADMIN — CLOZAPINE 50 MILLIGRAM(S): 150 TABLET, ORALLY DISINTEGRATING ORAL at 14:06

## 2018-03-11 RX ADMIN — Medication 400 MILLIGRAM(S): at 11:59

## 2018-03-11 RX ADMIN — Medication 650 MILLIGRAM(S): at 04:13

## 2018-03-11 RX ADMIN — CARBIDOPA, LEVODOPA, AND ENTACAPONE 1 TABLET(S): 50; 200; 200 TABLET, FILM COATED ORAL at 06:54

## 2018-03-11 RX ADMIN — CARBIDOPA, LEVODOPA, AND ENTACAPONE 1 TABLET(S): 50; 200; 200 TABLET, FILM COATED ORAL at 16:29

## 2018-03-11 RX ADMIN — CARBIDOPA AND LEVODOPA 1 TABLET(S): 25; 100 TABLET ORAL at 16:29

## 2018-03-11 RX ADMIN — CARBIDOPA, LEVODOPA, AND ENTACAPONE 1 TABLET(S): 50; 200; 200 TABLET, FILM COATED ORAL at 11:04

## 2018-03-11 RX ADMIN — CARBIDOPA AND LEVODOPA 1 TABLET(S): 25; 100 TABLET ORAL at 11:04

## 2018-03-11 RX ADMIN — Medication 325 MILLIGRAM(S): at 22:50

## 2018-03-11 RX ADMIN — CARBIDOPA AND LEVODOPA 1 TABLET(S): 25; 100 TABLET ORAL at 06:54

## 2018-03-12 PROCEDURE — 99231 SBSQ HOSP IP/OBS SF/LOW 25: CPT

## 2018-03-12 RX ADMIN — CARBIDOPA AND LEVODOPA 1 TABLET(S): 25; 100 TABLET ORAL at 10:51

## 2018-03-12 RX ADMIN — CARBIDOPA AND LEVODOPA 1 TABLET(S): 25; 100 TABLET ORAL at 20:11

## 2018-03-12 RX ADMIN — CLOZAPINE 50 MILLIGRAM(S): 150 TABLET, ORALLY DISINTEGRATING ORAL at 13:36

## 2018-03-12 RX ADMIN — CARBIDOPA, LEVODOPA, AND ENTACAPONE 1 TABLET(S): 50; 200; 200 TABLET, FILM COATED ORAL at 05:34

## 2018-03-12 RX ADMIN — CARBIDOPA, LEVODOPA, AND ENTACAPONE 1 TABLET(S): 50; 200; 200 TABLET, FILM COATED ORAL at 10:52

## 2018-03-12 RX ADMIN — CARBIDOPA AND LEVODOPA 1 TABLET(S): 25; 100 TABLET ORAL at 16:23

## 2018-03-12 RX ADMIN — CARBIDOPA, LEVODOPA, AND ENTACAPONE 1 TABLET(S): 50; 200; 200 TABLET, FILM COATED ORAL at 16:23

## 2018-03-12 RX ADMIN — CARBIDOPA AND LEVODOPA 1 TABLET(S): 25; 100 TABLET ORAL at 05:34

## 2018-03-12 NOTE — PROGRESS NOTE BEHAVIORAL HEALTH - SUMMARY
53 yo male with early onset Parkinson’s disease x 11 years, discharged from 4 prior nursing homes in 8 months due to his behaviors, hx of refusing medications,  transferred to [facility] Unit 2S on 6/12/17 where he manifested severe mood lability, paranoia and confabulation with poor insight and judgment including making > 40 Justice center complaints. 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 Patient was accepted to [facility] in [place] and discharged from [facility] on 7/12/17.  Upon getting to [facility], Patient refused to go into the facility and became combative. He was thus transferred back to [facility] ED which resulted in Service Line Chiefs’ of Service involvement given the history/issues and ultimate transfer to [address]. Patient has been on Unit 1N since. UPDATE: [facility] declined to accept Patient. Hundreds of nursing home applications wee sent out and all declined to accept patient. Court scheduled on 12/19/17 was deferred as Patient refused to go; he then again refused to go to the new Court date. Continuing to search for placement which have been unsuccessful; family does not want him living with them.  Neurological facility in [place] has accepted patient, and legal guardian is involved in transfer issues. Family meeting was done previous week before last and went well.

## 2018-03-12 NOTE — PROGRESS NOTE BEHAVIORAL HEALTH - NSBHFUPINTERVALHXFT_PSY_A_CORE
Patient seen for paranoia, disorganized behavior. Chart reviewed. No significant interval events over the weekend. No incidents. Same clinical presentation.

## 2018-03-13 PROCEDURE — 99231 SBSQ HOSP IP/OBS SF/LOW 25: CPT

## 2018-03-13 RX ORDER — IBUPROFEN 200 MG
400 TABLET ORAL
Qty: 0 | Refills: 0 | Status: DISCONTINUED | OUTPATIENT
Start: 2018-03-13 | End: 2018-08-18

## 2018-03-13 RX ADMIN — Medication 400 MILLIGRAM(S): at 09:00

## 2018-03-13 RX ADMIN — Medication 400 MILLIGRAM(S): at 08:37

## 2018-03-13 RX ADMIN — CARBIDOPA AND LEVODOPA 1 TABLET(S): 25; 100 TABLET ORAL at 16:08

## 2018-03-13 RX ADMIN — CARBIDOPA, LEVODOPA, AND ENTACAPONE 1 TABLET(S): 50; 200; 200 TABLET, FILM COATED ORAL at 16:08

## 2018-03-13 RX ADMIN — CARBIDOPA AND LEVODOPA 1 TABLET(S): 25; 100 TABLET ORAL at 05:45

## 2018-03-13 RX ADMIN — CLOZAPINE 50 MILLIGRAM(S): 150 TABLET, ORALLY DISINTEGRATING ORAL at 13:20

## 2018-03-13 RX ADMIN — CYCLOBENZAPRINE HYDROCHLORIDE 10 MILLIGRAM(S): 10 TABLET, FILM COATED ORAL at 20:12

## 2018-03-13 RX ADMIN — CARBIDOPA AND LEVODOPA 1 TABLET(S): 25; 100 TABLET ORAL at 10:37

## 2018-03-13 RX ADMIN — CARBIDOPA AND LEVODOPA 1 TABLET(S): 25; 100 TABLET ORAL at 20:13

## 2018-03-13 RX ADMIN — CARBIDOPA, LEVODOPA, AND ENTACAPONE 1 TABLET(S): 50; 200; 200 TABLET, FILM COATED ORAL at 05:45

## 2018-03-13 RX ADMIN — Medication 650 MILLIGRAM(S): at 05:45

## 2018-03-13 RX ADMIN — Medication 650 MILLIGRAM(S): at 05:46

## 2018-03-13 RX ADMIN — CARBIDOPA, LEVODOPA, AND ENTACAPONE 1 TABLET(S): 50; 200; 200 TABLET, FILM COATED ORAL at 10:37

## 2018-03-13 RX ADMIN — CARBIDOPA AND LEVODOPA 1 TABLET(S): 25; 100 TABLET ORAL at 20:12

## 2018-03-13 NOTE — PROGRESS NOTE BEHAVIORAL HEALTH - NSBHFUPINTERVALHXFT_PSY_A_CORE
Patient seen for paranoia, disorganized behavior. Chart reviewed. Patient was involved in a [employer] yesterday for another Patient and was arguing with staff and was sticking up for his peer. Redirectable but took a while to calm down. Same clinical presentation.

## 2018-03-13 NOTE — PROGRESS NOTE BEHAVIORAL HEALTH - OTHER
+ neck favoring one side, dressed in hospital gowns, adequate grooming and hygiene no psychomotor agitation at times (chronic and seemingly intentionaly as he can stop it when redirected). appropriate chronically limited but better since admission  more able to respond to redirection festinating gait but stable at times louder; at times with dramatic flair/ prosody "I am going to mikayla that kayla!" varying (chronic) overall linear, at times disorganized and circumstantial at times paranoia towards certain staff (chronic) chronically impaired but improved since admission Limited

## 2018-03-13 NOTE — PROGRESS NOTE BEHAVIORAL HEALTH - SUMMARY
53 yo male with early onset Parkinson’s disease x 11 years, discharged from 4 prior nursing homes in 8 months due to his behaviors, hx of refusing medications,  transferred to [facility] Unit 2S on 6/12/17 where he manifested severe mood lability, paranoia and confabulation with poor insight and judgment including making > 40 Justice center complaints. 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 Patient was accepted to [facility] in [place] and discharged from [facility] on 7/12/17.  Upon getting to [facility], Patient refused to go into the facility and became combative. He was thus transferred back to [facility] ED which resulted in Service Line Chiefs’ of Service involvement given the history/issues and ultimate transfer to [address]. Patient has been on Unit 1N since. UPDATE: [facility] declined to accept Patient. Hundreds of nursing home applications wee sent out and all declined to accept patient. Court scheduled on 12/19/17 was deferred as Patient refused to go; he then again refused to go to the new Court date. Continuing to search for placement which have been unsuccessful; family does not want him living with them.  Neurological facility in [place] has accepted patient, and legal guardian is involved in transfer issues. Family meeting was done previous week before last and went well.

## 2018-03-14 LAB
BASOPHILS # BLD AUTO: 0 K/UL — SIGNIFICANT CHANGE UP (ref 0–0.2)
BASOPHILS NFR BLD AUTO: 0.6 % — SIGNIFICANT CHANGE UP (ref 0–2)
EOSINOPHIL # BLD AUTO: 0.1 K/UL — SIGNIFICANT CHANGE UP (ref 0–0.5)
EOSINOPHIL NFR BLD AUTO: 2.1 % — SIGNIFICANT CHANGE UP (ref 0–6)
HCT VFR BLD CALC: 37.9 % — LOW (ref 39–50)
HGB BLD-MCNC: 12.4 G/DL — LOW (ref 13–17)
LYMPHOCYTES # BLD AUTO: 1.8 K/UL — SIGNIFICANT CHANGE UP (ref 1–3.3)
LYMPHOCYTES # BLD AUTO: 32.7 % — SIGNIFICANT CHANGE UP (ref 13–44)
MCHC RBC-ENTMCNC: 30.8 PG — SIGNIFICANT CHANGE UP (ref 27–34)
MCHC RBC-ENTMCNC: 32.7 GM/DL — SIGNIFICANT CHANGE UP (ref 32–36)
MCV RBC AUTO: 94.2 FL — SIGNIFICANT CHANGE UP (ref 80–100)
MONOCYTES # BLD AUTO: 0.5 K/UL — SIGNIFICANT CHANGE UP (ref 0–0.9)
MONOCYTES NFR BLD AUTO: 8.7 % — SIGNIFICANT CHANGE UP (ref 2–14)
NEUTROPHILS # BLD AUTO: 3 K/UL — SIGNIFICANT CHANGE UP (ref 1.8–7.4)
NEUTROPHILS NFR BLD AUTO: 55.9 % — SIGNIFICANT CHANGE UP (ref 43–77)
PLATELET # BLD AUTO: 191 K/UL — SIGNIFICANT CHANGE UP (ref 150–400)
RBC # BLD: 4.02 M/UL — LOW (ref 4.2–5.8)
RBC # FLD: 13 % — SIGNIFICANT CHANGE UP (ref 10.3–14.5)
WBC # BLD: 5.4 K/UL — SIGNIFICANT CHANGE UP (ref 3.8–10.5)
WBC # FLD AUTO: 5.4 K/UL — SIGNIFICANT CHANGE UP (ref 3.8–10.5)

## 2018-03-14 PROCEDURE — 99231 SBSQ HOSP IP/OBS SF/LOW 25: CPT

## 2018-03-14 RX ADMIN — CARBIDOPA AND LEVODOPA 1 TABLET(S): 25; 100 TABLET ORAL at 20:22

## 2018-03-14 RX ADMIN — CARBIDOPA, LEVODOPA, AND ENTACAPONE 1 TABLET(S): 50; 200; 200 TABLET, FILM COATED ORAL at 10:29

## 2018-03-14 RX ADMIN — CARBIDOPA, LEVODOPA, AND ENTACAPONE 1 TABLET(S): 50; 200; 200 TABLET, FILM COATED ORAL at 16:02

## 2018-03-14 RX ADMIN — Medication 650 MILLIGRAM(S): at 01:19

## 2018-03-14 RX ADMIN — Medication 400 MILLIGRAM(S): at 21:38

## 2018-03-14 RX ADMIN — CARBIDOPA, LEVODOPA, AND ENTACAPONE 1 TABLET(S): 50; 200; 200 TABLET, FILM COATED ORAL at 05:40

## 2018-03-14 RX ADMIN — CLOZAPINE 50 MILLIGRAM(S): 150 TABLET, ORALLY DISINTEGRATING ORAL at 13:34

## 2018-03-14 RX ADMIN — CARBIDOPA AND LEVODOPA 1 TABLET(S): 25; 100 TABLET ORAL at 16:02

## 2018-03-14 RX ADMIN — CARBIDOPA AND LEVODOPA 1 TABLET(S): 25; 100 TABLET ORAL at 05:40

## 2018-03-14 RX ADMIN — CARBIDOPA AND LEVODOPA 1 TABLET(S): 25; 100 TABLET ORAL at 10:30

## 2018-03-14 NOTE — PROGRESS NOTE BEHAVIORAL HEALTH - SUMMARY
51 yo male with early onset Parkinson’s disease x 11 years, discharged from 4 prior nursing homes in 8 months due to his behaviors, hx of refusing medications,  transferred to [facility] Unit 2S on 6/12/17 where he manifested severe mood lability, paranoia and confabulation with poor insight and judgment including making > 40 Justice center complaints. 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 Patient was accepted to [facility] in [place] and discharged from [facility] on 7/12/17.  Upon getting to [facility], Patient refused to go into the facility and became combative. He was thus transferred back to [facility] ED which resulted in Service Line Chiefs’ of Service involvement given the history/issues and ultimate transfer to [address]. Patient has been on Unit 1N since. UPDATE: [facility] declined to accept Patient. Hundreds of nursing home applications wee sent out and all declined to accept patient. Court scheduled on 12/19/17 was deferred as Patient refused to go; he then again refused to go to the new Court date. Continuing to search for placement which have been unsuccessful; family does not want him living with them.  Neurological facility in [place] has accepted patient, and legal guardian is involved in transfer issues. Family meeting was done previous week before last and went well.

## 2018-03-14 NOTE — PROGRESS NOTE BEHAVIORAL HEALTH - NSBHFUPINTERVALHXFT_PSY_A_CORE
Patient seen for paranoia, disorganized behavior. Chart reviewed. No subsequent behavioral incidents. Same clinical presentation.

## 2018-03-14 NOTE — PROGRESS NOTE BEHAVIORAL HEALTH - OTHER
+ neck favoring one side, dressed in hospital gowns, adequate grooming and hygiene no psychomotor agitation at times (chronic and seemingly intentionaly as he can stop it when redirected). appropriate chronically limited but better since admission  more able to respond to redirection festinating gait but stable at times louder; at times with dramatic flair/ prosody Limited "I am going to mikayla that kayla!" varying (chronic) overall linear, at times disorganized and circumstantial at times paranoia towards certain staff (chronic) chronically impaired but improved since admission

## 2018-03-15 PROCEDURE — 99231 SBSQ HOSP IP/OBS SF/LOW 25: CPT

## 2018-03-15 RX ADMIN — Medication 325 MILLIGRAM(S): at 10:18

## 2018-03-15 RX ADMIN — CARBIDOPA AND LEVODOPA 1 TABLET(S): 25; 100 TABLET ORAL at 20:18

## 2018-03-15 RX ADMIN — CLOZAPINE 50 MILLIGRAM(S): 150 TABLET, ORALLY DISINTEGRATING ORAL at 13:54

## 2018-03-15 RX ADMIN — Medication 325 MILLIGRAM(S): at 23:23

## 2018-03-15 RX ADMIN — Medication 325 MILLIGRAM(S): at 22:38

## 2018-03-15 RX ADMIN — CARBIDOPA AND LEVODOPA 1 TABLET(S): 25; 100 TABLET ORAL at 10:18

## 2018-03-15 RX ADMIN — CARBIDOPA, LEVODOPA, AND ENTACAPONE 1 TABLET(S): 50; 200; 200 TABLET, FILM COATED ORAL at 16:04

## 2018-03-15 RX ADMIN — CARBIDOPA AND LEVODOPA 1 TABLET(S): 25; 100 TABLET ORAL at 16:04

## 2018-03-15 RX ADMIN — CARBIDOPA, LEVODOPA, AND ENTACAPONE 1 TABLET(S): 50; 200; 200 TABLET, FILM COATED ORAL at 05:03

## 2018-03-15 RX ADMIN — Medication 325 MILLIGRAM(S): at 01:22

## 2018-03-15 RX ADMIN — CARBIDOPA AND LEVODOPA 1 TABLET(S): 25; 100 TABLET ORAL at 05:03

## 2018-03-15 RX ADMIN — Medication 325 MILLIGRAM(S): at 02:10

## 2018-03-15 RX ADMIN — CARBIDOPA, LEVODOPA, AND ENTACAPONE 1 TABLET(S): 50; 200; 200 TABLET, FILM COATED ORAL at 10:17

## 2018-03-15 RX ADMIN — Medication 10 MILLIGRAM(S): at 20:18

## 2018-03-15 RX ADMIN — Medication 325 MILLIGRAM(S): at 10:39

## 2018-03-15 NOTE — PROGRESS NOTE BEHAVIORAL HEALTH - SUMMARY
51 yo male with early onset Parkinson’s disease x 11 years, discharged from 4 prior nursing homes in 8 months due to his behaviors, hx of refusing medications,  transferred to [facility] Unit 2S on 6/12/17 where he manifested severe mood lability, paranoia and confabulation with poor insight and judgment including making > 40 Justice center complaints. 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 Patient was accepted to [facility] in [place] and discharged from [facility] on 7/12/17.  Upon getting to [facility], Patient refused to go into the facility and became combative. He was thus transferred back to [facility] ED which resulted in Service Line Chiefs’ of Service involvement given the history/issues and ultimate transfer to [address]. Patient has been on Unit 1N since. UPDATE: [facility] declined to accept Patient. Hundreds of nursing home applications wee sent out and all declined to accept patient. Court scheduled on 12/19/17 was deferred as Patient refused to go; he then again refused to go to the new Court date. Continuing to search for placement which have been unsuccessful; family does not want him living with them.  Neurological facility in [place] has accepted patient, and legal guardian is involved in transfer issues. Family meeting was done previous week before last and went well.

## 2018-03-15 NOTE — PROGRESS NOTE BEHAVIORAL HEALTH - NSBHFUPINTERVALHXFT_PSY_A_CORE
Patient seen for paranoia, disorganized behavior. Chart reviewed. WBC/ANC within normal limits. No subsequent behavioral incidents. Same clinical presentation. Patient seen for paranoia, disorganized behavior. Chart reviewed. WBC/ANC within normal limits. No subsequent behavioral incidents. Same clinical presentation. On the phone yelling, then telling Writer someone told him that she made a doctor's order for him to use only ertain bathrooms which is untrue. Patient redirected.

## 2018-03-15 NOTE — PROGRESS NOTE BEHAVIORAL HEALTH - OTHER
overall linear, at times disorganized and circumstantial at times paranoia towards certain staff (chronic) chronically impaired but improved since admission Limited + neck favoring one side, dressed in hospital gowns, adequate grooming and hygiene no psychomotor agitation at times (chronic and seemingly intentionaly as he can stop it when redirected). appropriate chronically limited but better since admission  more able to respond to redirection festinating gait but stable at times louder; at times with dramatic flair/ prosody "I want to get out of here!" varying (chronic)

## 2018-03-16 PROCEDURE — 99231 SBSQ HOSP IP/OBS SF/LOW 25: CPT

## 2018-03-16 PROCEDURE — 93010 ELECTROCARDIOGRAM REPORT: CPT

## 2018-03-16 PROCEDURE — 99232 SBSQ HOSP IP/OBS MODERATE 35: CPT

## 2018-03-16 PROCEDURE — 99231 SBSQ HOSP IP/OBS SF/LOW 25: CPT | Mod: 25

## 2018-03-16 RX ADMIN — CARBIDOPA AND LEVODOPA 1 TABLET(S): 25; 100 TABLET ORAL at 20:22

## 2018-03-16 RX ADMIN — Medication 325 MILLIGRAM(S): at 17:39

## 2018-03-16 RX ADMIN — CARBIDOPA AND LEVODOPA 1 TABLET(S): 25; 100 TABLET ORAL at 05:26

## 2018-03-16 RX ADMIN — CARBIDOPA, LEVODOPA, AND ENTACAPONE 1 TABLET(S): 50; 200; 200 TABLET, FILM COATED ORAL at 05:26

## 2018-03-16 RX ADMIN — CARBIDOPA AND LEVODOPA 1 TABLET(S): 25; 100 TABLET ORAL at 10:11

## 2018-03-16 RX ADMIN — Medication 650 MILLIGRAM(S): at 22:58

## 2018-03-16 RX ADMIN — CARBIDOPA, LEVODOPA, AND ENTACAPONE 1 TABLET(S): 50; 200; 200 TABLET, FILM COATED ORAL at 10:11

## 2018-03-16 RX ADMIN — Medication 325 MILLIGRAM(S): at 16:12

## 2018-03-16 RX ADMIN — CARBIDOPA AND LEVODOPA 1 TABLET(S): 25; 100 TABLET ORAL at 16:11

## 2018-03-16 RX ADMIN — CARBIDOPA, LEVODOPA, AND ENTACAPONE 1 TABLET(S): 50; 200; 200 TABLET, FILM COATED ORAL at 16:11

## 2018-03-16 NOTE — CHART NOTE - NSCHARTNOTEFT_GEN_A_CORE
requested by RN/MD for evaluation of left-sided chest pain.  Patient is emotionally upset currently; Psychiatrist at bedside.  Patient reports pain is reproducible and left-sided.  Confirmed on physical exam.  Emotional lability  lungs clear  RRR  reproducible left-sided chest pain  abd obese  1+ chronic leg edema    Impression/Plan: COSTOCHONDRITIS  -use ibuprofen or tylenol as needed.  Please call Hospitalist team back if chest pain persists.  d/w RN

## 2018-03-16 NOTE — PROGRESS NOTE BEHAVIORAL HEALTH - SUMMARY
53 yo male with early onset Parkinson’s disease x 11 years, discharged from 4 prior nursing homes in 8 months due to his behaviors, hx of refusing medications,  transferred to [facility] [address] on 6/12/17 where he manifested severe mood lability, paranoia and confabulation with poor insight and judgment including making > 40 [facility] center complaints. 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 Patient was accepted to [facility] in [place] and discharged from [facility] on 7/12/17.  Upon getting to [facility], Patient refused to go into the facility and became combative. He was thus transferred back to [facility] ED which resulted in Service Line Chiefs’ of Service involvement given the history/issues and ultimate transfer to [address]. Patient has been on [address] since. UPDATE: [facility] declined to accept Patient. Hundreds of nursing home applications wee sent out and all declined to accept patient. Court scheduled on 12/19/17 was deferred as Patient refused to go; he then again refused to go to the new Court date. Continuing to search for placement which have been unsuccessful; family does not want him living with them.  Neurological facility in [place] has accepted patient, and legal guardian is involved in transfer issues. Family meeting was done previous week before last and went well.

## 2018-03-16 NOTE — PROGRESS NOTE BEHAVIORAL HEALTH - NSBHFUPINTERVALHXFT_PSY_A_CORE
Patient seen for paranoia, disorganized behavior. Chart reviewed. WBC/ANC within normal limits. He has been on clozapine for 6 months now and can be moved to [id number] mood draws. No subsequent behavioral incidents. Same clinical presentation. On the phone yelling, then telling Writer someone told him that she made a doctor's order for him to use only certain bathrooms which is untrue. Patient redirected.

## 2018-03-16 NOTE — PROGRESS NOTE BEHAVIORAL HEALTH - OTHER
+ neck favoring one side, dressed in hospital gowns, adequate grooming and hygiene no psychomotor agitation at times (chronic and seemingly intentionaly as he can stop it when redirected). appropriate chronically limited but better since admission  more able to respond to redirection festinating gait but stable Limited at times louder; at times with dramatic flair/ prosody "I want to get out of here!" varying (chronic) overall linear, at times disorganized and circumstantial at times paranoia towards certain staff (chronic) chronically impaired but improved since admission

## 2018-03-16 NOTE — PROGRESS NOTE BEHAVIORAL HEALTH - NSBHADMITMEDEDUDETAILS_A_CORE FT
continue current management; start [id number] CBC blood draws as Patient has been on clozapine for 6 months now

## 2018-03-16 NOTE — CHART NOTE - NSCHARTNOTEFT_GEN_A_CORE
RN called pt was complainig of chest pain  examined pt at bedside  pt states pain has been for past 2 days, reproducible, constant,   no assosciation of other symptoms, while providing history eating bannan with putting  appears comfortable, ekg no new findings,   on pe pain is illicit just by touching the thoracic wall  suspect muskoskleteal  will do one set of cardiac enzymes, and nsaids  spend time 20 minutes

## 2018-03-17 RX ADMIN — Medication 650 MILLIGRAM(S): at 00:11

## 2018-03-17 RX ADMIN — Medication 325 MILLIGRAM(S): at 04:06

## 2018-03-17 RX ADMIN — CARBIDOPA, LEVODOPA, AND ENTACAPONE 1 TABLET(S): 50; 200; 200 TABLET, FILM COATED ORAL at 16:00

## 2018-03-17 RX ADMIN — Medication 325 MILLIGRAM(S): at 23:32

## 2018-03-17 RX ADMIN — Medication 325 MILLIGRAM(S): at 05:23

## 2018-03-17 RX ADMIN — Medication 400 MILLIGRAM(S): at 22:25

## 2018-03-17 RX ADMIN — CARBIDOPA AND LEVODOPA 1 TABLET(S): 25; 100 TABLET ORAL at 16:00

## 2018-03-17 RX ADMIN — CLOZAPINE 50 MILLIGRAM(S): 150 TABLET, ORALLY DISINTEGRATING ORAL at 14:14

## 2018-03-17 RX ADMIN — Medication 400 MILLIGRAM(S): at 21:57

## 2018-03-17 RX ADMIN — CARBIDOPA, LEVODOPA, AND ENTACAPONE 1 TABLET(S): 50; 200; 200 TABLET, FILM COATED ORAL at 05:59

## 2018-03-17 RX ADMIN — CARBIDOPA AND LEVODOPA 1 TABLET(S): 25; 100 TABLET ORAL at 05:59

## 2018-03-17 RX ADMIN — CARBIDOPA, LEVODOPA, AND ENTACAPONE 1 TABLET(S): 50; 200; 200 TABLET, FILM COATED ORAL at 10:42

## 2018-03-17 RX ADMIN — CARBIDOPA AND LEVODOPA 1 TABLET(S): 25; 100 TABLET ORAL at 20:19

## 2018-03-17 RX ADMIN — CARBIDOPA AND LEVODOPA 1 TABLET(S): 25; 100 TABLET ORAL at 10:42

## 2018-03-18 RX ADMIN — CARBIDOPA AND LEVODOPA 1 TABLET(S): 25; 100 TABLET ORAL at 20:46

## 2018-03-18 RX ADMIN — CARBIDOPA, LEVODOPA, AND ENTACAPONE 1 TABLET(S): 50; 200; 200 TABLET, FILM COATED ORAL at 05:56

## 2018-03-18 RX ADMIN — Medication 400 MILLIGRAM(S): at 22:43

## 2018-03-18 RX ADMIN — CARBIDOPA AND LEVODOPA 1 TABLET(S): 25; 100 TABLET ORAL at 16:16

## 2018-03-18 RX ADMIN — CARBIDOPA AND LEVODOPA 1 TABLET(S): 25; 100 TABLET ORAL at 05:56

## 2018-03-18 RX ADMIN — CARBIDOPA AND LEVODOPA 1 TABLET(S): 25; 100 TABLET ORAL at 10:00

## 2018-03-18 RX ADMIN — CARBIDOPA, LEVODOPA, AND ENTACAPONE 1 TABLET(S): 50; 200; 200 TABLET, FILM COATED ORAL at 16:16

## 2018-03-18 RX ADMIN — CARBIDOPA, LEVODOPA, AND ENTACAPONE 1 TABLET(S): 50; 200; 200 TABLET, FILM COATED ORAL at 10:00

## 2018-03-18 RX ADMIN — CLOZAPINE 50 MILLIGRAM(S): 150 TABLET, ORALLY DISINTEGRATING ORAL at 13:59

## 2018-03-18 RX ADMIN — Medication 400 MILLIGRAM(S): at 21:55

## 2018-03-18 RX ADMIN — Medication 325 MILLIGRAM(S): at 00:01

## 2018-03-19 PROCEDURE — 99231 SBSQ HOSP IP/OBS SF/LOW 25: CPT

## 2018-03-19 RX ADMIN — CARBIDOPA AND LEVODOPA 1 TABLET(S): 25; 100 TABLET ORAL at 10:24

## 2018-03-19 RX ADMIN — Medication 325 MILLIGRAM(S): at 02:43

## 2018-03-19 RX ADMIN — CARBIDOPA AND LEVODOPA 1 TABLET(S): 25; 100 TABLET ORAL at 20:59

## 2018-03-19 RX ADMIN — CARBIDOPA, LEVODOPA, AND ENTACAPONE 1 TABLET(S): 50; 200; 200 TABLET, FILM COATED ORAL at 10:23

## 2018-03-19 RX ADMIN — CLOZAPINE 50 MILLIGRAM(S): 150 TABLET, ORALLY DISINTEGRATING ORAL at 13:05

## 2018-03-19 RX ADMIN — Medication 400 MILLIGRAM(S): at 22:42

## 2018-03-19 RX ADMIN — Medication 325 MILLIGRAM(S): at 02:44

## 2018-03-19 RX ADMIN — CARBIDOPA AND LEVODOPA 1 TABLET(S): 25; 100 TABLET ORAL at 16:27

## 2018-03-19 RX ADMIN — CARBIDOPA, LEVODOPA, AND ENTACAPONE 1 TABLET(S): 50; 200; 200 TABLET, FILM COATED ORAL at 06:00

## 2018-03-19 RX ADMIN — CARBIDOPA, LEVODOPA, AND ENTACAPONE 1 TABLET(S): 50; 200; 200 TABLET, FILM COATED ORAL at 16:27

## 2018-03-19 RX ADMIN — CARBIDOPA AND LEVODOPA 1 TABLET(S): 25; 100 TABLET ORAL at 06:00

## 2018-03-19 NOTE — PROGRESS NOTE BEHAVIORAL HEALTH - NSBHADMITMEDEDUDETAILS_A_CORE FT
continue current management; now on s3ouglaa CBC blood draws as Patient has been on clozapine for 6 months now

## 2018-03-19 NOTE — PROGRESS NOTE BEHAVIORAL HEALTH - NSBHFUPINTERVALHXFT_PSY_A_CORE
Patient seen for paranoia, disorganized behavior. Chart reviewed. No significant interval events over the weekend. Patient's WBC/ANC within normal limits; CBC with diif weekly draws changed to [id number] as he has been on clozapine for 6 months now. Same clinical presentation. By nurses station yelling about wanting to take a shower in the bigger bathroom where he needs staff assistance due to fall risk. He refuses to have male staff present (or any staff) and he was offered many times to use the other bathrooms to use to shower whuch he did before. He refused. Patient redirected several times.

## 2018-03-19 NOTE — PROGRESS NOTE BEHAVIORAL HEALTH - OTHER
+ neck favoring one side, dressed in hospital gowns, adequate grooming and hygiene no psychomotor agitation at times (chronic and seemingly intentionaly as he can stop it when redirected). appropriate chronically limited but better since admission  more able to respond to redirection festinating gait but stable at times louder; at times with dramatic flair/ prosody "I am getting them fired!" varying (chronic) overall linear, at times disorganized and circumstantial at times paranoia towards certain staff (chronic) chronically impaired but improved since admission Limited

## 2018-03-19 NOTE — PROGRESS NOTE BEHAVIORAL HEALTH - SUMMARY
53 yo male with early onset Parkinson’s disease x 11 years, discharged from 4 prior nursing homes in 8 months due to his behaviors, hx of refusing medications,  transferred to [facility] on 6/12/17 where he manifested severe mood lability, paranoia and confabulation with poor insight and judgment including making > 40 [facility] center complaints. 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 Patient was accepted to [facility] in [place] and discharged from [facility] on 7/12/17.  Upon getting to [facility], Patient refused to go into the facility and became combative. He was thus transferred back to [facility] ED which resulted in Service Line Chiefs’ of Service involvement given the history/issues and ultimate transfer to [address]. Patient has been on [address] since. UPDATE: [facility] declined to accept Patient. Hundreds of nursing home applications wee sent out and all declined to accept patient. Court scheduled on 12/19/17 was deferred as Patient refused to go; he then again refused to go to the new Court date. Continuing to search for placement which have been unsuccessful; family does not want him living with them.  Neurological facility in [place] has accepted patient, and legal guardian is involved in transfer issues. Family meeting was done previous week before last and went well.

## 2018-03-20 PROCEDURE — 99231 SBSQ HOSP IP/OBS SF/LOW 25: CPT

## 2018-03-20 RX ADMIN — CARBIDOPA, LEVODOPA, AND ENTACAPONE 1 TABLET(S): 50; 200; 200 TABLET, FILM COATED ORAL at 11:01

## 2018-03-20 RX ADMIN — Medication 325 MILLIGRAM(S): at 22:34

## 2018-03-20 RX ADMIN — CARBIDOPA AND LEVODOPA 1 TABLET(S): 25; 100 TABLET ORAL at 05:32

## 2018-03-20 RX ADMIN — CARBIDOPA, LEVODOPA, AND ENTACAPONE 1 TABLET(S): 50; 200; 200 TABLET, FILM COATED ORAL at 16:30

## 2018-03-20 RX ADMIN — CARBIDOPA AND LEVODOPA 1 TABLET(S): 25; 100 TABLET ORAL at 16:30

## 2018-03-20 RX ADMIN — CARBIDOPA AND LEVODOPA 1 TABLET(S): 25; 100 TABLET ORAL at 11:01

## 2018-03-20 RX ADMIN — CARBIDOPA AND LEVODOPA 1 TABLET(S): 25; 100 TABLET ORAL at 20:09

## 2018-03-20 RX ADMIN — CLOZAPINE 50 MILLIGRAM(S): 150 TABLET, ORALLY DISINTEGRATING ORAL at 11:01

## 2018-03-20 RX ADMIN — CARBIDOPA, LEVODOPA, AND ENTACAPONE 1 TABLET(S): 50; 200; 200 TABLET, FILM COATED ORAL at 05:33

## 2018-03-20 RX ADMIN — Medication 325 MILLIGRAM(S): at 22:43

## 2018-03-20 NOTE — PROGRESS NOTE BEHAVIORAL HEALTH - OTHER
appropriate chronically limited but better since admission  more able to respond to redirection festinating gait but stable at times louder; at times with dramatic flair/ prosody "I want my Parkinsons medications increased!" varying (chronic) overall linear, at times disorganized and circumstantial at times paranoia towards certain staff (chronic) chronically impaired but improved since admission Limited + neck favoring one side, dressed in hospital gowns, adequate grooming and hygiene no psychomotor agitation at times (chronic and seemingly intentionaly as he can stop it when redirected).

## 2018-03-20 NOTE — PROGRESS NOTE BEHAVIORAL HEALTH - NSBHFUPINTERVALHXFT_PSY_A_CORE
Patient seen for paranoia, disorganized behavior. Chart reviewed. No significant interval events. Focus this week on his Parkinson's medications and wants them to be increased again despite being seen by Neurology several times in the last month with the same suggestion.  Repeated psychoeducation and support done. Same clinical presentation.

## 2018-03-20 NOTE — PROGRESS NOTE BEHAVIORAL HEALTH - SUMMARY
51 yo male with early onset Parkinson’s disease x 11 years, discharged from 4 prior nursing homes in 8 months due to his behaviors, hx of refusing medications,  transferred to [facility] Unit [facility] on 6/12/17 where he manifested severe mood lability, paranoia and confabulation with poor insight and judgment including making > 40 [facility] center complaints. 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 Patient was accepted to [facility] in [place] and discharged from [facility] on 7/12/17.  Upon getting to [facility], Patient refused to go into the facility and became combative. He was thus transferred back to [facility] ED which resulted in Service Line Chiefs’ of Service involvement given the history/issues and ultimate transfer to [address]. Patient has been on [address] since. UPDATE: [facility] declined to accept Patient. Hundreds of nursing home applications wee sent out and all declined to accept patient. Court scheduled on 12/19/17 was deferred as Patient refused to go; he then again refused to go to the new Court date. Continuing to search for placement which have been unsuccessful; family does not want him living with them.  Neurological facility in [place] has accepted patient, and legal guardian is involved in transfer issues. Family meeting was done previous week before last and went well.

## 2018-03-20 NOTE — PROGRESS NOTE BEHAVIORAL HEALTH - NSBHADMITMEDEDUDETAILS_A_CORE FT
continue current management; now on [id number] CBC blood draws as Patient has been on clozapine for 6 months now

## 2018-03-21 PROCEDURE — 99231 SBSQ HOSP IP/OBS SF/LOW 25: CPT

## 2018-03-21 RX ADMIN — Medication 400 MILLIGRAM(S): at 04:06

## 2018-03-21 RX ADMIN — CLOZAPINE 50 MILLIGRAM(S): 150 TABLET, ORALLY DISINTEGRATING ORAL at 11:07

## 2018-03-21 RX ADMIN — CARBIDOPA AND LEVODOPA 1 TABLET(S): 25; 100 TABLET ORAL at 05:53

## 2018-03-21 RX ADMIN — CARBIDOPA, LEVODOPA, AND ENTACAPONE 1 TABLET(S): 50; 200; 200 TABLET, FILM COATED ORAL at 16:07

## 2018-03-21 RX ADMIN — Medication 325 MILLIGRAM(S): at 23:10

## 2018-03-21 RX ADMIN — CARBIDOPA AND LEVODOPA 1 TABLET(S): 25; 100 TABLET ORAL at 11:54

## 2018-03-21 RX ADMIN — CARBIDOPA, LEVODOPA, AND ENTACAPONE 1 TABLET(S): 50; 200; 200 TABLET, FILM COATED ORAL at 11:54

## 2018-03-21 RX ADMIN — CARBIDOPA AND LEVODOPA 1 TABLET(S): 25; 100 TABLET ORAL at 20:45

## 2018-03-21 RX ADMIN — CARBIDOPA, LEVODOPA, AND ENTACAPONE 1 TABLET(S): 50; 200; 200 TABLET, FILM COATED ORAL at 05:53

## 2018-03-21 RX ADMIN — CARBIDOPA AND LEVODOPA 1 TABLET(S): 25; 100 TABLET ORAL at 16:07

## 2018-03-21 NOTE — PROGRESS NOTE BEHAVIORAL HEALTH - SUMMARY
53 yo male with early onset Parkinson’s disease x 11 years, discharged from 4 prior nursing homes in 8 months due to his behaviors, hx of refusing medications,  transferred to [facility] on 6/12/17 where he manifested severe mood lability, paranoia and confabulation with poor insight and judgment including making > 40 [facility] center complaints. 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 Patient was accepted to [facility] in [place] and discharged from [facility] on 7/12/17.  Upon getting to [facility], Patient refused to go into the facility and became combative. He was thus transferred back to [facility] ED which resulted in Service Line Chiefs’ of Service involvement given the history/issues and ultimate transfer to [address]. Patient has been on [address] since. UPDATE: [facility] declined to accept Patient. Hundreds of nursing home applications wee sent out and all declined to accept patient. Court scheduled on 12/19/17 was deferred as Patient refused to go; he then again refused to go to the new Court date. Continuing to search for placement which have been unsuccessful; family does not want him living with them.  Neurological facility in [place] has accepted patient, and legal guardian is involved in transfer issues. Another Court date is coming up again.

## 2018-03-21 NOTE — PROGRESS NOTE BEHAVIORAL HEALTH - NSBHFUPINTERVALHXFT_PSY_A_CORE
Patient seen for paranoia, disorganized behavior. Chart reviewed. No significant interval events. Overnight, he did not make it to the bathroom in time and urinated on himself and on the floor. He will get the urinal again for the overnights. Repeated psychoeducation and support done. Same clinical presentation.

## 2018-03-21 NOTE — PROGRESS NOTE BEHAVIORAL HEALTH - OTHER
+ neck favoring one side, dressed in hospital gowns, adequate grooming and hygiene no psychomotor agitation at times (chronic and seemingly intentionaly as he can stop it when redirected). appropriate chronically limited but better since admission  more able to respond to redirection festinating gait but stable at times louder; at times with dramatic flair/ prosody "I need to get out of here!" varying (chronic) overall linear, at times disorganized and circumstantial at times paranoia towards certain staff (chronic) chronically impaired but improved since admission Limited

## 2018-03-21 NOTE — PROGRESS NOTE BEHAVIORAL HEALTH - NSBHADMITMEDEDUDETAILS_A_CORE FT
continue current management; now on k1vqyshe CBC blood draws as Patient has been on clozapine for 6 months now

## 2018-03-22 PROCEDURE — 99232 SBSQ HOSP IP/OBS MODERATE 35: CPT

## 2018-03-22 RX ADMIN — CARBIDOPA AND LEVODOPA 1 TABLET(S): 25; 100 TABLET ORAL at 16:48

## 2018-03-22 RX ADMIN — Medication 325 MILLIGRAM(S): at 23:48

## 2018-03-22 RX ADMIN — CARBIDOPA AND LEVODOPA 1 TABLET(S): 25; 100 TABLET ORAL at 14:01

## 2018-03-22 RX ADMIN — CARBIDOPA, LEVODOPA, AND ENTACAPONE 1 TABLET(S): 50; 200; 200 TABLET, FILM COATED ORAL at 16:48

## 2018-03-22 RX ADMIN — Medication 400 MILLIGRAM(S): at 23:24

## 2018-03-22 RX ADMIN — Medication 400 MILLIGRAM(S): at 02:25

## 2018-03-22 RX ADMIN — CARBIDOPA AND LEVODOPA 1 TABLET(S): 25; 100 TABLET ORAL at 20:25

## 2018-03-22 RX ADMIN — Medication 400 MILLIGRAM(S): at 01:27

## 2018-03-22 RX ADMIN — CARBIDOPA AND LEVODOPA 1 TABLET(S): 25; 100 TABLET ORAL at 05:55

## 2018-03-22 RX ADMIN — CARBIDOPA, LEVODOPA, AND ENTACAPONE 1 TABLET(S): 50; 200; 200 TABLET, FILM COATED ORAL at 05:55

## 2018-03-22 RX ADMIN — CLOZAPINE 50 MILLIGRAM(S): 150 TABLET, ORALLY DISINTEGRATING ORAL at 12:25

## 2018-03-22 RX ADMIN — CARBIDOPA, LEVODOPA, AND ENTACAPONE 1 TABLET(S): 50; 200; 200 TABLET, FILM COATED ORAL at 14:02

## 2018-03-22 NOTE — PROGRESS NOTE BEHAVIORAL HEALTH - NSBHFUPINTERVALHXFT_PSY_A_CORE
Patient seen for paranoia, disorganized behavior. Chart reviewed. No significant interval events. Refusing to take Rx from nursing staff, but did take Rx from this writer. Continues labile and irritable at times . Same clinical presentation.  Reports his sister has located a 3 bedroom apartment, and he will live with her there, with an aide.
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
## 2018-03-22 NOTE — PROGRESS NOTE BEHAVIORAL HEALTH - NSBHADMITMEDEDUDETAILS_A_CORE FT
continue current management; now on [id number] CBC blood draws as Patient has been on clozapine for 6 months now.  Psychoeducation done re: need for Rx adherence for sx management with some teachback verbalized.

## 2018-03-22 NOTE — PROGRESS NOTE BEHAVIORAL HEALTH - OTHER
Limited + neck favoring one side, dressed in hospital gowns, adequate grooming and hygiene no psychomotor agitation at times (chronic and seemingly intentionaly as he can stop it when redirected). appropriate angry with staff today, due to paranoid ideation would only take po Rx from this writer chronically limited but better since admission  more able to respond to redirection festinating gait but stable at times louder; at times with dramatic flair/ prosody "My court date is next week--I want to be DC" varying (chronic),irritable at times overall linear, at times disorganized and circumstantial, concrete at times paranoia towards certain staff (chronic) chronically impaired but improved since admission

## 2018-03-22 NOTE — PROGRESS NOTE BEHAVIORAL HEALTH - SUMMARY
51 yo male with early onset Parkinson’s disease x 11 years, discharged from 4 prior nursing homes in 8 months due to his behaviors, hx of refusing medications,  transferred to [facility] Unit [facility] on 6/12/17 where he manifested severe mood lability, paranoia and confabulation with poor insight and judgment including making > 40 [facility] complaints. 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 Patient was accepted to [facility] in [place] and discharged from [facility] on 7/12/17.  Upon getting to [facility], Patient refused to go into the facility and became combative. He was thus transferred back to [facility] ED which resulted in Service Line Chiefs’ of Service involvement given the history/issues and ultimate transfer to [address]. Patient has been on [address] since. UPDATE: [facility] declined to accept Patient. Hundreds of nursing home applications wee sent out and all declined to accept patient. Court scheduled on 12/19/17 was deferred as Patient refused to go; he then again refused to go to the new Court date. Continuing to search for placement which have been unsuccessful; family does not want him living with them.  Neurological facility in [place] has accepted patient, and legal guardian is involved in transfer issues. Another Court date is coming up again on 3/27

## 2018-03-23 LAB
ANION GAP SERPL CALC-SCNC: 7 MMOL/L — SIGNIFICANT CHANGE UP (ref 5–17)
BUN SERPL-MCNC: 18 MG/DL — SIGNIFICANT CHANGE UP (ref 7–23)
CALCIUM SERPL-MCNC: 8.7 MG/DL — SIGNIFICANT CHANGE UP (ref 8.4–10.5)
CHLORIDE SERPL-SCNC: 104 MMOL/L — SIGNIFICANT CHANGE UP (ref 96–108)
CO2 SERPL-SCNC: 29 MMOL/L — SIGNIFICANT CHANGE UP (ref 22–31)
CREAT SERPL-MCNC: 0.88 MG/DL — SIGNIFICANT CHANGE UP (ref 0.5–1.3)
GLUCOSE SERPL-MCNC: 106 MG/DL — HIGH (ref 70–99)
POTASSIUM SERPL-MCNC: 3.6 MMOL/L — SIGNIFICANT CHANGE UP (ref 3.5–5.3)
POTASSIUM SERPL-SCNC: 3.6 MMOL/L — SIGNIFICANT CHANGE UP (ref 3.5–5.3)
SODIUM SERPL-SCNC: 140 MMOL/L — SIGNIFICANT CHANGE UP (ref 135–145)

## 2018-03-23 PROCEDURE — 99231 SBSQ HOSP IP/OBS SF/LOW 25: CPT

## 2018-03-23 RX ADMIN — CARBIDOPA AND LEVODOPA 1 TABLET(S): 25; 100 TABLET ORAL at 20:13

## 2018-03-23 RX ADMIN — CLOZAPINE 50 MILLIGRAM(S): 150 TABLET, ORALLY DISINTEGRATING ORAL at 14:30

## 2018-03-23 RX ADMIN — CARBIDOPA, LEVODOPA, AND ENTACAPONE 1 TABLET(S): 50; 200; 200 TABLET, FILM COATED ORAL at 16:17

## 2018-03-23 RX ADMIN — CARBIDOPA AND LEVODOPA 1 TABLET(S): 25; 100 TABLET ORAL at 10:59

## 2018-03-23 RX ADMIN — CARBIDOPA, LEVODOPA, AND ENTACAPONE 1 TABLET(S): 50; 200; 200 TABLET, FILM COATED ORAL at 11:00

## 2018-03-23 RX ADMIN — CARBIDOPA AND LEVODOPA 1 TABLET(S): 25; 100 TABLET ORAL at 05:39

## 2018-03-23 RX ADMIN — CARBIDOPA AND LEVODOPA 1 TABLET(S): 25; 100 TABLET ORAL at 16:17

## 2018-03-23 RX ADMIN — Medication 325 MILLIGRAM(S): at 03:25

## 2018-03-23 RX ADMIN — Medication 400 MILLIGRAM(S): at 00:15

## 2018-03-23 RX ADMIN — CARBIDOPA, LEVODOPA, AND ENTACAPONE 1 TABLET(S): 50; 200; 200 TABLET, FILM COATED ORAL at 05:39

## 2018-03-23 RX ADMIN — Medication 325 MILLIGRAM(S): at 04:00

## 2018-03-23 NOTE — PROGRESS NOTE BEHAVIORAL HEALTH - OTHER
+ neck favoring one side, dressed in hospital gowns, adequate grooming and hygiene no psychomotor agitation at times (chronic and seemingly intentionaly as he can stop it when redirected). appropriate angry with staff today, due to paranoid ideation would only take po Rx from this writer chronically limited but better since admission  more able to respond to redirection festinating gait but stable at times louder; at times with dramatic flair/ prosody "fine" varying (chronic),irritable at times overall linear, at times disorganized and circumstantial, concrete at times paranoia towards certain staff (chronic) chronically impaired but improved since admission Limited

## 2018-03-23 NOTE — PROGRESS NOTE BEHAVIORAL HEALTH - NSBHFUPINTERVALHXFT_PSY_A_CORE
Patient seen for paranoia, disorganized behavior. Chart reviewed. No significant interval events. Repeated psychoeducation and support done. Same clinical presentation.

## 2018-03-23 NOTE — PROGRESS NOTE BEHAVIORAL HEALTH - SUMMARY
51 yo male with early onset Parkinson’s disease x 11 years, discharged from 4 prior nursing homes in 8 months due to his behaviors, hx of refusing medications,  transferred to [facility] Unit [facility] on 6/12/17 where he manifested severe mood lability, paranoia and confabulation with poor insight and judgment including making > 40 [facility] center complaints. 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 Patient was accepted to [facility] in [place] and discharged from [facility] on 7/12/17.  Upon getting to [facility], Patient refused to go into the facility and became combative. He was thus transferred back to [facility] ED which resulted in Service Line Chiefs’ of Service involvement given the history/issues and ultimate transfer to [address]. Patient has been on [address] 1[address] since. UPDATE: [facility] declined to accept Patient. Hundreds of nursing home applications wee sent out and all declined to accept patient. Court scheduled on 12/19/17 was deferred as Patient refused to go; he then again refused to go to the new Court date. Continuing to search for placement which have been unsuccessful; family does not want him living with them.  Neurological facility in [place] has accepted patient, and legal guardian is involved in transfer issues. Another Court date is coming up again.

## 2018-03-24 RX ADMIN — CARBIDOPA AND LEVODOPA 1 TABLET(S): 25; 100 TABLET ORAL at 20:14

## 2018-03-24 RX ADMIN — Medication 400 MILLIGRAM(S): at 20:14

## 2018-03-24 RX ADMIN — CARBIDOPA, LEVODOPA, AND ENTACAPONE 1 TABLET(S): 50; 200; 200 TABLET, FILM COATED ORAL at 11:01

## 2018-03-24 RX ADMIN — CARBIDOPA AND LEVODOPA 1 TABLET(S): 25; 100 TABLET ORAL at 05:57

## 2018-03-24 RX ADMIN — Medication 325 MILLIGRAM(S): at 17:11

## 2018-03-24 RX ADMIN — Medication 400 MILLIGRAM(S): at 21:14

## 2018-03-24 RX ADMIN — CARBIDOPA, LEVODOPA, AND ENTACAPONE 1 TABLET(S): 50; 200; 200 TABLET, FILM COATED ORAL at 05:57

## 2018-03-24 RX ADMIN — CARBIDOPA AND LEVODOPA 1 TABLET(S): 25; 100 TABLET ORAL at 16:16

## 2018-03-24 RX ADMIN — CARBIDOPA AND LEVODOPA 1 TABLET(S): 25; 100 TABLET ORAL at 11:00

## 2018-03-24 RX ADMIN — CARBIDOPA, LEVODOPA, AND ENTACAPONE 1 TABLET(S): 50; 200; 200 TABLET, FILM COATED ORAL at 16:16

## 2018-03-24 RX ADMIN — CLOZAPINE 50 MILLIGRAM(S): 150 TABLET, ORALLY DISINTEGRATING ORAL at 12:33

## 2018-03-25 RX ADMIN — CARBIDOPA AND LEVODOPA 1 TABLET(S): 25; 100 TABLET ORAL at 10:33

## 2018-03-25 RX ADMIN — CARBIDOPA, LEVODOPA, AND ENTACAPONE 1 TABLET(S): 50; 200; 200 TABLET, FILM COATED ORAL at 10:33

## 2018-03-25 RX ADMIN — CLOZAPINE 50 MILLIGRAM(S): 150 TABLET, ORALLY DISINTEGRATING ORAL at 13:12

## 2018-03-25 RX ADMIN — CARBIDOPA AND LEVODOPA 1 TABLET(S): 25; 100 TABLET ORAL at 06:08

## 2018-03-25 RX ADMIN — CARBIDOPA AND LEVODOPA 1 TABLET(S): 25; 100 TABLET ORAL at 20:14

## 2018-03-25 RX ADMIN — Medication 325 MILLIGRAM(S): at 01:43

## 2018-03-25 RX ADMIN — Medication 325 MILLIGRAM(S): at 00:43

## 2018-03-25 RX ADMIN — CARBIDOPA AND LEVODOPA 1 TABLET(S): 25; 100 TABLET ORAL at 20:15

## 2018-03-25 RX ADMIN — CARBIDOPA, LEVODOPA, AND ENTACAPONE 1 TABLET(S): 50; 200; 200 TABLET, FILM COATED ORAL at 16:27

## 2018-03-25 RX ADMIN — CARBIDOPA, LEVODOPA, AND ENTACAPONE 1 TABLET(S): 50; 200; 200 TABLET, FILM COATED ORAL at 06:07

## 2018-03-25 RX ADMIN — CARBIDOPA AND LEVODOPA 1 TABLET(S): 25; 100 TABLET ORAL at 16:27

## 2018-03-26 PROCEDURE — 99231 SBSQ HOSP IP/OBS SF/LOW 25: CPT

## 2018-03-26 RX ADMIN — CARBIDOPA AND LEVODOPA 1 TABLET(S): 25; 100 TABLET ORAL at 20:12

## 2018-03-26 RX ADMIN — CARBIDOPA, LEVODOPA, AND ENTACAPONE 1 TABLET(S): 50; 200; 200 TABLET, FILM COATED ORAL at 16:43

## 2018-03-26 RX ADMIN — CARBIDOPA, LEVODOPA, AND ENTACAPONE 1 TABLET(S): 50; 200; 200 TABLET, FILM COATED ORAL at 05:22

## 2018-03-26 RX ADMIN — CLOZAPINE 50 MILLIGRAM(S): 150 TABLET, ORALLY DISINTEGRATING ORAL at 13:49

## 2018-03-26 RX ADMIN — Medication 325 MILLIGRAM(S): at 00:30

## 2018-03-26 RX ADMIN — CARBIDOPA AND LEVODOPA 1 TABLET(S): 25; 100 TABLET ORAL at 16:43

## 2018-03-26 RX ADMIN — Medication 325 MILLIGRAM(S): at 06:49

## 2018-03-26 RX ADMIN — CARBIDOPA, LEVODOPA, AND ENTACAPONE 1 TABLET(S): 50; 200; 200 TABLET, FILM COATED ORAL at 11:18

## 2018-03-26 RX ADMIN — CARBIDOPA AND LEVODOPA 1 TABLET(S): 25; 100 TABLET ORAL at 20:11

## 2018-03-26 RX ADMIN — CARBIDOPA AND LEVODOPA 1 TABLET(S): 25; 100 TABLET ORAL at 11:17

## 2018-03-26 RX ADMIN — Medication 325 MILLIGRAM(S): at 06:10

## 2018-03-26 RX ADMIN — Medication 325 MILLIGRAM(S): at 00:02

## 2018-03-26 RX ADMIN — CARBIDOPA AND LEVODOPA 1 TABLET(S): 25; 100 TABLET ORAL at 05:22

## 2018-03-26 NOTE — PROGRESS NOTE BEHAVIORAL HEALTH - NSBHFUPINTERVALHXFT_PSY_A_CORE
Patient seen for paranoia, disorganized behavior. Chart reviewed. No significant interval events. Repeated psychoeducation and support done. Same clinical presentation. Court hearing set for tomorrow; EMS transportation arranged for Patient.

## 2018-03-26 NOTE — PROGRESS NOTE BEHAVIORAL HEALTH - SUMMARY
51 yo male with early onset Parkinson’s disease x 11 years, discharged from 4 prior nursing homes in 8 months due to his behaviors, hx of refusing medications,  transferred to [facility] Unit [facility] on 6/12/17 where he manifested severe mood lability, paranoia and confabulation with poor insight and judgment including making > 40 [facility] complaints. 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 Patient was accepted to [facility] in [place] and discharged from [facility] on 7/12/17.  Upon getting to [facility], Patient refused to go into the facility and became combative. He was thus transferred back to [facility] ED which resulted in Service Line Chiefs’ of Service involvement given the history/issues and ultimate transfer to [address]. Patient has been on [address] since. UPDATE: [facility] declined to accept Patient. Hundreds of nursing home applications wee sent out and all declined to accept patient. Court scheduled on 12/19/17 was deferred as Patient refused to go; he then again refused to go to the new Court date. Continuing to search for placement which have been unsuccessful; family does not want him living with them.  Neurological facility in [place] has accepted patient, and legal guardian is involved in transfer issues. Subsequent Court date is scheduled for tomorrow.

## 2018-03-26 NOTE — PROGRESS NOTE BEHAVIORAL HEALTH - OTHER
+ neck favoring one side, dressed in hospital gowns, adequate grooming and hygiene no psychomotor agitation at times (chronic and seemingly intentionaly as he can stop it when redirected). appropriate angry with staff today, due to paranoid ideation would only take po Rx from this writer chronically limited but better since admission  more able to respond to redirection festinating gait but stable at times louder; at times with dramatic flair/ prosody "I want to leave as soon as possible" varying (chronic),irritable at times overall linear, at times disorganized and circumstantial, concrete at times paranoia towards certain staff (chronic) chronically impaired but improved since admission Limited

## 2018-03-27 PROCEDURE — 99231 SBSQ HOSP IP/OBS SF/LOW 25: CPT

## 2018-03-27 RX ADMIN — CARBIDOPA, LEVODOPA, AND ENTACAPONE 1 TABLET(S): 50; 200; 200 TABLET, FILM COATED ORAL at 16:21

## 2018-03-27 RX ADMIN — CARBIDOPA AND LEVODOPA 1 TABLET(S): 25; 100 TABLET ORAL at 20:06

## 2018-03-27 RX ADMIN — CARBIDOPA AND LEVODOPA 1 TABLET(S): 25; 100 TABLET ORAL at 16:21

## 2018-03-27 RX ADMIN — CARBIDOPA, LEVODOPA, AND ENTACAPONE 1 TABLET(S): 50; 200; 200 TABLET, FILM COATED ORAL at 06:31

## 2018-03-27 RX ADMIN — CARBIDOPA, LEVODOPA, AND ENTACAPONE 1 TABLET(S): 50; 200; 200 TABLET, FILM COATED ORAL at 12:22

## 2018-03-27 RX ADMIN — CLOZAPINE 50 MILLIGRAM(S): 150 TABLET, ORALLY DISINTEGRATING ORAL at 14:07

## 2018-03-27 RX ADMIN — CARBIDOPA AND LEVODOPA 1 TABLET(S): 25; 100 TABLET ORAL at 12:22

## 2018-03-27 RX ADMIN — CARBIDOPA AND LEVODOPA 1 TABLET(S): 25; 100 TABLET ORAL at 06:31

## 2018-03-27 RX ADMIN — CARBIDOPA AND LEVODOPA 1 TABLET(S): 25; 100 TABLET ORAL at 20:08

## 2018-03-27 NOTE — PROGRESS NOTE BEHAVIORAL HEALTH - SUMMARY
51 yo male with early onset Parkinson’s disease x 11 years, discharged from 4 prior nursing homes in 8 months due to his behaviors, hx of refusing medications,  transferred to [facility] on 6/12/17 where he manifested severe mood lability, paranoia and confabulation with poor insight and judgment including making > 40 [facility] complaints. 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 Patient was accepted to [facility] in [place] and discharged from [facility] on 7/12/17.  Upon getting to [facility], Patient refused to go into the facility and became combative. He was thus transferred back to [facility] ED which resulted in Service Line Chiefs’ of Service involvement given the history/issues and ultimate transfer to [address]. Patient has been on [address] since. UPDATE: [facility] declined to accept Patient. Hundreds of nursing home applications wee sent out and all declined to accept patient. Court scheduled on 12/19/17 was deferred as Patient refused to go; he then again refused to go to the new Court date. Continuing to search for placement which have been unsuccessful; family does not want him living with them.  Neurological facility in [place] has accepted patient, and legal guardian is involved in transfer issues. Court hearing regarding Patient's guardianship was 3/27/18.

## 2018-03-27 NOTE — CHART NOTE - NSCHARTNOTEFT_GEN_A_CORE
Requested by Nurse Manager to assist with transportation orders for court appearance.  Spoke to EMS Supervisor Bebeto and Dr. Galdamez -> we discussed 4-point restraints and Ativan IM prior to EMS ride to handle/avoid agitation during EMS ride and for EMS staff/patient safety.  After thorough discussion about pros and cons, consensus to use Buckle guard only for transport.  Sedatives should be avoided prior to court appearance.  4-point restraints deemed not the best fit for the situation.  Patient counseled on good behavior.  Signed order for Buckle guard.  D/w Nurse Manager.

## 2018-03-27 NOTE — PROGRESS NOTE BEHAVIORAL HEALTH - OTHER
appropriate angry with staff today, due to paranoid ideation would only take po Rx from this writer chronically limited but better since admission  more able to respond to redirection festinating gait but stable at times louder; at times with dramatic flair/ prosody "I want to leave as soon as possible" varying (chronic),irritable at times overall linear, at times disorganized and circumstantial, concrete at times paranoia towards certain staff (chronic) chronically impaired but improved since admission Limited + neck favoring one side, dressed in hospital gowns, adequate grooming and hygiene no psychomotor agitation at times (chronic and seemingly intentionaly as he can stop it when redirected).

## 2018-03-27 NOTE — PROGRESS NOTE BEHAVIORAL HEALTH - NSBHFUPINTERVALHXFT_PSY_A_CORE
Patient seen for paranoia, disorganized behavior. Chart reviewed. Significant interval events - Patient went to Court hearing today via ambulance transfer and CO 1:1 staff and returned. Transport unremarkable. Same clinical presentation. Court hearing set for tomorrow; EMS transportation arranged for Patient.

## 2018-03-28 LAB
BASOPHILS # BLD AUTO: 0 K/UL — SIGNIFICANT CHANGE UP (ref 0–0.2)
BASOPHILS NFR BLD AUTO: 0.7 % — SIGNIFICANT CHANGE UP (ref 0–2)
EOSINOPHIL # BLD AUTO: 0.1 K/UL — SIGNIFICANT CHANGE UP (ref 0–0.5)
EOSINOPHIL NFR BLD AUTO: 1.6 % — SIGNIFICANT CHANGE UP (ref 0–6)
HCT VFR BLD CALC: 38.4 % — LOW (ref 39–50)
HGB BLD-MCNC: 12.8 G/DL — LOW (ref 13–17)
LYMPHOCYTES # BLD AUTO: 1.9 K/UL — SIGNIFICANT CHANGE UP (ref 1–3.3)
LYMPHOCYTES # BLD AUTO: 29.6 % — SIGNIFICANT CHANGE UP (ref 13–44)
MCHC RBC-ENTMCNC: 31.3 PG — SIGNIFICANT CHANGE UP (ref 27–34)
MCHC RBC-ENTMCNC: 33.4 GM/DL — SIGNIFICANT CHANGE UP (ref 32–36)
MCV RBC AUTO: 93.5 FL — SIGNIFICANT CHANGE UP (ref 80–100)
MONOCYTES # BLD AUTO: 0.5 K/UL — SIGNIFICANT CHANGE UP (ref 0–0.9)
MONOCYTES NFR BLD AUTO: 8 % — SIGNIFICANT CHANGE UP (ref 2–14)
NEUTROPHILS # BLD AUTO: 3.8 K/UL — SIGNIFICANT CHANGE UP (ref 1.8–7.4)
NEUTROPHILS NFR BLD AUTO: 60 % — SIGNIFICANT CHANGE UP (ref 43–77)
PLATELET # BLD AUTO: 191 K/UL — SIGNIFICANT CHANGE UP (ref 150–400)
RBC # BLD: 4.11 M/UL — LOW (ref 4.2–5.8)
RBC # FLD: 12.5 % — SIGNIFICANT CHANGE UP (ref 10.3–14.5)
WBC # BLD: 6.3 K/UL — SIGNIFICANT CHANGE UP (ref 3.8–10.5)
WBC # FLD AUTO: 6.3 K/UL — SIGNIFICANT CHANGE UP (ref 3.8–10.5)

## 2018-03-28 PROCEDURE — 99231 SBSQ HOSP IP/OBS SF/LOW 25: CPT

## 2018-03-28 PROCEDURE — 12345: CPT | Mod: NC

## 2018-03-28 RX ORDER — LIDOCAINE 4 G/100G
1 CREAM TOPICAL ONCE
Qty: 0 | Refills: 0 | Status: COMPLETED | OUTPATIENT
Start: 2018-03-28 | End: 2018-03-28

## 2018-03-28 RX ADMIN — CARBIDOPA, LEVODOPA, AND ENTACAPONE 1 TABLET(S): 50; 200; 200 TABLET, FILM COATED ORAL at 16:04

## 2018-03-28 RX ADMIN — CARBIDOPA AND LEVODOPA 1 TABLET(S): 25; 100 TABLET ORAL at 05:57

## 2018-03-28 RX ADMIN — CARBIDOPA AND LEVODOPA 1 TABLET(S): 25; 100 TABLET ORAL at 16:04

## 2018-03-28 RX ADMIN — CLOZAPINE 50 MILLIGRAM(S): 150 TABLET, ORALLY DISINTEGRATING ORAL at 14:05

## 2018-03-28 RX ADMIN — Medication 650 MILLIGRAM(S): at 20:40

## 2018-03-28 RX ADMIN — CARBIDOPA, LEVODOPA, AND ENTACAPONE 1 TABLET(S): 50; 200; 200 TABLET, FILM COATED ORAL at 10:28

## 2018-03-28 RX ADMIN — CARBIDOPA AND LEVODOPA 1 TABLET(S): 25; 100 TABLET ORAL at 10:27

## 2018-03-28 RX ADMIN — CARBIDOPA AND LEVODOPA 1 TABLET(S): 25; 100 TABLET ORAL at 20:03

## 2018-03-28 RX ADMIN — CARBIDOPA, LEVODOPA, AND ENTACAPONE 1 TABLET(S): 50; 200; 200 TABLET, FILM COATED ORAL at 05:57

## 2018-03-28 RX ADMIN — Medication 650 MILLIGRAM(S): at 20:12

## 2018-03-28 NOTE — PROGRESS NOTE BEHAVIORAL HEALTH - SUMMARY
51 yo male with early onset Parkinson’s disease x 11 years, discharged from 4 prior nursing homes in 8 months due to his behaviors, hx of refusing medications,  transferred to [facility] Unit [facility] on 6/12/17 where he manifested severe mood lability, paranoia and confabulation with poor insight and judgment including making > 40 [facility] complaints. 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 Patient was accepted to [facility] in [place] and discharged from [facility] on 7/12/17.  Upon getting to [facility], Patient refused to go into the facility and became combative. He was thus transferred back to [facility] ED which resulted in Service Line Chiefs’ of Service involvement given the history/issues and ultimate transfer to [address]. Patient has been on [address] since. UPDATE: [facility] declined to accept Patient. Hundreds of nursing home applications wee sent out and all declined to accept patient. Court scheduled on 12/19/17 was deferred as Patient refused to go; he then again refused to go to the new Court date. Continuing to search for placement which have been unsuccessful; family does not want him living with them.  Neurological facility in [place] has accepted patient, and legal guardian is involved in transfer issues. Court hearing regarding Patient's guardianship was 3/27/18 and his sister was granted temporary guardianship. Court hearing for retention tentatively scheduled for today, 3/28/18.
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
## 2018-03-28 NOTE — PROGRESS NOTE BEHAVIORAL HEALTH - OTHER
"I am sick of being here" varying (chronic), usually euthymic with episodes of irritable at times overall linear, at times disorganized and circumstantial, concrete at times paranoia towards certain staff (chronic) chronically impaired but improved since admission Limited + neck favoring one side, dressed in hospital gowns, adequate grooming and hygiene no psychomotor agitation at times (chronic and seemingly intentionaly as he can stop it when redirected). appropriate chronically limited but better since admission  more able to respond to redirection festinating gait but stable

## 2018-03-29 PROCEDURE — 99232 SBSQ HOSP IP/OBS MODERATE 35: CPT

## 2018-03-29 RX ORDER — FUROSEMIDE 40 MG
20 TABLET ORAL DAILY
Qty: 0 | Refills: 0 | Status: COMPLETED | OUTPATIENT
Start: 2018-03-30 | End: 2018-04-01

## 2018-03-29 RX ORDER — POTASSIUM CHLORIDE 20 MEQ
10 PACKET (EA) ORAL DAILY
Qty: 0 | Refills: 0 | Status: DISCONTINUED | OUTPATIENT
Start: 2018-03-30 | End: 2018-03-30

## 2018-03-29 RX ADMIN — CARBIDOPA AND LEVODOPA 1 TABLET(S): 25; 100 TABLET ORAL at 16:10

## 2018-03-29 RX ADMIN — CARBIDOPA, LEVODOPA, AND ENTACAPONE 1 TABLET(S): 50; 200; 200 TABLET, FILM COATED ORAL at 05:41

## 2018-03-29 RX ADMIN — CARBIDOPA AND LEVODOPA 1 TABLET(S): 25; 100 TABLET ORAL at 20:28

## 2018-03-29 RX ADMIN — Medication 400 MILLIGRAM(S): at 03:00

## 2018-03-29 RX ADMIN — CLOZAPINE 50 MILLIGRAM(S): 150 TABLET, ORALLY DISINTEGRATING ORAL at 14:59

## 2018-03-29 RX ADMIN — Medication 400 MILLIGRAM(S): at 21:37

## 2018-03-29 RX ADMIN — Medication 400 MILLIGRAM(S): at 02:32

## 2018-03-29 RX ADMIN — CARBIDOPA, LEVODOPA, AND ENTACAPONE 1 TABLET(S): 50; 200; 200 TABLET, FILM COATED ORAL at 11:02

## 2018-03-29 RX ADMIN — CARBIDOPA AND LEVODOPA 1 TABLET(S): 25; 100 TABLET ORAL at 05:41

## 2018-03-29 RX ADMIN — CARBIDOPA, LEVODOPA, AND ENTACAPONE 1 TABLET(S): 50; 200; 200 TABLET, FILM COATED ORAL at 16:10

## 2018-03-29 RX ADMIN — CARBIDOPA AND LEVODOPA 1 TABLET(S): 25; 100 TABLET ORAL at 11:02

## 2018-03-29 NOTE — PROGRESS NOTE BEHAVIORAL HEALTH - OTHER
+ neck favoring one side, dressed in hospital gowns, adequate grooming and hygiene no psychomotor agitation at times (chronic and seemingly intentionaly as he can stop it when redirected). appropriate chronically limited but better since admission  more able to respond to redirection festinating gait but stable "Staff is messing up!" varying (chronic), usually euthymic with episodes of irritable at times overall linear, at times disorganized and circumstantial, concrete at times paranoia towards certain staff (chronic) chronically impaired but improved since admission Limited

## 2018-03-29 NOTE — PROGRESS NOTE BEHAVIORAL HEALTH - NSBHFUPINTERVALHXFT_PSY_A_CORE
Patient seen for paranoia, disorganized behavior. Chart reviewed. Significant interval events - Patient's former residence finally sent over his clothing in a box after 6 months of continuous contact by Unit 1[facility] staff asking for his belongings. Unit 1N paid the carrier service for Patient's clothing a his former residence declined to foot the bill. Overall, Patient continues to be demanding, demands attention from staff throughout the day without any resolution of being content/satisfied despite staff doing their best to vater to all his needs. For example, staff fills his ice packs which eventually melt, then he yells at staff about it, then tells Writer that no one gave him ice packs which is untrue. Writer then personally takes his ice packs and refills them. The Patient asks for snacks, or a drink, or someone to call the Neurologist once again even though he came regularly making the same recommendation. Most likely this is Patient's personality and baseline temperament.

## 2018-03-30 PROCEDURE — 99232 SBSQ HOSP IP/OBS MODERATE 35: CPT

## 2018-03-30 RX ORDER — POTASSIUM CHLORIDE 20 MEQ
10 PACKET (EA) ORAL DAILY
Qty: 0 | Refills: 0 | Status: DISCONTINUED | OUTPATIENT
Start: 2018-03-30 | End: 2018-04-02

## 2018-03-30 RX ADMIN — CLOZAPINE 50 MILLIGRAM(S): 150 TABLET, ORALLY DISINTEGRATING ORAL at 15:12

## 2018-03-30 RX ADMIN — CARBIDOPA AND LEVODOPA 1 TABLET(S): 25; 100 TABLET ORAL at 20:21

## 2018-03-30 RX ADMIN — CARBIDOPA AND LEVODOPA 1 TABLET(S): 25; 100 TABLET ORAL at 05:47

## 2018-03-30 RX ADMIN — CARBIDOPA AND LEVODOPA 1 TABLET(S): 25; 100 TABLET ORAL at 16:16

## 2018-03-30 RX ADMIN — Medication 325 MILLIGRAM(S): at 02:30

## 2018-03-30 RX ADMIN — CYCLOBENZAPRINE HYDROCHLORIDE 10 MILLIGRAM(S): 10 TABLET, FILM COATED ORAL at 20:21

## 2018-03-30 RX ADMIN — Medication 400 MILLIGRAM(S): at 17:41

## 2018-03-30 RX ADMIN — CARBIDOPA, LEVODOPA, AND ENTACAPONE 1 TABLET(S): 50; 200; 200 TABLET, FILM COATED ORAL at 05:47

## 2018-03-30 RX ADMIN — Medication 325 MILLIGRAM(S): at 01:32

## 2018-03-30 RX ADMIN — CARBIDOPA, LEVODOPA, AND ENTACAPONE 1 TABLET(S): 50; 200; 200 TABLET, FILM COATED ORAL at 16:16

## 2018-03-30 RX ADMIN — CARBIDOPA, LEVODOPA, AND ENTACAPONE 1 TABLET(S): 50; 200; 200 TABLET, FILM COATED ORAL at 10:40

## 2018-03-30 RX ADMIN — CARBIDOPA AND LEVODOPA 1 TABLET(S): 25; 100 TABLET ORAL at 20:23

## 2018-03-30 RX ADMIN — CARBIDOPA AND LEVODOPA 1 TABLET(S): 25; 100 TABLET ORAL at 10:40

## 2018-03-30 NOTE — PROGRESS NOTE BEHAVIORAL HEALTH - OTHER
chronically impaired but improved since admission Limited "I had it!" varying (chronic), usually euthymic with episodes of irritable at times overall linear, at times disorganized and circumstantial, concrete at times paranoia towards certain staff (chronic) + neck favoring one side, dressed in hospital gowns, adequate grooming and hygiene no psychomotor agitation at times (chronic and seemingly intentionaly as he can stop it when redirected). appropriate chronically limited but better since admission  more able to respond to redirection festinating gait but stable

## 2018-03-30 NOTE — PROGRESS NOTE BEHAVIORAL HEALTH - NSBHADMITMEDEDUDETAILS_A_CORE FT
continue current management; Lasix 20mg PO x 3 days with potassium chloride 10 mEq PO x 3 days for lower extremity edema ordered; extra large compression stockings also ordered

## 2018-03-30 NOTE — PROGRESS NOTE BEHAVIORAL HEALTH - SUMMARY
51 yo male with early onset Parkinson’s disease x 11 years, discharged from 4 prior nursing homes in 8 months due to his behaviors, hx of refusing medications,  transferred to [facility] Unit [facility] on 6/12/17 where he manifested severe mood lability, paranoia and confabulation with poor insight and judgment including making > 40 [facility] complaints. 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 Patient was accepted to [facility] in [place] and discharged from [facility] on 7/12/17.  Upon getting to [facility], Patient refused to go into the facility and became combative. He was thus transferred back to [facility] ED which resulted in Service Line Chiefs’ of Service involvement given the history/issues and ultimate transfer to [address]. Patient has been on Unit 1N since. UPDATE: [facility] declined to accept Patient. Hundreds of nursing home applications wee sent out and all declined to accept patient. Court scheduled on 12/19/17 was deferred as Patient refused to go; he then again refused to go to the new Court date. Continuing to search for placement which have been unsuccessful; family does not want him living with them.  Neurological facility in [place] has accepted patient, and legal guardian is involved in transfer issues. Court hearing regarding Patient's guardianship was 3/27/18 and his sister was granted temporary guardianship, received reportedly $25,000 to use for obtaining a residence/apartment with [facility] serves for Patient to go to. Court hearing for retention occurred on 3/28/18 and the hospital was granted an additional 60 days.

## 2018-03-30 NOTE — PROGRESS NOTE BEHAVIORAL HEALTH - NSBHFUPINTERVALHXFT_PSY_A_CORE
Patient seen for paranoia, disorganized behavior. Chart reviewed. Significant interval events - Patient reported that his lower extremity edema returned/is worst. His legs do look more edematous than last 2 weeks or so; same clinical presentation as before. Amount of edema is not worst. Patient asked to be put back on the usual Lasix dose x 3 days. Again, extensive medical psychoeducation was done regarding edema, importance of adhering to treatment and recommendations including elevating his legs which he has consistently had not done. Patient ultimately agreed to try the compression stockings again.   Otherwise, same clinical presentation - Patient continues to be demanding, demands attention from staff throughout the day without any resolution of being content/satisfied despite staff doing their best to cater to all his needs. For example, seems to like to make [facility] calls reporting different staff members (usually males) stating that he "smelled pot on them," or "they told me I'll never get out of here" - with thus far unfounded claims s/p investigations, staff interviews. Patient seen for paranoia, disorganized behavior. Chart reviewed. Significant interval events - Patient reported that his lower extremity edema returned/is worst. His legs do look more edematous than last 2 weeks or so; same clinical presentation as before. Amount of edema is not worst. Patient asked to be put back on the usual Lasix dose x 3 days. He said he wanted to start the Lasix today which was ordered as per his request. He refused morning dose saying 'It will flush out my Parkinson's medication. I will take it at 3pm." Both Writer and RN approached patient with the Lasix at 3pm and he refused, this time saying "I can't move" (not true - he was moving around today as usual). Patient said he will take it tomorrow. Again, extensive medical psychoeducation was done regarding edema, importance of adhering to treatment and recommendations including elevating his legs which he has consistently had not done. Patient ultimately agreed to try the compression stockings again.   Otherwise, same clinical presentation - Patient continues to be demanding, demands attention from staff throughout the day without any resolution of being content/satisfied despite staff doing their best to cater to all his needs. For example, seems to like to make [facility] calls reporting different staff members (usually males) stating that he "smelled pot on them," or "they told me I'll never get out of here" - with thus far unfounded claims s/p investigations, staff interviews.

## 2018-03-31 RX ADMIN — CARBIDOPA AND LEVODOPA 1 TABLET(S): 25; 100 TABLET ORAL at 20:15

## 2018-03-31 RX ADMIN — Medication 325 MILLIGRAM(S): at 05:44

## 2018-03-31 RX ADMIN — CARBIDOPA AND LEVODOPA 1 TABLET(S): 25; 100 TABLET ORAL at 17:02

## 2018-03-31 RX ADMIN — CARBIDOPA AND LEVODOPA 1 TABLET(S): 25; 100 TABLET ORAL at 11:05

## 2018-03-31 RX ADMIN — CARBIDOPA, LEVODOPA, AND ENTACAPONE 1 TABLET(S): 50; 200; 200 TABLET, FILM COATED ORAL at 05:44

## 2018-03-31 RX ADMIN — Medication 400 MILLIGRAM(S): at 11:10

## 2018-03-31 RX ADMIN — CARBIDOPA AND LEVODOPA 1 TABLET(S): 25; 100 TABLET ORAL at 05:44

## 2018-03-31 RX ADMIN — CLOZAPINE 50 MILLIGRAM(S): 150 TABLET, ORALLY DISINTEGRATING ORAL at 13:22

## 2018-03-31 RX ADMIN — CARBIDOPA, LEVODOPA, AND ENTACAPONE 1 TABLET(S): 50; 200; 200 TABLET, FILM COATED ORAL at 17:02

## 2018-03-31 RX ADMIN — Medication 400 MILLIGRAM(S): at 12:00

## 2018-03-31 RX ADMIN — CARBIDOPA, LEVODOPA, AND ENTACAPONE 1 TABLET(S): 50; 200; 200 TABLET, FILM COATED ORAL at 11:05

## 2018-03-31 RX ADMIN — Medication 325 MILLIGRAM(S): at 23:18

## 2018-04-01 PROCEDURE — 12345: CPT | Mod: NC

## 2018-04-01 RX ORDER — LIDOCAINE 4 G/100G
1 CREAM TOPICAL ONCE
Qty: 0 | Refills: 0 | Status: COMPLETED | OUTPATIENT
Start: 2018-04-01 | End: 2018-04-01

## 2018-04-01 RX ORDER — ACETAMINOPHEN 500 MG
650 TABLET ORAL ONCE
Qty: 0 | Refills: 0 | Status: COMPLETED | OUTPATIENT
Start: 2018-04-01 | End: 2018-04-01

## 2018-04-01 RX ADMIN — CARBIDOPA AND LEVODOPA 1 TABLET(S): 25; 100 TABLET ORAL at 16:01

## 2018-04-01 RX ADMIN — CLOZAPINE 50 MILLIGRAM(S): 150 TABLET, ORALLY DISINTEGRATING ORAL at 13:43

## 2018-04-01 RX ADMIN — CARBIDOPA AND LEVODOPA 1 TABLET(S): 25; 100 TABLET ORAL at 20:41

## 2018-04-01 RX ADMIN — Medication 325 MILLIGRAM(S): at 01:14

## 2018-04-01 RX ADMIN — CARBIDOPA, LEVODOPA, AND ENTACAPONE 1 TABLET(S): 50; 200; 200 TABLET, FILM COATED ORAL at 05:58

## 2018-04-01 RX ADMIN — CARBIDOPA, LEVODOPA, AND ENTACAPONE 1 TABLET(S): 50; 200; 200 TABLET, FILM COATED ORAL at 16:01

## 2018-04-01 RX ADMIN — CARBIDOPA AND LEVODOPA 1 TABLET(S): 25; 100 TABLET ORAL at 05:58

## 2018-04-01 RX ADMIN — CARBIDOPA AND LEVODOPA 1 TABLET(S): 25; 100 TABLET ORAL at 11:48

## 2018-04-01 RX ADMIN — CARBIDOPA, LEVODOPA, AND ENTACAPONE 1 TABLET(S): 50; 200; 200 TABLET, FILM COATED ORAL at 11:48

## 2018-04-02 PROCEDURE — 99232 SBSQ HOSP IP/OBS MODERATE 35: CPT

## 2018-04-02 RX ORDER — POTASSIUM CHLORIDE 20 MEQ
10 PACKET (EA) ORAL DAILY
Qty: 0 | Refills: 0 | Status: COMPLETED | OUTPATIENT
Start: 2018-04-02 | End: 2018-04-05

## 2018-04-02 RX ORDER — FUROSEMIDE 40 MG
20 TABLET ORAL DAILY
Qty: 0 | Refills: 0 | Status: COMPLETED | OUTPATIENT
Start: 2018-04-02 | End: 2018-04-05

## 2018-04-02 RX ADMIN — Medication 325 MILLIGRAM(S): at 06:17

## 2018-04-02 RX ADMIN — CARBIDOPA, LEVODOPA, AND ENTACAPONE 1 TABLET(S): 50; 200; 200 TABLET, FILM COATED ORAL at 16:05

## 2018-04-02 RX ADMIN — CARBIDOPA AND LEVODOPA 1 TABLET(S): 25; 100 TABLET ORAL at 20:22

## 2018-04-02 RX ADMIN — CARBIDOPA AND LEVODOPA 1 TABLET(S): 25; 100 TABLET ORAL at 06:00

## 2018-04-02 RX ADMIN — CARBIDOPA, LEVODOPA, AND ENTACAPONE 1 TABLET(S): 50; 200; 200 TABLET, FILM COATED ORAL at 06:00

## 2018-04-02 RX ADMIN — CLOZAPINE 50 MILLIGRAM(S): 150 TABLET, ORALLY DISINTEGRATING ORAL at 14:22

## 2018-04-02 RX ADMIN — Medication 400 MILLIGRAM(S): at 12:47

## 2018-04-02 RX ADMIN — CARBIDOPA AND LEVODOPA 1 TABLET(S): 25; 100 TABLET ORAL at 10:51

## 2018-04-02 RX ADMIN — Medication 400 MILLIGRAM(S): at 11:35

## 2018-04-02 RX ADMIN — Medication 20 MILLIGRAM(S): at 13:13

## 2018-04-02 RX ADMIN — CARBIDOPA, LEVODOPA, AND ENTACAPONE 1 TABLET(S): 50; 200; 200 TABLET, FILM COATED ORAL at 10:50

## 2018-04-02 RX ADMIN — Medication 10 MILLIEQUIVALENT(S): at 13:12

## 2018-04-02 RX ADMIN — CARBIDOPA AND LEVODOPA 1 TABLET(S): 25; 100 TABLET ORAL at 16:05

## 2018-04-02 RX ADMIN — CARBIDOPA AND LEVODOPA 1 TABLET(S): 25; 100 TABLET ORAL at 20:21

## 2018-04-02 RX ADMIN — Medication 325 MILLIGRAM(S): at 06:47

## 2018-04-02 NOTE — PROGRESS NOTE BEHAVIORAL HEALTH - NSBHFUPINTERVALHXFT_PSY_A_CORE
Patient seen for paranoia, disorganized behavior. Chart reviewed. No Significant interval events - Patient reported to be in good control over the weekend. He has some complaints of back pain but wont take the meds, "they don't work "His solution was to get more levothyroxine. We discussed how thia may not be safe. Overall he has been improved at baseline but has general minor behavioral issues, making comments about staff at times etc. He has not been aggressive and is compliant with psych meds and parkinson's meds but not other less important meds. He expresses his ongoing desire to go to an [facility] in the [place] and wasn't interested in going to a nursing home. He will consider it however.

## 2018-04-02 NOTE — PROGRESS NOTE BEHAVIORAL HEALTH - SUMMARY
51 yo male with early onset Parkinson’s disease x 11 years, discharged from 4 prior nursing homes in 8 months due to his behaviors, hx of refusing medications,  transferred to [facility] Unit [facility]S on 6/12/17 where he manifested severe mood lability, paranoia and confabulation with poor insight and judgment including making > 40 [facility] complaints. 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 Patient was accepted to [facility] in [place] and discharged from [facility] on 7/12/17.  Upon getting to [facility], Patient refused to go into the facility and became combative. He was thus transferred back to [facility] ED which resulted in Service Line Chiefs’ of Service involvement given the history/issues and ultimate transfer to [address]. Patient has been on Unit 1N since. UPDATE: [facility] declined to accept Patient. Hundreds of nursing home applications wee sent out and all declined to accept patient. Court scheduled on 12/19/17 was deferred as Patient refused to go; he then again refused to go to the new Court date. Continuing to search for placement which have been unsuccessful; family does not want him living with them.  Neurological facility in [place] has accepted patient, and legal guardian is involved in transfer issues. Court hearing regarding Patient's guardianship was 3/27/18 and his sister was granted temporary guardianship, received reportedly $25,000 to use for obtaining a residence/apartment with [facility] serves for Patient to go to. Court hearing for retention occurred on 3/28/18 and the hospital was granted an additional 60 days.

## 2018-04-02 NOTE — PROGRESS NOTE BEHAVIORAL HEALTH - OTHER
+ neck favoring one side, dressed in hospital gowns, adequate grooming and hygiene no psychomotor agitation at times (chronic and seemingly intentionaly as he can stop it when redirected). appropriate chronically limited but better since admission  more able to respond to redirection festinating gait but stable varying (chronic), usually euthymic with episodes of irritable at times overall linear, at times disorganized and circumstantial, concrete at times paranoia towards certain staff (chronic) chronically impaired but improved since admission Limited

## 2018-04-03 PROCEDURE — 99231 SBSQ HOSP IP/OBS SF/LOW 25: CPT

## 2018-04-03 RX ADMIN — Medication 325 MILLIGRAM(S): at 08:03

## 2018-04-03 RX ADMIN — CARBIDOPA AND LEVODOPA 1 TABLET(S): 25; 100 TABLET ORAL at 07:57

## 2018-04-03 RX ADMIN — Medication 325 MILLIGRAM(S): at 11:08

## 2018-04-03 RX ADMIN — CARBIDOPA AND LEVODOPA 1 TABLET(S): 25; 100 TABLET ORAL at 16:17

## 2018-04-03 RX ADMIN — CARBIDOPA, LEVODOPA, AND ENTACAPONE 1 TABLET(S): 50; 200; 200 TABLET, FILM COATED ORAL at 16:17

## 2018-04-03 RX ADMIN — Medication 400 MILLIGRAM(S): at 12:52

## 2018-04-03 RX ADMIN — CARBIDOPA AND LEVODOPA 1 TABLET(S): 25; 100 TABLET ORAL at 11:07

## 2018-04-03 RX ADMIN — CARBIDOPA AND LEVODOPA 1 TABLET(S): 25; 100 TABLET ORAL at 20:24

## 2018-04-03 RX ADMIN — CLOZAPINE 50 MILLIGRAM(S): 150 TABLET, ORALLY DISINTEGRATING ORAL at 14:53

## 2018-04-03 RX ADMIN — CARBIDOPA, LEVODOPA, AND ENTACAPONE 1 TABLET(S): 50; 200; 200 TABLET, FILM COATED ORAL at 07:58

## 2018-04-03 RX ADMIN — CARBIDOPA, LEVODOPA, AND ENTACAPONE 1 TABLET(S): 50; 200; 200 TABLET, FILM COATED ORAL at 11:07

## 2018-04-03 NOTE — PROGRESS NOTE BEHAVIORAL HEALTH - SUMMARY
51 yo male with early onset Parkinson’s disease x 11 years, discharged from 4 prior nursing homes in 8 months due to his behaviors, hx of refusing medications,  transferred to [facility] Unit 2S on 6/12/17 where he manifested severe mood lability, paranoia and confabulation with poor insight and judgment including making > 40 [facility] complaints. 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 Patient was accepted to [facility] in [place] and discharged from [facility] on 7/12/17.  Upon getting to [facility], Patient refused to go into the facility and became combative. He was thus transferred back to [facility] ED which resulted in Service Line Chiefs’ of Service involvement given the history/issues and ultimate transfer to [address]. Patient has been on Unit 1N since. UPDATE: [facility] declined to accept Patient. Hundreds of nursing home applications wee sent out and all declined to accept patient. Court scheduled on 12/19/17 was deferred as Patient refused to go; he then again refused to go to the new Court date. Continuing to search for placement which have been unsuccessful; family does not want him living with them.  Neurological facility in [place] has accepted patient, and legal guardian is involved in transfer issues. Court hearing regarding Patient's guardianship was 3/27/18 and his sister was granted temporary guardianship. Court hearing for retention occurred on 3/28/18 and the hospital was granted an additional 60 days.

## 2018-04-03 NOTE — PROGRESS NOTE BEHAVIORAL HEALTH - NSBHFUPINTERVALHXFT_PSY_A_CORE
Patient seen for paranoia, disorganized behavior. Chart reviewed. No significant interval events. As per staff, Patient unhappy this morning with staff from last evening which is his baseline as he has personality conflicts with certain staff (unchanged). Same clinical presentation otherwise. Patient seen for paranoia, disorganized behavior. Chart reviewed. No significant interval events. As per staff, Patient unhappy this morning with staff from last evening which is his baseline as he has personality conflicts with certain staff (unchanged). Same clinical presentation otherwise. Told Writer to call the  today because he wants to file a discrimination law suit against some staff (as per staff, Patient felt his Tylenol was not brought to him fast enough last night hence the complaint). This is chronic behavior for Patient.

## 2018-04-04 PROCEDURE — 99231 SBSQ HOSP IP/OBS SF/LOW 25: CPT

## 2018-04-04 RX ADMIN — Medication 325 MILLIGRAM(S): at 06:13

## 2018-04-04 RX ADMIN — Medication 20 MILLIGRAM(S): at 11:00

## 2018-04-04 RX ADMIN — CARBIDOPA AND LEVODOPA 1 TABLET(S): 25; 100 TABLET ORAL at 05:50

## 2018-04-04 RX ADMIN — Medication 10 MILLIEQUIVALENT(S): at 11:01

## 2018-04-04 RX ADMIN — Medication 400 MILLIGRAM(S): at 22:16

## 2018-04-04 RX ADMIN — CARBIDOPA, LEVODOPA, AND ENTACAPONE 1 TABLET(S): 50; 200; 200 TABLET, FILM COATED ORAL at 11:27

## 2018-04-04 RX ADMIN — CARBIDOPA, LEVODOPA, AND ENTACAPONE 1 TABLET(S): 50; 200; 200 TABLET, FILM COATED ORAL at 16:53

## 2018-04-04 RX ADMIN — CARBIDOPA, LEVODOPA, AND ENTACAPONE 1 TABLET(S): 50; 200; 200 TABLET, FILM COATED ORAL at 05:50

## 2018-04-04 RX ADMIN — Medication 400 MILLIGRAM(S): at 23:16

## 2018-04-04 RX ADMIN — CARBIDOPA AND LEVODOPA 1 TABLET(S): 25; 100 TABLET ORAL at 20:54

## 2018-04-04 RX ADMIN — CARBIDOPA AND LEVODOPA 1 TABLET(S): 25; 100 TABLET ORAL at 16:53

## 2018-04-04 RX ADMIN — CARBIDOPA AND LEVODOPA 1 TABLET(S): 25; 100 TABLET ORAL at 11:27

## 2018-04-04 RX ADMIN — Medication 325 MILLIGRAM(S): at 06:51

## 2018-04-04 RX ADMIN — CLOZAPINE 50 MILLIGRAM(S): 150 TABLET, ORALLY DISINTEGRATING ORAL at 10:14

## 2018-04-04 NOTE — PROGRESS NOTE BEHAVIORAL HEALTH - NSBHFUPINTERVALHXFT_PSY_A_CORE
Patient seen for paranoia, disorganized behavior. Chart reviewed. No significant interval events. Same clinical presentation otherwise which includes complaining about food, staff, bathroom mold, air conditioning, etc and making threats to mikayla and call his sister and  (this is chronic and daily occurrence). Visible on the Unit and spends most of his time in the community day room.

## 2018-04-04 NOTE — PROGRESS NOTE BEHAVIORAL HEALTH - OTHER
+ neck favoring one side, dressed in hospital gowns, adequate grooming and hygiene no psychomotor agitation at times (chronic and seemingly intentionaly as he can stop it when redirected). appropriate chronically limited but better since admission  more able to respond to redirection festinating gait but stable baseline variable - at times euthymic, easily reactive, at times makes jokes at times yelling varying (chronic), usually euthymic with episodes of irritable at times overall linear, at times disorganized and circumstantial, concrete at times paranoia towards certain staff (chronic) chronically impaired but improved since admission Limited

## 2018-04-04 NOTE — PROGRESS NOTE BEHAVIORAL HEALTH - SUMMARY
51 yo male with early onset Parkinson’s disease x 11 years, discharged from 4 prior nursing homes in 8 months due to his behaviors, hx of refusing medications,  transferred to [facility] Unit [facility] on 6/12/17 where he manifested severe mood lability, paranoia and confabulation with poor insight and judgment including making > 40 Justice center complaints. 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 Patient was accepted to [facility] in [place] and discharged from [facility] on 7/12/17.  Upon getting to [facility], Patient refused to go into the facility and became combative. He was thus transferred back to [facility] ED which resulted in Service Line Chiefs’ of Service involvement given the history/issues and ultimate transfer to [address]. Patient has been on Unit 1N since. UPDATE: [facility] declined to accept Patient. Hundreds of nursing home applications wee sent out and all declined to accept patient. Court scheduled on 12/19/17 was deferred as Patient refused to go; he then again refused to go to the new Court date. Continuing to search for placement which have been unsuccessful; family does not want him living with them.  Neurological facility in [place] has accepted patient, and legal guardian is involved in transfer issues. Court hearing regarding Patient's guardianship was 3/27/18 and his sister was granted temporary guardianship. Court hearing for retention occurred on 3/28/18 and the hospital was granted an additional 60 days. Awaiting Patient's sister to come up with a place to send Patient to.

## 2018-04-05 LAB
CK MB CFR SERPL CALC: 0.4 NG/ML — SIGNIFICANT CHANGE UP (ref 0–3.6)
CK SERPL-CCNC: 106 U/L — SIGNIFICANT CHANGE UP (ref 39–308)
TROPONIN I SERPL-MCNC: 0 NG/ML — LOW (ref 0.02–0.06)

## 2018-04-05 PROCEDURE — 93010 ELECTROCARDIOGRAM REPORT: CPT

## 2018-04-05 PROCEDURE — 99231 SBSQ HOSP IP/OBS SF/LOW 25: CPT

## 2018-04-05 PROCEDURE — 99232 SBSQ HOSP IP/OBS MODERATE 35: CPT

## 2018-04-05 RX ADMIN — CARBIDOPA, LEVODOPA, AND ENTACAPONE 1 TABLET(S): 50; 200; 200 TABLET, FILM COATED ORAL at 10:47

## 2018-04-05 RX ADMIN — CARBIDOPA AND LEVODOPA 1 TABLET(S): 25; 100 TABLET ORAL at 20:30

## 2018-04-05 RX ADMIN — CARBIDOPA AND LEVODOPA 1 TABLET(S): 25; 100 TABLET ORAL at 10:47

## 2018-04-05 RX ADMIN — Medication 325 MILLIGRAM(S): at 03:35

## 2018-04-05 RX ADMIN — CARBIDOPA AND LEVODOPA 1 TABLET(S): 25; 100 TABLET ORAL at 06:05

## 2018-04-05 RX ADMIN — CARBIDOPA, LEVODOPA, AND ENTACAPONE 1 TABLET(S): 50; 200; 200 TABLET, FILM COATED ORAL at 06:05

## 2018-04-05 NOTE — PROGRESS NOTE BEHAVIORAL HEALTH - SUMMARY
51 yo male with early onset Parkinson’s disease x 11 years, discharged from 4 prior nursing homes in 8 months due to his behaviors, hx of refusing medications,  transferred to [facility] Unit 2S on 6/12/17 where he manifested severe mood lability, paranoia and confabulation with poor insight and judgment including making > 40 Justice center complaints. 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 Patient was accepted to [facility] in [place] and discharged from [facility] on 7/12/17.  Upon getting to [facility], Patient refused to go into the facility and became combative. He was thus transferred back to [facility] ED which resulted in Service Line Chiefs’ of Service involvement given the history/issues and ultimate transfer to [address]. Patient has been on Unit 1N since. UPDATE: [facility] declined to accept Patient. Hundreds of nursing home applications wee sent out and all declined to accept patient. Court scheduled on 12/19/17 was deferred as Patient refused to go; he then again refused to go to the new Court date. Continuing to search for placement which have been unsuccessful; family does not want him living with them.  Neurological facility in [place] has accepted patient, and legal guardian is involved in transfer issues. Court hearing regarding Patient's guardianship was 3/27/18 and his sister was granted temporary guardianship. Court hearing for retention occurred on 3/28/18 and the hospital was granted an additional 60 days. Awaiting Patient's sister to come up with a place to send Patient to.

## 2018-04-05 NOTE — PROGRESS NOTE BEHAVIORAL HEALTH - NSBHFUPINTERVALHXFT_PSY_A_CORE
Patient seen for paranoia, disorganized behavior. Chart reviewed. No significant interval events. Same clinical presentation - preoccupied with having two ice packs all the time and continues to change his medication acceptance times, often telling nurses "I'l take it later, come back at 2pm" then when they return at 2pm, says "I want the other medication first." Visible on the Unit and spends most of his time in the community day room.

## 2018-04-05 NOTE — CHART NOTE - NSCHARTNOTEFT_GEN_A_CORE
Called for chest pain.  Patient just started to have chest pain this evening.  Said it is similar to his chest pain in the past and it is located in the center and it is caused by the "dry air" that "we are blowing into his room."  Patient did not further elaborate on his pain.  Patient seen sitting in his chair    Vital Signs patient refused    GENERAL:     overweight male in NAD, +tremors  HEAD:     atraumatic, normocephalic  EYES:     EOMI, conjunctiva and sclera clear  RESPIRATORY:     clear to auscultation bilaterally, no rales or rhonchi or wheezing or rubs  CARDIOVASCULAR:     tachycardic @ 100bpm, no murmurs or rubs or gallops,  GASTROINTESTINAL:     soft, nontender, +protuberant  EXTREMITIES:     no cyanosis or edema  MUSCULOSKELETAL:     +slouched over  NERVOUS SYSTEM:     +with intention tremors (consistent with his parkinson's)  PSYCH:     alert, refusing to answer questions directly    EKG:  sinus tach, unchanged from previous    A/P:  52M with parkinson's who is now complaining of chest pain.  Patient has had similar symptoms in the past and it was believed to be possible costochondritis.  Symptoms seems to manifest when patient becomes angry and emotionally upset.  RN reports patient had a family incident over the weekend and since then the patient has been acting out.  Possibly his symptoms are more facticious/seeking behavior rather than something physiological.      - will check cardiac enzymes  - tylenol PRN    Time Spent:  45 minutes

## 2018-04-06 LAB — CK MB BLD-MCNC: 0.4 % — SIGNIFICANT CHANGE UP (ref 0–3.5)

## 2018-04-06 PROCEDURE — 99231 SBSQ HOSP IP/OBS SF/LOW 25: CPT

## 2018-04-06 RX ADMIN — Medication 400 MILLIGRAM(S): at 16:21

## 2018-04-06 RX ADMIN — CARBIDOPA, LEVODOPA, AND ENTACAPONE 1 TABLET(S): 50; 200; 200 TABLET, FILM COATED ORAL at 16:09

## 2018-04-06 RX ADMIN — CARBIDOPA AND LEVODOPA 1 TABLET(S): 25; 100 TABLET ORAL at 11:16

## 2018-04-06 RX ADMIN — Medication 650 MILLIGRAM(S): at 23:34

## 2018-04-06 RX ADMIN — Medication 400 MILLIGRAM(S): at 18:16

## 2018-04-06 RX ADMIN — CARBIDOPA, LEVODOPA, AND ENTACAPONE 1 TABLET(S): 50; 200; 200 TABLET, FILM COATED ORAL at 11:16

## 2018-04-06 RX ADMIN — CARBIDOPA AND LEVODOPA 1 TABLET(S): 25; 100 TABLET ORAL at 20:51

## 2018-04-06 RX ADMIN — Medication 650 MILLIGRAM(S): at 22:56

## 2018-04-06 RX ADMIN — CARBIDOPA, LEVODOPA, AND ENTACAPONE 1 TABLET(S): 50; 200; 200 TABLET, FILM COATED ORAL at 05:51

## 2018-04-06 RX ADMIN — CARBIDOPA AND LEVODOPA 1 TABLET(S): 25; 100 TABLET ORAL at 16:09

## 2018-04-06 RX ADMIN — CARBIDOPA AND LEVODOPA 1 TABLET(S): 25; 100 TABLET ORAL at 05:51

## 2018-04-06 NOTE — PROGRESS NOTE BEHAVIORAL HEALTH - NSBHFUPINTERVALHXFT_PSY_A_CORE
Patient seen for paranoia, disorganized behavior. Chart reviewed; noted Hospitalist event note. Significant interval events - Patient yesterday was screaming at staff for over an hour demanding to use the large bathroom despite multiple other toilets/bathrooms being available. (same event as before). patient then used the bathroom and was in there for 1.5 hours. Staff was trying to get him to come out which he refused, and was screaming he could not move. Mel Lift was obtained and as soon as Patient saw it, he came out of the bathroom unassisted and without any observable difficulty in moving, ambulation. (not a new event. Patient has a long history of such episodes which are consistent with volitional behavior with malingering of physical complaints).

## 2018-04-06 NOTE — PROGRESS NOTE BEHAVIORAL HEALTH - SUMMARY
53 yo male with early onset Parkinson’s disease x 11 years, discharged from 4 prior nursing homes in 8 months due to his behaviors, hx of refusing medications,  transferred to [facility] Unit 2S on 6/12/17 where he manifested severe mood lability, paranoia and confabulation with poor insight and judgment including making > 40 Justice center complaints. 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 Patient was accepted to [facility] in [place] and discharged from [facility] on 7/12/17.  Upon getting to [facility], Patient refused to go into the facility and became combative. He was thus transferred back to [facility] ED which resulted in Service Line Chiefs’ of Service involvement given the history/issues and ultimate transfer to [address]. Patient has been on Unit 1N since. UPDATE: [facility] declined to accept Patient. Hundreds of nursing home applications wee sent out and all declined to accept patient. Court scheduled on 12/19/17 was deferred as Patient refused to go; he then again refused to go to the new Court date. Continuing to search for placement which have been unsuccessful; family does not want him living with them.  Neurological facility in [place] has accepted patient, and legal guardian is involved in transfer issues. Court hearing regarding Patient's guardianship was 3/27/18 and his sister was granted temporary guardianship. Court hearing for retention occurred on 3/28/18 and the hospital was granted an additional 60 days. Awaiting Patient's sister to come up with a place to send Patient to.

## 2018-04-07 PROCEDURE — 12345: CPT | Mod: NC

## 2018-04-07 RX ORDER — MAGNESIUM HYDROXIDE 400 MG/1
30 TABLET, CHEWABLE ORAL ONCE
Qty: 0 | Refills: 0 | Status: COMPLETED | OUTPATIENT
Start: 2018-04-07 | End: 2018-04-07

## 2018-04-07 RX ADMIN — CARBIDOPA, LEVODOPA, AND ENTACAPONE 1 TABLET(S): 50; 200; 200 TABLET, FILM COATED ORAL at 17:42

## 2018-04-07 RX ADMIN — CARBIDOPA AND LEVODOPA 1 TABLET(S): 25; 100 TABLET ORAL at 05:59

## 2018-04-07 RX ADMIN — CARBIDOPA, LEVODOPA, AND ENTACAPONE 1 TABLET(S): 50; 200; 200 TABLET, FILM COATED ORAL at 05:59

## 2018-04-07 RX ADMIN — CLOZAPINE 50 MILLIGRAM(S): 150 TABLET, ORALLY DISINTEGRATING ORAL at 17:41

## 2018-04-07 RX ADMIN — Medication 325 MILLIGRAM(S): at 23:30

## 2018-04-07 RX ADMIN — CARBIDOPA AND LEVODOPA 1 TABLET(S): 25; 100 TABLET ORAL at 17:41

## 2018-04-07 RX ADMIN — CARBIDOPA AND LEVODOPA 1 TABLET(S): 25; 100 TABLET ORAL at 20:49

## 2018-04-07 RX ADMIN — CARBIDOPA AND LEVODOPA 1 TABLET(S): 25; 100 TABLET ORAL at 11:17

## 2018-04-07 RX ADMIN — MAGNESIUM HYDROXIDE 30 MILLILITER(S): 400 TABLET, CHEWABLE ORAL at 00:37

## 2018-04-07 RX ADMIN — Medication 325 MILLIGRAM(S): at 22:32

## 2018-04-07 RX ADMIN — Medication 325 MILLIGRAM(S): at 08:20

## 2018-04-07 RX ADMIN — CARBIDOPA, LEVODOPA, AND ENTACAPONE 1 TABLET(S): 50; 200; 200 TABLET, FILM COATED ORAL at 11:17

## 2018-04-08 PROCEDURE — 12345: CPT | Mod: NC

## 2018-04-08 RX ORDER — IBUPROFEN 200 MG
400 TABLET ORAL ONCE
Qty: 0 | Refills: 0 | Status: COMPLETED | OUTPATIENT
Start: 2018-04-08 | End: 2018-04-08

## 2018-04-08 RX ADMIN — CARBIDOPA, LEVODOPA, AND ENTACAPONE 1 TABLET(S): 50; 200; 200 TABLET, FILM COATED ORAL at 16:32

## 2018-04-08 RX ADMIN — Medication 400 MILLIGRAM(S): at 20:22

## 2018-04-08 RX ADMIN — Medication 400 MILLIGRAM(S): at 01:56

## 2018-04-08 RX ADMIN — CLOZAPINE 50 MILLIGRAM(S): 150 TABLET, ORALLY DISINTEGRATING ORAL at 16:33

## 2018-04-08 RX ADMIN — CARBIDOPA AND LEVODOPA 1 TABLET(S): 25; 100 TABLET ORAL at 06:43

## 2018-04-08 RX ADMIN — Medication 400 MILLIGRAM(S): at 21:15

## 2018-04-08 RX ADMIN — CARBIDOPA AND LEVODOPA 1 TABLET(S): 25; 100 TABLET ORAL at 16:32

## 2018-04-08 RX ADMIN — CARBIDOPA, LEVODOPA, AND ENTACAPONE 1 TABLET(S): 50; 200; 200 TABLET, FILM COATED ORAL at 10:34

## 2018-04-08 RX ADMIN — CARBIDOPA AND LEVODOPA 1 TABLET(S): 25; 100 TABLET ORAL at 10:34

## 2018-04-08 RX ADMIN — CARBIDOPA, LEVODOPA, AND ENTACAPONE 1 TABLET(S): 50; 200; 200 TABLET, FILM COATED ORAL at 06:43

## 2018-04-08 RX ADMIN — CARBIDOPA AND LEVODOPA 1 TABLET(S): 25; 100 TABLET ORAL at 20:20

## 2018-04-08 RX ADMIN — Medication 400 MILLIGRAM(S): at 02:50

## 2018-04-09 PROCEDURE — 99231 SBSQ HOSP IP/OBS SF/LOW 25: CPT

## 2018-04-09 RX ORDER — OLANZAPINE 15 MG/1
5 TABLET, FILM COATED ORAL EVERY 6 HOURS
Qty: 0 | Refills: 0 | Status: DISCONTINUED | OUTPATIENT
Start: 2018-04-09 | End: 2018-08-18

## 2018-04-09 RX ADMIN — Medication 325 MILLIGRAM(S): at 02:30

## 2018-04-09 RX ADMIN — CARBIDOPA, LEVODOPA, AND ENTACAPONE 1 TABLET(S): 50; 200; 200 TABLET, FILM COATED ORAL at 15:53

## 2018-04-09 RX ADMIN — CARBIDOPA AND LEVODOPA 1 TABLET(S): 25; 100 TABLET ORAL at 10:31

## 2018-04-09 RX ADMIN — CARBIDOPA, LEVODOPA, AND ENTACAPONE 1 TABLET(S): 50; 200; 200 TABLET, FILM COATED ORAL at 05:20

## 2018-04-09 RX ADMIN — CARBIDOPA AND LEVODOPA 1 TABLET(S): 25; 100 TABLET ORAL at 20:04

## 2018-04-09 RX ADMIN — CLOZAPINE 50 MILLIGRAM(S): 150 TABLET, ORALLY DISINTEGRATING ORAL at 15:53

## 2018-04-09 RX ADMIN — CARBIDOPA AND LEVODOPA 1 TABLET(S): 25; 100 TABLET ORAL at 05:21

## 2018-04-09 RX ADMIN — CARBIDOPA, LEVODOPA, AND ENTACAPONE 1 TABLET(S): 50; 200; 200 TABLET, FILM COATED ORAL at 10:31

## 2018-04-09 RX ADMIN — Medication 30 MILLILITER(S): at 05:24

## 2018-04-09 RX ADMIN — Medication 325 MILLIGRAM(S): at 01:33

## 2018-04-09 RX ADMIN — CARBIDOPA AND LEVODOPA 1 TABLET(S): 25; 100 TABLET ORAL at 15:53

## 2018-04-09 RX ADMIN — Medication 325 MILLIGRAM(S): at 22:15

## 2018-04-09 NOTE — PROGRESS NOTE BEHAVIORAL HEALTH - SUMMARY
51 yo male with early onset Parkinson’s disease x 11 years, discharged from 4 prior nursing homes in 8 months due to his behaviors, hx of refusing medications,  transferred to [facility] Unit 2S on 6/12/17 where he manifested severe mood lability, paranoia and confabulation with poor insight and judgment including making > 40 Justice center complaints. 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 Patient was accepted to [facility] in [place] and discharged from [facility] on 7/12/17.  Upon getting to [facility], Patient refused to go into the facility and became combative. He was thus transferred back to [facility] ED which resulted in Service Line Chiefs’ of Service involvement given the history/issues and ultimate transfer to [address]. Patient has been on Unit 1N since. UPDATE: [facility] declined to accept Patient. Hundreds of nursing home applications wee sent out and all declined to accept patient. Court scheduled on 12/19/17 was deferred as Patient refused to go; he then again refused to go to the new Court date. Continuing to search for placement which have been unsuccessful; family does not want him living with them.  Neurological facility in [place] has accepted patient, and legal guardian is involved in transfer issues. Court hearing regarding Patient's guardianship was 3/27/18 and his sister was granted temporary guardianship. Court hearing for retention occurred on 3/28/18 and the hospital was granted an additional 60 days. Awaiting Patient's sister to come up with a place to send Patient to.

## 2018-04-09 NOTE — PROGRESS NOTE BEHAVIORAL HEALTH - NSBHFUPINTERVALHXFT_PSY_A_CORE
Patient seen for paranoia, disorganized behavior. Chart reviewed; noted Hospitalist event note. Significant interval events over the weekend apart from his usual behaviors and complaints.  .

## 2018-04-10 PROCEDURE — 99231 SBSQ HOSP IP/OBS SF/LOW 25: CPT

## 2018-04-10 RX ORDER — OLANZAPINE 15 MG/1
5 TABLET, FILM COATED ORAL ONCE
Qty: 0 | Refills: 0 | Status: DISCONTINUED | OUTPATIENT
Start: 2018-04-10 | End: 2018-05-25

## 2018-04-10 RX ADMIN — CLOZAPINE 50 MILLIGRAM(S): 150 TABLET, ORALLY DISINTEGRATING ORAL at 15:30

## 2018-04-10 RX ADMIN — CARBIDOPA, LEVODOPA, AND ENTACAPONE 1 TABLET(S): 50; 200; 200 TABLET, FILM COATED ORAL at 05:45

## 2018-04-10 RX ADMIN — CARBIDOPA AND LEVODOPA 1 TABLET(S): 25; 100 TABLET ORAL at 20:37

## 2018-04-10 RX ADMIN — CARBIDOPA AND LEVODOPA 1 TABLET(S): 25; 100 TABLET ORAL at 10:08

## 2018-04-10 RX ADMIN — CARBIDOPA AND LEVODOPA 1 TABLET(S): 25; 100 TABLET ORAL at 15:29

## 2018-04-10 RX ADMIN — CARBIDOPA, LEVODOPA, AND ENTACAPONE 1 TABLET(S): 50; 200; 200 TABLET, FILM COATED ORAL at 10:08

## 2018-04-10 RX ADMIN — CARBIDOPA AND LEVODOPA 1 TABLET(S): 25; 100 TABLET ORAL at 05:45

## 2018-04-10 RX ADMIN — CARBIDOPA, LEVODOPA, AND ENTACAPONE 1 TABLET(S): 50; 200; 200 TABLET, FILM COATED ORAL at 15:29

## 2018-04-10 NOTE — PROGRESS NOTE BEHAVIORAL HEALTH - SUMMARY
51 yo male with early onset Parkinson’s disease x 11 years, discharged from 4 prior nursing homes in 8 months due to his behaviors, hx of refusing medications,  transferred to [facility] Unit 2S on 6/12/17 where he manifested severe mood lability, paranoia and confabulation with poor insight and judgment including making > 40 Justice center complaints. 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 Patient was accepted to [facility] in [place] and discharged from [facility] on 7/12/17.  Upon getting to [facility], Patient refused to go into the facility and became combative. He was thus transferred back to [facility] ED which resulted in Service Line Chiefs’ of Service involvement given the history/issues and ultimate transfer to [address]. Patient has been on Unit 1N since. UPDATE: [facility] declined to accept Patient. Hundreds of nursing home applications wee sent out and all declined to accept patient. Court scheduled on 12/19/17 was deferred as Patient refused to go; he then again refused to go to the new Court date. Continuing to search for placement which have been unsuccessful; family does not want him living with them.  Neurological facility in [place] has accepted patient, and legal guardian is involved in transfer issues. Court hearing regarding Patient's guardianship was 3/27/18 and his sister was granted temporary guardianship. Court hearing for retention occurred on 3/28/18 and the hospital was granted an additional 60 days. Awaiting Patient's sister to come up with a place to send Patient to.

## 2018-04-10 NOTE — PROGRESS NOTE BEHAVIORAL HEALTH - NSBHFUPINTERVALHXFT_PSY_A_CORE
Patient seen for paranoia, disorganized behavior. Chart reviewed; No significant interval events apart from his usual behaviors and complaints.  Patient talked about his sister telling him about two apartments she found in [place] but cost too much - > $2000/month which is not something Pt could afford.

## 2018-04-11 LAB
BASOPHILS # BLD AUTO: 0 K/UL — SIGNIFICANT CHANGE UP (ref 0–0.2)
BASOPHILS NFR BLD AUTO: 0.7 % — SIGNIFICANT CHANGE UP (ref 0–2)
EOSINOPHIL # BLD AUTO: 0.1 K/UL — SIGNIFICANT CHANGE UP (ref 0–0.5)
EOSINOPHIL NFR BLD AUTO: 1.9 % — SIGNIFICANT CHANGE UP (ref 0–6)
HCT VFR BLD CALC: 36.9 % — LOW (ref 39–50)
HGB BLD-MCNC: 12.9 G/DL — LOW (ref 13–17)
LYMPHOCYTES # BLD AUTO: 2.4 K/UL — SIGNIFICANT CHANGE UP (ref 1–3.3)
LYMPHOCYTES # BLD AUTO: 34.8 % — SIGNIFICANT CHANGE UP (ref 13–44)
MCHC RBC-ENTMCNC: 33 PG — SIGNIFICANT CHANGE UP (ref 27–34)
MCHC RBC-ENTMCNC: 35 GM/DL — SIGNIFICANT CHANGE UP (ref 32–36)
MCV RBC AUTO: 94.4 FL — SIGNIFICANT CHANGE UP (ref 80–100)
MONOCYTES # BLD AUTO: 0.5 K/UL — SIGNIFICANT CHANGE UP (ref 0–0.9)
MONOCYTES NFR BLD AUTO: 6.7 % — SIGNIFICANT CHANGE UP (ref 2–14)
NEUTROPHILS # BLD AUTO: 3.8 K/UL — SIGNIFICANT CHANGE UP (ref 1.8–7.4)
NEUTROPHILS NFR BLD AUTO: 55.9 % — SIGNIFICANT CHANGE UP (ref 43–77)
PLATELET # BLD AUTO: 185 K/UL — SIGNIFICANT CHANGE UP (ref 150–400)
RBC # BLD: 3.91 M/UL — LOW (ref 4.2–5.8)
RBC # FLD: 13.2 % — SIGNIFICANT CHANGE UP (ref 10.3–14.5)
WBC # BLD: 6.8 K/UL — SIGNIFICANT CHANGE UP (ref 3.8–10.5)
WBC # FLD AUTO: 6.8 K/UL — SIGNIFICANT CHANGE UP (ref 3.8–10.5)

## 2018-04-11 PROCEDURE — 99232 SBSQ HOSP IP/OBS MODERATE 35: CPT

## 2018-04-11 RX ADMIN — CARBIDOPA AND LEVODOPA 1 TABLET(S): 25; 100 TABLET ORAL at 05:52

## 2018-04-11 RX ADMIN — Medication 325 MILLIGRAM(S): at 20:44

## 2018-04-11 RX ADMIN — CARBIDOPA AND LEVODOPA 1 TABLET(S): 25; 100 TABLET ORAL at 16:23

## 2018-04-11 RX ADMIN — CLOZAPINE 50 MILLIGRAM(S): 150 TABLET, ORALLY DISINTEGRATING ORAL at 17:33

## 2018-04-11 RX ADMIN — CARBIDOPA, LEVODOPA, AND ENTACAPONE 1 TABLET(S): 50; 200; 200 TABLET, FILM COATED ORAL at 16:23

## 2018-04-11 RX ADMIN — CARBIDOPA, LEVODOPA, AND ENTACAPONE 1 TABLET(S): 50; 200; 200 TABLET, FILM COATED ORAL at 10:13

## 2018-04-11 RX ADMIN — CARBIDOPA AND LEVODOPA 1 TABLET(S): 25; 100 TABLET ORAL at 20:02

## 2018-04-11 RX ADMIN — CARBIDOPA, LEVODOPA, AND ENTACAPONE 1 TABLET(S): 50; 200; 200 TABLET, FILM COATED ORAL at 05:51

## 2018-04-11 RX ADMIN — CARBIDOPA AND LEVODOPA 1 TABLET(S): 25; 100 TABLET ORAL at 20:03

## 2018-04-11 RX ADMIN — CARBIDOPA AND LEVODOPA 1 TABLET(S): 25; 100 TABLET ORAL at 10:13

## 2018-04-11 RX ADMIN — Medication 325 MILLIGRAM(S): at 19:00

## 2018-04-11 NOTE — PROGRESS NOTE BEHAVIORAL HEALTH - SUMMARY
53 yo male with early onset Parkinson’s disease x 11 years, discharged from 4 prior nursing homes in 8 months due to his behaviors, hx of refusing medications,  transferred to [facility] Unit 2S on 6/12/17 where he manifested severe mood lability, paranoia and confabulation with poor insight and judgment including making > 40 Justice center complaints. 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 Patient was accepted to [facility] in [place] and discharged from [facility] on 7/12/17.  Upon getting to [facility], Patient refused to go into the facility and became combative. He was thus transferred back to [facility] ED which resulted in Service Line Chiefs’ of Service involvement given the history/issues and ultimate transfer to [address]. Patient has been on Unit 1N since. UPDATE: [facility] declined to accept Patient. Hundreds of nursing home applications wee sent out and all declined to accept patient. Court scheduled on 12/19/17 was deferred as Patient refused to go; he then again refused to go to the new Court date. Continuing to search for placement which have been unsuccessful; family does not want him living with them.  Neurological facility in [place] has accepted patient, and legal guardian is involved in transfer issues. Court hearing regarding Patient's guardianship was 3/27/18 and his sister was granted temporary guardianship. Court hearing for retention occurred on 3/28/18 and the hospital was granted an additional 60 days. Awaiting Patient's sister to come up with a safe discharge place for patient

## 2018-04-11 NOTE — PROGRESS NOTE BEHAVIORAL HEALTH - NSBHFUPINTERVALHXFT_PSY_A_CORE
Patient seen for ongoing paranoia, disorganized behavior. Chart reviewed; No significant interval events apart from his usual behaviors and complaints. Case discussed in tx team meeting.  Staff reports patient was very well behaved yesterday and last night, and slept 7 hours. Patient is verbal and pleasant during interview, and using humor appropriately.  No Rx SE, sx TD/EPS are noted or reported.

## 2018-04-11 NOTE — PROGRESS NOTE BEHAVIORAL HEALTH - OTHER
+ neck favoring one side, dressed in hospital gowns, adequate grooming and hygiene no psychomotor agitation at times (chronic and seemingly intentionaly as he can stop it when redirected). chronically impaired but improved since admission Limited appropriate chronically limited but better since admission  more able to respond to redirection festinating gait but stable baseline variable - at times euthymic, easily reactive, at times makes jokes at times yelling varying (chronic), usually euthymic with episodes of irritable at times overall linear, at times disorganized and circumstantial, concrete at times paranoia towards certain staff (chronic)

## 2018-04-12 PROCEDURE — 99232 SBSQ HOSP IP/OBS MODERATE 35: CPT

## 2018-04-12 RX ADMIN — CARBIDOPA, LEVODOPA, AND ENTACAPONE 1 TABLET(S): 50; 200; 200 TABLET, FILM COATED ORAL at 16:08

## 2018-04-12 RX ADMIN — CARBIDOPA, LEVODOPA, AND ENTACAPONE 1 TABLET(S): 50; 200; 200 TABLET, FILM COATED ORAL at 10:29

## 2018-04-12 RX ADMIN — CARBIDOPA AND LEVODOPA 1 TABLET(S): 25; 100 TABLET ORAL at 05:53

## 2018-04-12 RX ADMIN — CARBIDOPA AND LEVODOPA 1 TABLET(S): 25; 100 TABLET ORAL at 16:08

## 2018-04-12 RX ADMIN — CARBIDOPA, LEVODOPA, AND ENTACAPONE 1 TABLET(S): 50; 200; 200 TABLET, FILM COATED ORAL at 05:53

## 2018-04-12 RX ADMIN — Medication 325 MILLIGRAM(S): at 08:21

## 2018-04-12 RX ADMIN — CARBIDOPA AND LEVODOPA 1 TABLET(S): 25; 100 TABLET ORAL at 20:27

## 2018-04-12 RX ADMIN — Medication 325 MILLIGRAM(S): at 10:42

## 2018-04-12 RX ADMIN — Medication 325 MILLIGRAM(S): at 22:40

## 2018-04-12 RX ADMIN — Medication 325 MILLIGRAM(S): at 22:08

## 2018-04-12 RX ADMIN — CARBIDOPA AND LEVODOPA 1 TABLET(S): 25; 100 TABLET ORAL at 20:26

## 2018-04-12 RX ADMIN — CARBIDOPA AND LEVODOPA 1 TABLET(S): 25; 100 TABLET ORAL at 10:29

## 2018-04-12 NOTE — PROGRESS NOTE BEHAVIORAL HEALTH - SUMMARY
51 yo male with early onset Parkinson’s disease x 11 years, discharged from 4 prior nursing homes in 8 months due to his behaviors, hx of refusing medications,  transferred to [facility] Unit 2S on 6/12/17 where he manifested severe mood lability, paranoia and confabulation with poor insight and judgment including making > 40 Justice center complaints. 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 Patient was accepted to [facility] in [place] and discharged from [facility] on 7/12/17.  Upon getting to [facility], Patient refused to go into the facility and became combative. He was thus transferred back to [facility] ED which resulted in Service Line Chiefs’ of Service involvement given the history/issues and ultimate transfer to [address]. Patient has been on Unit 1N since. UPDATE: [facility] declined to accept Patient. Hundreds of nursing home applications wee sent out and all declined to accept patient. Court scheduled on 12/19/17 was deferred as Patient refused to go; he then again refused to go to the new Court date. Continuing to search for placement which have been unsuccessful; family does not want him living with them.  Neurological facility in [place] has accepted patient, and legal guardian is involved in transfer issues. Court hearing regarding Patient's guardianship was 3/27/18 and his sister was granted temporary guardianship. Court hearing for retention occurred on 3/28/18 and the hospital was granted an additional 60 days. Awaiting Patient's sister to come up with a place to send Patient to.

## 2018-04-12 NOTE — PROGRESS NOTE BEHAVIORAL HEALTH - NSBHFUPINTERVALHXFT_PSY_A_CORE
Patient seen for paranoia, disorganized behavior. Chart reviewed; No significant interval events apart from his usual behaviors and complaints.  More quiet today and was sitting in the dayroom watching TV. He continues to hope that his sister will find a place for him.

## 2018-04-13 PROCEDURE — 99231 SBSQ HOSP IP/OBS SF/LOW 25: CPT

## 2018-04-13 RX ADMIN — CARBIDOPA, LEVODOPA, AND ENTACAPONE 1 TABLET(S): 50; 200; 200 TABLET, FILM COATED ORAL at 10:19

## 2018-04-13 RX ADMIN — Medication 30 MILLILITER(S): at 06:35

## 2018-04-13 RX ADMIN — CLOZAPINE 50 MILLIGRAM(S): 150 TABLET, ORALLY DISINTEGRATING ORAL at 15:15

## 2018-04-13 RX ADMIN — CARBIDOPA AND LEVODOPA 1 TABLET(S): 25; 100 TABLET ORAL at 20:50

## 2018-04-13 RX ADMIN — CARBIDOPA AND LEVODOPA 1 TABLET(S): 25; 100 TABLET ORAL at 05:36

## 2018-04-13 RX ADMIN — CARBIDOPA AND LEVODOPA 1 TABLET(S): 25; 100 TABLET ORAL at 10:18

## 2018-04-13 RX ADMIN — CARBIDOPA, LEVODOPA, AND ENTACAPONE 1 TABLET(S): 50; 200; 200 TABLET, FILM COATED ORAL at 05:36

## 2018-04-13 RX ADMIN — CARBIDOPA, LEVODOPA, AND ENTACAPONE 1 TABLET(S): 50; 200; 200 TABLET, FILM COATED ORAL at 15:16

## 2018-04-13 RX ADMIN — Medication 325 MILLIGRAM(S): at 20:50

## 2018-04-13 RX ADMIN — Medication 400 MILLIGRAM(S): at 03:00

## 2018-04-13 RX ADMIN — CARBIDOPA AND LEVODOPA 1 TABLET(S): 25; 100 TABLET ORAL at 15:16

## 2018-04-13 RX ADMIN — Medication 400 MILLIGRAM(S): at 02:28

## 2018-04-13 RX ADMIN — Medication 325 MILLIGRAM(S): at 21:30

## 2018-04-13 NOTE — PROGRESS NOTE BEHAVIORAL HEALTH - SUMMARY
51 yo male with early onset Parkinson’s disease x 11 years, discharged from 4 prior nursing homes in 8 months due to his behaviors, hx of refusing medications,  transferred to [facility] Unit 2S on 6/12/17 where he manifested severe mood lability, paranoia and confabulation with poor insight and judgment including making > 40 Justice center complaints. 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 Patient was accepted to [facility] in [place] and discharged from [facility] on 7/12/17.  Upon getting to [facility], Patient refused to go into the facility and became combative. He was thus transferred back to [facility] ED which resulted in Service Line Chiefs’ of Service involvement given the history/issues and ultimate transfer to [address]. Patient has been on Unit 1N since. UPDATE: [facility] declined to accept Patient. Hundreds of nursing home applications wee sent out and all declined to accept patient. Court scheduled on 12/19/17 was deferred as Patient refused to go; he then again refused to go to the new Court date. Continuing to search for placement which have been unsuccessful; family does not want him living with them.  Neurological facility in [place] has accepted patient, and legal guardian is involved in transfer issues. Court hearing regarding Patient's guardianship was 3/27/18 and his sister was granted temporary guardianship. Court hearing for retention occurred on 3/28/18 and the hospital was granted an additional 60 days. Awaiting Patient's sister to come up with a place to send Patient to.

## 2018-04-13 NOTE — PROGRESS NOTE BEHAVIORAL HEALTH - NSBHFUPINTERVALHXFT_PSY_A_CORE
Patient seen for paranoia, disorganized behavior. Chart reviewed; Significant interval events apart from his usual behaviors and complaints - Patient reportedly was using the urinal in his room last evening, an aide knocked on the door for patient checks, entered and immediately exited when she saw Patient use the urinal. Patient in return was yelling and reported to charge nurse etc that the aide was "peaking" at his penis. This has happened before with Patient and staff/patients around were interviewed and claim was unfounded.  He continues to hope that his sister will find a place for him.

## 2018-04-14 RX ADMIN — CARBIDOPA AND LEVODOPA 1 TABLET(S): 25; 100 TABLET ORAL at 20:36

## 2018-04-14 RX ADMIN — CARBIDOPA AND LEVODOPA 1 TABLET(S): 25; 100 TABLET ORAL at 16:23

## 2018-04-14 RX ADMIN — CYCLOBENZAPRINE HYDROCHLORIDE 10 MILLIGRAM(S): 10 TABLET, FILM COATED ORAL at 05:56

## 2018-04-14 RX ADMIN — CARBIDOPA, LEVODOPA, AND ENTACAPONE 1 TABLET(S): 50; 200; 200 TABLET, FILM COATED ORAL at 16:23

## 2018-04-14 RX ADMIN — CARBIDOPA, LEVODOPA, AND ENTACAPONE 1 TABLET(S): 50; 200; 200 TABLET, FILM COATED ORAL at 10:12

## 2018-04-14 RX ADMIN — CLOZAPINE 50 MILLIGRAM(S): 150 TABLET, ORALLY DISINTEGRATING ORAL at 16:24

## 2018-04-14 RX ADMIN — CARBIDOPA AND LEVODOPA 1 TABLET(S): 25; 100 TABLET ORAL at 10:12

## 2018-04-14 RX ADMIN — CARBIDOPA AND LEVODOPA 1 TABLET(S): 25; 100 TABLET ORAL at 05:55

## 2018-04-14 RX ADMIN — CLOZAPINE 50 MILLIGRAM(S): 150 TABLET, ORALLY DISINTEGRATING ORAL at 16:23

## 2018-04-14 RX ADMIN — Medication 400 MILLIGRAM(S): at 22:44

## 2018-04-14 RX ADMIN — CARBIDOPA, LEVODOPA, AND ENTACAPONE 1 TABLET(S): 50; 200; 200 TABLET, FILM COATED ORAL at 05:56

## 2018-04-15 RX ADMIN — CARBIDOPA AND LEVODOPA 1 TABLET(S): 25; 100 TABLET ORAL at 16:15

## 2018-04-15 RX ADMIN — CARBIDOPA AND LEVODOPA 1 TABLET(S): 25; 100 TABLET ORAL at 21:09

## 2018-04-15 RX ADMIN — CARBIDOPA, LEVODOPA, AND ENTACAPONE 1 TABLET(S): 50; 200; 200 TABLET, FILM COATED ORAL at 12:57

## 2018-04-15 RX ADMIN — CARBIDOPA, LEVODOPA, AND ENTACAPONE 1 TABLET(S): 50; 200; 200 TABLET, FILM COATED ORAL at 09:15

## 2018-04-15 RX ADMIN — CLOZAPINE 50 MILLIGRAM(S): 150 TABLET, ORALLY DISINTEGRATING ORAL at 16:15

## 2018-04-15 RX ADMIN — CARBIDOPA AND LEVODOPA 1 TABLET(S): 25; 100 TABLET ORAL at 12:57

## 2018-04-15 RX ADMIN — CARBIDOPA, LEVODOPA, AND ENTACAPONE 1 TABLET(S): 50; 200; 200 TABLET, FILM COATED ORAL at 16:15

## 2018-04-15 RX ADMIN — CARBIDOPA AND LEVODOPA 1 TABLET(S): 25; 100 TABLET ORAL at 09:16

## 2018-04-16 PROCEDURE — 99231 SBSQ HOSP IP/OBS SF/LOW 25: CPT

## 2018-04-16 RX ADMIN — CARBIDOPA, LEVODOPA, AND ENTACAPONE 1 TABLET(S): 50; 200; 200 TABLET, FILM COATED ORAL at 16:00

## 2018-04-16 RX ADMIN — CARBIDOPA, LEVODOPA, AND ENTACAPONE 1 TABLET(S): 50; 200; 200 TABLET, FILM COATED ORAL at 11:16

## 2018-04-16 RX ADMIN — CARBIDOPA AND LEVODOPA 1 TABLET(S): 25; 100 TABLET ORAL at 11:16

## 2018-04-16 RX ADMIN — CARBIDOPA AND LEVODOPA 1 TABLET(S): 25; 100 TABLET ORAL at 20:27

## 2018-04-16 RX ADMIN — CARBIDOPA AND LEVODOPA 1 TABLET(S): 25; 100 TABLET ORAL at 05:04

## 2018-04-16 RX ADMIN — Medication 325 MILLIGRAM(S): at 05:04

## 2018-04-16 RX ADMIN — CLOZAPINE 50 MILLIGRAM(S): 150 TABLET, ORALLY DISINTEGRATING ORAL at 11:16

## 2018-04-16 RX ADMIN — CARBIDOPA AND LEVODOPA 1 TABLET(S): 25; 100 TABLET ORAL at 16:00

## 2018-04-16 RX ADMIN — CARBIDOPA AND LEVODOPA 1 TABLET(S): 25; 100 TABLET ORAL at 20:28

## 2018-04-16 RX ADMIN — CARBIDOPA, LEVODOPA, AND ENTACAPONE 1 TABLET(S): 50; 200; 200 TABLET, FILM COATED ORAL at 05:04

## 2018-04-16 NOTE — PROGRESS NOTE BEHAVIORAL HEALTH - SUMMARY
53 yo male with early onset Parkinson’s disease x 11 years, discharged from 4 prior nursing homes in 8 months due to his behaviors, hx of refusing medications,  transferred to [facility] Unit 2S on 6/12/17 where he manifested severe mood lability, paranoia and confabulation with poor insight and judgment including making > 40 Justice center complaints. 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 Patient was accepted to [facility] in [place] and discharged from [facility] on 7/12/17.  Upon getting to [facility], Patient refused to go into the facility and became combative. He was thus transferred back to [facility] ED which resulted in Service Line Chiefs’ of Service involvement given the history/issues and ultimate transfer to [address]. Patient has been on Unit 1N since. UPDATE: [facility] declined to accept Patient. Hundreds of nursing home applications wee sent out and all declined to accept patient. Court scheduled on 12/19/17 was deferred as Patient refused to go; he then again refused to go to the new Court date. Continuing to search for placement which have been unsuccessful; family does not want him living with them.  Neurological facility in [place] has accepted patient, and legal guardian is involved in transfer issues. Court hearing regarding Patient's guardianship was 3/27/18 and his sister was granted temporary guardianship. Court hearing for retention occurred on 3/28/18 and the hospital was granted an additional 60 days. Awaiting Patient's sister to come up with a place to send Patient to.

## 2018-04-16 NOTE — PROGRESS NOTE BEHAVIORAL HEALTH - NSBHFUPINTERVALHXFT_PSY_A_CORE
Patient seen for paranoia, disorganized behavior. Chart reviewed;  No Significant interval events apart from his usual behaviors and complaints  He continues to hope that his sister will find a place for him. The patient had some papers that he wants sign so that his sister could be a representative payee. He is otherwise the same and has his baseline complaints but was in behavioral control.

## 2018-04-17 PROCEDURE — 99231 SBSQ HOSP IP/OBS SF/LOW 25: CPT

## 2018-04-17 PROCEDURE — 12345: CPT | Mod: NC

## 2018-04-17 RX ADMIN — CARBIDOPA AND LEVODOPA 1 TABLET(S): 25; 100 TABLET ORAL at 11:29

## 2018-04-17 RX ADMIN — CARBIDOPA, LEVODOPA, AND ENTACAPONE 1 TABLET(S): 50; 200; 200 TABLET, FILM COATED ORAL at 16:31

## 2018-04-17 RX ADMIN — Medication 325 MILLIGRAM(S): at 01:35

## 2018-04-17 RX ADMIN — CARBIDOPA, LEVODOPA, AND ENTACAPONE 1 TABLET(S): 50; 200; 200 TABLET, FILM COATED ORAL at 11:29

## 2018-04-17 RX ADMIN — CARBIDOPA AND LEVODOPA 1 TABLET(S): 25; 100 TABLET ORAL at 16:31

## 2018-04-17 RX ADMIN — Medication 325 MILLIGRAM(S): at 03:02

## 2018-04-17 RX ADMIN — CLOZAPINE 50 MILLIGRAM(S): 150 TABLET, ORALLY DISINTEGRATING ORAL at 11:29

## 2018-04-17 RX ADMIN — CARBIDOPA, LEVODOPA, AND ENTACAPONE 1 TABLET(S): 50; 200; 200 TABLET, FILM COATED ORAL at 05:38

## 2018-04-17 RX ADMIN — CARBIDOPA AND LEVODOPA 1 TABLET(S): 25; 100 TABLET ORAL at 21:01

## 2018-04-17 RX ADMIN — CARBIDOPA AND LEVODOPA 1 TABLET(S): 25; 100 TABLET ORAL at 05:38

## 2018-04-17 NOTE — PROGRESS NOTE BEHAVIORAL HEALTH - SUMMARY
51 yo male with early onset Parkinson’s disease x 11 years, discharged from 4 prior nursing homes in 8 months due to his behaviors, hx of refusing medications,  transferred to [facility] Unit [facility] on 6/12/17 where he manifested severe mood lability, paranoia and confabulation with poor insight and judgment including making > 40 Justice center complaints. 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 Patient was accepted to [facility] in [place] and discharged from [facility] on 7/12/17.  Upon getting to [facility], Patient refused to go into the facility and became combative. He was thus transferred back to [facility] ED which resulted in Service Line Chiefs’ of Service involvement given the history/issues and ultimate transfer to [address]. Patient has been on Unit 1N since. UPDATE: [facility] declined to accept Patient. Hundreds of nursing home applications wee sent out and all declined to accept patient. Court scheduled on 12/19/17 was deferred as Patient refused to go; he then again refused to go to the new Court date. Continuing to search for placement which have been unsuccessful; family does not want him living with them.  Neurological facility in [place] has accepted patient, and legal guardian is involved in transfer issues. Court hearing regarding Patient's guardianship was 3/27/18 and his sister was granted temporary guardianship. Court hearing for retention occurred on 3/28/18 and the hospital was granted an additional 60 days. Awaiting Patient's sister to come up with a place to send Patient to.

## 2018-04-17 NOTE — PROGRESS NOTE BEHAVIORAL HEALTH - NSBHFUPINTERVALHXFT_PSY_A_CORE
Patient seen for paranoia, disorganized behavior. Chart reviewed; no significant interval events apart from his usual behaviors and complaints He continues to hope that his sister will find a place for him. Same clinical presentation

## 2018-04-18 PROCEDURE — 99231 SBSQ HOSP IP/OBS SF/LOW 25: CPT

## 2018-04-18 RX ORDER — NYSTATIN CREAM 100000 [USP'U]/G
1 CREAM TOPICAL
Qty: 0 | Refills: 0 | Status: DISCONTINUED | OUTPATIENT
Start: 2018-04-18 | End: 2018-06-22

## 2018-04-18 RX ADMIN — CARBIDOPA AND LEVODOPA 1 TABLET(S): 25; 100 TABLET ORAL at 19:57

## 2018-04-18 RX ADMIN — Medication 325 MILLIGRAM(S): at 23:00

## 2018-04-18 RX ADMIN — CARBIDOPA, LEVODOPA, AND ENTACAPONE 1 TABLET(S): 50; 200; 200 TABLET, FILM COATED ORAL at 05:42

## 2018-04-18 RX ADMIN — CARBIDOPA AND LEVODOPA 1 TABLET(S): 25; 100 TABLET ORAL at 19:58

## 2018-04-18 RX ADMIN — CARBIDOPA AND LEVODOPA 1 TABLET(S): 25; 100 TABLET ORAL at 16:47

## 2018-04-18 RX ADMIN — CARBIDOPA AND LEVODOPA 1 TABLET(S): 25; 100 TABLET ORAL at 05:42

## 2018-04-18 RX ADMIN — Medication 325 MILLIGRAM(S): at 22:28

## 2018-04-18 RX ADMIN — CLOZAPINE 50 MILLIGRAM(S): 150 TABLET, ORALLY DISINTEGRATING ORAL at 17:43

## 2018-04-18 RX ADMIN — CARBIDOPA, LEVODOPA, AND ENTACAPONE 1 TABLET(S): 50; 200; 200 TABLET, FILM COATED ORAL at 16:47

## 2018-04-18 RX ADMIN — CARBIDOPA AND LEVODOPA 1 TABLET(S): 25; 100 TABLET ORAL at 11:19

## 2018-04-18 RX ADMIN — CARBIDOPA, LEVODOPA, AND ENTACAPONE 1 TABLET(S): 50; 200; 200 TABLET, FILM COATED ORAL at 11:19

## 2018-04-18 NOTE — PROGRESS NOTE BEHAVIORAL HEALTH - SUMMARY
51 yo male with early onset Parkinson’s disease x 11 years, discharged from 4 prior nursing homes in 8 months due to his behaviors, hx of refusing medications,  transferred to [facility] Unit 2S on 6/12/17 where he manifested severe mood lability, paranoia and confabulation with poor insight and judgment including making > 40 Justice center complaints. 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 Patient was accepted to [facility] in [place] and discharged from [facility] on 7/12/17.  Upon getting to [facility], Patient refused to go into the facility and became combative. He was thus transferred back to [facility] ED which resulted in Service Line Chiefs’ of Service involvement given the history/issues and ultimate transfer to [address]. Patient has been on Unit 1N since. UPDATE: [facility] declined to accept Patient. Hundreds of nursing home applications wee sent out and all declined to accept patient. Court scheduled on 12/19/17 was deferred as Patient refused to go; he then again refused to go to the new Court date. Continuing to search for placement which have been unsuccessful; family does not want him living with them.  Neurological facility in [place] has accepted patient, and legal guardian is involved in transfer issues. Court hearing regarding Patient's guardianship was 3/27/18 and his sister was granted temporary guardianship. Court hearing for retention occurred on 3/28/18 and the hospital was granted an additional 60 days. Awaiting Patient's sister to come up with a place to send Patient to.

## 2018-04-18 NOTE — PROGRESS NOTE BEHAVIORAL HEALTH - NSBHFUPINTERVALHXFT_PSY_A_CORE
Patient seen for paranoia, disorganized behavior. Chart reviewed; no significant interval events apart from his usual behaviors and complaints which was not getting 2 ice bags overnight. He continues to hope that his sister will find a place for him. Same clinical presentation

## 2018-04-19 PROCEDURE — 99231 SBSQ HOSP IP/OBS SF/LOW 25: CPT

## 2018-04-19 RX ADMIN — CYCLOBENZAPRINE HYDROCHLORIDE 10 MILLIGRAM(S): 10 TABLET, FILM COATED ORAL at 22:57

## 2018-04-19 RX ADMIN — CARBIDOPA AND LEVODOPA 1 TABLET(S): 25; 100 TABLET ORAL at 20:34

## 2018-04-19 RX ADMIN — CARBIDOPA AND LEVODOPA 1 TABLET(S): 25; 100 TABLET ORAL at 16:45

## 2018-04-19 RX ADMIN — CARBIDOPA, LEVODOPA, AND ENTACAPONE 1 TABLET(S): 50; 200; 200 TABLET, FILM COATED ORAL at 11:32

## 2018-04-19 RX ADMIN — CARBIDOPA, LEVODOPA, AND ENTACAPONE 1 TABLET(S): 50; 200; 200 TABLET, FILM COATED ORAL at 05:57

## 2018-04-19 RX ADMIN — CARBIDOPA AND LEVODOPA 1 TABLET(S): 25; 100 TABLET ORAL at 05:57

## 2018-04-19 RX ADMIN — CARBIDOPA AND LEVODOPA 1 TABLET(S): 25; 100 TABLET ORAL at 11:32

## 2018-04-19 RX ADMIN — CARBIDOPA, LEVODOPA, AND ENTACAPONE 1 TABLET(S): 50; 200; 200 TABLET, FILM COATED ORAL at 16:45

## 2018-04-19 NOTE — PROGRESS NOTE BEHAVIORAL HEALTH - OTHER
+ neck favoring one side, dressed in hospital gowns, adequate grooming and hygiene no psychomotor agitation at times (chronic and seemingly intentionaly as he can stop it when redirected). chronically limited but better since admission  more able to respond to redirection festinating gait but stable baseline variable - at times euthymic, easily reactive, at times makes jokes at times yelling varying (chronic), usually euthymic with episodes of irritable at times overall linear, at times disorganized and circumstantial, concrete at times paranoia towards certain staff (chronic) chronically impaired but improved since admission Limited

## 2018-04-19 NOTE — PROGRESS NOTE BEHAVIORAL HEALTH - NSBHFUPINTERVALHXFT_PSY_A_CORE
Patient seen. There has been no major changes in behavior from his baseline as he has been here 281 days. He was somewhat irritable today but this was because he was frustrated for being here so long. He was tearful and admitted this. Despite this he continues to have baseline fantasies and delusions about going to court and having private investigators come here. He is at baseline.

## 2018-04-19 NOTE — PROGRESS NOTE BEHAVIORAL HEALTH - SUMMARY
51 yo male with early onset Parkinson’s disease x 11 years, discharged from 4 prior nursing homes in 8 months due to his behaviors, hx of refusing medications,  transferred to [facility] Unit 2S on 6/12/17 where he manifested severe mood lability, paranoia and confabulation with poor insight and judgment including making > 40 Justice center complaints. 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 Patient was accepted to [facility] in [place] and discharged from [facility] on 7/12/17.  Upon getting to [facility], Patient refused to go into the facility and became combative. He was thus transferred back to [facility] ED which resulted in Service Line Chiefs’ of Service involvement given the history/issues and ultimate transfer to [address]. Patient has been on Unit 1N since. UPDATE: [facility] declined to accept Patient. Hundreds of nursing home applications wee sent out and all declined to accept patient. Court scheduled on 12/19/17 was deferred as Patient refused to go; he then again refused to go to the new Court date. Continuing to search for placement which have been unsuccessful; family does not want him living with them.  Neurological facility in [place] has accepted patient, and legal guardian is involved in transfer issues. Court hearing regarding Patient's guardianship was 3/27/18 and his sister was granted temporary guardianship. Court hearing for retention occurred on 3/28/18 and the hospital was granted an additional 60 days. Awaiting Patient's sister to come up with a place to send Patient to.

## 2018-04-20 PROCEDURE — 99231 SBSQ HOSP IP/OBS SF/LOW 25: CPT

## 2018-04-20 RX ADMIN — CARBIDOPA, LEVODOPA, AND ENTACAPONE 1 TABLET(S): 50; 200; 200 TABLET, FILM COATED ORAL at 16:32

## 2018-04-20 RX ADMIN — CARBIDOPA, LEVODOPA, AND ENTACAPONE 1 TABLET(S): 50; 200; 200 TABLET, FILM COATED ORAL at 05:47

## 2018-04-20 RX ADMIN — CARBIDOPA AND LEVODOPA 1 TABLET(S): 25; 100 TABLET ORAL at 20:35

## 2018-04-20 RX ADMIN — CARBIDOPA AND LEVODOPA 1 TABLET(S): 25; 100 TABLET ORAL at 16:32

## 2018-04-20 RX ADMIN — CARBIDOPA AND LEVODOPA 1 TABLET(S): 25; 100 TABLET ORAL at 05:47

## 2018-04-20 RX ADMIN — CARBIDOPA, LEVODOPA, AND ENTACAPONE 1 TABLET(S): 50; 200; 200 TABLET, FILM COATED ORAL at 11:37

## 2018-04-20 RX ADMIN — CLOZAPINE 50 MILLIGRAM(S): 150 TABLET, ORALLY DISINTEGRATING ORAL at 16:30

## 2018-04-20 RX ADMIN — CARBIDOPA AND LEVODOPA 1 TABLET(S): 25; 100 TABLET ORAL at 11:37

## 2018-04-20 NOTE — PROGRESS NOTE BEHAVIORAL HEALTH - SUMMARY
51 yo male with early onset Parkinson’s disease x 11 years, discharged from 4 prior nursing homes in 8 months due to his behaviors, hx of refusing medications,  transferred to [facility] Unit 2S on 6/12/17 where he manifested severe mood lability, paranoia and confabulation with poor insight and judgment including making > 40 Justice center complaints. 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 Patient was accepted to [facility] in [place] and discharged from [facility] on 7/12/17.  Upon getting to [facility], Patient refused to go into the facility and became combative. He was thus transferred back to [facility] ED which resulted in Service Line Chiefs’ of Service involvement given the history/issues and ultimate transfer to [address]. Patient has been on Unit 1N since. UPDATE: [facility] declined to accept Patient. Hundreds of nursing home applications wee sent out and all declined to accept patient. Court scheduled on 12/19/17 was deferred as Patient refused to go; he then again refused to go to the new Court date. Continuing to search for placement which have been unsuccessful; family does not want him living with them.  Neurological facility in [place] has accepted patient, and legal guardian is involved in transfer issues. Court hearing regarding Patient's guardianship was 3/27/18 and his sister was granted temporary guardianship. Court hearing for retention occurred on 3/28/18 and the hospital was granted an additional 60 days. Awaiting Patient's sister to come up with a place to send Patient to.

## 2018-04-20 NOTE — PROGRESS NOTE BEHAVIORAL HEALTH - NSBHFUPINTERVALHXFT_PSY_A_CORE
Patient seen for paranoia, disorganized behavior. Chart reviewed; no significant interval events apart from his usual behaviors and complaints. Patient continues to hope that his sister will find a place for him. Same clinical presentation Patient seen for paranoia, disorganized behavior. Chart reviewed; no significant interval events apart from his usual behaviors and complaints. Patient continues to hope that his sister will find a place for him. Same clinical presentation. Patient states that his sister may be coming later today as she 'needs another letter." Patient asked if she already came to  previous documents she dropped off and was filled out fast by MD per her request, he said "no, she was sick." Confirmed with Unit clerk that sister has not been back since that time to  aforementioned documents (despite being told that she needs them "ASAP").

## 2018-04-20 NOTE — PROGRESS NOTE BEHAVIORAL HEALTH - OTHER
chronically limited but better since admission  more able to respond to redirection festinating gait but stable baseline variable - at times euthymic, easily reactive, at times makes jokes at times yelling varying (chronic), usually euthymic with episodes of irritable at times overall linear, at times disorganized and circumstantial, concrete at times paranoia towards certain staff (chronic) chronically impaired but improved since admission Limited + neck favoring one side, dressed in hospital gowns, adequate grooming and hygiene no psychomotor agitation at times (chronic and seemingly intentionaly as he can stop it when redirected). appropriate

## 2018-04-21 RX ADMIN — CARBIDOPA AND LEVODOPA 1 TABLET(S): 25; 100 TABLET ORAL at 05:48

## 2018-04-21 RX ADMIN — Medication 400 MILLIGRAM(S): at 22:55

## 2018-04-21 RX ADMIN — CARBIDOPA AND LEVODOPA 1 TABLET(S): 25; 100 TABLET ORAL at 11:15

## 2018-04-21 RX ADMIN — CARBIDOPA, LEVODOPA, AND ENTACAPONE 1 TABLET(S): 50; 200; 200 TABLET, FILM COATED ORAL at 16:03

## 2018-04-21 RX ADMIN — CARBIDOPA, LEVODOPA, AND ENTACAPONE 1 TABLET(S): 50; 200; 200 TABLET, FILM COATED ORAL at 05:48

## 2018-04-21 RX ADMIN — CARBIDOPA AND LEVODOPA 1 TABLET(S): 25; 100 TABLET ORAL at 20:17

## 2018-04-21 RX ADMIN — Medication 400 MILLIGRAM(S): at 22:30

## 2018-04-21 RX ADMIN — Medication 325 MILLIGRAM(S): at 20:30

## 2018-04-21 RX ADMIN — Medication 325 MILLIGRAM(S): at 08:22

## 2018-04-21 RX ADMIN — CARBIDOPA, LEVODOPA, AND ENTACAPONE 1 TABLET(S): 50; 200; 200 TABLET, FILM COATED ORAL at 11:15

## 2018-04-21 RX ADMIN — Medication 325 MILLIGRAM(S): at 09:52

## 2018-04-21 RX ADMIN — CARBIDOPA AND LEVODOPA 1 TABLET(S): 25; 100 TABLET ORAL at 16:03

## 2018-04-22 RX ADMIN — Medication 650 MILLIGRAM(S): at 02:30

## 2018-04-22 RX ADMIN — CARBIDOPA AND LEVODOPA 1 TABLET(S): 25; 100 TABLET ORAL at 06:08

## 2018-04-22 RX ADMIN — Medication 325 MILLIGRAM(S): at 22:49

## 2018-04-22 RX ADMIN — CYCLOBENZAPRINE HYDROCHLORIDE 10 MILLIGRAM(S): 10 TABLET, FILM COATED ORAL at 23:21

## 2018-04-22 RX ADMIN — CARBIDOPA AND LEVODOPA 1 TABLET(S): 25; 100 TABLET ORAL at 20:41

## 2018-04-22 RX ADMIN — Medication 325 MILLIGRAM(S): at 22:25

## 2018-04-22 RX ADMIN — CARBIDOPA, LEVODOPA, AND ENTACAPONE 1 TABLET(S): 50; 200; 200 TABLET, FILM COATED ORAL at 10:38

## 2018-04-22 RX ADMIN — Medication 650 MILLIGRAM(S): at 01:27

## 2018-04-22 RX ADMIN — CARBIDOPA AND LEVODOPA 1 TABLET(S): 25; 100 TABLET ORAL at 10:38

## 2018-04-22 RX ADMIN — CARBIDOPA, LEVODOPA, AND ENTACAPONE 1 TABLET(S): 50; 200; 200 TABLET, FILM COATED ORAL at 16:06

## 2018-04-22 RX ADMIN — CARBIDOPA AND LEVODOPA 1 TABLET(S): 25; 100 TABLET ORAL at 16:06

## 2018-04-22 RX ADMIN — CARBIDOPA, LEVODOPA, AND ENTACAPONE 1 TABLET(S): 50; 200; 200 TABLET, FILM COATED ORAL at 06:08

## 2018-04-23 PROCEDURE — 99232 SBSQ HOSP IP/OBS MODERATE 35: CPT

## 2018-04-23 RX ORDER — FUROSEMIDE 40 MG
20 TABLET ORAL DAILY
Qty: 0 | Refills: 0 | Status: COMPLETED | OUTPATIENT
Start: 2018-04-23 | End: 2018-04-25

## 2018-04-23 RX ORDER — POTASSIUM CHLORIDE 20 MEQ
10 PACKET (EA) ORAL DAILY
Qty: 0 | Refills: 0 | Status: COMPLETED | OUTPATIENT
Start: 2018-04-23 | End: 2018-04-25

## 2018-04-23 RX ADMIN — CLOZAPINE 50 MILLIGRAM(S): 150 TABLET, ORALLY DISINTEGRATING ORAL at 15:34

## 2018-04-23 RX ADMIN — Medication 325 MILLIGRAM(S): at 05:59

## 2018-04-23 RX ADMIN — CARBIDOPA AND LEVODOPA 1 TABLET(S): 25; 100 TABLET ORAL at 20:31

## 2018-04-23 RX ADMIN — CARBIDOPA AND LEVODOPA 1 TABLET(S): 25; 100 TABLET ORAL at 05:53

## 2018-04-23 RX ADMIN — CARBIDOPA AND LEVODOPA 1 TABLET(S): 25; 100 TABLET ORAL at 15:39

## 2018-04-23 RX ADMIN — CARBIDOPA, LEVODOPA, AND ENTACAPONE 1 TABLET(S): 50; 200; 200 TABLET, FILM COATED ORAL at 05:53

## 2018-04-23 RX ADMIN — Medication 325 MILLIGRAM(S): at 06:29

## 2018-04-23 RX ADMIN — CARBIDOPA, LEVODOPA, AND ENTACAPONE 1 TABLET(S): 50; 200; 200 TABLET, FILM COATED ORAL at 11:00

## 2018-04-23 RX ADMIN — CARBIDOPA AND LEVODOPA 1 TABLET(S): 25; 100 TABLET ORAL at 11:00

## 2018-04-23 RX ADMIN — CARBIDOPA, LEVODOPA, AND ENTACAPONE 1 TABLET(S): 50; 200; 200 TABLET, FILM COATED ORAL at 15:39

## 2018-04-23 NOTE — PROGRESS NOTE BEHAVIORAL HEALTH - SUMMARY
51 yo male with early onset Parkinson’s disease x 11 years, discharged from 4 prior nursing homes in 8 months due to his behaviors, hx of refusing medications,  transferred to [facility] Unit 2S on 6/12/17 where he manifested severe mood lability, paranoia and confabulation with poor insight and judgment including making > 40 Justice center complaints. 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 Patient was accepted to [facility] in [place] and discharged from [facility] on 7/12/17.  Upon getting to [facility], Patient refused to go into the facility and became combative. He was thus transferred back to [facility] ED which resulted in Service Line Chiefs’ of Service involvement given the history/issues and ultimate transfer to [address]. Patient has been on Unit 1N since. UPDATE: [facility] declined to accept Patient. Hundreds of nursing home applications wee sent out and all declined to accept patient. Court scheduled on 12/19/17 was deferred as Patient refused to go; he then again refused to go to the new Court date. Continuing to search for placement which have been unsuccessful; family does not want him living with them.  Neurological facility in [place] has accepted patient, and legal guardian is involved in transfer issues. Court hearing regarding Patient's guardianship was 3/27/18 and his sister was granted temporary guardianship. Court hearing for retention occurred on 3/28/18 and the hospital was granted an additional 60 days. Awaiting Patient's sister to come up with a place to send Patient to.

## 2018-04-23 NOTE — PROGRESS NOTE BEHAVIORAL HEALTH - NSBHFUPINTERVALHXFT_PSY_A_CORE
Patient seen for paranoia, disorganized behavior. Chart reviewed; no significant interval events apart from his usual behaviors and complaints. Patient had again c/o chest pain and had been seen by MD. Case discussed in tx team meeting. Patient continues to hope that his sister will find a place for him. Same clinical presentation. Per staff, patient's sister was here on the weekend, and picked up the papers, but now reports she needs a letter. SW attempted to reach sister, but was unable to do so.  Patient had been refusing Clozaril, but did take it today.. No Rx SE, or sx TD/EPS are noted or reported.

## 2018-04-23 NOTE — CHART NOTE - NSCHARTNOTEFT_GEN_A_CORE
Called to see patient because patient is complaining of chest pain.  Went to see patient and patient was sitting in his room in his chair.  Patient said the pain started this evening when he went back to his room though the RN was only notified just now.  Patient said the pain is located in the center of his chest and is very sharp.  Patient cannot say if it radiates to any other location.       PHYSICAL EXAM:  /54      SaO2 97%     GENERAL:     very tremulous and anxious patient  HEAD:     atraumatic, normocephalic  EYES:     EOMI, conjunctiva and sclera clear  RESPIRATORY:     clear to auscultation bilaterally, no rales or rhonchi or wheezing or rubs  CARDIOVASCULAR:     tachycardic but no murmurs or rubs or gallops, 2+ peripheral pulses  CHEST:  tender to palpation in the center of his chest  GASTROINTESTINAL:     soft, nontender, nondistended, no hepatosplenomegaly palpated, bowel sounds present  EXTREMITIES:     trace pitting edema in BLE  MUSCULOSKELETAL:     no joint pain or swelling or deformities  NERVOUS SYSTEM:     bilateral upper arm tremors  PSYCH:     alert    A/P:  52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  Has had multiple episodes where he would complain of chest pain and so far, workup has been unrevealing.  Likely not cardiac related because of the nature of the pain (sharp) and is a palpable pain.  Also was told by the RN that patient had a late dinner/snack and therefore could be indigestion/GERD.    - patient refused EKG and blood work  - continue to monitor  - PPI/antiacids as needed

## 2018-04-24 PROCEDURE — 99231 SBSQ HOSP IP/OBS SF/LOW 25: CPT

## 2018-04-24 RX ADMIN — Medication 325 MILLIGRAM(S): at 06:41

## 2018-04-24 RX ADMIN — Medication 325 MILLIGRAM(S): at 11:58

## 2018-04-24 RX ADMIN — CYCLOBENZAPRINE HYDROCHLORIDE 10 MILLIGRAM(S): 10 TABLET, FILM COATED ORAL at 15:52

## 2018-04-24 RX ADMIN — CARBIDOPA AND LEVODOPA 1 TABLET(S): 25; 100 TABLET ORAL at 20:44

## 2018-04-24 RX ADMIN — CARBIDOPA AND LEVODOPA 1 TABLET(S): 25; 100 TABLET ORAL at 16:00

## 2018-04-24 RX ADMIN — CARBIDOPA, LEVODOPA, AND ENTACAPONE 1 TABLET(S): 50; 200; 200 TABLET, FILM COATED ORAL at 11:00

## 2018-04-24 RX ADMIN — CARBIDOPA, LEVODOPA, AND ENTACAPONE 1 TABLET(S): 50; 200; 200 TABLET, FILM COATED ORAL at 06:04

## 2018-04-24 RX ADMIN — Medication 325 MILLIGRAM(S): at 14:31

## 2018-04-24 RX ADMIN — Medication 325 MILLIGRAM(S): at 06:04

## 2018-04-24 RX ADMIN — CARBIDOPA AND LEVODOPA 1 TABLET(S): 25; 100 TABLET ORAL at 11:00

## 2018-04-24 RX ADMIN — CARBIDOPA, LEVODOPA, AND ENTACAPONE 1 TABLET(S): 50; 200; 200 TABLET, FILM COATED ORAL at 16:00

## 2018-04-24 RX ADMIN — CARBIDOPA AND LEVODOPA 1 TABLET(S): 25; 100 TABLET ORAL at 06:04

## 2018-04-24 NOTE — PROGRESS NOTE BEHAVIORAL HEALTH - NSBHFUPINTERVALHXFT_PSY_A_CORE
Patient seen for paranoia, disorganized behavior. Chart reviewed. Significant interval events - Patient's sister still has not returned to  the previously dropped off paperwork and is not returning phone calls or answering her phone when Team members call. Patient's clinical presentation remains the same.

## 2018-04-24 NOTE — CHART NOTE - NSCHARTNOTEFT_GEN_A_CORE
Psych NP Note   4/24/18         Patient refused his Clozapine despite staff encouragement and teaching.  Psychoeducation done re: benefits of clozapine for patient's sx, and he continues to refuse to take it.  Dr. Downing aware.  Cady Grossman NPP

## 2018-04-24 NOTE — PROGRESS NOTE BEHAVIORAL HEALTH - SUMMARY
53 yo male with early onset Parkinson’s disease x 11 years, discharged from 4 prior nursing homes in 8 months due to his behaviors, hx of refusing medications,  transferred to [facility] Unit 2S on 6/12/17 where he manifested severe mood lability, paranoia and confabulation with poor insight and judgment including making > 40 Justice center complaints. 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 Patient was accepted to [facility] in [place] and discharged from [facility] on 7/12/17.  Upon getting to [facility], Patient refused to go into the facility and became combative. He was thus transferred back to [facility] ED which resulted in Service Line Chiefs’ of Service involvement given the history/issues and ultimate transfer to [address]. Patient has been on Unit 1N since. UPDATE: [facility] declined to accept Patient. Hundreds of nursing home applications wee sent out and all declined to accept patient. Court scheduled on 12/19/17 was deferred as Patient refused to go; he then again refused to go to the new Court date. Continuing to search for placement which have been unsuccessful; family does not want him living with them.  Neurological facility in [place] has accepted patient, and legal guardian is involved in transfer issues. Court hearing regarding Patient's guardianship was 3/27/18 and his sister was granted temporary guardianship. Court hearing for retention occurred on 3/28/18 and the hospital was granted an additional 60 days. Awaiting Patient's sister to come up with a place to send Patient to.

## 2018-04-25 LAB
ANION GAP SERPL CALC-SCNC: 8 MMOL/L — SIGNIFICANT CHANGE UP (ref 5–17)
BASOPHILS # BLD AUTO: 0 K/UL — SIGNIFICANT CHANGE UP (ref 0–0.2)
BASOPHILS NFR BLD AUTO: 0.6 % — SIGNIFICANT CHANGE UP (ref 0–2)
BUN SERPL-MCNC: 16 MG/DL — SIGNIFICANT CHANGE UP (ref 7–23)
CALCIUM SERPL-MCNC: 8.9 MG/DL — SIGNIFICANT CHANGE UP (ref 8.4–10.5)
CHLORIDE SERPL-SCNC: 104 MMOL/L — SIGNIFICANT CHANGE UP (ref 96–108)
CO2 SERPL-SCNC: 28 MMOL/L — SIGNIFICANT CHANGE UP (ref 22–31)
CREAT SERPL-MCNC: 0.85 MG/DL — SIGNIFICANT CHANGE UP (ref 0.5–1.3)
EOSINOPHIL # BLD AUTO: 0.1 K/UL — SIGNIFICANT CHANGE UP (ref 0–0.5)
EOSINOPHIL NFR BLD AUTO: 1.7 % — SIGNIFICANT CHANGE UP (ref 0–6)
GLUCOSE SERPL-MCNC: 98 MG/DL — SIGNIFICANT CHANGE UP (ref 70–99)
HCT VFR BLD CALC: 37.4 % — LOW (ref 39–50)
HGB BLD-MCNC: 12.7 G/DL — LOW (ref 13–17)
LYMPHOCYTES # BLD AUTO: 2 K/UL — SIGNIFICANT CHANGE UP (ref 1–3.3)
LYMPHOCYTES # BLD AUTO: 28.9 % — SIGNIFICANT CHANGE UP (ref 13–44)
MCHC RBC-ENTMCNC: 30.9 PG — SIGNIFICANT CHANGE UP (ref 27–34)
MCHC RBC-ENTMCNC: 33.9 GM/DL — SIGNIFICANT CHANGE UP (ref 32–36)
MCV RBC AUTO: 91.2 FL — SIGNIFICANT CHANGE UP (ref 80–100)
MONOCYTES # BLD AUTO: 0.5 K/UL — SIGNIFICANT CHANGE UP (ref 0–0.9)
MONOCYTES NFR BLD AUTO: 7.6 % — SIGNIFICANT CHANGE UP (ref 2–14)
NEUTROPHILS # BLD AUTO: 4.1 K/UL — SIGNIFICANT CHANGE UP (ref 1.8–7.4)
NEUTROPHILS NFR BLD AUTO: 61.2 % — SIGNIFICANT CHANGE UP (ref 43–77)
PLATELET # BLD AUTO: 217 K/UL — SIGNIFICANT CHANGE UP (ref 150–400)
POTASSIUM SERPL-MCNC: 3.8 MMOL/L — SIGNIFICANT CHANGE UP (ref 3.5–5.3)
POTASSIUM SERPL-SCNC: 3.8 MMOL/L — SIGNIFICANT CHANGE UP (ref 3.5–5.3)
RBC # BLD: 4.1 M/UL — LOW (ref 4.2–5.8)
RBC # FLD: 12.4 % — SIGNIFICANT CHANGE UP (ref 10.3–14.5)
SODIUM SERPL-SCNC: 140 MMOL/L — SIGNIFICANT CHANGE UP (ref 135–145)
WBC # BLD: 6.8 K/UL — SIGNIFICANT CHANGE UP (ref 3.8–10.5)
WBC # FLD AUTO: 6.8 K/UL — SIGNIFICANT CHANGE UP (ref 3.8–10.5)

## 2018-04-25 PROCEDURE — 99231 SBSQ HOSP IP/OBS SF/LOW 25: CPT

## 2018-04-25 RX ADMIN — Medication 400 MILLIGRAM(S): at 22:06

## 2018-04-25 RX ADMIN — CARBIDOPA AND LEVODOPA 1 TABLET(S): 25; 100 TABLET ORAL at 20:35

## 2018-04-25 RX ADMIN — Medication 400 MILLIGRAM(S): at 23:00

## 2018-04-25 RX ADMIN — Medication 325 MILLIGRAM(S): at 06:47

## 2018-04-25 RX ADMIN — CYCLOBENZAPRINE HYDROCHLORIDE 10 MILLIGRAM(S): 10 TABLET, FILM COATED ORAL at 16:08

## 2018-04-25 RX ADMIN — CARBIDOPA, LEVODOPA, AND ENTACAPONE 1 TABLET(S): 50; 200; 200 TABLET, FILM COATED ORAL at 06:16

## 2018-04-25 RX ADMIN — Medication 650 MILLIGRAM(S): at 23:42

## 2018-04-25 RX ADMIN — CARBIDOPA AND LEVODOPA 1 TABLET(S): 25; 100 TABLET ORAL at 12:00

## 2018-04-25 RX ADMIN — CARBIDOPA, LEVODOPA, AND ENTACAPONE 1 TABLET(S): 50; 200; 200 TABLET, FILM COATED ORAL at 12:00

## 2018-04-25 RX ADMIN — Medication 325 MILLIGRAM(S): at 06:48

## 2018-04-25 RX ADMIN — CARBIDOPA AND LEVODOPA 1 TABLET(S): 25; 100 TABLET ORAL at 06:16

## 2018-04-25 NOTE — PROGRESS NOTE BEHAVIORAL HEALTH - NSBHFUPINTERVALHXFT_PSY_A_CORE
Patient did not have a great day yesterday - more needy and attention seeking then usual, including asking for a PT re-consult so "they can stretch me" then refused services when PT came. patient refused the Lasix, refused putting on his compression stockings, which is part of his usual behavior. Stated that his sister was not given the paperwork she came to  (as per staff, she never returned for them) because "that one nurse is mean and told her it is not there (in the chart)" which is also incorrect. Patient redirected and told that the paperwork has been read for > 1 week, it is in the chart and his sister did not come back to pick it up. Unit reached out to her repeatedly to get her mailing address so it van be overnighted via [employer] .

## 2018-04-25 NOTE — PROGRESS NOTE BEHAVIORAL HEALTH - SUMMARY
53 yo male with early onset Parkinson’s disease x 11 years, discharged from 4 prior nursing homes in 8 months due to his behaviors, hx of refusing medications,  transferred to [facility] Unit 2S on 6/12/17 where he manifested severe mood lability, paranoia and confabulation with poor insight and judgment including making > 40 Justice center complaints. 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 Patient was accepted to [facility] in [place] and discharged from [facility] on 7/12/17.  Upon getting to [facility], Patient refused to go into the facility and became combative. He was thus transferred back to [facility] ED which resulted in Service Line Chiefs’ of Service involvement given the history/issues and ultimate transfer to [address]. Patient has been on Unit 1N since. UPDATE: [facility] declined to accept Patient. Hundreds of nursing home applications wee sent out and all declined to accept patient. Court scheduled on 12/19/17 was deferred as Patient refused to go; he then again refused to go to the new Court date. Continuing to search for placement which have been unsuccessful; family does not want him living with them.  Neurological facility in [place] has accepted patient, and legal guardian is involved in transfer issues. Court hearing regarding Patient's guardianship was 3/27/18 and his sister was granted temporary guardianship. Court hearing for retention occurred on 3/28/18 and the hospital was granted an additional 60 days. Awaiting Patient's sister to come up with a place to send Patient to.

## 2018-04-26 PROCEDURE — 99231 SBSQ HOSP IP/OBS SF/LOW 25: CPT

## 2018-04-26 RX ADMIN — CARBIDOPA, LEVODOPA, AND ENTACAPONE 1 TABLET(S): 50; 200; 200 TABLET, FILM COATED ORAL at 11:01

## 2018-04-26 RX ADMIN — Medication 325 MILLIGRAM(S): at 17:26

## 2018-04-26 RX ADMIN — CARBIDOPA AND LEVODOPA 1 TABLET(S): 25; 100 TABLET ORAL at 11:01

## 2018-04-26 RX ADMIN — CARBIDOPA, LEVODOPA, AND ENTACAPONE 1 TABLET(S): 50; 200; 200 TABLET, FILM COATED ORAL at 17:25

## 2018-04-26 RX ADMIN — Medication 325 MILLIGRAM(S): at 17:00

## 2018-04-26 RX ADMIN — CARBIDOPA, LEVODOPA, AND ENTACAPONE 1 TABLET(S): 50; 200; 200 TABLET, FILM COATED ORAL at 05:14

## 2018-04-26 RX ADMIN — Medication 400 MILLIGRAM(S): at 12:00

## 2018-04-26 RX ADMIN — CARBIDOPA AND LEVODOPA 1 TABLET(S): 25; 100 TABLET ORAL at 17:25

## 2018-04-26 RX ADMIN — Medication 400 MILLIGRAM(S): at 11:00

## 2018-04-26 RX ADMIN — CYCLOBENZAPRINE HYDROCHLORIDE 10 MILLIGRAM(S): 10 TABLET, FILM COATED ORAL at 16:43

## 2018-04-26 RX ADMIN — CARBIDOPA AND LEVODOPA 1 TABLET(S): 25; 100 TABLET ORAL at 05:14

## 2018-04-26 RX ADMIN — Medication 650 MILLIGRAM(S): at 05:42

## 2018-04-26 RX ADMIN — CARBIDOPA AND LEVODOPA 1 TABLET(S): 25; 100 TABLET ORAL at 20:37

## 2018-04-26 RX ADMIN — Medication 650 MILLIGRAM(S): at 06:35

## 2018-04-26 RX ADMIN — Medication 650 MILLIGRAM(S): at 00:30

## 2018-04-26 NOTE — PROGRESS NOTE BEHAVIORAL HEALTH - NSBHADMITMEDEDUDETAILS_A_CORE FT
Psychoeducation done re: benefits of Lasix in helping his legs to be less swollen, with reply "I don't want to take it" and refusing to give reason

## 2018-04-26 NOTE — PROGRESS NOTE BEHAVIORAL HEALTH - OTHER
+ neck favoring one side, dressed in hospital gowns, adequate grooming and hygiene no psychomotor agitation at times (chronic and seemingly intentionaly as he can stop it when redirected). appropriate chronically limited but better since admission  more able to respond to redirection festinating gait but stable baseline variable - at times euthymic, easily reactive, at times makes jokes at times yelling varying (chronic), usually euthymic with episodes of irritable at times overall linear, at times disorganized and circumstantial, concrete at times paranoia towards certain staff (chronic), and paranoid that the engineering staff is causing problems with his room chronically impaired but improved since admission Limited

## 2018-04-26 NOTE — PROGRESS NOTE BEHAVIORAL HEALTH - NSBHFUPINTERVALHXFT_PSY_A_CORE
No significant interval events reported.  Case discussed in tx team meeting.  Patient seen and chart reviewed. Patient continues to be variable with Rx compliance, did take Sinemet and Stalevo, but refusing Clozapine and Lasix.  Patient also refusing to wear TEDS stockings, but was elevating his legs at times.  Patient is calmer today, and is relating in a more appropriate manner. No Rx SE are noted or reported.  Patient denies any SI or HI.  Increased anxiety re; sister getting him a place to live

## 2018-04-26 NOTE — PROGRESS NOTE BEHAVIORAL HEALTH - SUMMARY
51 yo male with early onset Parkinson’s disease x 11 years, discharged from 4 prior nursing homes in 8 months due to his behaviors, hx of refusing medications,  transferred to [facility] Unit [facility] on 6/12/17 where he manifested severe mood lability, paranoia and confabulation with poor insight and judgment including making > 40 Justice center complaints. 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 Patient was accepted to [facility] in [place] and discharged from [facility] on 7/12/17.  Upon getting to [facility], Patient refused to go into the facility and became combative. He was thus transferred back to [facility] ED which resulted in Service Line Chiefs’ of Service involvement given the history/issues and ultimate transfer to [address]. Patient has been on [address] since. UPDATE: [facility] declined to accept Patient. Hundreds of nursing home applications wee sent out and all declined to accept patient. Court scheduled on 12/19/17 was deferred as Patient refused to go; he then again refused to go to the new Court date. Continuing to search for placement which have been unsuccessful; family does not want him living with them.  Neurological facility in [place] has accepted patient, and legal guardian is involved in transfer issues. Court hearing regarding Patient's guardianship was 3/27/18 and his sister was granted temporary guardianship. Court hearing for retention occurred on 3/28/18 and the hospital was granted an additional 60 days. Awaiting Patient's sister to come up with a place to send Patient to.

## 2018-04-27 PROCEDURE — 99231 SBSQ HOSP IP/OBS SF/LOW 25: CPT

## 2018-04-27 RX ADMIN — CLOZAPINE 50 MILLIGRAM(S): 150 TABLET, ORALLY DISINTEGRATING ORAL at 16:36

## 2018-04-27 RX ADMIN — CARBIDOPA, LEVODOPA, AND ENTACAPONE 1 TABLET(S): 50; 200; 200 TABLET, FILM COATED ORAL at 05:28

## 2018-04-27 RX ADMIN — CARBIDOPA, LEVODOPA, AND ENTACAPONE 1 TABLET(S): 50; 200; 200 TABLET, FILM COATED ORAL at 10:25

## 2018-04-27 RX ADMIN — CARBIDOPA AND LEVODOPA 1 TABLET(S): 25; 100 TABLET ORAL at 20:53

## 2018-04-27 RX ADMIN — Medication 30 MILLILITER(S): at 04:13

## 2018-04-27 RX ADMIN — CARBIDOPA AND LEVODOPA 1 TABLET(S): 25; 100 TABLET ORAL at 10:25

## 2018-04-27 RX ADMIN — CARBIDOPA, LEVODOPA, AND ENTACAPONE 1 TABLET(S): 50; 200; 200 TABLET, FILM COATED ORAL at 16:37

## 2018-04-27 RX ADMIN — Medication 325 MILLIGRAM(S): at 01:00

## 2018-04-27 RX ADMIN — CARBIDOPA AND LEVODOPA 1 TABLET(S): 25; 100 TABLET ORAL at 16:37

## 2018-04-27 RX ADMIN — CARBIDOPA AND LEVODOPA 1 TABLET(S): 25; 100 TABLET ORAL at 20:54

## 2018-04-27 RX ADMIN — CYCLOBENZAPRINE HYDROCHLORIDE 10 MILLIGRAM(S): 10 TABLET, FILM COATED ORAL at 02:36

## 2018-04-27 RX ADMIN — Medication 325 MILLIGRAM(S): at 00:09

## 2018-04-27 RX ADMIN — CARBIDOPA AND LEVODOPA 1 TABLET(S): 25; 100 TABLET ORAL at 05:27

## 2018-04-27 NOTE — PROGRESS NOTE BEHAVIORAL HEALTH - NSBHADMITMEDEDUDETAILS_A_CORE FT
Psychoeducation done re: benefits of Clozapine to help him feel less paranoid with reply "They are messing with my room--engineering is making it hot and pumping poison air into it"

## 2018-04-27 NOTE — PROGRESS NOTE BEHAVIORAL HEALTH - NSBHFUPINTERVALHXFT_PSY_A_CORE
No significant interval events reported.  Case discussed in tx team meeting.  Patient seen and chart reviewed. Patient continues to be variable with Rx compliance, did take Sinemet and Stalevo, but refusing Clozapine..  Patient also refusing to wear TEDS stockings, but was elevating his legs at times.  Patient is calmer today, and is relating in a more appropriate manner. No Rx SE are noted or reported.  Patient denies any SI or HI.  Increased anxiety re; sister getting him a place to live.  Patient is verbalizing increased paranoia, especially concerning his room and poisoned air being pumped into his room

## 2018-04-27 NOTE — PROGRESS NOTE BEHAVIORAL HEALTH - SUMMARY
51 yo male with early onset Parkinson’s disease x 11 years, discharged from 4 prior nursing homes in 8 months due to his behaviors, hx of refusing medications,  transferred to [facility] Unit [facility] on 6/12/17 where he manifested severe mood lability, paranoia and confabulation with poor insight and judgment including making > 40 [facility] center complaints. 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 Patient was accepted to [facility] in [place] and discharged from [facility] on 7/12/17.  Upon getting to [facility], Patient refused to go into the facility and became combative. He was thus transferred back to [facility] ED which resulted in Service Line Chiefs’ of Service involvement given the history/issues and ultimate transfer to [address]. Patient has been on [address] 1[address] since. UPDATE: [facility] declined to accept Patient. Hundreds of nursing home applications wee sent out and all declined to accept patient. Court scheduled on 12/19/17 was deferred as Patient refused to go; he then again refused to go to the new Court date. Continuing to search for placement which have been unsuccessful; family does not want him living with them.  Neurological facility in [place] has accepted patient, and legal guardian is involved in transfer issues. Court hearing regarding Patient's guardianship was 3/27/18 and his sister was granted temporary guardianship. Court hearing for retention occurred on 3/28/18 and the hospital was granted an additional 60 days. Awaiting Patient's sister to come up with a place to send Patient to.  patient refusing Clozapine, and is becoming more paranoid

## 2018-04-28 RX ADMIN — CARBIDOPA AND LEVODOPA 1 TABLET(S): 25; 100 TABLET ORAL at 21:12

## 2018-04-28 RX ADMIN — CARBIDOPA, LEVODOPA, AND ENTACAPONE 1 TABLET(S): 50; 200; 200 TABLET, FILM COATED ORAL at 10:19

## 2018-04-28 RX ADMIN — CLOZAPINE 50 MILLIGRAM(S): 150 TABLET, ORALLY DISINTEGRATING ORAL at 16:18

## 2018-04-28 RX ADMIN — Medication 325 MILLIGRAM(S): at 05:33

## 2018-04-28 RX ADMIN — CARBIDOPA, LEVODOPA, AND ENTACAPONE 1 TABLET(S): 50; 200; 200 TABLET, FILM COATED ORAL at 06:23

## 2018-04-28 RX ADMIN — CARBIDOPA, LEVODOPA, AND ENTACAPONE 1 TABLET(S): 50; 200; 200 TABLET, FILM COATED ORAL at 16:17

## 2018-04-28 RX ADMIN — CARBIDOPA AND LEVODOPA 1 TABLET(S): 25; 100 TABLET ORAL at 06:23

## 2018-04-28 RX ADMIN — Medication 325 MILLIGRAM(S): at 04:22

## 2018-04-28 RX ADMIN — Medication 650 MILLIGRAM(S): at 11:21

## 2018-04-28 RX ADMIN — CARBIDOPA AND LEVODOPA 1 TABLET(S): 25; 100 TABLET ORAL at 10:18

## 2018-04-28 RX ADMIN — CARBIDOPA AND LEVODOPA 1 TABLET(S): 25; 100 TABLET ORAL at 16:18

## 2018-04-29 RX ADMIN — Medication 325 MILLIGRAM(S): at 22:36

## 2018-04-29 RX ADMIN — CARBIDOPA, LEVODOPA, AND ENTACAPONE 1 TABLET(S): 50; 200; 200 TABLET, FILM COATED ORAL at 06:34

## 2018-04-29 RX ADMIN — CARBIDOPA AND LEVODOPA 1 TABLET(S): 25; 100 TABLET ORAL at 06:34

## 2018-04-29 RX ADMIN — CYCLOBENZAPRINE HYDROCHLORIDE 10 MILLIGRAM(S): 10 TABLET, FILM COATED ORAL at 01:55

## 2018-04-29 RX ADMIN — CARBIDOPA, LEVODOPA, AND ENTACAPONE 1 TABLET(S): 50; 200; 200 TABLET, FILM COATED ORAL at 10:55

## 2018-04-29 RX ADMIN — CARBIDOPA AND LEVODOPA 1 TABLET(S): 25; 100 TABLET ORAL at 20:44

## 2018-04-29 RX ADMIN — CARBIDOPA AND LEVODOPA 1 TABLET(S): 25; 100 TABLET ORAL at 10:55

## 2018-04-30 PROCEDURE — 99231 SBSQ HOSP IP/OBS SF/LOW 25: CPT

## 2018-04-30 RX ADMIN — Medication 400 MILLIGRAM(S): at 02:56

## 2018-04-30 RX ADMIN — CARBIDOPA, LEVODOPA, AND ENTACAPONE 1 TABLET(S): 50; 200; 200 TABLET, FILM COATED ORAL at 12:03

## 2018-04-30 RX ADMIN — CARBIDOPA AND LEVODOPA 1 TABLET(S): 25; 100 TABLET ORAL at 16:15

## 2018-04-30 RX ADMIN — CARBIDOPA AND LEVODOPA 1 TABLET(S): 25; 100 TABLET ORAL at 05:36

## 2018-04-30 RX ADMIN — Medication 650 MILLIGRAM(S): at 06:09

## 2018-04-30 RX ADMIN — Medication 400 MILLIGRAM(S): at 18:32

## 2018-04-30 RX ADMIN — Medication 400 MILLIGRAM(S): at 18:02

## 2018-04-30 RX ADMIN — CARBIDOPA AND LEVODOPA 1 TABLET(S): 25; 100 TABLET ORAL at 20:09

## 2018-04-30 RX ADMIN — Medication 325 MILLIGRAM(S): at 02:54

## 2018-04-30 RX ADMIN — CARBIDOPA, LEVODOPA, AND ENTACAPONE 1 TABLET(S): 50; 200; 200 TABLET, FILM COATED ORAL at 05:36

## 2018-04-30 RX ADMIN — CARBIDOPA, LEVODOPA, AND ENTACAPONE 1 TABLET(S): 50; 200; 200 TABLET, FILM COATED ORAL at 16:14

## 2018-04-30 RX ADMIN — CARBIDOPA AND LEVODOPA 1 TABLET(S): 25; 100 TABLET ORAL at 12:03

## 2018-04-30 RX ADMIN — Medication 400 MILLIGRAM(S): at 01:51

## 2018-04-30 RX ADMIN — Medication 650 MILLIGRAM(S): at 08:31

## 2018-04-30 NOTE — PROGRESS NOTE BEHAVIORAL HEALTH - SUMMARY
53 yo male with early onset Parkinson’s disease x 11 years, discharged from 4 prior nursing homes in 8 months due to his behaviors, hx of refusing medications,  transferred to [facility] Unit [facility] on 6/12/17 where he manifested severe mood lability, paranoia and confabulation with poor insight and judgment including making > 40 [facility] center complaints. 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 Patient was accepted to [facility] in [place] and discharged from [facility] on 7/12/17.  Upon getting to [facility], Patient refused to go into the facility and became combative. He was thus transferred back to [facility] ED which resulted in Service Line Chiefs’ of Service involvement given the history/issues and ultimate transfer to [address]. Patient has been on [address] since. UPDATE: [facility] declined to accept Patient. Hundreds of nursing home applications wee sent out and all declined to accept patient. Court scheduled on 12/19/17 was deferred as Patient refused to go; he then again refused to go to the new Court date. Continuing to search for placement which have been unsuccessful; family does not want him living with them.  Neurological facility in [place] has accepted patient, and legal guardian is involved in transfer issues. Court hearing regarding Patient's guardianship was 3/27/18 and his sister was granted temporary guardianship. Court hearing for retention occurred on 3/28/18 and the hospital was granted an additional 60 days. Awaiting Patient's sister to come up with a place to send Patient to.  patient refusing Clozapine at times., and is becoming more paranoid

## 2018-04-30 NOTE — PROGRESS NOTE BEHAVIORAL HEALTH - NSBHADMITMEDEDUDETAILS_A_CORE FT
Psychoeducation done re: benefits of Clozapine to help him feel less paranoid with reply "They are still messing with my room--engineering is making it hot and pumping poison air into it"

## 2018-04-30 NOTE — PROGRESS NOTE BEHAVIORAL HEALTH - NSBHFUPINTERVALHXFT_PSY_A_CORE
No significant interval events reported other than patient's usual behaviors, including paranoid accusations to staff.   Case discussed in tx team meeting.  Patient seen and chart reviewed. Patient continues to be variable with Rx compliance, did take Sinemet and Stalevo, but refusing Clozapine on some days, without a stated reason.  Patient also refusing to wear TEDS stockings, but was elevating his legs at times.  Patient is calmer today, and is relating in a more appropriate manner. No Rx SE, sx TD/EPS  are noted or reported.  Patient denies any SI or HI.  Increased anxiety re; sister getting him a place to live.  Patient is verbalizing increased paranoia, especially concerning his room and poisoned air being pumped into his room, and staff accusations.  This may be related to inconsistent compliance with Clozapine and increased anxiety re: discharge issue.

## 2018-04-30 NOTE — PROGRESS NOTE BEHAVIORAL HEALTH - OTHER
overall linear, at times disorganized and circumstantial, concrete increase in paranoia, may be due to inconsistent Rx compliance and anxiety re: discharge chronically impaired but improved since admission festinating gait but stable baseline variable - at times euthymic, easily reactive, at times makes jokes at times yelling varying (chronic), usually euthymic with episodes of irritable at times Limited + neck favoring one side, dressed in hospital gowns, adequate grooming and hygiene no psychomotor agitation at times (chronic and seemingly intentionaly as he can stop it when redirected). appropriate chronically limited but better since admission  more able to respond to redirection

## 2018-05-01 PROCEDURE — 99231 SBSQ HOSP IP/OBS SF/LOW 25: CPT

## 2018-05-01 RX ADMIN — CARBIDOPA AND LEVODOPA 1 TABLET(S): 25; 100 TABLET ORAL at 20:19

## 2018-05-01 RX ADMIN — Medication 400 MILLIGRAM(S): at 21:07

## 2018-05-01 RX ADMIN — Medication 400 MILLIGRAM(S): at 21:08

## 2018-05-01 RX ADMIN — CARBIDOPA, LEVODOPA, AND ENTACAPONE 1 TABLET(S): 50; 200; 200 TABLET, FILM COATED ORAL at 11:08

## 2018-05-01 RX ADMIN — CARBIDOPA, LEVODOPA, AND ENTACAPONE 1 TABLET(S): 50; 200; 200 TABLET, FILM COATED ORAL at 05:50

## 2018-05-01 RX ADMIN — Medication 325 MILLIGRAM(S): at 17:38

## 2018-05-01 RX ADMIN — CARBIDOPA AND LEVODOPA 1 TABLET(S): 25; 100 TABLET ORAL at 11:08

## 2018-05-01 RX ADMIN — CARBIDOPA AND LEVODOPA 1 TABLET(S): 25; 100 TABLET ORAL at 05:49

## 2018-05-01 RX ADMIN — Medication 325 MILLIGRAM(S): at 18:42

## 2018-05-01 RX ADMIN — CARBIDOPA AND LEVODOPA 1 TABLET(S): 25; 100 TABLET ORAL at 17:09

## 2018-05-01 RX ADMIN — CARBIDOPA, LEVODOPA, AND ENTACAPONE 1 TABLET(S): 50; 200; 200 TABLET, FILM COATED ORAL at 17:09

## 2018-05-01 NOTE — PROGRESS NOTE BEHAVIORAL HEALTH - OTHER
baseline variable - at times euthymic, easily reactive, at times makes jokes at times yelling varying (chronic), usually euthymic with episodes of irritable at times overall linear, at times disorganized and circumstantial, concrete increase in paranoia, may be due to inconsistent Rx compliance and anxiety re: discharge chronically impaired but improved since admission Limited + neck favoring one side, dressed in hospital gowns, adequate grooming and hygiene no psychomotor agitation at times (chronic and seemingly intentionaly as he can stop it when redirected). appropriate chronically limited but better since admission  more able to respond to redirection festinating gait but stable

## 2018-05-01 NOTE — PROGRESS NOTE BEHAVIORAL HEALTH - NSBHADMITMEDEDUDETAILS_A_CORE FT
Psychoeducation again done re: benefits of Clozapine to help him feel less paranoid with reply "They are still messing with my room--engineering is making it hot and pumping poison air into it"

## 2018-05-01 NOTE — PROGRESS NOTE BEHAVIORAL HEALTH - NSBHFUPINTERVALHXFT_PSY_A_CORE
No significant interval events reported other than patient's usual behaviors, including paranoid accusations to staff.   Case discussed in tx team meeting.  Patient seen and chart reviewed. Patient continues to be variable with Rx compliance, did take Sinemet and Stalevo, but refusing Clozapine on some days, without a stated reason.  Patient also refusing to wear TEDS stockings, but was elevating his legs at times.  Patient is calmer today, and is relating in a more appropriate manner, but does have peroids of yelling.  No Rx SE, sx TD/EPS  are noted or reported.  Patient denies any SI or HI.  Increased anxiety re; sister getting him a place to live.  Patient is verbalizing increased paranoia, especially concerning his room and poisoned air being pumped into his room his room being kept "hot" by staff to torture him, and staff accusations.  This may be related to inconsistent compliance with Clozapine and increased anxiety re: discharge issue.

## 2018-05-01 NOTE — PROGRESS NOTE BEHAVIORAL HEALTH - SUMMARY
53 yo male with early onset Parkinson’s disease x 11 years, discharged from 4 prior nursing homes in 8 months due to his behaviors, hx of refusing medications,  transferred to [facility] Unit [facility] on 6/12/17 where he manifested severe mood lability, paranoia and confabulation with poor insight and judgment including making > 40 [facility] center complaints. 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 Patient was accepted to [facility] in [place] and discharged from [facility] on 7/12/17.  Upon getting to [facility], Patient refused to go into the facility and became combative. He was thus transferred back to [facility] ED which resulted in Service Line Chiefs’ of Service involvement given the history/issues and ultimate transfer to [address]. Patient has been on [address] 1[address] since. UPDATE: [facility] declined to accept Patient. Hundreds of nursing home applications wee sent out and all declined to accept patient. Court scheduled on 12/19/17 was deferred as Patient refused to go; he then again refused to go to the new Court date. Continuing to search for placement which have been unsuccessful; family does not want him living with them.  Neurological facility in [place] has accepted patient, and legal guardian is involved in transfer issues. Court hearing regarding Patient's guardianship was 3/27/18 and his sister was granted temporary guardianship. Court hearing for retention occurred on 3/28/18 and the hospital was granted an additional 60 days. Awaiting Patient's sister to come up with a place to send Patient to.  patient refusing Clozapine at times., and is becoming more paranoid

## 2018-05-02 PROCEDURE — 99231 SBSQ HOSP IP/OBS SF/LOW 25: CPT

## 2018-05-02 RX ADMIN — CARBIDOPA AND LEVODOPA 1 TABLET(S): 25; 100 TABLET ORAL at 05:46

## 2018-05-02 RX ADMIN — CARBIDOPA AND LEVODOPA 1 TABLET(S): 25; 100 TABLET ORAL at 20:36

## 2018-05-02 RX ADMIN — CARBIDOPA AND LEVODOPA 1 TABLET(S): 25; 100 TABLET ORAL at 11:04

## 2018-05-02 RX ADMIN — CARBIDOPA, LEVODOPA, AND ENTACAPONE 1 TABLET(S): 50; 200; 200 TABLET, FILM COATED ORAL at 11:03

## 2018-05-02 RX ADMIN — CARBIDOPA AND LEVODOPA 1 TABLET(S): 25; 100 TABLET ORAL at 16:27

## 2018-05-02 RX ADMIN — Medication 325 MILLIGRAM(S): at 21:43

## 2018-05-02 RX ADMIN — CARBIDOPA, LEVODOPA, AND ENTACAPONE 1 TABLET(S): 50; 200; 200 TABLET, FILM COATED ORAL at 05:46

## 2018-05-02 RX ADMIN — Medication 650 MILLIGRAM(S): at 06:50

## 2018-05-02 RX ADMIN — CARBIDOPA, LEVODOPA, AND ENTACAPONE 1 TABLET(S): 50; 200; 200 TABLET, FILM COATED ORAL at 16:28

## 2018-05-02 RX ADMIN — Medication 650 MILLIGRAM(S): at 05:56

## 2018-05-02 NOTE — PROGRESS NOTE BEHAVIORAL HEALTH - SUMMARY
51 yo male with early onset Parkinson’s disease x 11 years, discharged from 4 prior nursing homes in 8 months due to his behaviors, hx of refusing medications,  transferred to [facility] Unit 2S on 6/12/17 where he manifested severe mood lability, paranoia and confabulation with poor insight and judgment including making > 40 [facility] center complaints. 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 Patient was accepted to [facility] in [place] and discharged from [facility] on 7/12/17.  Upon getting to [facility], Patient refused to go into the facility and became combative. He was thus transferred back to [facility] ED which resulted in Service Line Chiefs’ of Service involvement given the history/issues and ultimate transfer to [address]. Patient has been on Unit 1N since. UPDATE: [facility] declined to accept Patient. Hundreds of nursing home applications wee sent out and all declined to accept patient. Court scheduled on 12/19/17 was deferred as Patient refused to go; he then again refused to go to the new Court date. Continuing to search for placement which have been unsuccessful; family does not want him living with them.  Neurological facility in [place] has accepted patient, and legal guardian is involved in transfer issues. Court hearing regarding Patient's guardianship was 3/27/18 and his sister was granted temporary guardianship. Court hearing for retention occurred on 3/28/18 and the hospital was granted an additional 60 days. Awaiting Patient's sister to come up with a place to send Patient to.  patient refusing Clozapine at times., and is becoming more paranoid

## 2018-05-02 NOTE — PROGRESS NOTE BEHAVIORAL HEALTH - OTHER
+ neck favoring one side, dressed in hospital gowns, adequate grooming and hygiene no psychomotor agitation at times (chronic and seemingly intentionaly as he can stop it when redirected). appropriate chronically limited but better since admission  more able to respond to redirection Limited festinating gait but stable baseline variable - at times euthymic, easily reactive, at times makes jokes at times yelling varying (chronic), usually euthymic with episodes of irritable at times overall linear, at times disorganized and circumstantial, concrete increase in paranoia, may be due to inconsistent Rx compliance and anxiety re: discharge chronically impaired but improved since admission

## 2018-05-02 NOTE — PROGRESS NOTE BEHAVIORAL HEALTH - NSBHFUPINTERVALHXFT_PSY_A_CORE
No significant interval events reported other than patient's usual behaviors, including paranoid accusations to staff.   Case discussed in tx team meeting.  Patient seen and chart reviewed. Patient continues to be variable with Rx compliance, did take Sinemet and Stalevo, but refusing Clozapine on some days, without a stated reason.  Patient also refusing to wear TEDS stockings, but was elevating his legs at times.  Patient is calmer today, and is relating in a more appropriate manner, but does have periods of yelling.  No Rx SE, sx TD/EPS  are noted or reported.  Patient denies any SI or HI.  Increased anxiety re; sister getting him a place to live.  Patient is verbalizing increased paranoia, especially concerning his room and poisoned air being pumped into his room his room being kept "hot" by staff to torture him, and staff accusations.  This may be related to inconsistent compliance with Clozapine and increased anxiety re: discharge issue.

## 2018-05-03 PROCEDURE — 99231 SBSQ HOSP IP/OBS SF/LOW 25: CPT

## 2018-05-03 RX ADMIN — Medication 325 MILLIGRAM(S): at 04:19

## 2018-05-03 RX ADMIN — CARBIDOPA AND LEVODOPA 1 TABLET(S): 25; 100 TABLET ORAL at 20:11

## 2018-05-03 RX ADMIN — CARBIDOPA AND LEVODOPA 1 TABLET(S): 25; 100 TABLET ORAL at 10:38

## 2018-05-03 RX ADMIN — CARBIDOPA AND LEVODOPA 1 TABLET(S): 25; 100 TABLET ORAL at 05:36

## 2018-05-03 RX ADMIN — CARBIDOPA AND LEVODOPA 1 TABLET(S): 25; 100 TABLET ORAL at 16:35

## 2018-05-03 RX ADMIN — CARBIDOPA, LEVODOPA, AND ENTACAPONE 1 TABLET(S): 50; 200; 200 TABLET, FILM COATED ORAL at 16:35

## 2018-05-03 RX ADMIN — CARBIDOPA, LEVODOPA, AND ENTACAPONE 1 TABLET(S): 50; 200; 200 TABLET, FILM COATED ORAL at 05:36

## 2018-05-03 RX ADMIN — Medication 325 MILLIGRAM(S): at 03:12

## 2018-05-03 RX ADMIN — CARBIDOPA, LEVODOPA, AND ENTACAPONE 1 TABLET(S): 50; 200; 200 TABLET, FILM COATED ORAL at 10:38

## 2018-05-03 RX ADMIN — Medication 325 MILLIGRAM(S): at 22:15

## 2018-05-03 NOTE — PROGRESS NOTE BEHAVIORAL HEALTH - OTHER
overall linear, at times disorganized and circumstantial, concrete increase in paranoia, may be due to inconsistent Rx compliance and anxiety re: discharge chronically impaired but improved since admission Limited + neck favoring one side, dressed in hospital gowns, adequate grooming and hygiene no psychomotor agitation at times (chronic and seemingly intentionaly as he can stop it when redirected). appropriate chronically limited but better since admission  more able to respond to redirection festinating gait but stable baseline variable - at times euthymic, easily reactive, at times makes jokes at times yelling varying (chronic), usually euthymic with episodes of irritable at times

## 2018-05-03 NOTE — PROGRESS NOTE BEHAVIORAL HEALTH - NSBHADMITMEDEDUDETAILS_A_CORE FT
Psychoeducation again done re: benefits of Clozapine to help him feel less paranoid with patient continuing to assert "They are still messing with my room--engineering is making it hot and pumping poison air into it.  They are all against me"

## 2018-05-03 NOTE — PROGRESS NOTE BEHAVIORAL HEALTH - NSBHFUPINTERVALCCFT_PSY_A_CORE
" I feel ok.  I like my haircut"  Staff cut patient's hair, and patient spoke about how good he feels with the new haircut.

## 2018-05-03 NOTE — PROGRESS NOTE BEHAVIORAL HEALTH - SUMMARY
51 yo male with early onset Parkinson’s disease x 11 years, discharged from 4 prior nursing homes in 8 months due to his behaviors, hx of refusing medications,  transferred to [facility] Unit [facility] on 6/12/17 where he manifested severe mood lability, paranoia and confabulation with poor insight and judgment including making > 40 [facility] complaints. 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 Patient was accepted to [facility] in [place] and discharged from [facility] on 7/12/17.  Upon getting to [facility], Patient refused to go into the facility and became combative. He was thus transferred back to [facility] ED which resulted in Service Line Chiefs’ of Service involvement given the history/issues and ultimate transfer to [address]. Patient has been on [address] 1[address] since. UPDATE: [facility] declined to accept Patient. Hundreds of nursing home applications wee sent out and all declined to accept patient. Court scheduled on 12/19/17 was deferred as Patient refused to go; he then again refused to go to the new Court date. Continuing to search for placement which have been unsuccessful; family does not want him living with them.  Neurological facility in [place] has accepted patient, and legal guardian is involved in transfer issues. Court hearing regarding Patient's guardianship was 3/27/18 and his sister was granted temporary guardianship. Court hearing for retention occurred on 3/28/18 and the hospital was granted an additional 60 days. Awaiting Patient's sister to come up with a place to send Patient to.  patient refusing Clozapine at times., and is becoming more paranoid

## 2018-05-03 NOTE — PROGRESS NOTE BEHAVIORAL HEALTH - NSBHFUPINTERVALHXFT_PSY_A_CORE
No significant interval events reported other than patient's usual behaviors, including paranoid accusations to staff.   Case discussed in tx team meeting.  Patient seen and chart reviewed. Patient continues to be variable with Rx compliance, did take Sinemet and Stalevo, but refusing Clozapine for past several days, despite staff encouragement and teaching,, without a stated reason.  Patient also refusing to wear TEDS stockings, but was elevating his legs at times.  Patient is calmer today, and is relating in a more appropriate manner, but does have periods of yelling.  No Rx SE  are noted or reported.  Patient denies any SI or HI.  Increased anxiety re; sister getting him a place to live.  Patient is verbalizing increased paranoia, especially concerning his room and poisoned air being pumped into his room his room being kept "hot" by staff to torture him, and staff accusations.  This may be related to inconsistent compliance with Clozapine and increased anxiety re: discharge issue.

## 2018-05-04 PROCEDURE — 99231 SBSQ HOSP IP/OBS SF/LOW 25: CPT

## 2018-05-04 RX ADMIN — CARBIDOPA, LEVODOPA, AND ENTACAPONE 1 TABLET(S): 50; 200; 200 TABLET, FILM COATED ORAL at 05:40

## 2018-05-04 RX ADMIN — CARBIDOPA AND LEVODOPA 1 TABLET(S): 25; 100 TABLET ORAL at 16:44

## 2018-05-04 RX ADMIN — CARBIDOPA AND LEVODOPA 1 TABLET(S): 25; 100 TABLET ORAL at 05:40

## 2018-05-04 RX ADMIN — CARBIDOPA, LEVODOPA, AND ENTACAPONE 1 TABLET(S): 50; 200; 200 TABLET, FILM COATED ORAL at 16:44

## 2018-05-04 RX ADMIN — CARBIDOPA AND LEVODOPA 1 TABLET(S): 25; 100 TABLET ORAL at 20:41

## 2018-05-04 RX ADMIN — CARBIDOPA, LEVODOPA, AND ENTACAPONE 1 TABLET(S): 50; 200; 200 TABLET, FILM COATED ORAL at 10:41

## 2018-05-04 RX ADMIN — Medication 325 MILLIGRAM(S): at 00:02

## 2018-05-04 RX ADMIN — CARBIDOPA AND LEVODOPA 1 TABLET(S): 25; 100 TABLET ORAL at 10:40

## 2018-05-04 NOTE — PROGRESS NOTE BEHAVIORAL HEALTH - NSBHFUPINTERVALHXFT_PSY_A_CORE
Patient seen and examined. No significant interval events. Same clinical presentation. Patient seen and examined. No significant interval events. Same clinical presentation. Patient has a new hair cut which he seems to like.

## 2018-05-04 NOTE — PROGRESS NOTE BEHAVIORAL HEALTH - SUMMARY
53 yo male with early onset Parkinson’s disease x 11 years, discharged from 4 prior nursing homes in 8 months due to his behaviors, hx of refusing medications,  transferred to [facility] Unit [facility]S on 6/12/17 where he manifested severe mood lability, paranoia and confabulation with poor insight and judgment including making > 40 [facility] center complaints. 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 Patient was accepted to [facility] in [place] and discharged from [facility] on 7/12/17.  Upon getting to [facility], Patient refused to go into the facility and became combative. He was thus transferred back to [facility] ED which resulted in Service Line Chiefs’ of Service involvement given the history/issues and ultimate transfer to [address]. Patient has been on [address] since. UPDATE: [facility] declined to accept Patient. Hundreds of nursing home applications wee sent out and all declined to accept patient. Court scheduled on 12/19/17 was deferred as Patient refused to go; he then again refused to go to the new Court date. Continuing to search for placement which have been unsuccessful; family does not want him living with them.  Neurological facility in [place] has accepted patient, and legal guardian is involved in transfer issues. Court hearing regarding Patient's guardianship was 3/27/18 and his sister was granted temporary guardianship. Court hearing for retention occurred on 3/28/18 and the hospital was granted an additional 60 days. Awaiting Patient's sister to come up with a place to send Patient to.

## 2018-05-04 NOTE — PROGRESS NOTE BEHAVIORAL HEALTH - OTHER
varying (chronic), usually euthymic with episodes of irritable at times overall linear, at times disorganized and circumstantial, concrete increase in paranoia, may be due to inconsistent Rx compliance and anxiety re: discharge chronically impaired but improved since admission Limited + neck favoring one side, dressed in hospital gowns, adequate grooming and hygiene no psychomotor agitation at times (chronic and seemingly intentionaly as he can stop it when redirected). appropriate chronically limited but better since admission  more able to respond to redirection festinating gait but stable baseline variable - at times euthymic, easily reactive, at times makes jokes at times yelling

## 2018-05-05 RX ADMIN — Medication 325 MILLIGRAM(S): at 01:11

## 2018-05-05 RX ADMIN — Medication 325 MILLIGRAM(S): at 23:33

## 2018-05-05 RX ADMIN — CARBIDOPA AND LEVODOPA 1 TABLET(S): 25; 100 TABLET ORAL at 11:07

## 2018-05-05 RX ADMIN — CARBIDOPA, LEVODOPA, AND ENTACAPONE 1 TABLET(S): 50; 200; 200 TABLET, FILM COATED ORAL at 16:48

## 2018-05-05 RX ADMIN — CARBIDOPA AND LEVODOPA 1 TABLET(S): 25; 100 TABLET ORAL at 20:57

## 2018-05-05 RX ADMIN — CARBIDOPA, LEVODOPA, AND ENTACAPONE 1 TABLET(S): 50; 200; 200 TABLET, FILM COATED ORAL at 11:07

## 2018-05-05 RX ADMIN — Medication 325 MILLIGRAM(S): at 02:36

## 2018-05-05 RX ADMIN — CARBIDOPA AND LEVODOPA 1 TABLET(S): 25; 100 TABLET ORAL at 16:48

## 2018-05-05 RX ADMIN — CARBIDOPA AND LEVODOPA 1 TABLET(S): 25; 100 TABLET ORAL at 06:49

## 2018-05-05 RX ADMIN — CARBIDOPA, LEVODOPA, AND ENTACAPONE 1 TABLET(S): 50; 200; 200 TABLET, FILM COATED ORAL at 06:49

## 2018-05-06 RX ADMIN — Medication 325 MILLIGRAM(S): at 22:20

## 2018-05-06 RX ADMIN — CARBIDOPA AND LEVODOPA 1 TABLET(S): 25; 100 TABLET ORAL at 10:41

## 2018-05-06 RX ADMIN — CARBIDOPA, LEVODOPA, AND ENTACAPONE 1 TABLET(S): 50; 200; 200 TABLET, FILM COATED ORAL at 06:20

## 2018-05-06 RX ADMIN — CARBIDOPA AND LEVODOPA 1 TABLET(S): 25; 100 TABLET ORAL at 16:28

## 2018-05-06 RX ADMIN — CARBIDOPA AND LEVODOPA 1 TABLET(S): 25; 100 TABLET ORAL at 20:27

## 2018-05-06 RX ADMIN — CARBIDOPA AND LEVODOPA 1 TABLET(S): 25; 100 TABLET ORAL at 06:20

## 2018-05-06 RX ADMIN — Medication 325 MILLIGRAM(S): at 00:48

## 2018-05-06 RX ADMIN — CARBIDOPA, LEVODOPA, AND ENTACAPONE 1 TABLET(S): 50; 200; 200 TABLET, FILM COATED ORAL at 16:28

## 2018-05-06 RX ADMIN — CARBIDOPA, LEVODOPA, AND ENTACAPONE 1 TABLET(S): 50; 200; 200 TABLET, FILM COATED ORAL at 10:41

## 2018-05-06 RX ADMIN — Medication 325 MILLIGRAM(S): at 21:47

## 2018-05-07 PROCEDURE — 99231 SBSQ HOSP IP/OBS SF/LOW 25: CPT

## 2018-05-07 RX ADMIN — Medication 325 MILLIGRAM(S): at 21:09

## 2018-05-07 RX ADMIN — Medication 650 MILLIGRAM(S): at 02:05

## 2018-05-07 RX ADMIN — Medication 325 MILLIGRAM(S): at 21:21

## 2018-05-07 RX ADMIN — CARBIDOPA AND LEVODOPA 1 TABLET(S): 25; 100 TABLET ORAL at 20:35

## 2018-05-07 RX ADMIN — CARBIDOPA, LEVODOPA, AND ENTACAPONE 1 TABLET(S): 50; 200; 200 TABLET, FILM COATED ORAL at 11:14

## 2018-05-07 RX ADMIN — CYCLOBENZAPRINE HYDROCHLORIDE 10 MILLIGRAM(S): 10 TABLET, FILM COATED ORAL at 22:52

## 2018-05-07 RX ADMIN — CARBIDOPA AND LEVODOPA 1 TABLET(S): 25; 100 TABLET ORAL at 17:38

## 2018-05-07 RX ADMIN — Medication 650 MILLIGRAM(S): at 03:10

## 2018-05-07 RX ADMIN — Medication 400 MILLIGRAM(S): at 22:40

## 2018-05-07 RX ADMIN — CARBIDOPA AND LEVODOPA 1 TABLET(S): 25; 100 TABLET ORAL at 06:41

## 2018-05-07 RX ADMIN — CARBIDOPA, LEVODOPA, AND ENTACAPONE 1 TABLET(S): 50; 200; 200 TABLET, FILM COATED ORAL at 17:39

## 2018-05-07 RX ADMIN — CARBIDOPA AND LEVODOPA 1 TABLET(S): 25; 100 TABLET ORAL at 11:13

## 2018-05-07 RX ADMIN — CARBIDOPA, LEVODOPA, AND ENTACAPONE 1 TABLET(S): 50; 200; 200 TABLET, FILM COATED ORAL at 06:41

## 2018-05-07 NOTE — PROGRESS NOTE BEHAVIORAL HEALTH - OTHER
+ neck favoring one side, dressed in hospital gowns, adequate grooming and hygiene no psychomotor agitation at times (chronic and seemingly intentionaly as he can stop it when redirected). appropriate chronically limited but better since admission  more able to respond to redirection festinating gait but stable baseline variable - at times euthymic, easily reactive, at times makes jokes at times yelling varying (chronic), usually euthymic with episodes of irritable at times overall linear, at times disorganized and circumstantial, concrete increase in paranoia, may be due to inconsistent Rx compliance and anxiety re: discharge chronically impaired but improved since admission Limited

## 2018-05-07 NOTE — PROGRESS NOTE BEHAVIORAL HEALTH - SUMMARY
53 yo male with early onset Parkinson’s disease x 11 years, discharged from 4 prior nursing homes in 8 months due to his behaviors, hx of refusing medications,  transferred to [facility] [address] on 6/12/17 where he manifested severe mood lability, paranoia and confabulation with poor insight and judgment including making > 40 Justice center complaints. 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 Patient was accepted to [facility] in [place] and discharged from [facility] on 7/12/17.  Upon getting to [facility], Patient refused to go into the facility and became combative. He was thus transferred back to [facility] ED which resulted in Service Line Chiefs’ of Service involvement given the history/issues and ultimate transfer to [address]. Patient has been on [address] since. UPDATE: [facility] declined to accept Patient. Hundreds of nursing home applications wee sent out and all declined to accept patient. Court scheduled on 12/19/17 was deferred as Patient refused to go; he then again refused to go to the new Court date. Continuing to search for placement which have been unsuccessful; family does not want him living with them.  Neurological facility in [place] has accepted patient, and legal guardian is involved in transfer issues. Court hearing regarding Patient's guardianship was 3/27/18 and his sister was granted temporary guardianship. Court hearing for retention occurred on 3/28/18 and the hospital was granted an additional 60 days. Awaiting Patient's sister to come up with a place to send Patient to with Court date for a "check in" on 5/15 next week.

## 2018-05-07 NOTE — PROGRESS NOTE BEHAVIORAL HEALTH - NSBHFUPINTERVALHXFT_PSY_A_CORE
Patient seen and examined. No significant interval events over the weekend. Same clinical presentation with baseline behavior which includes . refusing medications then later taking them, asking for snacks, ice packs etc, baseless accusations against certain staff of wrongdoing. No complaints.

## 2018-05-08 PROCEDURE — 99231 SBSQ HOSP IP/OBS SF/LOW 25: CPT

## 2018-05-08 RX ORDER — LANOLIN ALCOHOL/MO/W.PET/CERES
5 CREAM (GRAM) TOPICAL AT BEDTIME
Qty: 0 | Refills: 0 | Status: DISCONTINUED | OUTPATIENT
Start: 2018-05-08 | End: 2018-06-04

## 2018-05-08 RX ADMIN — Medication 650 MILLIGRAM(S): at 06:11

## 2018-05-08 RX ADMIN — CARBIDOPA AND LEVODOPA 1 TABLET(S): 25; 100 TABLET ORAL at 15:48

## 2018-05-08 RX ADMIN — CARBIDOPA, LEVODOPA, AND ENTACAPONE 1 TABLET(S): 50; 200; 200 TABLET, FILM COATED ORAL at 15:49

## 2018-05-08 RX ADMIN — CARBIDOPA, LEVODOPA, AND ENTACAPONE 1 TABLET(S): 50; 200; 200 TABLET, FILM COATED ORAL at 06:06

## 2018-05-08 RX ADMIN — CARBIDOPA AND LEVODOPA 1 TABLET(S): 25; 100 TABLET ORAL at 22:40

## 2018-05-08 RX ADMIN — CARBIDOPA AND LEVODOPA 1 TABLET(S): 25; 100 TABLET ORAL at 10:30

## 2018-05-08 RX ADMIN — CARBIDOPA AND LEVODOPA 1 TABLET(S): 25; 100 TABLET ORAL at 06:06

## 2018-05-08 RX ADMIN — CARBIDOPA, LEVODOPA, AND ENTACAPONE 1 TABLET(S): 50; 200; 200 TABLET, FILM COATED ORAL at 10:30

## 2018-05-08 NOTE — PROGRESS NOTE BEHAVIORAL HEALTH - NSBHADMITMEDEDUDETAILS_A_CORE FT
continue current management. Sister as of now still has not found a place for Patient continue current management. Sister as of now still has not found a place for Patient. Added on melatonin 5mg PO qhs for average 3 hours / night sleep

## 2018-05-08 NOTE — PROGRESS NOTE BEHAVIORAL HEALTH - NSBHFUPINTERVALHXFT_PSY_A_CORE
Patient seen and examined. No significant interval events. Same clinical presentation with baseline behavior. No complaints. Talked about how his sister found him [address] housing.

## 2018-05-08 NOTE — PROGRESS NOTE BEHAVIORAL HEALTH - SUMMARY
53 yo male with early onset Parkinson’s disease x 11 years, discharged from 4 prior nursing homes in 8 months due to his behaviors, hx of refusing medications,  transferred to [facility] Unit [facility] on 6/12/17 where he manifested severe mood lability, paranoia and confabulation with poor insight and judgment including making > 40 [facility] center complaints. 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 Patient was accepted to [facility] in [place] and discharged from [facility] on 7/12/17.  Upon getting to [facility], Patient refused to go into the facility and became combative. He was thus transferred back to [facility] ED which resulted in Service Line Chiefs’ of Service involvement given the history/issues and ultimate transfer to [address]. Patient has been on [address] 1[address] since. UPDATE: [facility] declined to accept Patient. Hundreds of nursing home applications wee sent out and all declined to accept patient. Court scheduled on 12/19/17 was deferred as Patient refused to go; he then again refused to go to the new Court date. Continuing to search for placement which have been unsuccessful; family does not want him living with them.  Neurological facility in [place] has accepted patient, and legal guardian is involved in transfer issues. Court hearing regarding Patient's guardianship was 3/27/18 and his sister was granted temporary guardianship. Court hearing for retention occurred on 3/28/18 and the hospital was granted an additional 60 days. Awaiting Patient's sister to come up with a place to send Patient to with Court date for a "check in" on 5/15 next week.

## 2018-05-09 PROCEDURE — 99231 SBSQ HOSP IP/OBS SF/LOW 25: CPT

## 2018-05-09 RX ADMIN — CARBIDOPA AND LEVODOPA 1 TABLET(S): 25; 100 TABLET ORAL at 06:23

## 2018-05-09 RX ADMIN — CARBIDOPA, LEVODOPA, AND ENTACAPONE 1 TABLET(S): 50; 200; 200 TABLET, FILM COATED ORAL at 11:32

## 2018-05-09 RX ADMIN — CARBIDOPA AND LEVODOPA 1 TABLET(S): 25; 100 TABLET ORAL at 11:31

## 2018-05-09 RX ADMIN — CARBIDOPA AND LEVODOPA 1 TABLET(S): 25; 100 TABLET ORAL at 22:06

## 2018-05-09 RX ADMIN — CARBIDOPA AND LEVODOPA 1 TABLET(S): 25; 100 TABLET ORAL at 16:57

## 2018-05-09 RX ADMIN — CARBIDOPA AND LEVODOPA 1 TABLET(S): 25; 100 TABLET ORAL at 22:05

## 2018-05-09 RX ADMIN — CARBIDOPA, LEVODOPA, AND ENTACAPONE 1 TABLET(S): 50; 200; 200 TABLET, FILM COATED ORAL at 16:57

## 2018-05-09 RX ADMIN — CARBIDOPA, LEVODOPA, AND ENTACAPONE 1 TABLET(S): 50; 200; 200 TABLET, FILM COATED ORAL at 06:23

## 2018-05-09 NOTE — PROGRESS NOTE BEHAVIORAL HEALTH - SUMMARY
53 yo male with early onset Parkinson’s disease x 11 years, discharged from 4 prior nursing homes in 8 months due to his behaviors, hx of refusing medications,  transferred to [facility] Unit [facility] on 6/12/17 where he manifested severe mood lability, paranoia and confabulation with poor insight and judgment including making > 40 Justice center complaints. 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 Patient was accepted to [facility] in [place] and discharged from [facility] on 7/12/17.  Upon getting to [facility], Patient refused to go into the facility and became combative. He was thus transferred back to [facility] ED which resulted in Service Line Chiefs’ of Service involvement given the history/issues and ultimate transfer to [address]. Patient has been on [address] 1[address] since. UPDATE: [facility] declined to accept Patient. Hundreds of nursing home applications wee sent out and all declined to accept patient. Court scheduled on 12/19/17 was deferred as Patient refused to go; he then again refused to go to the new Court date. Continuing to search for placement which have been unsuccessful; family does not want him living with them.  Neurological facility in [place] has accepted patient, and legal guardian is involved in transfer issues. Court hearing regarding Patient's guardianship was 3/27/18 and his sister was granted temporary guardianship. Court hearing for retention occurred on 3/28/18 and the hospital was granted an additional 60 days. Awaiting Patient's sister to come up with a place to send Patient to with Court date for a "check in" on 5/15 next week.

## 2018-05-09 NOTE — PROGRESS NOTE BEHAVIORAL HEALTH - NSBHFUPINTERVALHXFT_PSY_A_CORE
Patient seen and examined. No significant interval events. Same clinical presentation with baseline behavior. No complaints. As usual. asked for his 2 ice packs, Cola and a bag of pretzels which Writer usually gives him. patient agina encouraged to take Lasix and his compression stockings for his LE edema which he again continues to refuse. Patient seen and examined. No significant interval events. Same clinical presentation with baseline behavior. No complaints. As usual. asked for his 2 ice packs, Cola and a bag of pretzels which Writer usually gives him. patient again encouraged to take Lasix and his compression stockings for his LE edema which he again continues to refuse.

## 2018-05-09 NOTE — PROGRESS NOTE BEHAVIORAL HEALTH - NSBHADMITMEDEDUDETAILS_A_CORE FT
continue current management. Sister as of now still has not found a place for Patient. Added on melatonin 5mg PO qhs for average 3 hours / night sleep which he seems to tolerate well

## 2018-05-10 PROCEDURE — 99231 SBSQ HOSP IP/OBS SF/LOW 25: CPT

## 2018-05-10 RX ADMIN — CARBIDOPA AND LEVODOPA 1 TABLET(S): 25; 100 TABLET ORAL at 20:29

## 2018-05-10 RX ADMIN — CARBIDOPA, LEVODOPA, AND ENTACAPONE 1 TABLET(S): 50; 200; 200 TABLET, FILM COATED ORAL at 11:04

## 2018-05-10 RX ADMIN — CARBIDOPA, LEVODOPA, AND ENTACAPONE 1 TABLET(S): 50; 200; 200 TABLET, FILM COATED ORAL at 16:02

## 2018-05-10 RX ADMIN — CARBIDOPA AND LEVODOPA 1 TABLET(S): 25; 100 TABLET ORAL at 16:02

## 2018-05-10 RX ADMIN — Medication 650 MILLIGRAM(S): at 18:43

## 2018-05-10 RX ADMIN — CARBIDOPA AND LEVODOPA 1 TABLET(S): 25; 100 TABLET ORAL at 11:04

## 2018-05-10 RX ADMIN — CARBIDOPA AND LEVODOPA 1 TABLET(S): 25; 100 TABLET ORAL at 06:08

## 2018-05-10 RX ADMIN — CARBIDOPA, LEVODOPA, AND ENTACAPONE 1 TABLET(S): 50; 200; 200 TABLET, FILM COATED ORAL at 06:10

## 2018-05-10 RX ADMIN — Medication 325 MILLIGRAM(S): at 00:10

## 2018-05-10 RX ADMIN — Medication 650 MILLIGRAM(S): at 17:46

## 2018-05-10 RX ADMIN — Medication 325 MILLIGRAM(S): at 01:10

## 2018-05-10 NOTE — PROGRESS NOTE BEHAVIORAL HEALTH - NSBHADMITMEDEDUDETAILS_A_CORE FT
Patient teaching done re: need to take Clozapine for sx management, and to take lasix and wear TEDS stockings for sx management with patient reporting he will not take Rx or wear TEDS stockings and refusing to discuss why

## 2018-05-10 NOTE — PROGRESS NOTE BEHAVIORAL HEALTH - SUMMARY
51 yo male with early onset Parkinson’s disease x 11 years, discharged from 4 prior nursing homes in 8 months due to his behaviors, hx of refusing medications,  transferred to [facility] Unit [facility] on 6/12/17 where he manifested severe mood lability, paranoia and confabulation with poor insight and judgment including making > 40 Justice center complaints. 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 Patient was accepted to [facility] in [place] and discharged from [facility] on 7/12/17.  Upon getting to [facility], Patient refused to go into the facility and became combative. He was thus transferred back to [facility] ED which resulted in Service Line Chiefs’ of Service involvement given the history/issues and ultimate transfer to [address]. Patient has been on Unit 1N since. UPDATE: [facility] declined to accept Patient. Hundreds of nursing home applications wee sent out and all declined to accept patient. Court scheduled on 12/19/17 was deferred as Patient refused to go; he then again refused to go to the new Court date. Continuing to search for placement which have been unsuccessful; family does not want him living with them.  Neurological facility in [place] has accepted patient, and legal guardian is involved in transfer issues. Court hearing regarding Patient's guardianship was 3/27/18 and his sister was granted temporary guardianship. Court hearing for retention occurred on 3/28/18 and the hospital was granted an additional 60 days. Awaiting Patient's sister to come up with a place to send Patient to with Court date for a "check in" on 5/15 next week.  Patient now refusing Clozapine and has increase in paranoia at times

## 2018-05-10 NOTE — PROGRESS NOTE BEHAVIORAL HEALTH - NSBHFUPINTERVALHXFT_PSY_A_CORE
Patient seen and examined. Chart reviewed and case discussed in tx team meeting.  No significant interval events. Same clinical presentation with baseline behavior. No complaints. More cheerful today and smiling at times. patient again encouraged to take Lasix and his compression stockings for his LE edema which he again continues to refuse. Also refusing clozapine.  No Rx SE are noted or reported

## 2018-05-11 PROCEDURE — 12345: CPT | Mod: NC

## 2018-05-11 PROCEDURE — 99231 SBSQ HOSP IP/OBS SF/LOW 25: CPT

## 2018-05-11 RX ORDER — PETROLATUM,WHITE
1 JELLY (GRAM) TOPICAL
Qty: 0 | Refills: 0 | Status: DISCONTINUED | OUTPATIENT
Start: 2018-05-11 | End: 2018-05-11

## 2018-05-11 RX ADMIN — CARBIDOPA, LEVODOPA, AND ENTACAPONE 1 TABLET(S): 50; 200; 200 TABLET, FILM COATED ORAL at 05:28

## 2018-05-11 RX ADMIN — Medication 325 MILLIGRAM(S): at 00:00

## 2018-05-11 RX ADMIN — CARBIDOPA AND LEVODOPA 1 TABLET(S): 25; 100 TABLET ORAL at 20:45

## 2018-05-11 RX ADMIN — Medication 650 MILLIGRAM(S): at 05:50

## 2018-05-11 RX ADMIN — CARBIDOPA AND LEVODOPA 1 TABLET(S): 25; 100 TABLET ORAL at 12:00

## 2018-05-11 RX ADMIN — Medication 650 MILLIGRAM(S): at 05:47

## 2018-05-11 RX ADMIN — CARBIDOPA, LEVODOPA, AND ENTACAPONE 1 TABLET(S): 50; 200; 200 TABLET, FILM COATED ORAL at 16:06

## 2018-05-11 RX ADMIN — CARBIDOPA AND LEVODOPA 1 TABLET(S): 25; 100 TABLET ORAL at 16:06

## 2018-05-11 RX ADMIN — Medication 650 MILLIGRAM(S): at 13:15

## 2018-05-11 RX ADMIN — CARBIDOPA, LEVODOPA, AND ENTACAPONE 1 TABLET(S): 50; 200; 200 TABLET, FILM COATED ORAL at 12:00

## 2018-05-11 RX ADMIN — CARBIDOPA AND LEVODOPA 1 TABLET(S): 25; 100 TABLET ORAL at 05:28

## 2018-05-11 NOTE — CHART NOTE - NSCHARTNOTEFT_GEN_A_CORE
Called to see patient regarding chest pain.  Patient seen sitting in his chair with a towel around his neck.  Again, patient says his pain is similar to his previous complaints of chest pain.  Described as a sharp mid sternal chest pain.  Patient's vitals stable at 118/72 88.  Physical exam revealed tenderness to palpation in the center of his chest (winces and complains when I touch his chest).  Cardiac sounds were unremarkable.  Explained to patient that it likely does not represent a cardiac issue and most likely musculoskeletal such as costochondritis.  Patient then proceeded to complain how his room and the hallway/lounge have different temperatures.  I explained to him that I am here to address his chest pain and that I do not believe he is having any cardiac problems.   Patient then asked if he was having a stroke, I said no, and then he asked what were the symptoms of a stroke.  I reassured him that having chest tenderness is not a symptom of a stroke.  Furthermore, I instructed him that he can have tylenol or NSAIDs for his chest tenderness.  No further interventions needed at this time.

## 2018-05-11 NOTE — PROGRESS NOTE BEHAVIORAL HEALTH - SUMMARY
51 yo male with early onset Parkinson’s disease x 11 years, discharged from 4 prior nursing homes in 8 months due to his behaviors, hx of refusing medications,  transferred to [facility] Unit [facility] on 6/12/17 where he manifested severe mood lability, paranoia and confabulation with poor insight and judgment including making > 40 Justice center complaints. 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 Patient was accepted to [facility] in [place] and discharged from [facility] on 7/12/17.  Upon getting to [facility], Patient refused to go into the facility and became combative. He was thus transferred back to [facility] ED which resulted in Service Line Chiefs’ of Service involvement given the history/issues and ultimate transfer to [address]. Patient has been on [address] 1[address] since. UPDATE: [facility] declined to accept Patient. Hundreds of nursing home applications wee sent out and all declined to accept patient. Court scheduled on 12/19/17 was deferred as Patient refused to go; he then again refused to go to the new Court date. Continuing to search for placement which have been unsuccessful; family does not want him living with them.  Neurological facility in [place] has accepted patient, and legal guardian is involved in transfer issues. Court hearing regarding Patient's guardianship was 3/27/18 and his sister was granted temporary guardianship. Court hearing for retention occurred on 3/28/18 and the hospital was granted an additional 60 days. Awaiting Patient's sister to come up with a place to send Patient to with Court date for a "check in" on 5/15 next week.  Patient now refusing Clozapine and has increase in paranoia at times

## 2018-05-11 NOTE — CHART NOTE - NSCHARTNOTEFT_GEN_A_CORE
Called to see a rash around the buttocks area.  Patient complain of pain and itching.  Went to see patient and patient was seen sitting in his chair eating jello.  Examined patient and there is slight erythema around the crevices of the buttocks but not perianally.  However, there were dried feces all around the area.  Therefore the rash is likely from the bacteria.  Instructed patient that he needs to clean that area thoroughly.  He can then apply a cream to protect that area.  No further intervention is needed at this time. normal...

## 2018-05-11 NOTE — PROGRESS NOTE BEHAVIORAL HEALTH - NSBHFUPINTERVALHXFT_PSY_A_CORE
Patient seen and examined. Chart reviewed and case discussed in tx team meeting.  No significant interval events. Same clinical presentation with baseline behavior. No complaints. More cheerful today and smiling at times. patient again encouraged wear his compression stockings for his LE edema which he again continues to refuse. Also refusing clozapine.  No Rx SE are noted or reported.  Was visited by sister, apartment status is uncertain

## 2018-05-11 NOTE — PROGRESS NOTE BEHAVIORAL HEALTH - OTHER
Limited + neck favoring one side, dressed in hospital gowns, adequate grooming and hygiene no psychomotor agitation at times (chronic and seemingly intentionaly as he can stop it when redirected). appropriate chronically limited but better since admission  more able to respond to redirection festinating gait but stable baseline variable - at times euthymic, easily reactive, at times makes jokes at times yelling varying (chronic), usually euthymic with episodes of irritable at times overall linear, at times disorganized and circumstantial, concrete increase in paranoia, may be due to inconsistent Rx compliance and anxiety re: discharge chronically impaired but improved since admission

## 2018-05-12 RX ADMIN — Medication 325 MILLIGRAM(S): at 23:15

## 2018-05-12 RX ADMIN — CARBIDOPA, LEVODOPA, AND ENTACAPONE 1 TABLET(S): 50; 200; 200 TABLET, FILM COATED ORAL at 12:02

## 2018-05-12 RX ADMIN — CARBIDOPA AND LEVODOPA 1 TABLET(S): 25; 100 TABLET ORAL at 20:30

## 2018-05-12 RX ADMIN — CARBIDOPA AND LEVODOPA 1 TABLET(S): 25; 100 TABLET ORAL at 12:02

## 2018-05-12 RX ADMIN — Medication 325 MILLIGRAM(S): at 22:01

## 2018-05-12 RX ADMIN — Medication 400 MILLIGRAM(S): at 23:27

## 2018-05-12 RX ADMIN — CARBIDOPA, LEVODOPA, AND ENTACAPONE 1 TABLET(S): 50; 200; 200 TABLET, FILM COATED ORAL at 16:41

## 2018-05-12 RX ADMIN — CYCLOBENZAPRINE HYDROCHLORIDE 10 MILLIGRAM(S): 10 TABLET, FILM COATED ORAL at 22:01

## 2018-05-12 RX ADMIN — Medication 400 MILLIGRAM(S): at 22:27

## 2018-05-12 RX ADMIN — CARBIDOPA AND LEVODOPA 1 TABLET(S): 25; 100 TABLET ORAL at 16:42

## 2018-05-12 RX ADMIN — Medication 650 MILLIGRAM(S): at 05:47

## 2018-05-12 RX ADMIN — CARBIDOPA AND LEVODOPA 1 TABLET(S): 25; 100 TABLET ORAL at 05:47

## 2018-05-12 RX ADMIN — CARBIDOPA, LEVODOPA, AND ENTACAPONE 1 TABLET(S): 50; 200; 200 TABLET, FILM COATED ORAL at 05:47

## 2018-05-12 RX ADMIN — Medication 650 MILLIGRAM(S): at 06:20

## 2018-05-13 RX ORDER — OLANZAPINE 15 MG/1
5 TABLET, FILM COATED ORAL ONCE
Qty: 0 | Refills: 0 | Status: COMPLETED | OUTPATIENT
Start: 2018-05-13 | End: 2018-05-13

## 2018-05-13 RX ORDER — DIPHENHYDRAMINE HCL 50 MG
50 CAPSULE ORAL ONCE
Qty: 0 | Refills: 0 | Status: DISCONTINUED | OUTPATIENT
Start: 2018-05-13 | End: 2018-05-25

## 2018-05-13 RX ADMIN — CARBIDOPA AND LEVODOPA 1 TABLET(S): 25; 100 TABLET ORAL at 12:54

## 2018-05-13 RX ADMIN — CARBIDOPA AND LEVODOPA 1 TABLET(S): 25; 100 TABLET ORAL at 16:57

## 2018-05-13 RX ADMIN — Medication 325 MILLIGRAM(S): at 23:10

## 2018-05-13 RX ADMIN — CARBIDOPA, LEVODOPA, AND ENTACAPONE 1 TABLET(S): 50; 200; 200 TABLET, FILM COATED ORAL at 16:57

## 2018-05-13 RX ADMIN — CARBIDOPA, LEVODOPA, AND ENTACAPONE 1 TABLET(S): 50; 200; 200 TABLET, FILM COATED ORAL at 12:54

## 2018-05-13 RX ADMIN — CARBIDOPA AND LEVODOPA 1 TABLET(S): 25; 100 TABLET ORAL at 20:15

## 2018-05-13 RX ADMIN — OLANZAPINE 5 MILLIGRAM(S): 15 TABLET, FILM COATED ORAL at 16:31

## 2018-05-13 RX ADMIN — CARBIDOPA, LEVODOPA, AND ENTACAPONE 1 TABLET(S): 50; 200; 200 TABLET, FILM COATED ORAL at 06:00

## 2018-05-13 RX ADMIN — CARBIDOPA AND LEVODOPA 1 TABLET(S): 25; 100 TABLET ORAL at 06:00

## 2018-05-13 NOTE — PROGRESS NOTE BEHAVIORAL HEALTH - SUMMARY
51 y/o single white male, unmarried, most recently domiciled at [facility], no dependents, has an adult daughter who is peripherally involved, history of Mood Disorder s/t Parkinson's Disease, history of psychiatric hospitalization at [facility] from 6.10.17-7.12.17, no suicide attempts, no history of SIB, no known violence hx, has a legal guardian, no substance use, PMH of Parkinson's Disease, CHF, and herniated cervical discs, BIB EMS from [facility] after patient refused to stay at the facility as he did not like the location.    Patient remains labile and unpredictable; continues to require inpatient admission for safety. Required stat Olanzapine IM medication for severe agitation. A 5 minute manual hold required today around 4:15pm for safety precaution while administering IM medication.

## 2018-05-13 NOTE — PROGRESS NOTE BEHAVIORAL HEALTH - OTHER
+ neck favoring one side, dressed in hospital gowns, adequate grooming and hygiene no psychomotor agitation at times (chronic and seemingly intentionaly as he can stop it when redirected). chronically limited but better since admission  more able to respond to redirection festinating gait but stable baseline variable - at times euthymic, easily reactive, at times makes jokes at times yelling varying (chronic), usually euthymic with episodes of irritable at times overall linear, at times disorganized and circumstantial, concrete increase in paranoia, may be due to inconsistent Rx compliance and anxiety re: discharge chronically impaired but improved since admission Limited

## 2018-05-13 NOTE — PROGRESS NOTE BEHAVIORAL HEALTH - NSBHFUPINTERVALHXFT_PSY_A_CORE
Chart reviewed; this is a 53 yo male with early onset Parkinson’s disease x 11 years, discharged from 4 prior nursing homes in 8 months due to his behaviors, hx of refusing medications,  transferred to [facility] Unit [facility] on 6/12/17 where he manifested severe mood lability, paranoia and confabulation with poor insight and judgment including making > 40 Justice center complaints. 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 Patient was accepted to [facility] in [place] and discharged from [facility] on 7/12/17.  Upon getting to [facility], Patient refused to go into the facility and became combative. He was thus transferred back to [facility] ED which resulted in Service Line Chiefs’ of Service involvement given the history/issues and ultimate transfer to [address]. Patient has been on [address] 1N since. UPDATE: [facility] declined to accept Patient. Hundreds of nursing home applications were sent out and all declined to accept patient. Court scheduled on 12/19/17 was deferred as Patient refused to go; he then again refused to go to the new Court date. Continuing to search for placement which have been unsuccessful; family does not want him living with them.  Neurological facility in [place] has accepted patient, and legal guardian is involved in transfer issues. Court hearing regarding Patient's guardianship was 3/27/18 and his sister was granted temporary guardianship. Court hearing for retention occurred on 3/28/18 and the hospital was granted an additional 60 days. Awaiting Patient's sister to come up with a place to send Patient with Court date for a "check in" on 5/15/18 next week.  Patient now refusing Clozapine and has increase in paranoia at times.  Today 5/13/18 he has exhibited aggressive verbal and physical outbursts towards others, and required IM injection as a result. Patient was reportedly  threatening to a staff member and attempted to place his hands to the said staff member; shoulder after  having engaged in a verbal altercation with another peer over the Television. A manual hold required for administration of Olanzapine IM for severe agitation and physically threatening behaviors as evidenced by punching a , namely  Christopher on his right arm. Patient is notably dysregulated, verbally and physically threatening; very difficult to redirect. Engaged in name-calling and threatening behaviors when attempted to redirect him.

## 2018-05-14 PROCEDURE — 99231 SBSQ HOSP IP/OBS SF/LOW 25: CPT

## 2018-05-14 RX ADMIN — CARBIDOPA AND LEVODOPA 1 TABLET(S): 25; 100 TABLET ORAL at 20:33

## 2018-05-14 RX ADMIN — CARBIDOPA AND LEVODOPA 1 TABLET(S): 25; 100 TABLET ORAL at 16:05

## 2018-05-14 RX ADMIN — Medication 325 MILLIGRAM(S): at 18:14

## 2018-05-14 RX ADMIN — CARBIDOPA AND LEVODOPA 1 TABLET(S): 25; 100 TABLET ORAL at 11:01

## 2018-05-14 RX ADMIN — CARBIDOPA AND LEVODOPA 1 TABLET(S): 25; 100 TABLET ORAL at 05:44

## 2018-05-14 RX ADMIN — CARBIDOPA, LEVODOPA, AND ENTACAPONE 1 TABLET(S): 50; 200; 200 TABLET, FILM COATED ORAL at 16:05

## 2018-05-14 RX ADMIN — CARBIDOPA, LEVODOPA, AND ENTACAPONE 1 TABLET(S): 50; 200; 200 TABLET, FILM COATED ORAL at 05:44

## 2018-05-14 RX ADMIN — CARBIDOPA, LEVODOPA, AND ENTACAPONE 1 TABLET(S): 50; 200; 200 TABLET, FILM COATED ORAL at 11:01

## 2018-05-14 NOTE — PROGRESS NOTE BEHAVIORAL HEALTH - NSBHFUPINTERVALHXFT_PSY_A_CORE
Chart reviewed; patient seen.  Case discussed in tx team meeting. Significant interval events over the weekend include aggressive behavior, and patient having to be medicated with IM Rx.  Patient refusing psychiatric medications ( took only his Parkinson’s medications), continues with increased paranoia. 'Discussed with patient that he has court date tomorrow, with his sister being there to report on progress of finding an apartment for patient.  Patient reports he will maintain behavioral control, as he wants to go to court and "talk to the "  No Rx SE are noted or reported

## 2018-05-14 NOTE — PROGRESS NOTE BEHAVIORAL HEALTH - NSBHADMITMEDEDUDETAILS_A_CORE FT
Patient teaching done re: need for Rx compliance and to maintain behavioral control so that he will be eligible to go to court on 5/15 with teachback verbalized

## 2018-05-14 NOTE — PROGRESS NOTE BEHAVIORAL HEALTH - SUMMARY
53 y/o single white male, unmarried, most recently domiciled at [facility], no dependents, has an adult daughter who is peripherally involved, history of Mood Disorder s/t Parkinson's Disease, history of psychiatric hospitalization at [facility] from 6.10.17-7.12.17, no suicide attempts, no history of SIB, no known violence hx, has a legal guardian, no substance use, PMH of Parkinson's Disease, CHF, and herniated cervical discs, BIB EMS from [facility] after patient refused to stay at the facility as he did not like the location.    Patient remains labile and unpredictable; continues to require inpatient admission for safety. Required stat Olanzapine IM medication for severe agitation. A 5 minute manual hold required today around 4:15pm for safety precaution while administering IM medication.  For court date on 5/15

## 2018-05-14 NOTE — PROGRESS NOTE BEHAVIORAL HEALTH - OTHER
chronically limited but better since admission  more able to respond to redirection festinating gait but stable baseline variable - at times euthymic, easily reactive, at times makes jokes at times yelling and angry varying (chronic), usually euthymic with episodes of irritable at times overall linear, at times disorganized and circumstantial, concrete increase in paranoia, may be due to non compliance with Clozapine and anxiety re: discharge chronically impaired but improved since admission Limited + neck favoring one side, dressed in hospital gowns, adequate grooming and hygiene no psychomotor agitation at times (chronic and seemingly intentionaly as he can stop it when redirected). needs staff assist has episodes of anger and aggression

## 2018-05-14 NOTE — PROGRESS NOTE BEHAVIORAL HEALTH - NSBHFUPINTERVALCCFT_PSY_A_CORE
"Get away from me; I don't want to talk to you; you fucking monkey" "I had an argument with another patient about the TV.  I want to watch the ball game"

## 2018-05-15 LAB
APPEARANCE UR: CLEAR — SIGNIFICANT CHANGE UP
BILIRUB UR-MCNC: NEGATIVE — SIGNIFICANT CHANGE UP
COLOR SPEC: YELLOW — SIGNIFICANT CHANGE UP
DIFF PNL FLD: ABNORMAL
GLUCOSE UR QL: NEGATIVE MG/DL — SIGNIFICANT CHANGE UP
KETONES UR-MCNC: ABNORMAL
LEUKOCYTE ESTERASE UR-ACNC: ABNORMAL
NITRITE UR-MCNC: NEGATIVE — SIGNIFICANT CHANGE UP
PH UR: 6.5 — SIGNIFICANT CHANGE UP (ref 5–8)
PROT UR-MCNC: NEGATIVE MG/DL — SIGNIFICANT CHANGE UP
RBC CASTS # UR COMP ASSIST: SIGNIFICANT CHANGE UP /HPF (ref 0–4)
SP GR SPEC: 1.01 — SIGNIFICANT CHANGE UP (ref 1.01–1.02)
UROBILINOGEN FLD QL: NEGATIVE MG/DL — SIGNIFICANT CHANGE UP
WBC UR QL: SIGNIFICANT CHANGE UP

## 2018-05-15 PROCEDURE — 12345: CPT | Mod: NC

## 2018-05-15 PROCEDURE — 99231 SBSQ HOSP IP/OBS SF/LOW 25: CPT

## 2018-05-15 RX ADMIN — CARBIDOPA AND LEVODOPA 1 TABLET(S): 25; 100 TABLET ORAL at 20:37

## 2018-05-15 RX ADMIN — CARBIDOPA AND LEVODOPA 1 TABLET(S): 25; 100 TABLET ORAL at 15:46

## 2018-05-15 RX ADMIN — Medication 650 MILLIGRAM(S): at 02:44

## 2018-05-15 RX ADMIN — CARBIDOPA, LEVODOPA, AND ENTACAPONE 1 TABLET(S): 50; 200; 200 TABLET, FILM COATED ORAL at 05:28

## 2018-05-15 RX ADMIN — Medication 650 MILLIGRAM(S): at 17:13

## 2018-05-15 RX ADMIN — CARBIDOPA, LEVODOPA, AND ENTACAPONE 1 TABLET(S): 50; 200; 200 TABLET, FILM COATED ORAL at 09:15

## 2018-05-15 RX ADMIN — Medication 650 MILLIGRAM(S): at 03:16

## 2018-05-15 RX ADMIN — CARBIDOPA AND LEVODOPA 1 TABLET(S): 25; 100 TABLET ORAL at 09:15

## 2018-05-15 RX ADMIN — CARBIDOPA AND LEVODOPA 1 TABLET(S): 25; 100 TABLET ORAL at 05:28

## 2018-05-15 RX ADMIN — CARBIDOPA, LEVODOPA, AND ENTACAPONE 1 TABLET(S): 50; 200; 200 TABLET, FILM COATED ORAL at 15:46

## 2018-05-15 RX ADMIN — Medication 650 MILLIGRAM(S): at 18:35

## 2018-05-15 RX ADMIN — Medication 325 MILLIGRAM(S): at 22:53

## 2018-05-15 NOTE — PROGRESS NOTE BEHAVIORAL HEALTH - OTHER
+ neck favoring one side, dressed in hospital gowns, adequate grooming and hygiene no psychomotor agitation at times (chronic and seemingly intentionaly as he can stop it when redirected). needs staff assist has episodes of anger and aggression chronically limited but better since admission  more able to respond to redirection festinating gait but stable, using walker baseline variable - at times euthymic, easily reactive, at times makes jokes at times yelling and angry varying (chronic), usually euthymic with episodes of irritable at times overall linear, at times disorganized and circumstantial, concrete increase in paranoia, may be due to non compliance with Clozapine and anxiety re: discharge chronically impaired but improved since admission Limited

## 2018-05-15 NOTE — PROGRESS NOTE BEHAVIORAL HEALTH - NSBHADMITMEDEDUDETAILS_A_CORE FT
Patient teaching done re: need for Rx compliance for sx management with patient saying "I don't need that medicine" (Clozapine)

## 2018-05-15 NOTE — PROGRESS NOTE BEHAVIORAL HEALTH - SUMMARY
53 y/o single white male, unmarried, most recently domiciled at [facility], no dependents, has an adult daughter who is peripherally involved, history of Mood Disorder s/t Parkinson's Disease, history of psychiatric hospitalization at [facility] from 6.10.17-7.12.17, no suicide attempts, no history of SIB, no known violence hx, has a legal guardian, no substance use, PMH of Parkinson's Disease, CHF, and herniated cervical discs, BIB EMS from [facility] after patient refused to stay at the facility as he did not like the location.    Patient remains labile and unpredictable; continues to require inpatient admission for safety. Required stat Olanzapine IM medication for severe agitation.  Went to court on 5/15, may be transferred to SNF

## 2018-05-15 NOTE — PROGRESS NOTE BEHAVIORAL HEALTH - NSBHFUPINTERVALHXFT_PSY_A_CORE
Chart reviewed; patient seen.  Case discussed in tx team meeting. Significant interval events include patient going to court today.  Patient reports that he will go to an apartment with his sister, but per staff,  stated patient needs to go to an SNF.  Patient refusing psychiatric medications ( took only his Parkinson’s medications), continues with increased paranoia.  Patient reports he spoke to the , and got what he wanted (although staff reports results to be very different)  No Rx SE are noted or reported. Patient is labile at times.

## 2018-05-16 PROCEDURE — 99231 SBSQ HOSP IP/OBS SF/LOW 25: CPT

## 2018-05-16 RX ADMIN — CARBIDOPA AND LEVODOPA 1 TABLET(S): 25; 100 TABLET ORAL at 20:39

## 2018-05-16 RX ADMIN — CARBIDOPA AND LEVODOPA 1 TABLET(S): 25; 100 TABLET ORAL at 20:35

## 2018-05-16 RX ADMIN — CARBIDOPA AND LEVODOPA 1 TABLET(S): 25; 100 TABLET ORAL at 12:09

## 2018-05-16 RX ADMIN — CARBIDOPA, LEVODOPA, AND ENTACAPONE 1 TABLET(S): 50; 200; 200 TABLET, FILM COATED ORAL at 12:09

## 2018-05-16 RX ADMIN — Medication 325 MILLIGRAM(S): at 00:42

## 2018-05-16 RX ADMIN — CARBIDOPA AND LEVODOPA 1 TABLET(S): 25; 100 TABLET ORAL at 05:51

## 2018-05-16 RX ADMIN — Medication 325 MILLIGRAM(S): at 23:36

## 2018-05-16 RX ADMIN — Medication 325 MILLIGRAM(S): at 22:16

## 2018-05-16 RX ADMIN — CARBIDOPA, LEVODOPA, AND ENTACAPONE 1 TABLET(S): 50; 200; 200 TABLET, FILM COATED ORAL at 05:50

## 2018-05-16 NOTE — PROGRESS NOTE BEHAVIORAL HEALTH - SUMMARY
51 y/o single white male, unmarried, most recently domiciled at [facility], no dependents, has an adult daughter who is peripherally involved, history of Mood Disorder s/t Parkinson's Disease, history of psychiatric hospitalization at [facility] from 6.10.17-7.12.17, no suicide attempts, no history of SIB, no known violence hx, has a legal guardian, no substance use, PMH of Parkinson's Disease, CHF, and herniated cervical discs, BIB EMS from [facility] after patient refused to stay at the facility as he did not like the location.    Patient remains labile and unpredictable; continues to require inpatient admission for safety. Required stat Olanzapine IM medication for severe agitation.  Went to court on 5/15, may be transferred to SNF

## 2018-05-16 NOTE — PROGRESS NOTE BEHAVIORAL HEALTH - NSBHFUPINTERVALHXFT_PSY_A_CORE
Chart reviewed; patient seen.  Case discussed in tx team meeting. Significant interval events include patient going to court yesterday, and sister reporting that there may be a bed available in an SNF, and that she does not have an apartment for him.  Patient refusing psychiatric medications ( took only his Parkinson’s medications), continues with increased paranoia.   No Rx SE are noted or reported. Patient is labile at times, and more paranoid today.  Patient is maintaining behavioral control at this time, and reports he feels ok.

## 2018-05-16 NOTE — PROGRESS NOTE BEHAVIORAL HEALTH - OTHER
overall linear, at times disorganized and circumstantial, concrete increase in paranoia, may be due to non compliance with Clozapine and anxiety re: discharge chronically impaired but improved since admission Limited + neck favoring one side, dressed in hospital gowns, adequate grooming and hygiene no psychomotor agitation at times (chronic and seemingly intentionaly as he can stop it when redirected). needs staff assist has episodes of anger and aggression chronically limited but better since admission  more able to respond to redirection festinating gait but stable, using walker baseline variable - at times euthymic, easily reactive, at times makes jokes at times yelling and angry varying (chronic), usually euthymic with episodes of irritable at times

## 2018-05-17 LAB
CULTURE RESULTS: NO GROWTH — SIGNIFICANT CHANGE UP
SPECIMEN SOURCE: SIGNIFICANT CHANGE UP

## 2018-05-17 PROCEDURE — 99231 SBSQ HOSP IP/OBS SF/LOW 25: CPT

## 2018-05-17 PROCEDURE — 12345: CPT | Mod: NC

## 2018-05-17 RX ADMIN — Medication 650 MILLIGRAM(S): at 06:38

## 2018-05-17 RX ADMIN — CARBIDOPA, LEVODOPA, AND ENTACAPONE 1 TABLET(S): 50; 200; 200 TABLET, FILM COATED ORAL at 16:21

## 2018-05-17 RX ADMIN — CARBIDOPA AND LEVODOPA 1 TABLET(S): 25; 100 TABLET ORAL at 13:38

## 2018-05-17 RX ADMIN — CARBIDOPA AND LEVODOPA 1 TABLET(S): 25; 100 TABLET ORAL at 06:01

## 2018-05-17 RX ADMIN — CARBIDOPA, LEVODOPA, AND ENTACAPONE 1 TABLET(S): 50; 200; 200 TABLET, FILM COATED ORAL at 13:38

## 2018-05-17 RX ADMIN — CARBIDOPA, LEVODOPA, AND ENTACAPONE 1 TABLET(S): 50; 200; 200 TABLET, FILM COATED ORAL at 06:01

## 2018-05-17 RX ADMIN — CARBIDOPA AND LEVODOPA 1 TABLET(S): 25; 100 TABLET ORAL at 20:42

## 2018-05-17 RX ADMIN — Medication 650 MILLIGRAM(S): at 22:47

## 2018-05-17 RX ADMIN — CARBIDOPA AND LEVODOPA 1 TABLET(S): 25; 100 TABLET ORAL at 20:45

## 2018-05-17 RX ADMIN — CARBIDOPA AND LEVODOPA 1 TABLET(S): 25; 100 TABLET ORAL at 16:21

## 2018-05-17 NOTE — PROGRESS NOTE BEHAVIORAL HEALTH - SUMMARY
53 y/o single white male, unmarried, most recently domiciled at [facility], no dependents, has an adult daughter who is peripherally involved, history of Mood Disorder s/t Parkinson's Disease, history of psychiatric hospitalization at [facility] from 6.10.17-7.12.17, no suicide attempts, no history of SIB, no known violence hx, has a legal guardian, no substance use, PMH of Parkinson's Disease, CHF, and herniated cervical discs, BIB EMS from [facility] after patient refused to stay at the facility as he did not like the location.    Patient remains labile and unpredictable; continues to require inpatient admission for safety. Required stat Olanzapine IM medication for severe agitation.recently.  Went to court on 5/15, may be transferred to SNF

## 2018-05-17 NOTE — PROGRESS NOTE BEHAVIORAL HEALTH - OTHER
+ neck favoring one side, dressed in hospital gowns, adequate grooming and hygiene no psychomotor agitation at times (chronic and seemingly intentionaly as he can stop it when redirected). needs staff assist has episodes of anger and aggression chronically limited but better since admission  more able to respond to redirection festinating gait but stable, using walker yells out at times baseline variable - at times euthymic, easily reactive, at times makes jokes at times yelling and angry varying (chronic), usually euthymic with episodes of irritable at times overall linear, at times disorganized and circumstantial, concrete increase in paranoia, may be due to non compliance with Clozapine and anxiety re: discharge chronically impaired but improved since admission Limited

## 2018-05-17 NOTE — PROGRESS NOTE BEHAVIORAL HEALTH - NSBHFUPINTERVALCCFT_PSY_A_CORE
" Engineering is harassing me.  They put poison gas through the vents, and they make my room too hot at night"

## 2018-05-17 NOTE — CHART NOTE - NSCHARTNOTEFT_GEN_A_CORE
Called to see patient because of chest pain.  However, when asked about what was bothering him, patient said that both his legs hurt and that his left lung was hurting in the back.  Patient was seen sitting in his chair with ice packs around his neck.  Was noted in the chart that the patient has been refusing medications and have been more liable (even requiring a manual hold for IM medications).  Patient said that both his legs have been burning.  Patient said that he has swelling in his legs and that the fluid has traveled to his lungs and then to his heart.  Of note, patient was on lasix in the past but has not been on it since end of April.  Patient complained that lasix would make him urinate often and that he is unable to make it to the bathroom (and thus urinate on the floor despite a urinal on his chair).  Physical exam revealed:      /91  HR 84    GEN:  obese male sitting in chair with resting and intention tremors  CV:  s1 and s2 heard, regular rate and rhythm, with no murmurs, gallops, or rubs  PULM:  clear to auscultation with no crackles or rales heard  CHEST:  still with chest tenderness on palpation but no erythema or deformities   EXT:  trace non-pitting edema in BLE with tenderness to palpation in pre-tibial area.      A/P:  52M with Parkinson's disease with reported diastolic heart failure history, herniated discs who is complaining of burning in his legs and lung pain.  Patient was focused on the fact that he feels like there is fluid in his legs and is backing up into his lungs and heart.  However, his legs are not that edematous and his lung exam was clear.  Unlikely to be in acute heart failure.    - continue to monitor his legs and if he does seem to be retaining fluids even more consider re-ordering lasix and then possibly cardiology consult +/- TTE  - for now, no further intervention except for elevating his legs

## 2018-05-17 NOTE — PROGRESS NOTE BEHAVIORAL HEALTH - NSBHADMITMEDEDUDETAILS_A_CORE FT
Patient teaching done again re: need for Rx compliance for sx management with patient saying "I don't need that medicine" (Clozapine)

## 2018-05-18 PROCEDURE — 99231 SBSQ HOSP IP/OBS SF/LOW 25: CPT

## 2018-05-18 RX ORDER — OLANZAPINE 15 MG/1
5 TABLET, FILM COATED ORAL ONCE
Qty: 0 | Refills: 0 | Status: COMPLETED | OUTPATIENT
Start: 2018-05-18 | End: 2018-05-18

## 2018-05-18 RX ADMIN — Medication 325 MILLIGRAM(S): at 22:17

## 2018-05-18 RX ADMIN — Medication 400 MILLIGRAM(S): at 05:00

## 2018-05-18 RX ADMIN — CARBIDOPA AND LEVODOPA 1 TABLET(S): 25; 100 TABLET ORAL at 20:40

## 2018-05-18 RX ADMIN — CARBIDOPA AND LEVODOPA 1 TABLET(S): 25; 100 TABLET ORAL at 10:40

## 2018-05-18 RX ADMIN — CARBIDOPA AND LEVODOPA 1 TABLET(S): 25; 100 TABLET ORAL at 15:55

## 2018-05-18 RX ADMIN — CARBIDOPA AND LEVODOPA 1 TABLET(S): 25; 100 TABLET ORAL at 05:53

## 2018-05-18 RX ADMIN — CARBIDOPA, LEVODOPA, AND ENTACAPONE 1 TABLET(S): 50; 200; 200 TABLET, FILM COATED ORAL at 05:53

## 2018-05-18 RX ADMIN — CARBIDOPA, LEVODOPA, AND ENTACAPONE 1 TABLET(S): 50; 200; 200 TABLET, FILM COATED ORAL at 10:40

## 2018-05-18 RX ADMIN — Medication 325 MILLIGRAM(S): at 23:06

## 2018-05-18 RX ADMIN — CARBIDOPA, LEVODOPA, AND ENTACAPONE 1 TABLET(S): 50; 200; 200 TABLET, FILM COATED ORAL at 15:55

## 2018-05-18 RX ADMIN — Medication 400 MILLIGRAM(S): at 04:06

## 2018-05-18 NOTE — PROGRESS NOTE BEHAVIORAL HEALTH - NSBHFUPINTERVALHXFT_PSY_A_CORE
Patient seen and examined. No significant interval events. Same clinical presentation with baseline behavior. No complaints. As usual. asked for his 2 ice packs, Cola and a bag of pretzels which Writer usually gives him. patient again encouraged to take Lasix and his compression stockings for his LE edema which he again continues to refuse. Patient seen and examined. No significant interval events. Same clinical presentation with baseline behavior. Got into a verbal shouting match with a male peer - no serious events. Male peer walked away. Patient continued to yell and curse and was able to calm down on his own without a PRN. He agreed to put on his compression stocking today (rare event).

## 2018-05-18 NOTE — PROGRESS NOTE BEHAVIORAL HEALTH - OTHER
+ neck favoring one side, dressed in hospital gowns, adequate grooming and hygiene no psychomotor agitation at times (chronic and seemingly intentionaly as he can stop it when redirected). appropriate has episodes of anger and aggression chronically limited but better since admission  more able to respond to redirection festinating gait but stable, using walker yells out at times baseline variable - at times euthymic, easily reactive, at times makes jokes at times yelling and angry varying (chronic), usually euthymic with episodes of irritable at times overall linear, at times disorganized and circumstantial, concrete increase in paranoia, may be due to non compliance with Clozapine and anxiety re: discharge chronically impaired but improved since admission Limited

## 2018-05-18 NOTE — PROGRESS NOTE BEHAVIORAL HEALTH - SUMMARY
51 yo male with early onset Parkinson’s disease x 11 years, discharged from 4 prior nursing homes in 8 months due to his behaviors, hx of refusing medications,  transferred to [facility] [address] on 6/12/17 where he manifested severe mood lability, paranoia and confabulation with poor insight and judgment including making > 40 [facility] complaints. 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 Patient was accepted to [facility] in [place] and discharged from [facility] on 7/12/17.  Upon getting to [facility], Patient refused to go into the facility and became combative. He was thus transferred back to [facility] ED which resulted in Service Line Chiefs’ of Service involvement given the history/issues and ultimate transfer to [address]. Patient has been on [address] since. UPDATE: [facility] declined to accept Patient. Hundreds of nursing home applications wee sent out and all declined to accept patient. Court scheduled on 12/19/17 was deferred as Patient refused to go; he then again refused to go to the new Court date. Continuing to search for placement which have been unsuccessful; family does not want him living with them.  Neurological facility in [place] has accepted patient, and legal guardian is involved in transfer issues. Court hearing regarding Patient's guardianship was 3/27/18 and his sister was granted temporary guardianship. Court hearing for retention occurred on 3/28/18 and the hospital was granted an additional 60 days. Awaiting Patient's sister to come up with a place to send Patient to with Court date for a "check in" was on 5/15 next week. 51 yo male with early onset Parkinson’s disease x 11 years, discharged from 4 prior nursing homes in 8 months due to his behaviors, hx of refusing medications,  transferred to [facility] [address] on 6/12/17 where he manifested severe mood lability, paranoia and confabulation with poor insight and judgment including making > 40 [facility] center complaints. 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 Patient was accepted to [facility] in [place] and discharged from [facility] on 7/12/17.  Upon getting to [facility], Patient refused to go into the facility and became combative. He was thus transferred back to [facility] ED which resulted in Service Line Chiefs’ of Service involvement given the history/issues and ultimate transfer to [address]. Patient has been on [address] since. UPDATE: [facility] declined to accept Patient. Hundreds of nursing home applications wee sent out and all declined to accept patient. Court scheduled on 12/19/17 was deferred as Patient refused to go; he then again refused to go to the new Court date. Continuing to search for placement which have been unsuccessful; family does not want him living with them.  Neurological facility in [place] has accepted patient, and legal guardian is involved in transfer issues. Court hearing regarding Patient's guardianship was 3/27/18 and his sister was granted temporary guardianship. Court hearing for retention occurred on 3/28/18 and the hospital was granted an additional 60 days. Court date for a "check in" was on 5/15 during which time it was reported that there was a potential nursing home which the Patient's sister went to and liked. Another Court date is on 5/29/18.

## 2018-05-19 RX ADMIN — CARBIDOPA AND LEVODOPA 1 TABLET(S): 25; 100 TABLET ORAL at 05:09

## 2018-05-19 RX ADMIN — CARBIDOPA AND LEVODOPA 1 TABLET(S): 25; 100 TABLET ORAL at 20:56

## 2018-05-19 RX ADMIN — CARBIDOPA AND LEVODOPA 1 TABLET(S): 25; 100 TABLET ORAL at 17:12

## 2018-05-19 RX ADMIN — CARBIDOPA, LEVODOPA, AND ENTACAPONE 1 TABLET(S): 50; 200; 200 TABLET, FILM COATED ORAL at 10:27

## 2018-05-19 RX ADMIN — CARBIDOPA, LEVODOPA, AND ENTACAPONE 1 TABLET(S): 50; 200; 200 TABLET, FILM COATED ORAL at 17:12

## 2018-05-19 RX ADMIN — Medication 325 MILLIGRAM(S): at 06:06

## 2018-05-19 RX ADMIN — Medication 650 MILLIGRAM(S): at 11:12

## 2018-05-19 RX ADMIN — Medication 650 MILLIGRAM(S): at 23:35

## 2018-05-19 RX ADMIN — Medication 650 MILLIGRAM(S): at 10:05

## 2018-05-19 RX ADMIN — CARBIDOPA AND LEVODOPA 1 TABLET(S): 25; 100 TABLET ORAL at 10:26

## 2018-05-19 RX ADMIN — Medication 325 MILLIGRAM(S): at 05:09

## 2018-05-19 RX ADMIN — CARBIDOPA, LEVODOPA, AND ENTACAPONE 1 TABLET(S): 50; 200; 200 TABLET, FILM COATED ORAL at 05:09

## 2018-05-20 PROCEDURE — 99231 SBSQ HOSP IP/OBS SF/LOW 25: CPT

## 2018-05-20 RX ADMIN — Medication 325 MILLIGRAM(S): at 09:41

## 2018-05-20 RX ADMIN — Medication 325 MILLIGRAM(S): at 08:21

## 2018-05-20 RX ADMIN — CARBIDOPA AND LEVODOPA 1 TABLET(S): 25; 100 TABLET ORAL at 11:40

## 2018-05-20 RX ADMIN — CARBIDOPA, LEVODOPA, AND ENTACAPONE 1 TABLET(S): 50; 200; 200 TABLET, FILM COATED ORAL at 11:40

## 2018-05-20 RX ADMIN — CARBIDOPA, LEVODOPA, AND ENTACAPONE 1 TABLET(S): 50; 200; 200 TABLET, FILM COATED ORAL at 06:26

## 2018-05-20 RX ADMIN — CARBIDOPA AND LEVODOPA 1 TABLET(S): 25; 100 TABLET ORAL at 06:30

## 2018-05-20 RX ADMIN — CARBIDOPA AND LEVODOPA 1 TABLET(S): 25; 100 TABLET ORAL at 20:08

## 2018-05-20 RX ADMIN — CARBIDOPA AND LEVODOPA 1 TABLET(S): 25; 100 TABLET ORAL at 20:09

## 2018-05-20 NOTE — PROGRESS NOTE BEHAVIORAL HEALTH - NSBHFUPINTERVALHXFT_PSY_A_CORE
Patient seen and examined. Staff reports that Patient had 3 episodes of urinary incontinence in one day; UA and uCx were sent and both negative. No subsequent such events. Ordered BMP, HgA1C in AM as patient had some trace ketones in his UA and due to notable weight gain this year. Patient seen and examined. Staff reports that Patient had 3 episodes of urinary incontinence in one day; UA and uCx were sent and both negative. No subsequent such events. Ordered BMP, HgA1C and lipid panel in AM as patient had some trace ketones in his UA and due to notable weight gain this year. Thyroid panel also added on as last one was at [facility].

## 2018-05-20 NOTE — PROGRESS NOTE BEHAVIORAL HEALTH - SUMMARY
53 yo male with early onset Parkinson’s disease x 11 years, discharged from 4 prior nursing homes in 8 months due to his behaviors, hx of refusing medications,  transferred to [facility] [address] on 6/12/17 where he manifested severe mood lability, paranoia and confabulation with poor insight and judgment including making > 40 [facility] center complaints. 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 Patient was accepted to [facility] in [place] and discharged from [facility] on 7/12/17.  Upon getting to [facility], Patient refused to go into the facility and became combative. He was thus transferred back to [facility] ED which resulted in Service Line Chiefs’ of Service involvement given the history/issues and ultimate transfer to [address]. Patient has been on [address] since. UPDATE: [facility] declined to accept Patient. Hundreds of nursing home applications wee sent out and all declined to accept patient. Court scheduled on 12/19/17 was deferred as Patient refused to go; he then again refused to go to the new Court date. Continuing to search for placement which have been unsuccessful; family does not want him living with them.  Neurological facility in [place] has accepted patient, and legal guardian is involved in transfer issues. Court hearing regarding Patient's guardianship was 3/27/18 and his sister was granted temporary guardianship. Court hearing for retention occurred on 3/28/18 and the hospital was granted an additional 60 days. Court date for a "check in" was on 5/15 during which time it was reported that there was a potential nursing home which the Patient's sister went to and liked. Another Court date is on 5/29/18.

## 2018-05-21 LAB
ANION GAP SERPL CALC-SCNC: 5 MMOL/L — SIGNIFICANT CHANGE UP (ref 5–17)
BUN SERPL-MCNC: 18 MG/DL — SIGNIFICANT CHANGE UP (ref 7–23)
CALCIUM SERPL-MCNC: 9 MG/DL — SIGNIFICANT CHANGE UP (ref 8.4–10.5)
CHLORIDE SERPL-SCNC: 105 MMOL/L — SIGNIFICANT CHANGE UP (ref 96–108)
CHOLEST SERPL-MCNC: 164 MG/DL — SIGNIFICANT CHANGE UP (ref 10–199)
CO2 SERPL-SCNC: 31 MMOL/L — SIGNIFICANT CHANGE UP (ref 22–31)
CREAT SERPL-MCNC: 1.09 MG/DL — SIGNIFICANT CHANGE UP (ref 0.5–1.3)
GLUCOSE SERPL-MCNC: 96 MG/DL — SIGNIFICANT CHANGE UP (ref 70–99)
HBA1C BLD-MCNC: 5.4 % — SIGNIFICANT CHANGE UP (ref 4–5.6)
HDLC SERPL-MCNC: 26 MG/DL — LOW (ref 40–125)
LIPID PNL WITH DIRECT LDL SERPL: 79 MG/DL — SIGNIFICANT CHANGE UP
POTASSIUM SERPL-MCNC: 3.9 MMOL/L — SIGNIFICANT CHANGE UP (ref 3.5–5.3)
POTASSIUM SERPL-SCNC: 3.9 MMOL/L — SIGNIFICANT CHANGE UP (ref 3.5–5.3)
SODIUM SERPL-SCNC: 141 MMOL/L — SIGNIFICANT CHANGE UP (ref 135–145)
T3 SERPL-MCNC: 113 NG/DL — SIGNIFICANT CHANGE UP (ref 80–200)
T4 AB SER-ACNC: 4.9 UG/DL — SIGNIFICANT CHANGE UP (ref 4.6–12)
TOTAL CHOLESTEROL/HDL RATIO MEASUREMENT: 6.3 RATIO — SIGNIFICANT CHANGE UP (ref 3.4–9.6)
TRIGL SERPL-MCNC: 296 MG/DL — HIGH (ref 10–149)
TSH SERPL-MCNC: 1.06 UIU/ML — SIGNIFICANT CHANGE UP (ref 0.27–4.2)

## 2018-05-21 PROCEDURE — 99231 SBSQ HOSP IP/OBS SF/LOW 25: CPT

## 2018-05-21 RX ADMIN — CARBIDOPA, LEVODOPA, AND ENTACAPONE 1 TABLET(S): 50; 200; 200 TABLET, FILM COATED ORAL at 17:10

## 2018-05-21 RX ADMIN — CARBIDOPA, LEVODOPA, AND ENTACAPONE 1 TABLET(S): 50; 200; 200 TABLET, FILM COATED ORAL at 06:53

## 2018-05-21 RX ADMIN — CARBIDOPA AND LEVODOPA 1 TABLET(S): 25; 100 TABLET ORAL at 20:20

## 2018-05-21 RX ADMIN — CARBIDOPA AND LEVODOPA 1 TABLET(S): 25; 100 TABLET ORAL at 11:05

## 2018-05-21 RX ADMIN — CARBIDOPA AND LEVODOPA 1 TABLET(S): 25; 100 TABLET ORAL at 17:10

## 2018-05-21 RX ADMIN — CARBIDOPA, LEVODOPA, AND ENTACAPONE 1 TABLET(S): 50; 200; 200 TABLET, FILM COATED ORAL at 11:05

## 2018-05-21 RX ADMIN — CARBIDOPA AND LEVODOPA 1 TABLET(S): 25; 100 TABLET ORAL at 06:53

## 2018-05-21 NOTE — PROGRESS NOTE BEHAVIORAL HEALTH - SUMMARY
53 yo male with early onset Parkinson’s disease x 11 years, discharged from 4 prior nursing homes in 8 months due to his behaviors, hx of refusing medications,  transferred to [facility] Unit [address] on 6/12/17 where he manifested severe mood lability, paranoia and confabulation with poor insight and judgment including making > 40 [facility] center complaints. 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 Patient was accepted to [facility] in [place] and discharged from [facility] on 7/12/17.  Upon getting to [facility], Patient refused to go into the facility and became combative. He was thus transferred back to [facility] ED which resulted in Service Line Chiefs’ of Service involvement given the history/issues and ultimate transfer to [address]. Patient has been on [address] since. UPDATE: [facility] declined to accept Patient. Hundreds of nursing home applications wee sent out and all declined to accept patient. Court scheduled on 12/19/17 was deferred as Patient refused to go; he then again refused to go to the new Court date. Continuing to search for placement which have been unsuccessful; family does not want him living with them.  Neurological facility in [place] has accepted patient, and legal guardian is involved in transfer issues. Court hearing regarding Patient's guardianship was 3/27/18 and his sister was granted temporary guardianship. Court hearing for retention occurred on 3/28/18 and the hospital was granted an additional 60 days. Court date for a "check in" was on 5/15 during which time it was reported that there was a potential nursing home which the Patient's sister went to and liked. Another Court date is on 5/29/18.

## 2018-05-21 NOTE — PROGRESS NOTE BEHAVIORAL HEALTH - OTHER
+ neck favoring one side, dressed in hospital gowns, adequate grooming and hygiene no psychomotor agitation at times (chronic and seemingly intentionaly as he can stop it when redirected). appropriate has episodes of anger and aggression chronically impaired but improved since admission Limited chronically limited but better since admission  more able to respond to redirection festinating gait but stable, using walker yells out at times baseline variable - at times euthymic, easily reactive, at times makes jokes at times yelling and angry varying (chronic), usually euthymic with episodes of irritable at times overall linear, at times disorganized and circumstantial, concrete increase in paranoia, may be due to non compliance with Clozapine and anxiety re: discharge

## 2018-05-21 NOTE — PROGRESS NOTE BEHAVIORAL HEALTH - NSBHFUPINTERVALHXFT_PSY_A_CORE
Patient seen and examined. He had no psychiatric complaints and stated that he was OK but went over reasons why he needs to get out of her, one being because he believes one of the other patients has been saying that he asked her for sex (not likely true) but also has come in his room when he didn't want her too (likely true). He was only paranoid about that at the time. He was able to joke around and denied other problems. He is awaiting placement. He has certain staff members that he doesn't like and has some baseline paranoia about them. He did discuss going to the bathroom more frequently. There has been no added medication lately. he has not taken the Clozapine and there is some changes in behavior from that but he is in generally behavioral control during our session.

## 2018-05-22 PROCEDURE — 99232 SBSQ HOSP IP/OBS MODERATE 35: CPT

## 2018-05-22 RX ADMIN — Medication 325 MILLIGRAM(S): at 20:19

## 2018-05-22 RX ADMIN — CARBIDOPA, LEVODOPA, AND ENTACAPONE 1 TABLET(S): 50; 200; 200 TABLET, FILM COATED ORAL at 10:35

## 2018-05-22 RX ADMIN — CARBIDOPA AND LEVODOPA 1 TABLET(S): 25; 100 TABLET ORAL at 10:35

## 2018-05-22 RX ADMIN — CARBIDOPA, LEVODOPA, AND ENTACAPONE 1 TABLET(S): 50; 200; 200 TABLET, FILM COATED ORAL at 16:19

## 2018-05-22 RX ADMIN — CYCLOBENZAPRINE HYDROCHLORIDE 10 MILLIGRAM(S): 10 TABLET, FILM COATED ORAL at 20:27

## 2018-05-22 RX ADMIN — Medication 650 MILLIGRAM(S): at 03:04

## 2018-05-22 RX ADMIN — CARBIDOPA AND LEVODOPA 1 TABLET(S): 25; 100 TABLET ORAL at 16:19

## 2018-05-22 RX ADMIN — Medication 325 MILLIGRAM(S): at 17:51

## 2018-05-22 RX ADMIN — CARBIDOPA AND LEVODOPA 1 TABLET(S): 25; 100 TABLET ORAL at 20:27

## 2018-05-22 RX ADMIN — CARBIDOPA AND LEVODOPA 1 TABLET(S): 25; 100 TABLET ORAL at 05:59

## 2018-05-22 RX ADMIN — Medication 650 MILLIGRAM(S): at 20:19

## 2018-05-22 RX ADMIN — CARBIDOPA, LEVODOPA, AND ENTACAPONE 1 TABLET(S): 50; 200; 200 TABLET, FILM COATED ORAL at 05:59

## 2018-05-22 NOTE — PROGRESS NOTE BEHAVIORAL HEALTH - SUMMARY
53 yo male with early onset Parkinson’s disease x 11 years, discharged from 4 prior nursing homes in 8 months due to his behaviors, hx of refusing medications,  transferred to [facility] [address] on 6/12/17 where he manifested severe mood lability, paranoia and confabulation with poor insight and judgment including making > 40 [facility] center complaints. 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 Patient was accepted to [facility] in [place] and discharged from [facility] on 7/12/17.  Upon getting to [facility], Patient refused to go into the facility and became combative. He was thus transferred back to [facility] ED which resulted in Service Line Chiefs’ of Service involvement given the history/issues and ultimate transfer to [address]. Patient has been on [address] since. UPDATE: [facility] declined to accept Patient. Hundreds of nursing home applications wee sent out and all declined to accept patient. Court scheduled on 12/19/17 was deferred as Patient refused to go; he then again refused to go to the new Court date. Continuing to search for placement which have been unsuccessful; family does not want him living with them.  Neurological facility in [place] has accepted patient, and legal guardian is involved in transfer issues. Court hearing regarding Patient's guardianship was 3/27/18 and his sister was granted temporary guardianship. Court hearing for retention occurred on 3/28/18 and the hospital was granted an additional 60 days. Court date for a "check in" was on 5/15 during which time it was reported that there was a potential nursing home which the Patient's sister went to and liked. Another Court date is on 5/29/18.

## 2018-05-22 NOTE — PROGRESS NOTE BEHAVIORAL HEALTH - NSBHFUPINTERVALHXFT_PSY_A_CORE
Patient seen and examined. He had no psychiatric complaints but was tearful when discussing his Parkinson disease. He had questions about its progressive nature. Education done about it. He said was in control , but has personality conflicts with a few patients  (who also have conflicts with him and others.)   He has certain staff members that he doesn't like and has some baseline paranoia about them. He appears more or less at baseline. There has been no added medication lately. he has not taken the Clozapine and there is some changes in behavior from that but he is in generally behavioral control during our session.

## 2018-05-23 PROCEDURE — 12345: CPT | Mod: NC

## 2018-05-23 PROCEDURE — 99231 SBSQ HOSP IP/OBS SF/LOW 25: CPT

## 2018-05-23 RX ORDER — CLOTRIMAZOLE AND BETAMETHASONE DIPROPIONATE 10; .5 MG/G; MG/G
1 CREAM TOPICAL
Qty: 0 | Refills: 0 | Status: DISCONTINUED | OUTPATIENT
Start: 2018-05-23 | End: 2018-06-04

## 2018-05-23 RX ADMIN — CARBIDOPA AND LEVODOPA 1 TABLET(S): 25; 100 TABLET ORAL at 05:43

## 2018-05-23 RX ADMIN — CARBIDOPA, LEVODOPA, AND ENTACAPONE 1 TABLET(S): 50; 200; 200 TABLET, FILM COATED ORAL at 16:26

## 2018-05-23 RX ADMIN — CARBIDOPA AND LEVODOPA 1 TABLET(S): 25; 100 TABLET ORAL at 16:26

## 2018-05-23 RX ADMIN — CARBIDOPA, LEVODOPA, AND ENTACAPONE 1 TABLET(S): 50; 200; 200 TABLET, FILM COATED ORAL at 05:44

## 2018-05-23 RX ADMIN — CARBIDOPA AND LEVODOPA 1 TABLET(S): 25; 100 TABLET ORAL at 12:49

## 2018-05-23 RX ADMIN — CARBIDOPA AND LEVODOPA 1 TABLET(S): 25; 100 TABLET ORAL at 22:19

## 2018-05-23 RX ADMIN — CARBIDOPA, LEVODOPA, AND ENTACAPONE 1 TABLET(S): 50; 200; 200 TABLET, FILM COATED ORAL at 12:49

## 2018-05-23 RX ADMIN — CARBIDOPA AND LEVODOPA 1 TABLET(S): 25; 100 TABLET ORAL at 22:20

## 2018-05-23 NOTE — PROGRESS NOTE BEHAVIORAL HEALTH - OTHER
+ neck favoring one side, dressed in hospital gowns, adequate grooming and hygiene no psychomotor agitation at times (chronic and seemingly intentionaly as he can stop it when redirected). appropriate chronically limited but better since admission  more able to respond to redirection festinating gait but stable, using walker yells out at times able to understand what patient says baseline variable - at times euthymic, easily reactive, at times makes jokes at times yelling and angry varying (chronic), usually euthymic with episodes of irritable at times overall linear, at times disorganized and circumstantial, concrete increase in paranoia, may be due to non compliance with Clozapine and anxiety re: discharge chronically impaired but improved since admission Limited

## 2018-05-23 NOTE — PROGRESS NOTE BEHAVIORAL HEALTH - NSBHFUPINTERVALHXFT_PSY_A_CORE
Patient seemed to be in better mood today and was smiling and making a joke. he is focused on upcoming Court hearing and is hopeful he will finally be discharged. No complaints and denies adverse medication  side effects. reports regular BMs.

## 2018-05-23 NOTE — PROGRESS NOTE BEHAVIORAL HEALTH - SUMMARY
51 yo male with early onset Parkinson’s disease x 11 years, discharged from 4 prior nursing homes in 8 months due to his behaviors, hx of refusing medications,  transferred to [facility] Unit [address] on 6/12/17 where he manifested severe mood lability, paranoia and confabulation with poor insight and judgment including making > 40 [facility] center complaints. 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 Patient was accepted to [facility] in [place] and discharged from [facility] on 7/12/17.  Upon getting to [facility], Patient refused to go into the facility and became combative. He was thus transferred back to [facility] ED which resulted in Service Line Chiefs’ of Service involvement given the history/issues and ultimate transfer to [address]. Patient has been on [address] since. UPDATE: [facility] declined to accept Patient. Hundreds of nursing home applications wee sent out and all declined to accept patient. Court scheduled on 12/19/17 was deferred as Patient refused to go; he then again refused to go to the new Court date. Continuing to search for placement which have been unsuccessful; family does not want him living with them.  Neurological facility in [place] has accepted patient, and legal guardian is involved in transfer issues. Court hearing regarding Patient's guardianship was 3/27/18 and his sister was granted temporary guardianship. Court hearing for retention occurred on 3/28/18 and the hospital was granted an additional 60 days. Court date for a "check in" was on 5/15 during which time it was reported that there was a potential nursing home which the Patient's sister went to and liked. Another Court date is on 5/29/18.

## 2018-05-24 RX ORDER — ACETAMINOPHEN 500 MG
500 TABLET ORAL ONCE
Qty: 0 | Refills: 0 | Status: COMPLETED | OUTPATIENT
Start: 2018-05-24 | End: 2018-05-24

## 2018-05-24 RX ADMIN — CARBIDOPA AND LEVODOPA 1 TABLET(S): 25; 100 TABLET ORAL at 20:27

## 2018-05-24 RX ADMIN — CARBIDOPA, LEVODOPA, AND ENTACAPONE 1 TABLET(S): 50; 200; 200 TABLET, FILM COATED ORAL at 05:38

## 2018-05-24 RX ADMIN — CLOTRIMAZOLE AND BETAMETHASONE DIPROPIONATE 1 APPLICATION(S): 10; .5 CREAM TOPICAL at 14:11

## 2018-05-24 RX ADMIN — Medication 325 MILLIGRAM(S): at 09:33

## 2018-05-24 RX ADMIN — Medication 325 MILLIGRAM(S): at 10:21

## 2018-05-24 RX ADMIN — CARBIDOPA, LEVODOPA, AND ENTACAPONE 1 TABLET(S): 50; 200; 200 TABLET, FILM COATED ORAL at 10:40

## 2018-05-24 RX ADMIN — CARBIDOPA, LEVODOPA, AND ENTACAPONE 1 TABLET(S): 50; 200; 200 TABLET, FILM COATED ORAL at 16:18

## 2018-05-24 RX ADMIN — Medication 500 MILLIGRAM(S): at 03:10

## 2018-05-24 RX ADMIN — Medication 650 MILLIGRAM(S): at 00:35

## 2018-05-24 RX ADMIN — CARBIDOPA AND LEVODOPA 1 TABLET(S): 25; 100 TABLET ORAL at 16:26

## 2018-05-24 RX ADMIN — CARBIDOPA AND LEVODOPA 1 TABLET(S): 25; 100 TABLET ORAL at 10:40

## 2018-05-24 RX ADMIN — Medication 650 MILLIGRAM(S): at 22:05

## 2018-05-24 RX ADMIN — Medication 500 MILLIGRAM(S): at 04:00

## 2018-05-24 RX ADMIN — Medication 650 MILLIGRAM(S): at 22:40

## 2018-05-24 RX ADMIN — CARBIDOPA AND LEVODOPA 1 TABLET(S): 25; 100 TABLET ORAL at 05:38

## 2018-05-24 RX ADMIN — Medication 30 MILLILITER(S): at 22:08

## 2018-05-24 RX ADMIN — Medication 325 MILLIGRAM(S): at 16:49

## 2018-05-24 NOTE — PROGRESS NOTE BEHAVIORAL HEALTH - SUMMARY
51 yo male with early onset Parkinson’s disease x 11 years, discharged from 4 prior nursing homes in 8 months due to his behaviors, hx of refusing medications,  transferred to [facility] Unit [facility]S on 6/12/17 where he manifested severe mood lability, paranoia and confabulation with poor insight and judgment including making > 40 [facility] center complaints. 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 Patient was accepted to [facility] in [place] and discharged from [facility] on 7/12/17.  Upon getting to [facility], Patient refused to go into the facility and became combative. He was thus transferred back to [facility] ED which resulted in Service Line Chiefs’ of Service involvement given the history/issues and ultimate transfer to [address]. Patient has been on [address] 1N since. UPDATE: [facility] declined to accept Patient. Hundreds of nursing home applications wee sent out and all declined to accept patient. Court scheduled on 12/19/17 was deferred as Patient refused to go; he then again refused to go to the new Court date. Continuing to search for placement which have been unsuccessful; family does not want him living with them.  Neurological facility in [place] has accepted patient, and legal guardian is involved in transfer issues. Court hearing regarding Patient's guardianship was 3/27/18 and his sister was granted temporary guardianship. Court hearing for retention occurred on 3/28/18 and the hospital was granted an additional 60 days. Court date for a "check in" was on 5/15 during which time it was reported that there was a potential nursing home which the Patient's sister went to and liked. Another Court date is on 5/29/18.

## 2018-05-24 NOTE — PROGRESS NOTE BEHAVIORAL HEALTH - NSBHFUPINTERVALHXFT_PSY_A_CORE
Daryn was upset yesterday because he was not accepted to his nursing home. He had some verbal agitation with another client who was also provacative. He was more subdued and remorseful today. He is frustrated by being for almost a year. He was not taking the Clozapine, and we agreed to discontinue it. He said he might be open to restarting it at a later time if needed. He did have some benefit from it but perhaps not absolutely necessary for treatment at this time. He may also have personality traits that contribute to his behaviors. He appears to have a paranoid personality in addition to the effects of his Parkinson disease. He is in control this morning and was able to express himself well. He needs to be in a supportive community environment not confined to inpatient units for extended periods of time. His prolonged stay contributes to his behaviors as well despite supportive treatment. He says he is willing to work with the treatment team towards a D/C plan. Currently in behavioral control.

## 2018-05-25 PROCEDURE — 99231 SBSQ HOSP IP/OBS SF/LOW 25: CPT

## 2018-05-25 RX ADMIN — CYCLOBENZAPRINE HYDROCHLORIDE 10 MILLIGRAM(S): 10 TABLET, FILM COATED ORAL at 03:32

## 2018-05-25 RX ADMIN — CARBIDOPA AND LEVODOPA 1 TABLET(S): 25; 100 TABLET ORAL at 06:00

## 2018-05-25 RX ADMIN — CARBIDOPA AND LEVODOPA 1 TABLET(S): 25; 100 TABLET ORAL at 21:42

## 2018-05-25 RX ADMIN — Medication 650 MILLIGRAM(S): at 22:20

## 2018-05-25 RX ADMIN — CARBIDOPA AND LEVODOPA 1 TABLET(S): 25; 100 TABLET ORAL at 11:30

## 2018-05-25 RX ADMIN — CARBIDOPA, LEVODOPA, AND ENTACAPONE 1 TABLET(S): 50; 200; 200 TABLET, FILM COATED ORAL at 11:30

## 2018-05-25 RX ADMIN — Medication 650 MILLIGRAM(S): at 23:20

## 2018-05-25 RX ADMIN — CARBIDOPA, LEVODOPA, AND ENTACAPONE 1 TABLET(S): 50; 200; 200 TABLET, FILM COATED ORAL at 16:03

## 2018-05-25 RX ADMIN — CARBIDOPA, LEVODOPA, AND ENTACAPONE 1 TABLET(S): 50; 200; 200 TABLET, FILM COATED ORAL at 06:00

## 2018-05-25 RX ADMIN — CARBIDOPA AND LEVODOPA 1 TABLET(S): 25; 100 TABLET ORAL at 16:02

## 2018-05-25 NOTE — PROGRESS NOTE BEHAVIORAL HEALTH - SUMMARY
53 yo male with early onset Parkinson’s disease x 11 years, discharged from 4 prior nursing homes in 8 months due to his behaviors, hx of refusing medications,  transferred to [facility] Unit [facility]S on 6/12/17 where he manifested severe mood lability, paranoia and confabulation with poor insight and judgment including making > 40 Justice center complaints. 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 Patient was accepted to [facility] in [place] and discharged from [facility] on 7/12/17.  Upon getting to [facility], Patient refused to go into the facility and became combative. He was thus transferred back to [facility] ED which resulted in Service Line Chiefs’ of Service involvement given the history/issues and ultimate transfer to [address]. Patient has been on Unit 1[address] since. UPDATE: [facility] declined to accept Patient. Hundreds of nursing home applications wee sent out and all declined to accept patient. Court scheduled on 12/19/17 was deferred as Patient refused to go; he then again refused to go to the new Court date. Continuing to search for placement which have been unsuccessful; family does not want him living with them.  Neurological facility in [place] has accepted patient, and legal guardian is involved in transfer issues. Court hearing regarding Patient's guardianship was 3/27/18 and his sister was granted temporary guardianship. Court hearing for retention occurred on 3/28/18 and the hospital was granted an additional 60 days. Court date for a "check in" was on 5/15 during which time it was reported that there was a potential nursing home which the Patient's sister went to and liked. Another Court date is on 5/29/18.

## 2018-05-25 NOTE — PROGRESS NOTE BEHAVIORAL HEALTH - NSBHFUPINTERVALHXFT_PSY_A_CORE
Daryn was upset yesterday because he claims that last night they gave him pills but didn't give him water, according to him. " I kept my cool"  He says He may also have personality traits that contribute to his behaviors. He appears to have a paranoid personality in addition to the effects of his Parkinson disease. He is in control this morning and was able to express himself well. He needs to be in a supportive community environment not confined to inpatient units for extended periods of time. His prolonged stay contributes to his behaviors as well despite supportive treatment. He says he is willing to work with the treatment team towards a D/C plan. Currently in behavioral control. He appears to be at baseline. Clozapine was Discontinued.

## 2018-05-26 RX ADMIN — CARBIDOPA AND LEVODOPA 1 TABLET(S): 25; 100 TABLET ORAL at 12:14

## 2018-05-26 RX ADMIN — Medication 650 MILLIGRAM(S): at 06:03

## 2018-05-26 RX ADMIN — CARBIDOPA, LEVODOPA, AND ENTACAPONE 1 TABLET(S): 50; 200; 200 TABLET, FILM COATED ORAL at 06:02

## 2018-05-26 RX ADMIN — CARBIDOPA AND LEVODOPA 1 TABLET(S): 25; 100 TABLET ORAL at 16:15

## 2018-05-26 RX ADMIN — CARBIDOPA AND LEVODOPA 1 TABLET(S): 25; 100 TABLET ORAL at 06:13

## 2018-05-26 RX ADMIN — CARBIDOPA, LEVODOPA, AND ENTACAPONE 1 TABLET(S): 50; 200; 200 TABLET, FILM COATED ORAL at 12:14

## 2018-05-26 RX ADMIN — CARBIDOPA AND LEVODOPA 1 TABLET(S): 25; 100 TABLET ORAL at 06:02

## 2018-05-26 RX ADMIN — CARBIDOPA, LEVODOPA, AND ENTACAPONE 1 TABLET(S): 50; 200; 200 TABLET, FILM COATED ORAL at 16:15

## 2018-05-26 RX ADMIN — CARBIDOPA AND LEVODOPA 1 TABLET(S): 25; 100 TABLET ORAL at 20:22

## 2018-05-26 RX ADMIN — Medication 650 MILLIGRAM(S): at 06:41

## 2018-05-27 RX ORDER — GEMFIBROZIL 600 MG
600 TABLET ORAL
Qty: 0 | Refills: 0 | Status: DISCONTINUED | OUTPATIENT
Start: 2018-05-27 | End: 2018-06-04

## 2018-05-27 RX ADMIN — CARBIDOPA, LEVODOPA, AND ENTACAPONE 1 TABLET(S): 50; 200; 200 TABLET, FILM COATED ORAL at 16:03

## 2018-05-27 RX ADMIN — CARBIDOPA AND LEVODOPA 1 TABLET(S): 25; 100 TABLET ORAL at 13:27

## 2018-05-27 RX ADMIN — CARBIDOPA AND LEVODOPA 1 TABLET(S): 25; 100 TABLET ORAL at 07:33

## 2018-05-27 RX ADMIN — Medication 600 MILLIGRAM(S): at 20:17

## 2018-05-27 RX ADMIN — CARBIDOPA, LEVODOPA, AND ENTACAPONE 1 TABLET(S): 50; 200; 200 TABLET, FILM COATED ORAL at 13:27

## 2018-05-27 RX ADMIN — CARBIDOPA AND LEVODOPA 1 TABLET(S): 25; 100 TABLET ORAL at 16:03

## 2018-05-27 RX ADMIN — CARBIDOPA, LEVODOPA, AND ENTACAPONE 1 TABLET(S): 50; 200; 200 TABLET, FILM COATED ORAL at 07:33

## 2018-05-27 RX ADMIN — CARBIDOPA AND LEVODOPA 1 TABLET(S): 25; 100 TABLET ORAL at 20:17

## 2018-05-27 RX ADMIN — Medication 325 MILLIGRAM(S): at 20:43

## 2018-05-28 RX ADMIN — CARBIDOPA AND LEVODOPA 1 TABLET(S): 25; 100 TABLET ORAL at 20:37

## 2018-05-28 RX ADMIN — CARBIDOPA AND LEVODOPA 1 TABLET(S): 25; 100 TABLET ORAL at 05:53

## 2018-05-28 RX ADMIN — CARBIDOPA AND LEVODOPA 1 TABLET(S): 25; 100 TABLET ORAL at 12:02

## 2018-05-28 RX ADMIN — CARBIDOPA, LEVODOPA, AND ENTACAPONE 1 TABLET(S): 50; 200; 200 TABLET, FILM COATED ORAL at 12:02

## 2018-05-28 RX ADMIN — CARBIDOPA AND LEVODOPA 1 TABLET(S): 25; 100 TABLET ORAL at 16:49

## 2018-05-28 RX ADMIN — Medication 600 MILLIGRAM(S): at 20:37

## 2018-05-28 RX ADMIN — CARBIDOPA, LEVODOPA, AND ENTACAPONE 1 TABLET(S): 50; 200; 200 TABLET, FILM COATED ORAL at 05:53

## 2018-05-28 RX ADMIN — Medication 325 MILLIGRAM(S): at 04:25

## 2018-05-28 RX ADMIN — CARBIDOPA, LEVODOPA, AND ENTACAPONE 1 TABLET(S): 50; 200; 200 TABLET, FILM COATED ORAL at 16:50

## 2018-05-29 PROCEDURE — 99231 SBSQ HOSP IP/OBS SF/LOW 25: CPT

## 2018-05-29 PROCEDURE — 12345: CPT | Mod: NC

## 2018-05-29 RX ORDER — CARBIDOPA AND LEVODOPA 25; 100 MG/1; MG/1
1 TABLET ORAL ONCE
Qty: 0 | Refills: 0 | Status: COMPLETED | OUTPATIENT
Start: 2018-05-29 | End: 2018-05-29

## 2018-05-29 RX ORDER — CARBIDOPA, LEVODOPA, AND ENTACAPONE 50; 200; 200 MG/1; MG/1; MG/1
1 TABLET, FILM COATED ORAL ONCE
Qty: 0 | Refills: 0 | Status: COMPLETED | OUTPATIENT
Start: 2018-05-29 | End: 2018-05-29

## 2018-05-29 RX ORDER — LORATADINE 10 MG/1
10 TABLET ORAL DAILY
Qty: 0 | Refills: 0 | Status: DISCONTINUED | OUTPATIENT
Start: 2018-05-29 | End: 2018-06-04

## 2018-05-29 RX ADMIN — CARBIDOPA AND LEVODOPA 1 TABLET(S): 25; 100 TABLET ORAL at 20:50

## 2018-05-29 RX ADMIN — CARBIDOPA, LEVODOPA, AND ENTACAPONE 1 TABLET(S): 50; 200; 200 TABLET, FILM COATED ORAL at 05:41

## 2018-05-29 RX ADMIN — CARBIDOPA AND LEVODOPA 1 TABLET(S): 25; 100 TABLET ORAL at 05:40

## 2018-05-29 RX ADMIN — CARBIDOPA AND LEVODOPA 1 TABLET(S): 25; 100 TABLET ORAL at 10:00

## 2018-05-29 RX ADMIN — CARBIDOPA AND LEVODOPA 1 TABLET(S): 25; 100 TABLET ORAL at 15:24

## 2018-05-29 RX ADMIN — CARBIDOPA, LEVODOPA, AND ENTACAPONE 1 TABLET(S): 50; 200; 200 TABLET, FILM COATED ORAL at 10:00

## 2018-05-29 RX ADMIN — CARBIDOPA, LEVODOPA, AND ENTACAPONE 1 TABLET(S): 50; 200; 200 TABLET, FILM COATED ORAL at 15:25

## 2018-05-29 RX ADMIN — Medication 650 MILLIGRAM(S): at 22:15

## 2018-05-29 RX ADMIN — CARBIDOPA AND LEVODOPA 1 TABLET(S): 25; 100 TABLET ORAL at 20:49

## 2018-05-29 RX ADMIN — Medication 30 MILLILITER(S): at 04:25

## 2018-05-29 NOTE — PROGRESS NOTE BEHAVIORAL HEALTH - OTHER
chronically impaired but improved since admission Limited + neck favoring one side, dressed in hospital gowns, adequate grooming and hygiene no psychomotor agitation at times (chronic and seemingly intentionaly as he can stop it when redirected). appropriate needs staff assist at times chronically limited but better since admission  more able to respond to redirection festinating gait but stable, using walker yells out at times able to understand what patient says baseline variable - at times euthymic, easily reactive, at times makes jokes at times yelling and angry varying (chronic), usually euthymic with episodes of irritable at times overall linear, at times disorganized and circumstantial, concrete increase in paranoia, may be due to non compliance with Clozapine and anxiety re: discharge

## 2018-05-29 NOTE — PROGRESS NOTE BEHAVIORAL HEALTH - SUMMARY
53 yo male with early onset Parkinson’s disease x 11 years, discharged from 4 prior nursing homes in 8 months due to his behaviors, hx of refusing medications,  transferred to [facility] Unit [facility] on 6/12/17 where he manifested severe mood lability, paranoia and confabulation with poor insight and judgment including making > 40 [facility] center complaints. 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 Patient was accepted to [facility] in [place] and discharged from [facility] on 7/12/17.  Upon getting to [facility], Patient refused to go into the facility and became combative. He was thus transferred back to [facility] ED which resulted in Service Line Chiefs’ of Service involvement given the history/issues and ultimate transfer to [address]. Patient has been on Unit 1N since. UPDATE: [facility] declined to accept Patient. Hundreds of nursing home applications wee sent out and all declined to accept patient. Court scheduled on 12/19/17 was deferred as Patient refused to go; he then again refused to go to the new Court date. Continuing to search for placement which have been unsuccessful; family does not want him living with them.  Neurological facility in [place] has accepted patient, and legal guardian is involved in transfer issues. Court hearing regarding Patient's guardianship was 3/27/18 and his sister was granted temporary guardianship. Court hearing for retention occurred on 3/28/18 and the hospital was granted an additional 60 days. Court date for a "check in" was on 5/15 during which time it was reported that there was a potential nursing home which the Patient's sister went to and liked. Another Court held on 5/29/18

## 2018-05-29 NOTE — PROGRESS NOTE BEHAVIORAL HEALTH - NSBHFUPINTERVALHXFT_PSY_A_CORE
Met with patient, chart reviewed and case discussed in tx team meeting.  No significant interval events reported. Patient went out of the hospital to go to court today re: his discharge and his sister as guardian who is following up on patient's discharge plans. He may also have personality traits that contribute to his behaviors. He appears to have a paranoid personality in addition to the effects of his Parkinson disease. He is in control today and was able to express himself well. He needs to be in a supportive community environment not confined to inpatient units for extended periods of time. His prolonged stay contributes to his behaviors as well despite supportive treatment. He says he is willing to work with the treatment team towards a D/C plan. Currently in behavioral control. He appears to be at baseline. No Rx SE are noted or reported. .

## 2018-05-30 PROCEDURE — 99231 SBSQ HOSP IP/OBS SF/LOW 25: CPT

## 2018-05-30 RX ADMIN — CARBIDOPA AND LEVODOPA 1 TABLET(S): 25; 100 TABLET ORAL at 20:05

## 2018-05-30 RX ADMIN — CARBIDOPA, LEVODOPA, AND ENTACAPONE 1 TABLET(S): 50; 200; 200 TABLET, FILM COATED ORAL at 06:08

## 2018-05-30 RX ADMIN — CARBIDOPA AND LEVODOPA 1 TABLET(S): 25; 100 TABLET ORAL at 17:10

## 2018-05-30 RX ADMIN — CARBIDOPA AND LEVODOPA 1 TABLET(S): 25; 100 TABLET ORAL at 06:08

## 2018-05-30 RX ADMIN — CARBIDOPA, LEVODOPA, AND ENTACAPONE 1 TABLET(S): 50; 200; 200 TABLET, FILM COATED ORAL at 17:08

## 2018-05-30 RX ADMIN — CARBIDOPA AND LEVODOPA 1 TABLET(S): 25; 100 TABLET ORAL at 12:00

## 2018-05-30 RX ADMIN — CARBIDOPA, LEVODOPA, AND ENTACAPONE 1 TABLET(S): 50; 200; 200 TABLET, FILM COATED ORAL at 12:00

## 2018-05-30 NOTE — PROGRESS NOTE BEHAVIORAL HEALTH - OTHER
chronically limited but better since admission  more able to respond to redirection festinating gait but stable, using walker yells out at times, is yelling out less often able to understand what patient says baseline variable - at times euthymic, easily reactive, at times makes jokes at times yelling and angry varying (chronic), usually euthymic with episodes of irritable at times overall linear, at times disorganized and circumstantial, concrete increase in paranoia, may be due to non compliance with Clozapine and anxiety re: discharge + neck favoring one side, dressed in hospital gowns, adequate grooming and hygiene no psychomotor agitation at times (chronic and seemingly intentionaly as he can stop it when redirected). appropriate needs staff assist at times chronically impaired but improved since admission Limited

## 2018-05-30 NOTE — PROGRESS NOTE BEHAVIORAL HEALTH - NSBHADMITMEDEDUDETAILS_A_CORE FT
Psychoeducation done re: need for Rx compliance for sx management with patient saying "I will take my Parkinson's medicine"

## 2018-05-30 NOTE — PROGRESS NOTE BEHAVIORAL HEALTH - NSBHFUPINTERVALHXFT_PSY_A_CORE
Met with patient, chart reviewed and case discussed in tx team meeting.  No significant interval events reported. He may also have personality traits that contribute to his behaviors. He appears to have a paranoid personality in addition to the effects of his Parkinson disease. He is in control today and was able to express himself well. He needs to be in a supportive community environment not confined to inpatient units for extended periods of time. His prolonged stay contributes to his behaviors as well despite supportive treatment. He says he is willing to work with the treatment team towards a D/C plan. Currently in behavioral control. He appears to be at baseline. No Rx SE are noted or reported. Continues to discuss his wish for discharge and the plans that he states his sister is telling him for his discharge.

## 2018-05-30 NOTE — PROGRESS NOTE BEHAVIORAL HEALTH - NSBHFUPINTERVALCCFT_PSY_A_CORE
" My sister called and she told me she can hire aides [facility], and they can help me at home"

## 2018-05-30 NOTE — PROGRESS NOTE BEHAVIORAL HEALTH - SUMMARY
51 yo male with early onset Parkinson’s disease x 11 years, discharged from 4 prior nursing homes in 8 months due to his behaviors, hx of refusing medications,  transferred to [facility] Unit 2S on 6/12/17 where he manifested severe mood lability, paranoia and confabulation with poor insight and judgment including making > 40 [facility] center complaints. 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 Patient was accepted to [facility] in [place] and discharged from [facility] on 7/12/17.  Upon getting to [facility], Patient refused to go into the facility and became combative. He was thus transferred back to [facility] ED which resulted in Service Line Chiefs’ of Service involvement given the history/issues and ultimate transfer to [address]. Patient has been on Unit 1N since. UPDATE: [facility] declined to accept Patient. Hundreds of nursing home applications wee sent out and all declined to accept patient. Court scheduled on 12/19/17 was deferred as Patient refused to go; he then again refused to go to the new Court date. Continuing to search for placement which have been unsuccessful; family does not want him living with them.  Neurological facility in [place] has accepted patient, and legal guardian is involved in transfer issues. Court hearing regarding Patient's guardianship was 3/27/18 and his sister was granted temporary guardianship. Court hearing for retention occurred on 3/28/18 and the hospital was granted an additional 60 days. Court date for a "check in" was on 5/15 during which time it was reported that there was a potential nursing home which the Patient's sister went to and liked. Another Court held on 5/29/18

## 2018-05-31 PROCEDURE — 12345: CPT | Mod: NC

## 2018-05-31 PROCEDURE — 99231 SBSQ HOSP IP/OBS SF/LOW 25: CPT

## 2018-05-31 RX ADMIN — CARBIDOPA AND LEVODOPA 1 TABLET(S): 25; 100 TABLET ORAL at 16:20

## 2018-05-31 RX ADMIN — Medication 650 MILLIGRAM(S): at 21:50

## 2018-05-31 RX ADMIN — CARBIDOPA AND LEVODOPA 1 TABLET(S): 25; 100 TABLET ORAL at 20:01

## 2018-05-31 RX ADMIN — Medication 650 MILLIGRAM(S): at 20:51

## 2018-05-31 RX ADMIN — CARBIDOPA, LEVODOPA, AND ENTACAPONE 1 TABLET(S): 50; 200; 200 TABLET, FILM COATED ORAL at 16:20

## 2018-05-31 RX ADMIN — Medication 650 MILLIGRAM(S): at 13:41

## 2018-05-31 RX ADMIN — CARBIDOPA AND LEVODOPA 1 TABLET(S): 25; 100 TABLET ORAL at 05:36

## 2018-05-31 RX ADMIN — CARBIDOPA, LEVODOPA, AND ENTACAPONE 1 TABLET(S): 50; 200; 200 TABLET, FILM COATED ORAL at 05:36

## 2018-05-31 RX ADMIN — Medication 400 MILLIGRAM(S): at 22:45

## 2018-05-31 RX ADMIN — CARBIDOPA AND LEVODOPA 1 TABLET(S): 25; 100 TABLET ORAL at 11:45

## 2018-05-31 RX ADMIN — CARBIDOPA AND LEVODOPA 1 TABLET(S): 25; 100 TABLET ORAL at 20:19

## 2018-05-31 RX ADMIN — CARBIDOPA, LEVODOPA, AND ENTACAPONE 1 TABLET(S): 50; 200; 200 TABLET, FILM COATED ORAL at 11:44

## 2018-05-31 NOTE — PROGRESS NOTE BEHAVIORAL HEALTH - NSBHFUPINTERVALHXFT_PSY_A_CORE
Met with patient, chart reviewed and case discussed in tx team meeting.  No significant interval events reported except [employer] [place] last night for agitatied behavior. He may also have personality traits that contribute to his behaviors. He appears to have a paranoid personality in addition to the effects of his Parkinson disease. He is in limited control today and is yelling at times. He needs to be in a supportive community environment not confined to inpatient units for extended periods of time. His prolonged stay contributes to his behaviors as well despite supportive treatment. He says he is willing to work with the treatment team towards a D/C plan. Currently in behavioral control sometimes.  He appears to be at baseline. No Rx SE are noted or reported. Continues to discuss his wish for discharge and the plans that he states his sister is telling him for his discharge.

## 2018-05-31 NOTE — PROGRESS NOTE BEHAVIORAL HEALTH - OTHER
+ neck favoring one side, dressed in hospital gowns, adequate grooming and hygiene no psychomotor agitation at times (chronic and seemingly intentionaly as he can stop it when redirected). appropriate needs staff assist at times chronically limited but better since admission  more able to respond to redirection festinating gait but stable, using walker yells out at times able to understand what patient says baseline variable - at times euthymic, easily reactive, at times makes jokes at times yelling and angry varying (chronic), usually euthymic with episodes of irritable at times overall linear, at times disorganized and circumstantial, concrete increase in paranoia, may be due to non compliance with Clozapine and anxiety re: discharge chronically impaired but improved since admission Limited

## 2018-05-31 NOTE — PROGRESS NOTE BEHAVIORAL HEALTH - SUMMARY
53 yo male with early onset Parkinson’s disease x 11 years, discharged from 4 prior nursing homes in 8 months due to his behaviors, hx of refusing medications,  transferred to [facility] Unit 2S on 6/12/17 where he manifested severe mood lability, paranoia and confabulation with poor insight and judgment including making > 40 [facility] center complaints. 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 Patient was accepted to [facility] in [place] and discharged from [facility] on 7/12/17.  Upon getting to [facility], Patient refused to go into the facility and became combative. He was thus transferred back to [facility] ED which resulted in Service Line Chiefs’ of Service involvement given the history/issues and ultimate transfer to [address]. Patient has been on Unit 1N since. UPDATE: [facility] declined to accept Patient. Hundreds of nursing home applications wee sent out and all declined to accept patient. Court scheduled on 12/19/17 was deferred as Patient refused to go; he then again refused to go to the new Court date. Continuing to search for placement which have been unsuccessful; family does not want him living with them.  Neurological facility in [place] has accepted patient, and legal guardian is involved in transfer issues. Court hearing regarding Patient's guardianship was 3/27/18 and his sister was granted temporary guardianship. Court hearing for retention occurred on 3/28/18 and the hospital was granted an additional 60 days. Court date for a "check in" was on 5/15 during which time it was reported that there was a potential nursing home which the Patient's sister went to and liked. Another Court held on 5/29/18 and was adjourned until the end of June.

## 2018-06-01 PROCEDURE — 99231 SBSQ HOSP IP/OBS SF/LOW 25: CPT

## 2018-06-01 RX ADMIN — Medication 325 MILLIGRAM(S): at 18:00

## 2018-06-01 RX ADMIN — CARBIDOPA, LEVODOPA, AND ENTACAPONE 1 TABLET(S): 50; 200; 200 TABLET, FILM COATED ORAL at 10:40

## 2018-06-01 RX ADMIN — Medication 325 MILLIGRAM(S): at 06:39

## 2018-06-01 RX ADMIN — CARBIDOPA AND LEVODOPA 1 TABLET(S): 25; 100 TABLET ORAL at 06:06

## 2018-06-01 RX ADMIN — CARBIDOPA AND LEVODOPA 1 TABLET(S): 25; 100 TABLET ORAL at 16:06

## 2018-06-01 RX ADMIN — CLOTRIMAZOLE AND BETAMETHASONE DIPROPIONATE 1 APPLICATION(S): 10; .5 CREAM TOPICAL at 21:04

## 2018-06-01 RX ADMIN — CARBIDOPA, LEVODOPA, AND ENTACAPONE 1 TABLET(S): 50; 200; 200 TABLET, FILM COATED ORAL at 16:06

## 2018-06-01 RX ADMIN — CARBIDOPA, LEVODOPA, AND ENTACAPONE 1 TABLET(S): 50; 200; 200 TABLET, FILM COATED ORAL at 06:07

## 2018-06-01 RX ADMIN — CARBIDOPA AND LEVODOPA 1 TABLET(S): 25; 100 TABLET ORAL at 20:52

## 2018-06-01 RX ADMIN — CARBIDOPA AND LEVODOPA 1 TABLET(S): 25; 100 TABLET ORAL at 20:53

## 2018-06-01 RX ADMIN — CARBIDOPA AND LEVODOPA 1 TABLET(S): 25; 100 TABLET ORAL at 10:40

## 2018-06-01 NOTE — PROGRESS NOTE BEHAVIORAL HEALTH - SUMMARY
51 yo male with early onset Parkinson’s disease x 11 years, discharged from 4 prior nursing homes in 8 months due to his behaviors, hx of refusing medications,  transferred to [facility] Unit 2S on 6/12/17 where he manifested severe mood lability, paranoia and confabulation with poor insight and judgment including making > 40 Justice center complaints. 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 Patient was accepted to [facility] in [place] and discharged from [facility] on 7/12/17.  Upon getting to [facility], Patient refused to go into the facility and became combative. He was thus transferred back to [facility] ED which resulted in Service Line Chiefs’ of Service involvement given the history/issues and ultimate transfer to [address]. Patient has been on Unit 1N since. UPDATE: [facility] declined to accept Patient. Hundreds of nursing home applications wee sent out and all declined to accept patient. Court scheduled on 12/19/17 was deferred as Patient refused to go; he then again refused to go to the new Court date. Continuing to search for placement which have been unsuccessful; family does not want him living with them.  Neurological facility in [place] has accepted patient, and legal guardian is involved in transfer issues. Court hearing regarding Patient's guardianship was 3/27/18 and his sister was granted temporary guardianship. Court hearing for retention occurred on 3/28/18 and the hospital was granted an additional 60 days. Court date for a "check in" was on 5/15 during which time it was reported that there was a potential nursing home which the Patient's sister went to and liked. Another Court held on 5/29/18 and was adjourned until the end of June.

## 2018-06-01 NOTE — PROGRESS NOTE BEHAVIORAL HEALTH - OTHER
overall linear, at times disorganized and circumstantial, concrete increase in paranoia, may be due to non compliance with Clozapine and anxiety re: discharge chronically impaired but improved since admission Limited + neck favoring one side, dressed in hospital gowns, adequate grooming and hygiene no psychomotor agitation at times (chronic and seemingly intentionaly as he can stop it when redirected). appropriate needs staff assist at times chronically limited but better since admission  more able to respond to redirection festinating gait but stable, using walker yells out at times able to understand what patient says baseline variable - at times euthymic, easily reactive, at times makes jokes at times yelling and angry varying (chronic), usually euthymic with episodes of irritable at times.  Sad at times

## 2018-06-01 NOTE — PROGRESS NOTE BEHAVIORAL HEALTH - NSBHADMITMEDEDUDETAILS_A_CORE FT
Psychoeducation again done re: need for Rx compliance for sx management with patient saying "I will only take my Parkinson's medicine"

## 2018-06-01 NOTE — PROGRESS NOTE BEHAVIORAL HEALTH - NSBHFUPINTERVALHXFT_PSY_A_CORE
Met with patient, chart reviewed and case discussed in tx team meeting.  No significant interval events reported. . He may also have personality traits that contribute to his behaviors. He appears to have a paranoid personality in addition to the effects of his Parkinson disease. He is in limited control today and is yelling at times. He needs to be in a supportive community environment not confined to inpatient units for extended periods of time. His prolonged stay contributes to his behaviors as well despite supportive treatment. He says he is willing to work with the treatment team towards a D/C plan. Currently in behavioral control sometimes, but is verbally aggressive to night staff.   He appears to be at baseline. No Rx SE are noted or reported. Continues to discuss his wish for discharge and the plans that he states his sister is telling him for his discharge.  No Rx SE are noted or reported

## 2018-06-02 RX ORDER — BACITRACIN ZINC 500 UNIT/G
1 OINTMENT IN PACKET (EA) TOPICAL
Qty: 0 | Refills: 0 | Status: DISCONTINUED | OUTPATIENT
Start: 2018-06-02 | End: 2018-06-04

## 2018-06-02 RX ADMIN — CARBIDOPA, LEVODOPA, AND ENTACAPONE 1 TABLET(S): 50; 200; 200 TABLET, FILM COATED ORAL at 06:34

## 2018-06-02 RX ADMIN — CARBIDOPA, LEVODOPA, AND ENTACAPONE 1 TABLET(S): 50; 200; 200 TABLET, FILM COATED ORAL at 11:05

## 2018-06-02 RX ADMIN — CARBIDOPA AND LEVODOPA 1 TABLET(S): 25; 100 TABLET ORAL at 11:05

## 2018-06-02 RX ADMIN — CARBIDOPA AND LEVODOPA 1 TABLET(S): 25; 100 TABLET ORAL at 21:05

## 2018-06-02 RX ADMIN — CARBIDOPA, LEVODOPA, AND ENTACAPONE 1 TABLET(S): 50; 200; 200 TABLET, FILM COATED ORAL at 16:06

## 2018-06-02 RX ADMIN — Medication 650 MILLIGRAM(S): at 00:01

## 2018-06-02 RX ADMIN — Medication 650 MILLIGRAM(S): at 06:34

## 2018-06-02 RX ADMIN — CYCLOBENZAPRINE HYDROCHLORIDE 10 MILLIGRAM(S): 10 TABLET, FILM COATED ORAL at 00:01

## 2018-06-02 RX ADMIN — CLOTRIMAZOLE AND BETAMETHASONE DIPROPIONATE 1 APPLICATION(S): 10; .5 CREAM TOPICAL at 15:30

## 2018-06-02 RX ADMIN — Medication 650 MILLIGRAM(S): at 00:40

## 2018-06-02 RX ADMIN — Medication 325 MILLIGRAM(S): at 16:55

## 2018-06-02 RX ADMIN — Medication 650 MILLIGRAM(S): at 07:05

## 2018-06-02 RX ADMIN — CARBIDOPA AND LEVODOPA 1 TABLET(S): 25; 100 TABLET ORAL at 16:06

## 2018-06-02 RX ADMIN — CARBIDOPA AND LEVODOPA 1 TABLET(S): 25; 100 TABLET ORAL at 06:34

## 2018-06-03 PROCEDURE — 12345: CPT | Mod: NC

## 2018-06-03 RX ORDER — BACITRACIN ZINC 500 UNIT/G
1 OINTMENT IN PACKET (EA) TOPICAL
Qty: 0 | Refills: 0 | Status: DISCONTINUED | OUTPATIENT
Start: 2018-06-03 | End: 2018-06-22

## 2018-06-03 RX ADMIN — CARBIDOPA, LEVODOPA, AND ENTACAPONE 1 TABLET(S): 50; 200; 200 TABLET, FILM COATED ORAL at 05:42

## 2018-06-03 RX ADMIN — Medication 650 MILLIGRAM(S): at 17:45

## 2018-06-03 RX ADMIN — CARBIDOPA AND LEVODOPA 1 TABLET(S): 25; 100 TABLET ORAL at 11:30

## 2018-06-03 RX ADMIN — CARBIDOPA, LEVODOPA, AND ENTACAPONE 1 TABLET(S): 50; 200; 200 TABLET, FILM COATED ORAL at 16:08

## 2018-06-03 RX ADMIN — CARBIDOPA AND LEVODOPA 1 TABLET(S): 25; 100 TABLET ORAL at 20:46

## 2018-06-03 RX ADMIN — Medication 325 MILLIGRAM(S): at 05:41

## 2018-06-03 RX ADMIN — Medication 650 MILLIGRAM(S): at 23:45

## 2018-06-03 RX ADMIN — CARBIDOPA, LEVODOPA, AND ENTACAPONE 1 TABLET(S): 50; 200; 200 TABLET, FILM COATED ORAL at 11:30

## 2018-06-03 RX ADMIN — Medication 325 MILLIGRAM(S): at 06:15

## 2018-06-03 RX ADMIN — CARBIDOPA AND LEVODOPA 1 TABLET(S): 25; 100 TABLET ORAL at 05:42

## 2018-06-03 RX ADMIN — CARBIDOPA AND LEVODOPA 1 TABLET(S): 25; 100 TABLET ORAL at 16:08

## 2018-06-04 PROCEDURE — 99233 SBSQ HOSP IP/OBS HIGH 50: CPT

## 2018-06-04 RX ADMIN — Medication 325 MILLIGRAM(S): at 10:26

## 2018-06-04 RX ADMIN — CLOTRIMAZOLE AND BETAMETHASONE DIPROPIONATE 1 APPLICATION(S): 10; .5 CREAM TOPICAL at 09:07

## 2018-06-04 RX ADMIN — CARBIDOPA AND LEVODOPA 1 TABLET(S): 25; 100 TABLET ORAL at 16:35

## 2018-06-04 RX ADMIN — CARBIDOPA AND LEVODOPA 1 TABLET(S): 25; 100 TABLET ORAL at 20:40

## 2018-06-04 RX ADMIN — Medication 325 MILLIGRAM(S): at 18:16

## 2018-06-04 RX ADMIN — CARBIDOPA, LEVODOPA, AND ENTACAPONE 1 TABLET(S): 50; 200; 200 TABLET, FILM COATED ORAL at 20:39

## 2018-06-04 RX ADMIN — CARBIDOPA AND LEVODOPA 1 TABLET(S): 25; 100 TABLET ORAL at 11:03

## 2018-06-04 RX ADMIN — CARBIDOPA, LEVODOPA, AND ENTACAPONE 1 TABLET(S): 50; 200; 200 TABLET, FILM COATED ORAL at 16:35

## 2018-06-04 RX ADMIN — Medication 650 MILLIGRAM(S): at 00:45

## 2018-06-04 RX ADMIN — Medication 325 MILLIGRAM(S): at 18:46

## 2018-06-04 RX ADMIN — Medication 1 APPLICATION(S): at 20:14

## 2018-06-04 RX ADMIN — CARBIDOPA AND LEVODOPA 1 TABLET(S): 25; 100 TABLET ORAL at 06:14

## 2018-06-04 RX ADMIN — CARBIDOPA, LEVODOPA, AND ENTACAPONE 1 TABLET(S): 50; 200; 200 TABLET, FILM COATED ORAL at 11:03

## 2018-06-04 RX ADMIN — CARBIDOPA, LEVODOPA, AND ENTACAPONE 1 TABLET(S): 50; 200; 200 TABLET, FILM COATED ORAL at 06:14

## 2018-06-04 RX ADMIN — Medication 325 MILLIGRAM(S): at 11:03

## 2018-06-04 NOTE — PROGRESS NOTE BEHAVIORAL HEALTH - NSBHADMITMEDEDUDETAILS_A_CORE FT
continue current management. Discontinued Lotrisone cream, Lopid, Proctosol, Claritin and melatonin as Patient has refused to take them for > 1 month (or longer) and they are not necessary for his treatment.

## 2018-06-04 NOTE — PROGRESS NOTE BEHAVIORAL HEALTH - NSBHFUPINTERVALHXFT_PSY_A_CORE
Patient seemed to be in better mood today and was smiling and had a nice conversation with Writer - also discussed non-required/unnecessary medications he has been refusing for months and not taking hence to be discontinued. Patient also had questions about prostate issues over the age of 50 etc. General medical education given and questions answered. No complaints and denies adverse medication  side effects. reports regular BMs.

## 2018-06-04 NOTE — PROGRESS NOTE BEHAVIORAL HEALTH - SUMMARY
53 yo male with early onset Parkinson’s disease x 11 years, discharged from 4 prior nursing homes in 8 months due to his behaviors, hx of refusing medications,  transferred to [facility] Unit 2S on 6/12/17 where he manifested severe mood lability, paranoia and confabulation with poor insight and judgment including making > 40 [facility] center complaints. 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 Patient was accepted to [facility] in [place] and discharged from [facility] on 7/12/17.  Upon getting to [facility], Patient refused to go into the facility and became combative. He was thus transferred back to [facility] ED which resulted in Service Line Chiefs’ of Service involvement given the history/issues and ultimate transfer to [address]. Patient has been on Unit 1N since. UPDATE: [facility] declined to accept Patient. Hundreds of nursing home applications wee sent out and all declined to accept patient. Court scheduled on 12/19/17 was deferred as Patient refused to go; he then again refused to go to the new Court date. Continuing to search for placement which have been unsuccessful; family does not want him living with them.  Neurological facility in [place] has accepted patient, and legal guardian is involved in transfer issues. Court hearing regarding Patient's guardianship was 3/27/18 and his sister was granted temporary guardianship. Court hearing for retention occurred on 3/28/18 and the hospital was granted an additional 60 days. Court date for a "check in" was on 5/15 during which time it was reported that there was a potential nursing home which the Patient's sister went to and liked. Another Court date was on 5/29/18 during which time the [facility] withdrew acceptance and now Pt's sister still has to find him an apartment to live.

## 2018-06-04 NOTE — PROGRESS NOTE BEHAVIORAL HEALTH - OTHER
+ neck favoring one side, dressed in hospital gowns, adequate grooming and hygiene no psychomotor agitation at times (chronic and seemingly intentionaly as he can stop it when redirected). appropriate chronically limited but better since admission  more able to respond to redirection festinating gait but stable, using walker able to understand what patient says varying (chronic), usually euthymic with episodes of irritable at times.  Sad at times - today smiling and euthymic overall linear, at times disorganized and circumstantial, concrete (chronic) but better since initial admission at times paranoid but no overt delusions today chronically impaired but improved since admission Limited

## 2018-06-05 PROCEDURE — 99231 SBSQ HOSP IP/OBS SF/LOW 25: CPT

## 2018-06-05 RX ADMIN — CARBIDOPA, LEVODOPA, AND ENTACAPONE 1 TABLET(S): 50; 200; 200 TABLET, FILM COATED ORAL at 05:43

## 2018-06-05 RX ADMIN — CARBIDOPA AND LEVODOPA 1 TABLET(S): 25; 100 TABLET ORAL at 20:31

## 2018-06-05 RX ADMIN — Medication 400 MILLIGRAM(S): at 01:16

## 2018-06-05 RX ADMIN — Medication 400 MILLIGRAM(S): at 05:43

## 2018-06-05 RX ADMIN — CARBIDOPA, LEVODOPA, AND ENTACAPONE 1 TABLET(S): 50; 200; 200 TABLET, FILM COATED ORAL at 11:01

## 2018-06-05 RX ADMIN — Medication 1 APPLICATION(S): at 20:31

## 2018-06-05 RX ADMIN — Medication 650 MILLIGRAM(S): at 22:33

## 2018-06-05 RX ADMIN — CARBIDOPA AND LEVODOPA 1 TABLET(S): 25; 100 TABLET ORAL at 11:01

## 2018-06-05 RX ADMIN — CARBIDOPA, LEVODOPA, AND ENTACAPONE 1 TABLET(S): 50; 200; 200 TABLET, FILM COATED ORAL at 16:42

## 2018-06-05 RX ADMIN — CARBIDOPA AND LEVODOPA 1 TABLET(S): 25; 100 TABLET ORAL at 16:41

## 2018-06-05 RX ADMIN — CARBIDOPA AND LEVODOPA 1 TABLET(S): 25; 100 TABLET ORAL at 20:32

## 2018-06-05 RX ADMIN — CARBIDOPA AND LEVODOPA 1 TABLET(S): 25; 100 TABLET ORAL at 05:43

## 2018-06-05 NOTE — PROGRESS NOTE BEHAVIORAL HEALTH - NSBHFUPINTERVALHXFT_PSY_A_CORE
No significant interval events. Patient is maintaining his usual clinical presentation and again found to be in a better mood with brighter affect. No complaints and denies adverse medication  side effects. reports regular BMs. No significant interval events. Patient is maintaining his usual clinical presentation and again found to be in a better mood with brighter affect. No complaints and denies adverse medication  side effects. reports regular BMs. patient participated in a PT session today and did a good job. He was walking around the unit terri rather reddy speed without any apparent difficulties.

## 2018-06-05 NOTE — PROGRESS NOTE BEHAVIORAL HEALTH - SUMMARY
51 yo male with early onset Parkinson’s disease x 11 years, discharged from 4 prior nursing homes in 8 months due to his behaviors, hx of refusing medications,  transferred to [facility] Unit 2S on 6/12/17 where he manifested severe mood lability, paranoia and confabulation with poor insight and judgment including making > 40 [facility] center complaints. 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 Patient was accepted to [facility] in [place] and discharged from [facility] on 7/12/17.  Upon getting to [facility], Patient refused to go into the facility and became combative. He was thus transferred back to [facility] ED which resulted in Service Line Chiefs’ of Service involvement given the history/issues and ultimate transfer to [address]. Patient has been on Unit 1N since. UPDATE: [facility] declined to accept Patient. Hundreds of nursing home applications wee sent out and all declined to accept patient. Court scheduled on 12/19/17 was deferred as Patient refused to go; he then again refused to go to the new Court date. Continuing to search for placement which have been unsuccessful; family does not want him living with them.  Neurological facility in [place] has accepted patient, and legal guardian is involved in transfer issues. Court hearing regarding Patient's guardianship was 3/27/18 and his sister was granted temporary guardianship. Court hearing for retention occurred on 3/28/18 and the hospital was granted an additional 60 days. Court date for a "check in" was on 5/15 during which time it was reported that there was a potential nursing home which the Patient's sister went to and liked. Another Court date was on 5/29/18 during which time the [facility] withdrew acceptance and now Pt's sister still has to find him an apartment to live.

## 2018-06-06 PROCEDURE — 99231 SBSQ HOSP IP/OBS SF/LOW 25: CPT

## 2018-06-06 RX ADMIN — Medication 650 MILLIGRAM(S): at 23:30

## 2018-06-06 RX ADMIN — CARBIDOPA AND LEVODOPA 1 TABLET(S): 25; 100 TABLET ORAL at 21:05

## 2018-06-06 RX ADMIN — Medication 325 MILLIGRAM(S): at 11:02

## 2018-06-06 RX ADMIN — CARBIDOPA AND LEVODOPA 1 TABLET(S): 25; 100 TABLET ORAL at 11:01

## 2018-06-06 RX ADMIN — CARBIDOPA AND LEVODOPA 1 TABLET(S): 25; 100 TABLET ORAL at 05:50

## 2018-06-06 RX ADMIN — Medication 325 MILLIGRAM(S): at 03:25

## 2018-06-06 RX ADMIN — CARBIDOPA, LEVODOPA, AND ENTACAPONE 1 TABLET(S): 50; 200; 200 TABLET, FILM COATED ORAL at 15:47

## 2018-06-06 RX ADMIN — Medication 325 MILLIGRAM(S): at 02:54

## 2018-06-06 RX ADMIN — Medication 650 MILLIGRAM(S): at 22:38

## 2018-06-06 RX ADMIN — CARBIDOPA, LEVODOPA, AND ENTACAPONE 1 TABLET(S): 50; 200; 200 TABLET, FILM COATED ORAL at 11:01

## 2018-06-06 RX ADMIN — Medication 325 MILLIGRAM(S): at 09:55

## 2018-06-06 RX ADMIN — CARBIDOPA, LEVODOPA, AND ENTACAPONE 1 TABLET(S): 50; 200; 200 TABLET, FILM COATED ORAL at 05:50

## 2018-06-06 RX ADMIN — CARBIDOPA AND LEVODOPA 1 TABLET(S): 25; 100 TABLET ORAL at 15:47

## 2018-06-06 NOTE — PROGRESS NOTE BEHAVIORAL HEALTH - SUMMARY
53 yo male with early onset Parkinson’s disease x 11 years, discharged from 4 prior nursing homes in 8 months due to his behaviors, hx of refusing medications,  transferred to [facility] Unit 2S on 6/12/17 where he manifested severe mood lability, paranoia and confabulation with poor insight and judgment including making > 40 [facility] center complaints. 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 Patient was accepted to [facility] in [place] and discharged from [facility] on 7/12/17.  Upon getting to [facility], Patient refused to go into the facility and became combative. He was thus transferred back to [facility] ED which resulted in Service Line Chiefs’ of Service involvement given the history/issues and ultimate transfer to [address]. Patient has been on Unit 1N since. UPDATE: [facility] declined to accept Patient. Hundreds of nursing home applications wee sent out and all declined to accept patient. Court scheduled on 12/19/17 was deferred as Patient refused to go; he then again refused to go to the new Court date. Continuing to search for placement which have been unsuccessful; family does not want him living with them.  Neurological facility in [place] has accepted patient, and legal guardian is involved in transfer issues. Court hearing regarding Patient's guardianship was 3/27/18 and his sister was granted temporary guardianship. Court hearing for retention occurred on 3/28/18 and the hospital was granted an additional 60 days. Court date for a "check in" was on 5/15 during which time it was reported that there was a potential nursing home which the Patient's sister went to and liked. Another Court date was on 5/29/18 during which time the [facility] withdrew acceptance and now Pt's sister still has to find him an apartment to live.

## 2018-06-06 NOTE — PROGRESS NOTE BEHAVIORAL HEALTH - NSBHFUPINTERVALHXFT_PSY_A_CORE
No significant interval events. Patient had a good day yesterday. Patient is maintaining his usual clinical presentation and again found to be in a better mood with brighter affect. No complaints and denies adverse medication  side effects. reports regular BMs. Physically, looks better since initial admission - better walking with better speed and stability; affect brighter.

## 2018-06-07 PROCEDURE — 99231 SBSQ HOSP IP/OBS SF/LOW 25: CPT

## 2018-06-07 RX ADMIN — CARBIDOPA AND LEVODOPA 1 TABLET(S): 25; 100 TABLET ORAL at 16:00

## 2018-06-07 RX ADMIN — CARBIDOPA, LEVODOPA, AND ENTACAPONE 1 TABLET(S): 50; 200; 200 TABLET, FILM COATED ORAL at 12:00

## 2018-06-07 RX ADMIN — Medication 650 MILLIGRAM(S): at 21:13

## 2018-06-07 RX ADMIN — CARBIDOPA AND LEVODOPA 1 TABLET(S): 25; 100 TABLET ORAL at 05:39

## 2018-06-07 RX ADMIN — Medication 650 MILLIGRAM(S): at 05:39

## 2018-06-07 RX ADMIN — CARBIDOPA AND LEVODOPA 1 TABLET(S): 25; 100 TABLET ORAL at 20:28

## 2018-06-07 RX ADMIN — Medication 325 MILLIGRAM(S): at 17:43

## 2018-06-07 RX ADMIN — CARBIDOPA, LEVODOPA, AND ENTACAPONE 1 TABLET(S): 50; 200; 200 TABLET, FILM COATED ORAL at 16:00

## 2018-06-07 RX ADMIN — CARBIDOPA AND LEVODOPA 1 TABLET(S): 25; 100 TABLET ORAL at 12:00

## 2018-06-07 RX ADMIN — CARBIDOPA, LEVODOPA, AND ENTACAPONE 1 TABLET(S): 50; 200; 200 TABLET, FILM COATED ORAL at 05:39

## 2018-06-07 RX ADMIN — CARBIDOPA AND LEVODOPA 1 TABLET(S): 25; 100 TABLET ORAL at 20:27

## 2018-06-07 NOTE — PROGRESS NOTE BEHAVIORAL HEALTH - NSBHFUPINTERVALHXFT_PSY_A_CORE
No significant interval events. Patient had again a good day yesterday. Today asking for his usual ice packs. Patient is maintaining his usual clinical presentation and again found to be in a better mood with brighter affect. No complaints and denies adverse medication  side effects. reports regular BMs. Physically, looks better since initial admission - better walking with better speed and stability; affect brighter.

## 2018-06-07 NOTE — PROGRESS NOTE BEHAVIORAL HEALTH - SUMMARY
53 yo male with early onset Parkinson’s disease x 11 years, discharged from 4 prior nursing homes in 8 months due to his behaviors, hx of refusing medications,  transferred to [facility] Unit 2S on 6/12/17 where he manifested severe mood lability, paranoia and confabulation with poor insight and judgment including making > 40 [facility] center complaints. 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 Patient was accepted to [facility] in [place] and discharged from [facility] on 7/12/17.  Upon getting to [facility], Patient refused to go into the facility and became combative. He was thus transferred back to [facility] ED which resulted in Service Line Chiefs’ of Service involvement given the history/issues and ultimate transfer to [address]. Patient has been on Unit 1N since. UPDATE: [facility] declined to accept Patient. Hundreds of nursing home applications wee sent out and all declined to accept patient. Court scheduled on 12/19/17 was deferred as Patient refused to go; he then again refused to go to the new Court date. Continuing to search for placement which have been unsuccessful; family does not want him living with them.  Neurological facility in [place] has accepted patient, and legal guardian is involved in transfer issues. Court hearing regarding Patient's guardianship was 3/27/18 and his sister was granted temporary guardianship. Court hearing for retention occurred on 3/28/18 and the hospital was granted an additional 60 days. Court date for a "check in" was on 5/15 during which time it was reported that there was a potential nursing home which the Patient's sister went to and liked. Another Court date was on 5/29/18 during which time the [facility] withdrew acceptance and now Pt's sister still has to find him an apartment to live.

## 2018-06-08 PROCEDURE — 99231 SBSQ HOSP IP/OBS SF/LOW 25: CPT

## 2018-06-08 RX ADMIN — CARBIDOPA AND LEVODOPA 1 TABLET(S): 25; 100 TABLET ORAL at 21:22

## 2018-06-08 RX ADMIN — CARBIDOPA AND LEVODOPA 1 TABLET(S): 25; 100 TABLET ORAL at 11:06

## 2018-06-08 RX ADMIN — Medication 325 MILLIGRAM(S): at 06:27

## 2018-06-08 RX ADMIN — Medication 325 MILLIGRAM(S): at 23:51

## 2018-06-08 RX ADMIN — CARBIDOPA, LEVODOPA, AND ENTACAPONE 1 TABLET(S): 50; 200; 200 TABLET, FILM COATED ORAL at 16:00

## 2018-06-08 RX ADMIN — CARBIDOPA AND LEVODOPA 1 TABLET(S): 25; 100 TABLET ORAL at 21:24

## 2018-06-08 RX ADMIN — CARBIDOPA, LEVODOPA, AND ENTACAPONE 1 TABLET(S): 50; 200; 200 TABLET, FILM COATED ORAL at 06:27

## 2018-06-08 RX ADMIN — Medication 325 MILLIGRAM(S): at 22:51

## 2018-06-08 RX ADMIN — CARBIDOPA, LEVODOPA, AND ENTACAPONE 1 TABLET(S): 50; 200; 200 TABLET, FILM COATED ORAL at 11:06

## 2018-06-08 RX ADMIN — CARBIDOPA AND LEVODOPA 1 TABLET(S): 25; 100 TABLET ORAL at 06:27

## 2018-06-08 RX ADMIN — CARBIDOPA AND LEVODOPA 1 TABLET(S): 25; 100 TABLET ORAL at 16:00

## 2018-06-08 NOTE — PROGRESS NOTE BEHAVIORAL HEALTH - SUMMARY
53 yo male with early onset Parkinson’s disease x 11 years, discharged from 4 prior nursing homes in 8 months due to his behaviors, hx of refusing medications,  transferred to [facility] Unit 2S on 6/12/17 where he manifested severe mood lability, paranoia and confabulation with poor insight and judgment including making > 40 Justice center complaints. 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 Patient was accepted to [facility] in [place] and discharged from [facility] on 7/12/17.  Upon getting to [facility], Patient refused to go into the facility and became combative. He was thus transferred back to [facility] ED which resulted in Service Line Chiefs’ of Service involvement given the history/issues and ultimate transfer to [address]. Patient has been on Unit 1N since. UPDATE: [facility] declined to accept Patient. Hundreds of nursing home applications wee sent out and all declined to accept patient. Court scheduled on 12/19/17 was deferred as Patient refused to go; he then again refused to go to the new Court date. Continuing to search for placement which have been unsuccessful; family does not want him living with them.  Neurological facility in [place] has accepted patient, and legal guardian is involved in transfer issues. Court hearing regarding Patient's guardianship was 3/27/18 and his sister was granted temporary guardianship. Court hearing for retention occurred on 3/28/18 and the hospital was granted an additional 60 days. Court date for a "check in" was on 5/15 during which time it was reported that there was a potential nursing home which the Patient's sister went to and liked. Another Court date was on 5/29/18 during which time the [facility] withdrew acceptance and now Pt's sister still has to find him an apartment to live.

## 2018-06-08 NOTE — PROGRESS NOTE BEHAVIORAL HEALTH - NSBHFUPINTERVALHXFT_PSY_A_CORE
No significant interval events. Same clinical presentation. No complaints and denies adverse medication  side effects. reports regular BMs. As per sister's request, letter documenting patent's length of stay with exact dates of admission etc was typed on letter head and FedEx-d to Pt's sister's address this morning.

## 2018-06-09 RX ADMIN — CARBIDOPA AND LEVODOPA 1 TABLET(S): 25; 100 TABLET ORAL at 17:14

## 2018-06-09 RX ADMIN — CARBIDOPA AND LEVODOPA 1 TABLET(S): 25; 100 TABLET ORAL at 20:33

## 2018-06-09 RX ADMIN — Medication 650 MILLIGRAM(S): at 22:51

## 2018-06-09 RX ADMIN — CARBIDOPA, LEVODOPA, AND ENTACAPONE 1 TABLET(S): 50; 200; 200 TABLET, FILM COATED ORAL at 11:00

## 2018-06-09 RX ADMIN — Medication 650 MILLIGRAM(S): at 22:21

## 2018-06-09 RX ADMIN — CARBIDOPA, LEVODOPA, AND ENTACAPONE 1 TABLET(S): 50; 200; 200 TABLET, FILM COATED ORAL at 05:36

## 2018-06-09 RX ADMIN — Medication 325 MILLIGRAM(S): at 04:58

## 2018-06-09 RX ADMIN — CARBIDOPA AND LEVODOPA 1 TABLET(S): 25; 100 TABLET ORAL at 05:36

## 2018-06-09 RX ADMIN — CARBIDOPA AND LEVODOPA 1 TABLET(S): 25; 100 TABLET ORAL at 11:00

## 2018-06-09 RX ADMIN — CARBIDOPA, LEVODOPA, AND ENTACAPONE 1 TABLET(S): 50; 200; 200 TABLET, FILM COATED ORAL at 17:14

## 2018-06-10 RX ADMIN — CARBIDOPA AND LEVODOPA 1 TABLET(S): 25; 100 TABLET ORAL at 11:04

## 2018-06-10 RX ADMIN — CARBIDOPA AND LEVODOPA 1 TABLET(S): 25; 100 TABLET ORAL at 21:19

## 2018-06-10 RX ADMIN — CARBIDOPA, LEVODOPA, AND ENTACAPONE 1 TABLET(S): 50; 200; 200 TABLET, FILM COATED ORAL at 05:35

## 2018-06-10 RX ADMIN — CARBIDOPA, LEVODOPA, AND ENTACAPONE 1 TABLET(S): 50; 200; 200 TABLET, FILM COATED ORAL at 16:22

## 2018-06-10 RX ADMIN — CARBIDOPA, LEVODOPA, AND ENTACAPONE 1 TABLET(S): 50; 200; 200 TABLET, FILM COATED ORAL at 11:04

## 2018-06-10 RX ADMIN — CARBIDOPA AND LEVODOPA 1 TABLET(S): 25; 100 TABLET ORAL at 16:22

## 2018-06-10 RX ADMIN — CARBIDOPA AND LEVODOPA 1 TABLET(S): 25; 100 TABLET ORAL at 05:35

## 2018-06-11 PROCEDURE — 99231 SBSQ HOSP IP/OBS SF/LOW 25: CPT

## 2018-06-11 RX ADMIN — CARBIDOPA AND LEVODOPA 1 TABLET(S): 25; 100 TABLET ORAL at 10:38

## 2018-06-11 RX ADMIN — Medication 650 MILLIGRAM(S): at 00:54

## 2018-06-11 RX ADMIN — CARBIDOPA, LEVODOPA, AND ENTACAPONE 1 TABLET(S): 50; 200; 200 TABLET, FILM COATED ORAL at 10:38

## 2018-06-11 RX ADMIN — CARBIDOPA AND LEVODOPA 1 TABLET(S): 25; 100 TABLET ORAL at 20:08

## 2018-06-11 RX ADMIN — CARBIDOPA AND LEVODOPA 1 TABLET(S): 25; 100 TABLET ORAL at 16:15

## 2018-06-11 RX ADMIN — CARBIDOPA, LEVODOPA, AND ENTACAPONE 1 TABLET(S): 50; 200; 200 TABLET, FILM COATED ORAL at 16:15

## 2018-06-11 RX ADMIN — CARBIDOPA AND LEVODOPA 1 TABLET(S): 25; 100 TABLET ORAL at 05:40

## 2018-06-11 RX ADMIN — Medication 325 MILLIGRAM(S): at 00:50

## 2018-06-11 RX ADMIN — CARBIDOPA, LEVODOPA, AND ENTACAPONE 1 TABLET(S): 50; 200; 200 TABLET, FILM COATED ORAL at 05:40

## 2018-06-11 NOTE — PROGRESS NOTE BEHAVIORAL HEALTH - SUMMARY
51 yo male with early onset Parkinson’s disease x 11 years, discharged from 4 prior nursing homes in 8 months due to his behaviors, hx of refusing medications,  transferred to [facility] Unit 2S on 6/12/17 where he manifested severe mood lability, paranoia and confabulation with poor insight and judgment including making > 40 [facility] center complaints. 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 Patient was accepted to [facility] in [place] and discharged from [facility] on 7/12/17.  Upon getting to [facility], Patient refused to go into the facility and became combative. He was thus transferred back to [facility] ED which resulted in Service Line Chiefs’ of Service involvement given the history/issues and ultimate transfer to [address]. Patient has been on Unit 1N since. UPDATE: [facility] declined to accept Patient. Hundreds of nursing home applications wee sent out and all declined to accept patient. Court scheduled on 12/19/17 was deferred as Patient refused to go; he then again refused to go to the new Court date. Continuing to search for placement which have been unsuccessful; family does not want him living with them.  Neurological facility in [place] has accepted patient, and legal guardian is involved in transfer issues. Court hearing regarding Patient's guardianship was 3/27/18 and his sister was granted temporary guardianship. Court hearing for retention occurred on 3/28/18 and the hospital was granted an additional 60 days. Court date for a "check in" was on 5/15 during which time it was reported that there was a potential nursing home which the Patient's sister went to and liked. Another Court date was on 5/29/18 during which time the [facility] withdrew acceptance and now Pt's sister still has to find him an apartment to live.  Next court date reported to be 6/26

## 2018-06-11 NOTE — PROGRESS NOTE BEHAVIORAL HEALTH - NSBHFUPINTERVALHXFT_PSY_A_CORE
No significant interval events. Patient seen and case discussed in tx team meeting.  Same clinical presentation. No complaints and denies adverse medication  side effects. reports regular BMs. As per sister's request, letter documenting patent's length of stay with exact dates of admission etc was typed on letter head and FedEx-d to Pt's sister's address on 6/8.  Patient denies any SI or HI. Patient is in good behavioral control at this time.

## 2018-06-11 NOTE — PROGRESS NOTE BEHAVIORAL HEALTH - OTHER
+ neck favoring one side, dressed in hospital gowns, adequate grooming and hygiene no psychomotor agitation at times (chronic and seemingly intentionaly as he can stop it when redirected). at times paranoid but no overt delusions today chronically impaired but improved since admission Limited appropriate needs  staff assist at times chronically limited but better since admission  more able to respond to redirection festinating gait but stable, using walker able to understand what patient says varying (chronic), usually euthymic with episodes of irritable at times.  Sad at times - today smiling and euthymic overall linear, at times disorganized and circumstantial, concrete (chronic) but better since initial admission

## 2018-06-12 PROCEDURE — 99231 SBSQ HOSP IP/OBS SF/LOW 25: CPT

## 2018-06-12 RX ADMIN — CARBIDOPA AND LEVODOPA 1 TABLET(S): 25; 100 TABLET ORAL at 20:16

## 2018-06-12 RX ADMIN — CARBIDOPA AND LEVODOPA 1 TABLET(S): 25; 100 TABLET ORAL at 10:32

## 2018-06-12 RX ADMIN — CARBIDOPA AND LEVODOPA 1 TABLET(S): 25; 100 TABLET ORAL at 05:56

## 2018-06-12 RX ADMIN — CARBIDOPA, LEVODOPA, AND ENTACAPONE 1 TABLET(S): 50; 200; 200 TABLET, FILM COATED ORAL at 05:56

## 2018-06-12 RX ADMIN — CARBIDOPA, LEVODOPA, AND ENTACAPONE 1 TABLET(S): 50; 200; 200 TABLET, FILM COATED ORAL at 16:17

## 2018-06-12 RX ADMIN — CARBIDOPA AND LEVODOPA 1 TABLET(S): 25; 100 TABLET ORAL at 16:17

## 2018-06-12 RX ADMIN — CARBIDOPA, LEVODOPA, AND ENTACAPONE 1 TABLET(S): 50; 200; 200 TABLET, FILM COATED ORAL at 10:31

## 2018-06-12 RX ADMIN — Medication 325 MILLIGRAM(S): at 16:16

## 2018-06-12 RX ADMIN — Medication 325 MILLIGRAM(S): at 15:14

## 2018-06-12 RX ADMIN — CARBIDOPA AND LEVODOPA 1 TABLET(S): 25; 100 TABLET ORAL at 20:17

## 2018-06-12 NOTE — PROGRESS NOTE BEHAVIORAL HEALTH - SUMMARY
53 yo male with early onset Parkinson’s disease x 11 years, discharged from 4 prior nursing homes in 8 months due to his behaviors, hx of refusing medications,  transferred to [facility] Unit 2S on 6/12/17 where he manifested severe mood lability, paranoia and confabulation with poor insight and judgment including making > 40 [facility] center complaints. 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 Patient was accepted to [facility] in [place] and discharged from [facility] on 7/12/17.  Upon getting to [facility], Patient refused to go into the facility and became combative. He was thus transferred back to [facility] ED which resulted in Service Line Chiefs’ of Service involvement given the history/issues and ultimate transfer to [address]. Patient has been on Unit 1N since. UPDATE: [facility] declined to accept Patient. Hundreds of nursing home applications wee sent out and all declined to accept patient. Court scheduled on 12/19/17 was deferred as Patient refused to go; he then again refused to go to the new Court date. Continuing to search for placement which have been unsuccessful; family does not want him living with them.  Neurological facility in [place] has accepted patient, and legal guardian is involved in transfer issues. Court hearing regarding Patient's guardianship was 3/27/18 and his sister was granted temporary guardianship. Court hearing for retention occurred on 3/28/18 and the hospital was granted an additional 60 days. Court date for a "check in" was on 5/15 during which time it was reported that there was a potential nursing home which the Patient's sister went to and liked. Another Court date was on 5/29/18 during which time the [facility] withdrew acceptance and now Pt's sister still has to find him an apartment to live.  Next court date reported to be 6/26

## 2018-06-12 NOTE — PROGRESS NOTE BEHAVIORAL HEALTH - NSBHFUPINTERVALCCFT_PSY_A_CORE
" My sister got the letter they sent from here, and she will have a [employer] voucher for me this week.  I hope to be discharged by Friday"

## 2018-06-12 NOTE — PROGRESS NOTE BEHAVIORAL HEALTH - NSBHFUPINTERVALHXFT_PSY_A_CORE
No significant interval events. Patient seen and case discussed in tx team meeting.  Same clinical presentation. No complaints and denies adverse medication  side effects. reports regular BMs. As per sister's request, letter documenting patent's length of stay with exact dates of admission etc was typed on letter head and FedEx-d to Pt's sister's address on 6/8. Patient reports the sister got the letter.   Patient denies any SI or HI. Patient is in good behavioral control at this time, and reports having less pain

## 2018-06-12 NOTE — PROGRESS NOTE BEHAVIORAL HEALTH - OTHER
Limited + neck favoring one side, dressed in hospital gowns, adequate grooming and hygiene no psychomotor agitation at times (chronic and seemingly intentionaly as he can stop it when redirected). appropriate needs  staff assist at times chronically limited but better since admission  more able to respond to redirection festinating gait but stable, using walker able to understand what patient says varying (chronic), usually euthymic with episodes of irritable at times.  Sad at times - today smiling and euthymic overall linear, at times disorganized and circumstantial, concrete (chronic) but better since initial admission at times paranoid but no overt delusions today chronically impaired but improved since admission

## 2018-06-13 PROCEDURE — 99231 SBSQ HOSP IP/OBS SF/LOW 25: CPT

## 2018-06-13 RX ADMIN — CARBIDOPA AND LEVODOPA 1 TABLET(S): 25; 100 TABLET ORAL at 13:29

## 2018-06-13 RX ADMIN — Medication 650 MILLIGRAM(S): at 04:13

## 2018-06-13 RX ADMIN — CARBIDOPA AND LEVODOPA 1 TABLET(S): 25; 100 TABLET ORAL at 08:24

## 2018-06-13 RX ADMIN — Medication 650 MILLIGRAM(S): at 22:59

## 2018-06-13 RX ADMIN — CARBIDOPA, LEVODOPA, AND ENTACAPONE 1 TABLET(S): 50; 200; 200 TABLET, FILM COATED ORAL at 13:29

## 2018-06-13 RX ADMIN — CARBIDOPA, LEVODOPA, AND ENTACAPONE 1 TABLET(S): 50; 200; 200 TABLET, FILM COATED ORAL at 16:02

## 2018-06-13 RX ADMIN — CARBIDOPA AND LEVODOPA 1 TABLET(S): 25; 100 TABLET ORAL at 20:19

## 2018-06-13 RX ADMIN — CARBIDOPA, LEVODOPA, AND ENTACAPONE 1 TABLET(S): 50; 200; 200 TABLET, FILM COATED ORAL at 08:23

## 2018-06-13 RX ADMIN — CARBIDOPA AND LEVODOPA 1 TABLET(S): 25; 100 TABLET ORAL at 16:02

## 2018-06-13 NOTE — PROGRESS NOTE BEHAVIORAL HEALTH - NSBHFUPINTERVALHXFT_PSY_A_CORE
No significant interval events. Patient seen and case discussed in tx team meeting.  Same clinical presentation. No complaints and denies adverse medication  side effects. reports regular BMs. Patient is more irritible and paranoid today, often yelling out.   Patient denies any SI or HI.  Patient refused 2 CBCs that were ordered, and reports he does not want lab work done.

## 2018-06-13 NOTE — PROGRESS NOTE BEHAVIORAL HEALTH - SUMMARY
51 yo male with early onset Parkinson’s disease x 11 years, discharged from 4 prior nursing homes in 8 months due to his behaviors, hx of refusing medications,  transferred to [facility] Unit [facility] on 6/12/17 where he manifested severe mood lability, paranoia and confabulation with poor insight and judgment including making > 40 [facility] complaints. 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 Patient was accepted to [facility] in [place] and discharged from [facility] on 7/12/17.  Upon getting to [facility], Patient refused to go into the facility and became combative. He was thus transferred back to [facility] ED which resulted in Service Line Chiefs’ of Service involvement given the history/issues and ultimate transfer to [address]. Patient has been on [address] since. UPDATE: [facility] declined to accept Patient. Hundreds of nursing home applications wee sent out and all declined to accept patient. Court scheduled on 12/19/17 was deferred as Patient refused to go; he then again refused to go to the new Court date. Continuing to search for placement which have been unsuccessful; family does not want him living with them.  Neurological facility in [place] has accepted patient, and legal guardian is involved in transfer issues. Court hearing regarding Patient's guardianship was 3/27/18 and his sister was granted temporary guardianship. Court hearing for retention occurred on 3/28/18 and the hospital was granted an additional 60 days. Court date for a "check in" was on 5/15 during which time it was reported that there was a potential nursing home which the Patient's sister went to and liked. Another Court date was on 5/29/18 during which time the [facility] withdrew acceptance and now Pt's sister still has to find him an apartment to live.  Next court date reported to be 6/26

## 2018-06-13 NOTE — PROGRESS NOTE BEHAVIORAL HEALTH - OTHER
+ neck favoring one side, dressed in hospital gowns, adequate grooming and hygiene no psychomotor agitation at times (chronic and seemingly intentionaly as he can stop it when redirected). appropriate needs  staff assist at times variable today due to irritability chronically limited but better since admission  more able to respond to redirection festinating gait but stable, using walker able to understand what patient says varying (chronic), usually euthymic with episodes of irritable at times.  Sad at times - today smiling and euthymic overall linear, at times disorganized and circumstantial, concrete (chronic) but better since initial admission paranoid today chronically impaired but improved since admission Limited

## 2018-06-14 PROCEDURE — 99231 SBSQ HOSP IP/OBS SF/LOW 25: CPT

## 2018-06-14 PROCEDURE — 12345: CPT | Mod: NC

## 2018-06-14 RX ORDER — HALOPERIDOL DECANOATE 100 MG/ML
5 INJECTION INTRAMUSCULAR EVERY 6 HOURS
Qty: 0 | Refills: 0 | Status: COMPLETED | OUTPATIENT
Start: 2018-06-14 | End: 2018-06-14

## 2018-06-14 RX ADMIN — CARBIDOPA AND LEVODOPA 1 TABLET(S): 25; 100 TABLET ORAL at 11:00

## 2018-06-14 RX ADMIN — Medication 2 MILLIGRAM(S): at 19:44

## 2018-06-14 RX ADMIN — CARBIDOPA AND LEVODOPA 1 TABLET(S): 25; 100 TABLET ORAL at 06:10

## 2018-06-14 RX ADMIN — CARBIDOPA AND LEVODOPA 1 TABLET(S): 25; 100 TABLET ORAL at 16:14

## 2018-06-14 RX ADMIN — HALOPERIDOL DECANOATE 5 MILLIGRAM(S): 100 INJECTION INTRAMUSCULAR at 19:43

## 2018-06-14 RX ADMIN — CARBIDOPA, LEVODOPA, AND ENTACAPONE 1 TABLET(S): 50; 200; 200 TABLET, FILM COATED ORAL at 16:14

## 2018-06-14 RX ADMIN — CARBIDOPA, LEVODOPA, AND ENTACAPONE 1 TABLET(S): 50; 200; 200 TABLET, FILM COATED ORAL at 11:00

## 2018-06-14 RX ADMIN — CARBIDOPA, LEVODOPA, AND ENTACAPONE 1 TABLET(S): 50; 200; 200 TABLET, FILM COATED ORAL at 06:10

## 2018-06-14 NOTE — CHART NOTE - NSCHARTNOTEFT_GEN_A_CORE
Called and responded to a CODE GREY.  Patient was moved from the day room and patient became extremely combative and argumentative.  Patient started to threaten violence and was uncooperative.  Patient said he was going to call 911 because of agitated he became.  Patient heard and seen in the hallway, yelling and screaming.  Patient confronting security and would not listen to direction.      Physical Exam was deferred because of the violent nature and behavior of the patient.      - Will prescribed haldol 5mg IM PRN was activated and ativan 2mg IM was also ordered.

## 2018-06-14 NOTE — PROGRESS NOTE BEHAVIORAL HEALTH - SUMMARY
53 yo male with early onset Parkinson’s disease x 11 years, discharged from 4 prior nursing homes in 8 months due to his behaviors, hx of refusing medications,  transferred to [facility] Unit [facility] on 6/12/17 where he manifested severe mood lability, paranoia and confabulation with poor insight and judgment including making > 40 [facility] complaints. 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 Patient was accepted to [facility] in [place] and discharged from [facility] on 7/12/17.  Upon getting to [facility], Patient refused to go into the facility and became combative. He was thus transferred back to [facility] ED which resulted in Service Line Chiefs’ of Service involvement given the history/issues and ultimate transfer to [address]. Patient has been on [address] since. UPDATE: [facility] declined to accept Patient. Hundreds of nursing home applications wee sent out and all declined to accept patient. Court scheduled on 12/19/17 was deferred as Patient refused to go; he then again refused to go to the new Court date. Continuing to search for placement which have been unsuccessful; family does not want him living with them.  Neurological facility in [place] has accepted patient, and legal guardian is involved in transfer issues. Court hearing regarding Patient's guardianship was 3/27/18 and his sister was granted temporary guardianship. Court hearing for retention occurred on 3/28/18 and the hospital was granted an additional 60 days. Court date for a "check in" was on 5/15 during which time it was reported that there was a potential nursing home which the Patient's sister went to and liked. Another Court date was on 5/29/18 during which time the [facility] withdrew acceptance and now Pt's sister still has to find him an apartment to live.  Next court date reported to be 6/26

## 2018-06-14 NOTE — PROGRESS NOTE BEHAVIORAL HEALTH - OTHER
+ neck favoring one side, dressed in hospital gowns, adequate grooming and hygiene no psychomotor agitation at times (chronic and seemingly intentionaly as he can stop it when redirected). appropriate, needs staff assist at times needs  staff assist at times variable, improved from admissiion chronically limited but better since admission  more able to respond to redirection festinating gait but stable, using walker able to understand what patient says varying (chronic), usually euthymic with episodes of irritable at times.  Sad at times - today smiling and euthymic overall linear, at times disorganized and circumstantial, concrete (chronic) but better since initial admission paranoid at times chronically impaired but improved since admission Limited

## 2018-06-14 NOTE — PROGRESS NOTE BEHAVIORAL HEALTH - NSBHFUPINTERVALHXFT_PSY_A_CORE
No significant interval events. Patient seen and case discussed in tx team meeting.  Same clinical presentation. No complaints and denies adverse medication  side effects. reports regular BMs. Patient is calmer and accepts redirection.  Patient denies any SI or HI. Patient is staying in Day Area, and is social at times. No Rx SE are reported.

## 2018-06-15 PROCEDURE — 99232 SBSQ HOSP IP/OBS MODERATE 35: CPT

## 2018-06-15 RX ADMIN — Medication 325 MILLIGRAM(S): at 20:22

## 2018-06-15 RX ADMIN — CARBIDOPA AND LEVODOPA 1 TABLET(S): 25; 100 TABLET ORAL at 17:36

## 2018-06-15 RX ADMIN — CARBIDOPA AND LEVODOPA 1 TABLET(S): 25; 100 TABLET ORAL at 11:12

## 2018-06-15 RX ADMIN — CARBIDOPA, LEVODOPA, AND ENTACAPONE 1 TABLET(S): 50; 200; 200 TABLET, FILM COATED ORAL at 11:12

## 2018-06-15 RX ADMIN — Medication 325 MILLIGRAM(S): at 18:36

## 2018-06-15 RX ADMIN — CARBIDOPA AND LEVODOPA 1 TABLET(S): 25; 100 TABLET ORAL at 21:01

## 2018-06-15 RX ADMIN — CARBIDOPA, LEVODOPA, AND ENTACAPONE 1 TABLET(S): 50; 200; 200 TABLET, FILM COATED ORAL at 17:37

## 2018-06-15 NOTE — PROGRESS NOTE BEHAVIORAL HEALTH - OTHER
+ neck favoring one side, dressed in hospital gowns, adequate grooming and hygiene no psychomotor agitation at times (chronic and seemingly intentionaly as he can stop it when redirected). appropriate, needs staff assist at times needs  staff assist at times variable, improved from admission chronically limited but better since admission  more able to respond to redirection festinating gait but stable, using walker able to understand what patient says varying (chronic), usually euthymic with episodes of irritable at times.  Sad at times - today smiling and euthymic overall linear, at times disorganized and circumstantial, concrete (chronic) but better since initial admission paranoid at times, increased paranoia today chronically impaired but improved since admission Limited

## 2018-06-15 NOTE — PROGRESS NOTE BEHAVIORAL HEALTH - NSBHFUPINTERVALHXFT_PSY_A_CORE
A significant interval event is reported: Patient needed code grey last night due to verbal agitation and threatening behaviors to staff.  Patient also required IM Haldol and Ativan for safety of patient and others due to verbal threatening and agitation.  Patient seen and case discussed in tx team meeting.  Same clinical presentation.. Patient is paranoid and complains that his room is being poisoned .Reports regular BMs. Patient is calmer and accepts redirection this am.   Patient denies any SI or HI. Patient is staying near his room, and is social at times. No Rx SE or sx TD/EPS are noted or reported.

## 2018-06-15 NOTE — PROGRESS NOTE BEHAVIORAL HEALTH - SUMMARY
51 yo male with early onset Parkinson’s disease x 11 years, discharged from 4 prior nursing homes in 8 months due to his behaviors, hx of refusing medications,  transferred to [facility] Unit [facility] on 6/12/17 where he manifested severe mood lability, paranoia and confabulation with poor insight and judgment including making > 40 [facility] complaints. 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 Patient was accepted to [facility] in [place] and discharged from [facility] on 7/12/17.  Upon getting to [facility], Patient refused to go into the facility and became combative. He was thus transferred back to [facility] ED which resulted in Service Line Chiefs’ of Service involvement given the history/issues and ultimate transfer to [address]. Patient has been on [address] since. UPDATE: [facility] declined to accept Patient. Hundreds of nursing home applications wee sent out and all declined to accept patient. Court scheduled on 12/19/17 was deferred as Patient refused to go; he then again refused to go to the new Court date. Continuing to search for placement which have been unsuccessful; family does not want him living with them.  Neurological facility in [place] has accepted patient, and legal guardian is involved in transfer issues. Court hearing regarding Patient's guardianship was 3/27/18 and his sister was granted temporary guardianship. Court hearing for retention occurred on 3/28/18 and the hospital was granted an additional 60 days. Court date for a "check in" was on 5/15 during which time it was reported that there was a potential nursing home which the Patient's sister went to and liked. Another Court date was on 5/29/18 during which time the [facility] withdrew acceptance and now Pt's sister still has to find him an apartment to live.  Next court date reported to be 6/26

## 2018-06-16 PROCEDURE — 12345: CPT | Mod: NC

## 2018-06-16 RX ADMIN — CARBIDOPA, LEVODOPA, AND ENTACAPONE 1 TABLET(S): 50; 200; 200 TABLET, FILM COATED ORAL at 05:59

## 2018-06-16 RX ADMIN — CARBIDOPA AND LEVODOPA 1 TABLET(S): 25; 100 TABLET ORAL at 19:57

## 2018-06-16 RX ADMIN — CARBIDOPA AND LEVODOPA 1 TABLET(S): 25; 100 TABLET ORAL at 16:05

## 2018-06-16 RX ADMIN — Medication 650 MILLIGRAM(S): at 06:03

## 2018-06-16 RX ADMIN — CARBIDOPA, LEVODOPA, AND ENTACAPONE 1 TABLET(S): 50; 200; 200 TABLET, FILM COATED ORAL at 10:16

## 2018-06-16 RX ADMIN — Medication 325 MILLIGRAM(S): at 22:55

## 2018-06-16 RX ADMIN — CARBIDOPA AND LEVODOPA 1 TABLET(S): 25; 100 TABLET ORAL at 05:59

## 2018-06-16 RX ADMIN — Medication 650 MILLIGRAM(S): at 06:36

## 2018-06-16 RX ADMIN — CARBIDOPA AND LEVODOPA 1 TABLET(S): 25; 100 TABLET ORAL at 10:16

## 2018-06-16 RX ADMIN — CARBIDOPA, LEVODOPA, AND ENTACAPONE 1 TABLET(S): 50; 200; 200 TABLET, FILM COATED ORAL at 16:05

## 2018-06-16 RX ADMIN — Medication 325 MILLIGRAM(S): at 22:21

## 2018-06-16 NOTE — CHART NOTE - NSCHARTNOTEFT_GEN_A_CORE
requested by RN to evaluate patient after he reported banging is head on the wall in the shower.  No headache or vision complaints.  Reports small bump posterior head.    Pertinent exam: small area of mild swelling occipito-parietal area    Suggest: ice pack for symptom relief.  Avoid future such incidents.  Patient should have attendant with him when in shower.  d/w RN.  Please call Hospitalist team as needed.

## 2018-06-17 RX ADMIN — CARBIDOPA AND LEVODOPA 1 TABLET(S): 25; 100 TABLET ORAL at 05:57

## 2018-06-17 RX ADMIN — CARBIDOPA AND LEVODOPA 1 TABLET(S): 25; 100 TABLET ORAL at 20:48

## 2018-06-17 RX ADMIN — CARBIDOPA AND LEVODOPA 1 TABLET(S): 25; 100 TABLET ORAL at 17:22

## 2018-06-17 RX ADMIN — CARBIDOPA, LEVODOPA, AND ENTACAPONE 1 TABLET(S): 50; 200; 200 TABLET, FILM COATED ORAL at 05:57

## 2018-06-17 RX ADMIN — CARBIDOPA, LEVODOPA, AND ENTACAPONE 1 TABLET(S): 50; 200; 200 TABLET, FILM COATED ORAL at 17:22

## 2018-06-17 RX ADMIN — CARBIDOPA AND LEVODOPA 1 TABLET(S): 25; 100 TABLET ORAL at 11:22

## 2018-06-17 RX ADMIN — CARBIDOPA, LEVODOPA, AND ENTACAPONE 1 TABLET(S): 50; 200; 200 TABLET, FILM COATED ORAL at 17:23

## 2018-06-17 RX ADMIN — Medication 30 MILLILITER(S): at 07:56

## 2018-06-18 PROCEDURE — 99231 SBSQ HOSP IP/OBS SF/LOW 25: CPT

## 2018-06-18 RX ADMIN — CARBIDOPA, LEVODOPA, AND ENTACAPONE 1 TABLET(S): 50; 200; 200 TABLET, FILM COATED ORAL at 05:25

## 2018-06-18 RX ADMIN — CARBIDOPA AND LEVODOPA 1 TABLET(S): 25; 100 TABLET ORAL at 10:27

## 2018-06-18 RX ADMIN — CARBIDOPA AND LEVODOPA 1 TABLET(S): 25; 100 TABLET ORAL at 16:12

## 2018-06-18 RX ADMIN — CARBIDOPA AND LEVODOPA 1 TABLET(S): 25; 100 TABLET ORAL at 19:55

## 2018-06-18 RX ADMIN — Medication 650 MILLIGRAM(S): at 19:15

## 2018-06-18 RX ADMIN — Medication 650 MILLIGRAM(S): at 18:13

## 2018-06-18 RX ADMIN — CARBIDOPA AND LEVODOPA 1 TABLET(S): 25; 100 TABLET ORAL at 19:56

## 2018-06-18 RX ADMIN — CARBIDOPA AND LEVODOPA 1 TABLET(S): 25; 100 TABLET ORAL at 05:25

## 2018-06-18 RX ADMIN — CARBIDOPA, LEVODOPA, AND ENTACAPONE 1 TABLET(S): 50; 200; 200 TABLET, FILM COATED ORAL at 16:12

## 2018-06-18 RX ADMIN — CARBIDOPA, LEVODOPA, AND ENTACAPONE 1 TABLET(S): 50; 200; 200 TABLET, FILM COATED ORAL at 10:27

## 2018-06-18 NOTE — PROGRESS NOTE BEHAVIORAL HEALTH - OTHER
+ neck favoring one side, dressed in hospital gowns, adequate grooming and hygiene no psychomotor agitation at times (chronic and seemingly intentionaly as he can stop it when redirected). appropriate, needs staff assist at times needs  staff assist at times variable, improved from admission chronically limited but better since admission  more able to respond to redirection festinating gait but stable, using walker able to understand what patient says varying (chronic), usually euthymic with episodes of irritable at times.  Sad at times - today smiling and euthymic overall linear, at times disorganized and circumstantial, concrete (chronic) but better since initial admission paranoid at times chronically impaired but improved since admission Limited

## 2018-06-18 NOTE — PROGRESS NOTE BEHAVIORAL HEALTH - SUMMARY
51 yo male with early onset Parkinson’s disease x 11 years, discharged from 4 prior nursing homes in 8 months due to his behaviors, hx of refusing medications,  transferred to [facility] Unit [facility] on 6/12/17 where he manifested severe mood lability, paranoia and confabulation with poor insight and judgment including making > 40 [facility] complaints. 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 Patient was accepted to [facility] in [place] and discharged from [facility] on 7/12/17.  Upon getting to [facility], Patient refused to go into the facility and became combative. He was thus transferred back to [facility] ED which resulted in Service Line Chiefs’ of Service involvement given the history/issues and ultimate transfer to [address]. Patient has been on [address] since. UPDATE: [facility] declined to accept Patient. Hundreds of nursing home applications wee sent out and all declined to accept patient. Court scheduled on 12/19/17 was deferred as Patient refused to go; he then again refused to go to the new Court date. Continuing to search for placement which have been unsuccessful; family does not want him living with them.  Neurological facility in [place] has accepted patient, and legal guardian is involved in transfer issues. Court hearing regarding Patient's guardianship was 3/27/18 and his sister was granted temporary guardianship. Court hearing for retention occurred on 3/28/18 and the hospital was granted an additional 60 days. Court date for a "check in" was on 5/15 during which time it was reported that there was a potential nursing home which the Patient's sister went to and liked. Another Court date was on 5/29/18 during which time the [facility] withdrew acceptance and now Pt's sister still has to find him an apartment to live.  Next court date reported to be 6/26

## 2018-06-18 NOTE — PROGRESS NOTE BEHAVIORAL HEALTH - NSBHFUPINTERVALHXFT_PSY_A_CORE
No significant interval events  are reported  Patient seen and case discussed in tx team meeting.  Same clinical presentation.. Patient is paranoid, but less focused on paranoid thoughts today.  .Reports regular BMs. Patient is calmer and accepts redirection.   Patient denies any SI or HI. Patient is staying near his room, and is social at times. No Rx SE  are noted or reported.

## 2018-06-19 PROCEDURE — 99231 SBSQ HOSP IP/OBS SF/LOW 25: CPT

## 2018-06-19 RX ADMIN — CARBIDOPA AND LEVODOPA 1 TABLET(S): 25; 100 TABLET ORAL at 16:14

## 2018-06-19 RX ADMIN — Medication 650 MILLIGRAM(S): at 19:00

## 2018-06-19 RX ADMIN — CARBIDOPA, LEVODOPA, AND ENTACAPONE 1 TABLET(S): 50; 200; 200 TABLET, FILM COATED ORAL at 10:43

## 2018-06-19 RX ADMIN — CARBIDOPA AND LEVODOPA 1 TABLET(S): 25; 100 TABLET ORAL at 10:43

## 2018-06-19 RX ADMIN — CARBIDOPA, LEVODOPA, AND ENTACAPONE 1 TABLET(S): 50; 200; 200 TABLET, FILM COATED ORAL at 16:14

## 2018-06-19 RX ADMIN — Medication 650 MILLIGRAM(S): at 18:19

## 2018-06-19 RX ADMIN — Medication 400 MILLIGRAM(S): at 23:10

## 2018-06-19 RX ADMIN — CARBIDOPA AND LEVODOPA 1 TABLET(S): 25; 100 TABLET ORAL at 20:14

## 2018-06-19 RX ADMIN — CARBIDOPA AND LEVODOPA 1 TABLET(S): 25; 100 TABLET ORAL at 06:21

## 2018-06-19 RX ADMIN — Medication 400 MILLIGRAM(S): at 22:10

## 2018-06-19 RX ADMIN — CARBIDOPA, LEVODOPA, AND ENTACAPONE 1 TABLET(S): 50; 200; 200 TABLET, FILM COATED ORAL at 06:21

## 2018-06-19 NOTE — PROGRESS NOTE BEHAVIORAL HEALTH - NSBHFUPINTERVALHXFT_PSY_A_CORE
No significant interval events; same clinical presentation. Reports regular BMs. Patient is calmer and accepts redirection.   Patient denies any SI or HI. Patient is staying near his room, and is social at times. No Rx SE  are noted or reported.

## 2018-06-19 NOTE — PROGRESS NOTE BEHAVIORAL HEALTH - SUMMARY
51 yo male with early onset Parkinson’s disease x 11 years, discharged from 4 prior nursing homes in 8 months due to his behaviors, hx of refusing medications,  transferred to [facility] Unit 2S on 6/12/17 where he manifested severe mood lability, paranoia and confabulation with poor insight and judgment including making > 40 [facility] complaints. 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 Patient was accepted to [facility] in [place] and discharged from [facility] on 7/12/17.  Upon getting to [facility], Patient refused to go into the facility and became combative. He was thus transferred back to [facility] ED which resulted in Service Line Chiefs’ of Service involvement given the history/issues and ultimate transfer to [address]. Patient has been on [address] 1[address] since. UPDATE: [facility] declined to accept Patient. Hundreds of nursing home applications wee sent out and all declined to accept patient. Court scheduled on 12/19/17 was deferred as Patient refused to go; he then again refused to go to the new Court date. Continuing to search for placement which have been unsuccessful; family does not want him living with them.  Neurological facility in [place] has accepted patient, and legal guardian is involved in transfer issues. Court hearing regarding Patient's guardianship was 3/27/18 and his sister was granted temporary guardianship. Court hearing for retention occurred on 3/28/18 and the hospital was granted an additional 60 days. Court date for a "check in" was on 5/15 during which time it was reported that there was a potential nursing home which the Patient's sister went to and liked. Another Court date was on 5/29/18 during which time the [facility] withdrew acceptance and now Pt's sister still has to find him an apartment to live.  Next court date reported to be 6/26

## 2018-06-19 NOTE — PROGRESS NOTE BEHAVIORAL HEALTH - OTHER
appropriate, needs staff assist at times needs  staff assist at times variable, improved from admission chronically limited but better since admission  more able to respond to redirection festinating gait but stable, using walker able to understand what patient says varying (chronic), usually euthymic with episodes of irritable at times.  Sad at times - today smiling and euthymic overall linear, at times disorganized and circumstantial, concrete (chronic) but better since initial admission paranoid at times chronically impaired but improved since admission Limited + neck favoring one side, dressed in hospital gowns, adequate grooming and hygiene no psychomotor agitation at times (chronic and seemingly intentionaly as he can stop it when redirected).

## 2018-06-19 NOTE — PROGRESS NOTE BEHAVIORAL HEALTH - NSBHADMITMEDEDUDETAILS_A_CORE FT
OT DAILY TREATMENT NOTE - [facility]     Patient Name: Johanny Do  Date:2018   : 1962  [x]  Patient  Verified  Payor: Vicky Heath / Plan: [address] [place] [address] HMO / Product Type: Managed Care Medicare /    In time:1030  Out time:11:15   Total Treatment Time (min): 45  Total Timed Codes (min): 45  1:1 Treatment Time ( [address] only): 39   Visit #: 11 of 12    Treatment Area: Nontraumatic intracerebral hemorrhage, unspecified [I61.9]  Other reduced mobility [Z74.09]    SUBJECTIVE  Pain Level (0-10 scale): 0/10  Any medication changes, allergies to medications, adverse drug reactions, diagnosis change, or new procedure performed?: [x] No    [] Yes (see summary sheet for update)  Subjective functional status/changes:   [] No changes reported  \"I feel pretty good today. \"    OBJECTIVE    45 min Therapeutic Exercise:  [x] See flow sheet :   Rationale: increase ROM, increase strength and improve coordination to improve the patients ability to grasp and lift. With   [x] TE   [] TA   [] neuro   [] other: Patient Education: [x] Review HEP    [] Progressed/Changed HEP based on:   [] positioning   [] body mechanics   [] transfers   [] heat/ice application   [] Splint wear/care   [] Sensory re-education   [] scar management      [] other:         Other Objective/Functional Measures: N/T     Pain Level (0-10 scale) post treatment: 0/10    ASSESSMENT/Changes in Function: Patient tolerated session well today. Patient reports increased functional movement in shoulder and is now able to reach for a glass. Patient will continue to benefit from skilled OT services to address ROM deficits, address strength deficits and analyze and address soft tissue restrictions to attain remaining goals. [x]  See Plan of Care  []  See progress note/recertification  []  See Discharge Summary         Progress towards goals / Updated goals:  Short Term Goals:  To be accomplished in 2  weeks:  Goal:* Patient will be compliant with initial home exercise program to take an active role in their rehabilitation process. Status at eval:   HEP initiated  Current Status:  Patient continuing HEP MET      Long Term Goals: To be accomplished in 4 weeks:  Goal:*Patient will increase  strength by at least 10 lbs at right hand in order to carry a small shopping bag. Status at Eval: 4 lbs   Current status: 9 lbs Progressing        Goal:*Patient will increase pinch strength by at least 3 lbs at right hand in order to manipulate fasteners. Status at Eval: 7 lbs  Current Status: 8 lbs Progressing      Goal:*Patient will be able to demonstrate 3/5 MMT and move right UE through full ROM against gravity with no added resistance.   Status at Eval: 2/5 right shoulder flexion and abduction  Current Status: 2+/5 progressing    PLAN  [x]  Upgrade activities as tolerated     [x]  Continue plan of care  []  Update interventions per flow sheet       []  Discharge due to:_  []  Other:_      Andra Ogden OT 5/29/2018  10:58 AM continue current management.

## 2018-06-20 PROCEDURE — 99231 SBSQ HOSP IP/OBS SF/LOW 25: CPT

## 2018-06-20 RX ADMIN — Medication 325 MILLIGRAM(S): at 13:23

## 2018-06-20 RX ADMIN — CARBIDOPA AND LEVODOPA 1 TABLET(S): 25; 100 TABLET ORAL at 20:20

## 2018-06-20 RX ADMIN — CARBIDOPA AND LEVODOPA 1 TABLET(S): 25; 100 TABLET ORAL at 06:34

## 2018-06-20 RX ADMIN — Medication 325 MILLIGRAM(S): at 04:05

## 2018-06-20 RX ADMIN — CARBIDOPA, LEVODOPA, AND ENTACAPONE 1 TABLET(S): 50; 200; 200 TABLET, FILM COATED ORAL at 06:34

## 2018-06-20 RX ADMIN — CARBIDOPA AND LEVODOPA 1 TABLET(S): 25; 100 TABLET ORAL at 10:38

## 2018-06-20 RX ADMIN — Medication 325 MILLIGRAM(S): at 05:53

## 2018-06-20 RX ADMIN — CARBIDOPA AND LEVODOPA 1 TABLET(S): 25; 100 TABLET ORAL at 20:21

## 2018-06-20 RX ADMIN — CARBIDOPA AND LEVODOPA 1 TABLET(S): 25; 100 TABLET ORAL at 16:28

## 2018-06-20 RX ADMIN — Medication 325 MILLIGRAM(S): at 11:59

## 2018-06-20 RX ADMIN — CARBIDOPA, LEVODOPA, AND ENTACAPONE 1 TABLET(S): 50; 200; 200 TABLET, FILM COATED ORAL at 16:28

## 2018-06-20 RX ADMIN — CARBIDOPA, LEVODOPA, AND ENTACAPONE 1 TABLET(S): 50; 200; 200 TABLET, FILM COATED ORAL at 10:37

## 2018-06-20 NOTE — PROGRESS NOTE BEHAVIORAL HEALTH - SUMMARY
51 yo male with early onset Parkinson’s disease x 11 years, discharged from 4 prior nursing homes in 8 months due to his behaviors, hx of refusing medications,  transferred to [facility] Unit 2S on 6/12/17 where he manifested severe mood lability, paranoia and confabulation with poor insight and judgment including making > 40 Justice center complaints. 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 Patient was accepted to [facility] in [place] and discharged from [facility] on 7/12/17.  Upon getting to [facility], Patient refused to go into the facility and became combative. He was thus transferred back to [facility] ED which resulted in Service Line Chiefs’ of Service involvement given the history/issues and ultimate transfer to [address]. Patient has been on Unit 1N since. UPDATE: [facility] declined to accept Patient. Hundreds of nursing home applications wee sent out and all declined to accept patient. Court scheduled on 12/19/17 was deferred as Patient refused to go; he then again refused to go to the new Court date. Continuing to search for placement which have been unsuccessful; family does not want him living with them.  Neurological facility in [place] has accepted patient, and legal guardian is involved in transfer issues. Court hearing regarding Patient's guardianship was 3/27/18 and his sister was granted temporary guardianship. Court hearing for retention occurred on 3/28/18 and the hospital was granted an additional 60 days. Court date for a "check in" was on 5/15 during which time it was reported that there was a potential nursing home which the Patient's sister went to and liked. Another Court date was on 5/29/18 during which time the [facility] withdrew acceptance and now Pt's sister still has to find him an apartment to live.  Court dates subsequently held with no definite outcome. 51 yo male with early onset Parkinson’s disease x 11 years, discharged from 4 prior nursing homes in 8 months due to his behaviors, hx of refusing medications,  transferred to [facility] Unit 2S on 6/12/17 where he manifested severe mood lability, paranoia and confabulation with poor insight and judgment including making > 40 Justice center complaints. 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 Patient was accepted to [facility] in [place] and discharged from [facility] on 7/12/17.  Upon getting to [facility], Patient refused to go into the facility and became combative. He was thus transferred back to [facility] ED which resulted in Service Line Chiefs’ of Service involvement given the history/issues and ultimate transfer to [address]. Patient has been on Unit 1N since. UPDATE: [facility] declined to accept Patient. Hundreds of nursing home applications wee sent out and all declined to accept patient. Court scheduled on 12/19/17 was deferred as Patient refused to go; he then again refused to go to the new Court date. Continuing to search for placement which have been unsuccessful; family does not want him living with them.  Neurological facility in [place] has accepted patient, and legal guardian is involved in transfer issues. Court hearing regarding Patient's guardianship was 3/27/18 and his sister was granted temporary guardianship. Court hearing for retention occurred on 3/28/18 and the hospital was granted an additional 60 days. Court date for a "check in" was on 5/15 during which time it was reported that there was a potential nursing home which the Patient's sister went to and liked. Another Court date was on 5/29/18 during which time the [facility] withdrew acceptance and now Pt's sister still has to find him an apartment to live.  Next Court date scheduled for 06/26/18.

## 2018-06-20 NOTE — PROGRESS NOTE BEHAVIORAL HEALTH - NSBHFUPINTERVALHXFT_PSY_A_CORE
Patient seemed and examined; yesterday said his sister was coming to take him to his new apartment which did not happen nor has sister been in contact with the Unit / Treatment Team. No significant interval events; same clinical presentation. Reports regular BMs. Patient is calmer and accepts redirection.   Patient denies any SI or HI. Patient is staying near his room, and is social at times. No Rx SE  are noted or reported.

## 2018-06-21 PROCEDURE — 99231 SBSQ HOSP IP/OBS SF/LOW 25: CPT

## 2018-06-21 RX ADMIN — CARBIDOPA, LEVODOPA, AND ENTACAPONE 1 TABLET(S): 50; 200; 200 TABLET, FILM COATED ORAL at 10:48

## 2018-06-21 RX ADMIN — Medication 650 MILLIGRAM(S): at 01:50

## 2018-06-21 RX ADMIN — Medication 650 MILLIGRAM(S): at 18:10

## 2018-06-21 RX ADMIN — CARBIDOPA AND LEVODOPA 1 TABLET(S): 25; 100 TABLET ORAL at 21:00

## 2018-06-21 RX ADMIN — CARBIDOPA AND LEVODOPA 1 TABLET(S): 25; 100 TABLET ORAL at 16:46

## 2018-06-21 RX ADMIN — CARBIDOPA AND LEVODOPA 1 TABLET(S): 25; 100 TABLET ORAL at 21:03

## 2018-06-21 RX ADMIN — CARBIDOPA, LEVODOPA, AND ENTACAPONE 1 TABLET(S): 50; 200; 200 TABLET, FILM COATED ORAL at 05:49

## 2018-06-21 RX ADMIN — CARBIDOPA AND LEVODOPA 1 TABLET(S): 25; 100 TABLET ORAL at 05:48

## 2018-06-21 RX ADMIN — CARBIDOPA, LEVODOPA, AND ENTACAPONE 1 TABLET(S): 50; 200; 200 TABLET, FILM COATED ORAL at 16:46

## 2018-06-21 RX ADMIN — Medication 650 MILLIGRAM(S): at 00:58

## 2018-06-21 RX ADMIN — CARBIDOPA AND LEVODOPA 1 TABLET(S): 25; 100 TABLET ORAL at 10:49

## 2018-06-21 RX ADMIN — Medication 325 MILLIGRAM(S): at 13:57

## 2018-06-21 RX ADMIN — Medication 325 MILLIGRAM(S): at 12:30

## 2018-06-21 NOTE — PROGRESS NOTE BEHAVIORAL HEALTH - SUMMARY
53 yo male with early onset Parkinson’s disease x 11 years, discharged from 4 prior nursing homes in 8 months due to his behaviors, hx of refusing medications,  transferred to [facility] Unit 2S on 6/12/17 where he manifested severe mood lability, paranoia and confabulation with poor insight and judgment including making > 40 Justice center complaints. 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 Patient was accepted to [facility] in [place] and discharged from [facility] on 7/12/17.  Upon getting to [facility], Patient refused to go into the facility and became combative. He was thus transferred back to [facility] ED which resulted in Service Line Chiefs’ of Service involvement given the history/issues and ultimate transfer to [address]. Patient has been on Unit 1N since. UPDATE: [facility] declined to accept Patient. Hundreds of nursing home applications wee sent out and all declined to accept patient. Court scheduled on 12/19/17 was deferred as Patient refused to go; he then again refused to go to the new Court date. Continuing to search for placement which have been unsuccessful; family does not want him living with them.  Neurological facility in [place] has accepted patient, and legal guardian is involved in transfer issues. Court hearing regarding Patient's guardianship was 3/27/18 and his sister was granted temporary guardianship. Court hearing for retention occurred on 3/28/18 and the hospital was granted an additional 60 days. Court date for a "check in" was on 5/15 during which time it was reported that there was a potential nursing home which the Patient's sister went to and liked. Another Court date was on 5/29/18 during which time the [facility] withdrew acceptance and now Pt's sister still has to find him an apartment to live.  Next Court date scheduled for 06/26/18.

## 2018-06-21 NOTE — PROGRESS NOTE BEHAVIORAL HEALTH - NSBHFUPINTERVALHXFT_PSY_A_CORE
Patient seemed and examined; yesterday had a more acting-out day in which he placed himself on the floor yelling at staff and then had no problem standing up himself and going for his meal. Patient's birthday is today and may be the trigger for this behavior as he is still in the hospital and likely no one will come visit him for his birthday. Same clinical presentation. Reports regular BMs. Patient is calmer and accepts redirection.   Patient denies any SI or HI. Patient is staying near his room, and is social at times. No Rx SE  are noted or reported.

## 2018-06-21 NOTE — PROGRESS NOTE BEHAVIORAL HEALTH - OTHER
festinating gait but stable, using walker able to understand what patient says varying (chronic), usually euthymic with episodes of irritable at times.  Sad at times - today smiling and euthymic overall linear, at times disorganized and circumstantial, concrete (chronic) but better since initial admission paranoid at times chronically impaired but improved since admission + neck favoring one side, dressed in hospital gowns, adequate grooming and hygiene no psychomotor agitation at times (chronic and seemingly intentionaly as he can stop it when redirected). appropriate, needs staff assist at times needs  staff assist at times variable, improved from admission chronically limited but better since admission  more able to respond to redirection Limited

## 2018-06-22 PROCEDURE — 99231 SBSQ HOSP IP/OBS SF/LOW 25: CPT

## 2018-06-22 RX ADMIN — CARBIDOPA AND LEVODOPA 1 TABLET(S): 25; 100 TABLET ORAL at 10:50

## 2018-06-22 RX ADMIN — CARBIDOPA AND LEVODOPA 1 TABLET(S): 25; 100 TABLET ORAL at 20:10

## 2018-06-22 RX ADMIN — CARBIDOPA AND LEVODOPA 1 TABLET(S): 25; 100 TABLET ORAL at 20:11

## 2018-06-22 RX ADMIN — Medication 325 MILLIGRAM(S): at 06:12

## 2018-06-22 RX ADMIN — CARBIDOPA, LEVODOPA, AND ENTACAPONE 1 TABLET(S): 50; 200; 200 TABLET, FILM COATED ORAL at 16:00

## 2018-06-22 RX ADMIN — CARBIDOPA, LEVODOPA, AND ENTACAPONE 1 TABLET(S): 50; 200; 200 TABLET, FILM COATED ORAL at 05:48

## 2018-06-22 RX ADMIN — CARBIDOPA, LEVODOPA, AND ENTACAPONE 1 TABLET(S): 50; 200; 200 TABLET, FILM COATED ORAL at 10:50

## 2018-06-22 RX ADMIN — CARBIDOPA AND LEVODOPA 1 TABLET(S): 25; 100 TABLET ORAL at 05:48

## 2018-06-22 RX ADMIN — CARBIDOPA AND LEVODOPA 1 TABLET(S): 25; 100 TABLET ORAL at 15:46

## 2018-06-22 NOTE — PROGRESS NOTE BEHAVIORAL HEALTH - SUMMARY
51 yo male with early onset Parkinson’s disease x 11 years, discharged from 4 prior nursing homes in 8 months due to his behaviors, hx of refusing medications,  transferred to [facility] Unit 2S on 6/12/17 where he manifested severe mood lability, paranoia and confabulation with poor insight and judgment including making > 40 Justice center complaints. 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 Patient was accepted to [facility] in [place] and discharged from [facility] on 7/12/17.  Upon getting to [facility], Patient refused to go into the facility and became combative. He was thus transferred back to [facility] ED which resulted in Service Line Chiefs’ of Service involvement given the history/issues and ultimate transfer to [address]. Patient has been on Unit 1N since. UPDATE: [facility] declined to accept Patient. Hundreds of nursing home applications wee sent out and all declined to accept patient. Court scheduled on 12/19/17 was deferred as Patient refused to go; he then again refused to go to the new Court date. Continuing to search for placement which have been unsuccessful; family does not want him living with them.  Neurological facility in [place] has accepted patient, and legal guardian is involved in transfer issues. Court hearing regarding Patient's guardianship was 3/27/18 and his sister was granted temporary guardianship. Court hearing for retention occurred on 3/28/18 and the hospital was granted an additional 60 days. Court date for a "check in" was on 5/15 during which time it was reported that there was a potential nursing home which the Patient's sister went to and liked. Another Court date was on 5/29/18 during which time the [facility] withdrew acceptance and now Pt's sister still has to find him an apartment to live.  Next Court date scheduled for 06/26/18.

## 2018-06-22 NOTE — PROGRESS NOTE BEHAVIORAL HEALTH - NSBHFUPINTERVALHXFT_PSY_A_CORE
Patient seemed and examined. Patient's birthday was yesterday and Writer brought him cannolis which he really appreciated and liked. Same clinical presentation. Reports regular BMs. Patient is calmer and accepts redirection.   Patient denies any SI or HI. Patient is staying near his room, and is social at times. No Rx SE  are noted or reported.

## 2018-06-23 PROCEDURE — 12345: CPT | Mod: NC

## 2018-06-23 RX ADMIN — CARBIDOPA, LEVODOPA, AND ENTACAPONE 1 TABLET(S): 50; 200; 200 TABLET, FILM COATED ORAL at 05:36

## 2018-06-23 RX ADMIN — Medication 650 MILLIGRAM(S): at 05:36

## 2018-06-23 RX ADMIN — CARBIDOPA AND LEVODOPA 1 TABLET(S): 25; 100 TABLET ORAL at 05:36

## 2018-06-23 RX ADMIN — CARBIDOPA AND LEVODOPA 1 TABLET(S): 25; 100 TABLET ORAL at 20:51

## 2018-06-23 RX ADMIN — CARBIDOPA AND LEVODOPA 1 TABLET(S): 25; 100 TABLET ORAL at 10:55

## 2018-06-23 RX ADMIN — CARBIDOPA, LEVODOPA, AND ENTACAPONE 1 TABLET(S): 50; 200; 200 TABLET, FILM COATED ORAL at 16:18

## 2018-06-23 RX ADMIN — Medication 325 MILLIGRAM(S): at 22:43

## 2018-06-23 RX ADMIN — CARBIDOPA AND LEVODOPA 1 TABLET(S): 25; 100 TABLET ORAL at 16:18

## 2018-06-23 RX ADMIN — Medication 650 MILLIGRAM(S): at 06:10

## 2018-06-23 RX ADMIN — CARBIDOPA, LEVODOPA, AND ENTACAPONE 1 TABLET(S): 50; 200; 200 TABLET, FILM COATED ORAL at 10:55

## 2018-06-24 RX ADMIN — CARBIDOPA, LEVODOPA, AND ENTACAPONE 1 TABLET(S): 50; 200; 200 TABLET, FILM COATED ORAL at 10:33

## 2018-06-24 RX ADMIN — CARBIDOPA AND LEVODOPA 1 TABLET(S): 25; 100 TABLET ORAL at 19:47

## 2018-06-24 RX ADMIN — CARBIDOPA, LEVODOPA, AND ENTACAPONE 1 TABLET(S): 50; 200; 200 TABLET, FILM COATED ORAL at 16:01

## 2018-06-24 RX ADMIN — CARBIDOPA AND LEVODOPA 1 TABLET(S): 25; 100 TABLET ORAL at 06:01

## 2018-06-24 RX ADMIN — Medication 650 MILLIGRAM(S): at 22:28

## 2018-06-24 RX ADMIN — CARBIDOPA AND LEVODOPA 1 TABLET(S): 25; 100 TABLET ORAL at 16:01

## 2018-06-24 RX ADMIN — CARBIDOPA, LEVODOPA, AND ENTACAPONE 1 TABLET(S): 50; 200; 200 TABLET, FILM COATED ORAL at 06:01

## 2018-06-24 RX ADMIN — CARBIDOPA AND LEVODOPA 1 TABLET(S): 25; 100 TABLET ORAL at 10:33

## 2018-06-25 PROCEDURE — 99231 SBSQ HOSP IP/OBS SF/LOW 25: CPT

## 2018-06-25 RX ADMIN — Medication 325 MILLIGRAM(S): at 23:15

## 2018-06-25 RX ADMIN — CARBIDOPA AND LEVODOPA 1 TABLET(S): 25; 100 TABLET ORAL at 10:30

## 2018-06-25 RX ADMIN — CARBIDOPA AND LEVODOPA 1 TABLET(S): 25; 100 TABLET ORAL at 20:27

## 2018-06-25 RX ADMIN — CARBIDOPA, LEVODOPA, AND ENTACAPONE 1 TABLET(S): 50; 200; 200 TABLET, FILM COATED ORAL at 16:59

## 2018-06-25 RX ADMIN — CARBIDOPA, LEVODOPA, AND ENTACAPONE 1 TABLET(S): 50; 200; 200 TABLET, FILM COATED ORAL at 10:30

## 2018-06-25 RX ADMIN — CARBIDOPA AND LEVODOPA 1 TABLET(S): 25; 100 TABLET ORAL at 16:59

## 2018-06-25 RX ADMIN — Medication 325 MILLIGRAM(S): at 22:36

## 2018-06-25 RX ADMIN — CARBIDOPA, LEVODOPA, AND ENTACAPONE 1 TABLET(S): 50; 200; 200 TABLET, FILM COATED ORAL at 05:46

## 2018-06-25 RX ADMIN — CARBIDOPA AND LEVODOPA 1 TABLET(S): 25; 100 TABLET ORAL at 05:47

## 2018-06-25 NOTE — PROGRESS NOTE BEHAVIORAL HEALTH - NSBHADMITMEDEDUDETAILS_A_CORE FT
continue current management.  Psychoeducation done re: potential benefits of antipsychotic Rx for sx management with patient saying NO!

## 2018-06-25 NOTE — PROGRESS NOTE BEHAVIORAL HEALTH - NSBHFUPINTERVALHXFT_PSY_A_CORE
Patient seemed and examined. Chart reviewed and case discussed in tx team meeting.  Patient is aware he is going to court tomorrow and wants to go. . Same clinical presentation. Reports regular BMs. Patient is calmer and accepts redirection.  Patient discussed seeing his daughter recently, and how he feels responsible for her and wants to see her grow up.   Patient denies any SI or HI. Patient is staying near his room, and is social at times. No Rx SE  are noted or reported.

## 2018-06-25 NOTE — PROGRESS NOTE BEHAVIORAL HEALTH - SUMMARY
51 yo male with early onset Parkinson’s disease x 11 years, discharged from 4 prior nursing homes in 8 months due to his behaviors, hx of refusing medications,  transferred to [facility] Unit 2S on 6/12/17 where he manifested severe mood lability, paranoia and confabulation with poor insight and judgment including making > 40 Justice center complaints. 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 Patient was accepted to [facility] in [place] and discharged from [facility] on 7/12/17.  Upon getting to [facility], Patient refused to go into the facility and became combative. He was thus transferred back to [facility] ED which resulted in Service Line Chiefs’ of Service involvement given the history/issues and ultimate transfer to [address]. Patient has been on Unit 1N since. UPDATE: [facility] declined to accept Patient. Hundreds of nursing home applications wee sent out and all declined to accept patient. Court scheduled on 12/19/17 was deferred as Patient refused to go; he then again refused to go to the new Court date. Continuing to search for placement which have been unsuccessful; family does not want him living with them.  Neurological facility in [place] has accepted patient, and legal guardian is involved in transfer issues. Court hearing regarding Patient's guardianship was 3/27/18 and his sister was granted temporary guardianship. Court hearing for retention occurred on 3/28/18 and the hospital was granted an additional 60 days. Court date for a "check in" was on 5/15 during which time it was reported that there was a potential nursing home which the Patient's sister went to and liked. Another Court date was on 5/29/18 during which time the [facility] withdrew acceptance and now Pt's sister still has to find him an apartment to live.  Next Court date scheduled for 06/26/18.

## 2018-06-26 PROCEDURE — 99231 SBSQ HOSP IP/OBS SF/LOW 25: CPT

## 2018-06-26 PROCEDURE — 12345: CPT | Mod: NC

## 2018-06-26 RX ADMIN — CARBIDOPA, LEVODOPA, AND ENTACAPONE 1 TABLET(S): 50; 200; 200 TABLET, FILM COATED ORAL at 06:13

## 2018-06-26 RX ADMIN — CARBIDOPA, LEVODOPA, AND ENTACAPONE 1 TABLET(S): 50; 200; 200 TABLET, FILM COATED ORAL at 15:36

## 2018-06-26 RX ADMIN — Medication 325 MILLIGRAM(S): at 22:42

## 2018-06-26 RX ADMIN — CARBIDOPA AND LEVODOPA 1 TABLET(S): 25; 100 TABLET ORAL at 20:13

## 2018-06-26 RX ADMIN — Medication 325 MILLIGRAM(S): at 23:44

## 2018-06-26 RX ADMIN — CARBIDOPA AND LEVODOPA 1 TABLET(S): 25; 100 TABLET ORAL at 06:13

## 2018-06-26 RX ADMIN — CARBIDOPA AND LEVODOPA 1 TABLET(S): 25; 100 TABLET ORAL at 15:36

## 2018-06-26 RX ADMIN — CARBIDOPA, LEVODOPA, AND ENTACAPONE 1 TABLET(S): 50; 200; 200 TABLET, FILM COATED ORAL at 13:44

## 2018-06-26 RX ADMIN — CARBIDOPA AND LEVODOPA 1 TABLET(S): 25; 100 TABLET ORAL at 13:44

## 2018-06-26 NOTE — CHART NOTE - NSCHARTNOTEFT_GEN_A_CORE
Requested by Nurse  to assist with transportation orders for court appearance. Patient has mood lability, paranoid at times, unpredicted behavior.  He needs  [employer] only for transport.  Sedatives should be avoided prior to court appearance.  Patient counseled on good behavior.  Signed order for [employer].  D/w Nurse.

## 2018-06-26 NOTE — PROGRESS NOTE BEHAVIORAL HEALTH - NSBHADMITMEDEDUDETAILS_A_CORE FT
continue current management.  Psychoeducation done again re: potential benefits of antipsychotic Rx for sx management with patient saying NO!

## 2018-06-26 NOTE — PROGRESS NOTE BEHAVIORAL HEALTH - NSBHFUPINTERVALCCFT_PSY_A_CORE
" My sister is an asshole.  She should have gotten me  an apartment by now.  I am ready to leave.  My sister did tell me that she has someone in the senate working on getting me an apartment."

## 2018-06-26 NOTE — PROGRESS NOTE BEHAVIORAL HEALTH - NSBHFUPINTERVALHXFT_PSY_A_CORE
Patient seemed and examined. Chart reviewed and case discussed in tx team meeting. No significant interval events except patient went to court today, and initially refused to come back to the hospital, then decided to return.  Per report,  had threatened patient with alf for contempt of court because patient refused to get back on the stretcher to return to the hospital.   . Same clinical presentation. Reports regular BMs. Patient is calmer and accepts redirection.  Able to verbalize his frustration at being in the hospital for so long.  Patient discussed seeing his daughter recently, and how he feels responsible for her and wants to see her grow up.   Patient denies any SI or HI. Patient is staying near his room, and is social at times. No Rx SE  are noted or reported.

## 2018-06-26 NOTE — PROGRESS NOTE BEHAVIORAL HEALTH - SUMMARY
53 yo male with early onset Parkinson’s disease x 11 years, discharged from 4 prior nursing homes in 8 months due to his behaviors, hx of refusing medications,  transferred to [facility] Unit 2S on 6/12/17 where he manifested severe mood lability, paranoia and confabulation with poor insight and judgment including making > 40 Justice center complaints. 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 Patient was accepted to [facility] in [place] and discharged from [facility] on 7/12/17.  Upon getting to [facility], Patient refused to go into the facility and became combative. He was thus transferred back to [facility] ED which resulted in Service Line Chiefs’ of Service involvement given the history/issues and ultimate transfer to [address]. Patient has been on Unit 1N since. UPDATE: [facility] declined to accept Patient. Hundreds of nursing home applications wee sent out and all declined to accept patient. Court scheduled on 12/19/17 was deferred as Patient refused to go; he then again refused to go to the new Court date. Continuing to search for placement which have been unsuccessful; family does not want him living with them.  Neurological facility in [place] has accepted patient, and legal guardian is involved in transfer issues. Court hearing regarding Patient's guardianship was 3/27/18 and his sister was granted temporary guardianship. Court hearing for retention occurred on 3/28/18 and the hospital was granted an additional 60 days. Court date for a "check in" was on 5/15 during which time it was reported that there was a potential nursing home which the Patient's sister went to and liked. Another Court date was on 5/29/18 during which time the [facility] withdrew acceptance and now Pt's sister still has to find him an apartment to live.  Patient went to court on 6/26.

## 2018-06-26 NOTE — PROGRESS NOTE BEHAVIORAL HEALTH - OTHER
+ neck favoring one side, dressed in hospital gowns, adequate grooming and hygiene no psychomotor agitation at times (chronic and seemingly intentionaly as he can stop it when redirected). appropriate, needs staff assist at times needs  staff assist at times variable, improved from admission chronically limited but better since admission  more able to respond to redirection festinating gait but stable, using walker able to understand what patient says varying (chronic), usually euthymic with episodes of irritable at times.  Sad at times - today smiling and euthymic overall linear, at times disorganized and circumstantial, concrete (chronic) but better since initial admission paranoid at times, verbalizes frustration at long hospital stay chronically impaired but improved since admission Limited

## 2018-06-27 PROCEDURE — 99231 SBSQ HOSP IP/OBS SF/LOW 25: CPT

## 2018-06-27 RX ADMIN — CARBIDOPA, LEVODOPA, AND ENTACAPONE 1 TABLET(S): 50; 200; 200 TABLET, FILM COATED ORAL at 18:38

## 2018-06-27 RX ADMIN — Medication 650 MILLIGRAM(S): at 22:19

## 2018-06-27 RX ADMIN — CARBIDOPA AND LEVODOPA 1 TABLET(S): 25; 100 TABLET ORAL at 05:39

## 2018-06-27 RX ADMIN — Medication 650 MILLIGRAM(S): at 08:18

## 2018-06-27 RX ADMIN — CARBIDOPA AND LEVODOPA 1 TABLET(S): 25; 100 TABLET ORAL at 20:43

## 2018-06-27 RX ADMIN — CARBIDOPA, LEVODOPA, AND ENTACAPONE 1 TABLET(S): 50; 200; 200 TABLET, FILM COATED ORAL at 05:39

## 2018-06-27 RX ADMIN — Medication 650 MILLIGRAM(S): at 23:00

## 2018-06-27 RX ADMIN — CARBIDOPA AND LEVODOPA 1 TABLET(S): 25; 100 TABLET ORAL at 18:37

## 2018-06-27 RX ADMIN — CARBIDOPA, LEVODOPA, AND ENTACAPONE 1 TABLET(S): 50; 200; 200 TABLET, FILM COATED ORAL at 10:22

## 2018-06-27 RX ADMIN — CARBIDOPA AND LEVODOPA 1 TABLET(S): 25; 100 TABLET ORAL at 10:22

## 2018-06-27 RX ADMIN — Medication 650 MILLIGRAM(S): at 10:20

## 2018-06-27 NOTE — PROGRESS NOTE BEHAVIORAL HEALTH - NSBHFUPINTERVALHXFT_PSY_A_CORE
Patient seemed and examined. Patient still complaining about Court yesterday but maintains control. Same clinical presentation. Reports regular BMs. Patient is calmer and accepts redirection.  Patient denies any SI or HI. Patient is staying near his room, and is social at times. No Rx SE  are noted or reported.

## 2018-06-27 NOTE — PROGRESS NOTE BEHAVIORAL HEALTH - SUMMARY
51 yo male with early onset Parkinson’s disease x 11 years, discharged from 4 prior nursing homes in 8 months due to his behaviors, hx of refusing medications,  transferred to [facility] Unit 2S on 6/12/17 where he manifested severe mood lability, paranoia and confabulation with poor insight and judgment including making > 40 Justice center complaints. 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 Patient was accepted to [facility] in [place] and discharged from [facility] on 7/12/17.  Upon getting to [facility], Patient refused to go into the facility and became combative. He was thus transferred back to [facility] ED which resulted in Service Line Chiefs’ of Service involvement given the history/issues and ultimate transfer to [address]. Patient has been on Unit 1N since. UPDATE: [facility] declined to accept Patient. Hundreds of nursing home applications wee sent out and all declined to accept patient. Court scheduled on 12/19/17 was deferred as Patient refused to go; he then again refused to go to the new Court date. Continuing to search for placement which have been unsuccessful; family does not want him living with them.  Neurological facility in [place] has accepted patient, and legal guardian is involved in transfer issues. Court hearing regarding Patient's guardianship was 3/27/18 and his sister was granted temporary guardianship. Court hearing for retention occurred on 3/28/18 and the hospital was granted an additional 60 days. Court date for a "check in" was on 5/15 during which time it was reported that there was a potential nursing home which the Patient's sister went to and liked. Another Court date was on 5/29/18 during which time the [facility] withdrew acceptance and now Pt's sister still has to find him an apartment to live.  Next Court date scheduled for 06/26/18.

## 2018-06-28 PROCEDURE — 99231 SBSQ HOSP IP/OBS SF/LOW 25: CPT

## 2018-06-28 RX ADMIN — CARBIDOPA AND LEVODOPA 1 TABLET(S): 25; 100 TABLET ORAL at 06:38

## 2018-06-28 RX ADMIN — Medication 650 MILLIGRAM(S): at 23:00

## 2018-06-28 RX ADMIN — CARBIDOPA, LEVODOPA, AND ENTACAPONE 1 TABLET(S): 50; 200; 200 TABLET, FILM COATED ORAL at 10:34

## 2018-06-28 RX ADMIN — CARBIDOPA AND LEVODOPA 1 TABLET(S): 25; 100 TABLET ORAL at 16:17

## 2018-06-28 RX ADMIN — Medication 325 MILLIGRAM(S): at 17:55

## 2018-06-28 RX ADMIN — Medication 325 MILLIGRAM(S): at 17:54

## 2018-06-28 RX ADMIN — CARBIDOPA, LEVODOPA, AND ENTACAPONE 1 TABLET(S): 50; 200; 200 TABLET, FILM COATED ORAL at 06:38

## 2018-06-28 RX ADMIN — CARBIDOPA, LEVODOPA, AND ENTACAPONE 1 TABLET(S): 50; 200; 200 TABLET, FILM COATED ORAL at 16:18

## 2018-06-28 RX ADMIN — CARBIDOPA AND LEVODOPA 1 TABLET(S): 25; 100 TABLET ORAL at 20:37

## 2018-06-28 RX ADMIN — CARBIDOPA AND LEVODOPA 1 TABLET(S): 25; 100 TABLET ORAL at 10:34

## 2018-06-28 NOTE — PROGRESS NOTE BEHAVIORAL HEALTH - SUMMARY
51 yo male with early onset Parkinson’s disease x 11 years, discharged from 4 prior nursing homes in 8 months due to his behaviors, hx of refusing medications,  transferred to [facility] Unit 2S on 6/12/17 where he manifested severe mood lability, paranoia and confabulation with poor insight and judgment including making > 40 Justice center complaints. 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 Patient was accepted to [facility] in [place] and discharged from [facility] on 7/12/17.  Upon getting to [facility], Patient refused to go into the facility and became combative. He was thus transferred back to [facility] ED which resulted in Service Line Chiefs’ of Service involvement given the history/issues and ultimate transfer to [address]. Patient has been on Unit 1N since. UPDATE: [facility] declined to accept Patient. Hundreds of nursing home applications wee sent out and all declined to accept patient. Court scheduled on 12/19/17 was deferred as Patient refused to go; he then again refused to go to the new Court date. Continuing to search for placement which have been unsuccessful; family does not want him living with them.  Neurological facility in [place] has accepted patient, and legal guardian is involved in transfer issues. Court hearing regarding Patient's guardianship was 3/27/18 and his sister was granted temporary guardianship. Court hearing for retention occurred on 3/28/18 and the hospital was granted an additional 60 days. Court date for a "check in" was on 5/15 during which time it was reported that there was a potential nursing home which the Patient's sister went to and liked. Another Court date was on 5/29/18 during which time the [facility] withdrew acceptance and now Pt's sister still has to find him an apartment to live.  Next Court date was scheduled for 06/26/18 which Patient attended. He caused some ruckus at the Court after he refused to get back on the stretcher to be brought back the hospital, which he ultimately did after the  threatened him with [facility] time for contempt of Court.

## 2018-06-28 NOTE — PROGRESS NOTE BEHAVIORAL HEALTH - OTHER
+ neck favoring one side, dressed in hospital gowns, adequate grooming and hygiene no psychomotor agitation at times (chronic and seemingly intentionaly as he can stop it when redirected). appropriate, needs staff assist at times needs  staff assist at times chronically limited but better since admission  more able to respond to redirection festinating gait but stable, using walker overall linear, at times episodes of being disorganized and circumstantial, concrete (chronic) but much better since initial admission paranoid at times, verbalizes frustration at long hospital stay but better since admission chronically impaired but much improved since admission Limited

## 2018-06-28 NOTE — PROGRESS NOTE BEHAVIORAL HEALTH - NSBHFUPINTERVALHXFT_PSY_A_CORE
Patient seemed and examined. Seemingly in an upbeat mood, receptive to jokes and laughs. Patient is calmer and accepts redirection.  Patient denies any SI or HI. Patient is staying more in the community room this week and has been calm, cooperative. denies adverse medication side effects. Still resistant to follow recommendations such as attend exercise group, do some stretching himself for his neck and shoulders, elevating his legs, wearing compression stockings etc. (chronic resistance)

## 2018-06-29 PROCEDURE — 99231 SBSQ HOSP IP/OBS SF/LOW 25: CPT

## 2018-06-29 RX ADMIN — CARBIDOPA, LEVODOPA, AND ENTACAPONE 1 TABLET(S): 50; 200; 200 TABLET, FILM COATED ORAL at 06:28

## 2018-06-29 RX ADMIN — Medication 650 MILLIGRAM(S): at 07:05

## 2018-06-29 RX ADMIN — Medication 650 MILLIGRAM(S): at 06:28

## 2018-06-29 RX ADMIN — Medication 650 MILLIGRAM(S): at 00:00

## 2018-06-29 RX ADMIN — CARBIDOPA AND LEVODOPA 1 TABLET(S): 25; 100 TABLET ORAL at 20:50

## 2018-06-29 RX ADMIN — CARBIDOPA AND LEVODOPA 1 TABLET(S): 25; 100 TABLET ORAL at 16:48

## 2018-06-29 RX ADMIN — CARBIDOPA, LEVODOPA, AND ENTACAPONE 1 TABLET(S): 50; 200; 200 TABLET, FILM COATED ORAL at 10:10

## 2018-06-29 RX ADMIN — CARBIDOPA, LEVODOPA, AND ENTACAPONE 1 TABLET(S): 50; 200; 200 TABLET, FILM COATED ORAL at 16:48

## 2018-06-29 RX ADMIN — CARBIDOPA AND LEVODOPA 1 TABLET(S): 25; 100 TABLET ORAL at 10:10

## 2018-06-29 RX ADMIN — CARBIDOPA AND LEVODOPA 1 TABLET(S): 25; 100 TABLET ORAL at 06:28

## 2018-06-29 NOTE — PROGRESS NOTE BEHAVIORAL HEALTH - OTHER
chronically limited but better since admission  more able to respond to redirection festinating gait but stable, using walker chronically impaired but much improved since admission Limited + neck favoring one side, dressed in hospital gowns, adequate grooming and hygiene no psychomotor agitation at times (chronic and seemingly intentionaly as he can stop it when redirected). appropriate, needs staff assist at times needs  staff assist at times overall linear, at times episodes of being disorganized and circumstantial, concrete (chronic) but much better since initial admission paranoid at times, verbalizes frustration at long hospital stay but better since admission

## 2018-06-29 NOTE — PROGRESS NOTE BEHAVIORAL HEALTH - NSBHFUPINTERVALHXFT_PSY_A_CORE
Same clinical presentation. Seemingly in an upbeat mood, receptive to jokes and laughs. Patient is calmer and accepts redirection.  Patient denies any SI or HI. Patient is staying more in the community room this week and has been calm, cooperative. denies adverse medication side effects. Still resistant to follow recommendations such as attend exercise group, do some stretching himself for his neck and shoulders, elevating his legs, wearing compression stockings etc. (chronic resistance) Same clinical presentation. PT came and worked with Patient today. Patient is staying more in the community room this week and has been calm, cooperative. Patient denies adverse medication side effects. Still resistant to follow recommendations such as attend exercise group, do some stretching himself for his neck and shoulders, elevating his legs, wearing compression stockings etc. (chronic resistance)

## 2018-06-29 NOTE — PROGRESS NOTE BEHAVIORAL HEALTH - SUMMARY
51 yo male with early onset Parkinson’s disease x 11 years, discharged from 4 prior nursing homes in 8 months due to his behaviors, hx of refusing medications,  transferred to [facility] Unit [facility]S on 6/12/17 where he manifested severe mood lability, paranoia and confabulation with poor insight and judgment including making > 40 Justice center complaints. 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 Patient was accepted to [facility] in [place] and discharged from [facility] on 7/12/17.  Upon getting to [facility], Patient refused to go into the facility and became combative. He was thus transferred back to [facility] ED which resulted in Service Line Chiefs’ of Service involvement given the history/issues and ultimate transfer to [address]. Patient has been on [address] 1[address] since. UPDATE: [facility] declined to accept Patient. Hundreds of nursing home applications wee sent out and all declined to accept patient. Court scheduled on 12/19/17 was deferred as Patient refused to go; he then again refused to go to the new Court date. Continuing to search for placement which have been unsuccessful; family does not want him living with them.  Neurological facility in [place] has accepted patient, and legal guardian is involved in transfer issues. Court hearing regarding Patient's guardianship was 3/27/18 and his sister was granted temporary guardianship. Court hearing for retention occurred on 3/28/18 and the hospital was granted an additional 60 days. Court date for a "check in" was on 5/15 during which time it was reported that there was a potential nursing home which the Patient's sister went to and liked. Another Court date was on 5/29/18 during which time the [facility] withdrew acceptance and now Pt's sister still has to find him an apartment to live.  Next Court date was scheduled for 06/26/18 which Patient attended. He caused some ruckus at the Court after he refused to get back on the stretcher to be brought back the hospital, which he ultimately did after the  threatened him with detention time for contempt of Court.

## 2018-06-30 RX ORDER — FUROSEMIDE 40 MG
20 TABLET ORAL DAILY
Qty: 0 | Refills: 0 | Status: COMPLETED | OUTPATIENT
Start: 2018-06-30 | End: 2018-07-03

## 2018-06-30 RX ORDER — POTASSIUM CHLORIDE 20 MEQ
10 PACKET (EA) ORAL DAILY
Qty: 0 | Refills: 0 | Status: COMPLETED | OUTPATIENT
Start: 2018-06-30 | End: 2018-07-03

## 2018-06-30 RX ADMIN — CARBIDOPA AND LEVODOPA 1 TABLET(S): 25; 100 TABLET ORAL at 06:15

## 2018-06-30 RX ADMIN — CARBIDOPA AND LEVODOPA 1 TABLET(S): 25; 100 TABLET ORAL at 16:07

## 2018-06-30 RX ADMIN — Medication 650 MILLIGRAM(S): at 23:00

## 2018-06-30 RX ADMIN — CARBIDOPA AND LEVODOPA 1 TABLET(S): 25; 100 TABLET ORAL at 22:26

## 2018-06-30 RX ADMIN — CARBIDOPA, LEVODOPA, AND ENTACAPONE 1 TABLET(S): 50; 200; 200 TABLET, FILM COATED ORAL at 16:07

## 2018-06-30 RX ADMIN — Medication 650 MILLIGRAM(S): at 22:26

## 2018-06-30 RX ADMIN — CARBIDOPA, LEVODOPA, AND ENTACAPONE 1 TABLET(S): 50; 200; 200 TABLET, FILM COATED ORAL at 11:05

## 2018-06-30 RX ADMIN — CARBIDOPA, LEVODOPA, AND ENTACAPONE 1 TABLET(S): 50; 200; 200 TABLET, FILM COATED ORAL at 06:15

## 2018-06-30 RX ADMIN — CARBIDOPA AND LEVODOPA 1 TABLET(S): 25; 100 TABLET ORAL at 11:05

## 2018-06-30 RX ADMIN — Medication 325 MILLIGRAM(S): at 11:05

## 2018-06-30 RX ADMIN — Medication 325 MILLIGRAM(S): at 12:00

## 2018-07-01 RX ADMIN — CARBIDOPA AND LEVODOPA 1 TABLET(S): 25; 100 TABLET ORAL at 16:06

## 2018-07-01 RX ADMIN — Medication 650 MILLIGRAM(S): at 20:16

## 2018-07-01 RX ADMIN — Medication 10 MILLIEQUIVALENT(S): at 08:09

## 2018-07-01 RX ADMIN — Medication 30 MILLILITER(S): at 10:45

## 2018-07-01 RX ADMIN — CARBIDOPA AND LEVODOPA 1 TABLET(S): 25; 100 TABLET ORAL at 05:52

## 2018-07-01 RX ADMIN — CARBIDOPA AND LEVODOPA 1 TABLET(S): 25; 100 TABLET ORAL at 20:16

## 2018-07-01 RX ADMIN — Medication 650 MILLIGRAM(S): at 21:15

## 2018-07-01 RX ADMIN — Medication 20 MILLIGRAM(S): at 08:09

## 2018-07-01 RX ADMIN — Medication 325 MILLIGRAM(S): at 09:00

## 2018-07-01 RX ADMIN — CARBIDOPA, LEVODOPA, AND ENTACAPONE 1 TABLET(S): 50; 200; 200 TABLET, FILM COATED ORAL at 10:45

## 2018-07-01 RX ADMIN — CARBIDOPA, LEVODOPA, AND ENTACAPONE 1 TABLET(S): 50; 200; 200 TABLET, FILM COATED ORAL at 16:06

## 2018-07-01 RX ADMIN — Medication 325 MILLIGRAM(S): at 08:09

## 2018-07-01 RX ADMIN — CARBIDOPA, LEVODOPA, AND ENTACAPONE 1 TABLET(S): 50; 200; 200 TABLET, FILM COATED ORAL at 05:52

## 2018-07-01 RX ADMIN — CARBIDOPA AND LEVODOPA 1 TABLET(S): 25; 100 TABLET ORAL at 10:45

## 2018-07-02 PROCEDURE — 99231 SBSQ HOSP IP/OBS SF/LOW 25: CPT

## 2018-07-02 RX ADMIN — CARBIDOPA AND LEVODOPA 1 TABLET(S): 25; 100 TABLET ORAL at 05:39

## 2018-07-02 RX ADMIN — Medication 650 MILLIGRAM(S): at 13:38

## 2018-07-02 RX ADMIN — CARBIDOPA AND LEVODOPA 1 TABLET(S): 25; 100 TABLET ORAL at 20:19

## 2018-07-02 RX ADMIN — CARBIDOPA, LEVODOPA, AND ENTACAPONE 1 TABLET(S): 50; 200; 200 TABLET, FILM COATED ORAL at 17:00

## 2018-07-02 RX ADMIN — Medication 650 MILLIGRAM(S): at 20:19

## 2018-07-02 RX ADMIN — CARBIDOPA, LEVODOPA, AND ENTACAPONE 1 TABLET(S): 50; 200; 200 TABLET, FILM COATED ORAL at 10:36

## 2018-07-02 RX ADMIN — Medication 650 MILLIGRAM(S): at 04:50

## 2018-07-02 RX ADMIN — CARBIDOPA AND LEVODOPA 1 TABLET(S): 25; 100 TABLET ORAL at 17:00

## 2018-07-02 RX ADMIN — Medication 650 MILLIGRAM(S): at 03:52

## 2018-07-02 RX ADMIN — Medication 650 MILLIGRAM(S): at 19:13

## 2018-07-02 RX ADMIN — CARBIDOPA AND LEVODOPA 1 TABLET(S): 25; 100 TABLET ORAL at 10:37

## 2018-07-02 RX ADMIN — CARBIDOPA, LEVODOPA, AND ENTACAPONE 1 TABLET(S): 50; 200; 200 TABLET, FILM COATED ORAL at 05:39

## 2018-07-02 RX ADMIN — Medication 650 MILLIGRAM(S): at 15:03

## 2018-07-02 NOTE — PROGRESS NOTE BEHAVIORAL HEALTH - NSBHFUPINTERVALHXFT_PSY_A_CORE
Met with patient and chart reviewed.  Case discussed in tx team meeting.  No significant interval events are reported. Same clinical presentation.  Patient is staying more in the community room  and has been calm, cooperative. Patient denies adverse medication side effects. Still resistant to follow recommendations such as attend exercise group, do some stretching himself for his neck and shoulders, elevating his legs, wearing compression stockings etc. (chronic resistance)

## 2018-07-02 NOTE — PROGRESS NOTE BEHAVIORAL HEALTH - NSBHFUPINTERVALCCFT_PSY_A_CORE
" I told my sister I have been here long enough, and I want to get out of here.  She is finding an apartment"

## 2018-07-02 NOTE — PROGRESS NOTE BEHAVIORAL HEALTH - SUMMARY
51 yo male with early onset Parkinson’s disease x 11 years, discharged from 4 prior nursing homes in 8 months due to his behaviors, hx of refusing medications,  transferred to [facility] Unit 2S on 6/12/17 where he manifested severe mood lability, paranoia and confabulation with poor insight and judgment including making > 40 Justice center complaints. 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 Patient was accepted to [facility] in [place] and discharged from [facility] on 7/12/17.  Upon getting to [facility], Patient refused to go into the facility and became combative. He was thus transferred back to [facility] ED which resulted in Service Line Chiefs’ of Service involvement given the history/issues and ultimate transfer to [address]. Patient has been on [address] since. UPDATE: [facility] declined to accept Patient. Hundreds of nursing home applications wee sent out and all declined to accept patient. Court scheduled on 12/19/17 was deferred as Patient refused to go; he then again refused to go to the new Court date. Continuing to search for placement which have been unsuccessful; family does not want him living with them.  Neurological facility in [place] has accepted patient, and legal guardian is involved in transfer issues. Court hearing regarding Patient's guardianship was 3/27/18 and his sister was granted temporary guardianship. Court hearing for retention occurred on 3/28/18 and the hospital was granted an additional 60 days. Court date for a "check in" was on 5/15 during which time it was reported that there was a potential nursing home which the Patient's sister went to and liked. Another Court date was on 5/29/18 during which time the [facility] withdrew acceptance and now Pt's sister still has to find him an apartment to live.  Next Court date was scheduled for 06/26/18 which Patient attended. He caused some ruckus at the Court after he refused to get back on the stretcher to be brought back the hospital, which he ultimately did after the  threatened him with [employer] time for contempt of Court.

## 2018-07-03 PROCEDURE — 99231 SBSQ HOSP IP/OBS SF/LOW 25: CPT

## 2018-07-03 RX ORDER — NYSTATIN/TRIAMCINOLONE ACET
1 OINTMENT (GRAM) TOPICAL
Qty: 0 | Refills: 0 | Status: COMPLETED | OUTPATIENT
Start: 2018-07-03 | End: 2018-07-08

## 2018-07-03 RX ADMIN — CARBIDOPA, LEVODOPA, AND ENTACAPONE 1 TABLET(S): 50; 200; 200 TABLET, FILM COATED ORAL at 16:48

## 2018-07-03 RX ADMIN — CARBIDOPA AND LEVODOPA 1 TABLET(S): 25; 100 TABLET ORAL at 20:28

## 2018-07-03 RX ADMIN — Medication 325 MILLIGRAM(S): at 22:05

## 2018-07-03 RX ADMIN — CARBIDOPA AND LEVODOPA 1 TABLET(S): 25; 100 TABLET ORAL at 10:30

## 2018-07-03 RX ADMIN — Medication 325 MILLIGRAM(S): at 22:08

## 2018-07-03 RX ADMIN — CARBIDOPA AND LEVODOPA 1 TABLET(S): 25; 100 TABLET ORAL at 05:41

## 2018-07-03 RX ADMIN — CARBIDOPA AND LEVODOPA 1 TABLET(S): 25; 100 TABLET ORAL at 16:48

## 2018-07-03 RX ADMIN — Medication 1 APPLICATION(S): at 21:15

## 2018-07-03 RX ADMIN — CARBIDOPA, LEVODOPA, AND ENTACAPONE 1 TABLET(S): 50; 200; 200 TABLET, FILM COATED ORAL at 10:30

## 2018-07-03 RX ADMIN — CARBIDOPA, LEVODOPA, AND ENTACAPONE 1 TABLET(S): 50; 200; 200 TABLET, FILM COATED ORAL at 05:41

## 2018-07-03 NOTE — PROGRESS NOTE BEHAVIORAL HEALTH - NSBHFUPINTERVALHXFT_PSY_A_CORE
Met with patient and chart reviewed.  Case discussed in tx team meeting.  No significant interval events are reported. Same clinical presentation.  Patient is staying less in the community room  and has been calm, cooperative. Patient denies adverse medication side effects. Still resistant to follow recommendations such as attend exercise group, do some stretching himself for his neck and shoulders, elevating his legs, wearing compression stockings etc. (chronic resistance). Reports he is sad today

## 2018-07-03 NOTE — PROGRESS NOTE BEHAVIORAL HEALTH - NSBHADMITMEDEDUDETAILS_A_CORE FT
continue current management.  Psychoeducation done re: benefits of current Rx with patient saying "ok"

## 2018-07-03 NOTE — PROGRESS NOTE BEHAVIORAL HEALTH - SUMMARY
53 yo male with early onset Parkinson’s disease x 11 years, discharged from 4 prior nursing homes in 8 months due to his behaviors, hx of refusing medications,  transferred to [facility] Unit 2S on 6/12/17 where he manifested severe mood lability, paranoia and confabulation with poor insight and judgment including making > 40 Justice center complaints. 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 Patient was accepted to [facility] in [place] and discharged from [facility] on 7/12/17.  Upon getting to [facility], Patient refused to go into the facility and became combative. He was thus transferred back to [facility] ED which resulted in Service Line Chiefs’ of Service involvement given the history/issues and ultimate transfer to [address]. Patient has been on Unit 1N since. UPDATE: [facility] declined to accept Patient. Hundreds of nursing home applications wee sent out and all declined to accept patient. Court scheduled on 12/19/17 was deferred as Patient refused to go; he then again refused to go to the new Court date. Continuing to search for placement which have been unsuccessful; family does not want him living with them.  Neurological facility in [place] has accepted patient, and legal guardian is involved in transfer issues. Court hearing regarding Patient's guardianship was 3/27/18 and his sister was granted temporary guardianship. Court hearing for retention occurred on 3/28/18 and the hospital was granted an additional 60 days. Court date for a "check in" was on 5/15 during which time it was reported that there was a potential nursing home which the Patient's sister went to and liked. Another Court date was on 5/29/18 during which time the [facility] withdrew acceptance and now Pt's sister still has to find him an apartment to live.  Next Court date was scheduled for 06/26/18 which Patient attended. He caused some ruckus at the Court after he refused to get back on the stretcher to be brought back the hospital, which he ultimately did after the  threatened him with detention time for contempt of Court.  Next court date is 7/26

## 2018-07-03 NOTE — PROGRESS NOTE BEHAVIORAL HEALTH - OTHER
paranoid at times, verbalizes frustration at long hospital stay but better since admission chronically impaired but much improved since admission Limited + neck favoring one side, dressed in hospital gowns, adequate grooming and hygiene no psychomotor agitation at times (chronic and seemingly intentionaly as he can stop it when redirected). appropriate, needs staff assist at times needs  staff assist at times chronically limited but better since admission  more able to respond to redirection festinating gait but stable, using walker overall linear, at times episodes of being disorganized and circumstantial, concrete (chronic) but much better since initial admission

## 2018-07-04 PROCEDURE — 12345: CPT | Mod: NC

## 2018-07-04 RX ORDER — DIPHENHYDRAMINE HCL 50 MG
25 CAPSULE ORAL EVERY 4 HOURS
Qty: 0 | Refills: 0 | Status: DISCONTINUED | OUTPATIENT
Start: 2018-07-04 | End: 2018-07-04

## 2018-07-04 RX ORDER — HALOPERIDOL DECANOATE 100 MG/ML
5 INJECTION INTRAMUSCULAR ONCE
Qty: 0 | Refills: 0 | Status: COMPLETED | OUTPATIENT
Start: 2018-07-04 | End: 2018-07-04

## 2018-07-04 RX ADMIN — HALOPERIDOL DECANOATE 5 MILLIGRAM(S): 100 INJECTION INTRAMUSCULAR at 11:18

## 2018-07-04 RX ADMIN — CARBIDOPA AND LEVODOPA 1 TABLET(S): 25; 100 TABLET ORAL at 20:44

## 2018-07-04 RX ADMIN — Medication 2 MILLIGRAM(S): at 11:18

## 2018-07-04 RX ADMIN — CARBIDOPA, LEVODOPA, AND ENTACAPONE 1 TABLET(S): 50; 200; 200 TABLET, FILM COATED ORAL at 16:21

## 2018-07-04 RX ADMIN — CARBIDOPA, LEVODOPA, AND ENTACAPONE 1 TABLET(S): 50; 200; 200 TABLET, FILM COATED ORAL at 10:46

## 2018-07-04 RX ADMIN — CARBIDOPA AND LEVODOPA 1 TABLET(S): 25; 100 TABLET ORAL at 16:21

## 2018-07-04 RX ADMIN — CARBIDOPA, LEVODOPA, AND ENTACAPONE 1 TABLET(S): 50; 200; 200 TABLET, FILM COATED ORAL at 05:50

## 2018-07-04 RX ADMIN — Medication 650 MILLIGRAM(S): at 16:31

## 2018-07-04 RX ADMIN — Medication 650 MILLIGRAM(S): at 17:07

## 2018-07-04 RX ADMIN — CARBIDOPA AND LEVODOPA 1 TABLET(S): 25; 100 TABLET ORAL at 05:50

## 2018-07-04 RX ADMIN — CARBIDOPA AND LEVODOPA 1 TABLET(S): 25; 100 TABLET ORAL at 10:47

## 2018-07-04 NOTE — PROGRESS NOTE BEHAVIORAL HEALTH - NSBHFUPINTERVALHXFT_PSY_A_CORE
Chart reviewed; met with patient for an assessment in AM; however, patient states "talk to my ". No significant interval events noted. However, patient was notably agitated and physically aggressive towards A female registered nurse today on the unit; required a brief manual hold to be given a stat injection after attempting to de-escalate patient verbally to no avail.  Patient was also offered oral medication prior to given injection to no avail as he adamantly refused PO medication. Dr. Nino made aware of the need for manual hold and IM stat meds. Patient was much calmer after receiving injection. Was reassessed after wards and did not report any pain or discomfort post restrictive intervention. Patient denies adverse medication side effects. continuous monitoring in place. Chart reviewed; met with patient for an assessment in AM; however, patient states "talk to my ". No significant interval events noted. However, patient was notably agitated and physically aggressive towards A female registered nurse today on the unit; required a brief manual hold to be given a stat injection after attempting to de-escalate patient verbally to no avail.  Patient was also offered oral medication prior to given injection to no avail as he adamantly refused PO medication. Dr. Nino made aware of the need for manual hold and IM stat meds (Haldol 5 mg and Ativan 2 mg). Patient was much calmer after receiving injection. Was reassessed after wards and did not report any pain or discomfort post restrictive intervention. Patient denies adverse medication side effects. continuous monitoring in place.

## 2018-07-04 NOTE — PROGRESS NOTE BEHAVIORAL HEALTH - SUMMARY
51 yo male with early onset Parkinson’s disease x 11 years, discharged from 4 prior nursing homes in 8 months due to his behaviors, hx of refusing medications,  transferred to [facility] Unit 2S on 6/12/17 where he manifested severe mood lability, paranoia and confabulation with poor insight and judgment including making > 40 Justice center complaints. 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 Patient was accepted to [facility] in [place] and discharged from [facility] on 7/12/17.  Upon getting to [facility], Patient refused to go into the facility and became combative. He was thus transferred back to [facility] ED which resulted in Service Line Chiefs’ of Service involvement given the history/issues and ultimate transfer to [address]. Patient has been on Unit 1N since. UPDATE: [facility] declined to accept Patient. Hundreds of nursing home applications wee sent out and all declined to accept patient. Court scheduled on 12/19/17 was deferred as Patient refused to go; he then again refused to go to the new Court date. Continuing to search for placement which have been unsuccessful; family does not want him living with them.  Neurological facility in [place] has accepted patient, and legal guardian is involved in transfer issues. Court hearing regarding Patient's guardianship was 3/27/18 and his sister was granted temporary guardianship. Court hearing for retention occurred on 3/28/18 and the hospital was granted an additional 60 days. Court date for a "check in" was on 5/15 during which time it was reported that there was a potential nursing home which the Patient's sister went to and liked. Another Court date was on 5/29/18 during which time the [facility] withdrew acceptance and now Pt's sister still has to find him an apartment to live.  Next Court date was scheduled for 06/26/18 which Patient attended. He caused some ruckus at the Court after he refused to get back on the stretcher to be brought back the hospital, which he ultimately did after the  threatened him with [facility] time for contempt of Court.  Next court date is 7/26

## 2018-07-05 PROCEDURE — 99231 SBSQ HOSP IP/OBS SF/LOW 25: CPT

## 2018-07-05 RX ADMIN — CARBIDOPA, LEVODOPA, AND ENTACAPONE 1 TABLET(S): 50; 200; 200 TABLET, FILM COATED ORAL at 10:07

## 2018-07-05 RX ADMIN — CARBIDOPA AND LEVODOPA 1 TABLET(S): 25; 100 TABLET ORAL at 10:07

## 2018-07-05 RX ADMIN — CARBIDOPA AND LEVODOPA 1 TABLET(S): 25; 100 TABLET ORAL at 20:42

## 2018-07-05 RX ADMIN — CARBIDOPA AND LEVODOPA 1 TABLET(S): 25; 100 TABLET ORAL at 16:29

## 2018-07-05 RX ADMIN — Medication 650 MILLIGRAM(S): at 09:06

## 2018-07-05 RX ADMIN — CARBIDOPA AND LEVODOPA 1 TABLET(S): 25; 100 TABLET ORAL at 06:01

## 2018-07-05 RX ADMIN — CARBIDOPA, LEVODOPA, AND ENTACAPONE 1 TABLET(S): 50; 200; 200 TABLET, FILM COATED ORAL at 16:29

## 2018-07-05 RX ADMIN — Medication 30 MILLILITER(S): at 03:21

## 2018-07-05 RX ADMIN — Medication 650 MILLIGRAM(S): at 10:00

## 2018-07-05 RX ADMIN — CARBIDOPA, LEVODOPA, AND ENTACAPONE 1 TABLET(S): 50; 200; 200 TABLET, FILM COATED ORAL at 06:01

## 2018-07-05 NOTE — PROGRESS NOTE BEHAVIORAL HEALTH - OTHER
+ neck favoring one side, dressed in hospital gowns, adequate grooming and hygiene no psychomotor agitation at times (chronic and seemingly intentionaly as he can stop it when redirected). appropriate, needs staff assist at times needs  staff assist at times chronically limited but better since admission  more able to respond to redirection festinating gait but stable, using walker somewhat depressed neutral, sad at times overall linear, at times episodes of being disorganized and circumstantial, concrete (chronic) but much better since initial admission paranoid at times, verbalizes frustration at long hospital stay but better since admission chronically impaired but much improved since admission Limited

## 2018-07-05 NOTE — PROGRESS NOTE BEHAVIORAL HEALTH - SUMMARY
53 yo male with early onset Parkinson’s disease x 11 years, discharged from 4 prior nursing homes in 8 months due to his behaviors, hx of refusing medications,  transferred to [facility] Unit 2S on 6/12/17 where he manifested severe mood lability, paranoia and confabulation with poor insight and judgment including making > 40 Justice center complaints. 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 Patient was accepted to [facility] in [place] and discharged from [facility] on 7/12/17.  Upon getting to [facility], Patient refused to go into the facility and became combative. He was thus transferred back to [facility] ED which resulted in Service Line Chiefs’ of Service involvement given the history/issues and ultimate transfer to [address]. Patient has been on Unit 1N since. UPDATE: [facility] declined to accept Patient. Hundreds of nursing home applications wee sent out and all declined to accept patient. Court scheduled on 12/19/17 was deferred as Patient refused to go; he then again refused to go to the new Court date. Continuing to search for placement which have been unsuccessful; family does not want him living with them.  Neurological facility in [place] has accepted patient, and legal guardian is involved in transfer issues. Court hearing regarding Patient's guardianship was 3/27/18 and his sister was granted temporary guardianship. Court hearing for retention occurred on 3/28/18 and the hospital was granted an additional 60 days. Court date for a "check in" was on 5/15 during which time it was reported that there was a potential nursing home which the Patient's sister went to and liked. Another Court date was on 5/29/18 during which time the [facility] withdrew acceptance and now Pt's sister still has to find him an apartment to live.  Next Court date was scheduled for 06/26/18 which Patient attended. He caused some ruckus at the Court after he refused to get back on the stretcher to be brought back the hospital, which he ultimately did after the  threatened him with [facility] time for contempt of Court.  Next court date is 7/26
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
## 2018-07-06 PROCEDURE — 99231 SBSQ HOSP IP/OBS SF/LOW 25: CPT

## 2018-07-06 RX ADMIN — CARBIDOPA AND LEVODOPA 1 TABLET(S): 25; 100 TABLET ORAL at 10:40

## 2018-07-06 RX ADMIN — CARBIDOPA, LEVODOPA, AND ENTACAPONE 1 TABLET(S): 50; 200; 200 TABLET, FILM COATED ORAL at 06:20

## 2018-07-06 RX ADMIN — CARBIDOPA AND LEVODOPA 1 TABLET(S): 25; 100 TABLET ORAL at 06:20

## 2018-07-06 RX ADMIN — CARBIDOPA, LEVODOPA, AND ENTACAPONE 1 TABLET(S): 50; 200; 200 TABLET, FILM COATED ORAL at 10:40

## 2018-07-06 RX ADMIN — CARBIDOPA AND LEVODOPA 1 TABLET(S): 25; 100 TABLET ORAL at 20:16

## 2018-07-06 RX ADMIN — Medication 1 APPLICATION(S): at 20:52

## 2018-07-06 RX ADMIN — CARBIDOPA, LEVODOPA, AND ENTACAPONE 1 TABLET(S): 50; 200; 200 TABLET, FILM COATED ORAL at 16:06

## 2018-07-06 RX ADMIN — Medication 650 MILLIGRAM(S): at 01:38

## 2018-07-06 RX ADMIN — CARBIDOPA AND LEVODOPA 1 TABLET(S): 25; 100 TABLET ORAL at 16:06

## 2018-07-06 RX ADMIN — Medication 650 MILLIGRAM(S): at 02:30

## 2018-07-06 NOTE — PROGRESS NOTE BEHAVIORAL HEALTH - SUMMARY
53 yo male with early onset Parkinson’s disease x 11 years, discharged from 4 prior nursing homes in 8 months due to his behaviors, hx of refusing medications,  transferred to [facility] Unit 2S on 6/12/17 where he manifested severe mood lability, paranoia and confabulation with poor insight and judgment including making > 40 Justice center complaints. 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 Patient was accepted to [facility] in [place] and discharged from [facility] on 7/12/17.  Upon getting to [facility], Patient refused to go into the facility and became combative. He was thus transferred back to [facility] ED which resulted in Service Line Chiefs’ of Service involvement given the history/issues and ultimate transfer to [address]. Patient has been on Unit 1N since. UPDATE: [facility] declined to accept Patient. Hundreds of nursing home applications wee sent out and all declined to accept patient. Court scheduled on 12/19/17 was deferred as Patient refused to go; he then again refused to go to the new Court date. Continuing to search for placement which have been unsuccessful; family does not want him living with them.  Neurological facility in [place] has accepted patient, and legal guardian is involved in transfer issues. Court hearing regarding Patient's guardianship was 3/27/18 and his sister was granted temporary guardianship. Court hearing for retention occurred on 3/28/18 and the hospital was granted an additional 60 days. Court date for a "check in" was on 5/15 during which time it was reported that there was a potential nursing home which the Patient's sister went to and liked. Another Court date was on 5/29/18 during which time the [facility] withdrew acceptance and now Pt's sister still has to find him an apartment to live.  Next Court date was scheduled for 06/26/18 which Patient attended. He caused some ruckus at the Court after he refused to get back on the stretcher to be brought back the hospital, which he ultimately did after the  threatened him with [facility] time for contempt of Court.  Next court date is 7/26
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
## 2018-07-07 RX ADMIN — CARBIDOPA AND LEVODOPA 1 TABLET(S): 25; 100 TABLET ORAL at 05:55

## 2018-07-07 RX ADMIN — CARBIDOPA, LEVODOPA, AND ENTACAPONE 1 TABLET(S): 50; 200; 200 TABLET, FILM COATED ORAL at 16:13

## 2018-07-07 RX ADMIN — CARBIDOPA AND LEVODOPA 1 TABLET(S): 25; 100 TABLET ORAL at 10:31

## 2018-07-07 RX ADMIN — CARBIDOPA AND LEVODOPA 1 TABLET(S): 25; 100 TABLET ORAL at 20:20

## 2018-07-07 RX ADMIN — CARBIDOPA, LEVODOPA, AND ENTACAPONE 1 TABLET(S): 50; 200; 200 TABLET, FILM COATED ORAL at 10:31

## 2018-07-07 RX ADMIN — CARBIDOPA AND LEVODOPA 1 TABLET(S): 25; 100 TABLET ORAL at 16:14

## 2018-07-07 RX ADMIN — Medication 650 MILLIGRAM(S): at 04:30

## 2018-07-07 RX ADMIN — CARBIDOPA, LEVODOPA, AND ENTACAPONE 1 TABLET(S): 50; 200; 200 TABLET, FILM COATED ORAL at 05:56

## 2018-07-07 RX ADMIN — Medication 1 APPLICATION(S): at 23:20

## 2018-07-07 RX ADMIN — Medication 650 MILLIGRAM(S): at 04:04

## 2018-07-08 RX ORDER — TOBRAMYCIN 0.3 %
1 DROPS OPHTHALMIC (EYE)
Qty: 0 | Refills: 0 | Status: COMPLETED | OUTPATIENT
Start: 2018-07-08 | End: 2018-07-13

## 2018-07-08 RX ADMIN — CARBIDOPA AND LEVODOPA 1 TABLET(S): 25; 100 TABLET ORAL at 20:00

## 2018-07-08 RX ADMIN — CARBIDOPA AND LEVODOPA 1 TABLET(S): 25; 100 TABLET ORAL at 11:43

## 2018-07-08 RX ADMIN — Medication 1 DROP(S): at 20:00

## 2018-07-08 RX ADMIN — CARBIDOPA AND LEVODOPA 1 TABLET(S): 25; 100 TABLET ORAL at 16:04

## 2018-07-08 RX ADMIN — CARBIDOPA AND LEVODOPA 1 TABLET(S): 25; 100 TABLET ORAL at 06:14

## 2018-07-08 RX ADMIN — CARBIDOPA, LEVODOPA, AND ENTACAPONE 1 TABLET(S): 50; 200; 200 TABLET, FILM COATED ORAL at 11:43

## 2018-07-08 RX ADMIN — CARBIDOPA, LEVODOPA, AND ENTACAPONE 1 TABLET(S): 50; 200; 200 TABLET, FILM COATED ORAL at 16:04

## 2018-07-08 RX ADMIN — CARBIDOPA, LEVODOPA, AND ENTACAPONE 1 TABLET(S): 50; 200; 200 TABLET, FILM COATED ORAL at 06:14

## 2018-07-09 PROCEDURE — 99231 SBSQ HOSP IP/OBS SF/LOW 25: CPT

## 2018-07-09 RX ADMIN — CARBIDOPA, LEVODOPA, AND ENTACAPONE 1 TABLET(S): 50; 200; 200 TABLET, FILM COATED ORAL at 05:07

## 2018-07-09 RX ADMIN — CARBIDOPA AND LEVODOPA 1 TABLET(S): 25; 100 TABLET ORAL at 20:08

## 2018-07-09 RX ADMIN — Medication 1 DROP(S): at 09:45

## 2018-07-09 RX ADMIN — CARBIDOPA AND LEVODOPA 1 TABLET(S): 25; 100 TABLET ORAL at 10:05

## 2018-07-09 RX ADMIN — Medication 30 MILLILITER(S): at 07:56

## 2018-07-09 RX ADMIN — CARBIDOPA, LEVODOPA, AND ENTACAPONE 1 TABLET(S): 50; 200; 200 TABLET, FILM COATED ORAL at 16:16

## 2018-07-09 RX ADMIN — CARBIDOPA AND LEVODOPA 1 TABLET(S): 25; 100 TABLET ORAL at 16:16

## 2018-07-09 RX ADMIN — CARBIDOPA, LEVODOPA, AND ENTACAPONE 1 TABLET(S): 50; 200; 200 TABLET, FILM COATED ORAL at 10:05

## 2018-07-09 RX ADMIN — CARBIDOPA AND LEVODOPA 1 TABLET(S): 25; 100 TABLET ORAL at 05:07

## 2018-07-09 NOTE — PROGRESS NOTE BEHAVIORAL HEALTH - SUMMARY
51 yo male with early onset Parkinson’s disease x 11 years, discharged from 4 prior nursing homes in 8 months due to his behaviors, hx of refusing medications,  transferred to [facility] Unit [address] on 6/12/17 where he manifested severe mood lability, paranoia and confabulation with poor insight and judgment including making > 40 Justice center complaints. 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 Patient was accepted to [facility] in [place] and discharged from [facility] on 7/12/17.  Upon getting to [facility], Patient refused to go into the facility and became combative. He was thus transferred back to [facility] ED which resulted in Service Line Chiefs’ of Service involvement given the history/issues and ultimate transfer to [address]. Patient has been on [address] since. UPDATE: [facility] declined to accept Patient. Hundreds of nursing home applications wee sent out and all declined to accept patient. Court scheduled on 12/19/17 was deferred as Patient refused to go; he then again refused to go to the new Court date. Continuing to search for placement which have been unsuccessful; family does not want him living with them.  Neurological facility in [place] has accepted patient, and legal guardian is involved in transfer issues. Court hearing regarding Patient's guardianship was 3/27/18 and his sister was granted temporary guardianship. Court hearing for retention occurred on 3/28/18 and the hospital was granted an additional 60 days. Court date for a "check in" was on 5/15 during which time it was reported that there was a potential nursing home which the Patient's sister went to and liked. Another Court date was on 5/29/18 during which time the [facility] withdrew acceptance and now Pt's sister still has to find him an apartment to live.  Next Court date was scheduled for 06/26/18 which Patient attended. He caused some ruckus at the Court after he refused to get back on the stretcher to be brought back the hospital, which he ultimately did after the  threatened him with custodial time for contempt of Court.  Next court date is 7/26

## 2018-07-09 NOTE — PROGRESS NOTE BEHAVIORAL HEALTH - NSBHFUPINTERVALCCFT_PSY_A_CORE
"My sister got an apartment and I will have aides to help me there.  She is just waiting for the voucher.  I am almost out of here"

## 2018-07-09 NOTE — PROGRESS NOTE BEHAVIORAL HEALTH - OTHER
+ neck favoring one side, dressed in hospital gowns, adequate grooming and hygiene no psychomotor agitation at times (chronic and seemingly intentionaly as he can stop it when redirected). appropriate, needs staff assist at times needs  staff assist at times chronically limited but better since admission  more able to respond to redirection festinating gait but stable, using walker neutral overall linear, at times episodes of being disorganized and circumstantial, concrete (chronic) but much better since initial admission paranoid at times, verbalizes frustration at long hospital stay but better since admission chronically impaired but much improved since admission Limited

## 2018-07-09 NOTE — PROGRESS NOTE BEHAVIORAL HEALTH - NSBHFUPINTERVALHXFT_PSY_A_CORE
Chart reviewed; met with patient for an assessment. Case discussed in tx team meeting.  No significant interval events noted.   Patient is in behavioral control today, but continues paranoid and feels others are holding up his getting an apartment on purpose.  No Rx SE  are noted or reported. Requesting a lot of staff support today, which was provided. Reports happy that he may be leaving soon.

## 2018-07-10 PROCEDURE — 99231 SBSQ HOSP IP/OBS SF/LOW 25: CPT

## 2018-07-10 RX ADMIN — CARBIDOPA AND LEVODOPA 1 TABLET(S): 25; 100 TABLET ORAL at 20:28

## 2018-07-10 RX ADMIN — Medication 650 MILLIGRAM(S): at 14:02

## 2018-07-10 RX ADMIN — CARBIDOPA AND LEVODOPA 1 TABLET(S): 25; 100 TABLET ORAL at 05:37

## 2018-07-10 RX ADMIN — CARBIDOPA, LEVODOPA, AND ENTACAPONE 1 TABLET(S): 50; 200; 200 TABLET, FILM COATED ORAL at 05:37

## 2018-07-10 RX ADMIN — Medication 650 MILLIGRAM(S): at 15:29

## 2018-07-10 RX ADMIN — CARBIDOPA, LEVODOPA, AND ENTACAPONE 1 TABLET(S): 50; 200; 200 TABLET, FILM COATED ORAL at 11:39

## 2018-07-10 RX ADMIN — CARBIDOPA, LEVODOPA, AND ENTACAPONE 1 TABLET(S): 50; 200; 200 TABLET, FILM COATED ORAL at 15:28

## 2018-07-10 RX ADMIN — CARBIDOPA AND LEVODOPA 1 TABLET(S): 25; 100 TABLET ORAL at 11:39

## 2018-07-10 RX ADMIN — CARBIDOPA AND LEVODOPA 1 TABLET(S): 25; 100 TABLET ORAL at 15:28

## 2018-07-10 NOTE — PROGRESS NOTE BEHAVIORAL HEALTH - NSBHFUPINTERVALCCFT_PSY_A_CORE
"My sister and my  told me not to let anyone look at the sore or put medicine on it.  That will ruin our case and we are suing"

## 2018-07-10 NOTE — PROVIDER CONTACT NOTE (CHANGE IN STATUS NOTIFICATION) - RECOMMENDATIONS
Encourage ambulation and repositioning. Obtain a ROHO cushion. Should patient change his mind please call me.

## 2018-07-10 NOTE — PROGRESS NOTE BEHAVIORAL HEALTH - OTHER
needs  staff assist at times chronically limited but better since admission  more able to respond to redirection festinating gait but stable, using walker overall linear, at times episodes of being disorganized and circumstantial, concrete (chronic) but much better since initial admission more paranoid today + neck favoring one side, dressed in hospital gowns, adequate grooming and hygiene no psychomotor agitation at times (chronic and seemingly intentionaly as he can stop it when redirected). appropriate, needs staff assist at times chronically impaired but much improved since admission Limited

## 2018-07-10 NOTE — PROGRESS NOTE BEHAVIORAL HEALTH - DETAILS
chronic neck and back pain, which is relieved by ice packs at times possible decubitus in gluteal fold--patient will not allow staff to view site
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
## 2018-07-10 NOTE — PROGRESS NOTE BEHAVIORAL HEALTH - SUMMARY
51 yo male with early onset Parkinson’s disease x 11 years, discharged from 4 prior nursing homes in 8 months due to his behaviors, hx of refusing medications,  transferred to [facility] Unit [facility] on 6/12/17 where he manifested severe mood lability, paranoia and confabulation with poor insight and judgment including making > 40 Justice center complaints. 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 Patient was accepted to [facility] in [place] and discharged from [facility] on 7/12/17.  Upon getting to [facility], Patient refused to go into the facility and became combative. He was thus transferred back to [facility] ED which resulted in Service Line Chiefs’ of Service involvement given the history/issues and ultimate transfer to [address]. Patient has been on [address] 1[address] since. UPDATE: [facility] declined to accept Patient. Hundreds of nursing home applications wee sent out and all declined to accept patient. Court scheduled on 12/19/17 was deferred as Patient refused to go; he then again refused to go to the new Court date. Continuing to search for placement which have been unsuccessful; family does not want him living with them.  Neurological facility in [place] has accepted patient, and legal guardian is involved in transfer issues. Court hearing regarding Patient's guardianship was 3/27/18 and his sister was granted temporary guardianship. Court hearing for retention occurred on 3/28/18 and the hospital was granted an additional 60 days. Court date for a "check in" was on 5/15 during which time it was reported that there was a potential nursing home which the Patient's sister went to and liked. Another Court date was on 5/29/18 during which time the [facility] withdrew acceptance and now Pt's sister still has to find him an apartment to live.  Next Court date was scheduled for 06/26/18 which Patient attended. He caused some ruckus at the Court after he refused to get back on the stretcher to be brought back the hospital, which he ultimately did after the  threatened him with pen[facility]entiary time for contempt of Court.  Next court date is 7/26

## 2018-07-10 NOTE — PROVIDER CONTACT NOTE (CHANGE IN STATUS NOTIFICATION) - ASSESSMENT
Patient is a 54yo psychiatric patient residing in patient for 13 months. Per RNs patient refuses to sleep in bed and prefers sitting in a chair patient can ambulate with a walker but over the past year ambulation has declined due to his dx of PD. A break is skin integrity was noticed last week either in the gluteal fold or left buttock. Patient refused to allow me to assess his skin. Multiple attempts were made with various medical professionals that have developed relationships with the patient throughout his residency in [place].

## 2018-07-10 NOTE — PROGRESS NOTE BEHAVIORAL HEALTH - NSBHADMITMEDEDUDETAILS_A_CORE FT
continue current management.  Psychoeducation again done re: benefits and potential SE of current Rx with patient saying "ok"

## 2018-07-11 PROCEDURE — 99232 SBSQ HOSP IP/OBS MODERATE 35: CPT

## 2018-07-11 RX ORDER — ACETAMINOPHEN 500 MG
325 TABLET ORAL EVERY 6 HOURS
Qty: 0 | Refills: 0 | Status: DISCONTINUED | OUTPATIENT
Start: 2018-07-11 | End: 2018-08-18

## 2018-07-11 RX ADMIN — CARBIDOPA AND LEVODOPA 1 TABLET(S): 25; 100 TABLET ORAL at 20:20

## 2018-07-11 RX ADMIN — CARBIDOPA AND LEVODOPA 1 TABLET(S): 25; 100 TABLET ORAL at 15:22

## 2018-07-11 RX ADMIN — Medication 325 MILLIGRAM(S): at 23:55

## 2018-07-11 RX ADMIN — CARBIDOPA, LEVODOPA, AND ENTACAPONE 1 TABLET(S): 50; 200; 200 TABLET, FILM COATED ORAL at 10:43

## 2018-07-11 RX ADMIN — CARBIDOPA AND LEVODOPA 1 TABLET(S): 25; 100 TABLET ORAL at 06:42

## 2018-07-11 RX ADMIN — CARBIDOPA, LEVODOPA, AND ENTACAPONE 1 TABLET(S): 50; 200; 200 TABLET, FILM COATED ORAL at 15:21

## 2018-07-11 RX ADMIN — CARBIDOPA AND LEVODOPA 1 TABLET(S): 25; 100 TABLET ORAL at 10:44

## 2018-07-11 RX ADMIN — CARBIDOPA, LEVODOPA, AND ENTACAPONE 1 TABLET(S): 50; 200; 200 TABLET, FILM COATED ORAL at 06:42

## 2018-07-11 RX ADMIN — Medication 325 MILLIGRAM(S): at 22:52

## 2018-07-11 RX ADMIN — Medication 325 MILLIGRAM(S): at 23:33

## 2018-07-11 RX ADMIN — Medication 1 DROP(S): at 20:25

## 2018-07-11 NOTE — PROGRESS NOTE BEHAVIORAL HEALTH - SUMMARY
51 yo male with early onset Parkinson’s disease x 11 years, discharged from 4 prior nursing homes in 8 months due to his behaviors, hx of refusing medications,  transferred to [facility] Unit 2S on 6/12/17 where he manifested severe mood lability, paranoia and confabulation with poor insight and judgment including making > 40 Justice center complaints. 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 Patient was accepted to [facility] in [place] and discharged from [facility] on 7/12/17.  Upon getting to [facility], Patient refused to go into the facility and became combative. He was thus transferred back to [facility] ED which resulted in Service Line Chiefs’ of Service involvement given the history/issues and ultimate transfer to [address]. Patient has been on [address] 1[address] since. UPDATE: [facility] declined to accept Patient. Hundreds of nursing home applications wee sent out and all declined to accept patient. Court scheduled on 12/19/17 was deferred as Patient refused to go; he then again refused to go to the new Court date. Continuing to search for placement which have been unsuccessful; family does not want him living with them.  Neurological facility in [place] has accepted patient, and legal guardian is involved in transfer issues. Court hearing regarding Patient's guardianship was 3/27/18 and his sister was granted temporary guardianship. Court hearing for retention occurred on 3/28/18 and the hospital was granted an additional 60 days. Court date for a "check in" was on 5/15 during which time it was reported that there was a potential nursing home which the Patient's sister went to and liked. Another Court date was on 5/29/18 during which time the [facility] withdrew acceptance and now Pt's sister still has to find him an apartment to live.  Next Court date was scheduled for 06/26/18 which Patient attended. He caused some ruckus at the Court after he refused to get back on the stretcher to be brought back the hospital, which he ultimately did after the  threatened him with [facility] time for contempt of Court.  Next court date is 7/26.  Patient has wound on/near gluteal fold. Wound care being done.

## 2018-07-11 NOTE — PROGRESS NOTE BEHAVIORAL HEALTH - NSBHADMITMEDEDUDETAILS_A_CORE FT
continue current management.  Psychoeducation again done re: need to allow wound care and to sleep in hospital bed to encourage wound healing with patient saying ok,"but I won't sleep in the hospital bed"

## 2018-07-11 NOTE — PROGRESS NOTE BEHAVIORAL HEALTH - OTHER
less paranoid today chronically impaired but much improved since admission Limited + neck favoring one side, dressed in hospital gowns, adequate grooming and hygiene no psychomotor agitation at times (chronic and seemingly intentionaly as he can stop it when redirected). appropriate, needs staff assist at times needs  staff assist at times chronically limited but better since admission  more able to respond to redirection festinating gait but stable, using walker reports "happy" at times, some anxiety re: wound care overall linear, at times episodes of being disorganized and circumstantial, concrete (chronic) but much better since initial admission

## 2018-07-11 NOTE — PROGRESS NOTE BEHAVIORAL HEALTH - NSBHFUPINTERVALHXFT_PSY_A_CORE
Chart reviewed; met with patient for an assessment. Case discussed in tx team meeting.  No significant interval events reported. Now allowing staff to do wound care. but is very anxious and controlling about it.  Patient was transferred to room with a hospital bed, so he can sleep in a position to help heal the wound, but reports he did not sleep in the hospital bed, despite staff teaching and encouragement about doing this to help heal his wound.  "I don't want to"   Patient is in behavioral control today, but has become more needy and frequently calls out for staff to do things for him that he is capable of doing.  Staff encourages patient to do the most for himself that he can do.   No Rx SE  are noted or reported. Requesting a lot of staff support today, which was provided.  Called PT to request Roho cushion, they report SW needs to contact [employer] to order cushion

## 2018-07-12 PROCEDURE — 99231 SBSQ HOSP IP/OBS SF/LOW 25: CPT

## 2018-07-12 RX ADMIN — Medication 325 MILLIGRAM(S): at 02:55

## 2018-07-12 RX ADMIN — CARBIDOPA AND LEVODOPA 1 TABLET(S): 25; 100 TABLET ORAL at 11:16

## 2018-07-12 RX ADMIN — Medication 325 MILLIGRAM(S): at 23:21

## 2018-07-12 RX ADMIN — CARBIDOPA AND LEVODOPA 1 TABLET(S): 25; 100 TABLET ORAL at 06:08

## 2018-07-12 RX ADMIN — CARBIDOPA, LEVODOPA, AND ENTACAPONE 1 TABLET(S): 50; 200; 200 TABLET, FILM COATED ORAL at 15:49

## 2018-07-12 RX ADMIN — Medication 325 MILLIGRAM(S): at 21:55

## 2018-07-12 RX ADMIN — CARBIDOPA AND LEVODOPA 1 TABLET(S): 25; 100 TABLET ORAL at 15:50

## 2018-07-12 RX ADMIN — CARBIDOPA, LEVODOPA, AND ENTACAPONE 1 TABLET(S): 50; 200; 200 TABLET, FILM COATED ORAL at 06:08

## 2018-07-12 RX ADMIN — CARBIDOPA AND LEVODOPA 1 TABLET(S): 25; 100 TABLET ORAL at 20:43

## 2018-07-12 RX ADMIN — Medication 325 MILLIGRAM(S): at 20:44

## 2018-07-12 RX ADMIN — CARBIDOPA, LEVODOPA, AND ENTACAPONE 1 TABLET(S): 50; 200; 200 TABLET, FILM COATED ORAL at 11:16

## 2018-07-12 NOTE — PROGRESS NOTE BEHAVIORAL HEALTH - SUMMARY
51 yo male with early onset Parkinson’s disease x 11 years, discharged from 4 prior nursing homes in 8 months due to his behaviors, hx of refusing medications,  transferred to [facility] Unit 2S on 6/12/17 where he manifested severe mood lability, paranoia and confabulation with poor insight and judgment including making > 40 Justice center complaints. 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 Patient was accepted to [facility] in [place] and discharged from [facility] on 7/12/17.  Upon getting to [facility], Patient refused to go into the facility and became combative. He was thus transferred back to [facility] ED which resulted in Service Line Chiefs’ of Service involvement given the history/issues and ultimate transfer to [address]. Patient has been on Unit 1N since. UPDATE: [facility] declined to accept Patient. Hundreds of nursing home applications wee sent out and all declined to accept patient. Court scheduled on 12/19/17 was deferred as Patient refused to go; he then again refused to go to the new Court date. Continuing to search for placement which have been unsuccessful; family does not want him living with them.  Neurological facility in [place] has accepted patient, and legal guardian is involved in transfer issues. Court hearing regarding Patient's guardianship was 3/27/18 and his sister was granted temporary guardianship. Court hearing for retention occurred on 3/28/18 and the hospital was granted an additional 60 days. Court date for a "check in" was on 5/15 during which time it was reported that there was a potential nursing home which the Patient's sister went to and liked. Another Court date was on 5/29/18 during which time the [facility] withdrew acceptance and now Pt's sister still has to find him an apartment to live.  Next Court date was scheduled for 06/26/18 which Patient attended. He caused some ruckus at the Court after he refused to get back on the stretcher to be brought back the hospital, which he ultimately did after the  threatened him with [facility] time for contempt of Court.  Next court date is 7/26.  Patient has wound on/near gluteal fold. Wound care being done.

## 2018-07-12 NOTE — PROGRESS NOTE BEHAVIORAL HEALTH - OTHER
festinating gait but stable, using walker reports "happy" at times, some anxiety re: wound care overall linear, at times episodes of being disorganized and circumstantial, concrete (chronic) but much better since initial admission less paranoid today chronically impaired but much improved since admission Limited + neck favoring one side, dressed in hospital gowns, adequate grooming and hygiene no psychomotor agitation at times (chronic and seemingly intentionaly as he can stop it when redirected). appropriate, needs staff assist at times needs  staff assist at times chronically limited but better since admission  more able to respond to redirection

## 2018-07-12 NOTE — PROGRESS NOTE BEHAVIORAL HEALTH - NSBHADMITMEDEDUDETAILS_A_CORE FT
continue current management.  Psychoeducation again done re: need to allow wound care and to sleep in hospital bed to encourage wound healing with patient saying ok.

## 2018-07-12 NOTE — PROGRESS NOTE BEHAVIORAL HEALTH - NSBHFUPINTERVALHXFT_PSY_A_CORE
Chart reviewed; met with patient for an assessment. Case discussed in tx team meeting.  No significant interval events reported. Now allowing staff to do wound care. at times,  but is very anxious and controlling about it.  Patient was transferred to room with a hospital bed, so he can sleep in a position to help heal the wound, and reports he did sleep in the hospital bed, last night.   Patient is in behavioral control today, has episodes of rude behavior to peers and has become more needy and frequently calls out for staff to do things for him that he is capable of doing.  Staff encourages patient to do the most for himself that he can do.   No Rx SE  are noted or reported. Requesting a lot of staff support today, which was provided.  Leana montalvo requested from Home Care, and they are following up with this.  Denies SI or HI.

## 2018-07-13 PROCEDURE — 99231 SBSQ HOSP IP/OBS SF/LOW 25: CPT

## 2018-07-13 RX ADMIN — CARBIDOPA AND LEVODOPA 1 TABLET(S): 25; 100 TABLET ORAL at 19:54

## 2018-07-13 RX ADMIN — Medication 400 MILLIGRAM(S): at 22:43

## 2018-07-13 RX ADMIN — CARBIDOPA, LEVODOPA, AND ENTACAPONE 1 TABLET(S): 50; 200; 200 TABLET, FILM COATED ORAL at 06:15

## 2018-07-13 RX ADMIN — CARBIDOPA AND LEVODOPA 1 TABLET(S): 25; 100 TABLET ORAL at 06:15

## 2018-07-13 RX ADMIN — CARBIDOPA, LEVODOPA, AND ENTACAPONE 1 TABLET(S): 50; 200; 200 TABLET, FILM COATED ORAL at 16:33

## 2018-07-13 RX ADMIN — CARBIDOPA, LEVODOPA, AND ENTACAPONE 1 TABLET(S): 50; 200; 200 TABLET, FILM COATED ORAL at 10:44

## 2018-07-13 RX ADMIN — CARBIDOPA AND LEVODOPA 1 TABLET(S): 25; 100 TABLET ORAL at 16:33

## 2018-07-13 RX ADMIN — CARBIDOPA AND LEVODOPA 1 TABLET(S): 25; 100 TABLET ORAL at 10:44

## 2018-07-13 NOTE — PROGRESS NOTE BEHAVIORAL HEALTH - SUMMARY
51 yo male with early onset Parkinson’s disease x 11 years, discharged from 4 prior nursing homes in 8 months due to his behaviors, hx of refusing medications,  transferred to [facility] Unit [facility]S on 6/12/17 where he manifested severe mood lability, paranoia and confabulation with poor insight and judgment including making > 40 Justice center complaints. 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 Patient was accepted to [facility] in [place] and discharged from [facility] on 7/12/17.  Upon getting to [facility], Patient refused to go into the facility and became combative. He was thus transferred back to [facility] ED which resulted in Service Line Chiefs’ of Service involvement given the history/issues and ultimate transfer to [address]. Patient has been on [address] 1[address] since. UPDATE: [facility] declined to accept Patient. Hundreds of nursing home applications wee sent out and all declined to accept patient. Court scheduled on 12/19/17 was deferred as Patient refused to go; he then again refused to go to the new Court date. Continuing to search for placement which have been unsuccessful; family does not want him living with them.  Neurological facility in [place] has accepted patient, and legal guardian is involved in transfer issues. Court hearing regarding Patient's guardianship was 3/27/18 and his sister was granted temporary guardianship. Court hearing for retention occurred on 3/28/18 and the hospital was granted an additional 60 days. Court date for a "check in" was on 5/15 during which time it was reported that there was a potential nursing home which the Patient's sister went to and liked. Another Court date was on 5/29/18 during which time the [facility] withdrew acceptance and now Pt's sister still has to find him an apartment to live.  Next Court date was scheduled for 06/26/18 which Patient attended. He caused some ruckus at the Court after he refused to get back on the stretcher to be brought back the hospital, which he ultimately did after the  threatened him with care home time for contempt of Court. Since that time, there have been no apparent reported move on his sister's part regarding updating the Team if she has found any appropriate places for Patient to go to.

## 2018-07-13 NOTE — CHART NOTE - NSCHARTNOTEFT_GEN_A_CORE
Psych NP Note:       Although patient had refused to allow the Wound Care Nurse, Sasha, to see his wound, she had recommended a roho cushion for the patient, to encourage healing and prevent further skin problems.   Home Care Nurse, Rebecca, had attempted to locate this cushion, but was unable to do so, as it is not formulary.  Suggestion is for patient's sister to purchase roho cushion, which can be purchased on [employer].  This was discussed with the patient, he reports he does not want to spend money on the cushion.  Patient currently has a gel cushion.  Per patient's RN, Toribio Dooley, the area is almost completely healed.   Cady Grossman NPP

## 2018-07-13 NOTE — PROGRESS NOTE BEHAVIORAL HEALTH - NSBHADMITMEDEDUDETAILS_A_CORE FT
continue current management. continue current management. Repeat basic lab work ordered for Monday as last one were late May.

## 2018-07-13 NOTE — PROGRESS NOTE BEHAVIORAL HEALTH - NSBHFUPINTERVALHXFT_PSY_A_CORE
Same clinical presentation; no major incidents or events. Patient is staying more in the community room this week and has been calm, cooperative. Patient denies adverse medication side effects. Still resistant to follow recommendations such as attend exercise group, do some stretching himself for his neck and shoulders, elevating his legs, wearing compression stockings etc. (chronic resistance) Same clinical presentation; no major incidents or events. Patient is staying more in the community room this week and has been calm, cooperative. Patient denies adverse medication side effects. Still resistant to follow recommendations such as attend exercise group, do some stretching himself for his neck and shoulders, elevating his legs, wearing compression stockings etc. (chronic resistance). Repeat basic lab work ordered for Monday as last one were late May.

## 2018-07-14 RX ADMIN — CARBIDOPA AND LEVODOPA 1 TABLET(S): 25; 100 TABLET ORAL at 20:12

## 2018-07-14 RX ADMIN — CARBIDOPA AND LEVODOPA 1 TABLET(S): 25; 100 TABLET ORAL at 11:02

## 2018-07-14 RX ADMIN — CARBIDOPA, LEVODOPA, AND ENTACAPONE 1 TABLET(S): 50; 200; 200 TABLET, FILM COATED ORAL at 06:15

## 2018-07-14 RX ADMIN — CARBIDOPA, LEVODOPA, AND ENTACAPONE 1 TABLET(S): 50; 200; 200 TABLET, FILM COATED ORAL at 11:02

## 2018-07-14 RX ADMIN — CARBIDOPA AND LEVODOPA 1 TABLET(S): 25; 100 TABLET ORAL at 06:16

## 2018-07-14 RX ADMIN — CARBIDOPA AND LEVODOPA 1 TABLET(S): 25; 100 TABLET ORAL at 16:34

## 2018-07-14 RX ADMIN — CARBIDOPA, LEVODOPA, AND ENTACAPONE 1 TABLET(S): 50; 200; 200 TABLET, FILM COATED ORAL at 16:34

## 2018-07-14 NOTE — PROGRESS NOTE BEHAVIORAL HEALTH - NSBHFUPINTERVALHXFT_PSY_A_CORE
Date of service: 



07/14/2018



PROCEDURE:  CT Abdomen and Pelvis with intravenous contrast



HISTORY:

abdominal trauma



COMPARISON:

None.



TECHNIQUE:

Multiple contiguous axial images were performed through the abdomen 

and pelvis with the use of intravenous contrast.  Subsequently, 

sagittal and coronal reformatted images were obtained. 



Radiation dose:



Total exam DLP = 216 mGy-cm.



This CT exam was performed using one or more of the following dose 

reduction techniques: Automated exposure control, adjustment of the 

mA and/or kV according to patient size, and/or use of iterative 

reconstruction technique.



FINDINGS:



LOWER THORAX:

3 millimeter subpleural nodule within the posterior aspect of the 

left lower lobe. 



LIVER:

Unremarkable. No gross lesion or ductal dilatation.  



GALLBLADDER AND BILE DUCTS:

Unremarkable. 



PANCREAS:

Unremarkable. No gross lesion or ductal dilatation.



SPLEEN:

Unremarkable. 



ADRENALS:

Unremarkable. No mass. 



KIDNEYS AND URETERS:

Unremarkable. No hydronephrosis. No solid mass. 



VASCULATURE:

Unremarkable. No aortic aneurysm. 



BOWEL:

Unremarkable. No obstruction. No gross mural thickening. 



APPENDIX:

No evidence for appendicitis. 



PERITONEUM:

Unremarkable. No free fluid. No free air. 



LYMPH NODES:

Unremarkable. No enlarged lymph nodes. 



BLADDER:

Unremarkable. 



REPRODUCTIVE:

Unremarkable. 



BONES:

Tiny chip fracture involving the anterolateral margin of the left 

iliac crest adjacent to the focus of the soft tissue injury. 



Small focus of soft tissue injury involving the anterolateral 

superficial soft tissues of the left lower quadrant along the margin 

of the left iliac crest. 



OTHER FINDINGS:

None.



IMPRESSION:

Tiny chip fracture involving the anterolateral margin of the left 

iliac crest adjacent to the focus of the soft tissue injury.  Small 

focus of soft tissue injury involving the anterolateral superficial 

soft tissues of the left lower quadrant along the margin of the left 

iliac crest. 



3 millimeter subpleural nodule within the posterior aspect of the 

left lower lobe. Three month interval followup may be helpful if 

clinically indicated. 



These findings were preliminarily reported at 5:41 a.m. on 07/14/2018 

by Dr. Paul Albarado from [employer]. Pt continues to be paranoid, and target some staff.  He was relatively calmer but focuses on discharge. He wants to see his daughter and get a haircut Pt is in variable behavioral control during different times of the day, variable compliance with staff direction.  taking Clozaril, and takes it with much staff encouragement. We discussed good behaviors will help him to work towards discharge and placements but didn't want to hear about.

## 2018-07-15 RX ADMIN — CARBIDOPA AND LEVODOPA 1 TABLET(S): 25; 100 TABLET ORAL at 16:41

## 2018-07-15 RX ADMIN — CARBIDOPA AND LEVODOPA 1 TABLET(S): 25; 100 TABLET ORAL at 10:55

## 2018-07-15 RX ADMIN — CARBIDOPA AND LEVODOPA 1 TABLET(S): 25; 100 TABLET ORAL at 22:12

## 2018-07-15 RX ADMIN — CARBIDOPA, LEVODOPA, AND ENTACAPONE 1 TABLET(S): 50; 200; 200 TABLET, FILM COATED ORAL at 10:55

## 2018-07-15 RX ADMIN — CARBIDOPA, LEVODOPA, AND ENTACAPONE 1 TABLET(S): 50; 200; 200 TABLET, FILM COATED ORAL at 16:41

## 2018-07-15 RX ADMIN — CARBIDOPA, LEVODOPA, AND ENTACAPONE 1 TABLET(S): 50; 200; 200 TABLET, FILM COATED ORAL at 06:28

## 2018-07-15 RX ADMIN — CARBIDOPA AND LEVODOPA 1 TABLET(S): 25; 100 TABLET ORAL at 06:28

## 2018-07-15 RX ADMIN — Medication 325 MILLIGRAM(S): at 01:17

## 2018-07-16 PROCEDURE — 99231 SBSQ HOSP IP/OBS SF/LOW 25: CPT

## 2018-07-16 RX ADMIN — Medication 325 MILLIGRAM(S): at 14:20

## 2018-07-16 RX ADMIN — CARBIDOPA AND LEVODOPA 1 TABLET(S): 25; 100 TABLET ORAL at 06:24

## 2018-07-16 RX ADMIN — CARBIDOPA AND LEVODOPA 1 TABLET(S): 25; 100 TABLET ORAL at 15:45

## 2018-07-16 RX ADMIN — CARBIDOPA AND LEVODOPA 1 TABLET(S): 25; 100 TABLET ORAL at 22:03

## 2018-07-16 RX ADMIN — CARBIDOPA, LEVODOPA, AND ENTACAPONE 1 TABLET(S): 50; 200; 200 TABLET, FILM COATED ORAL at 15:45

## 2018-07-16 RX ADMIN — CARBIDOPA, LEVODOPA, AND ENTACAPONE 1 TABLET(S): 50; 200; 200 TABLET, FILM COATED ORAL at 10:38

## 2018-07-16 RX ADMIN — Medication 325 MILLIGRAM(S): at 13:23

## 2018-07-16 RX ADMIN — CARBIDOPA, LEVODOPA, AND ENTACAPONE 1 TABLET(S): 50; 200; 200 TABLET, FILM COATED ORAL at 06:24

## 2018-07-16 RX ADMIN — CARBIDOPA AND LEVODOPA 1 TABLET(S): 25; 100 TABLET ORAL at 10:38

## 2018-07-16 NOTE — PROGRESS NOTE BEHAVIORAL HEALTH - NSBHADMITMEDEDUDETAILS_A_CORE FT
continue current management. Repeat basic lab work ordered for Monday as last one were late May.  Psychoeducation done re; need for wound care and for routine labs for safety and good health with patient saying "I don't care.  I don't want it"

## 2018-07-16 NOTE — PROGRESS NOTE BEHAVIORAL HEALTH - DETAILS
chronic neck and back pain, which is relieved by ice packs at times possible decubitus in gluteal fold--patient will not allow staff to view site or do wound tx

## 2018-07-16 NOTE — PROGRESS NOTE BEHAVIORAL HEALTH - NSBHFUPINTERVALHXFT_PSY_A_CORE
Met with patient and reviewed chart.  No significant interval events are reported except tx refusals.  Case discussed in tx team meeting. Same clinical presentation; no major incidents or events. Patient is staying more in the community room this week and has been calm, cooperative. Patient denies adverse medication side effects. Still resistant to follow recommendations such as attend exercise group, do some stretching himself for his neck and shoulders, elevating his legs, wearing compression stockings etc. (chronic resistance). Repeat basic lab work ordered for Monday as last one were late May.  Patient again refused lab work despite staff encouragement and teaching.  Also continues to refuse wound care despite staff encouragement and teaching.  Denies SI or HI.  No Rx SE are noted or reported

## 2018-07-16 NOTE — PROGRESS NOTE BEHAVIORAL HEALTH - SUMMARY
53 yo male with early onset Parkinson’s disease x 11 years, discharged from 4 prior nursing homes in 8 months due to his behaviors, hx of refusing medications,  transferred to [facility] [address] on 6/12/17 where he manifested severe mood lability, paranoia and confabulation with poor insight and judgment including making > 40 [facility] complaints. 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 Patient was accepted to [facility] in [place] and discharged from [facility] on 7/12/17.  Upon getting to [facility], Patient refused to go into the facility and became combative. He was thus transferred back to [facility] ED which resulted in Service Line Chiefs’ of Service involvement given the history/issues and ultimate transfer to [address]. Patient has been on [address] since. UPDATE: [facility] declined to accept Patient. Hundreds of nursing home applications wee sent out and all declined to accept patient. Court scheduled on 12/19/17 was deferred as Patient refused to go; he then again refused to go to the new Court date. Continuing to search for placement which have been unsuccessful; family does not want him living with them.  Neurological facility in [place] has accepted patient, and legal guardian is involved in transfer issues. Court hearing regarding Patient's guardianship was 3/27/18 and his sister was granted temporary guardianship. Court hearing for retention occurred on 3/28/18 and the hospital was granted an additional 60 days. Court date for a "check in" was on 5/15 during which time it was reported that there was a potential nursing home which the Patient's sister went to and liked. Another Court date was on 5/29/18 during which time the [facility] withdrew acceptance and now Pt's sister still has to find him an apartment to live.  Next Court date was scheduled for 06/26/18 which Patient attended. He caused some ruckus at the Court after he refused to get back on the stretcher to be brought back the hospital, which he ultimately did after the  threatened him with prison time for contempt of Court. Since that time, there have been no apparent reported move on his sister's part regarding updating the Team if she has found any appropriate places for Patient to go to.  Patient currently refusing labs and wound care

## 2018-07-16 NOTE — PROGRESS NOTE BEHAVIORAL HEALTH - OTHER
+ neck favoring one side, dressed in hospital gowns, adequate grooming and hygiene no psychomotor agitation at times (chronic and seemingly intentionaly as he can stop it when redirected). appropriate, needs staff assist at times needs  staff assist at times except refusing lab work and wound care chronically limited but better since admission  more able to respond to redirection festinating gait but stable, using walker reports "happy" overall linear, at times episodes of being disorganized and circumstantial, concrete (chronic) but much better since initial admission less paranoid today chronically impaired but much improved since admission Limited

## 2018-07-17 PROCEDURE — 99231 SBSQ HOSP IP/OBS SF/LOW 25: CPT

## 2018-07-17 RX ADMIN — Medication 325 MILLIGRAM(S): at 14:00

## 2018-07-17 RX ADMIN — CARBIDOPA, LEVODOPA, AND ENTACAPONE 1 TABLET(S): 50; 200; 200 TABLET, FILM COATED ORAL at 06:48

## 2018-07-17 RX ADMIN — CARBIDOPA AND LEVODOPA 1 TABLET(S): 25; 100 TABLET ORAL at 06:48

## 2018-07-17 RX ADMIN — Medication 325 MILLIGRAM(S): at 21:00

## 2018-07-17 RX ADMIN — CARBIDOPA, LEVODOPA, AND ENTACAPONE 1 TABLET(S): 50; 200; 200 TABLET, FILM COATED ORAL at 10:27

## 2018-07-17 RX ADMIN — CARBIDOPA AND LEVODOPA 1 TABLET(S): 25; 100 TABLET ORAL at 19:43

## 2018-07-17 RX ADMIN — Medication 325 MILLIGRAM(S): at 14:03

## 2018-07-17 RX ADMIN — CARBIDOPA AND LEVODOPA 1 TABLET(S): 25; 100 TABLET ORAL at 16:09

## 2018-07-17 RX ADMIN — CARBIDOPA, LEVODOPA, AND ENTACAPONE 1 TABLET(S): 50; 200; 200 TABLET, FILM COATED ORAL at 16:09

## 2018-07-17 RX ADMIN — CARBIDOPA AND LEVODOPA 1 TABLET(S): 25; 100 TABLET ORAL at 10:27

## 2018-07-17 RX ADMIN — Medication 325 MILLIGRAM(S): at 20:25

## 2018-07-17 NOTE — PROGRESS NOTE BEHAVIORAL HEALTH - OTHER
+ neck favoring one side, dressed in hospital gowns, adequate grooming and hygiene no psychomotor agitation at times (chronic and seemingly intentionaly as he can stop it when redirected). appropriate, needs staff assist at times needs  staff assist at times refusing lab work and wound care, minimal participation in milieu chronically limited but better since admission  more able to respond to redirection festinating gait but stable, using walker reports "sad" overall linear, at times episodes of being disorganized and circumstantial, concrete (chronic) but much better since initial admission less paranoid today chronically impaired but much improved since admission Limited

## 2018-07-17 NOTE — PROGRESS NOTE BEHAVIORAL HEALTH - SUMMARY
53 yo male with early onset Parkinson’s disease x 11 years, discharged from 4 prior nursing homes in 8 months due to his behaviors, hx of refusing medications,  transferred to [facility] [address] on 6/12/17 where he manifested severe mood lability, paranoia and confabulation with poor insight and judgment including making > 40 [facility] complaints. 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 Patient was accepted to [facility] in [place] and discharged from [facility] on 7/12/17.  Upon getting to [facility], Patient refused to go into the facility and became combative. He was thus transferred back to [facility] ED which resulted in Service Line Chiefs’ of Service involvement given the history/issues and ultimate transfer to [address]. Patient has been on [address] since. UPDATE: [facility] declined to accept Patient. Hundreds of nursing home applications wee sent out and all declined to accept patient. Court scheduled on 12/19/17 was deferred as Patient refused to go; he then again refused to go to the new Court date. Continuing to search for placement which have been unsuccessful; family does not want him living with them.  Neurological facility in [place] has accepted patient, and legal guardian is involved in transfer issues. Court hearing regarding Patient's guardianship was 3/27/18 and his sister was granted temporary guardianship. Court hearing for retention occurred on 3/28/18 and the hospital was granted an additional 60 days. Court date for a "check in" was on 5/15 during which time it was reported that there was a potential nursing home which the Patient's sister went to and liked. Another Court date was on 5/29/18 during which time the [facility] withdrew acceptance and now Pt's sister still has to find him an apartment to live.  Next Court date was scheduled for 06/26/18 which Patient attended. He caused some ruckus at the Court after he refused to get back on the stretcher to be brought back the hospital, which he ultimately did after the  threatened him with penitentiary time for contempt of Court. Since that time, there have been no apparent reported move on his sister's part regarding updating the Team if she has found any appropriate places for Patient to go to.  Patient currently refusing labs and wound care

## 2018-07-17 NOTE — PROGRESS NOTE BEHAVIORAL HEALTH - NSBHADMITMEDEDUDETAILS_A_CORE FT
continue current management. Repeat basic lab work ordered for Monday as last one were late May.  Psychoeducation done re; need for wound care and for routine labs for safety and good health with patient saying "I don't care.  I don't want it" again today

## 2018-07-17 NOTE — PROGRESS NOTE BEHAVIORAL HEALTH - NSBHFUPINTERVALHXFT_PSY_A_CORE
Met with patient and reviewed chart.  No significant interval events are reported except tx refusals.  Case discussed in tx team meeting. Same clinical presentation; no major incidents or events. Patient is staying more in the community room this week and has been calm, cooperative. Patient denies adverse medication side effects. Still resistant to follow recommendations such as attend exercise group, do some stretching himself for his neck and shoulders, elevating his legs, wearing compression stockings etc. (chronic resistance). Repeat basic lab work ordered for Monday as last one were late May.  Patient again refused lab work despite staff encouragement and teaching.  Also continues to refuse wound care despite staff encouragement and teaching.  Denies SI or HI.  No Rx SE are noted or reported.  Patient reports he feels sad today, and is not as verbal as usual

## 2018-07-17 NOTE — PROGRESS NOTE BEHAVIORAL HEALTH - NSBHCHARTREVIEWLAB_PSY_A_CORE FT
labs this week unremarkable
WBC/ ANC within normal limits
WBC, ANC within normal limits
WBC, ANC within normal limits this week from 4/11
WBC/ANC within normal limits today
WBC, ANC within normal limits
WBC, ANC within normal limits
WBC, ANC within normal limits this week from 4/11
WBC, ANC within normal limits
WBC/ ANC within normal limits
WBC, ANC within normal limits this week from 4/25
WBC/ ANC within normal limits
WBC/ ANC within normal limits
WBC, ANC within normal limits
CBC and CMP ordered but patient refused labs to be drawn despite staff teaching and encouragement
WBC, ANC within normal limits
WBC/ANC = 4.7/2.6
CBC and CMP ordered.
WBC, ANC within normal limits
WBC, ANC within normal limits this week from 4/11
WBC/ ANC within normal limits
WBC/ ANC within normal limits
WBC/ANC within normal limits today
CBC and CMP ordered but patient refused labs to be drawn despite staff teaching and encouragement
CBC ordered, as patient has not had a CBC since 5/25
CBC with diff and BMP within normal limits this morning
WBC, ANC within normal limits on 4/11
WBC, ANC within normal limits this week from 4/11
WBC, ANC within normal limits this week from 4/25
WBC, ANC within normal limits this week from 4/25  for next CBC on 5/9
WBC, ANC within normal limits this week from 4/25  for next CBC on 5/9
WBC/ ANC within normal limits
WBC/ANC within normal limits today
WBC/ANC within normal limits today  HgbA1C of 12/17 is 5.3
WBC/ ANC within normal limits
WBC/ANC within normal limits today
refused labs ordered on 7/13
CBC and CMP ordered for 6/26
WBC, ANC within normal limits
WBC/ ANC within normal limits
WBC, ANC within normal limits this week from 4/11
WBC/ ANC within normal limits
WBC/ ANC within normal limits
refused labs ordered on 7/13
WBC, ANC within normal limits this week from 4/25
WBC/ ANC within normal limits
CBC ordered, as patient has not had a CBC since 5/25
Patient refused CBC with diff on 5/9   Ordered for 5/10, but patient again refused.  As patient is also currently refusing Clozapine, will wait to reorder
CBC and CMP ordered but patient refused labs to be drawn despite staff teaching and encouragement
WBC/ ANC within normal limits
WBC/ ANC within normal limits
WBC, ANC within normal limits this week from 4/11
WBC/ ANC within normal limits
WBC/ANC within normal limits today
CBC and CMP ordered but patient refused labs to be drawn despite staff teaching and encouragement
WBC, ANC within normal limits
WBC/ ANC within normal limits
WBC/ ANC within normal limits
WBC, ANC within normal limits
WBC/ ANC within normal limits
WBC, ANC within normal limits
CBC and CMP ordered but patient refused labs to be drawn despite staff teaching and encouragement
WBC, ANC within normal limits this week from 4/11
CBC and CMP ordered but patient refused labs to be drawn despite staff teaching and encouragement
WBC, ANC within normal limits
WBC, ANC within normal limits
WBC, ANC within normal limits this week from 4/25
WBC/ANC within normal limits today
WBC/ ANC within normal limits
WBC/ ANC within normal limits
Patient refused CBC with diff on 5/9   Ordered for 5/10
WBC=5.0   Neutrophils %=60.9 on 12/13  ANC=3,045

## 2018-07-18 PROCEDURE — 99231 SBSQ HOSP IP/OBS SF/LOW 25: CPT

## 2018-07-18 RX ADMIN — CARBIDOPA AND LEVODOPA 1 TABLET(S): 25; 100 TABLET ORAL at 20:00

## 2018-07-18 RX ADMIN — CARBIDOPA, LEVODOPA, AND ENTACAPONE 1 TABLET(S): 50; 200; 200 TABLET, FILM COATED ORAL at 10:06

## 2018-07-18 RX ADMIN — CARBIDOPA AND LEVODOPA 1 TABLET(S): 25; 100 TABLET ORAL at 06:55

## 2018-07-18 RX ADMIN — CARBIDOPA AND LEVODOPA 1 TABLET(S): 25; 100 TABLET ORAL at 10:06

## 2018-07-18 RX ADMIN — CARBIDOPA AND LEVODOPA 1 TABLET(S): 25; 100 TABLET ORAL at 20:02

## 2018-07-18 RX ADMIN — CYCLOBENZAPRINE HYDROCHLORIDE 10 MILLIGRAM(S): 10 TABLET, FILM COATED ORAL at 20:00

## 2018-07-18 RX ADMIN — CARBIDOPA AND LEVODOPA 1 TABLET(S): 25; 100 TABLET ORAL at 16:02

## 2018-07-18 RX ADMIN — Medication 325 MILLIGRAM(S): at 15:17

## 2018-07-18 RX ADMIN — CARBIDOPA, LEVODOPA, AND ENTACAPONE 1 TABLET(S): 50; 200; 200 TABLET, FILM COATED ORAL at 16:02

## 2018-07-18 RX ADMIN — CARBIDOPA, LEVODOPA, AND ENTACAPONE 1 TABLET(S): 50; 200; 200 TABLET, FILM COATED ORAL at 06:55

## 2018-07-18 RX ADMIN — Medication 325 MILLIGRAM(S): at 16:04

## 2018-07-18 NOTE — PROGRESS NOTE BEHAVIORAL HEALTH - OTHER
Limited festinating gait but stable, using walker reports "sad" overall linear, at times episodes of being disorganized and circumstantial, concrete (chronic) but much better since initial admission less paranoid today chronically impaired but much improved since admission + neck favoring one side, dressed in hospital gowns, adequate grooming and hygiene no psychomotor agitation at times (chronic and seemingly intentionaly as he can stop it when redirected). appropriate, needs staff assist at times needs  staff assist at times refusing lab work and wound care, minimal participation in milieu chronically limited but better since admission  more able to respond to redirection

## 2018-07-18 NOTE — PROGRESS NOTE BEHAVIORAL HEALTH - NSBHFUPINTERVALHXFT_PSY_A_CORE
Met with patient and reviewed chart.  No significant interval events are reported except tx refusals.  Case discussed in tx team meeting. Same clinical presentation; no major incidents or events. Patient has been calm, cooperative but is verbally innappropriate with some staff. Patient denies adverse medication side effects. Still resistant to follow recommendations such as attend exercise group, do some stretching himself for his neck and shoulders, elevating his legs, wearing compression stockings etc. (chronic resistance). He continues to refuse wound care despite staff encouragement and teaching. I discussed the merits of allowing wound care and he did not refuse or argue about it but it appeared that he would take it into consideration.  Denies SI or HI.  No Rx SE are noted or reported.  Patient is not feeling sad today and appears to be at his baseline. He is awaiting discharge pending placement.

## 2018-07-18 NOTE — PROGRESS NOTE BEHAVIORAL HEALTH - SUMMARY
53 yo male with early onset Parkinson’s disease x 11 years, discharged from 4 prior nursing homes in 8 months due to his behaviors, hx of refusing medications,  transferred to [facility] [address] on 6/12/17 where he manifested severe mood lability, paranoia and confabulation with poor insight and judgment including making > 40 [facility] complaints. 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 Patient was accepted to [facility] in [place] and discharged from [facility] on 7/12/17.  Upon getting to [facility], Patient refused to go into the facility and became combative. He was thus transferred back to [facility] ED which resulted in Service Line Chiefs’ of Service involvement given the history/issues and ultimate transfer to [address]. Patient has been on [address] since. UPDATE: [facility] declined to accept Patient. Hundreds of nursing home applications wee sent out and all declined to accept patient. Court scheduled on 12/19/17 was deferred as Patient refused to go; he then again refused to go to the new Court date. Continuing to search for placement which have been unsuccessful; family does not want him living with them.  Neurological facility in [place] has accepted patient, and legal guardian is involved in transfer issues. Court hearing regarding Patient's guardianship was 3/27/18 and his sister was granted temporary guardianship. Court hearing for retention occurred on 3/28/18 and the hospital was granted an additional 60 days. Court date for a "check in" was on 5/15 during which time it was reported that there was a potential nursing home which the Patient's sister went to and liked. Another Court date was on 5/29/18 during which time the [facility] withdrew acceptance and now Pt's sister still has to find him an apartment to live.  Next Court date was scheduled for 06/26/18 which Patient attended. He caused some ruckus at the Court after he refused to get back on the stretcher to be brought back the hospital, which he ultimately did after the  threatened him with shelter time for contempt of Court. Since that time, there have been no apparent reported move on his sister's part regarding updating the Team if she has found any appropriate places for Patient to go to.  Patient currently refusing labs and wound care. Psychiatrically at his baseline awaiting placement.

## 2018-07-19 LAB
ALBUMIN SERPL ELPH-MCNC: 3.6 G/DL — SIGNIFICANT CHANGE UP (ref 3.3–5)
ALP SERPL-CCNC: 96 U/L — SIGNIFICANT CHANGE UP (ref 30–120)
ALT FLD-CCNC: 13 U/L DA — SIGNIFICANT CHANGE UP (ref 10–60)
ANION GAP SERPL CALC-SCNC: 3 MMOL/L — LOW (ref 5–17)
AST SERPL-CCNC: 15 U/L — SIGNIFICANT CHANGE UP (ref 10–40)
BASOPHILS # BLD AUTO: 0.02 K/UL — SIGNIFICANT CHANGE UP (ref 0–0.2)
BASOPHILS NFR BLD AUTO: 0.3 % — SIGNIFICANT CHANGE UP (ref 0–2)
BILIRUB SERPL-MCNC: 0.4 MG/DL — SIGNIFICANT CHANGE UP (ref 0.2–1.2)
BUN SERPL-MCNC: 19 MG/DL — SIGNIFICANT CHANGE UP (ref 7–23)
CALCIUM SERPL-MCNC: 8.6 MG/DL — SIGNIFICANT CHANGE UP (ref 8.4–10.5)
CHLORIDE SERPL-SCNC: 103 MMOL/L — SIGNIFICANT CHANGE UP (ref 96–108)
CO2 SERPL-SCNC: 31 MMOL/L — SIGNIFICANT CHANGE UP (ref 22–31)
CREAT SERPL-MCNC: 0.99 MG/DL — SIGNIFICANT CHANGE UP (ref 0.5–1.3)
EOSINOPHIL # BLD AUTO: 0.1 K/UL — SIGNIFICANT CHANGE UP (ref 0–0.5)
EOSINOPHIL NFR BLD AUTO: 1.6 % — SIGNIFICANT CHANGE UP (ref 0–6)
GLUCOSE SERPL-MCNC: 99 MG/DL — SIGNIFICANT CHANGE UP (ref 70–99)
HCT VFR BLD CALC: 37.8 % — LOW (ref 39–50)
HGB BLD-MCNC: 12.3 G/DL — LOW (ref 13–17)
IMM GRANULOCYTES NFR BLD AUTO: 0.3 % — SIGNIFICANT CHANGE UP (ref 0–1.5)
LYMPHOCYTES # BLD AUTO: 1.68 K/UL — SIGNIFICANT CHANGE UP (ref 1–3.3)
LYMPHOCYTES # BLD AUTO: 26.2 % — SIGNIFICANT CHANGE UP (ref 13–44)
MCHC RBC-ENTMCNC: 30.4 PG — SIGNIFICANT CHANGE UP (ref 27–34)
MCHC RBC-ENTMCNC: 32.5 GM/DL — SIGNIFICANT CHANGE UP (ref 32–36)
MCV RBC AUTO: 93.6 FL — SIGNIFICANT CHANGE UP (ref 80–100)
MONOCYTES # BLD AUTO: 0.53 K/UL — SIGNIFICANT CHANGE UP (ref 0–0.9)
MONOCYTES NFR BLD AUTO: 8.3 % — SIGNIFICANT CHANGE UP (ref 2–14)
NEUTROPHILS # BLD AUTO: 4.07 K/UL — SIGNIFICANT CHANGE UP (ref 1.8–7.4)
NEUTROPHILS NFR BLD AUTO: 63.3 % — SIGNIFICANT CHANGE UP (ref 43–77)
PLATELET # BLD AUTO: 210 K/UL — SIGNIFICANT CHANGE UP (ref 150–400)
POTASSIUM SERPL-MCNC: 4 MMOL/L — SIGNIFICANT CHANGE UP (ref 3.5–5.3)
POTASSIUM SERPL-SCNC: 4 MMOL/L — SIGNIFICANT CHANGE UP (ref 3.5–5.3)
PROT SERPL-MCNC: 6.4 G/DL — SIGNIFICANT CHANGE UP (ref 6–8.3)
RBC # BLD: 4.04 M/UL — LOW (ref 4.2–5.8)
RBC # FLD: 13.3 % — SIGNIFICANT CHANGE UP (ref 10.3–14.5)
SODIUM SERPL-SCNC: 137 MMOL/L — SIGNIFICANT CHANGE UP (ref 135–145)
WBC # BLD: 6.42 K/UL — SIGNIFICANT CHANGE UP (ref 3.8–10.5)
WBC # FLD AUTO: 6.42 K/UL — SIGNIFICANT CHANGE UP (ref 3.8–10.5)

## 2018-07-19 PROCEDURE — 12345: CPT

## 2018-07-19 RX ADMIN — CARBIDOPA AND LEVODOPA 1 TABLET(S): 25; 100 TABLET ORAL at 10:02

## 2018-07-19 RX ADMIN — Medication 325 MILLIGRAM(S): at 21:39

## 2018-07-19 RX ADMIN — CARBIDOPA AND LEVODOPA 1 TABLET(S): 25; 100 TABLET ORAL at 19:53

## 2018-07-19 RX ADMIN — CARBIDOPA, LEVODOPA, AND ENTACAPONE 1 TABLET(S): 50; 200; 200 TABLET, FILM COATED ORAL at 06:48

## 2018-07-19 RX ADMIN — CARBIDOPA, LEVODOPA, AND ENTACAPONE 1 TABLET(S): 50; 200; 200 TABLET, FILM COATED ORAL at 10:02

## 2018-07-19 RX ADMIN — CARBIDOPA, LEVODOPA, AND ENTACAPONE 1 TABLET(S): 50; 200; 200 TABLET, FILM COATED ORAL at 16:00

## 2018-07-19 RX ADMIN — Medication 325 MILLIGRAM(S): at 12:41

## 2018-07-19 RX ADMIN — CARBIDOPA AND LEVODOPA 1 TABLET(S): 25; 100 TABLET ORAL at 16:00

## 2018-07-19 RX ADMIN — CARBIDOPA AND LEVODOPA 1 TABLET(S): 25; 100 TABLET ORAL at 06:48

## 2018-07-19 RX ADMIN — Medication 325 MILLIGRAM(S): at 11:53

## 2018-07-19 RX ADMIN — Medication 325 MILLIGRAM(S): at 21:33

## 2018-07-19 NOTE — PROGRESS NOTE BEHAVIORAL HEALTH - NSBHFUPINTERVALHXFT_PSY_A_CORE
Met with patient and reviewed chart.  No significant interval events are reported except some tx refusals. He had some minor neck pain. Case discussed in tx team meeting. Same clinical presentation; no major incidents or events. Patient has been calm, cooperative but is verbally inappropriate with some staff at times. Patient denies adverse medication side effects. Still resistant to follow recommendations such as attend exercise group, do some stretching himself for his neck and shoulders, elevating his legs, wearing compression stockings etc. (chronic resistance). He continues to refuse wound care despite staff encouragement and teaching. I discussed the merits of allowing wound care and he did not refuse or argue about it but it appeared that he would take it into consideration.  Denies SI or HI.  No Rx SE are noted or reported.  Patient is at his baseline. He is awaiting discharge pending placement.

## 2018-07-19 NOTE — PROGRESS NOTE BEHAVIORAL HEALTH - SUMMARY
53 yo male with early onset Parkinson’s disease x 11 years, discharged from 4 prior nursing homes in 8 months due to his behaviors, hx of refusing medications,  transferred to [facility] Unit 2S on 6/12/17 where he manifested severe mood lability, paranoia and confabulation with poor insight and judgment including making > 40 Justice center complaints. 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 Patient was accepted to [facility] in [place] and discharged from [facility] on 7/12/17.  Upon getting to [facility], Patient refused to go into the facility and became combative. He was thus transferred back to [facility] ED which resulted in Service Line Chiefs’ of Service involvement given the history/issues and ultimate transfer to [address]. Patient has been on Unit 1N since. UPDATE: [facility] declined to accept Patient. Hundreds of nursing home applications wee sent out and all declined to accept patient. Court scheduled on 12/19/17 was deferred as Patient refused to go; he then again refused to go to the new Court date. Continuing to search for placement which have been unsuccessful; family does not want him living with them.  Neurological facility in [place] has accepted patient, and legal guardian is involved in transfer issues. Court hearing regarding Patient's guardianship was 3/27/18 and his sister was granted temporary guardianship. Court hearing for retention occurred on 3/28/18 and the hospital was granted an additional 60 days. Court date for a "check in" was on 5/15 during which time it was reported that there was a potential nursing home which the Patient's sister went to and liked. Another Court date was on 5/29/18 during which time the [facility] withdrew acceptance and now Pt's sister still has to find him an apartment to live.  Next Court date was scheduled for 06/26/18 which Patient attended. He caused some ruckus at the Court after he refused to get back on the stretcher to be brought back the hospital, which he ultimately did after the  threatened him with senior care time for contempt of Court. Since that time, there have been no apparent reported move on his sister's part regarding updating the Team if she has found any appropriate places for Patient to go to.  Patient currently refusing labs and wound care. Psychiatrically at his baseline awaiting placement.

## 2018-07-20 LAB — HBA1C BLD-MCNC: 5.5 % — SIGNIFICANT CHANGE UP (ref 4–5.6)

## 2018-07-20 PROCEDURE — 99231 SBSQ HOSP IP/OBS SF/LOW 25: CPT

## 2018-07-20 RX ADMIN — CARBIDOPA AND LEVODOPA 1 TABLET(S): 25; 100 TABLET ORAL at 20:18

## 2018-07-20 RX ADMIN — CARBIDOPA, LEVODOPA, AND ENTACAPONE 1 TABLET(S): 50; 200; 200 TABLET, FILM COATED ORAL at 10:24

## 2018-07-20 RX ADMIN — CARBIDOPA, LEVODOPA, AND ENTACAPONE 1 TABLET(S): 50; 200; 200 TABLET, FILM COATED ORAL at 15:32

## 2018-07-20 RX ADMIN — Medication 325 MILLIGRAM(S): at 01:50

## 2018-07-20 RX ADMIN — CARBIDOPA AND LEVODOPA 1 TABLET(S): 25; 100 TABLET ORAL at 06:17

## 2018-07-20 RX ADMIN — CARBIDOPA, LEVODOPA, AND ENTACAPONE 1 TABLET(S): 50; 200; 200 TABLET, FILM COATED ORAL at 06:17

## 2018-07-20 RX ADMIN — Medication 325 MILLIGRAM(S): at 17:01

## 2018-07-20 RX ADMIN — CARBIDOPA AND LEVODOPA 1 TABLET(S): 25; 100 TABLET ORAL at 10:24

## 2018-07-20 RX ADMIN — CARBIDOPA AND LEVODOPA 1 TABLET(S): 25; 100 TABLET ORAL at 15:32

## 2018-07-20 NOTE — PROGRESS NOTE BEHAVIORAL HEALTH - OTHER
refusing lab work and wound care, minimal participation in milieu chronically limited but better since admission  more able to respond to redirection festinating gait but stable, using walker reports "sad" overall linear, at times episodes of being disorganized and circumstantial, concrete (chronic) but much better since initial admission less paranoid today chronically impaired but much improved since admission Limited + neck favoring one side, dressed in hospital gowns, adequate grooming and hygiene no psychomotor agitation at times (chronic and seemingly intentionaly as he can stop it when redirected). appropriate, needs staff assist at times needs  staff assist at times

## 2018-07-20 NOTE — PROGRESS NOTE BEHAVIORAL HEALTH - NSBHFUPINTERVALHXFT_PSY_A_CORE
Same clinical presentation. Finally agreed to blood draw and basic lab work performed and within normal limits. No major incidents or events other then noted acting out behavior which is likely fueled by displaced frustration at still being on the Unit with no end in sight given that his sister reportedly has not been active in place searches, does not update Team on her findings. Patient denies adverse medication side effects. Still resistant to follow recommendations such as attend exercise group, do some stretching himself for his neck and shoulders, elevating his legs, wearing compression stockings etc. (chronic resistance).

## 2018-07-20 NOTE — PROGRESS NOTE BEHAVIORAL HEALTH - SUMMARY
51 yo male with early onset Parkinson’s disease x 11 years, discharged from 4 prior nursing homes in 8 months due to his behaviors, hx of refusing medications,  transferred to [facility] Unit 2S on 6/12/17 where he manifested severe mood lability, paranoia and confabulation with poor insight and judgment including making > 40 Justice center complaints. 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 Patient was accepted to [facility] in [place] and discharged from [facility] on 7/12/17.  Upon getting to [facility], Patient refused to go into the facility and became combative. He was thus transferred back to [facility] ED which resulted in Service Line Chiefs’ of Service involvement given the history/issues and ultimate transfer to [address]. Patient has been on Unit 1N since. UPDATE: [facility] declined to accept Patient. Hundreds of nursing home applications wee sent out and all declined to accept patient. Court scheduled on 12/19/17 was deferred as Patient refused to go; he then again refused to go to the new Court date. Continuing to search for placement which have been unsuccessful; family does not want him living with them.  Neurological facility in [place] has accepted patient, and legal guardian is involved in transfer issues. Court hearing regarding Patient's guardianship was 3/27/18 and his sister was granted temporary guardianship. Court hearing for retention occurred on 3/28/18 and the hospital was granted an additional 60 days. Court date for a "check in" was on 5/15 during which time it was reported that there was a potential nursing home which the Patient's sister went to and liked. Another Court date was on 5/29/18 during which time the [facility] withdrew acceptance and now Pt's sister still has to find him an apartment to live.  Next Court date was scheduled for 06/26/18 which Patient attended. He caused some ruckus at the Court after he refused to get back on the stretcher to be brought back the hospital, which he ultimately did after the  threatened him with prison time for contempt of Court. Since that time, there have been no apparent reported move on his sister's part regarding updating the Team if she has found any appropriate places for Patient to go to.

## 2018-07-21 RX ADMIN — CARBIDOPA AND LEVODOPA 1 TABLET(S): 25; 100 TABLET ORAL at 05:48

## 2018-07-21 RX ADMIN — CARBIDOPA, LEVODOPA, AND ENTACAPONE 1 TABLET(S): 50; 200; 200 TABLET, FILM COATED ORAL at 10:20

## 2018-07-21 RX ADMIN — CARBIDOPA AND LEVODOPA 1 TABLET(S): 25; 100 TABLET ORAL at 20:24

## 2018-07-21 RX ADMIN — Medication 30 MILLILITER(S): at 18:19

## 2018-07-21 RX ADMIN — CARBIDOPA, LEVODOPA, AND ENTACAPONE 1 TABLET(S): 50; 200; 200 TABLET, FILM COATED ORAL at 05:48

## 2018-07-21 RX ADMIN — CARBIDOPA AND LEVODOPA 1 TABLET(S): 25; 100 TABLET ORAL at 10:20

## 2018-07-21 RX ADMIN — CARBIDOPA AND LEVODOPA 1 TABLET(S): 25; 100 TABLET ORAL at 16:22

## 2018-07-21 RX ADMIN — CARBIDOPA, LEVODOPA, AND ENTACAPONE 1 TABLET(S): 50; 200; 200 TABLET, FILM COATED ORAL at 16:22

## 2018-07-22 RX ADMIN — CARBIDOPA AND LEVODOPA 1 TABLET(S): 25; 100 TABLET ORAL at 20:00

## 2018-07-22 RX ADMIN — Medication 325 MILLIGRAM(S): at 17:15

## 2018-07-22 RX ADMIN — Medication 325 MILLIGRAM(S): at 16:23

## 2018-07-22 RX ADMIN — CARBIDOPA AND LEVODOPA 1 TABLET(S): 25; 100 TABLET ORAL at 16:22

## 2018-07-22 RX ADMIN — CARBIDOPA AND LEVODOPA 1 TABLET(S): 25; 100 TABLET ORAL at 10:37

## 2018-07-22 RX ADMIN — CARBIDOPA, LEVODOPA, AND ENTACAPONE 1 TABLET(S): 50; 200; 200 TABLET, FILM COATED ORAL at 10:37

## 2018-07-22 RX ADMIN — CARBIDOPA AND LEVODOPA 1 TABLET(S): 25; 100 TABLET ORAL at 06:27

## 2018-07-22 RX ADMIN — CARBIDOPA, LEVODOPA, AND ENTACAPONE 1 TABLET(S): 50; 200; 200 TABLET, FILM COATED ORAL at 06:27

## 2018-07-22 RX ADMIN — CARBIDOPA, LEVODOPA, AND ENTACAPONE 1 TABLET(S): 50; 200; 200 TABLET, FILM COATED ORAL at 16:22

## 2018-07-23 PROCEDURE — 99231 SBSQ HOSP IP/OBS SF/LOW 25: CPT

## 2018-07-23 RX ADMIN — CARBIDOPA, LEVODOPA, AND ENTACAPONE 1 TABLET(S): 50; 200; 200 TABLET, FILM COATED ORAL at 10:30

## 2018-07-23 RX ADMIN — CARBIDOPA, LEVODOPA, AND ENTACAPONE 1 TABLET(S): 50; 200; 200 TABLET, FILM COATED ORAL at 05:48

## 2018-07-23 RX ADMIN — CARBIDOPA, LEVODOPA, AND ENTACAPONE 1 TABLET(S): 50; 200; 200 TABLET, FILM COATED ORAL at 15:45

## 2018-07-23 RX ADMIN — CARBIDOPA AND LEVODOPA 1 TABLET(S): 25; 100 TABLET ORAL at 20:02

## 2018-07-23 RX ADMIN — Medication 325 MILLIGRAM(S): at 22:57

## 2018-07-23 RX ADMIN — CARBIDOPA AND LEVODOPA 1 TABLET(S): 25; 100 TABLET ORAL at 15:45

## 2018-07-23 RX ADMIN — CARBIDOPA AND LEVODOPA 1 TABLET(S): 25; 100 TABLET ORAL at 05:49

## 2018-07-23 RX ADMIN — Medication 325 MILLIGRAM(S): at 21:24

## 2018-07-23 RX ADMIN — CARBIDOPA AND LEVODOPA 1 TABLET(S): 25; 100 TABLET ORAL at 10:30

## 2018-07-23 NOTE — PROGRESS NOTE BEHAVIORAL HEALTH - NSBHFUPINTERVALHXFT_PSY_A_CORE
Same clinical presentation. Reportedly, his sister and a private hire potential HHA came to visit yesterday. Patient denies adverse medication side effects. Still resistant to follow recommendations such as attend exercise group, do some stretching himself for his neck and shoulders, elevating his legs, wearing compression stockings etc. (chronic resistance).

## 2018-07-23 NOTE — PROGRESS NOTE BEHAVIORAL HEALTH - SUMMARY
51 yo male with early onset Parkinson’s disease x 11 years, discharged from 4 prior nursing homes in 8 months due to his behaviors, hx of refusing medications,  transferred to [facility] Unit 2S on 6/12/17 where he manifested severe mood lability, paranoia and confabulation with poor insight and judgment including making > 40 Justice center complaints. 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 Patient was accepted to [facility] in [place] and discharged from [facility] on 7/12/17.  Upon getting to [facility], Patient refused to go into the facility and became combative. He was thus transferred back to [facility] ED which resulted in Service Line Chiefs’ of Service involvement given the history/issues and ultimate transfer to [address]. Patient has been on Unit 1N since. UPDATE: [facility] declined to accept Patient. Hundreds of nursing home applications wee sent out and all declined to accept patient. Court scheduled on 12/19/17 was deferred as Patient refused to go; he then again refused to go to the new Court date. Continuing to search for placement which have been unsuccessful; family does not want him living with them.  Neurological facility in [place] has accepted patient, and legal guardian is involved in transfer issues. Court hearing regarding Patient's guardianship was 3/27/18 and his sister was granted temporary guardianship. Court hearing for retention occurred on 3/28/18 and the hospital was granted an additional 60 days. Court date for a "check in" was on 5/15 during which time it was reported that there was a potential nursing home which the Patient's sister went to and liked. Another Court date was on 5/29/18 during which time the [facility] withdrew acceptance and now Pt's sister still has to find him an apartment to live.  Next Court date was scheduled for 06/26/18 which Patient attended. He caused some ruckus at the Court after he refused to get back on the stretcher to be brought back the hospital, which he ultimately did after the  threatened him with nursing home time for contempt of Court. Since that time, there have been no apparent reported move on his sister's part regarding updating the Team if she has found any appropriate places for Patient to go to. SW continues to try to contact Pt's sister for updates.

## 2018-07-24 PROCEDURE — 99231 SBSQ HOSP IP/OBS SF/LOW 25: CPT

## 2018-07-24 RX ADMIN — Medication 325 MILLIGRAM(S): at 12:28

## 2018-07-24 RX ADMIN — CARBIDOPA AND LEVODOPA 1 TABLET(S): 25; 100 TABLET ORAL at 16:15

## 2018-07-24 RX ADMIN — Medication 400 MILLIGRAM(S): at 16:15

## 2018-07-24 RX ADMIN — CARBIDOPA, LEVODOPA, AND ENTACAPONE 1 TABLET(S): 50; 200; 200 TABLET, FILM COATED ORAL at 16:15

## 2018-07-24 RX ADMIN — CARBIDOPA AND LEVODOPA 1 TABLET(S): 25; 100 TABLET ORAL at 20:43

## 2018-07-24 RX ADMIN — CARBIDOPA, LEVODOPA, AND ENTACAPONE 1 TABLET(S): 50; 200; 200 TABLET, FILM COATED ORAL at 10:49

## 2018-07-24 RX ADMIN — CARBIDOPA, LEVODOPA, AND ENTACAPONE 1 TABLET(S): 50; 200; 200 TABLET, FILM COATED ORAL at 07:27

## 2018-07-24 RX ADMIN — Medication 400 MILLIGRAM(S): at 16:17

## 2018-07-24 RX ADMIN — CARBIDOPA AND LEVODOPA 1 TABLET(S): 25; 100 TABLET ORAL at 10:49

## 2018-07-24 RX ADMIN — CARBIDOPA AND LEVODOPA 1 TABLET(S): 25; 100 TABLET ORAL at 07:27

## 2018-07-24 NOTE — PROGRESS NOTE BEHAVIORAL HEALTH - SUMMARY
51 yo male with early onset Parkinson’s disease x 11 years, discharged from 4 prior nursing homes in 8 months due to his behaviors, hx of refusing medications,  transferred to [facility] Unit 2S on 6/12/17 where he manifested severe mood lability, paranoia and confabulation with poor insight and judgment including making > 40 Justice center complaints. 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 Patient was accepted to [facility] in [place] and discharged from [facility] on 7/12/17.  Upon getting to [facility], Patient refused to go into the facility and became combative. He was thus transferred back to [facility] ED which resulted in Service Line Chiefs’ of Service involvement given the history/issues and ultimate transfer to [address]. Patient has been on Unit 1N since. UPDATE: [facility] declined to accept Patient. Hundreds of nursing home applications wee sent out and all declined to accept patient. Court scheduled on 12/19/17 was deferred as Patient refused to go; he then again refused to go to the new Court date. Continuing to search for placement which have been unsuccessful; family does not want him living with them.  Neurological facility in [place] has accepted patient, and legal guardian is involved in transfer issues. Court hearing regarding Patient's guardianship was 3/27/18 and his sister was granted temporary guardianship. Court hearing for retention occurred on 3/28/18 and the hospital was granted an additional 60 days. Court date for a "check in" was on 5/15 during which time it was reported that there was a potential nursing home which the Patient's sister went to and liked. Another Court date was on 5/29/18 during which time the [facility] withdrew acceptance and now Pt's sister still has to find him an apartment to live.  Next Court date was scheduled for 06/26/18 which Patient attended. He caused some ruckus at the Court after he refused to get back on the stretcher to be brought back the hospital, which he ultimately did after the  threatened him with intermediate time for contempt of Court. Since that time, there have been no apparent reported move on his sister's part regarding updating the Team if she has found any appropriate places for Patient to go to. SW continues to try to contact Pt's sister for updates. As per Patient, no updates for months due to things like "my sister's car broke down," or "she did not pass the [facility]'s credit check." Sister has not been in regular contact with Unit team and very difficult to get in touch with. As per last conversation with SW, she hung up the phone call for no apparent reason.

## 2018-07-24 NOTE — PROGRESS NOTE BEHAVIORAL HEALTH - OTHER
chronically impaired but much improved since admission Limited + neck favoring one side, dressed in hospital gowns, adequate grooming and hygiene no psychomotor agitation at times (chronic and seemingly intentionaly as he can stop it when redirected). appropriate, needs staff assist at times needs  staff assist at times refusing lab work and wound care, minimal participation in milieu chronically limited but better since admission  more able to respond to redirection festinating gait but stable, using walker overall linear, at times episodes of being disorganized and circumstantial, concrete (chronic) but much better since initial admission

## 2018-07-24 NOTE — PROGRESS NOTE BEHAVIORAL HEALTH - NSBHFUPINTERVALHXFT_PSY_A_CORE
Same clinical presentation. Reportedly, his sister could not get the latest apartment as "she did not pass the credit check" as per Patient. Moreover, Patient is unsure how much money his sister got from the Court as that should be enough for first and last month's rent. Patient upset that he is still here. Patient denies adverse medication side effects. Still resistant to follow recommendations such as attend exercise group, do some stretching himself for his neck and shoulders, elevating his legs, wearing compression stockings etc. (chronic resistance).

## 2018-07-25 PROCEDURE — 99231 SBSQ HOSP IP/OBS SF/LOW 25: CPT

## 2018-07-25 RX ORDER — FLUTICASONE PROPIONATE 50 MCG
1 SPRAY, SUSPENSION NASAL
Qty: 0 | Refills: 0 | Status: COMPLETED | OUTPATIENT
Start: 2018-07-25 | End: 2018-07-30

## 2018-07-25 RX ADMIN — Medication 325 MILLIGRAM(S): at 17:25

## 2018-07-25 RX ADMIN — CARBIDOPA, LEVODOPA, AND ENTACAPONE 1 TABLET(S): 50; 200; 200 TABLET, FILM COATED ORAL at 15:50

## 2018-07-25 RX ADMIN — CARBIDOPA AND LEVODOPA 1 TABLET(S): 25; 100 TABLET ORAL at 10:56

## 2018-07-25 RX ADMIN — Medication 325 MILLIGRAM(S): at 17:04

## 2018-07-25 RX ADMIN — CARBIDOPA AND LEVODOPA 1 TABLET(S): 25; 100 TABLET ORAL at 20:21

## 2018-07-25 RX ADMIN — CARBIDOPA AND LEVODOPA 1 TABLET(S): 25; 100 TABLET ORAL at 15:50

## 2018-07-25 RX ADMIN — CARBIDOPA, LEVODOPA, AND ENTACAPONE 1 TABLET(S): 50; 200; 200 TABLET, FILM COATED ORAL at 10:56

## 2018-07-25 RX ADMIN — CARBIDOPA, LEVODOPA, AND ENTACAPONE 1 TABLET(S): 50; 200; 200 TABLET, FILM COATED ORAL at 06:38

## 2018-07-25 RX ADMIN — CARBIDOPA AND LEVODOPA 1 TABLET(S): 25; 100 TABLET ORAL at 06:39

## 2018-07-25 NOTE — PROGRESS NOTE BEHAVIORAL HEALTH - SUMMARY
51 yo male with early onset Parkinson’s disease x 11 years, discharged from 4 prior nursing homes in 8 months due to his behaviors, hx of refusing medications,  transferred to [facility] Unit 2S on 6/12/17 where he manifested severe mood lability, paranoia and confabulation with poor insight and judgment including making > 40 Justice center complaints. 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 Patient was accepted to [facility] in [place] and discharged from [facility] on 7/12/17.  Upon getting to [facility], Patient refused to go into the facility and became combative. He was thus transferred back to [facility] ED which resulted in Service Line Chiefs’ of Service involvement given the history/issues and ultimate transfer to [address]. Patient has been on Unit 1N since. UPDATE: [facility] declined to accept Patient. Hundreds of nursing home applications wee sent out and all declined to accept patient. Court scheduled on 12/19/17 was deferred as Patient refused to go; he then again refused to go to the new Court date. Continuing to search for placement which have been unsuccessful; family does not want him living with them.  Neurological facility in [place] has accepted patient, and legal guardian is involved in transfer issues. Court hearing regarding Patient's guardianship was 3/27/18 and his sister was granted temporary guardianship. Court hearing for retention occurred on 3/28/18 and the hospital was granted an additional 60 days. Court date for a "check in" was on 5/15 during which time it was reported that there was a potential nursing home which the Patient's sister went to and liked. Another Court date was on 5/29/18 during which time the [facility] withdrew acceptance and now Pt's sister still has to find him an apartment to live.  Next Court date was scheduled for 06/26/18 which Patient attended. He caused some ruckus at the Court after he refused to get back on the stretcher to be brought back the hospital, which he ultimately did after the  threatened him with custodial time for contempt of Court. Since that time, there have been no apparent reported move on his sister's part regarding updating the Team if she has found any appropriate places for Patient to go to. SW continues to try to contact Pt's sister for updates. As per Patient, no updates for months due to things like "my sister's car broke down," or "she did not pass the [facility]'s credit check." Sister has not been in regular contact with Unit team and very difficult to get in touch with. As per last conversation with SW, she hung up the phone call for no apparent reason.

## 2018-07-25 NOTE — PROGRESS NOTE BEHAVIORAL HEALTH - OTHER
+ neck favoring one side, dressed in hospital gowns, adequate grooming and hygiene no psychomotor agitation at times (chronic and seemingly intentionaly as he can stop it when redirected). appropriate, needs staff assist at times needs  staff assist at times refusing lab work and wound care, minimal participation in milieu chronically limited but better since admission  more able to respond to redirection festinating gait but stable, using walker overall linear, at times episodes of being disorganized and circumstantial, concrete (chronic) but much better since initial admission chronically impaired but much improved since admission Limited but has improved since admission

## 2018-07-25 NOTE — PROGRESS NOTE BEHAVIORAL HEALTH - NSBHFUPINTERVALHXFT_PSY_A_CORE
Interval events - Patient has some issues with some overnight staff members (chronic) and demanded to see admin-on-call who came to the Unit to address the situation. Same clinical presentation otherwise. Patient denies adverse medication side effects. Again, recommended he attend the exercise group and do some stretching himself for his neck and shoulders as he complains of soreness after he gets up - refusing and likes someone to stretch him out, etc.

## 2018-07-26 PROCEDURE — 99231 SBSQ HOSP IP/OBS SF/LOW 25: CPT

## 2018-07-26 RX ADMIN — CARBIDOPA AND LEVODOPA 1 TABLET(S): 25; 100 TABLET ORAL at 20:07

## 2018-07-26 RX ADMIN — CARBIDOPA, LEVODOPA, AND ENTACAPONE 1 TABLET(S): 50; 200; 200 TABLET, FILM COATED ORAL at 16:19

## 2018-07-26 RX ADMIN — CARBIDOPA AND LEVODOPA 1 TABLET(S): 25; 100 TABLET ORAL at 16:19

## 2018-07-26 RX ADMIN — CARBIDOPA AND LEVODOPA 1 TABLET(S): 25; 100 TABLET ORAL at 10:34

## 2018-07-26 RX ADMIN — CARBIDOPA, LEVODOPA, AND ENTACAPONE 1 TABLET(S): 50; 200; 200 TABLET, FILM COATED ORAL at 10:34

## 2018-07-26 NOTE — PROGRESS NOTE BEHAVIORAL HEALTH - SUMMARY
51 yo male with early onset Parkinson’s disease x 11 years, discharged from 4 prior nursing homes in 8 months due to his behaviors, hx of refusing medications,  transferred to [facility] Unit 2S on 6/12/17 where he manifested severe mood lability, paranoia and confabulation with poor insight and judgment including making > 40 Justice center complaints. 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 Patient was accepted to [facility] in [place] and discharged from [facility] on 7/12/17.  Upon getting to [facility], Patient refused to go into the facility and became combative. He was thus transferred back to [facility] ED which resulted in Service Line Chiefs’ of Service involvement given the history/issues and ultimate transfer to [address]. Patient has been on Unit 1N since. UPDATE: [facility] declined to accept Patient. Hundreds of nursing home applications wee sent out and all declined to accept patient. Court scheduled on 12/19/17 was deferred as Patient refused to go; he then again refused to go to the new Court date. Continuing to search for placement which have been unsuccessful; family does not want him living with them.  Neurological facility in [place] has accepted patient, and legal guardian is involved in transfer issues. Court hearing regarding Patient's guardianship was 3/27/18 and his sister was granted temporary guardianship. Court hearing for retention occurred on 3/28/18 and the hospital was granted an additional 60 days. Court date for a "check in" was on 5/15 during which time it was reported that there was a potential nursing home which the Patient's sister went to and liked. Another Court date was on 5/29/18 during which time the [facility] withdrew acceptance and now Pt's sister still has to find him an apartment to live.  Next Court date was scheduled for 06/26/18 which Patient attended. He caused some ruckus at the Court after he refused to get back on the stretcher to be brought back the hospital, which he ultimately did after the  threatened him with retirement time for contempt of Court. Since that time, there have been no apparent reported move on his sister's part regarding updating the Team if she has found any appropriate places for Patient to go to. SW continues to try to contact Pt's sister for updates. As per Patient, no updates for months due to things like "my sister's car broke down," or "she did not pass the [facility]'s credit check." Sister has not been in regular contact with Unit team and very difficult to get in touch with. As per last conversation with SW, she hung up the phone call for no apparent reason.

## 2018-07-26 NOTE — PROGRESS NOTE BEHAVIORAL HEALTH - OTHER
+ neck favoring one side, dressed in hospital gowns, adequate grooming and hygiene no psychomotor agitation at times (chronic and seemingly intentionaly as he can stop it when redirected). appropriate, needs staff assist at times needs  staff assist at times refusing lab work and wound care, minimal participation in milieu chronically limited but better since admission  more able to respond to redirection festinating gait but stable, using walker "I'm sick of being here!' overall linear, at times episodes of being disorganized and circumstantial, concrete (chronic) but much better since initial admission chronically impaired but much improved since admission Limited but has improved since admission

## 2018-07-26 NOTE — PROGRESS NOTE BEHAVIORAL HEALTH - NSBHFUPINTERVALHXFT_PSY_A_CORE
No significant interval events. There appears to be no movement in his case on part of his guardian/sister; unclear why Patient's  is not advocating more assertively. Patient could be discharged anytime he has a place to be discharged to with services. Same clinical presentation otherwise; denies medication side effects.

## 2018-07-27 PROCEDURE — 99231 SBSQ HOSP IP/OBS SF/LOW 25: CPT

## 2018-07-27 RX ORDER — CARBIDOPA AND LEVODOPA 25; 100 MG/1; MG/1
1.5 TABLET ORAL
Qty: 0 | Refills: 0 | Status: DISCONTINUED | OUTPATIENT
Start: 2018-07-27 | End: 2018-07-27

## 2018-07-27 RX ORDER — CARBIDOPA, LEVODOPA, AND ENTACAPONE 50; 200; 200 MG/1; MG/1; MG/1
1 TABLET, FILM COATED ORAL
Qty: 0 | Refills: 0 | Status: DISCONTINUED | OUTPATIENT
Start: 2018-07-27 | End: 2018-08-18

## 2018-07-27 RX ORDER — CARBIDOPA AND LEVODOPA 25; 100 MG/1; MG/1
1.5 TABLET ORAL
Qty: 0 | Refills: 0 | Status: DISCONTINUED | OUTPATIENT
Start: 2018-07-27 | End: 2018-08-02

## 2018-07-27 RX ADMIN — Medication 30 MILLILITER(S): at 03:15

## 2018-07-27 RX ADMIN — CARBIDOPA, LEVODOPA, AND ENTACAPONE 1 TABLET(S): 50; 200; 200 TABLET, FILM COATED ORAL at 10:33

## 2018-07-27 RX ADMIN — CARBIDOPA AND LEVODOPA 1.5 TABLET(S): 25; 100 TABLET ORAL at 17:04

## 2018-07-27 RX ADMIN — CARBIDOPA, LEVODOPA, AND ENTACAPONE 1 TABLET(S): 50; 200; 200 TABLET, FILM COATED ORAL at 17:04

## 2018-07-27 RX ADMIN — Medication 1 SPRAY(S): at 20:20

## 2018-07-27 RX ADMIN — CARBIDOPA AND LEVODOPA 1.5 TABLET(S): 25; 100 TABLET ORAL at 20:19

## 2018-07-27 RX ADMIN — CARBIDOPA AND LEVODOPA 1 TABLET(S): 25; 100 TABLET ORAL at 05:51

## 2018-07-27 RX ADMIN — Medication 325 MILLIGRAM(S): at 03:19

## 2018-07-27 RX ADMIN — Medication 325 MILLIGRAM(S): at 03:22

## 2018-07-27 RX ADMIN — CARBIDOPA AND LEVODOPA 1 TABLET(S): 25; 100 TABLET ORAL at 10:33

## 2018-07-27 RX ADMIN — CARBIDOPA, LEVODOPA, AND ENTACAPONE 1 TABLET(S): 50; 200; 200 TABLET, FILM COATED ORAL at 05:51

## 2018-07-27 NOTE — CHART NOTE - NSCHARTNOTEFT_GEN_A_CORE
Psych NP Note:       Patient's Parkinson's sx noted by staff to be increasing.  Dr. Jimenez was called, and message left for him to see pt for assessment.  Cady Grossman NPP

## 2018-07-27 NOTE — PROGRESS NOTE BEHAVIORAL HEALTH - OTHER
chronically impaired but much improved since admission Limited but has improved since admission + neck favoring one side, dressed in hospital gowns, adequate grooming and hygiene no psychomotor agitation at times (chronic and seemingly intentionaly as he can stop it when redirected). appropriate, needs staff assist at times needs  staff assist at times refusing lab work and wound care, minimal participation in milieu chronically limited but better since admission  more able to respond to redirection festinating gait but stable, using walker "I'm sick of being here!' overall linear, at times episodes of being disorganized and circumstantial, concrete (chronic) but much better since initial admission

## 2018-07-27 NOTE — PROGRESS NOTE BEHAVIORAL HEALTH - NSBHFUPVIOLFT_PSY_A_CORE
+ staff reported Patient chest butted a female nurse last week stemming from an argument regarding phone use; hx of trying to hit staff at on inpatient units when agitated hx of trying to throw chair at staff; hx of throwing his urinal at staf
hx of trying to attack staff on Units like throwing walker or urinal
hx of trying to attack staff on Units like throwing walker or urinal
hx of trying to attack staff on Units like throwing walker or urinal   verbally threatens staff at times
hx of trying to attack staff on Units like throwing walker or urinal   currently in behavioral control
hx of trying to attack staff on Units like throwing walker or urinal
hx of trying to attack staff on Units like throwing walker or urinal   currently in behavioral control most of the time
hx of trying to attack staff on Units like throwing walker or urinal   currently in behavioral control most of the time
hx of trying to attack staff on Unit like throwing walker or urinal, but has been in good behavioral control for some time
hx of trying to attack staff on Units like throwing walker or urinal
hx of trying to attack staff on Units like throwing walker or urinal
hx of trying to attack staff on Units like throwing walker or urinal   currently in behavioral control most of the time
hx of trying to attack staff on Units like throwing walker or urinal
hx of trying to attack staff on Units like throwing walker or urinal   currently in behavioral control most of the time
hx of episodic aggression to staff, most recent on 5/13
hx of trying to attack staff on Units like throwing walker or urinal
hx of trying to attack staff on Units like throwing walker or urinal
hx of trying to hit staff at on inpatient units when agitated hx of trying to throw chair at staff; hx of throwing his urinal at staff.
+ staff reported Patient chest butted a female nurse last week stemming from an argument regarding phone use; hx of trying to hit staff at on inpatient units when agitated hx of trying to throw chair at staff; hx of throwing his urinal at staf
hx of trying to attack staff on Units like throwing walker or urinal
+ staff reported Patient chest butted a female nurse last week stemming from an argument regarding phone use; hx of trying to hit staff at on inpatient units when agitated hx of trying to throw chair at staff; hx of throwing his urinal at staf
hx of trying to attack staff on Units like throwing walker or urinal
hx of trying to attack staff on Units like throwing walker or urinal   currently in behavioral control most of the time
+ staff reported Patient chest butted a female nurse yesterday stemming from an argument regarding phone use; hx of trying to hit staff at on inpatient units when agitated hx of trying to throw chair at staff; hx of throwing his urinal at staff
+ staff reported Patient chest butted a female nurse this week stemming from an argument regarding phone use; hx of trying to hit staff at on inpatient units when agitated hx of trying to throw chair at staff; hx of throwing his urinal at staff
+ staff reported Patient chest butted a female nurse this week stemming from an argument regarding phone use; hx of trying to hit staff at on inpatient units when agitated hx of trying to throw chair at staff; hx of throwing his urinal at staf
hx of trying to hit staff at on inpatient units when agitated hx of trying to throw chair at staff; hx of throwing his urinal at staff.
+ staff reported Patient chest butted a female nurse last week stemming from an argument regarding phone use; hx of trying to hit staff at on inpatient units when agitated hx of trying to throw chair at staff; hx of throwing his urinal at staf
hx of trying to attack staff on Units like throwing walker or urinal
+ staff reported Patient chest butted a female nurse last week stemming from an argument regarding phone use; hx of trying to hit staff at on inpatient units when agitated hx of trying to throw chair at staff; hx of throwing his urinal at staf
hx of trying to attack staff on Units like throwing walker or urinal
hx of trying to attack staff on Units like throwing walker or urinal   currently in behavioral control most of the time
+ staff reported Patient chest butted a female nurse last week stemming from an argument regarding phone use; hx of trying to hit staff at on inpatient units when agitated hx of trying to throw chair at staff; hx of throwing his urinal at staf
hx of trying to attack staff on Units like throwing walker or urinal
+ staff reported Patient chest butted a female nurse last week stemming from an argument regarding phone use; hx of trying to hit staff at on inpatient units when agitated hx of trying to throw chair at staff; hx of throwing his urinal at staf
hx of trying to attack staff on Units like throwing walker or urinal   currently in behavioral control
hx of trying to attack staff on Units like throwing walker or urinal   currently in behavioral control most of the time
hx of trying to attack staff on Units like throwing walker or urinal   currently in behavioral control most of the time.  Had episode of aggression to staff on 7/4
hx of episodic aggression to staff, most recent on 5/13
hx of trying to attack staff on Units like throwing walker or urinal   currently in behavioral control most of the time
hx of trying to attack staff on Units like throwing walker or urinal
hx of trying to attack staff on Units like throwing walker or urinal   currently in behavioral control most of the time.  Had episode of aggression to staff on 7/4
+ staff reported Patient chest butted a female nurse yesterday stemming from an argument regarding phone use; hx of trying to hit staff at on inpatient units when agitated hx of trying to throw chair at staff; hx of throwing his urinal at staff
hx of trying to attack staff on Units like throwing walker or urinal
hx of trying to attack staff on Units like throwing walker or urinal
+ staff reported Patient chest butted a female nurse last week stemming from an argument regarding phone use; hx of trying to hit staff at on inpatient units when agitated hx of trying to throw chair at staff; hx of throwing his urinal at staf
hx of trying to attack staff on Units like throwing walker or urinal
hx of trying to attack staff on Units like throwing walker or urinal   currently in behavioral control most of the time
No acute homicidal ideation/intent/plan, but history of physical aggression towards others and property.
hx of trying to attack staff on Units like throwing walker or urinal
hx of trying to hit staff at on inpatient units when agitated hx of trying to throw chair at staff; hx of throwing his urinal at staff.
hx of trying to hit staff at on inpatient units when agitated hx of trying to throw chair at staff; hx of throwing his urinal at staff.
hx of trying to attack staff on Units like throwing walker or urinal
hx of trying to attack staff on Units like throwing walker or urinal
hx of trying to hit staff at on inpatient units when agitated hx of trying to throw chair at staff; hx of throwing his urinal at staff.
+ staff reported Patient chest butted a female nurse last week stemming from an argument regarding phone use; hx of trying to hit staff at on inpatient units when agitated hx of trying to throw chair at staff; hx of throwing his urinal at staff
hx of trying to attack staff on Units like throwing walker or urinal
hx of trying to hit staff at on inpatient units when agitated hx of trying to throw chair at staff; hx of throwing his urinal at staff
hx of trying to hit staff at on inpatient units when agitated hx of trying to throw chair at staff; hx of throwing his urinal at staff.
hx of trying to attack staff on Units like throwing walker or urinal
+ staff reported Patient chest butted a female nurse last week stemming from an argument regarding phone use; hx of trying to hit staff at on inpatient units when agitated hx of trying to throw chair at staff; hx of throwing his urinal at staf
hx of trying to attack staff on Units like throwing walker or urinal
+ staff reported Patient chest butted a female nurse last week stemming from an argument regarding phone use; hx of trying to hit staff at on inpatient units when agitated hx of trying to throw chair at staff; hx of throwing his urinal at staf
hx of trying to attack staff on Units like throwing walker or urinal
+ staff reported Patient chest butted a female nurse last week stemming from an argument regarding phone use; hx of trying to hit staff at on inpatient units when agitated hx of trying to throw chair at staff; hx of throwing his urinal at staf
hx of trying to attack staff on Units like throwing walker or urinal
hx of trying to attack staff on Units like throwing walker or urinal   currently in behavioral control most of the time
hx of trying to attack staff on Units like throwing walker or urinal and being verbally threatening to staff
+ staff reported Patient chest butted a female nurse last week stemming from an argument regarding phone use; hx of trying to hit staff at on inpatient units when agitated hx of trying to throw chair at staff; hx of throwing his urinal at staf
hx of trying to attack staff on Units like throwing walker or urinal
hx of trying to attack staff on Units like throwing walker or urinal
hx of trying to attack staff on Units like throwing walker or urinal   currently in behavioral control most of the time
hx of trying to attack staff on Units like throwing walker or urinal
hx of trying to attack staff on Units like throwing walker or urinal
hx of trying to hit staff at on inpatient units when agitated hx of trying to throw chair at staff; hx of throwing his urinal at staff.
hx of trying to attack staff on Units like throwing walker or urinal   currently in behavioral control most of the time
hx of trying to attack staff on Units like throwing walker or urinal
hx of trying to attack staff on Units like throwing walker or urinal
hx of trying to attack staff on Units like throwing walker or urinal   currently in behavioral control
hx of trying to attack staff on Units like throwing walker or urinal   currently in behavioral control most of the time
hx of trying to attack staff on Units like throwing walker or urinal, but has been in good behavioral control for some time
hx of episodic aggression to staff, most recent on 5/13
hx of trying to attack staff on Units like throwing walker or urinal   currently in behavioral control most of the time.  Had episode of aggression to staff on 7/4
+ staff reported Patient chest butted a female nurse last week stemming from an argument regarding phone use; hx of trying to hit staff at on inpatient units when agitated hx of trying to throw chair at staff; hx of throwing his urinal at staf
hx of trying to attack staff on Units like throwing walker or urinal   currently in behavioral control most of the time
hx of trying to attack staff on Units like throwing walker or urinal   currently in behavioral control most of the time.  Had episode of aggression to staff on 7/4
hx of episodic aggression to staff, most recent on 5/13
hx of trying to attack staff on Units like throwing walker or urinal   currently in behavioral control most of the time
hx of trying to attack staff on Units like throwing walker or urinal
hx of trying to attack staff on Units like throwing walker or urinal
hx of trying to attack staff on Unit like throwing walker or urinal, but has been in good behavioral control for some time
hx of trying to attack staff on Units like throwing walker or urinal
hx of trying to attack staff on Units like throwing walker or urinal
hx of trying to attack staff on Units like throwing walker or urinal   currently in behavioral control most of the time
hx of trying to attack staff on Units like throwing walker or urinal   currently in behavioral control most of the time
hx of trying to attack staff on Units like throwing walker or urinal
hx of trying to attack staff on Units like throwing walker or urinal   currently in behavioral control most of the time
hx of trying to attack staff on Units like throwing walker or urinal
+ staff reported Patient chest butted a female nurse last week stemming from an argument regarding phone use; hx of trying to hit staff at on inpatient units when agitated hx of trying to throw chair at staff; hx of throwing his urinal at staf
hx of trying to attack staff on Units like throwing walker or urinal, but has been in good behavioral control for some time
hx of trying to hit staff at on inpatient units when agitated hx of trying to throw chair at staff; hx of throwing his urinal at staff.
hx of trying to attack staff on Units like throwing walker or urinal   currently in behavioral control most of the time
hx of trying to attack staff on Units like throwing walker or urinal
hx of trying to attack staff on Units like throwing walker or urinal   currently in behavioral control
hx of trying to attack staff on Units like throwing walker or urinal   currently in behavioral control most of the time
hx of trying to attack staff on Units like throwing walker or urinal   currently in behavioral control most of the time.  Had episode of aggression to staff on 7/4
hx of trying to attack staff on Units like throwing walker or urinal   currently in behavioral control most of the time.  Had episode of aggression to staff on 7/4

## 2018-07-27 NOTE — PROGRESS NOTE BEHAVIORAL HEALTH - NSBHFUPVIOLINTENT_PSY_A_CORE
none known

## 2018-07-27 NOTE — PROGRESS NOTE BEHAVIORAL HEALTH - NSBHFUPVIOLPROPERTY_PSY_A_CORE
none known
yes
none known

## 2018-07-27 NOTE — PROGRESS NOTE BEHAVIORAL HEALTH - SUMMARY
51 yo male with early onset Parkinson’s disease x 11 years, discharged from 4 prior nursing homes in 8 months due to his behaviors, hx of refusing medications,  transferred to [facility] on 6/12/17 where he manifested severe mood lability, paranoia and confabulation with poor insight and judgment including making > 40 [facility] center complaints. 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 Patient was accepted to [facility] in [place] and discharged from [facility] on 7/12/17.  Upon getting to [facility], Patient refused to go into the facility and became combative. He was thus transferred back to [facility] ED which resulted in Service Line Chiefs’ of Service involvement given the history/issues and ultimate transfer to [address]. Patient has been on Unit 1[address] since. UPDATE: [facility] declined to accept Patient. Hundreds of nursing home applications wee sent out and all declined to accept patient. Court scheduled on 12/19/17 was deferred as Patient refused to go; he then again refused to go to the new Court date. Continuing to search for placement which have been unsuccessful; family does not want him living with them.  Neurological facility in [place] has accepted patient, and legal guardian is involved in transfer issues. Court hearing regarding Patient's guardianship was 3/27/18 and his sister was granted temporary guardianship. Court hearing for retention occurred on 3/28/18 and the hospital was granted an additional 60 days. Court date for a "check in" was on 5/15 during which time it was reported that there was a potential nursing home which the Patient's sister went to and liked. Another Court date was on 5/29/18 during which time the [facility] withdrew acceptance and now Pt's sister still has to find him an apartment to live.  Next Court date was scheduled for 06/26/18 which Patient attended. He caused some ruckus at the Court after he refused to get back on the stretcher to be brought back the hospital, which he ultimately did after the  threatened him with detention time for contempt of Court. Since that time, there have been no apparent reported move on his sister's part regarding updating the Team if she has found any appropriate places for Patient to go to. SW continues to try to contact Pt's sister for updates. As per Patient, no updates for months due to things like "my sister's car broke down," or "she did not pass the landlor's credit check." Sister has not been in regular contact with Unit team and very difficult to get in touch with. As per last conversation with SW, she hung up the phone call for no apparent reason. On 7/27/18, agency called Unit looking for Pt's sister as they have a $5000 check for her and she is not answering her phone/returning calls.

## 2018-07-27 NOTE — PROGRESS NOTE BEHAVIORAL HEALTH - NSBHFUPINTERVALHXFT_PSY_A_CORE
Phone call made to Team from agency looking for Patient's guardian/sister as they have a $5000 check for her. They stated that they were not able to get in touch with her. Otherwise, no significant interval events. There still appears to be no movement in his case on part of his guardian/sister; unclear why Patient's  is not advocating more assertively. Patient could be discharged anytime he has a place to be discharged to with services. Same clinical presentation otherwise; denies medication side effects.

## 2018-07-27 NOTE — PROGRESS NOTE BEHAVIORAL HEALTH - NSBHFUPVIOLOTHERS_PSY_A_CORE
none known
yes
none known
none known
yes
none known
none known
yes
none known
yes
yes
none known
yes
none known
yes
none known
yes
yes
none known
yes
none known
yes
yes

## 2018-07-28 RX ADMIN — CARBIDOPA AND LEVODOPA 1.5 TABLET(S): 25; 100 TABLET ORAL at 16:00

## 2018-07-28 RX ADMIN — Medication 325 MILLIGRAM(S): at 12:33

## 2018-07-28 RX ADMIN — CARBIDOPA AND LEVODOPA 1.5 TABLET(S): 25; 100 TABLET ORAL at 06:11

## 2018-07-28 RX ADMIN — CARBIDOPA, LEVODOPA, AND ENTACAPONE 1 TABLET(S): 50; 200; 200 TABLET, FILM COATED ORAL at 10:04

## 2018-07-28 RX ADMIN — CARBIDOPA AND LEVODOPA 1.5 TABLET(S): 25; 100 TABLET ORAL at 20:21

## 2018-07-28 RX ADMIN — Medication 325 MILLIGRAM(S): at 23:04

## 2018-07-28 RX ADMIN — CARBIDOPA, LEVODOPA, AND ENTACAPONE 1 TABLET(S): 50; 200; 200 TABLET, FILM COATED ORAL at 20:20

## 2018-07-28 RX ADMIN — CARBIDOPA, LEVODOPA, AND ENTACAPONE 1 TABLET(S): 50; 200; 200 TABLET, FILM COATED ORAL at 06:11

## 2018-07-28 RX ADMIN — CARBIDOPA AND LEVODOPA 1.5 TABLET(S): 25; 100 TABLET ORAL at 10:09

## 2018-07-28 RX ADMIN — Medication 325 MILLIGRAM(S): at 22:27

## 2018-07-29 PROCEDURE — 12345: CPT

## 2018-07-29 PROCEDURE — 99231 SBSQ HOSP IP/OBS SF/LOW 25: CPT

## 2018-07-29 RX ORDER — ACETAMINOPHEN 500 MG
650 TABLET ORAL ONCE
Qty: 0 | Refills: 0 | Status: COMPLETED | OUTPATIENT
Start: 2018-07-29 | End: 2018-07-29

## 2018-07-29 RX ORDER — HYDROCORTISONE 1 %
1 OINTMENT (GRAM) TOPICAL
Qty: 0 | Refills: 0 | Status: COMPLETED | OUTPATIENT
Start: 2018-07-29 | End: 2018-08-02

## 2018-07-29 RX ADMIN — CARBIDOPA AND LEVODOPA 1.5 TABLET(S): 25; 100 TABLET ORAL at 20:02

## 2018-07-29 RX ADMIN — Medication 650 MILLIGRAM(S): at 22:09

## 2018-07-29 RX ADMIN — CARBIDOPA AND LEVODOPA 1.5 TABLET(S): 25; 100 TABLET ORAL at 16:06

## 2018-07-29 RX ADMIN — CARBIDOPA AND LEVODOPA 1.5 TABLET(S): 25; 100 TABLET ORAL at 06:26

## 2018-07-29 RX ADMIN — CARBIDOPA, LEVODOPA, AND ENTACAPONE 1 TABLET(S): 50; 200; 200 TABLET, FILM COATED ORAL at 10:56

## 2018-07-29 RX ADMIN — CARBIDOPA, LEVODOPA, AND ENTACAPONE 1 TABLET(S): 50; 200; 200 TABLET, FILM COATED ORAL at 06:27

## 2018-07-29 RX ADMIN — Medication 400 MILLIGRAM(S): at 23:21

## 2018-07-29 RX ADMIN — CARBIDOPA AND LEVODOPA 1.5 TABLET(S): 25; 100 TABLET ORAL at 10:56

## 2018-07-29 RX ADMIN — CARBIDOPA, LEVODOPA, AND ENTACAPONE 1 TABLET(S): 50; 200; 200 TABLET, FILM COATED ORAL at 16:06

## 2018-07-29 RX ADMIN — Medication 650 MILLIGRAM(S): at 22:40

## 2018-07-29 NOTE — PROGRESS NOTE BEHAVIORAL HEALTH - NSBHFUPINTERVALHXFT_PSY_A_CORE
Interval events: Patient was seen by Neurology (appreciate consult and recommendations; increase in Sinemet to 25/250 one and a half tablets at 6, 12, 4, and 8, and Stalevo dosing changed to 6, 10:30, and 6.) He has some mild dermatitis of his left elbow so Writer ordered him topical 2.5% hydrocortisone cream bid x 4 days. Patient's sister reportedly came to visit yesterday - Pt states that she again mentioned that she could not a place as she did not "pass" the credit check. Staff reported that sister told staff that "no one here is helping her look for a place" which she assumed responsibility to do as guardian and inpt team does not look for real estate.

## 2018-07-29 NOTE — PROGRESS NOTE BEHAVIORAL HEALTH - OTHER
festinating gait but stable, using walker "I'm sick of being here!' overall linear, at times episodes of being disorganized and circumstantial, concrete (chronic) but much better since initial admission Limited but has improved since admission + neck favoring one side, dressed in hospital gowns, adequate grooming and hygiene no psychomotor agitation at times (chronic and seemingly intentionaly as he can stop it when redirected). refusing lab work and wound care, minimal participation in milieu chronically limited but better since admission  more able to respond to redirection chronically impaired but much improved since admission

## 2018-07-29 NOTE — PROGRESS NOTE BEHAVIORAL HEALTH - SUMMARY
53 yo male with early onset Parkinson’s disease x 11 years, discharged from 4 prior nursing homes in 8 months due to his behaviors, hx of refusing medications,  transferred to [facility] Unit [facility] on 6/12/17 where he manifested severe mood lability, paranoia and confabulation with poor insight and judgment including making > 40 [facility] center complaints. 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 Patient was accepted to [facility] in [place] and discharged from [facility] on 7/12/17.  Upon getting to [facility], Patient refused to go into the facility and became combative. He was thus transferred back to [facility] ED which resulted in Service Line Chiefs’ of Service involvement given the history/issues and ultimate transfer to [address]. Patient has been on Unit 1N since. UPDATE: [facility] declined to accept Patient. Hundreds of nursing home applications wee sent out and all declined to accept patient. Court scheduled on 12/19/17 was deferred as Patient refused to go; he then again refused to go to the new Court date. Continuing to search for placement which have been unsuccessful; family does not want him living with them.  Neurological facility in [place] has accepted patient, and legal guardian is involved in transfer issues. Court hearing regarding Patient's guardianship was 3/27/18 and his sister was granted temporary guardianship. Court hearing for retention occurred on 3/28/18 and the hospital was granted an additional 60 days. Court date for a "check in" was on 5/15 during which time it was reported that there was a potential nursing home which the Patient's sister went to and liked. Another Court date was on 5/29/18 during which time the [facility] withdrew acceptance and now Pt's sister still has to find him an apartment to live.  Next Court date was scheduled for 06/26/18 which Patient attended. He caused some ruckus at the Court after he refused to get back on the stretcher to be brought back the hospital, which he ultimately did after the  threatened him with halfway time for contempt of Court. Since that time, there have been no apparent reported move on his sister's part regarding updating the Team if she has found any appropriate places for Patient to go to. SW continues to try to contact Pt's sister for updates. As per Patient, no updates for months due to things like "my sister's car broke down," or "she did not pass the landlor's credit check." Sister has not been in regular contact with Unit team and very difficult to get in touch with. As per last conversation with SW, she hung up the phone call for no apparent reason. On 7/27/18, agency called Unit looking for Pt's sister as they have a $5000 check for her and she is not answering her phone/returning calls.

## 2018-07-30 PROCEDURE — 99231 SBSQ HOSP IP/OBS SF/LOW 25: CPT

## 2018-07-30 PROCEDURE — 99232 SBSQ HOSP IP/OBS MODERATE 35: CPT

## 2018-07-30 RX ADMIN — CARBIDOPA, LEVODOPA, AND ENTACAPONE 1 TABLET(S): 50; 200; 200 TABLET, FILM COATED ORAL at 06:39

## 2018-07-30 RX ADMIN — CARBIDOPA, LEVODOPA, AND ENTACAPONE 1 TABLET(S): 50; 200; 200 TABLET, FILM COATED ORAL at 12:12

## 2018-07-30 RX ADMIN — Medication 325 MILLIGRAM(S): at 14:12

## 2018-07-30 RX ADMIN — CARBIDOPA AND LEVODOPA 1.5 TABLET(S): 25; 100 TABLET ORAL at 06:40

## 2018-07-30 RX ADMIN — CARBIDOPA AND LEVODOPA 1.5 TABLET(S): 25; 100 TABLET ORAL at 20:13

## 2018-07-30 RX ADMIN — Medication 325 MILLIGRAM(S): at 23:50

## 2018-07-30 RX ADMIN — CARBIDOPA AND LEVODOPA 1.5 TABLET(S): 25; 100 TABLET ORAL at 16:07

## 2018-07-30 RX ADMIN — CARBIDOPA AND LEVODOPA 1.5 TABLET(S): 25; 100 TABLET ORAL at 12:13

## 2018-07-30 RX ADMIN — CARBIDOPA, LEVODOPA, AND ENTACAPONE 1 TABLET(S): 50; 200; 200 TABLET, FILM COATED ORAL at 16:07

## 2018-07-30 RX ADMIN — Medication 325 MILLIGRAM(S): at 13:31
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
## 2018-07-30 NOTE — PROGRESS NOTE BEHAVIORAL HEALTH - SUMMARY
53 yo male with early onset Parkinson’s disease x 11 years, discharged from 4 prior nursing homes in 8 months due to his behaviors, hx of refusing medications,  transferred to [facility] Unit 2S on 6/12/17 where he manifested severe mood lability, paranoia and confabulation with poor insight and judgment including making > 40 Justice center complaints. 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 Patient was accepted to [facility] in [place] and discharged from [facility] on 7/12/17.  Upon getting to [facility], Patient refused to go into the facility and became combative. He was thus transferred back to [facility] ED which resulted in Service Line Chiefs’ of Service involvement given the history/issues and ultimate transfer to [address]. Patient has been on Unit 1N since. UPDATE: [facility] declined to accept Patient. Hundreds of nursing home applications wee sent out and all declined to accept patient. Court scheduled on 12/19/17 was deferred as Patient refused to go; he then again refused to go to the new Court date. Continuing to search for placement which have been unsuccessful; family does not want him living with them.  Neurological facility in [place] has accepted patient, and legal guardian is involved in transfer issues. Court hearing regarding Patient's guardianship was 3/27/18 and his sister was granted temporary guardianship. Court hearing for retention occurred on 3/28/18 and the hospital was granted an additional 60 days. Court date for a "check in" was on 5/15 during which time it was reported that there was a potential nursing home which the Patient's sister went to and liked. Another Court date was on 5/29/18 during which time the [facility] withdrew acceptance and now Pt's sister still has to find him an apartment to live.  Next Court date was scheduled for 06/26/18 which Patient attended. He caused some ruckus at the Court after he refused to get back on the stretcher to be brought back the hospital, which he ultimately did after the  threatened him with [facility] time for contempt of Court. Since that time, there have been no apparent reported move on his sister's part regarding updating the Team if she has found any appropriate places for Patient to go to. SW continues to try to contact Pt's sister for updates. As per Patient, no updates for months due to things like "my sister's car broke down," or "she did not pass the [employer]'s credit check." Sister has not been in regular contact with Unit team and very difficult to get in touch with. As per last conversation with SW, she hung up the phone call for no apparent reason. On 7/27/18, agency called Unit looking for Pt's sister as they have a $5000 check for her and she is not answering her phone/returning calls. 51 yo male with early onset Parkinson’s disease x 11 years, discharged from 4 prior nursing homes in 8 months due to his behaviors, hx of refusing medications,  transferred to [facility] Unit 2S on 6/12/17 where he manifested severe mood lability, paranoia and confabulation with poor insight and judgment including making > 40 Justice center complaints. 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 Patient was accepted to [facility] in [place] and discharged from [facility] on 7/12/17.  Upon getting to [facility], Patient refused to go into the facility and became combative. He was thus transferred back to [facility] ED which resulted in Service Line Chiefs’ of Service involvement given the history/issues and ultimate transfer to [address]. Patient has been on Unit 1N since. UPDATE: [facility] declined to accept Patient. Hundreds of nursing home applications wee sent out and all declined to accept patient. Court scheduled on 12/19/17 was deferred as Patient refused to go; he then again refused to go to the new Court date. Continuing to search for placement which have been unsuccessful; family does not want him living with them.  Neurological facility in [place] has accepted patient, and legal guardian is involved in transfer issues. Court hearing regarding Patient's guardianship was 3/27/18 and his sister was granted temporary guardianship. Court hearing for retention occurred on 3/28/18 and the hospital was granted an additional 60 days. Court date for a "check in" was on 5/15 during which time it was reported that there was a potential nursing home which the Patient's sister went to and liked. Another Court date was on 5/29/18 during which time the [facility] withdrew acceptance and now Pt's sister still has to find him an apartment to live.  Next Court date was scheduled for 06/26/18 which Patient attended. He caused some ruckus at the Court after he refused to get back on the stretcher to be brought back the hospital, which he ultimately did after the  threatened him with senior care time for contempt of Court. Since that time, there have been no apparent reported move on his sister's part regarding updating the Team if she has found any appropriate places for Patient to go to. SW continues to try to contact Pt's sister for updates. As per Patient, no updates for months due to things like "my sister's car broke down," or "she did not pass the [employer]'s credit check." Sister has not been in regular contact with Unit team and very difficult to get in touch with. As per last conversation with SW, she hung up the phone call for no apparent reason. On 7/27/18, agency called Unit looking for Pt's sister as they have a $5000 check for her and she is not answering her phone/returning calls. Patient's sister came to visit on Saturday 7/28/18, and reportedly told staff that she feels "overwhelmed" with seeking apartments for patient and that she is not getting help looking for places from the Unit etc. Sister reminded that she assumed responsibility for this as Pt's guardian and inpatient psychiatric units and staff do not search for independent [place] real estate for patients. Suspecting that guardianship issue will return to Court as current guardian is not meeting her responsibilities and obligations in a timely manner.

## 2018-07-30 NOTE — PROGRESS NOTE BEHAVIORAL HEALTH - OTHER
chronically impaired but much improved since admission Limited but has improved since admission + neck favoring one side, dressed in hospital gowns, adequate grooming and hygiene no psychomotor agitation at times (chronic and seemingly intentionaly as he can stop it when redirected). yelling this morning at nurses station chronically limited but better since admission  more able to respond to redirection festinating gait but stable, using walker "I want a new nurse!" overall linear, at times episodes of being disorganized and circumstantial, concrete (chronic) but much better since initial admission

## 2018-07-31 PROCEDURE — 99231 SBSQ HOSP IP/OBS SF/LOW 25: CPT

## 2018-07-31 RX ADMIN — CARBIDOPA, LEVODOPA, AND ENTACAPONE 1 TABLET(S): 50; 200; 200 TABLET, FILM COATED ORAL at 05:41

## 2018-07-31 RX ADMIN — Medication 325 MILLIGRAM(S): at 21:25

## 2018-07-31 RX ADMIN — CARBIDOPA, LEVODOPA, AND ENTACAPONE 1 TABLET(S): 50; 200; 200 TABLET, FILM COATED ORAL at 10:57

## 2018-07-31 RX ADMIN — Medication 325 MILLIGRAM(S): at 21:29

## 2018-07-31 RX ADMIN — CARBIDOPA AND LEVODOPA 1.5 TABLET(S): 25; 100 TABLET ORAL at 05:41

## 2018-07-31 RX ADMIN — CARBIDOPA, LEVODOPA, AND ENTACAPONE 1 TABLET(S): 50; 200; 200 TABLET, FILM COATED ORAL at 16:03

## 2018-07-31 RX ADMIN — CARBIDOPA AND LEVODOPA 1.5 TABLET(S): 25; 100 TABLET ORAL at 16:03

## 2018-07-31 RX ADMIN — CARBIDOPA AND LEVODOPA 1.5 TABLET(S): 25; 100 TABLET ORAL at 11:00

## 2018-07-31 RX ADMIN — CARBIDOPA AND LEVODOPA 1.5 TABLET(S): 25; 100 TABLET ORAL at 20:31

## 2018-07-31 NOTE — PROGRESS NOTE BEHAVIORAL HEALTH - SUMMARY
53 yo male with early onset Parkinson’s disease x 11 years, discharged from 4 prior nursing homes in 8 months due to his behaviors, hx of refusing medications,  transferred to [facility] Unit 2S on 6/12/17 where he manifested severe mood lability, paranoia and confabulation with poor insight and judgment including making > 40 Justice center complaints. 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 Patient was accepted to [facility] in [place] and discharged from [facility] on 7/12/17.  Upon getting to [facility], Patient refused to go into the facility and became combative. He was thus transferred back to [facility] ED which resulted in Service Line Chiefs’ of Service involvement given the history/issues and ultimate transfer to [address]. Patient has been on Unit 1N since. UPDATE: [facility] declined to accept Patient. Hundreds of nursing home applications wee sent out and all declined to accept patient. Court scheduled on 12/19/17 was deferred as Patient refused to go; he then again refused to go to the new Court date. Continuing to search for placement which have been unsuccessful; family does not want him living with them.  Neurological facility in [place] has accepted patient, and legal guardian is involved in transfer issues. Court hearing regarding Patient's guardianship was 3/27/18 and his sister was granted temporary guardianship. Court hearing for retention occurred on 3/28/18 and the hospital was granted an additional 60 days. Court date for a "check in" was on 5/15 during which time it was reported that there was a potential nursing home which the Patient's sister went to and liked. Another Court date was on 5/29/18 during which time the [facility] withdrew acceptance and now Pt's sister still has to find him an apartment to live.  Next Court date was scheduled for 06/26/18 which Patient attended. He caused some ruckus at the Court after he refused to get back on the stretcher to be brought back the hospital, which he ultimately did after the  threatened him with nursing home time for contempt of Court. Since that time, there have been no apparent reported move on his sister's part regarding updating the Team if she has found any appropriate places for Patient to go to. SW continues to try to contact Pt's sister for updates. As per Patient, no updates for months due to things like "my sister's car broke down," or "she did not pass the [employer]'s credit check." Sister has not been in regular contact with Unit team and very difficult to get in touch with. As per last conversation with SW, she hung up the phone call for no apparent reason. On 7/27/18, agency called Unit looking for Pt's sister as they have a $5000 check for her and she is not answering her phone/returning calls. Patient's sister came to visit on Saturday 7/28/18, and reportedly told staff that she feels "overwhelmed" with seeking apartments for patient and that she is not getting help looking for places from the Unit etc. Sister reminded that she assumed responsibility for this as Pt's guardian and inpatient psychiatric units and staff do not search for independent [place] real estate for patients. Suspecting that guardianship issue will return to Court as current guardian is not meeting her responsibilities and obligations in a timely manner.

## 2018-07-31 NOTE — PROGRESS NOTE BEHAVIORAL HEALTH - OTHER
Limited but has improved since admission chronically limited but better since admission  more able to respond to redirection festinating gait but stable, using walker varying + neck favoring one side, dressed in hospital gowns, adequate grooming and hygiene no psychomotor agitation at times (chronic and seemingly intentionaly as he can stop it when redirected). overall linear, at times episodes of being disorganized and circumstantial, concrete (chronic) but much better since initial admission chronically impaired but much improved since admission

## 2018-07-31 NOTE — PROGRESS NOTE BEHAVIORAL HEALTH - NSBHFUPINTERVALHXFT_PSY_A_CORE
Interval events: Hospitalist service came to see Patient last evening regarding c/o of neck pain which is chronic, unchanged and pre-existing as Patient had neck deviation on admission. He refuses to stretch or do self-exercises despite daily encouragement to do so.  Same clinical presentation. Interval events: Hospitalist service came to see Patient last evening regarding c/o of neck pain which is chronic, unchanged and pre-existing as Patient had neck deviation on admission. He was ordered a neck xray which he refused; Writer spent time providing support and psychoeducation about need for medical work up including radiology if he reports complaints. He still refuses to stretch or do self-exercises despite daily encouragement to do so.  Same clinical presentation.

## 2018-08-01 PROCEDURE — 72040 X-RAY EXAM NECK SPINE 2-3 VW: CPT | Mod: 26

## 2018-08-01 PROCEDURE — 99231 SBSQ HOSP IP/OBS SF/LOW 25: CPT

## 2018-08-01 RX ADMIN — CARBIDOPA, LEVODOPA, AND ENTACAPONE 1 TABLET(S): 50; 200; 200 TABLET, FILM COATED ORAL at 09:36

## 2018-08-01 RX ADMIN — CARBIDOPA AND LEVODOPA 1.5 TABLET(S): 25; 100 TABLET ORAL at 15:27

## 2018-08-01 RX ADMIN — CARBIDOPA AND LEVODOPA 1.5 TABLET(S): 25; 100 TABLET ORAL at 19:52

## 2018-08-01 RX ADMIN — Medication 1 APPLICATION(S): at 09:28

## 2018-08-01 RX ADMIN — CARBIDOPA AND LEVODOPA 1.5 TABLET(S): 25; 100 TABLET ORAL at 11:02

## 2018-08-01 RX ADMIN — CARBIDOPA, LEVODOPA, AND ENTACAPONE 1 TABLET(S): 50; 200; 200 TABLET, FILM COATED ORAL at 05:52

## 2018-08-01 RX ADMIN — Medication 325 MILLIGRAM(S): at 22:01

## 2018-08-01 RX ADMIN — CARBIDOPA AND LEVODOPA 1.5 TABLET(S): 25; 100 TABLET ORAL at 05:53

## 2018-08-01 RX ADMIN — CARBIDOPA, LEVODOPA, AND ENTACAPONE 1 TABLET(S): 50; 200; 200 TABLET, FILM COATED ORAL at 15:27

## 2018-08-01 NOTE — PROGRESS NOTE BEHAVIORAL HEALTH - OTHER
festinating gait but stable, using walker varying overall linear, at times episodes of being disorganized and circumstantial, concrete (chronic) but much better since initial admission chronically impaired but much improved since admission Limited but has improved since admission + neck favoring one side, dressed in hospital gowns, adequate grooming and hygiene no psychomotor agitation at times (chronic and seemingly intentionaly as he can stop it when redirected). chronically limited but better since admission  more able to respond to redirection

## 2018-08-01 NOTE — PROGRESS NOTE BEHAVIORAL HEALTH - NSBHFUPINTERVALHXFT_PSY_A_CORE
No significant interval events. Same clinical presentation. Visible on the Unit, tolerating the increased / adjusted Sinemet dose. Denies adverse side effects.

## 2018-08-01 NOTE — PROGRESS NOTE BEHAVIORAL HEALTH - SUMMARY
51 yo male with early onset Parkinson’s disease x 11 years, discharged from 4 prior nursing homes in 8 months due to his behaviors, hx of refusing medications,  transferred to [facility] Unit 2S on 6/12/17 where he manifested severe mood lability, paranoia and confabulation with poor insight and judgment including making > 40 Justice center complaints. 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 Patient was accepted to [facility] in [place] and discharged from [facility] on 7/12/17.  Upon getting to [facility], Patient refused to go into the facility and became combative. He was thus transferred back to [facility] ED which resulted in Service Line Chiefs’ of Service involvement given the history/issues and ultimate transfer to [address]. Patient has been on Unit 1N since. UPDATE: [facility] declined to accept Patient. Hundreds of nursing home applications wee sent out and all declined to accept patient. Court scheduled on 12/19/17 was deferred as Patient refused to go; he then again refused to go to the new Court date. Continuing to search for placement which have been unsuccessful; family does not want him living with them.  Neurological facility in [place] has accepted patient, and legal guardian is involved in transfer issues. Court hearing regarding Patient's guardianship was 3/27/18 and his sister was granted temporary guardianship. Court hearing for retention occurred on 3/28/18 and the hospital was granted an additional 60 days. Court date for a "check in" was on 5/15 during which time it was reported that there was a potential nursing home which the Patient's sister went to and liked. Another Court date was on 5/29/18 during which time the [facility] withdrew acceptance and now Pt's sister still has to find him an apartment to live.  Next Court date was scheduled for 06/26/18 which Patient attended. He caused some ruckus at the Court after he refused to get back on the stretcher to be brought back the hospital, which he ultimately did after the  threatened him with retirement time for contempt of Court. Since that time, there have been no apparent reported move on his sister's part regarding updating the Team if she has found any appropriate places for Patient to go to. SW continues to try to contact Pt's sister for updates. As per Patient, no updates for months due to things like "my sister's car broke down," or "she did not pass the [employer]'s credit check." Sister has not been in regular contact with Unit team and very difficult to get in touch with. As per last conversation with SW, she hung up the phone call for no apparent reason. On 7/27/18, agency called Unit looking for Pt's sister as they have a $5000 check for her and she is not answering her phone/returning calls. Patient's sister came to visit on Saturday 7/28/18, and reportedly told staff that she feels "overwhelmed" with seeking apartments for patient and that she is not getting help looking for places from the Unit etc. Sister reminded that she assumed responsibility for this as Pt's guardian and inpatient psychiatric units and staff do not search for independent [place] real estate for patients. Suspecting that guardianship issue will return to Court as current guardian is not meeting her responsibilities and obligations in a timely manner. Week of end of July/start of August - Patient's  drafting a notification letter to Patient's sister regarding apparent lack of performance and fulfilling duties.

## 2018-08-02 PROCEDURE — 99231 SBSQ HOSP IP/OBS SF/LOW 25: CPT

## 2018-08-02 RX ORDER — CARBIDOPA AND LEVODOPA 25; 100 MG/1; MG/1
1.5 TABLET ORAL
Qty: 0 | Refills: 0 | Status: DISCONTINUED | OUTPATIENT
Start: 2018-08-02 | End: 2018-08-16

## 2018-08-02 RX ADMIN — CARBIDOPA AND LEVODOPA 1.5 TABLET(S): 25; 100 TABLET ORAL at 20:22

## 2018-08-02 RX ADMIN — CARBIDOPA, LEVODOPA, AND ENTACAPONE 1 TABLET(S): 50; 200; 200 TABLET, FILM COATED ORAL at 09:30

## 2018-08-02 RX ADMIN — CARBIDOPA AND LEVODOPA 1.5 TABLET(S): 25; 100 TABLET ORAL at 16:17

## 2018-08-02 RX ADMIN — CARBIDOPA, LEVODOPA, AND ENTACAPONE 1 TABLET(S): 50; 200; 200 TABLET, FILM COATED ORAL at 05:37

## 2018-08-02 RX ADMIN — CYCLOBENZAPRINE HYDROCHLORIDE 10 MILLIGRAM(S): 10 TABLET, FILM COATED ORAL at 22:39

## 2018-08-02 RX ADMIN — Medication 325 MILLIGRAM(S): at 05:40

## 2018-08-02 RX ADMIN — CARBIDOPA AND LEVODOPA 1.5 TABLET(S): 25; 100 TABLET ORAL at 05:37

## 2018-08-02 RX ADMIN — Medication 325 MILLIGRAM(S): at 22:04

## 2018-08-02 RX ADMIN — Medication 325 MILLIGRAM(S): at 22:45

## 2018-08-02 RX ADMIN — CARBIDOPA, LEVODOPA, AND ENTACAPONE 1 TABLET(S): 50; 200; 200 TABLET, FILM COATED ORAL at 16:17

## 2018-08-02 RX ADMIN — Medication 1 APPLICATION(S): at 09:30

## 2018-08-02 RX ADMIN — CARBIDOPA AND LEVODOPA 1.5 TABLET(S): 25; 100 TABLET ORAL at 11:19

## 2018-08-02 NOTE — PROGRESS NOTE BEHAVIORAL HEALTH - DETAILS
chronic neck and back pain, which is relieved by ice packs at times possible decubitus in gluteal fold--patient will not allow staff to view site or do wound tx skin is healing +; likely candidiasis type irritation

## 2018-08-02 NOTE — PROGRESS NOTE BEHAVIORAL HEALTH - SUMMARY
53 yo male with early onset Parkinson’s disease x 11 years, discharged from 4 prior nursing homes in 8 months due to his behaviors, hx of refusing medications,  transferred to [facility] Unit 2S on 6/12/17 where he manifested severe mood lability, paranoia and confabulation with poor insight and judgment including making > 40 Justice center complaints. 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 Patient was accepted to [facility] in [place] and discharged from [facility] on 7/12/17.  Upon getting to [facility], Patient refused to go into the facility and became combative. He was thus transferred back to [facility] ED which resulted in Service Line Chiefs’ of Service involvement given the history/issues and ultimate transfer to [address]. Patient has been on Unit 1N since. UPDATE: [facility] declined to accept Patient. Hundreds of nursing home applications wee sent out and all declined to accept patient. Court scheduled on 12/19/17 was deferred as Patient refused to go; he then again refused to go to the new Court date. Continuing to search for placement which have been unsuccessful; family does not want him living with them.  Neurological facility in [place] has accepted patient, and legal guardian is involved in transfer issues. Court hearing regarding Patient's guardianship was 3/27/18 and his sister was granted temporary guardianship. Court hearing for retention occurred on 3/28/18 and the hospital was granted an additional 60 days. Court date for a "check in" was on 5/15 during which time it was reported that there was a potential nursing home which the Patient's sister went to and liked. Another Court date was on 5/29/18 during which time the [facility] withdrew acceptance and now Pt's sister still has to find him an apartment to live.  Next Court date was scheduled for 06/26/18 which Patient attended. He caused some ruckus at the Court after he refused to get back on the stretcher to be brought back the hospital, which he ultimately did after the  threatened him with care home time for contempt of Court. Since that time, there have been no apparent reported move on his sister's part regarding updating the Team if she has found any appropriate places for Patient to go to. SW continues to try to contact Pt's sister for updates. As per Patient, no updates for months due to things like "my sister's car broke down," or "she did not pass the [employer]'s credit check." Sister has not been in regular contact with Unit team and very difficult to get in touch with. As per last conversation with SW, she hung up the phone call for no apparent reason. On 7/27/18, agency called Unit looking for Pt's sister as they have a $5000 check for her and she is not answering her phone/returning calls. Patient's sister came to visit on Saturday 7/28/18, and reportedly told staff that she feels "overwhelmed" with seeking apartments for patient and that she is not getting help looking for places from the Unit etc. Sister reminded that she assumed responsibility for this as Pt's guardian and inpatient psychiatric units and staff do not search for independent [place] real estate for patients. Suspecting that guardianship issue will return to Court as current guardian is not meeting her responsibilities and obligations in a timely manner. Week of end of July/start of August - Patient's  drafting a notification letter to Patient's sister regarding apparent lack of performance and fulfilling duties.

## 2018-08-02 NOTE — PROGRESS NOTE BEHAVIORAL HEALTH - OTHER
+ neck favoring one side, dressed in hospital gowns, adequate grooming and hygiene no psychomotor agitation at times (chronic and seemingly intentionaly as he can stop it when redirected). chronically limited but better since admission  more able to respond to redirection festinating gait but stable, using walker varying overall linear, at times episodes of being disorganized and circumstantial, concrete (chronic) but much better since initial admission chronically impaired but much improved since admission Limited but has improved since admission "ok"

## 2018-08-02 NOTE — PROGRESS NOTE BEHAVIORAL HEALTH - NSBHFUPINTERVALHXFT_PSY_A_CORE
Significant interval events - Patient agreed to a neck x-ray and a bedside x-ray was done yesterday with formal radiology report done finding no acute issues. Appreciate Radiology service being flexible regarding bedside study. Same clinical presentation. Visible on the Unit, tolerating the increased / adjusted Sinemet dose. Denies adverse side effects.

## 2018-08-03 PROCEDURE — 99231 SBSQ HOSP IP/OBS SF/LOW 25: CPT

## 2018-08-03 RX ADMIN — CARBIDOPA, LEVODOPA, AND ENTACAPONE 1 TABLET(S): 50; 200; 200 TABLET, FILM COATED ORAL at 16:29

## 2018-08-03 RX ADMIN — CARBIDOPA AND LEVODOPA 1.5 TABLET(S): 25; 100 TABLET ORAL at 06:01

## 2018-08-03 RX ADMIN — CARBIDOPA, LEVODOPA, AND ENTACAPONE 1 TABLET(S): 50; 200; 200 TABLET, FILM COATED ORAL at 06:01

## 2018-08-03 RX ADMIN — Medication 325 MILLIGRAM(S): at 20:50

## 2018-08-03 RX ADMIN — CARBIDOPA AND LEVODOPA 1.5 TABLET(S): 25; 100 TABLET ORAL at 20:17

## 2018-08-03 RX ADMIN — CARBIDOPA, LEVODOPA, AND ENTACAPONE 1 TABLET(S): 50; 200; 200 TABLET, FILM COATED ORAL at 09:58

## 2018-08-03 RX ADMIN — CARBIDOPA AND LEVODOPA 1.5 TABLET(S): 25; 100 TABLET ORAL at 14:18

## 2018-08-03 RX ADMIN — CARBIDOPA AND LEVODOPA 1.5 TABLET(S): 25; 100 TABLET ORAL at 09:58

## 2018-08-03 RX ADMIN — CARBIDOPA AND LEVODOPA 1.5 TABLET(S): 25; 100 TABLET ORAL at 16:29

## 2018-08-03 RX ADMIN — Medication 325 MILLIGRAM(S): at 20:17

## 2018-08-03 NOTE — PROGRESS NOTE BEHAVIORAL HEALTH - OTHER
+ neck favoring one side, dressed in hospital gowns, adequate grooming and hygiene no psychomotor agitation at times (chronic and seemingly intentionaly as he can stop it when redirected). chronically limited but better since admission  more able to respond to redirection festinating gait but stable, using walker "ok" overall linear, at times episodes of being disorganized and circumstantial, concrete (chronic) but much better since initial admission chronically impaired but much improved since admission Limited but has improved since admission

## 2018-08-03 NOTE — PROGRESS NOTE BEHAVIORAL HEALTH - DETAILS
chronic neck and back pain, which is relieved by ice packs at times skin is healing +; likely candidiasis type irritation

## 2018-08-03 NOTE — PROGRESS NOTE BEHAVIORAL HEALTH - SUMMARY
51 yo male with early onset Parkinson’s disease x 11 years, discharged from 4 prior nursing homes in 8 months due to his behaviors, hx of refusing medications,  transferred to [facility] Unit 2S on 6/12/17 where he manifested severe mood lability, paranoia and confabulation with poor insight and judgment including making > 40 Justice center complaints. 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 Patient was accepted to [place] [facility] in [place] and discharged from [facility] on 7/12/17.  Upon getting to [place] [facility], Patient refused to go into the facility and became combative. He was thus transferred back to [facility] ED which resulted in Service Line Chiefs’ of Service involvement given the history/issues and ultimate transfer to [address]. Patient has been on Unit 1N since. UPDATE: [facility] declined to accept Patient. Hundreds of nursing home applications wee sent out and all declined to accept patient. Court scheduled on 12/19/17 was deferred as Patient refused to go; he then again refused to go to the new Court date. Continuing to search for placement which have been unsuccessful; family does not want him living with them.  Neurological facility in [place] has accepted patient, and legal guardian is involved in transfer issues. Court hearing regarding Patient's guardianship was 3/27/18 and his sister was granted temporary guardianship. Court hearing for retention occurred on 3/28/18 and the hospital was granted an additional 60 days. Court date for a "check in" was on 5/15 during which time it was reported that there was a potential nursing home which the Patient's sister went to and liked. Another Court date was on 5/29/18 during which time the [facility] withdrew acceptance and now Pt's sister still has to find him an apartment to live.  Next Court date was scheduled for 06/26/18 which Patient attended. He caused some ruckus at the Court after he refused to get back on the stretcher to be brought back the hospital, which he ultimately did after the  threatened him with [facility] time for contempt of Court. Since that time, there have been no apparent reported move on his sister's part regarding updating the Team if she has found any appropriate places for Patient to go to. SW continues to try to contact Pt's sister for updates. As per Patient, no updates for months due to things like "my sister's car broke down," or "she did not pass the [employer]'s credit check." Sister has not been in regular contact with Unit team and very difficult to get in touch with. As per last conversation with SW, she hung up the phone call for no apparent reason. On 7/27/18, agency called Unit looking for Pt's sister as they have a $5000 check for her and she is not answering her phone/returning calls. Patient's sister came to visit on Saturday 7/28/18, and reportedly told staff that she feels "overwhelmed" with seeking apartments for patient and that she is not getting help looking for places from the Unit etc. Sister reminded that she assumed responsibility for this as Pt's guardian and inpatient psychiatric units and staff do not search for independent [place] real estate for patients. Suspecting that guardianship issue will return to Court as current guardian is not meeting her responsibilities and obligations in a timely manner. Week of end of July/start of August - Patient's  drafting a notification letter to Patient's sister regarding apparent lack of performance and fulfilling duties.

## 2018-08-03 NOTE — PROGRESS NOTE BEHAVIORAL HEALTH - NSBHADMITMEDEDUDETAILS_A_CORE FT
continue current management. continue current management; PT follow up to be called as per Neurologist's recommendation

## 2018-08-03 NOTE — PROGRESS NOTE BEHAVIORAL HEALTH - NSBHCHARTREVIEWIMAGING_PSY_A_CORE FT
neck x-ray - mild curvature reversal centered at the C3-4 level; no prevertebral soft tissue ; some lower cervical spondylitic change mainly anteriorly away from the spinal canal.
neck x-ray - mild curvature reversal centered at the C3-4 level; no prevertebral soft tissue ; some lower cervical spondylitic change mainly anteriorly away from the spinal canal.

## 2018-08-03 NOTE — PROGRESS NOTE BEHAVIORAL HEALTH - NSBHFUPINTERVALHXFT_PSY_A_CORE
Significant interval events - appreciate Neurology follow-up appreciated and recommendations reviewed and implemented. PT to be called today for eval/follow-up. Same clinical presentation otherwise. Visible on the Unit, tolerating the increased / adjusted Sinemet dose. Denies adverse side effects. Significant interval events - appreciate Neurology follow-up appreciated; added on another dose of Sinemet for progressing Parkinson's, and recommendations reviewed and implemented. PT to be called today for eval/follow-up. Same clinical presentation otherwise. Visible on the Unit, tolerating the increased / adjusted Sinemet dose. Denies adverse side effects.

## 2018-08-04 RX ADMIN — CARBIDOPA, LEVODOPA, AND ENTACAPONE 1 TABLET(S): 50; 200; 200 TABLET, FILM COATED ORAL at 15:54

## 2018-08-04 RX ADMIN — CARBIDOPA AND LEVODOPA 1.5 TABLET(S): 25; 100 TABLET ORAL at 14:31

## 2018-08-04 RX ADMIN — CYCLOBENZAPRINE HYDROCHLORIDE 10 MILLIGRAM(S): 10 TABLET, FILM COATED ORAL at 22:34

## 2018-08-04 RX ADMIN — CARBIDOPA AND LEVODOPA 1.5 TABLET(S): 25; 100 TABLET ORAL at 10:18

## 2018-08-04 RX ADMIN — CARBIDOPA, LEVODOPA, AND ENTACAPONE 1 TABLET(S): 50; 200; 200 TABLET, FILM COATED ORAL at 06:11

## 2018-08-04 RX ADMIN — Medication 325 MILLIGRAM(S): at 22:35

## 2018-08-04 RX ADMIN — CARBIDOPA AND LEVODOPA 1.5 TABLET(S): 25; 100 TABLET ORAL at 16:04

## 2018-08-04 RX ADMIN — CARBIDOPA AND LEVODOPA 1.5 TABLET(S): 25; 100 TABLET ORAL at 06:11

## 2018-08-04 RX ADMIN — CARBIDOPA, LEVODOPA, AND ENTACAPONE 1 TABLET(S): 50; 200; 200 TABLET, FILM COATED ORAL at 10:18

## 2018-08-04 RX ADMIN — CARBIDOPA AND LEVODOPA 1.5 TABLET(S): 25; 100 TABLET ORAL at 19:52

## 2018-08-05 PROCEDURE — 99231 SBSQ HOSP IP/OBS SF/LOW 25: CPT

## 2018-08-05 RX ADMIN — CARBIDOPA AND LEVODOPA 1.5 TABLET(S): 25; 100 TABLET ORAL at 05:28

## 2018-08-05 RX ADMIN — Medication 325 MILLIGRAM(S): at 09:52

## 2018-08-05 RX ADMIN — CARBIDOPA AND LEVODOPA 1.5 TABLET(S): 25; 100 TABLET ORAL at 19:52

## 2018-08-05 RX ADMIN — CARBIDOPA AND LEVODOPA 1.5 TABLET(S): 25; 100 TABLET ORAL at 13:31

## 2018-08-05 RX ADMIN — Medication 325 MILLIGRAM(S): at 07:36

## 2018-08-05 RX ADMIN — CYCLOBENZAPRINE HYDROCHLORIDE 10 MILLIGRAM(S): 10 TABLET, FILM COATED ORAL at 17:12

## 2018-08-05 RX ADMIN — CARBIDOPA, LEVODOPA, AND ENTACAPONE 1 TABLET(S): 50; 200; 200 TABLET, FILM COATED ORAL at 16:39

## 2018-08-05 RX ADMIN — CARBIDOPA AND LEVODOPA 1.5 TABLET(S): 25; 100 TABLET ORAL at 09:52

## 2018-08-05 RX ADMIN — CARBIDOPA AND LEVODOPA 1.5 TABLET(S): 25; 100 TABLET ORAL at 16:39

## 2018-08-05 RX ADMIN — CARBIDOPA, LEVODOPA, AND ENTACAPONE 1 TABLET(S): 50; 200; 200 TABLET, FILM COATED ORAL at 05:28

## 2018-08-05 RX ADMIN — CARBIDOPA, LEVODOPA, AND ENTACAPONE 1 TABLET(S): 50; 200; 200 TABLET, FILM COATED ORAL at 09:52

## 2018-08-05 RX ADMIN — Medication 30 MILLILITER(S): at 00:05

## 2018-08-05 NOTE — PROGRESS NOTE BEHAVIORAL HEALTH - OTHER
+ neck favoring one side, dressed in hospital gowns, adequate grooming and hygiene no psychomotor agitation at times (chronic and seemingly intentionaly as he can stop it when redirected). chronically limited but better since admission  more able to respond to redirection festinating gait but stable, using walker "I had spicy pizza" overall linear, at times episodes of being disorganized and circumstantial, concrete (chronic) but much better since initial admission chronically impaired but much improved since admission Limited but has improved since admission

## 2018-08-05 NOTE — PROGRESS NOTE BEHAVIORAL HEALTH - SUMMARY
53 yo male with early onset Parkinson’s disease x 11 years, discharged from 4 prior nursing homes in 8 months due to his behaviors, hx of refusing medications,  transferred to [facility] Unit 2S on 6/12/17 where he manifested severe mood lability, paranoia and confabulation with poor insight and judgment including making > 40 Justice center complaints. 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 Patient was accepted to [facility] in [place] and discharged from [facility] on 7/12/17.  Upon getting to [facility], Patient refused to go into the facility and became combative. He was thus transferred back to [facility] ED which resulted in Service Line Chiefs’ of Service involvement given the history/issues and ultimate transfer to [address]. Patient has been on Unit 1N since. UPDATE: [facility] declined to accept Patient. Hundreds of nursing home applications wee sent out and all declined to accept patient. Court scheduled on 12/19/17 was deferred as Patient refused to go; he then again refused to go to the new Court date. Continuing to search for placement which have been unsuccessful; family does not want him living with them.  Neurological facility in [place] has accepted patient, and legal guardian is involved in transfer issues. Court hearing regarding Patient's guardianship was 3/27/18 and his sister was granted temporary guardianship. Court hearing for retention occurred on 3/28/18 and the hospital was granted an additional 60 days. Court date for a "check in" was on 5/15 during which time it was reported that there was a potential nursing home which the Patient's sister went to and liked. Another Court date was on 5/29/18 during which time the [facility] withdrew acceptance and now Pt's sister still has to find him an apartment to live.  Next Court date was scheduled for 06/26/18 which Patient attended. He caused some ruckus at the Court after he refused to get back on the stretcher to be brought back the hospital, which he ultimately did after the  threatened him with [facility] time for contempt of Court. Since that time, there have been no apparent reported move on his sister's part regarding updating the Team if she has found any appropriate places for Patient to go to. SW continues to try to contact Pt's sister for updates. As per Patient, no updates for months due to things like "my sister's car broke down," or "she did not pass the [employer]'s credit check." Sister has not been in regular contact with Unit team and very difficult to get in touch with. As per last conversation with SW, she hung up the phone call for no apparent reason. On 7/27/18, agency called Unit looking for Pt's sister as they have a $5000 check for her and she is not answering her phone/returning calls. Patient's sister came to visit on Saturday 7/28/18, and reportedly told staff that she feels "overwhelmed" with seeking apartments for patient and that she is not getting help looking for places from the Unit etc. Sister reminded that she assumed responsibility for this as Pt's guardian and inpatient psychiatric units and staff do not search for independent [place] real estate for patients. Suspecting that guardianship issue will return to Court as current guardian is not meeting her responsibilities and obligations in a timely manner. Week of end of July/start of August - Patient's  drafting a notification letter to Patient's sister regarding apparent lack of performance and fulfilling duties. Awaiting next step after letter is received by guardian /sister.

## 2018-08-05 NOTE — PROGRESS NOTE BEHAVIORAL HEALTH - NSBHFUPINTERVALHXFT_PSY_A_CORE
No significant interval events. Patient talked about being more "shaky" because he ate spicy pizza last evening. Same clinical presentation otherwise. Visible on the Unit, tolerating the increased / adjusted Sinemet dose then later said he thinks the other agent should have been increased and not the Sinemet even though he said the opposite last week (chronic). Denies adverse side effects.

## 2018-08-06 PROCEDURE — 99231 SBSQ HOSP IP/OBS SF/LOW 25: CPT

## 2018-08-06 RX ADMIN — Medication 325 MILLIGRAM(S): at 17:44

## 2018-08-06 RX ADMIN — Medication 325 MILLIGRAM(S): at 21:50

## 2018-08-06 RX ADMIN — Medication 325 MILLIGRAM(S): at 12:04

## 2018-08-06 RX ADMIN — Medication 325 MILLIGRAM(S): at 00:50

## 2018-08-06 RX ADMIN — CARBIDOPA AND LEVODOPA 1.5 TABLET(S): 25; 100 TABLET ORAL at 16:20

## 2018-08-06 RX ADMIN — CYCLOBENZAPRINE HYDROCHLORIDE 10 MILLIGRAM(S): 10 TABLET, FILM COATED ORAL at 19:15

## 2018-08-06 RX ADMIN — CARBIDOPA AND LEVODOPA 1.5 TABLET(S): 25; 100 TABLET ORAL at 09:15

## 2018-08-06 RX ADMIN — CARBIDOPA AND LEVODOPA 1.5 TABLET(S): 25; 100 TABLET ORAL at 06:03

## 2018-08-06 RX ADMIN — Medication 325 MILLIGRAM(S): at 00:10

## 2018-08-06 RX ADMIN — Medication 325 MILLIGRAM(S): at 21:16

## 2018-08-06 RX ADMIN — CARBIDOPA, LEVODOPA, AND ENTACAPONE 1 TABLET(S): 50; 200; 200 TABLET, FILM COATED ORAL at 15:11

## 2018-08-06 RX ADMIN — CARBIDOPA, LEVODOPA, AND ENTACAPONE 1 TABLET(S): 50; 200; 200 TABLET, FILM COATED ORAL at 06:03

## 2018-08-06 RX ADMIN — Medication 325 MILLIGRAM(S): at 10:57

## 2018-08-06 RX ADMIN — CARBIDOPA, LEVODOPA, AND ENTACAPONE 1 TABLET(S): 50; 200; 200 TABLET, FILM COATED ORAL at 09:30

## 2018-08-06 RX ADMIN — Medication 325 MILLIGRAM(S): at 16:43

## 2018-08-06 RX ADMIN — CARBIDOPA AND LEVODOPA 1.5 TABLET(S): 25; 100 TABLET ORAL at 20:01

## 2018-08-06 RX ADMIN — CARBIDOPA AND LEVODOPA 1.5 TABLET(S): 25; 100 TABLET ORAL at 13:25

## 2018-08-06 NOTE — PROGRESS NOTE BEHAVIORAL HEALTH - SUMMARY
53 yo male with early onset Parkinson’s disease x 11 years, discharged from 4 prior nursing homes in 8 months due to his behaviors, hx of refusing medications,  transferred to [facility] Unit 2S on 6/12/17 where he manifested severe mood lability, paranoia and confabulation with poor insight and judgment including making > 40 Justice center complaints. 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 Patient was accepted to [facility] in [place] and discharged from [facility] on 7/12/17.  Upon getting to [facility], Patient refused to go into the facility and became combative. He was thus transferred back to [facility] ED which resulted in Service Line Chiefs’ of Service involvement given the history/issues and ultimate transfer to [address]. Patient has been on Unit 1N since. UPDATE: [facility] declined to accept Patient. Hundreds of nursing home applications wee sent out and all declined to accept patient. Court scheduled on 12/19/17 was deferred as Patient refused to go; he then again refused to go to the new Court date. Continuing to search for placement which have been unsuccessful; family does not want him living with them.  Neurological facility in [place] has accepted patient, and legal guardian is involved in transfer issues. Court hearing regarding Patient's guardianship was 3/27/18 and his sister was granted temporary guardianship. Court hearing for retention occurred on 3/28/18 and the hospital was granted an additional 60 days. Court date for a "check in" was on 5/15 during which time it was reported that there was a potential nursing home which the Patient's sister went to and liked. Another Court date was on 5/29/18 during which time the [facility] withdrew acceptance and now Pt's sister still has to find him an apartment to live.  Next Court date was scheduled for 06/26/18 which Patient attended. He caused some ruckus at the Court after he refused to get back on the stretcher to be brought back the hospital, which he ultimately did after the  threatened him with [facility] time for contempt of Court. Since that time, there have been no apparent reported move on his sister's part regarding updating the Team if she has found any appropriate places for Patient to go to. SW continues to try to contact Pt's sister for updates. As per Patient, no updates for months due to things like "my sister's car broke down," or "she did not pass the [employer]'s credit check." Sister has not been in regular contact with Unit team and very difficult to get in touch with. As per last conversation with SW, she hung up the phone call for no apparent reason. On 7/27/18, agency called Unit looking for Pt's sister as they have a $5000 check for her and she is not answering her phone/returning calls. Patient's sister came to visit on Saturday 7/28/18, and reportedly told staff that she feels "overwhelmed" with seeking apartments for patient and that she is not getting help looking for places from the Unit etc. Sister reminded that she assumed responsibility for this as Pt's guardian and inpatient psychiatric units and staff do not search for independent [place] real estate for patients. Suspecting that guardianship issue will return to Court as current guardian is not meeting her responsibilities and obligations in a timely manner. Week of end of July/start of August - Patient's  drafting a notification letter to Patient's sister regarding apparent lack of performance and fulfilling duties. Awaiting next step after letter is received by guardian /sister.

## 2018-08-06 NOTE — PROGRESS NOTE BEHAVIORAL HEALTH - NSBHFUPINTERVALHXFT_PSY_A_CORE
Patient sitting in dayroom, finishing lunch, demanding his 1pm medications - time was 12:55pm; Pt said "the wall clock shows otherwise" (it showed 1:01pm) Same clinical presentation. No update regarding sister as per  or Team. Patient mentioned that she reportedly told him that she "only" found a 3 bedroom place.

## 2018-08-06 NOTE — PROGRESS NOTE BEHAVIORAL HEALTH - OTHER
+ neck favoring one side, dressed in hospital gowns, adequate grooming and hygiene no psychomotor agitation at times (chronic and seemingly intentionaly as he can stop it when redirected). chronically limited but better since admission  more able to respond to redirection festinating gait but stable, using walker "I want my 1pm medication" overall linear, at times episodes of being disorganized and circumstantial, concrete (chronic) but much better since initial admission chronically impaired but much improved since admission Limited but has improved since admission

## 2018-08-07 PROCEDURE — 99232 SBSQ HOSP IP/OBS MODERATE 35: CPT

## 2018-08-07 PROCEDURE — 99231 SBSQ HOSP IP/OBS SF/LOW 25: CPT

## 2018-08-07 RX ADMIN — CARBIDOPA AND LEVODOPA 1.5 TABLET(S): 25; 100 TABLET ORAL at 10:04

## 2018-08-07 RX ADMIN — CARBIDOPA AND LEVODOPA 1.5 TABLET(S): 25; 100 TABLET ORAL at 16:06

## 2018-08-07 RX ADMIN — Medication 325 MILLIGRAM(S): at 08:43

## 2018-08-07 RX ADMIN — Medication 325 MILLIGRAM(S): at 09:59

## 2018-08-07 RX ADMIN — CYCLOBENZAPRINE HYDROCHLORIDE 10 MILLIGRAM(S): 10 TABLET, FILM COATED ORAL at 20:24

## 2018-08-07 RX ADMIN — CARBIDOPA AND LEVODOPA 1.5 TABLET(S): 25; 100 TABLET ORAL at 20:24

## 2018-08-07 RX ADMIN — Medication 325 MILLIGRAM(S): at 21:00

## 2018-08-07 RX ADMIN — Medication 325 MILLIGRAM(S): at 20:25

## 2018-08-07 RX ADMIN — CYCLOBENZAPRINE HYDROCHLORIDE 10 MILLIGRAM(S): 10 TABLET, FILM COATED ORAL at 10:47

## 2018-08-07 RX ADMIN — CARBIDOPA AND LEVODOPA 1.5 TABLET(S): 25; 100 TABLET ORAL at 06:12

## 2018-08-07 RX ADMIN — CARBIDOPA, LEVODOPA, AND ENTACAPONE 1 TABLET(S): 50; 200; 200 TABLET, FILM COATED ORAL at 06:13

## 2018-08-07 RX ADMIN — CARBIDOPA, LEVODOPA, AND ENTACAPONE 1 TABLET(S): 50; 200; 200 TABLET, FILM COATED ORAL at 10:04

## 2018-08-07 RX ADMIN — CARBIDOPA, LEVODOPA, AND ENTACAPONE 1 TABLET(S): 50; 200; 200 TABLET, FILM COATED ORAL at 15:55

## 2018-08-07 RX ADMIN — CARBIDOPA AND LEVODOPA 1.5 TABLET(S): 25; 100 TABLET ORAL at 13:00

## 2018-08-07 NOTE — PROGRESS NOTE BEHAVIORAL HEALTH - OTHER
chronically impaired but much improved since admission Limited but has improved since admission chronically limited but better since admission  more able to respond to redirection festinating gait but stable, using walker overall linear, at times episodes of being disorganized and circumstantial, concrete (chronic) but much better since initial admission + neck favoring one side, dressed in hospital gowns, adequate grooming and hygiene no psychomotor agitation at times (chronic and seemingly intentionaly as he can stop it when redirected).

## 2018-08-07 NOTE — PROGRESS NOTE BEHAVIORAL HEALTH - NSBHFUPINTERVALHXFT_PSY_A_CORE
Day 391. Patient sitting in room, and was upset because he believes that she took  $[id number] out of his account and was tearful and angry. He says his brother told her. No real changes in presentation. He was otherwise at his baseline. Supportive therapy given.

## 2018-08-07 NOTE — CHART NOTE - NSCHARTNOTEFT_GEN_A_CORE
Patient inserted small piece of paper towel into the left nostril.  Upon exam no foreign object noticed.  No sign of respiratory distress.  Patient is not cooperative with exam.  Offered ENT consult,, but refused understand risks, and benefits.  patient keep repeating in an angry tone that it came out.  Patient reports that he keep blowing nose until it came out.  please re-consult us if needed.  samaria VÁSQUEZ present

## 2018-08-07 NOTE — PROGRESS NOTE BEHAVIORAL HEALTH - SUMMARY
53 yo male with early onset Parkinson’s disease x 11 years, discharged from 4 prior nursing homes in 8 months due to his behaviors, hx of refusing medications,  transferred to [facility] Unit 2S on 6/12/17 where he manifested severe mood lability, paranoia and confabulation with poor insight and judgment including making > 40 [facility] center complaints. 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 Patient was accepted to [facility] in [place] and discharged from [facility] on 7/12/17.  Upon getting to [facility], Patient refused to go into the facility and became combative. He was thus transferred back to [facility] ED which resulted in Service Line Chiefs’ of Service involvement given the history/issues and ultimate transfer to [address]. Patient has been on Unit 1N since. UPDATE: [facility] declined to accept Patient. Hundreds of nursing home applications wee sent out and all declined to accept patient. Court scheduled on 12/19/17 was deferred as Patient refused to go; he then again refused to go to the new Court date. Continuing to search for placement which have been unsuccessful; family does not want him living with them.  Neurological facility in [place] has accepted patient, and legal guardian is involved in transfer issues. Court hearing regarding Patient's guardianship was 3/27/18 and his sister was granted temporary guardianship. Court hearing for retention occurred on 3/28/18 and the hospital was granted an additional 60 days. Court date for a "check in" was on 5/15 during which time it was reported that there was a potential nursing home which the Patient's sister went to and liked. Another Court date was on 5/29/18 during which time the [facility] withdrew acceptance and now Pt's sister still has to find him an apartment to live.  Next Court date was scheduled for 06/26/18 which Patient attended. He caused some ruckus at the Court after he refused to get back on the stretcher to be brought back the hospital, which he ultimately did after the  threatened him with intermediate time for contempt of Court. Since that time, there have been no apparent reported move on his sister's part regarding updating the Team if she has found any appropriate places for Patient to go to. SW continues to try to contact Pt's sister for updates. As per Patient, no updates for months due to things like "my sister's car broke down," or "she did not pass the [employer]'s credit check." Sister has not been in regular contact with Unit team and very difficult to get in touch with. As per last conversation with SW, she hung up the phone call for no apparent reason. On 7/27/18, agency called Unit looking for Pt's sister as they have a $5000 check for her and she is not answering her phone/returning calls. Patient's sister came to visit on Saturday 7/28/18, and reportedly told staff that she feels "overwhelmed" with seeking apartments for patient and that she is not getting help looking for places from the Unit etc. Sister reminded that she assumed responsibility for this as Pt's guardian and inpatient psychiatric units and staff do not search for independent [place] real estate for patients. Suspecting that guardianship issue will return to Court as current guardian is not meeting her responsibilities and obligations in a timely manner. Week of end of July/start of August - Patient's  drafting a notification letter to Patient's sister regarding apparent lack of performance and fulfilling duties. Awaiting next step after letter is received by guardian /sister.

## 2018-08-08 PROCEDURE — 99231 SBSQ HOSP IP/OBS SF/LOW 25: CPT

## 2018-08-08 RX ADMIN — Medication 325 MILLIGRAM(S): at 11:15

## 2018-08-08 RX ADMIN — CARBIDOPA AND LEVODOPA 1.5 TABLET(S): 25; 100 TABLET ORAL at 09:52

## 2018-08-08 RX ADMIN — Medication 325 MILLIGRAM(S): at 15:59

## 2018-08-08 RX ADMIN — CARBIDOPA AND LEVODOPA 1.5 TABLET(S): 25; 100 TABLET ORAL at 20:43

## 2018-08-08 RX ADMIN — CARBIDOPA AND LEVODOPA 1.5 TABLET(S): 25; 100 TABLET ORAL at 13:25

## 2018-08-08 RX ADMIN — CARBIDOPA AND LEVODOPA 1.5 TABLET(S): 25; 100 TABLET ORAL at 16:01

## 2018-08-08 RX ADMIN — CARBIDOPA, LEVODOPA, AND ENTACAPONE 1 TABLET(S): 50; 200; 200 TABLET, FILM COATED ORAL at 09:52

## 2018-08-08 RX ADMIN — CARBIDOPA, LEVODOPA, AND ENTACAPONE 1 TABLET(S): 50; 200; 200 TABLET, FILM COATED ORAL at 06:17

## 2018-08-08 RX ADMIN — Medication 325 MILLIGRAM(S): at 15:30

## 2018-08-08 RX ADMIN — Medication 325 MILLIGRAM(S): at 10:34

## 2018-08-08 RX ADMIN — CARBIDOPA, LEVODOPA, AND ENTACAPONE 1 TABLET(S): 50; 200; 200 TABLET, FILM COATED ORAL at 16:00

## 2018-08-08 RX ADMIN — CARBIDOPA AND LEVODOPA 1.5 TABLET(S): 25; 100 TABLET ORAL at 06:16

## 2018-08-08 NOTE — PROGRESS NOTE BEHAVIORAL HEALTH - NSBHFUPINTERVALHXFT_PSY_A_CORE
Day 392. Patient sitting in room, and was calmer today. He is generally upset at his situation but in good control. No real changes in presentation. He was otherwise at his baseline. Supportive therapy given.

## 2018-08-08 NOTE — PROGRESS NOTE BEHAVIORAL HEALTH - OTHER
+ neck favoring one side, dressed in hospital gowns, adequate grooming and hygiene no psychomotor agitation at times (chronic and seemingly intentionaly as he can stop it when redirected). chronically limited but better since admission  more able to respond to redirection festinating gait but stable, using walker overall linear, at times episodes of being disorganized and circumstantial, concrete (chronic) but much better since initial admission chronically impaired but much improved since admission Limited but has improved since admission

## 2018-08-08 NOTE — PROGRESS NOTE BEHAVIORAL HEALTH - SUMMARY
51 yo male with early onset Parkinson’s disease x 11 years, discharged from 4 prior nursing homes in 8 months due to his behaviors, hx of refusing medications,  transferred to [facility] Unit 2S on 6/12/17 where he manifested severe mood lability, paranoia and confabulation with poor insight and judgment including making > 40 Justice center complaints. 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 Patient was accepted to [facility] in [place] and discharged from [facility] on 7/12/17.  Upon getting to [facility], Patient refused to go into the facility and became combative. He was thus transferred back to [facility] ED which resulted in Service Line Chiefs’ of Service involvement given the history/issues and ultimate transfer to [address]. Patient has been on Unit 1N since. UPDATE: [facility] declined to accept Patient. Hundreds of nursing home applications wee sent out and all declined to accept patient. Court scheduled on 12/19/17 was deferred as Patient refused to go; he then again refused to go to the new Court date. Continuing to search for placement which have been unsuccessful; family does not want him living with them.  Neurological facility in [place] has accepted patient, and legal guardian is involved in transfer issues. Court hearing regarding Patient's guardianship was 3/27/18 and his sister was granted temporary guardianship. Court hearing for retention occurred on 3/28/18 and the hospital was granted an additional 60 days. Court date for a "check in" was on 5/15 during which time it was reported that there was a potential nursing home which the Patient's sister went to and liked. Another Court date was on 5/29/18 during which time the [facility] withdrew acceptance and now Pt's sister still has to find him an apartment to live.  Next Court date was scheduled for 06/26/18 which Patient attended. He caused some ruckus at the Court after he refused to get back on the stretcher to be brought back the hospital, which he ultimately did after the  threatened him with custodial time for contempt of Court. Since that time, there have been no apparent reported move on his sister's part regarding updating the Team if she has found any appropriate places for Patient to go to. SW continues to try to contact Pt's sister for updates. As per Patient, no updates for months due to things like "my sister's car broke down," or "she did not pass the [employer]'s credit check." Sister has not been in regular contact with Unit team and very difficult to get in touch with. As per last conversation with SW, she hung up the phone call for no apparent reason. On 7/27/18, agency called Unit looking for Pt's sister as they have a $5000 check for her and she is not answering her phone/returning calls. Patient's sister came to visit on Saturday 7/28/18, and reportedly told staff that she feels "overwhelmed" with seeking apartments for patient and that she is not getting help looking for places from the Unit etc. Sister reminded that she assumed responsibility for this as Pt's guardian and inpatient psychiatric units and staff do not search for independent [place] real estate for patients. Suspecting that guardianship issue will return to Court as current guardian is not meeting her responsibilities and obligations in a timely manner. Week of end of July/start of August - Patient's  drafting a notification letter to Patient's sister regarding apparent lack of performance and fulfilling duties. Awaiting next step after letter is received by guardian /sister.

## 2018-08-09 PROCEDURE — 99231 SBSQ HOSP IP/OBS SF/LOW 25: CPT

## 2018-08-09 PROCEDURE — 12345: CPT

## 2018-08-09 RX ORDER — HALOPERIDOL DECANOATE 100 MG/ML
5 INJECTION INTRAMUSCULAR EVERY 6 HOURS
Qty: 0 | Refills: 0 | Status: COMPLETED | OUTPATIENT
Start: 2018-08-09 | End: 2018-08-09

## 2018-08-09 RX ADMIN — CARBIDOPA AND LEVODOPA 1.5 TABLET(S): 25; 100 TABLET ORAL at 10:15

## 2018-08-09 RX ADMIN — CARBIDOPA AND LEVODOPA 1.5 TABLET(S): 25; 100 TABLET ORAL at 19:54

## 2018-08-09 RX ADMIN — CARBIDOPA AND LEVODOPA 1.5 TABLET(S): 25; 100 TABLET ORAL at 13:36

## 2018-08-09 RX ADMIN — CARBIDOPA AND LEVODOPA 1.5 TABLET(S): 25; 100 TABLET ORAL at 16:04

## 2018-08-09 RX ADMIN — HALOPERIDOL DECANOATE 5 MILLIGRAM(S): 100 INJECTION INTRAMUSCULAR at 20:55

## 2018-08-09 RX ADMIN — CARBIDOPA, LEVODOPA, AND ENTACAPONE 1 TABLET(S): 50; 200; 200 TABLET, FILM COATED ORAL at 16:03

## 2018-08-09 RX ADMIN — CARBIDOPA, LEVODOPA, AND ENTACAPONE 1 TABLET(S): 50; 200; 200 TABLET, FILM COATED ORAL at 06:03

## 2018-08-09 RX ADMIN — Medication 325 MILLIGRAM(S): at 04:29

## 2018-08-09 RX ADMIN — CARBIDOPA, LEVODOPA, AND ENTACAPONE 1 TABLET(S): 50; 200; 200 TABLET, FILM COATED ORAL at 10:15

## 2018-08-09 RX ADMIN — Medication 325 MILLIGRAM(S): at 06:00

## 2018-08-09 RX ADMIN — Medication 2 MILLIGRAM(S): at 20:55

## 2018-08-09 RX ADMIN — CARBIDOPA AND LEVODOPA 1.5 TABLET(S): 25; 100 TABLET ORAL at 06:03

## 2018-08-09 NOTE — PROGRESS NOTE BEHAVIORAL HEALTH - NSBHFUPINTERVALHXFT_PSY_A_CORE
Day 392. Patient sitting in room, and was calmer today.  He now reports that he spoke to his niece and that his sister didn't steal the money but instead seems to have found an apt. in the [place]. It is unclear if any of this information is true or not. He denies any problems and was in a bit of a joking mood today. He is generally in good control. No real changes in presentation. He was otherwise at his baseline. Supportive therapy given.

## 2018-08-09 NOTE — CHART NOTE - NSCHARTNOTEFT_GEN_A_CORE
Per RN, patient has become anxious and agitated and physically uncooperative.  Patient recently has learned some bad news and thus has been acting out.  Will activate the haldol PRN order and also order ativan IM as well..

## 2018-08-09 NOTE — PROGRESS NOTE BEHAVIORAL HEALTH - SUMMARY
51 yo male with early onset Parkinson’s disease x 11 years, discharged from 4 prior nursing homes in 8 months due to his behaviors, hx of refusing medications,  transferred to [facility] Unit 2S on 6/12/17 where he manifested severe mood lability, paranoia and confabulation with poor insight and judgment including making > 40 Justice center complaints. 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 Patient was accepted to [facility] in [place] and discharged from [facility] on 7/12/17.  Upon getting to [facility], Patient refused to go into the facility and became combative. He was thus transferred back to [facility] ED which resulted in Service Line Chiefs’ of Service involvement given the history/issues and ultimate transfer to [address]. Patient has been on Unit 1N since. UPDATE: [facility] declined to accept Patient. Hundreds of nursing home applications wee sent out and all declined to accept patient. Court scheduled on 12/19/17 was deferred as Patient refused to go; he then again refused to go to the new Court date. Continuing to search for placement which have been unsuccessful; family does not want him living with them.  Neurological facility in [place] has accepted patient, and legal guardian is involved in transfer issues. Court hearing regarding Patient's guardianship was 3/27/18 and his sister was granted temporary guardianship. Court hearing for retention occurred on 3/28/18 and the hospital was granted an additional 60 days. Court date for a "check in" was on 5/15 during which time it was reported that there was a potential nursing home which the Patient's sister went to and liked. Another Court date was on 5/29/18 during which time the [facility] withdrew acceptance and now Pt's sister still has to find him an apartment to live.  Next Court date was scheduled for 06/26/18 which Patient attended. He caused some ruckus at the Court after he refused to get back on the stretcher to be brought back the hospital, which he ultimately did after the  threatened him with long-term time for contempt of Court. Since that time, there have been no apparent reported move on his sister's part regarding updating the Team if she has found any appropriate places for Patient to go to. SW continues to try to contact Pt's sister for updates. As per Patient, no updates for months due to things like "my sister's car broke down," or "she did not pass the [employer]'s credit check." Sister has not been in regular contact with Unit team and very difficult to get in touch with. As per last conversation with SW, she hung up the phone call for no apparent reason. On 7/27/18, agency called Unit looking for Pt's sister as they have a $5000 check for her and she is not answering her phone/returning calls. Patient's sister came to visit on Saturday 7/28/18, and reportedly told staff that she feels "overwhelmed" with seeking apartments for patient and that she is not getting help looking for places from the Unit etc. Sister reminded that she assumed responsibility for this as Pt's guardian and inpatient psychiatric units and staff do not search for independent [place] real estate for patients. Suspecting that guardianship issue will return to Court as current guardian is not meeting her responsibilities and obligations in a timely manner. Week of end of July/start of August - Patient's  drafting a notification letter to Patient's sister regarding apparent lack of performance and fulfilling duties. Awaiting next step after letter is received by guardian /sister.

## 2018-08-10 PROCEDURE — 99231 SBSQ HOSP IP/OBS SF/LOW 25: CPT

## 2018-08-10 RX ADMIN — Medication 2 MILLIGRAM(S): at 18:34

## 2018-08-10 RX ADMIN — CARBIDOPA AND LEVODOPA 1.5 TABLET(S): 25; 100 TABLET ORAL at 10:59

## 2018-08-10 RX ADMIN — CARBIDOPA, LEVODOPA, AND ENTACAPONE 1 TABLET(S): 50; 200; 200 TABLET, FILM COATED ORAL at 06:06

## 2018-08-10 RX ADMIN — Medication 325 MILLIGRAM(S): at 23:22

## 2018-08-10 RX ADMIN — CARBIDOPA AND LEVODOPA 1.5 TABLET(S): 25; 100 TABLET ORAL at 14:26

## 2018-08-10 RX ADMIN — CARBIDOPA AND LEVODOPA 1.5 TABLET(S): 25; 100 TABLET ORAL at 17:36

## 2018-08-10 RX ADMIN — CARBIDOPA, LEVODOPA, AND ENTACAPONE 1 TABLET(S): 50; 200; 200 TABLET, FILM COATED ORAL at 17:36

## 2018-08-10 RX ADMIN — CARBIDOPA AND LEVODOPA 1.5 TABLET(S): 25; 100 TABLET ORAL at 20:28

## 2018-08-10 RX ADMIN — CARBIDOPA, LEVODOPA, AND ENTACAPONE 1 TABLET(S): 50; 200; 200 TABLET, FILM COATED ORAL at 10:59

## 2018-08-10 RX ADMIN — CARBIDOPA AND LEVODOPA 1.5 TABLET(S): 25; 100 TABLET ORAL at 06:06

## 2018-08-10 NOTE — PROGRESS NOTE BEHAVIORAL HEALTH - SUMMARY
51 yo male with early onset Parkinson’s disease x 11 years, discharged from 4 prior nursing homes in 8 months due to his behaviors, hx of refusing medications,  transferred to [facility] Unit 2S on 6/12/17 where he manifested severe mood lability, paranoia and confabulation with poor insight and judgment including making > 40 Justice center complaints. 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 Patient was accepted to [facility] in [place] and discharged from [facility] on 7/12/17.  Upon getting to [facility], Patient refused to go into the facility and became combative. He was thus transferred back to [facility] ED which resulted in Service Line Chiefs’ of Service involvement given the history/issues and ultimate transfer to [address]. Patient has been on Unit 1N since. UPDATE: [facility] declined to accept Patient. Hundreds of nursing home applications wee sent out and all declined to accept patient. Court scheduled on 12/19/17 was deferred as Patient refused to go; he then again refused to go to the new Court date. Continuing to search for placement which have been unsuccessful; family does not want him living with them.  Neurological facility in [place] has accepted patient, and legal guardian is involved in transfer issues. Court hearing regarding Patient's guardianship was 3/27/18 and his sister was granted temporary guardianship. Court hearing for retention occurred on 3/28/18 and the hospital was granted an additional 60 days. Court date for a "check in" was on 5/15 during which time it was reported that there was a potential nursing home which the Patient's sister went to and liked. Another Court date was on 5/29/18 during which time the [facility] withdrew acceptance and now Pt's sister still has to find him an apartment to live.  Next Court date was scheduled for 06/26/18 which Patient attended. He caused some ruckus at the Court after he refused to get back on the stretcher to be brought back the hospital, which he ultimately did after the  threatened him with [facility] time for contempt of Court. Since that time, there have been no apparent reported move on his sister's part regarding updating the Team if she has found any appropriate places for Patient to go to. SW continues to try to contact Pt's sister for updates. As per Patient, no updates for months due to things like "my sister's car broke down," or "she did not pass the [employer]'s credit check." Sister has not been in regular contact with Unit team and very difficult to get in touch with. As per last conversation with SW, she hung up the phone call for no apparent reason. On 7/27/18, agency called Unit looking for Pt's sister as they have a $5000 check for her and she is not answering her phone/returning calls. Patient's sister came to visit on Saturday 7/28/18, and reportedly told staff that she feels "overwhelmed" with seeking apartments for patient and that she is not getting help looking for places from the Unit etc. Sister reminded that she assumed responsibility for this as Pt's guardian and inpatient psychiatric units and staff do not search for independent [place] real estate for patients. Suspecting that guardianship issue will return to Court as current guardian is not meeting her responsibilities and obligations in a timely manner. Week of end of July/start of August - Patient's  drafting a notification letter to Patient's sister regarding apparent lack of performance and fulfilling duties. Awaiting next step after letter is received by guardian /sister.

## 2018-08-10 NOTE — PROGRESS NOTE BEHAVIORAL HEALTH - NSBHFUPINTERVALHXFT_PSY_A_CORE
Patient has a incident of agitation last evening which was displaced / acting out anger after learning upsetting news which pushed back his discharge further which has been the status quo.

## 2018-08-11 RX ADMIN — CARBIDOPA AND LEVODOPA 1.5 TABLET(S): 25; 100 TABLET ORAL at 06:12

## 2018-08-11 RX ADMIN — CARBIDOPA, LEVODOPA, AND ENTACAPONE 1 TABLET(S): 50; 200; 200 TABLET, FILM COATED ORAL at 08:53

## 2018-08-11 RX ADMIN — CARBIDOPA AND LEVODOPA 1.5 TABLET(S): 25; 100 TABLET ORAL at 20:55

## 2018-08-11 RX ADMIN — CARBIDOPA, LEVODOPA, AND ENTACAPONE 1 TABLET(S): 50; 200; 200 TABLET, FILM COATED ORAL at 06:12

## 2018-08-11 RX ADMIN — CARBIDOPA, LEVODOPA, AND ENTACAPONE 1 TABLET(S): 50; 200; 200 TABLET, FILM COATED ORAL at 15:39

## 2018-08-11 RX ADMIN — Medication 2 MILLIGRAM(S): at 15:41

## 2018-08-11 RX ADMIN — Medication 325 MILLIGRAM(S): at 00:22

## 2018-08-11 RX ADMIN — CARBIDOPA AND LEVODOPA 1.5 TABLET(S): 25; 100 TABLET ORAL at 11:28

## 2018-08-11 RX ADMIN — CARBIDOPA AND LEVODOPA 1.5 TABLET(S): 25; 100 TABLET ORAL at 16:22

## 2018-08-11 RX ADMIN — CARBIDOPA AND LEVODOPA 1.5 TABLET(S): 25; 100 TABLET ORAL at 08:52

## 2018-08-12 RX ADMIN — Medication 2 MILLIGRAM(S): at 20:34

## 2018-08-12 RX ADMIN — CARBIDOPA AND LEVODOPA 1.5 TABLET(S): 25; 100 TABLET ORAL at 06:15

## 2018-08-12 RX ADMIN — CARBIDOPA AND LEVODOPA 1.5 TABLET(S): 25; 100 TABLET ORAL at 10:28

## 2018-08-12 RX ADMIN — CARBIDOPA AND LEVODOPA 1.5 TABLET(S): 25; 100 TABLET ORAL at 14:04

## 2018-08-12 RX ADMIN — CARBIDOPA, LEVODOPA, AND ENTACAPONE 1 TABLET(S): 50; 200; 200 TABLET, FILM COATED ORAL at 06:14

## 2018-08-12 RX ADMIN — CARBIDOPA AND LEVODOPA 1.5 TABLET(S): 25; 100 TABLET ORAL at 20:33

## 2018-08-12 RX ADMIN — CARBIDOPA, LEVODOPA, AND ENTACAPONE 1 TABLET(S): 50; 200; 200 TABLET, FILM COATED ORAL at 16:03

## 2018-08-12 RX ADMIN — CARBIDOPA, LEVODOPA, AND ENTACAPONE 1 TABLET(S): 50; 200; 200 TABLET, FILM COATED ORAL at 10:28

## 2018-08-12 RX ADMIN — CARBIDOPA AND LEVODOPA 1.5 TABLET(S): 25; 100 TABLET ORAL at 17:10

## 2018-08-12 RX ADMIN — Medication 2 MILLIGRAM(S): at 14:04

## 2018-08-13 PROCEDURE — 99231 SBSQ HOSP IP/OBS SF/LOW 25: CPT

## 2018-08-13 RX ADMIN — CARBIDOPA AND LEVODOPA 1.5 TABLET(S): 25; 100 TABLET ORAL at 11:30

## 2018-08-13 RX ADMIN — CARBIDOPA AND LEVODOPA 1.5 TABLET(S): 25; 100 TABLET ORAL at 08:50

## 2018-08-13 RX ADMIN — CARBIDOPA, LEVODOPA, AND ENTACAPONE 1 TABLET(S): 50; 200; 200 TABLET, FILM COATED ORAL at 16:53

## 2018-08-13 RX ADMIN — CARBIDOPA AND LEVODOPA 1.5 TABLET(S): 25; 100 TABLET ORAL at 14:35

## 2018-08-13 RX ADMIN — Medication 2 MILLIGRAM(S): at 22:29

## 2018-08-13 RX ADMIN — CARBIDOPA AND LEVODOPA 1.5 TABLET(S): 25; 100 TABLET ORAL at 20:47

## 2018-08-13 RX ADMIN — CARBIDOPA AND LEVODOPA 1.5 TABLET(S): 25; 100 TABLET ORAL at 17:19

## 2018-08-13 RX ADMIN — CARBIDOPA, LEVODOPA, AND ENTACAPONE 1 TABLET(S): 50; 200; 200 TABLET, FILM COATED ORAL at 16:54

## 2018-08-13 RX ADMIN — CARBIDOPA, LEVODOPA, AND ENTACAPONE 1 TABLET(S): 50; 200; 200 TABLET, FILM COATED ORAL at 08:50

## 2018-08-13 NOTE — PROGRESS NOTE BEHAVIORAL HEALTH - NSBHFUPINTERVALHXFT_PSY_A_CORE
Patient had prn Ativan yesterday and had been in fair control. Today, he is calmer and had no psychiatric complaints. He is looking forward to going to court for his guardianship hearing follow up. He is under the impression that his family found an apt for him, although no staff is aware of any such development. He was asked about his complaint of buttock pain and refused any pain meds or treatment.

## 2018-08-13 NOTE — PROGRESS NOTE BEHAVIORAL HEALTH - DETAILS
buttock pain chronic neck and back pain, which is relieved by ice packs at times skin is healing +; likely candidiasis type irritation

## 2018-08-13 NOTE — PROGRESS NOTE BEHAVIORAL HEALTH - SUMMARY
53 yo male with early onset Parkinson’s disease x 11 years, discharged from 4 prior nursing homes in 8 months due to his behaviors, hx of refusing medications,  transferred to [facility] Unit 2S on 6/12/17 where he manifested severe mood lability, paranoia and confabulation with poor insight and judgment including making > 40 Justice center complaints. 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 Patient was accepted to [facility] in [place] and discharged from [facility] on 7/12/17.  Upon getting to [facility], Patient refused to go into the facility and became combative. He was thus transferred back to [facility] ED which resulted in Service Line Chiefs’ of Service involvement given the history/issues and ultimate transfer to [address]. Patient has been on Unit 1N since. UPDATE: [facility] declined to accept Patient. Hundreds of nursing home applications wee sent out and all declined to accept patient. Court scheduled on 12/19/17 was deferred as Patient refused to go; he then again refused to go to the new Court date. Continuing to search for placement which have been unsuccessful; family does not want him living with them.  Neurological facility in [place] has accepted patient, and legal guardian is involved in transfer issues. Court hearing regarding Patient's guardianship was 3/27/18 and his sister was granted temporary guardianship. Court hearing for retention occurred on 3/28/18 and the hospital was granted an additional 60 days. Court date for a "check in" was on 5/15 during which time it was reported that there was a potential nursing home which the Patient's sister went to and liked. Another Court date was on 5/29/18 during which time the [facility] withdrew acceptance and now Pt's sister still has to find him an apartment to live.  Next Court date was scheduled for 06/26/18 which Patient attended. He caused some ruckus at the Court after he refused to get back on the stretcher to be brought back the hospital, which he ultimately did after the  threatened him with care home time for contempt of Court. Since that time, there have been no apparent reported move on his sister's part regarding updating the Team if she has found any appropriate places for Patient to go to. SW continues to try to contact Pt's sister for updates. As per Patient, no updates for months due to things like "my sister's car broke down," or "she did not pass the [employer]'s credit check." Sister has not been in regular contact with Unit team and very difficult to get in touch with. As per last conversation with SW, she hung up the phone call for no apparent reason. On 7/27/18, agency called Unit looking for Pt's sister as they have a $5000 check for her and she is not answering her phone/returning calls. Patient's sister came to visit on Saturday 7/28/18, and reportedly told staff that she feels "overwhelmed" with seeking apartments for patient and that she is not getting help looking for places from the Unit etc. Sister reminded that she assumed responsibility for this as Pt's guardian and inpatient psychiatric units and staff do not search for independent [place] real estate for patients. Suspecting that guardianship issue will return to Court as current guardian is not meeting her responsibilities and obligations in a timely manner. Week of end of July/start of August - Patient's  drafting a notification letter to Patient's sister regarding apparent lack of performance and fulfilling duties. Awaiting next step after letter is received by guardian /sister.

## 2018-08-14 PROCEDURE — 12345: CPT

## 2018-08-14 RX ADMIN — CARBIDOPA AND LEVODOPA 1.5 TABLET(S): 25; 100 TABLET ORAL at 13:26

## 2018-08-14 RX ADMIN — Medication 325 MILLIGRAM(S): at 16:51

## 2018-08-14 RX ADMIN — CARBIDOPA, LEVODOPA, AND ENTACAPONE 1 TABLET(S): 50; 200; 200 TABLET, FILM COATED ORAL at 05:42

## 2018-08-14 RX ADMIN — Medication 2 MILLIGRAM(S): at 20:15

## 2018-08-14 RX ADMIN — CARBIDOPA AND LEVODOPA 1.5 TABLET(S): 25; 100 TABLET ORAL at 20:09

## 2018-08-14 RX ADMIN — CARBIDOPA, LEVODOPA, AND ENTACAPONE 1 TABLET(S): 50; 200; 200 TABLET, FILM COATED ORAL at 15:30

## 2018-08-14 RX ADMIN — CARBIDOPA, LEVODOPA, AND ENTACAPONE 1 TABLET(S): 50; 200; 200 TABLET, FILM COATED ORAL at 13:26

## 2018-08-14 RX ADMIN — CARBIDOPA AND LEVODOPA 1.5 TABLET(S): 25; 100 TABLET ORAL at 05:43

## 2018-08-14 RX ADMIN — CARBIDOPA AND LEVODOPA 1.5 TABLET(S): 25; 100 TABLET ORAL at 16:27

## 2018-08-14 NOTE — PROGRESS NOTE BEHAVIORAL HEALTH - NSBHFUPINTERVALHXFT_PSY_A_CORE
Patient went to court today for his follow up guardianship hearing. He had no major interval events. He is at his current baseline. Chart reviewed.

## 2018-08-14 NOTE — PROGRESS NOTE BEHAVIORAL HEALTH - SUMMARY
51 yo male with early onset Parkinson’s disease x 11 years, discharged from 4 prior nursing homes in 8 months due to his behaviors, hx of refusing medications,  transferred to [facility] Unit 2S on 6/12/17 where he manifested severe mood lability, paranoia and confabulation with poor insight and judgment including making > 40 Justice center complaints. 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 Patient was accepted to [facility] in [place] and discharged from [facility] on 7/12/17.  Upon getting to [facility], Patient refused to go into the facility and became combative. He was thus transferred back to [facility] ED which resulted in Service Line Chiefs’ of Service involvement given the history/issues and ultimate transfer to [address]. Patient has been on Unit 1N since. UPDATE: [facility] declined to accept Patient. Hundreds of nursing home applications wee sent out and all declined to accept patient. Court scheduled on 12/19/17 was deferred as Patient refused to go; he then again refused to go to the new Court date. Continuing to search for placement which have been unsuccessful; family does not want him living with them.  Neurological facility in [place] has accepted patient, and legal guardian is involved in transfer issues. Court hearing regarding Patient's guardianship was 3/27/18 and his sister was granted temporary guardianship. Court hearing for retention occurred on 3/28/18 and the hospital was granted an additional 60 days. Court date for a "check in" was on 5/15 during which time it was reported that there was a potential nursing home which the Patient's sister went to and liked. Another Court date was on 5/29/18 during which time the [facility] withdrew acceptance and now Pt's sister still has to find him an apartment to live.  Next Court date was scheduled for 06/26/18 which Patient attended. He caused some ruckus at the Court after he refused to get back on the stretcher to be brought back the hospital, which he ultimately did after the  threatened him with halfway time for contempt of Court. Since that time, there have been no apparent reported move on his sister's part regarding updating the Team if she has found any appropriate places for Patient to go to. SW continues to try to contact Pt's sister for updates. As per Patient, no updates for months due to things like "my sister's car broke down," or "she did not pass the [employer]'s credit check." Sister has not been in regular contact with Unit team and very difficult to get in touch with. As per last conversation with SW, she hung up the phone call for no apparent reason. On 7/27/18, agency called Unit looking for Pt's sister as they have a $5000 check for her and she is not answering her phone/returning calls. Patient's sister came to visit on Saturday 7/28/18, and reportedly told staff that she feels "overwhelmed" with seeking apartments for patient and that she is not getting help looking for places from the Unit etc. Sister reminded that she assumed responsibility for this as Pt's guardian and inpatient psychiatric units and staff do not search for independent [place] real estate for patients. Suspecting that guardianship issue will return to Court as current guardian is not meeting her responsibilities and obligations in a timely manner. Week of end of July/start of August - Patient's  drafting a notification letter to Patient's sister regarding apparent lack of performance and fulfilling duties. Awaiting next step after letter is received by guardian /sister.

## 2018-08-15 PROCEDURE — 99231 SBSQ HOSP IP/OBS SF/LOW 25: CPT

## 2018-08-15 PROCEDURE — 12345: CPT

## 2018-08-15 RX ADMIN — CARBIDOPA AND LEVODOPA 1.5 TABLET(S): 25; 100 TABLET ORAL at 11:35

## 2018-08-15 RX ADMIN — Medication 2 MILLIGRAM(S): at 16:44

## 2018-08-15 RX ADMIN — CARBIDOPA AND LEVODOPA 1.5 TABLET(S): 25; 100 TABLET ORAL at 16:00

## 2018-08-15 RX ADMIN — CARBIDOPA, LEVODOPA, AND ENTACAPONE 1 TABLET(S): 50; 200; 200 TABLET, FILM COATED ORAL at 06:07

## 2018-08-15 RX ADMIN — CARBIDOPA AND LEVODOPA 1.5 TABLET(S): 25; 100 TABLET ORAL at 18:51

## 2018-08-15 RX ADMIN — CARBIDOPA, LEVODOPA, AND ENTACAPONE 1 TABLET(S): 50; 200; 200 TABLET, FILM COATED ORAL at 11:35

## 2018-08-15 RX ADMIN — Medication 325 MILLIGRAM(S): at 16:45

## 2018-08-15 RX ADMIN — CARBIDOPA AND LEVODOPA 1.5 TABLET(S): 25; 100 TABLET ORAL at 21:08

## 2018-08-15 RX ADMIN — Medication 2 MILLIGRAM(S): at 23:05

## 2018-08-15 RX ADMIN — CARBIDOPA, LEVODOPA, AND ENTACAPONE 1 TABLET(S): 50; 200; 200 TABLET, FILM COATED ORAL at 16:42

## 2018-08-15 RX ADMIN — CARBIDOPA AND LEVODOPA 1.5 TABLET(S): 25; 100 TABLET ORAL at 06:08

## 2018-08-15 NOTE — PROGRESS NOTE BEHAVIORAL HEALTH - NSBHFUPINTERVALHXFT_PSY_A_CORE
Reportedly, sister/guardian has a [address] housing for Patient (unclear details). Patient now saying "it was a misunderstanding" and now is adamant that "I'll be out of here in September." Still taking Ativan PRN intermittently. Same clinical presentation otherwise.

## 2018-08-15 NOTE — PROGRESS NOTE BEHAVIORAL HEALTH - SUMMARY
51 yo male with early onset Parkinson’s disease x 11 years, discharged from 4 prior nursing homes in 8 months due to his behaviors, hx of refusing medications,  transferred to [facility] Unit [facility]S on 6/12/17 where he manifested severe mood lability, paranoia and confabulation with poor insight and judgment including making > 40 [facility] complaints. 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 Patient was accepted to [facility] in [place] and discharged from [facility] on 7/12/17.  Upon getting to [facility], Patient refused to go into the facility and became combative. He was thus transferred back to [facility] ED which resulted in Service Line Chiefs’ of Service involvement given the history/issues and ultimate transfer to [address]. Patient has been on [address] since. UPDATE: [facility] declined to accept Patient. Hundreds of nursing home applications wee sent out and all declined to accept patient. Court scheduled on 12/19/17 was deferred as Patient refused to go; he then again refused to go to the new Court date. Continuing to search for placement which have been unsuccessful; family does not want him living with them.  Neurological facility in [place] has accepted patient, and legal guardian is involved in transfer issues. Court hearing regarding Patient's guardianship was 3/27/18 and his sister was granted temporary guardianship. Court hearing for retention occurred on 3/28/18 and the hospital was granted an additional 60 days. Court date for a "check in" was on 5/15 during which time it was reported that there was a potential nursing home which the Patient's sister went to and liked. Another Court date was on 5/29/18 during which time the [facility] withdrew acceptance and now Pt's sister still has to find him an apartment to live.  Next Court date was scheduled for 06/26/18 which Patient attended. He caused some ruckus at the Court after he refused to get back on the stretcher to be brought back the hospital, which he ultimately did after the  threatened him with halfway time for contempt of Court. Since that time, there have been no apparent reported move on his sister's part regarding updating the Team if she has found any appropriate places for Patient to go to. SW continues to try to contact Pt's sister for updates. As per Patient, no updates for months due to things like "my sister's car broke down," or "she did not pass the [email or web address]'s credit check." Sister has not been in regular contact with Unit team and very difficult to get in touch with. As per last conversation with SW, she hung up the phone call for no apparent reason. On 7/27/18, agency called Unit looking for Pt's sister as they have a $5000 check for her and she is not answering her phone/returning calls. Patient's sister came to visit on Saturday 7/28/18, and reportedly told staff that she feels "overwhelmed" with seeking apartments for patient and that she is not getting help looking for places from the Unit etc. Sister reminded that she assumed responsibility for this as Pt's guardian and inpatient psychiatric units and staff do not search for independent [place] real estate for patients. Suspecting that guardianship issue will return to Court as current guardian is not meeting her responsibilities and obligations in a timely manner. Week of end of July/start of August - Patient's  drafting a notification letter to Patient's sister regarding apparent lack of performance and fulfilling duties. Awaiting next step after letter is received by guardian /sister. Patient's sister/guardian still not in regular contact with Unit SW Team/Treatment Team about updates despite the 's order to do so.

## 2018-08-16 PROCEDURE — 99231 SBSQ HOSP IP/OBS SF/LOW 25: CPT

## 2018-08-16 RX ORDER — CARBIDOPA AND LEVODOPA 25; 100 MG/1; MG/1
1 TABLET ORAL
Qty: 0 | Refills: 0 | Status: DISCONTINUED | OUTPATIENT
Start: 2018-08-16 | End: 2018-08-18

## 2018-08-16 RX ADMIN — CARBIDOPA, LEVODOPA, AND ENTACAPONE 1 TABLET(S): 50; 200; 200 TABLET, FILM COATED ORAL at 15:45

## 2018-08-16 RX ADMIN — Medication 325 MILLIGRAM(S): at 01:00

## 2018-08-16 RX ADMIN — CARBIDOPA AND LEVODOPA 1.5 TABLET(S): 25; 100 TABLET ORAL at 20:19

## 2018-08-16 RX ADMIN — Medication 2 MILLIGRAM(S): at 17:42

## 2018-08-16 RX ADMIN — Medication 30 MILLILITER(S): at 00:33

## 2018-08-16 RX ADMIN — CARBIDOPA AND LEVODOPA 1.5 TABLET(S): 25; 100 TABLET ORAL at 06:32

## 2018-08-16 RX ADMIN — Medication 325 MILLIGRAM(S): at 00:26

## 2018-08-16 RX ADMIN — CARBIDOPA, LEVODOPA, AND ENTACAPONE 1 TABLET(S): 50; 200; 200 TABLET, FILM COATED ORAL at 06:33

## 2018-08-16 RX ADMIN — CARBIDOPA AND LEVODOPA 1.5 TABLET(S): 25; 100 TABLET ORAL at 14:11

## 2018-08-16 RX ADMIN — CARBIDOPA, LEVODOPA, AND ENTACAPONE 1 TABLET(S): 50; 200; 200 TABLET, FILM COATED ORAL at 10:08

## 2018-08-16 RX ADMIN — CARBIDOPA AND LEVODOPA 1.5 TABLET(S): 25; 100 TABLET ORAL at 09:17

## 2018-08-16 NOTE — PROGRESS NOTE BEHAVIORAL HEALTH - NSBHADMITCOORDCARE_PSY_A_CORE
yes

## 2018-08-16 NOTE — PROGRESS NOTE BEHAVIORAL HEALTH - NSBHFUPINTERVALHXFT_PSY_A_CORE
Reportedly, sister/guardian has a Section VIII housing for Patient (unclear details). Patient now saying "it was a misunderstanding" and now is adamant that "I'll be out of here in September." Still taking Ativan PRN intermittently. Same clinical presentation otherwise. Patient reports he is happy he will be leaving soon.  No Rx SE are noted or reported. Patient seen, chart reviewed and case discussed in tx team meeting.  No significant interval events are reported. Reportedly, sister/guardian has a Section VIII housing for Patient (unclear details). Patient now saying "it was a misunderstanding" and now is adamant that "I'll be out of here in September." Still taking Ativan PRN intermittently. Same clinical presentation otherwise. Patient reports he is happy he will be leaving soon.  No Rx SE are noted or reported. Patient seen, chart reviewed and case discussed in tx team meeting.  No significant interval events are reported. Reportedly, sister/guardian has a Section VIII housing for Patient (unclear details). Patient now saying "it was a misunderstanding" and now is adamant that "I'll be out of here in September." Still taking Ativan PRN intermittently. Same clinical presentation otherwise. Patient reports he is happy he will be leaving soon.  No Rx SE are noted or reported.  Patient exhibiting thrashing movements almost non stop.  Called Dr. Jimenez, he will come to see patient.

## 2018-08-16 NOTE — PROGRESS NOTE ADULT - ASSESSMENT
Neurology follow up note    BARTOLO [address]      Interval History:    Patient feels ok no new complaints.    MEDICATIONS    acetaminophen   Tablet. 325 milliGRAM(s) Oral every 6 hours PRN  acetaminophen   Tablet. 325 milliGRAM(s) Oral every 6 hours PRN  aluminum hydroxide/magnesium hydroxide/simethicone Suspension 30 milliLiter(s) Oral every 6 hours PRN  carbidopa/levodopa  25/250 1 Tablet(s) Oral <User Schedule>  carbidopa/levodopa/entacapone 50/200/200 1 Tablet(s) Oral <User Schedule>  cyclobenzaprine 10 milliGRAM(s) Oral three times a day PRN  haloperidol     Tablet 5 milliGRAM(s) Oral every 6 hours PRN  haloperidol    Injectable 5 milliGRAM(s) IntraMuscular every 6 hours PRN  ibuprofen  Tablet 400 milliGRAM(s) Oral two times a day PRN  LORazepam     Tablet 2 milliGRAM(s) Oral every 6 hours PRN  LORazepam   Injectable 2 milliGRAM(s) IntraMuscular every 6 hours PRN  nitroglycerin     SubLingual 0.4 milliGRAM(s) SubLingual every 5 minutes PRN  OLANZapine Disintegrating Tablet 5 milliGRAM(s) Oral every 6 hours PRN      Allergies    aspirin (Unknown)  Cheese (Unknown)  ibuprofen (Unknown)  Reglan (Swelling)    Intolerances    ibuprofen (Unknown)          Vital Signs Last 24 Hrs  T(C): --  T(F): --  HR: --  BP: --  BP(mean): --  RR: --  SpO2: --      REVIEW OF SYSTEMS: Non Verbal  Limited or unable to obtain secondary to patient's poor mental status.    Constitutional: No fever, chills, fatigue, weakness  Eyes: no eye pain, visual disturbances, or discharge  ENT:  No difficulty hearing, tinnitus, vertigo; No sinus or throat pain  Neck: No pain or stiffness  Respiratory: No cough, dyspnea, wheezing   Cardiovascular: No chest pain, palpitations,   Gastrointestinal: No abdominal or epigastric pain. No nausea, vomiting  No diarrhea or constipation.   Genitourinary: No dysuria, frequency, hematuria or incontinence  Neurological: No headaches, lightheadedness, vertigo, numbness or tremors  Psychiatric: No depression, anxiety, mood swings or difficulty sleeping  Musculoskeletal: No joint pain or swelling; No muscle, back or extremity pain  Skin: No itching, burning, rashes or lesions   Lymph Nodes: No enlarged glands  Endocrine: No heat or cold intolerance; No hair loss   Allergy and Immunologic: No hives or eczema    On Neurological Examination:    Mental Status - Patient is alert, awake, oriented X3. Confused Dementia Lethargic Unresponsive  .       Follow simple commands  Follow complex commands  Does not follow commands    Speech -   Fluent    Dysarthria  Aphasia   Non Verbal                         Cranial Nerves - Pupils 3 mm equal and reactive to light,   extraocular eye movements intact.   smile symmetric  intact bilateral NLF    Motor Exam -   Right upper  Left upper  Right lower  Left lower     With stimuli positive movement of all 4 extremities    Muscle tone - is normal all over.  No asymmetry is seen.      Sensory    Bilateral intact to light touch    Gait -  normal  ataxia     GENERAL Exam: Nontoxic , No Acute Distress   	  HEENT:  normocephalic, atraumatic  		  LUNGS: Clear bilaterally  No Wheeze  Decreased bilaterally  	  HEART: Normal S1S2   No murmur RRR        	  GI/ ABDOMEN:  Soft  Non tender    EXTREMITIES:   No Edema  No Clubbing  No Cyanosis     MUSCULOSKELETAL: Normal Range of Motion  	   SKIN: Normal  No Ecchymosis               LABS:            Hemoglobin A1C:       Vitamin B12         RADIOLOGY    possible dyskinesia will decrease sinemet 1.5 tablets to 1 tablet    Physical therapy evaluation.  OOB to chair/ambulation with assistance only.    Greater than 45 minutes spent in direct patient care reviewing  the notes, lab data/ imaging , discussion with multidisciplinary team. Neurology follow up note    BARTOLO GREENE53yMale      Interval History:      MEDICATIONS    acetaminophen   Tablet. 325 milliGRAM(s) Oral every 6 hours PRN  acetaminophen   Tablet. 325 milliGRAM(s) Oral every 6 hours PRN  aluminum hydroxide/magnesium hydroxide/simethicone Suspension 30 milliLiter(s) Oral every 6 hours PRN  carbidopa/levodopa  25/250 1 Tablet(s) Oral <User Schedule>  carbidopa/levodopa/entacapone 50/200/200 1 Tablet(s) Oral <User Schedule>  cyclobenzaprine 10 milliGRAM(s) Oral three times a day PRN  haloperidol     Tablet 5 milliGRAM(s) Oral every 6 hours PRN  haloperidol    Injectable 5 milliGRAM(s) IntraMuscular every 6 hours PRN  ibuprofen  Tablet 400 milliGRAM(s) Oral two times a day PRN  LORazepam     Tablet 2 milliGRAM(s) Oral every 6 hours PRN  LORazepam   Injectable 2 milliGRAM(s) IntraMuscular every 6 hours PRN  nitroglycerin     SubLingual 0.4 milliGRAM(s) SubLingual every 5 minutes PRN  OLANZapine Disintegrating Tablet 5 milliGRAM(s) Oral every 6 hours PRN      Allergies    aspirin (Unknown)  Cheese (Unknown)  ibuprofen (Unknown)  Reglan (Swelling)    Intolerances    ibuprofen (Unknown)          Vital Signs Last 24 Hrs  T(C): --  T(F): --  HR: --  BP: --  BP(mean): --  RR: --  SpO2: --      REVIEW OF SYSTEMS:         On Neurological Examination:    suspected dyskinesia               LABS:            Hemoglobin A1C:       Vitamin B12         RADIOLOGY    possible dyskinesia will decrease sinemet 1.5 tablets to 1 tablet    Physical therapy evaluation.  OOB to chair/ambulation with assistance only.    Greater than 15 minutes spent in direct patient care reviewing  the notes, lab data/ imaging , discussion with multidisciplinary team.

## 2018-08-16 NOTE — PROGRESS NOTE BEHAVIORAL HEALTH - OTHER
anxious at times re: DC overall linear, at times episodes of being disorganized and circumstantial, concrete (chronic) but much better since initial admission paranoid at times, poor historian chronically impaired but much improved since admission Limited but has improved since admission + neck favoring one side, dressed in hospital gowns, adequate grooming and hygiene no psychomotor agitation at times (chronic and seemingly intentionaly as he can stop it when redirected). showered today chronically limited but better since admission  more able to respond to redirection festinating gait but stable, using walker yelling at times

## 2018-08-16 NOTE — PROGRESS NOTE BEHAVIORAL HEALTH - NSBHADMITMEDEDUDETAILS_A_CORE FT
continue current management.  Psychoeducation done re: need for Rx adherence to maintain optimal physical health with patient saying "I know that"

## 2018-08-16 NOTE — PROGRESS NOTE BEHAVIORAL HEALTH - NS ED BHA REVIEW OF ED CHART AVAILABLE LABS REVIEWED
Yes
None available
None available
Yes
None available
Yes
None available
None available
Yes
None available
Yes
None available
Yes
None available
Yes
None available
Yes
None available
None available
Yes
None available
Yes
None available
Yes
None available
Yes
None available
Yes
None available
Yes
None available
None available
Yes
None available
Yes
Yes
None available
Yes
None available
Yes
None available
Yes
None available
Yes
None available
Yes
None available
None available
Yes

## 2018-08-16 NOTE — PROGRESS NOTE BEHAVIORAL HEALTH - NSBHTIMEBASEDBILLING_PSY_A_CORE
yes...
no
yes...
yes...
no
yes...
no
yes...
no
yes...
no
yes...
no
no
yes...
yes...
no
no
yes...
no
yes...
yes...
no
yes...
no
yes...
yes...
no
yes...
no
yes...
yes...
no
no
yes...
no
yes...
no
yes...
no
yes...

## 2018-08-16 NOTE — PROGRESS NOTE BEHAVIORAL HEALTH - SUMMARY
53 yo male with early onset Parkinson’s disease x 11 years, discharged from 4 prior nursing homes in 8 months due to his behaviors, hx of refusing medications,  transferred to [facility] Unit [facility]S on 6/12/17 where he manifested severe mood lability, paranoia and confabulation with poor insight and judgment including making > 40 [facility] center complaints. 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 Patient was accepted to [facility] in [place] and discharged from [facility] on 7/12/17.  Upon getting to [facility], Patient refused to go into the facility and became combative. He was thus transferred back to [facility] ED which resulted in Service Line Chiefs’ of Service involvement given the history/issues and ultimate transfer to [address]. Patient has been on Unit 1N since. UPDATE: [facility] declined to accept Patient. Hundreds of nursing home applications wee sent out and all declined to accept patient. Court scheduled on 12/19/17 was deferred as Patient refused to go; he then again refused to go to the new Court date. Continuing to search for placement which have been unsuccessful; family does not want him living with them.  Neurological facility in [place] has accepted patient, and legal guardian is involved in transfer issues. Court hearing regarding Patient's guardianship was 3/27/18 and his sister was granted temporary guardianship. Court hearing for retention occurred on 3/28/18 and the hospital was granted an additional 60 days. Court date for a "check in" was on 5/15 during which time it was reported that there was a potential nursing home which the Patient's sister went to and liked. Another Court date was on 5/29/18 during which time the [facility] withdrew acceptance and now Pt's sister still has to find him an apartment to live.  Next Court date was scheduled for 06/26/18 which Patient attended. He caused some ruckus at the Court after he refused to get back on the stretcher to be brought back the hospital, which he ultimately did after the  threatened him with [facility] time for contempt of Court. Since that time, there have been no apparent reported move on his sister's part regarding updating the Team if she has found any appropriate places for Patient to go to. SW continues to try to contact Pt's sister for updates. As per Patient, no updates for months due to things like "my sister's car broke down," or "she did not pass the [employer]'s credit check." Sister has not been in regular contact with Unit team and very difficult to get in touch with. As per last conversation with SW, she hung up the phone call for no apparent reason. On 7/27/18, agency called Unit looking for Pt's sister as they have a $5000 check for her and she is not answering her phone/returning calls. Patient's sister came to visit on Saturday 7/28/18, and reportedly told staff that she feels "overwhelmed" with seeking apartments for patient and that she is not getting help looking for places from the Unit etc. Sister reminded that she assumed responsibility for this as Pt's guardian and inpatient psychiatric units and staff do not search for independent [place] real estate for patients. Suspecting that guardianship issue will return to Court as current guardian is not meeting her responsibilities and obligations in a timely manner. Week of end of July/start of August - Patient's  drafting a notification letter to Patient's sister regarding apparent lack of performance and fulfilling duties. Awaiting next step after letter is received by guardian /sister. Patient's sister/guardian still not in regular contact with Unit SW Team/Treatment Team about updates despite the 's order to do so.

## 2018-08-16 NOTE — PROGRESS NOTE BEHAVIORAL HEALTH - NSBHADMITCOUNSEL_PSY_A_CORE
importance of adherence to chosen treatment
importance of adherence to chosen treatment/risk factor reduction
importance of adherence to chosen treatment
importance of adherence to chosen treatment/risks and benefits of treatment options/risk factor reduction
importance of adherence to chosen treatment/risk factor reduction
importance of adherence to chosen treatment/risk factor reduction
risks and benefits of treatment options/importance of adherence to chosen treatment/risk factor reduction
importance of adherence to chosen treatment
importance of adherence to chosen treatment
risk factor reduction
importance of adherence to chosen treatment/risk factor reduction
importance of adherence to chosen treatment
importance of adherence to chosen treatment
importance of adherence to chosen treatment/risks and benefits of treatment options/risk factor reduction
importance of adherence to chosen treatment
risks and benefits of treatment options/importance of adherence to chosen treatment
importance of adherence to chosen treatment
importance of adherence to chosen treatment/risk factor reduction
risk factor reduction/importance of adherence to chosen treatment/risks and benefits of treatment options
risks and benefits of treatment options/importance of adherence to chosen treatment
importance of adherence to chosen treatment/risk factor reduction
instructions for management, treatment and follow up/importance of adherence to chosen treatment
risk factor reduction/risks and benefits of treatment options/importance of adherence to chosen treatment
importance of adherence to chosen treatment
importance of adherence to chosen treatment
risks and benefits of treatment options/importance of adherence to chosen treatment
importance of adherence to chosen treatment
risks and benefits of treatment options/importance of adherence to chosen treatment
importance of adherence to chosen treatment
importance of adherence to chosen treatment
importance of adherence to chosen treatment/risks and benefits of treatment options
risks and benefits of treatment options/importance of adherence to chosen treatment/risk factor reduction
risk factor reduction/importance of adherence to chosen treatment
risks and benefits of treatment options/importance of adherence to chosen treatment
importance of adherence to chosen treatment/risks and benefits of treatment options/risk factor reduction
risks and benefits of treatment options/risk factor reduction
importance of adherence to chosen treatment
risks and benefits of treatment options/importance of adherence to chosen treatment
importance of adherence to chosen treatment/risk factor reduction
importance of adherence to chosen treatment
importance of adherence to chosen treatment/risk factor reduction
risks and benefits of treatment options/importance of adherence to chosen treatment
importance of adherence to chosen treatment/risk factor reduction/risks and benefits of treatment options
risk factor reduction/importance of adherence to chosen treatment/risks and benefits of treatment options
importance of adherence to chosen treatment/risk factor reduction
importance of adherence to chosen treatment
importance of adherence to chosen treatment/risk factor reduction
importance of adherence to chosen treatment
risk factor reduction/importance of adherence to chosen treatment
risk factor reduction/importance of adherence to chosen treatment
risk factor reduction/other.../importance of adherence to chosen treatment
diagnostic results/impressions and/or recommended studies/importance of adherence to chosen treatment
importance of adherence to chosen treatment/risk factor reduction
importance of adherence to chosen treatment/risks and benefits of treatment options
other.../risk factor reduction/importance of adherence to chosen treatment
risk factor reduction/risks and benefits of treatment options/importance of adherence to chosen treatment
risks and benefits of treatment options
risks and benefits of treatment options/importance of adherence to chosen treatment
risks and benefits of treatment options/importance of adherence to chosen treatment/risk factor reduction
importance of adherence to chosen treatment
importance of adherence to chosen treatment/risks and benefits of treatment options
importance of adherence to chosen treatment/risks and benefits of treatment options/risk factor reduction
risk factor reduction/importance of adherence to chosen treatment
risks and benefits of treatment options/importance of adherence to chosen treatment
risks and benefits of treatment options/importance of adherence to chosen treatment
importance of adherence to chosen treatment
risk factor reduction
risks and benefits of treatment options
importance of adherence to chosen treatment
importance of adherence to chosen treatment
importance of adherence to chosen treatment/risk factor reduction
risks and benefits of treatment options/importance of adherence to chosen treatment
importance of adherence to chosen treatment
risk factor reduction/importance of adherence to chosen treatment
importance of adherence to chosen treatment
importance of adherence to chosen treatment
importance of adherence to chosen treatment/risk factor reduction
risks and benefits of treatment options/importance of adherence to chosen treatment/risk factor reduction

## 2018-08-17 PROCEDURE — 99231 SBSQ HOSP IP/OBS SF/LOW 25: CPT

## 2018-08-17 RX ORDER — BACITRACIN ZINC 500 UNIT/G
1 OINTMENT IN PACKET (EA) TOPICAL DAILY
Qty: 0 | Refills: 0 | Status: DISCONTINUED | OUTPATIENT
Start: 2018-08-17 | End: 2018-08-18

## 2018-08-17 RX ADMIN — CARBIDOPA AND LEVODOPA 1 TABLET(S): 25; 100 TABLET ORAL at 10:25

## 2018-08-17 RX ADMIN — Medication 2 MILLIGRAM(S): at 15:04

## 2018-08-17 RX ADMIN — CYCLOBENZAPRINE HYDROCHLORIDE 10 MILLIGRAM(S): 10 TABLET, FILM COATED ORAL at 19:38

## 2018-08-17 RX ADMIN — CARBIDOPA AND LEVODOPA 1 TABLET(S): 25; 100 TABLET ORAL at 17:23

## 2018-08-17 RX ADMIN — CARBIDOPA AND LEVODOPA 1 TABLET(S): 25; 100 TABLET ORAL at 19:37

## 2018-08-17 RX ADMIN — Medication 325 MILLIGRAM(S): at 12:43

## 2018-08-17 RX ADMIN — CYCLOBENZAPRINE HYDROCHLORIDE 10 MILLIGRAM(S): 10 TABLET, FILM COATED ORAL at 14:24

## 2018-08-17 RX ADMIN — CARBIDOPA AND LEVODOPA 1 TABLET(S): 25; 100 TABLET ORAL at 06:11

## 2018-08-17 RX ADMIN — CARBIDOPA AND LEVODOPA 1 TABLET(S): 25; 100 TABLET ORAL at 14:16

## 2018-08-17 RX ADMIN — CARBIDOPA, LEVODOPA, AND ENTACAPONE 1 TABLET(S): 50; 200; 200 TABLET, FILM COATED ORAL at 16:23

## 2018-08-17 RX ADMIN — CARBIDOPA, LEVODOPA, AND ENTACAPONE 1 TABLET(S): 50; 200; 200 TABLET, FILM COATED ORAL at 06:11

## 2018-08-17 RX ADMIN — Medication 325 MILLIGRAM(S): at 11:19

## 2018-08-17 RX ADMIN — Medication 1 APPLICATION(S): at 16:43

## 2018-08-17 RX ADMIN — CARBIDOPA, LEVODOPA, AND ENTACAPONE 1 TABLET(S): 50; 200; 200 TABLET, FILM COATED ORAL at 10:25

## 2018-08-17 NOTE — PROGRESS NOTE BEHAVIORAL HEALTH - NS ED BHA REVIEW OF ED CHART VITAL SIGNS REVIEWED
Yes

## 2018-08-17 NOTE — PROGRESS NOTE BEHAVIORAL HEALTH - NS ED BHA MED ROS CONSTITUTIONAL SYMPTOMS
Yes
No complaints
No complaints
Yes
No complaints
Yes
No complaints
Yes
No complaints
No complaints
Yes
No complaints
No complaints
Yes
No complaints
Yes
No complaints
No complaints
Yes
No complaints
Yes
No complaints
No complaints
Yes
No complaints
Yes
No complaints
Yes
No complaints
Yes
No complaints
No complaints
Yes
No complaints
Yes
Yes
No complaints
Yes
No complaints
Yes
No complaints
Yes
No complaints
Yes
No complaints
Yes
No complaints
Yes
Yes
No complaints
Yes
No complaints
No complaints
Yes
Yes
No complaints
Yes
Yes

## 2018-08-17 NOTE — PROGRESS NOTE BEHAVIORAL HEALTH - FUND OF KNOWLEDGE
Normal
Normal
<<-----Click on this checkbox to enter Post-Op Dx
Normal

## 2018-08-17 NOTE — PROGRESS NOTE BEHAVIORAL HEALTH - NS ED BHA MED ROS PSYCHIATRIC
See HPI

## 2018-08-17 NOTE — PROGRESS NOTE BEHAVIORAL HEALTH - GROOMING
Fair
Fair
Fair/Other
Fair
Other/Fair
Fair
Fair/Other
Fair
Other/Fair
Fair
Fair/Other
Fair/Other
Fair
Other/Fair
Fair
Fair/Other
Fair
Fair
Fair/Other
Fair
Other/Fair
Fair
Other/Fair
Fair
Fair/Other
Fair
Fair/Other
Fair
Other/Fair
Fair
Fair/Other
Other/Fair
Other/Fair
Fair
Fair/Other
Fair/Other
Other/Fair
Fair
Fair
Fair/Other
Other/Fair
Fair
Fair/Other
Fair/Other
Other/Fair
Fair
Fair
Other/Fair
Fair/Other
Fair
Other/Fair
Fair
Other/Fair
Fair
Fair
Fair/Other
Fair
Other/Fair
Fair
Fair/Other
Fair
Other/Fair
Other/Fair
Fair
Fair/Other
Fair/Other
Fair
Fair/Other
Fair
Other/Fair
Fair
Other/Fair
Fair
Fair
Other/Fair
Fair
Fair/Other
Fair
Other/Fair
Fair
Fair/Other
Other/Fair
Fair
Fair
Fair/Other
Fair
Fair/Other
Fair/Other
Other/Fair
Fair
Other/Fair
Fair

## 2018-08-17 NOTE — PROGRESS NOTE BEHAVIORAL HEALTH - NSBHFUPMEDSE_PSY_A_CORE
None known
None known
Yes
None known
Yes
Yes
None known
Yes
Yes
None known
Yes
None known
Yes
None known
Yes
Yes
None known
Yes
Yes
None known
Yes
None known
Yes

## 2018-08-17 NOTE — PROGRESS NOTE BEHAVIORAL HEALTH - NSBHLEGALSTATUS_PSY_A_CORE
9.27 (2PC)
for 6 months/9.27 (2PC)
9.27 (2PC)
9.13 (Voluntary)
9.27 (2PC)

## 2018-08-17 NOTE — PROGRESS NOTE BEHAVIORAL HEALTH - NS ED B[facility] MED ROS SKIN
No complaints
Yes
No complaints
Yes
No complaints
Yes
No complaints
Yes
No complaints
No complaints
Yes
Yes
No complaints
No complaints
Yes
No complaints
Yes
Yes
No complaints
Yes
Yes
No complaints
Yes
No complaints
No complaints
Yes
No complaints
Yes
No complaints
Yes
No complaints
Yes
No complaints
Yes
No complaints
Yes
No complaints
Yes
No complaints
Yes
No complaints

## 2018-08-17 NOTE — PROGRESS NOTE BEHAVIORAL HEALTH - NS ED [facility] MSE SPEECH VOLUME
Normal
Other/Normal
Loud
Normal/Other
Normal
Other/Normal
Normal
Normal/Other
Loud
Normal/Other
Other/Normal
Other/Normal
Normal
Normal
Normal/Other
Other/Normal
Loud
Normal
Normal/Other
Normal
Normal
Other/Normal
Normal
Normal/Other
Other/Normal
Normal
Other/Normal
Normal
Normal/Other
Normal
Normal
Normal/Other
Normal
Normal
Normal/Other
Loud
Normal
Loud
Normal
Normal/Other
Normal
Normal/Other
Other/Normal
Normal
Normal/Other
Normal/Other
Other/Normal
Normal
Normal/Other
Other/Normal
Other/Normal
Loud
Normal
Normal/Other
Other/Normal
Normal
Normal
Normal/Other
Other/Normal
Normal
Normal/Other
Normal/Other
Other/Normal
Other/Normal
Normal
Other/Normal
Other/Normal
Normal
Normal/Other
Normal
Normal/Other
Normal
Normal
Loud
Normal/Other
Normal
Other/Normal
Normal
Normal
Normal/Other
Normal/Other
Loud
Loud
Normal/Other
Loud
Other/Normal
Other/Normal
Normal
Normal/Other
Other/Normal
Loud
Normal/Other
Normal
Normal/Other
Normal
Normal/Other
Normal/Other
Loud
Normal
Normal/Other
Normal
Normal
Normal/Other
Normal/Other
Other/Normal
Other/Normal
Normal
Normal/Other
Normal
Other/Normal
Normal
Normal/Other
Other/Normal
Other/Normal
Normal/Other
Normal
Normal
Other/Normal
Normal/Other
Normal
Other/Normal
Normal
Normal
Normal/Other
Loud
Other/Normal
Other/Normal
Normal
Normal
Other/Normal
Normal/Other
Normal
Normal/Other

## 2018-08-17 NOTE — PROGRESS NOTE BEHAVIORAL HEALTH - NSBHFUPTYPE_PSY_A_CORE
Inpatient

## 2018-08-17 NOTE — PROGRESS NOTE BEHAVIORAL HEALTH - NSBHADMITIPDSM_PSY_A_CORE
see above for Axis I, II, III

## 2018-08-17 NOTE — PROGRESS NOTE BEHAVIORAL HEALTH - NS ED [facility] MSE SPEECH ARTICULATION
Impaired
Impaired
Normal
Impaired
Normal
Impaired
Impaired/Other
Impaired
Other
Impaired
Impaired/Other
Impaired/Other
Normal
Impaired
Normal
Impaired
Other/Impaired
Impaired
Impaired
Impaired/Other
Other/Impaired
Impaired
Impaired/Other
Impaired
Normal
Impaired
Impaired/Other
Impaired
Normal
Impaired
Normal
Other
Impaired
Impaired/Other
Normal
Normal
Impaired
Impaired/Other
Impaired/Other
Normal
Impaired
Normal
Normal
Impaired
Normal
Other/Impaired
Impaired
Impaired/Other
Impaired/Other
Normal
Impaired
Normal
Normal
Other/Impaired
Impaired
Normal
Impaired
Other/Impaired
Impaired
Normal
Impaired
Impaired/Other
Normal
Impaired
Other/Impaired
Other/Impaired
Impaired
Impaired
Normal
Normal
Impaired
Normal
Impaired
Impaired
Normal
Impaired
Normal
Impaired
Impaired/Other
Impaired
Impaired
Impaired/Other
Impaired/Other
Impaired
Other
Other/Impaired
Impaired
Impaired
Normal
Impaired
Normal
Impaired
Normal
Impaired
Normal
Impaired
Normal
Impaired
Impaired
Other
Impaired/Other
Impaired/Other
Normal
Impaired

## 2018-08-17 NOTE — PROGRESS NOTE BEHAVIORAL HEALTH - RELATEDNESS
Other
Other
Good
Other
Other
Fair
Other
Fair
Fair
Other
Fair
Fair
Good
Other
Other
Fair
Other
Fair
Other
Fair
Other
Fair
Good
Fair
Fair
Other
Poor
Fair
Fair
Good
Fair
Other
Other
Poor
Other
Fair
Good
Other
Other
Poor
Good
Other
Other
Poor/Fair
Fair
Good
Other
Fair
Good
Good
Other
Fair
Good
Other
Fair
Good
Other
Fair
Other
Fair
Good
Other
Fair
Fair
Good
Other
Fair
Good
Other
Other
Fair
Other
Fair
Other
Other
Fair
Other
Fair
Other
Good
Fair
Fair/Poor
Other
Fair
Fair
Good
Other
Fair
Other
Other
Fair
Poor
Other
Other
Good
Other
Other
Fair
Fair
Good
Fair
Other
Fair
Fair
Good
Other
Fair
Good
Fair
Fair
Other
Other
Fair
Other
Other
Fair
Good
Other
Good
Other
Other
Good
Fair
Good

## 2018-08-17 NOTE — PROGRESS NOTE BEHAVIORAL HEALTH - THOUGHT ASSOCIATIONS
Normal

## 2018-08-17 NOTE — PROGRESS NOTE BEHAVIORAL HEALTH - THOUGHT CONTENT
Delusions/Preoccupations/Other
Preoccupations/Delusions/Other
Delusions/Preoccupations/Other
Other/Preoccupations/Delusions
Preoccupations/Other/Delusions
Preoccupations/Delusions/Other
Other/Delusions
Other/Delusions/Preoccupations
Other/Preoccupations/Delusions
Delusions/Other
Delusions/Preoccupations/Other
Delusions/Preoccupations/Other
Preoccupations/Delusions/Other
Unremarkable
Preoccupations/Delusions/Other
Other/Preoccupations
Preoccupations/Delusions/Other
Delusions/Other/Preoccupations
Other/Delusions/Preoccupations
Delusions/Preoccupations/Other
Preoccupations/Delusions/Other
Delusions/Other/Preoccupations
Other/Delusions/Preoccupations
Delusions/Other/Preoccupations
Delusions/Preoccupations/Other
Other
Delusions/Other/Preoccupations
Delusions/Other/Preoccupations
Delusions/Preoccupations/Other
Other/Delusions/Preoccupations
Other/Preoccupations/Delusions
Preoccupations/Delusions/Other
Preoccupations/Delusions/Other
Delusions/Other/Preoccupations
Delusions/Other/Preoccupations
Delusions/Preoccupations/Other
Other/Delusions/Preoccupations
Preoccupations/Other
Delusions/Preoccupations/Other
Other/Delusions/Preoccupations
Other/Delusions/Preoccupations
Delusions/Other/Preoccupations
Delusions/Preoccupations/Other
Other/Preoccupations/Delusions
Other/Preoccupations/Delusions
Delusions/Other/Preoccupations
Delusions/Preoccupations/Other
Other/Delusions/Preoccupations
Other/Delusions/Preoccupations
Other/Preoccupations/Delusions
Other/Preoccupations/Delusions
Delusions/Preoccupations/Other
Delusions/Preoccupations/Other
Other/Preoccupations/Delusions
Unremarkable
Delusions/Preoccupations/Other
Other/Preoccupations
Other/Delusions/Preoccupations
Other
Delusions/Preoccupations/Other
Other/Delusions/Preoccupations
Unremarkable
Delusions/Preoccupations/Other
Delusions/Preoccupations/Other
Other
Preoccupations/Other/Delusions
Unremarkable
Other/Delusions/Preoccupations
Preoccupations/Other
Delusions/Other/Preoccupations
Delusions/Preoccupations/Other
Other/Delusions/Preoccupations
Other/Preoccupations/Delusions
Delusions/Other/Preoccupations
Delusions/Other/Preoccupations
Delusions/Preoccupations/Other
Other/Delusions/Preoccupations
Preoccupations/Delusions/Other
Preoccupations/Other
Preoccupations/Other
Preoccupations/Other/Delusions
Unremarkable
Delusions/Other/Preoccupations
Delusions/Preoccupations/Other
Other/Delusions/Preoccupations
Other/Preoccupations/Delusions
Preoccupations/Other/Delusions
Unremarkable
Delusions/Other/Preoccupations
Delusions/Preoccupations/Other
Other/Delusions/Preoccupations
Other/Preoccupations
Other/Preoccupations/Delusions
Preoccupations/Other/Delusions
Unremarkable
Delusions/Other/Preoccupations
Delusions/Preoccupations/Other
Other/Delusions/Preoccupations
Other/Delusions/Preoccupations
Preoccupations/Delusions/Other
Preoccupations/Other
Preoccupations/Other
Preoccupations/Other/Delusions
Delusions/Preoccupations/Other
Other/Delusions/Preoccupations
Other/Preoccupations/Delusions
Preoccupations/Other
Preoccupations/Other/Delusions
Delusions/Other/Preoccupations
Delusions/Preoccupations/Other
Preoccupations/Delusions/Other
Other/Delusions/Preoccupations
Delusions/Other/Preoccupations
Preoccupations/Delusions/Other
Unremarkable
Delusions/Other/Preoccupations
Preoccupations/Delusions/Other
Unremarkable
Unremarkable
Preoccupations/Other/Delusions
Unremarkable
Delusions/Other/Preoccupations
Other/Delusions/Preoccupations
Unremarkable
Other/Delusions/Preoccupations
Preoccupations/Other
Preoccupations/Other
Unremarkable
Preoccupations/Other/Delusions
Unremarkable
Other/Preoccupations/Delusions
Preoccupations/Delusions/Other
Preoccupations/Delusions/Other
Delusions/Preoccupations/Other
Unremarkable
Other/Delusions/Preoccupations
Unremarkable
Delusions/Preoccupations/Other
Preoccupations/Other
Preoccupations/Other/Delusions
Delusions/Other/Preoccupations
Delusions/Preoccupations/Other
Preoccupations/Delusions/Other
Delusions/Other/Preoccupations
Other/Preoccupations
Other/Delusions/Preoccupations
Delusions/Preoccupations/Other
Other/Delusions/Preoccupations
Preoccupations/Other
Preoccupations/Other
Preoccupations/Other/Delusions
Delusions/Preoccupations/Other
Other
Preoccupations/Delusions/Other
Preoccupations/Other/Delusions
Preoccupations/Other/Delusions
Delusions/Preoccupations/Other
Other/Preoccupations/Delusions
Delusions/Preoccupations/Other
Delusions/Preoccupations/Other
Preoccupations/Other/Delusions
Unremarkable
Unremarkable
Delusions/Preoccupations/Other
Other/Delusions/Preoccupations
Other/Preoccupations
Delusions/Preoccupations/Other
Preoccupations/Delusions/Other
Delusions/Preoccupations/Other
Delusions/Preoccupations/Other
Unremarkable
Preoccupations/Other

## 2018-08-17 NOTE — PROGRESS NOTE BEHAVIORAL HEALTH - PRIMARY DX
Psychosis due to Parkinson's disease
Mood disorder due to a general medical condition
Psychosis due to Parkinson's disease
Mood disorder due to a general medical condition
Psychosis due to Parkinson's disease
Mood disorder due to a general medical condition
Psychosis due to Parkinson's disease
Mood disorder due to a general medical condition
Psychosis due to Parkinson's disease
Mood disorder due to a general medical condition
Psychosis due to Parkinson's disease

## 2018-08-17 NOTE — PROGRESS NOTE BEHAVIORAL HEALTH - NSBHFUPSUICINTERVAL_PSY_A_CORE
none known

## 2018-08-17 NOTE — PROGRESS NOTE ADULT - SUBJECTIVE AND OBJECTIVE BOX
Neurology follow up note    BARTOLO [address]      Interval History:    Patient feels ok no new complaints.    MEDICATIONS    acetaminophen   Tablet. 325 milliGRAM(s) Oral every 6 hours PRN  acetaminophen   Tablet. 325 milliGRAM(s) Oral every 6 hours PRN  aluminum hydroxide/magnesium hydroxide/simethicone Suspension 30 milliLiter(s) Oral every 6 hours PRN  carbidopa/levodopa  25/250 1.5 Tablet(s) Oral <User Schedule>  carbidopa/levodopa/entacapone 50/200/200 1 Tablet(s) Oral <User Schedule>  cyclobenzaprine 10 milliGRAM(s) Oral three times a day PRN  fluticasone propionate 50 MICROgram(s)/spray Nasal Spray 1 Spray(s) Both Nostrils two times a day  haloperidol     Tablet 5 milliGRAM(s) Oral every 6 hours PRN  haloperidol    Injectable 5 milliGRAM(s) IntraMuscular every 6 hours PRN  ibuprofen  Tablet 400 milliGRAM(s) Oral two times a day PRN  nitroglycerin     SubLingual 0.4 milliGRAM(s) SubLingual every 5 minutes PRN  OLANZapine Disintegrating Tablet 5 milliGRAM(s) Oral every 6 hours PRN      Allergies    aspirin (Unknown)  Cheese (Unknown)  ibuprofen (Unknown)  Reglan (Swelling)    Intolerances    ibuprofen (Unknown)          Vital Signs Last 24 Hrs  T(C): --  T(F): --  HR: --  BP: --  BP(mean): --  RR: --  SpO2: --      REVIEW OF SYSTEMS:     Constitutional: No fever, chills, fatigue, weakness  Eyes: no eye pain, visual disturbances, or discharge  ENT:  No difficulty hearing, tinnitus, vertigo; No sinus or throat pain  Neck: No pain or stiffness  Respiratory: No cough, dyspnea, wheezing   Cardiovascular: No chest pain, palpitations,   Gastrointestinal: No abdominal or epigastric pain. No nausea, vomiting  No diarrhea or constipation.   Genitourinary: No dysuria, frequency, hematuria or incontinence  Neurological: No headaches, lightheadedness, vertigo, numbness or tremors  Psychiatric: No depression, anxiety, mood swings or difficulty sleeping  Musculoskeletal: No joint pain or swelling; No muscle, back or extremity pain  Skin: No itching, burning, rashes or lesions   Lymph Nodes: No enlarged glands  Endocrine: No heat or cold intolerance; No hair loss   Allergy and Immunologic: No hives or eczema    On Neurological Examination:    The patient is awake and alert.      Extraocular movements were intact.  Pupils were equal, round, and reactive bilaterally, 3 mm to 2 mm.      Speech was fluent.      Smile was symmetric.      Motor:  Bilateral upper and lower were 4-/5.      Sensory:  Bilateral upper and lower intact to light touch.  Upon walking in to see the patient, he was not having any tremors.    Follow simple commands    GENERAL Exam: Nontoxic , No Acute Distress   	  HEENT:  normocephalic, atraumatic  		  LUNGS: Clear bilaterally    	  HEART: Normal S1S2   No murmur RRR        	  GI/ ABDOMEN:  Soft  Non tender    EXTREMITIES:   No Edema  No Clubbing  No Cyanosis No Edema    MUSCULOSKELETAL: decreased Range of Motion all 4 extremities   	   SKIN: Normal  No Ecchymosis               LABS:            Hemoglobin A1C:       Vitamin B12         RADIOLOGY    ANALYSIS AND PLAN:  This is a 52-year-old with a history of Parkinson's disease.  1.	For history of Parkinson's disease, with questionable exacerbation, this could be secondary to antipsychotic medication.  The patient's examination, upon when I first walked in, the patient was not having any shaking. Questionable, if the patient is having a true exacerbation of Parkinson's.  The patient is on a good dose of Stalevo and Sinemet.  I will increase the patient's Sinemet to 25/250 one and a half tablets at 6, 12, 4, and 8, and I will change the patient's Stalevo dosing to 6, 10:30, and 6.    2.	We continue to follow and adjust medications as needed.  3.	Continue psychiatric evaluation.  4.	Physical Therapy evaluation.  5.	Fall precautions.  6.	At present when seen tremors appear ok     Thank you for the courtesy of this consultation.      Greater than 45 minutes spent in direct patient care reviewing  the notes, lab data/ imaging , discussion with multidisciplinary team.
Neurology follow up note    BARTOLO GREENE52yMale      Interval History:    Patient feels that his parkinson medications are not right --- yesterday was able to walk to bathroom and today can not walk     MEDICATIONS    docusate sodium 100 milliGRAM(s) Oral two times a day  OLANZapine 2.5 milliGRAM(s) Oral every 6 hours PRN  OLANZapine Injectable 5 milliGRAM(s) IntraMuscular every 6 hours PRN  nystatin Powder 1 Application(s) Topical daily  BACItracin   Ointment 1 Application(s) Topical two times a day  acetaminophen   Tablet. 325 milliGRAM(s) Oral every 6 hours PRN  baclofen 10 milliGRAM(s) Oral two times a day  hydrocortisone 2.5% Rectal Cream 1 Application(s) Rectal daily  OLANZapine Injectable 2.5 milliGRAM(s) IntraMuscular once  aluminum hydroxide/magnesium hydroxide/simethicone Suspension 30 milliLiter(s) Oral every 6 hours PRN  carbidopa/levodopa/entacapone 50/200/200 1 Tablet(s) Oral <User Schedule>  Nuplazid (•PIMAVANSERIN) 17mg 2 Tablet(s) 2 Tablet(s) Oral <User Schedule>  cyclobenzaprine 10 milliGRAM(s) Oral three times a day PRN  carbidopa/levodopa  25/250 1 Tablet(s) Oral <User Schedule>      Allergies    aspirin (Unknown)  Cheese (Unknown)  ibuprofen (Unknown)  Reglan (Swelling)    Intolerances    ibuprofen (Unknown)          Vital Signs Last 24 Hrs  T(C): --  T(F): --  HR: --  BP: --  BP(mean): --  RR: --  SpO2: --      On Neurological Examination:    Mental Status - Patient is alert, awake,       Follow simple commands    Speech -   Fluent                      Cranial Nerves - Pupils 3 mm equal and reactive to light,   extraocular eye movements intact.   smile symmetric  intact bilateral NLF    Motor Exam -     With stimuli positive movement of all 4 extremities    + termor     Muscle tone - is increased all over.  No asymmetry is seen.      Sensory    Bilateral intact to light touch    GENERAL Exam: Nontoxic , No Acute Distress   	  HEENT:  normocephalic, atraumatic  		  LUNGS: Clear bilaterally  No Wheeze  Decreased bilaterally  	  HEART: Normal S1S2   No murmur RRR        	  GI/ ABDOMEN:  Soft  Non tender    EXTREMITIES:   No Edema  No Clubbing  No Cyanosis No Edema    MUSCULOSKELETAL: Normal Range of Motion  	   SKIN: Normal  No Ecchymosis                  LABS:    Urinalysis Basic - ( 26 Sep 2017 06:16 )    Color: Yellow / Appearance: Clear / S.020 / pH: x  Gluc: x / Ketone: Negative  / Bili: Negative / Urobili: Negative   Blood: x / Protein: Negative / Nitrite: Negative   Leuk Esterase: Negative / RBC: 0-2 /HPF / WBC x   Sq Epi: x / Non Sq Epi: x / Bacteria: x            Hemoglobin A1C:       Vitamin B12         RADIOLOGY    ANALYSIS AND PLAN:   This is a 52-year-old with a history of Parkinson's disease.    For history of Parkinson's disease, I had a lengthy discussion with the patient, Dr. Dewitt, [phone] office [place] he is on  Stalevo 200mg tid and now patient wants Sinemet   25/250 4 times a day.  which I placed him back on , - based on patient clinical course will adjust medications as needed   PT eval as tolerated     20 minutes spent on total encounter; more than 50% of the visit was spent counseling and/or coordinating care by the attending physician.
Neurology follow up note    BARTOLO GREENE52yMale      Interval History:Patient still upset about color of pills --- wants blue pills only and the not receiving name brand       MEDICATIONS    acetaminophen   Tablet. 325 milliGRAM(s) Oral every 6 hours PRN  aluminum hydroxide/magnesium hydroxide/simethicone Suspension 30 milliLiter(s) Oral every 6 hours PRN  baclofen 10 milliGRAM(s) Oral two times a day  carbidopa/levodopa  25/250 1 Tablet(s) Oral <User Schedule>  carbidopa/levodopa/entacapone 50/200/200 1 Tablet(s) Oral <User Schedule>  cyclobenzaprine 10 milliGRAM(s) Oral three times a day PRN  docusate sodium 100 milliGRAM(s) Oral two times a day  hydrocortisone 2.5% Rectal Cream 1 Application(s) Rectal daily  LORazepam   Injectable 2 milliGRAM(s) IntraMuscular every 4 hours PRN  nitroglycerin     SubLingual 0.4 milliGRAM(s) SubLingual every 5 minutes PRN  Nuplazid (pimavanserin) 17 milliGRAM(s) 17 milliGRAM(s) Oral <User Schedule>  nystatin Powder 1 Application(s) Topical daily  OLANZapine Injectable 5 milliGRAM(s) IntraMuscular every 6 hours PRN  PPD  5 Tuberculin Unit(s) Injectable 5 Unit(s) IntraDermal once      Allergies    aspirin (Unknown)  Cheese (Unknown)  ibuprofen (Unknown)  Reglan (Swelling)    Intolerances    ibuprofen (Unknown)          Vital Signs Last 24 Hrs  T(C): --  T(F): --  HR: --  BP: --  BP(mean): --  RR: --  SpO2: --    On Neurological Examination:    Mental Status - Patient is alert, awake,       Follow simple commands    Speech -   Fluent                      Cranial Nerves - Pupils 3 mm equal and reactive to light,   extraocular eye movements intact.   smile symmetric  intact bilateral NLF    Motor Exam -     With stimuli positive movement of all 4 extremities    did not see any termor at rest upon becoming agitated developed dyskinesia with tremors   + dyskinesias seen today     Muscle tone - is increased all over.  No asymmetry is seen.      Sensory    Bilateral intact to light touch    GENERAL Exam: Nontoxic , No Acute Distress   	  HEENT:  normocephalic, atraumatic  		  LUNGS: Clear bilaterally  No Wheeze  Decreased bilaterally  	  HEART: Normal S1S2   No murmur RRR        	  GI/ ABDOMEN:  Soft  Non tender    EXTREMITIES:   No Edema  No Clubbing  No Cyanosis No Edema    MUSCULOSKELETAL: Normal Range of Motion  	   SKIN: Normal  No Ecchymosis                  LABS:          Hemoglobin A1C:       Vitamin B12         RADIOLOGY    ANALYSIS AND PLAN:   This is a 52-year-old with a history of Parkinson's disease.    For history of Parkinson's disease, I had a lengthy discussion with the patient, Dr. Dewitt, [phone] office [place] in past he is on  Stalevo 200mg tid and now patient wants Sinemet   25/250 4 times a day.  which I placed him back on , - based on patient clinical course will adjust medications as needed --- at present pt appears to have dyskinesias now   PT eval as tolerated     continue psych treatment    20 minutes spent on total encounter; more than 50% of the visit was spent counseling and/or coordinating care by the attending physician.
Neurology follow up note    BARTOLO [id number]      Interval History:    Patient feels ok no new complaints. wants more and more parkinsonian medications     MEDICATIONS    acetaminophen   Tablet. 650 milliGRAM(s) Oral every 6 hours PRN  acetaminophen   Tablet. 325 milliGRAM(s) Oral every 6 hours PRN  aluminum hydroxide/magnesium hydroxide/simethicone Suspension 30 milliLiter(s) Oral every 6 hours PRN  baclofen 10 milliGRAM(s) Oral <User Schedule>  carbidopa/levodopa  10/100 1 Tablet(s) Oral <User Schedule>  carbidopa/levodopa  25/250 1 Tablet(s) Oral <User Schedule>  carbidopa/levodopa/entacapone 50/200/200 1 Tablet(s) Oral <User Schedule>  cloZAPine 50 milliGRAM(s) Oral <User Schedule>  cyclobenzaprine 10 milliGRAM(s) Oral three times a day PRN  docusate sodium 100 milliGRAM(s) Oral <User Schedule>  haloperidol     Tablet 5 milliGRAM(s) Oral every 6 hours PRN  haloperidol    Injectable 5 milliGRAM(s) IntraMuscular every 6 hours PRN  hydrocortisone 2.5% Rectal Cream 1 Application(s) Rectal daily  ibuprofen  Tablet 400 milliGRAM(s) Oral daily PRN  nitroglycerin     SubLingual 0.4 milliGRAM(s) SubLingual every 5 minutes PRN  OLANZapine Injectable 2.5 milliGRAM(s) IntraMuscular once  OLANZapine Injectable 5 milliGRAM(s) IntraMuscular daily      Allergies    aspirin (Unknown)  Cheese (Unknown)  ibuprofen (Unknown)  Reglan (Swelling)    Intolerances    ibuprofen (Unknown)          Vital Signs Last 24 Hrs  T(C): --  T(F): --  HR: --  BP: --  BP(mean): --  RR: --  SpO2: --    REVIEW OF SYSTEMS: Limit or unable to obtain secondary to patient's poor mental status was angery       Allergy and Immunologic: No hives or eczema    On Neurological Examination:     Mental Status - Patient is alert, awake,       Follow simple commands    Speech -   Fluent                      Cranial Nerves - Pupils 3 mm equal and reactive to light,   extraocular eye movements intact.   smile symmetric  intact bilateral NLF    Motor Exam -     bilateral upper/lower 4/5    + Dyskinesias     Follow simple commands    GENERAL Exam: Nontoxic , No Acute Distress   	  HEENT:  normocephalic, atraumatic  		  LUNGS: Clear bilaterally    	  HEART: Normal S1S2   No murmur RRR        	  GI/ ABDOMEN:  Soft  Non tender    EXTREMITIES:   No Edema  No Clubbing  No Cyanosis No Edema    MUSCULOSKELETAL: Normal Range of Motion decreased Range of Motion all 4 extremities   	   SKIN: Normal  No Ecchymosis               LABS:  CBC Full  -  ( 21 Feb 2018 07:13 )  WBC Count : 5.1 K/uL  Hemoglobin : 12.2 g/dL  Hematocrit : 36.3 %  Platelet Count - Automated : 178 K/uL  Mean Cell Volume : 91.4 fl  Mean Cell Hemoglobin : 30.7 pg  Mean Cell Hemoglobin Concentration : 33.6 gm/dL  Auto Neutrophil # : 2.8 K/uL  Auto Lymphocyte # : 1.7 K/uL  Auto Monocyte # : 0.4 K/uL  Auto Eosinophil # : 0.1 K/uL  Auto Basophil # : 0.0 K/uL  Auto Neutrophil % : 55.3 %  Auto Lymphocyte % : 33.6 %  Auto Monocyte % : 08.2 %  Auto Eosinophil % : 2.2 %  Auto Basophil % : 0.6 %            Hemoglobin A1C:       Vitamin B12         RADIOLOGY    ANALYSIS AND PLAN:  This is a 52-year-old with a history of Parkinson's disease.  1.	For episode of Parkinson disease, what I could see at present the patient appeared to have more dyskinesia type of movements. The patient is on a good dose of Parkinson's medications in regards to Stalevo and Sinemet.   He is on a good dose of parkinson medication and is walking well up and down the parmar-- he is requesting I double his dose and make it 6 times a day -- stated he called his outside neurologist who agreed with his plan. I do not feel at present -- the patient does not need extra doses. what I saw today is dyskinesias   2.	I will continue physical therapy evaluation.  3.	psychiatric followup.  4.	Fall precautions.    Thank you for the courtesy of consultation.    Physical therapy evaluation as tolerated  OOB to chair/ambulation with assistance only if possible.    Greater than 40 minutes spent in direct patient care reviewing  the notes, lab data/ imaging , discussion with multidisciplinary team.
Neurology follow up note    BARTOLO [address]      Interval History:    Patient resting in chair     MEDICATIONS    acetaminophen   Tablet. 325 milliGRAM(s) Oral every 6 hours PRN  acetaminophen   Tablet. 325 milliGRAM(s) Oral every 6 hours PRN  aluminum hydroxide/magnesium hydroxide/simethicone Suspension 30 milliLiter(s) Oral every 6 hours PRN  carbidopa/levodopa  25/250 1 Tablet(s) Oral <User Schedule>  carbidopa/levodopa/entacapone 50/200/200 1 Tablet(s) Oral <User Schedule>  cyclobenzaprine 10 milliGRAM(s) Oral three times a day PRN  haloperidol     Tablet 5 milliGRAM(s) Oral every 6 hours PRN  haloperidol    Injectable 5 milliGRAM(s) IntraMuscular every 6 hours PRN  ibuprofen  Tablet 400 milliGRAM(s) Oral two times a day PRN  LORazepam     Tablet 2 milliGRAM(s) Oral every 6 hours PRN  LORazepam   Injectable 2 milliGRAM(s) IntraMuscular every 6 hours PRN  nitroglycerin     SubLingual 0.4 milliGRAM(s) SubLingual every 5 minutes PRN  OLANZapine Disintegrating Tablet 5 milliGRAM(s) Oral every 6 hours PRN      Allergies    aspirin (Unknown)  Cheese (Unknown)  ibuprofen (Unknown)  Reglan (Swelling)    Intolerances    ibuprofen (Unknown)          Vital Signs Last 24 Hrs  T(C): --  T(F): --  HR: --  BP: --  BP(mean): --  RR: --  SpO2: --          REVIEW OF SYSTEMS:     Constitutional: No fever, chills, fatigue, weakness  Eyes: no eye pain, visual disturbances, or discharge  ENT:  No difficulty hearing, tinnitus, vertigo; No sinus or throat pain  Neck: No pain or stiffness  Respiratory: No cough, dyspnea, wheezing   Cardiovascular: No chest pain, palpitations,   Gastrointestinal: No abdominal or epigastric pain. No nausea, vomiting  No diarrhea or constipation.   Genitourinary: No dysuria, frequency, hematuria or incontinence  Neurological: No headaches, lightheadedness, vertigo, numbness or tremors  Psychiatric: No depression, anxiety, mood swings or difficulty sleeping  Musculoskeletal: No joint pain or swelling; No muscle, back or extremity pain  Skin: No itching, burning, rashes or lesions   Lymph Nodes: No enlarged glands  Endocrine: No heat or cold intolerance; No hair loss   Allergy and Immunologic: No hives or eczema    On Neurological Examination:    The patient is awake and alert.      Extraocular movements were intact.  Pupils were equal, round, and reactive bilaterally, 3 mm to 2 mm.      Speech was fluent.      Smile was symmetric.      Motor:  Bilateral upper and lower were 3+/5    Sensory:  Bilateral upper and lower intact to light touch. he was not having any tremors THESE ABNORMAL MOVEMENT APPEAR TO BE DYSKINESIAS     Follow simple commands    GENERAL Exam: Nontoxic , No Acute Distress   	  HEENT:  normocephalic, atraumatic neck increase tone turned to left   		  LUNGS: Clear bilaterally    	  HEART: Normal S1S2   No murmur RRR        	  GI/ ABDOMEN:  Soft  Non tender    EXTREMITIES:   No Edema  No Clubbing  No Cyanosis No Edema    MUSCULOSKELETAL: decreased Range of Motion all 4 extremities   	   SKIN: Normal  No Ecchymosis           LABS:            Hemoglobin A1C:       Vitamin B12         RADIOLOGY        ANALYSIS AND PLAN:  This is a 52-year-old with a history of Parkinson's disease.  1.	For history of Parkinson's disease, with questionable exacerbation, what was seen by me a present is NOT parkinsonism symptoms these movements appear to be more of dyskinesia will decrease sinemet 1.5 tablets to 1 tablet.  Also as per conversation patient was becoming more paranoid which could also be side effect for dopamine We continue to follow and adjust medications as needed.  2.	Continue psychiatric evaluation.  3.	neck recommend warm compresses  4.	Physical Therapy evaluation.  5.	Fall precautions.  6.	SPOKE TO PATIENT IN DETAIL ABOUT DECREASE DOSE -- HE STATE " I WILL START TO FALL THEN"--- RECOMMEND AMBULATION ONLY WITH ASSISTANCE     Thank you for the courtesy of this consultation.      Greater than 45 minutes spent in direct patient care reviewing  the notes, lab data/ imaging , discussion with multidisciplinary team.ANALYSIS AND PLAN:  This is a 52-year-old with a history of Parkinson's disease.
Neurology follow up note    BARTOLO [address]      Interval History:    Patient upset about medication timing     MEDICATIONS    acetaminophen   Tablet. 325 milliGRAM(s) Oral every 6 hours PRN  acetaminophen   Tablet. 325 milliGRAM(s) Oral every 6 hours PRN  aluminum hydroxide/magnesium hydroxide/simethicone Suspension 30 milliLiter(s) Oral every 6 hours PRN  carbidopa/levodopa  25/250 1.5 Tablet(s) Oral <User Schedule>  carbidopa/levodopa/entacapone 50/200/200 1 Tablet(s) Oral <User Schedule>  cyclobenzaprine 10 milliGRAM(s) Oral three times a day PRN  haloperidol     Tablet 5 milliGRAM(s) Oral every 6 hours PRN  haloperidol    Injectable 5 milliGRAM(s) IntraMuscular every 6 hours PRN  ibuprofen  Tablet 400 milliGRAM(s) Oral two times a day PRN  nitroglycerin     SubLingual 0.4 milliGRAM(s) SubLingual every 5 minutes PRN  OLANZapine Disintegrating Tablet 5 milliGRAM(s) Oral every 6 hours PRN      Allergies    aspirin (Unknown)  Cheese (Unknown)  ibuprofen (Unknown)  Reglan (Swelling)    Intolerances    ibuprofen (Unknown)          Vital Signs Last 24 Hrs  T(C): --  T(F): --  HR: --  BP: --  BP(mean): --  RR: --  SpO2: --    REVIEW OF SYSTEMS:     Constitutional: No fever, chills, fatigue, weakness  Eyes: no eye pain, visual disturbances, or discharge  ENT:  No difficulty hearing, tinnitus, vertigo; No sinus or throat pain  Neck: No pain or stiffness  Respiratory: No cough, dyspnea, wheezing   Cardiovascular: No chest pain, palpitations,   Gastrointestinal: No abdominal or epigastric pain. No nausea, vomiting  No diarrhea or constipation.   Genitourinary: No dysuria, frequency, hematuria or incontinence  Neurological: No headaches, lightheadedness, vertigo, numbness or tremors  Psychiatric: No depression, anxiety, mood swings or difficulty sleeping  Musculoskeletal: No joint pain or swelling; No muscle, back or extremity pain  Skin: No itching, burning, rashes or lesions   Lymph Nodes: No enlarged glands  Endocrine: No heat or cold intolerance; No hair loss   Allergy and Immunologic: No hives or eczema    On Neurological Examination:    The patient is awake and alert.      Extraocular movements were intact.  Pupils were equal, round, and reactive bilaterally, 3 mm to 2 mm.      Speech was fluent.      Smile was symmetric.      Motor:  Bilateral upper and lower were 4-/5.      Sensory:  Bilateral upper and lower intact to light touch.  Upon walking in to see the patient, he was not having any tremors.    Follow simple commands    GENERAL Exam: Nontoxic , No Acute Distress   	  HEENT:  normocephalic, atraumatic  		  LUNGS: Clear bilaterally    	  HEART: Normal S1S2   No murmur RRR        	  GI/ ABDOMEN:  Soft  Non tender    EXTREMITIES:   No Edema  No Clubbing  No Cyanosis No Edema    MUSCULOSKELETAL: decreased Range of Motion all 4 extremities   	   SKIN: Normal  No Ecchymosis               LABS:            Hemoglobin A1C:       Vitamin B12         RADIOLOGY      ANALYSIS AND PLAN:  This is a 52-year-old with a history of Parkinson's disease.  1.	For history of Parkinson's disease, with questionable exacerbation, this could be secondary to antipsychotic medication.  The patient's examination, upon when I first walked in, the patient was not having any shaking. Questionable, if the patient is having a true exacerbation of Parkinson's.    2.	We continue to follow and adjust medications as needed.  3.	Continue psychiatric evaluation.  4.	Physical Therapy evaluation.  5.	Fall precautions.  6.	will adjust medications as needed   7.	At present when seen tremors appear ok     Thank you for the courtesy of this consultation.      Greater than 45 minutes spent in direct patient care reviewing  the notes, lab data/ imaging , discussion with multidisciplinary team.
Neurology follow up note    BARTOLO GREENE52yMale      Interval History:    Patient feels he is shutting down     MEDICATIONS    docusate sodium 100 milliGRAM(s) Oral two times a day  acetaminophen   Tablet 650 milliGRAM(s) Oral every 6 hours PRN  rasagiline Tablet 1.5 milliGRAM(s) Oral two times a day  valproic  acid Syrup 500 milliGRAM(s) Oral at bedtime  OLANZapine 2.5 milliGRAM(s) Oral every 6 hours PRN  OLANZapine Injectable 5 milliGRAM(s) IntraMuscular every 6 hours PRN  carbidopa/levodopa/entacapone 50/200/200 1 Tablet(s) Oral three times a day  nystatin Powder 1 Application(s) Topical daily  carbidopa/levodopa  25/250 1 Tablet(s) Oral three times a day      Allergies    aspirin (Unknown)  Cheese (Unknown)  ibuprofen (Unknown)  Reglan (Swelling)    Intolerances    ibuprofen (Unknown)          Vital Signs Last 24 Hrs  T(C): --  T(F): --  HR: --  BP: --  BP(mean): --  RR: --  SpO2: --         REVIEW OF Systems: limited or unable to obtain secondary to patient's poor mental status.    Constitutional: No fever, chills, fatigue, weakness  Eyes: no eye pain, visual disturbances, or discharge  ENT:  No difficulty hearing, tinnitus, vertigo; No sinus or throat pain  Neck: No pain or stiffness  Respiratory: No cough, dyspnea, wheezing   Cardiovascular: No chest pain, palpitations,   Gastrointestinal: No abdominal or epigastric pain. No nausea, vomiting  No diarrhea or constipation.   Genitourinary: No dysuria, frequency, hematuria or incontinence  Neurological: No headaches, lightheadedness, vertigo, numbness or tremors  Psychiatric: No depression, anxiety, mood swings or difficulty sleeping  Musculoskeletal: No joint pain or swelling; No muscle, back or extremity pain  Skin: No itching, burning, rashes or lesions   Lymph Nodes: No enlarged glands  Endocrine: No heat or cold intolerance; No hair loss   Allergy and Immunologic: No hives or eczema      On Neurological Examination:    Mental Status - Patient is alert, awake,       Follow simple commands    Speech -   Fluent                      Cranial Nerves - Pupils 3 mm equal and reactive to light,   extraocular eye movements intact.   smile symmetric  intact bilateral NLF    Motor Exam -     With stimuli positive movement of all 4 extremities    + termor     Muscle tone - is increased all over.  No asymmetry is seen.      Sensory    Bilateral intact to light touch    GENERAL Exam: Nontoxic , No Acute Distress   	  HEENT:  normocephalic, atraumatic  		  LUNGS: Clear bilaterally  No Wheeze  Decreased bilaterally  	  HEART: Normal S1S2   No murmur RRR        	  GI/ ABDOMEN:  Soft  Non tender    EXTREMITIES:   No Edema  No Clubbing  No Cyanosis No Edema    MUSCULOSKELETAL: Normal Range of Motion  	   SKIN: Normal  No Ecchymosis               LABS:            Hemoglobin A1C:       Vitamin B12         RADIOLOGY      Assessment and plan    episode of questionable fall-- spoke to patient if he refuses his medications his parkinson's sym toms can become worse -- he appears to understand     h/o of parkinsonism --- spoke to psych possible behavorial issues could be secondary  to dopamine from Parkinson medications will adjust-- spoke to patient in detail about medications -- and the times to try to see how he does-- it appears that patient will refuse meds-- unclear about what time he wants to take them -- I will contintu to try to evaluate how patient is with the meds and timing     continue psych work up     PT eval as tolerated     20 minutes spent on total encounter; more than 50% of the visit was spent counseling and/or coordinating care by the attending physician.
Neurology follow up note    BARTOLO GREENE52yMale      Interval History:    Patient still upset about color of pills --- wants blue pills only     MEDICATIONS    docusate sodium 100 milliGRAM(s) Oral two times a day  OLANZapine 2.5 milliGRAM(s) Oral every 6 hours PRN  OLANZapine Injectable 5 milliGRAM(s) IntraMuscular every 6 hours PRN  nystatin Powder 1 Application(s) Topical daily  BACItracin   Ointment 1 Application(s) Topical two times a day  acetaminophen   Tablet. 325 milliGRAM(s) Oral every 6 hours PRN  baclofen 10 milliGRAM(s) Oral two times a day  hydrocortisone 2.5% Rectal Cream 1 Application(s) Rectal daily  OLANZapine Injectable 2.5 milliGRAM(s) IntraMuscular once  aluminum hydroxide/magnesium hydroxide/simethicone Suspension 30 milliLiter(s) Oral every 6 hours PRN  carbidopa/levodopa/entacapone 50/200/200 1 Tablet(s) Oral <User Schedule>  Nuplazid (•PIMAVANSERIN) 17mg 2 Tablet(s) 2 Tablet(s) Oral <User Schedule>  cyclobenzaprine 10 milliGRAM(s) Oral three times a day PRN  carbidopa/levodopa  25/250 1 Tablet(s) Oral <User Schedule>      Allergies    aspirin (Unknown)  Cheese (Unknown)  ibuprofen (Unknown)  Reglan (Swelling)    Intolerances    ibuprofen (Unknown)          Vital Signs Last 24 Hrs  T(C): --  T(F): --  HR: --  BP: --  BP(mean): --  RR: --  SpO2: --    On Neurological Examination:    Mental Status - Patient is alert, awake,       Follow simple commands    Speech -   Fluent                      Cranial Nerves - Pupils 3 mm equal and reactive to light,   extraocular eye movements intact.   smile symmetric  intact bilateral NLF    Motor Exam -     With stimuli positive movement of all 4 extremities    did not see any termor at rest upon becoming agitated developed dyskinesia with tremors     Muscle tone - is increased all over.  No asymmetry is seen.      Sensory    Bilateral intact to light touch    GENERAL Exam: Nontoxic , No Acute Distress   	  HEENT:  normocephalic, atraumatic  		  LUNGS: Clear bilaterally  No Wheeze  Decreased bilaterally  	  HEART: Normal S1S2   No murmur RRR        	  GI/ ABDOMEN:  Soft  Non tender    EXTREMITIES:   No Edema  No Clubbing  No Cyanosis No Edema    MUSCULOSKELETAL: Normal Range of Motion  	   SKIN: Normal  No Ecchymosis                    LABS:            Hemoglobin A1C:       Vitamin B12         RADIOLOGY      ANALYSIS AND PLAN:   This is a 52-year-old with a history of Parkinson's disease.    For history of Parkinson's disease, I had a lengthy discussion with the patient, Dr. Dewitt, [phone] office n[place] he is on  Stalevo 200mg tid and now patient wants Sinemet   25/250 4 times a day.  which I placed him back on , - based on patient clinical course will adjust medications as needed   PT eval as tolerated     continue psych treatment    20 minutes spent on total encounter; more than 50% of the visit was spent counseling and/or coordinating care by the attending physician.
Neurology follow up note    BARTOLO GREENE52yMale      Interval History:    Patient upset about his medications     MEDICATIONS    acetaminophen   Tablet. 650 milliGRAM(s) Oral every 6 hours PRN  acetaminophen   Tablet. 325 milliGRAM(s) Oral every 6 hours PRN  aluminum hydroxide/magnesium hydroxide/simethicone Suspension 30 milliLiter(s) Oral every 6 hours PRN  baclofen 10 milliGRAM(s) Oral <User Schedule>  carbidopa/levodopa  10/100 1 Tablet(s) Oral <User Schedule>  carbidopa/levodopa  25/250 1 Tablet(s) Oral <User Schedule>  carbidopa/levodopa/entacapone 50/200/200 1 Tablet(s) Oral <User Schedule>  cloZAPine 50 milliGRAM(s) Oral <User Schedule>  cyclobenzaprine 10 milliGRAM(s) Oral three times a day PRN  docusate sodium 100 milliGRAM(s) Oral <User Schedule>  haloperidol     Tablet 5 milliGRAM(s) Oral every 6 hours PRN  haloperidol    Injectable 5 milliGRAM(s) IntraMuscular every 6 hours PRN  hydrocortisone 2.5% Rectal Cream 1 Application(s) Rectal daily  ibuprofen  Tablet 400 milliGRAM(s) Oral daily PRN  nitroglycerin     SubLingual 0.4 milliGRAM(s) SubLingual every 5 minutes PRN  OLANZapine Injectable 2.5 milliGRAM(s) IntraMuscular once  OLANZapine Injectable 5 milliGRAM(s) IntraMuscular daily      Allergies    aspirin (Unknown)  Cheese (Unknown)  ibuprofen (Unknown)  Reglan (Swelling)    Intolerances    ibuprofen (Unknown)          Vital Signs Last 24 Hrs  T(C): --  T(F): --  HR: --  BP: --  BP(mean): --  RR: --  SpO2: --    REVIEW OF SYSTEMS: Limited    On Neurological Examination:     Mental Status - Patient is alert, awake,       Follow simple commands    Speech -   Fluent                      Cranial Nerves - Pupils 3 mm equal and reactive to light,   extraocular eye movements intact.   smile symmetric  intact bilateral NLF    Motor Exam -     bilateral upper/lower 4/5     NO Dyskinesias at present --- appear stable     Follow simple commands    GENERAL Exam: Nontoxic , No Acute Distress   	  HEENT:  normocephalic, atraumatic  		  LUNGS: Clear bilaterally    	  HEART: Normal S1S2   No murmur RRR        	  GI/ ABDOMEN:  Soft  Non tender    EXTREMITIES:   No Edema  No Clubbing  No Cyanosis No Edema    MUSCULOSKELETAL: Normal Range of Motion decreased Range of Motion all 4 extremities   	   SKIN: Normal  No Ecchymosis                LABS:  CBC Full  -  ( 07 Mar 2018 06:56 )  WBC Count : 5.1 K/uL  Hemoglobin : 12.2 g/dL  Hematocrit : 36.6 %  Platelet Count - Automated : 181 K/uL  Mean Cell Volume : 94.8 fl  Mean Cell Hemoglobin : 31.6 pg  Mean Cell Hemoglobin Concentration : 33.3 gm/dL  Auto Neutrophil # : 3.4 K/uL  Auto Lymphocyte # : 1.2 K/uL  Auto Monocyte # : 0.4 K/uL  Auto Eosinophil # : 0.1 K/uL  Auto Basophil # : 0.0 K/uL  Auto Neutrophil % : 66.0 %  Auto Lymphocyte % : 22.9 %  Auto Monocyte % : 08.4 %  Auto Eosinophil % : 2.0 %  Auto Basophil % : 0.7 %            Hemoglobin A1C: Hemoglobin A1C, Whole Blood: 5.4 % (03-07 @ 11:05)        Vitamin B12         RADIOLOGY    ANALYSIS AND PLAN:  This is a 52-year-old with a history of Parkinson's disease.  1.	For episode of Parkinson disease, what I could see at present the patient appeared to have more dyskinesia type of movements. The patient is on a good dose of Parkinson's medications in regards to Stalevo and Sinemet.   He is on a good dose of parkinson medication and is walking well up and down the [place]-- he is requesting I double his dose and make it 6 times a day -- -- the patient does not need extra doses. today at present no dyskinesias -- appears stable in regards to Parkinson symptoms   2.	I will continue physical therapy evaluation.  3.	psychiatric followup.  4.	Fall precautions.  5.	neurology wise stable --- no need to change sinemet dosage     Thank you for the courtesy of consultation.    Physical therapy evaluation as tolerated  OOB to chair/ambulation with assistance only if possible.    Greater than 25 minutes spent in direct patient care reviewing  the notes, lab data/ imaging , discussion with multidisciplinary team.
Neurology follow up note    BARTOLO GREENE52yMale      Interval History:Patient feels ok no new complaints. wants more and more parkinsonian medications         MEDICATIONS    acetaminophen   Tablet. 650 milliGRAM(s) Oral every 6 hours PRN  acetaminophen   Tablet. 325 milliGRAM(s) Oral every 6 hours PRN  aluminum hydroxide/magnesium hydroxide/simethicone Suspension 30 milliLiter(s) Oral every 6 hours PRN  baclofen 10 milliGRAM(s) Oral <User Schedule>  carbidopa/levodopa  10/100 1 Tablet(s) Oral <User Schedule>  carbidopa/levodopa  25/250 1 Tablet(s) Oral <User Schedule>  carbidopa/levodopa/entacapone 50/200/200 1 Tablet(s) Oral <User Schedule>  cloZAPine 50 milliGRAM(s) Oral <User Schedule>  cyclobenzaprine 10 milliGRAM(s) Oral three times a day PRN  docusate sodium 100 milliGRAM(s) Oral <User Schedule>  haloperidol     Tablet 5 milliGRAM(s) Oral every 6 hours PRN  haloperidol    Injectable 5 milliGRAM(s) IntraMuscular every 6 hours PRN  hydrocortisone 2.5% Rectal Cream 1 Application(s) Rectal daily  ibuprofen  Tablet 400 milliGRAM(s) Oral daily PRN  nitroglycerin     SubLingual 0.4 milliGRAM(s) SubLingual every 5 minutes PRN  OLANZapine Injectable 2.5 milliGRAM(s) IntraMuscular once  OLANZapine Injectable 5 milliGRAM(s) IntraMuscular daily      Allergies    aspirin (Unknown)  Cheese (Unknown)  ibuprofen (Unknown)  Reglan (Swelling)    Intolerances    ibuprofen (Unknown)          Vital Signs Last 24 Hrs  T(C): --  T(F): --  HR: --  BP: --  BP(mean): --  RR: --  SpO2: --      REVIEW OF SYSTEMS: Limited      Allergy and Immunologic: No hives or eczema    On Neurological Examination:     Mental Status - Patient is alert, awake,       Follow simple commands    Speech -   Fluent                      Cranial Nerves - Pupils 3 mm equal and reactive to light,   extraocular eye movements intact.   smile symmetric  intact bilateral NLF    Motor Exam -     bilateral upper/lower 4/5     NO Dyskinesias at present     Follow simple commands    GENERAL Exam: Nontoxic , No Acute Distress   	  HEENT:  normocephalic, atraumatic  		  LUNGS: Clear bilaterally    	  HEART: Normal S1S2   No murmur RRR        	  GI/ ABDOMEN:  Soft  Non tender    EXTREMITIES:   No Edema  No Clubbing  No Cyanosis No Edema    MUSCULOSKELETAL: Normal Range of Motion decreased Range of Motion all 4 extremities   	   SKIN: Normal  No Ecchymosis             LABS:  CBC Full  -  ( 28 Feb 2018 06:57 )  WBC Count : 5.6 K/uL  Hemoglobin : 12.5 g/dL  Hematocrit : 37.8 %  Platelet Count - Automated : 198 K/uL  Mean Cell Volume : 94.0 fl  Mean Cell Hemoglobin : 31.1 pg  Mean Cell Hemoglobin Concentration : 33.1 gm/dL  Auto Neutrophil # : 3.5 K/uL  Auto Lymphocyte # : 1.3 K/uL  Auto Monocyte # : 0.6 K/uL  Auto Eosinophil # : 0.1 K/uL  Auto Basophil # : 0.0 K/uL  Auto Neutrophil % : 63.4 %  Auto Lymphocyte % : 24.1 %  Auto Monocyte % : 10.1 %  Auto Eosinophil % : 1.7 %  Auto Basophil % : 0.7 %            Hemoglobin A1C:       Vitamin B12         RADIOLOGY      ANALYSIS AND PLAN:  This is a 52-year-old with a history of Parkinson's disease.  1.	For episode of Parkinson disease, what I could see at present the patient appeared to have more dyskinesia type of movements. The patient is on a good dose of Parkinson's medications in regards to Stalevo and Sinemet.   He is on a good dose of parkinson medication and is walking well up and down the [place]-- he is requesting I double his dose and make it 6 times a day -- -- the patient does not need extra doses. today at present no dyskinesias -- appears stable in regards to Parkinson symptoms   2.	I will continue physical therapy evaluation.  3.	psychiatric followup.  4.	Fall precautions.    Thank you for the courtesy of consultation.    Physical therapy evaluation as tolerated  OOB to chair/ambulation with assistance only if possible.    Greater than 25 minutes spent in direct patient care reviewing  the notes, lab data/ imaging , discussion with multidisciplinary team.
Neurology follow up note    BARTOLO [id number]      Interval History:    Patient standing and using the phone is very upset     MEDICATIONS    docusate sodium 100 milliGRAM(s) Oral two times a day  rasagiline Tablet 1.5 milliGRAM(s) Oral two times a day  valproic  acid Syrup 500 milliGRAM(s) Oral at bedtime  OLANZapine 2.5 milliGRAM(s) Oral every 6 hours PRN  OLANZapine Injectable 5 milliGRAM(s) IntraMuscular every 6 hours PRN  carbidopa/levodopa/entacapone 50/200/200 1 Tablet(s) Oral <User Schedule>  nystatin Powder 1 Application(s) Topical daily  BACItracin   Ointment 1 Application(s) Topical two times a day  acetaminophen   Tablet. 325 milliGRAM(s) Oral every 6 hours PRN  carbidopa/levodopa  25/100 2 Tablet(s) Oral <User Schedule>  Nuplazid 17mg tablet 2 Tablet(s) 2 Tablet(s) Oral daily      Allergies    aspirin (Unknown)  Cheese (Unknown)  ibuprofen (Unknown)  Reglan (Swelling)    Intolerances    ibuprofen (Unknown)          Vital Signs Last 24 Hrs  T(C): --  T(F): --  HR: --  BP: --  BP(mean): --  RR: --  SpO2: --      REVIEW OF SYSTEMS: limited angry     On Neurological Examination:    Mental Status - Patient is alert, awake      Speech -   Fluent               Cranial Nerves -   extraocular eye movements intact.       Motor Exam -  standing and using the phone       Muscle tone - is normal all over.  No asymmetry is seen.       GENERAL Exam: Nontoxic , No Acute Distress   	  HEENT:  normocephalic, atraumatic  		  LUNGS: Clear bilaterally    	  HEART: Normal S1S2   No murmur RRR        	  GI/ ABDOMEN:  Soft  Non tender    EXTREMITIES:   No Edema  No Clubbing  No Cyanosis No Edema    MUSCULOSKELETAL: Normal Range of Motion  	   SKIN: Normal  No Ecchymosis               LABS:            Hemoglobin A1C:       Vitamin B12         RADIOLOGY    -ANALYSIS AND PLAN:   This is a 52-year-old with a history of Parkinson's disease.    For history of Parkinson's disease, I had a lengthy discussion with the patient, Spoke to  Dr. Dewitt, [phone] office [place] he is on  Stalevo 200mg tid and Sinemet 50/200 4 times a day.  which I placed him back on , now he is upset saying the color of the pills are not the same, that he is on 25/100 dose-- that he cant move -- I informed him he is right on standing and using the phone so he is able to move -- I do not believe patient appears to understand the reasoning and appears to be fixated on the color of pills -- continue psych treatment     PT eval as tolerated     20 minutes spent on total encounter; more than 50% of the visit was spent counseling and/or coordinating care by the attending physician.
Neurology follow up note    BARTOLO [id number]      Interval History:    Patient has multiple complaints about  everything     MEDICATIONS    acetaminophen   Tablet. 325 milliGRAM(s) Oral every 6 hours PRN  aluminum hydroxide/magnesium hydroxide/simethicone Suspension 30 milliLiter(s) Oral every 6 hours PRN  baclofen 10 milliGRAM(s) Oral <User Schedule>  carbidopa/levodopa  25/250 1 Tablet(s) Oral <User Schedule>  carbidopa/levodopa/entacapone 50/200/200 1 Tablet(s) Oral <User Schedule>  cloZAPine 25 milliGRAM(s) Oral <User Schedule>  cyclobenzaprine 10 milliGRAM(s) Oral three times a day PRN  docusate sodium 100 milliGRAM(s) Oral <User Schedule>  hydrocortisone 2.5% Rectal Cream 1 Application(s) Rectal daily  nitroglycerin     SubLingual 0.4 milliGRAM(s) SubLingual every 5 minutes PRN  nystatin Powder 1 Application(s) Topical daily  OLANZapine Injectable 2.5 milliGRAM(s) IntraMuscular once  OLANZapine Injectable 2.5 milliGRAM(s) IntraMuscular once  PPD  5 Tuberculin Unit(s) Injectable 5 Unit(s) IntraDermal once      Allergies    aspirin (Unknown)  Cheese (Unknown)  ibuprofen (Unknown)  Reglan (Swelling)    Intolerances    ibuprofen (Unknown)          Vital Signs Last 24 Hrs  T(C): --  T(F): --  HR: --  BP: --  BP(mean): --  RR: --  SpO2: --        On Neurological Examination:    Mental Status - Patient is alert, awake,       Follow simple commands    Speech -   Fluent                      Cranial Nerves - Pupils 3 mm equal and reactive to light,   extraocular eye movements intact.   smile symmetric  intact bilateral NLF    Motor Exam -     With stimuli positive movement of all 4 extremities    did not see any termor at rest upon becoming agitated developed dyskinesia with tremors   + dyskinesias seen today     walking in [place] with walker     Muscle tone - is increased all over.  No asymmetry is seen.      Sensory    Bilateral intact to light touch    GENERAL Exam: Nontoxic , No Acute Distress   	  HEENT:  normocephalic, atraumatic  		  LUNGS: Clear bilaterally  No Wheeze  Decreased bilaterally  	  HEART: Normal S1S2   No murmur RRR        	  GI/ ABDOMEN:  Soft  Non tender    EXTREMITIES:   No Edema  No Clubbing  No Cyanosis No Edema    MUSCULOSKELETAL: Normal Range of Motion  	   SKIN: Normal  No Ecchymosis                LABS:            Hemoglobin A1C:       Vitamin B12         RADIOLOGY    ANALYSIS AND PLAN:   This is a 52-year-old with a history of Parkinson's disease.    For history of Parkinson's disease, I had a lengthy discussion with the patient, Dr. Dewitt, [phone] office nurse in past he is on  Stalevo 200mg tid , Sinemet   25/250  adjust medications as needed --- was walking in [place] with walker   appears stable neurologic wise  will sign off   PT eval as tolerated     continue psych treatment    20 minutes spent on total encounter; more than 50% of the visit was spent counseling and/or coordinating care by the attending physician.
Neurology follow up note    BARTOLO [id number]    Interval History:    Patient standing and using the phone is very upset     MEDICATIONS    docusate sodium 100 milliGRAM(s) Oral two times a day  rasagiline Tablet 1.5 milliGRAM(s) Oral two times a day  valproic  acid Syrup 500 milliGRAM(s) Oral at bedtime  OLANZapine 2.5 milliGRAM(s) Oral every 6 hours PRN  OLANZapine Injectable 5 milliGRAM(s) IntraMuscular every 6 hours PRN  carbidopa/levodopa/entacapone 50/200/200 1 Tablet(s) Oral <User Schedule>  nystatin Powder 1 Application(s) Topical daily  BACItracin   Ointment 1 Application(s) Topical two times a day  acetaminophen   Tablet. 325 milliGRAM(s) Oral every 6 hours PRN  carbidopa/levodopa  25/100 2 Tablet(s) Oral <User Schedule>  Nuplazid 17mg tablet 2 Tablet(s) 2 Tablet(s) Oral daily      Allergies    aspirin (Unknown)  Cheese (Unknown)  ibuprofen (Unknown)  Reglan (Swelling)    Intolerances    ibuprofen (Unknown)    REVIEW OF SYSTEMS: limited angry     GENERAL Exam: Nontoxic , No Acute Distress   	  HEENT:  normocephalic, atraumatic  		  LUNGS: Clear bilaterally    	  HEART: Normal S1S2   No murmur RRR        	  GI/ ABDOMEN:  Soft  Non tender    EXTREMITIES:   No Edema  No Clubbing  No Cyanosis No Edema    MUSCULOSKELETAL: Normal Range of Motion  	   SKIN: Normal  No Ecchymosis       On Neurological Examination:    Mental Status - Patient is alert, awake    Speech -   Fluent               Cranial Nerves -  Pupils 3.5 mm reacting to light. extraocular eye movements intact.     Motor Exam -  Moves all extremities equally.    Muscle tone - is normal all over.  No asymmetry is seen.     Able to walk well with RW.
Neurology follow up note    BARTOLO [id number]      Interval History:    Patient overnight events noted episode on knees     MEDICATIONS    docusate sodium 100 milliGRAM(s) Oral two times a day  acetaminophen   Tablet 650 milliGRAM(s) Oral every 6 hours PRN  rasagiline Tablet 1.5 milliGRAM(s) Oral two times a day  carbidopa/levodopa  25/250 1 Tablet(s) Oral five times a day  valproic  acid Syrup 500 milliGRAM(s) Oral at bedtime  OLANZapine 2.5 milliGRAM(s) Oral every 6 hours PRN  OLANZapine Injectable 5 milliGRAM(s) IntraMuscular every 6 hours PRN  carbidopa/levodopa/entacapone 50/200/200 1 Tablet(s) Oral three times a day  nystatin Powder 1 Application(s) Topical daily      Allergies    aspirin (Unknown)  Cheese (Unknown)  ibuprofen (Unknown)  Reglan (Swelling)    Intolerances    ibuprofen (Unknown)          Vital Signs Last 24 Hrs stable      REVIEW OF Systems: limited or unable to obtain secondary to patient's poor mental status.    Constitutional: No fever, chills, fatigue, weakness  Eyes: no eye pain, visual disturbances, or discharge  ENT:  No difficulty hearing, tinnitus, vertigo; No sinus or throat pain  Neck: No pain or stiffness  Respiratory: No cough, dyspnea, wheezing   Cardiovascular: No chest pain, palpitations,   Gastrointestinal: No abdominal or epigastric pain. No nausea, vomiting  No diarrhea or constipation.   Genitourinary: No dysuria, frequency, hematuria or incontinence  Neurological: No headaches, lightheadedness, vertigo, numbness or tremors  Psychiatric: No depression, anxiety, mood swings or difficulty sleeping  Musculoskeletal: No joint pain or swelling; No muscle, back or extremity pain  Skin: No itching, burning, rashes or lesions   Lymph Nodes: No enlarged glands  Endocrine: No heat or cold intolerance; No hair loss   Allergy and Immunologic: No hives or eczema      On Neurological Examination:    Mental Status - Patient is alert, awake,       Follow simple commands    Speech -   Fluent                      Cranial Nerves - Pupils 3 mm equal and reactive to light,   extraocular eye movements intact.   smile symmetric  intact bilateral NLF    Motor Exam -     With stimuli positive movement of all 4 extremities    + termor     Muscle tone - is increased all over.  No asymmetry is seen.      Sensory    Bilateral intact to light touch    GENERAL Exam: Nontoxic , No Acute Distress   	  HEENT:  normocephalic, atraumatic  		  LUNGS: Clear bilaterally  No Wheeze  Decreased bilaterally  	  HEART: Normal S1S2   No murmur RRR        	  GI/ ABDOMEN:  Soft  Non tender    EXTREMITIES:   No Edema  No Clubbing  No Cyanosis No Edema    MUSCULOSKELETAL: Normal Range of Motion  	   SKIN: Normal  No Ecchymosis             LABS: no new labs               RADIOLOGY      Assessment and plan    episode of questionable fall-- spoke to patient if he refuses his medications his parkinson's sym toms can become worse -- he appears to understand     h/o of parkinsonism appears stable will continue current meds -- as per patient wishes --- spoke to patient in detail about his parkinson's medication he appears to understand and at present agrees to take them     continue psych work up     PT eval     20 minutes spent on total encounter; more than 50% of the visit was spent counseling and/or coordinating care by the attending physician.
Neurology follow up note    BARTOLO [id number]      Interval History:    Patient walking around [place] upset wants more parkinson medications     MEDICATIONS    acetaminophen   Tablet. 650 milliGRAM(s) Oral every 6 hours PRN  acetaminophen   Tablet. 325 milliGRAM(s) Oral every 6 hours PRN  aluminum hydroxide/magnesium hydroxide/simethicone Suspension 30 milliLiter(s) Oral every 6 hours PRN  baclofen 10 milliGRAM(s) Oral <User Schedule>  carbidopa/levodopa  10/100 1 Tablet(s) Oral <User Schedule>  carbidopa/levodopa  25/250 1 Tablet(s) Oral <User Schedule>  carbidopa/levodopa/entacapone 50/200/200 1 Tablet(s) Oral <User Schedule>  cloZAPine 50 milliGRAM(s) Oral <User Schedule>  cyclobenzaprine 10 milliGRAM(s) Oral three times a day PRN  docusate sodium 100 milliGRAM(s) Oral <User Schedule>  haloperidol     Tablet 5 milliGRAM(s) Oral every 6 hours PRN  haloperidol    Injectable 5 milliGRAM(s) IntraMuscular every 6 hours PRN  hydrocortisone 2.5% Rectal Cream 1 Application(s) Rectal daily  nitroglycerin     SubLingual 0.4 milliGRAM(s) SubLingual every 5 minutes PRN  OLANZapine Injectable 2.5 milliGRAM(s) IntraMuscular once  OLANZapine Injectable 5 milliGRAM(s) IntraMuscular daily      Allergies    aspirin (Unknown)  Cheese (Unknown)  ibuprofen (Unknown)  Reglan (Swelling)    Intolerances    ibuprofen (Unknown)          Vital Signs Last 24 Hrs  T(C): --  T(F): --  HR: --  BP: --  BP(mean): --  RR: --  SpO2: --      REVIEW OF SYSTEMS: Limit or unable to obtain secondary to patient's poor mental status was anger       Allergy and Immunologic: No hives or eczema    On Neurological Examination:     Mental Status - Patient is alert, awake,       Follow simple commands    Speech -   Fluent                      Cranial Nerves - Pupils 3 mm equal and reactive to light,   extraocular eye movements intact.   smile symmetric  intact bilateral NLF    Motor Exam -     bilateral upper/lower 4/5    + slight  termor     Follow simple commands    GENERAL Exam: Nontoxic , No Acute Distress   	  HEENT:  normocephalic, atraumatic  		  LUNGS: Clear bilaterally    	  HEART: Normal S1S2   No murmur RRR        	  GI/ ABDOMEN:  Soft  Non tender    EXTREMITIES:   No Edema  No Clubbing  No Cyanosis No Edema    MUSCULOSKELETAL: Normal Range of Motion decreased Range of Motion all 4 extremities   	   SKIN: Normal  No Ecchymosis               LABS:            Hemoglobin A1C:       Vitamin B12         RADIOLOGY    ANALYSIS AND PLAN:  This is a 52-year-old with a history of Parkinson's disease.  1.	For episode of Parkinson disease, what I could see at present the patient appeared to have more dyskinesia type of movements. The patient is on a good dose of Parkinson's medications in regards to Stalevo and Sinemet.   He is on a good dose of parkinson medication and is walking well up and down the parmar-- he is requesting I double his dose and make it 6 times a day -- stated he called his outside neurologist who agreed with his plan. I do not feel at present -- the patient does not need extra doses.  2.	I will continue physical therapy evaluation.  3.	psychiatric followup.  4.	Fall precautions.    Thank you for the courtesy of consultation.    Physical therapy evaluation as tolerated  OOB to chair/ambulation with assistance only if possible.    Greater than 45 minutes spent in direct patient care reviewing  the notes, lab data/ imaging , discussion with multidisciplinary team.
Neurology follow up note    BARTOLO [id number]      Interval History:    Patient complaining about the color of pills     MEDICATIONS    docusate sodium 100 milliGRAM(s) Oral two times a day  OLANZapine 2.5 milliGRAM(s) Oral every 6 hours PRN  OLANZapine Injectable 5 milliGRAM(s) IntraMuscular every 6 hours PRN  nystatin Powder 1 Application(s) Topical daily  BACItracin   Ointment 1 Application(s) Topical two times a day  acetaminophen   Tablet. 325 milliGRAM(s) Oral every 6 hours PRN  baclofen 10 milliGRAM(s) Oral two times a day  hydrocortisone 2.5% Rectal Cream 1 Application(s) Rectal daily  OLANZapine Injectable 2.5 milliGRAM(s) IntraMuscular once  aluminum hydroxide/magnesium hydroxide/simethicone Suspension 30 milliLiter(s) Oral every 6 hours PRN  carbidopa/levodopa/entacapone 50/200/200 1 Tablet(s) Oral <User Schedule>  Nuplazid (•PIMAVANSERIN) 17mg 2 Tablet(s) 2 Tablet(s) Oral <User Schedule>  cyclobenzaprine 10 milliGRAM(s) Oral three times a day PRN  carbidopa/levodopa  25/250 1 Tablet(s) Oral <User Schedule>      Allergies    aspirin (Unknown)  Cheese (Unknown)  ibuprofen (Unknown)  Reglan (Swelling)    Intolerances    ibuprofen (Unknown)          Vital Signs Last 24 Hrs  T(C): --  T(F): --  HR: --  BP: --  BP(mean): --  RR: --  SpO2: --        On Neurological Examination:    Mental Status - Patient is alert, awake,       Follow simple commands    Speech -   Fluent                      Cranial Nerves - Pupils 3 mm equal and reactive to light,   extraocular eye movements intact.   smile symmetric  intact bilateral NLF    Motor Exam -     With stimuli positive movement of all 4 extremities    + termor     Muscle tone - is increased all over.  No asymmetry is seen.      Sensory    Bilateral intact to light touch    GENERAL Exam: Nontoxic , No Acute Distress   	  HEENT:  normocephalic, atraumatic  		  LUNGS: Clear bilaterally  No Wheeze  Decreased bilaterally  	  HEART: Normal S1S2   No murmur RRR        	  GI/ ABDOMEN:  Soft  Non tender    EXTREMITIES:   No Edema  No Clubbing  No Cyanosis No Edema    MUSCULOSKELETAL: Normal Range of Motion  	   SKIN: Normal  No Ecchymosis             LABS:            Hemoglobin A1C:       Vitamin B12         RADIOLOGY    ANALYSIS AND PLAN:   This is a 52-year-old with a history of Parkinson's disease.    For history of Parkinson's disease, I had a lengthy discussion with the patient, Dr. Dewitt, [phone] office nurese he is on  Stalevo 200mg tid and now patient wants Sinemet   25/250 4 times a day.  which I placed him back on , - based on patient clinical course will adjust medications as needed   PT eval as tolerated     continue psych treatment    20 minutes spent on total encounter; more than 50% of the visit was spent counseling and/or coordinating care by the attending physician.
Neurology follow up note    BARTOLO [id number]      Interval History:    Patient feels ok no new complaints.    MEDICATIONS    docusate sodium 100 milliGRAM(s) Oral two times a day  acetaminophen   Tablet 650 milliGRAM(s) Oral every 6 hours PRN  rasagiline Tablet 1.5 milliGRAM(s) Oral two times a day  valproic  acid Syrup 500 milliGRAM(s) Oral at bedtime  OLANZapine 2.5 milliGRAM(s) Oral every 6 hours PRN  OLANZapine Injectable 5 milliGRAM(s) IntraMuscular every 6 hours PRN  carbidopa/levodopa/entacapone 50/200/200 1 Tablet(s) Oral three times a day  nystatin Powder 1 Application(s) Topical daily  carbidopa/levodopa  25/250 1 Tablet(s) Oral three times a day      Allergies    aspirin (Unknown)  Cheese (Unknown)  ibuprofen (Unknown)  Reglan (Swelling)    Intolerances    ibuprofen (Unknown)          Vital Signs Last 24 Hrs  T(C): --  T(F): --  HR: --  BP: --  BP(mean): --  RR: --  SpO2: --         REVIEW OF Systems: limited or unable to obtain secondary to patient's poor mental status.    Constitutional: No fever, chills, fatigue, weakness  Eyes: no eye pain, visual disturbances, or discharge  ENT:  No difficulty hearing, tinnitus, vertigo; No sinus or throat pain  Neck: No pain or stiffness  Respiratory: No cough, dyspnea, wheezing   Cardiovascular: No chest pain, palpitations,   Gastrointestinal: No abdominal or epigastric pain. No nausea, vomiting  No diarrhea or constipation.   Genitourinary: No dysuria, frequency, hematuria or incontinence  Neurological: No headaches, lightheadedness, vertigo, numbness or tremors  Psychiatric: No depression, anxiety, mood swings or difficulty sleeping  Musculoskeletal: No joint pain or swelling; No muscle, back or extremity pain  Skin: No itching, burning, rashes or lesions   Lymph Nodes: No enlarged glands  Endocrine: No heat or cold intolerance; No hair loss   Allergy and Immunologic: No hives or eczema      On Neurological Examination:    Mental Status - Patient is alert, awake,       Follow simple commands    Speech -   Fluent                      Cranial Nerves - Pupils 3 mm equal and reactive to light,   extraocular eye movements intact.   smile symmetric  intact bilateral NLF    Motor Exam -     With stimuli positive movement of all 4 extremities    + termor     Muscle tone - is increased all over.  No asymmetry is seen.      Sensory    Bilateral intact to light touch    GENERAL Exam: Nontoxic , No Acute Distress   	  HEENT:  normocephalic, atraumatic  		  LUNGS: Clear bilaterally  No Wheeze  Decreased bilaterally  	  HEART: Normal S1S2   No murmur RRR        	  GI/ ABDOMEN:  Soft  Non tender    EXTREMITIES:   No Edema  No Clubbing  No Cyanosis No Edema    MUSCULOSKELETAL: Normal Range of Motion  	   SKIN: Normal  No Ecchymosis                    LABS:            Hemoglobin A1C:       Vitamin B12         RADIOLOGY    Assessment and plan    episode of questionable fall-- spoke to patient if he refuses his medications his parkinson's sym toms can become worse -- he appears to understand     h/o of parkinsonism --- spoke to psych possible behavorial issues could be secondary  to dopamine from Parkinson medications will adjust-- spoke to patient in detail about medications -- and the times to try to see how he does-- it appears that patient will refuse meds-- unclear about what time he wants to take them -- I will contintu to try to evaluate how patient is with the meds and timing -- he is on and off with taking medications     continue psych work up     PT eval as tolerated     20 minutes spent on total encounter; more than 50% of the visit was spent counseling and/or coordinating care by the attending physician.
Neurology follow up note    BARTOLO [id number]      Interval History:    Patient feels ok no new complaints.    MEDICATIONS    docusate sodium 100 milliGRAM(s) Oral two times a day  rasagiline Tablet 1.5 milliGRAM(s) Oral two times a day  valproic  acid Syrup 500 milliGRAM(s) Oral at bedtime  OLANZapine 2.5 milliGRAM(s) Oral every 6 hours PRN  OLANZapine Injectable 5 milliGRAM(s) IntraMuscular every 6 hours PRN  carbidopa/levodopa/entacapone 50/200/200 1 Tablet(s) Oral three times a day  nystatin Powder 1 Application(s) Topical daily  carbidopa/levodopa  25/250 1 Tablet(s) Oral three times a day  methocarbamol 750 milliGRAM(s) Oral two times a day  BACItracin   Ointment 1 Application(s) Topical two times a day  acetaminophen   Tablet. 325 milliGRAM(s) Oral every 6 hours PRN      Allergies    aspirin (Unknown)  Cheese (Unknown)  ibuprofen (Unknown)  Reglan (Swelling)    Intolerances    ibuprofen (Unknown)          Vital Signs Last 24 Hrs  T(C): --  T(F): --  HR: --  BP: --  BP(mean): --  RR: --  SpO2: --       REVIEW OF Systems: limited or unable to obtain secondary to patient's poor mental status.    Constitutional: No fever, chills, fatigue, weakness  Eyes: no eye pain, visual disturbances, or discharge  ENT:  No difficulty hearing, tinnitus, vertigo; No sinus or throat pain  Neck: No pain or stiffness  Respiratory: No cough, dyspnea, wheezing   Cardiovascular: No chest pain, palpitations,   Gastrointestinal: No abdominal or epigastric pain. No nausea, vomiting  No diarrhea or constipation.   Genitourinary: No dysuria, frequency, hematuria or incontinence  Neurological: No headaches, lightheadedness, vertigo, numbness or tremors  Psychiatric: No depression, anxiety, mood swings or difficulty sleeping  Musculoskeletal: No joint pain or swelling; No muscle, back or extremity pain  Skin: No itching, burning, rashes or lesions   Lymph Nodes: No enlarged glands  Endocrine: No heat or cold intolerance; No hair loss   Allergy and Immunologic: No hives or eczema      On Neurological Examination:    Mental Status - Patient is alert, awake,       Follow simple commands    Speech -   Fluent                      Cranial Nerves - Pupils 3 mm equal and reactive to light,   extraocular eye movements intact.   smile symmetric  intact bilateral NLF    Motor Exam -     With stimuli positive movement of all 4 extremities    + termor     Muscle tone - is increased all over.  No asymmetry is seen.      Sensory    Bilateral intact to light touch    GENERAL Exam: Nontoxic , No Acute Distress   	  HEENT:  normocephalic, atraumatic  		  LUNGS: Clear bilaterally  No Wheeze  Decreased bilaterally  	  HEART: Normal S1S2   No murmur RRR        	  GI/ ABDOMEN:  Soft  Non tender    EXTREMITIES:   No Edema  No Clubbing  No Cyanosis No Edema    MUSCULOSKELETAL: Normal Range of Motion  	   SKIN: Normal  No Ecchymosis                  LABS:            Hemoglobin A1C:       Vitamin B12         RADIOLOGY      Assessment and plan    episode of questionable fall-- spoke to patient if he refuses his medications his parkinson's sym toms can become worse -- he appears to understand     h/o of parkinsonism --- spoke to psych possible behavorial issues could be secondary  to dopamine from Parkinson medications will adjust-- spoke to patient in detail about medications -- and the times to try to see how he does-- it appears that patient will refuse meds-- I will contintu to try to evaluate how patient is with the meds and timing -- he is on and off with taking medications -- spoke to patient if he does not take medications there will be on and off symptoms of parkinsonism disease     continue psych work up     PT eval as tolerated     20 minutes spent on total encounter; more than 50% of the visit was spent counseling and/or coordinating care by the attending physician.
Neurology follow up note    BARTOLO [id number]      Interval History:    Patient feels ok no new complaints.    MEDICATIONS    docusate sodium 100 milliGRAM(s) Oral two times a day  acetaminophen   Tablet 650 milliGRAM(s) Oral every 6 hours PRN  rasagiline Tablet 1.5 milliGRAM(s) Oral two times a day  carbidopa/levodopa  25/250 1 Tablet(s) Oral five times a day  valproic  acid Syrup 500 milliGRAM(s) Oral at bedtime  OLANZapine 2.5 milliGRAM(s) Oral every 6 hours PRN  OLANZapine Injectable 5 milliGRAM(s) IntraMuscular every 6 hours PRN  carbidopa/levodopa/entacapone 50/200/200 1 Tablet(s) Oral three times a day  nystatin Powder 1 Application(s) Topical daily      Allergies    aspirin (Unknown)  Cheese (Unknown)  ibuprofen (Unknown)  Reglan (Swelling)    Intolerances    ibuprofen (Unknown)          Vital Signs Last 24 Hrs-- stable             REVIEW OF SYSTEMS: Limit or unable to obtain secondary to patient's poor mental status.    Constitutional: No fever, chills, fatigue, weakness  Eyes: no eye pain, visual disturbances, or discharge  ENT:  No difficulty hearing, tinnitus, vertigo; No sinus or throat pain  Neck: No pain or stiffness  Respiratory: No cough, dyspnea, wheezing   Cardiovascular: No chest pain, palpitations,   Gastrointestinal: No abdominal or epigastric pain. No nausea, vomiting  No diarrhea or constipation.   Genitourinary: No dysuria, frequency, hematuria or incontinence  Neurological: No headaches, lightheadedness, vertigo, numbness or tremors  Psychiatric: No depression, anxiety, mood swings or difficulty sleeping  Musculoskeletal: No joint pain or swelling; No muscle, back or extremity pain  Skin: No itching, burning, rashes or lesions   Lymph Nodes: No enlarged glands  Endocrine: No heat or cold intolerance; No hair loss   Allergy and Immunologic: No hives or eczema    On Neurological Examination:    Mental Status - Patient is alert, awake, oriented X3.       Follow simple commands  Does not follow commands    Speech -   Fluent                      Cranial Nerves - Pupils 3 mm equal and reactive to light,   extraocular eye movements intact.   smile symmetric  intact bilateral NLF    Motor Exam -     With stimuli positive movement of all 4 extremities    + termor     Muscle tone - is normal all over.  No asymmetry is seen.      Sensory    Bilateral intact to light touch    GENERAL Exam: Nontoxic , No Acute Distress   	  HEENT:  normocephalic, atraumatic  		  LUNGS: Clear bilaterally  No Wheeze  Decreased bilaterally  	  HEART: Normal S1S2   No murmur RRR        	  GI/ ABDOMEN:  Soft  Non tender    EXTREMITIES:   No Edema  No Clubbing  No Cyanosis No Edema    MUSCULOSKELETAL: Normal Range of Motion  	   SKIN: Normal  No Ecchymosis                  LABS:-- no new labs       Assessment and plan    h/o of parkinsonism appears stable will continue current meds -- as per patient wishes --- spoke to patient in detail about his parkinson's medication he appears to understand and at present agrees to take them     continue psych work up     PT eval     20 minutes spent on total encounter; more than 50% of the visit was spent counseling and/or coordinating care by the attending physician.
Neurology follow up note    BARTOLO [id number]      Interval History:    Patient feels ok no new complaints.    MEDICATIONS    docusate sodium 100 milliGRAM(s) Oral two times a day  acetaminophen   Tablet 650 milliGRAM(s) Oral every 6 hours PRN  rasagiline Tablet 1.5 milliGRAM(s) Oral two times a day  carbidopa/levodopa  25/250 1 Tablet(s) Oral five times a day  valproic  acid Syrup 500 milliGRAM(s) Oral at bedtime  OLANZapine 2.5 milliGRAM(s) Oral every 6 hours PRN  OLANZapine Injectable 5 milliGRAM(s) IntraMuscular every 6 hours PRN  carbidopa/levodopa/entacapone 50/200/200 1 Tablet(s) Oral daily      Allergies    aspirin (Unknown)  Cheese (Unknown)  ibuprofen (Unknown)  Reglan (Swelling)    Intolerances    ibuprofen (Unknown)          Vital Signs Last 24 Hrs  T(C): --  T(F): --  HR: --  BP: --  BP(mean): --  RR: --  SpO2: --      REVIEW OF SYSTEMS: Limit or unable to obtain secondary to patient's poor mental status.    Constitutional: No fever, chills, fatigue, weakness  Eyes: no eye pain, visual disturbances, or discharge  ENT:  No difficulty hearing, tinnitus, vertigo; No sinus or throat pain  Neck: No pain or stiffness  Respiratory: No cough, dyspnea, wheezing   Cardiovascular: No chest pain, palpitations,   Gastrointestinal: No abdominal or epigastric pain. No nausea, vomiting  No diarrhea or constipation.   Genitourinary: No dysuria, frequency, hematuria or incontinence  Neurological: No headaches, lightheadedness, vertigo, numbness or tremors  Psychiatric: No depression, anxiety, mood swings or difficulty sleeping  Musculoskeletal: No joint pain or swelling; No muscle, back or extremity pain  Skin: No itching, burning, rashes or lesions   Lymph Nodes: No enlarged glands  Endocrine: No heat or cold intolerance; No hair loss   Allergy and Immunologic: No hives or eczema    On Neurological Examination:    Mental Status - Patient is alert, awake, oriented X3.       Follow simple commands  Does not follow commands    Speech -   Fluent                      Cranial Nerves - Pupils 3 mm equal and reactive to light,   extraocular eye movements intact.   smile symmetric  intact bilateral NLF    Motor Exam -     With stimuli positive movement of all 4 extremities    + termor     Muscle tone - is normal all over.  No asymmetry is seen.      Sensory    Bilateral intact to light touch    GENERAL Exam: Nontoxic , No Acute Distress   	  HEENT:  normocephalic, atraumatic  		  LUNGS: Clear bilaterally  No Wheeze  Decreased bilaterally  	  HEART: Normal S1S2   No murmur RRR        	  GI/ ABDOMEN:  Soft  Non tender    EXTREMITIES:   No Edema  No Clubbing  No Cyanosis No Edema    MUSCULOSKELETAL: Normal Range of Motion  	   SKIN: Normal  No Ecchymosis               LABS:            Hemoglobin A1C:       Vitamin B12         RADIOLOGY    Assesment and plan    h/o of parkinsonism appears stable will continue current meds -- as per patient wishes     continue psych work up     20 minutes spent on total encounter; more than 50% of the visit was spent counseling and/or coordinating care by the attending physician.
Neurology follow up note    BARTOLO [id number]      Interval History:    Patient feels ok no new complaints.    MEDICATIONS    docusate sodium 100 milliGRAM(s) Oral two times a day  rasagiline Tablet 1.5 milliGRAM(s) Oral two times a day  valproic  acid Syrup 500 milliGRAM(s) Oral at bedtime  OLANZapine 2.5 milliGRAM(s) Oral every 6 hours PRN  OLANZapine Injectable 5 milliGRAM(s) IntraMuscular every 6 hours PRN  carbidopa/levodopa/entacapone 50/200/200 1 Tablet(s) Oral <User Schedule>  nystatin Powder 1 Application(s) Topical daily  BACItracin   Ointment 1 Application(s) Topical two times a day  acetaminophen   Tablet. 325 milliGRAM(s) Oral every 6 hours PRN  Nuplazid 17mg tablet 2 Tablet(s) 2 Tablet(s) Oral daily  carbidopa/levodopa  25/250 1 Tablet(s) Oral three times a day      Allergies    aspirin (Unknown)  Cheese (Unknown)  ibuprofen (Unknown)  Reglan (Swelling)    Intolerances    ibuprofen (Unknown)          Vital Signs Last 24 Hrs  T(C): --  T(F): --  HR: --  BP: --  BP(mean): --  RR: --  SpO2: --    REVIEW OF Systems: limited or unable to obtain secondary to patient's poor mental status.    Constitutional: No fever, chills, fatigue, weakness  Eyes: no eye pain, visual disturbances, or discharge  ENT:  No difficulty hearing, tinnitus, vertigo; No sinus or throat pain  Neck: No pain or stiffness  Respiratory: No cough, dyspnea, wheezing   Cardiovascular: No chest pain, palpitations,   Gastrointestinal: No abdominal or epigastric pain. No nausea, vomiting  No diarrhea or constipation.   Genitourinary: No dysuria, frequency, hematuria or incontinence  Neurological: No headaches, lightheadedness, vertigo, numbness or tremors  Psychiatric: No depression, anxiety, mood swings or difficulty sleeping  Musculoskeletal: No joint pain or swelling; No muscle, back or extremity pain  Skin: No itching, burning, rashes or lesions   Lymph Nodes: No enlarged glands  Endocrine: No heat or cold intolerance; No hair loss   Allergy and Immunologic: No hives or eczema      On Neurological Examination:    Mental Status - Patient is alert, awake,       Follow simple commands    Speech -   Fluent                      Cranial Nerves - Pupils 3 mm equal and reactive to light,   extraocular eye movements intact.   smile symmetric  intact bilateral NLF    Motor Exam -     With stimuli positive movement of all 4 extremities    + termor     Muscle tone - is increased all over.  No asymmetry is seen.      Sensory    Bilateral intact to light touch    GENERAL Exam: Nontoxic , No Acute Distress   	  HEENT:  normocephalic, atraumatic  		  LUNGS: Clear bilaterally  No Wheeze  Decreased bilaterally  	  HEART: Normal S1S2   No murmur RRR        	  GI/ ABDOMEN:  Soft  Non tender    EXTREMITIES:   No Edema  No Clubbing  No Cyanosis No Edema    MUSCULOSKELETAL: Normal Range of Motion  	   SKIN: Normal  No Ecchymosis             LABS:            Hemoglobin A1C:       Vitamin B12         RADIOLOGY    -ANALYSIS AND PLAN:   This is a 52-year-old with a history of Parkinson's disease.    For history of Parkinson's disease, I had a lengthy discussion with the patient, Spoke to  Dr. Dewitt, [phone] office he is on  Stalevo 200mg tid and Sinemet 50/200 4 times a day.   We will continue to follow.  Continue psychiatric evaluation.    PT eval as tolerated     20 minutes spent on total encounter; more than 50% of the visit was spent counseling and/or coordinating care by the attending physician.
Neurology follow up note    [facility]52[facility]      Interval History:    Patient feels ok no new complaints.    MEDICATIONS    docusate sodium 100 milliGRAM(s) Oral two times a day  rasagiline Tablet 1.5 milliGRAM(s) Oral two times a day  valproic  acid Syrup 500 milliGRAM(s) Oral at bedtime  OLANZapine 2.5 milliGRAM(s) Oral every 6 hours PRN  OLANZapine Injectable 5 milliGRAM(s) IntraMuscular every 6 hours PRN  carbidopa/levodopa/entacapone 50/200/200 1 Tablet(s) Oral <User Schedule>  nystatin Powder 1 Application(s) Topical daily  BACItracin   Ointment 1 Application(s) Topical two times a day  acetaminophen   Tablet. 325 milliGRAM(s) Oral every 6 hours PRN  Nuplazid 17mg tablet 2 Tablet(s) 2 Tablet(s) Oral daily  baclofen 10 milliGRAM(s) Oral two times a day  carbidopa/levodopa CR 25/100 2 Tablet(s) Oral <User Schedule>  hydrocortisone 2.5% Rectal Cream 1 Application(s) Rectal daily      Allergies    aspirin (Unknown)  Cheese (Unknown)  ibuprofen (Unknown)  Reglan (Swelling)    Intolerances    ibuprofen (Unknown)          Vital Signs Last 24 Hrs  T(C): --  T(F): --  HR: --  BP: --  BP(mean): --  RR: --  SpO2: --      Neurology follow up note    [facility]52[facility]      Interval History:    Patient still upset about everything     MEDICATIONS    docusate sodium 100 milliGRAM(s) Oral two times a day  rasagiline Tablet 1.5 milliGRAM(s) Oral two times a day  valproic  acid Syrup 500 milliGRAM(s) Oral at bedtime  OLANZapine 2.5 milliGRAM(s) Oral every 6 hours PRN  OLANZapine Injectable 5 milliGRAM(s) IntraMuscular every 6 hours PRN  carbidopa/levodopa/entacapone 50/200/200 1 Tablet(s) Oral <User Schedule>  nystatin Powder 1 Application(s) Topical daily  BACItracin   Ointment 1 Application(s) Topical two times a day  acetaminophen   Tablet. 325 milliGRAM(s) Oral every 6 hours PRN  Nuplazid 17mg tablet 2 Tablet(s) 2 Tablet(s) Oral daily  baclofen 10 milliGRAM(s) Oral two times a day  carbidopa/levodopa CR 25/100 2 Tablet(s) Oral <User Schedule>  hydrocortisone 2.5% Rectal Cream 1 Application(s) Rectal daily      Allergies    aspirin (Unknown)  Cheese (Unknown)  ibuprofen (Unknown)  Reglan (Swelling)    Intolerances    ibuprofen (Unknown)          Vital Signs Last 24 Hrs  T(C): --  T(F): --  HR: --  BP: --  BP(mean): --  RR: --  SpO2: --    REVIEW OF SYSTEMS: limited angry     On Neurological Examination:    Mental Status - Patient is alert, awake      Speech -   Fluent               Cranial Nerves -   extraocular eye movements intact.       Motor Exam -  moves all ext   able to walk in halls     + tremors on and off     Muscle tone - is normal all over.  No asymmetry is seen.       GENERAL Exam: Nontoxic , No Acute Distress   	  HEENT:  normocephalic, atraumatic  		  LUNGS: Clear bilaterally    	  HEART: Normal S1S2   No murmur RRR        	  GI/ ABDOMEN:  Soft  Non tender    EXTREMITIES:   No Edema  No Clubbing  No Cyanosis No Edema    MUSCULOSKELETAL: Normal Range of Motion  	   SKIN: Normal  No Ecchymosis                    LABS:            Hemoglobin A1C:       Vitamin B12         RADIOLOGY      -ANALYSIS AND PLAN:   This is a 52-year-old with a history of Parkinson's disease.    For history of Parkinson's disease, I had a lengthy discussion with the patient, Spoke to  Dr. Dewitt, [phone] office nurese he is on  Stalevo 200mg tid and Sinemet 50/200 4 times a day.  which I placed him back on , now he is upset saying the color of the pills are not the same, that he is on 25/100 dose-- that he cant move -- I informed him he is right on standing and using the phone so he is able to move -- I do not believe patient appears to understand the reasoning and appears to be fixated on the color of pills -- continue psych treatment -- i called DR Dewitt office and I put him on phone with them to confirm his does -- he still does not  believe anyone    PT eval as tolerated     20 minutes spent on total encounter; more than 50% of the visit was spent counseling and/or coordinating care by the attending physician.                 LABS:            Hemoglobin A1C:       Vitamin B12         RADIOLOGY
Patient was very agitated and violent .   [facility]ung was called .   pt was violent agitated , splashing urine to nursing stuff .  Manual hold was ordered.   Patient  was also c/o chest pain , EKG and blood test were ordered but pt refused .   Discussed with NP Xuan Tristan and Nurse Manager Jen Zaragoza and Dr Grossman Hospitalist.   Will re-evaluate in few hours regarding chest pain and agitation issues.
Psychiatry:  The patient was agitated this afternoon and yelling cursing and threatening staff. He was spoken to by different staff members trying reason with him. He was fixated on his wanting a shower right now despite explaining to him alternatives. He escalated by making aggressive verbal threats. He was given a PRN of Ativan and a manual hold was needed.

## 2018-08-17 NOTE — PROGRESS NOTE BEHAVIORAL HEALTH - NSBHFUPVIOL_PSY_A_CORE
yes
none known
none known
yes
none known
yes
none known
yes
none known
yes
none known
yes
none known
none known
yes
none known
none known
yes
none known
yes
none known
yes
none known
none known
yes
none known
yes
none known
yes
none known
yes
none known
none known
yes
none known
yes
none known
yes
none known
yes

## 2018-08-17 NOTE — PROGRESS NOTE BEHAVIORAL HEALTH - SUMMARY
53 yo male with early onset Parkinson’s disease x 11 years, discharged from 4 prior nursing homes in 8 months due to his behaviors, hx of refusing medications,  transferred to [facility] Unit [facility] on 6/12/17 where he manifested severe mood lability, paranoia and confabulation with poor insight and judgment including making > 40 Justice center complaints. 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 Patient was accepted to [facility] in [place] and discharged from [facility] on 7/12/17.  Upon getting to [facility], Patient refused to go into the facility and became combative. He was thus transferred back to [facility] ED which resulted in Service Line Chiefs’ of Service involvement given the history/issues and ultimate transfer to [address]. Patient has been on Unit [address] since. UPDATE: [facility] declined to accept Patient. Hundreds of nursing home applications wee sent out and all declined to accept patient. Court scheduled on 12/19/17 was deferred as Patient refused to go; he then again refused to go to the new Court date. Continuing to search for placement which have been unsuccessful; family does not want him living with them.  Neurological facility in [place] has accepted patient, and legal guardian is involved in transfer issues. Court hearing regarding Patient's guardianship was 3/27/18 and his sister was granted temporary guardianship. Court hearing for retention occurred on 3/28/18 and the hospital was granted an additional 60 days. Court date for a "check in" was on 5/15 during which time it was reported that there was a potential nursing home which the Patient's sister went to and liked. Another Court date was on 5/29/18 during which time the [facility] withdrew acceptance and now Pt's sister still has to find him an apartment to live.  Next Court date was scheduled for 06/26/18 which Patient attended. He caused some ruckus at the Court after he refused to get back on the stretcher to be brought back the hospital, which he ultimately did after the  threatened him with longterm time for contempt of Court. Since that time, there have been no apparent reported move on his sister's part regarding updating the Team if she has found any appropriate places for Patient to go to. SW continues to try to contact Pt's sister for updates. As per Patient, no updates for months due to things like "my sister's car broke down," or "she did not pass the [employer] credit check." Sister has not been in regular contact with Unit team and very difficult to get in touch with. As per last conversation with SW, she hung up the phone call for no apparent reason. On 7/27/18, agency called Unit looking for Pt's sister as they have a $5000 check for her and she is not answering her phone/returning calls. Patient's sister came to visit on Saturday 7/28/18, and reportedly told staff that she feels "overwhelmed" with seeking apartments for patient and that she is not getting help looking for places from the Unit etc. Sister reminded that she assumed responsibility for this as Pt's guardian and inpatient psychiatric units and staff do not search for independent [place] real estate for patients. Suspecting that guardianship issue will return to Court as current guardian is not meeting her responsibilities and obligations in a timely manner. Week of end of July/start of August - Patient's  drafting a notification letter to Patient's sister regarding apparent lack of performance and fulfilling duties. Awaiting next step after letter is received by guardian /sister. Patient's sister/guardian still not in regular contact with Unit SW Team/Treatment Team about updates despite the 's order to do so.

## 2018-08-17 NOTE — PROGRESS NOTE BEHAVIORAL HEALTH - NSBHADMITIPOBS_PSY_A_CORE
Routine observation

## 2018-08-17 NOTE — PROGRESS NOTE ADULT - PROVIDER SPECIALTY LIST ADULT
Hospitalist
Neurology
Psychiatry
Neurology

## 2018-08-17 NOTE — PROGRESS NOTE BEHAVIORAL HEALTH - NSBHADMITNEWMED_PSY_A_CORE
no
yes
no
yes
no
yes
no
yes
no
yes
no

## 2018-08-17 NOTE — PROGRESS NOTE BEHAVIORAL HEALTH - RISK ASSESSMENT
Patient denies any SI or HI.
Patient has been calm, cooperative, not needing any PRNs; he is able to verbalize his needs; he attends meals and is visible on the Unit. He has not been violent or aggressive. He takes his Parkinson's medications. He denies any suicidal/homicidal ideation, intent or plan. He is future oriented
Patient has been calm, cooperative, not needing any PRNs; he is able to verbalize his needs; he attends meals and is visible on the Unit. He has not been violent or aggressive. He takes his Parkinson's medications. He denies any suicidal/homicidal ideation, intent or plan. He is future oriented - has specific plans such as which Hebrew restaurant to got o after discharge; he would like to return to his former Neurologist for follow up etc.
Patient has been calm, cooperative, not needing any PRNs; he is able to verbalize his needs; he attends meals and is visible on the Unit. He has not been violent or aggressive. He takes his Parkinson's medications. He denies any suicidal/homicidal ideation, intent or plan. He is future oriented
Patient denies any SI or HI.
Patient denies any SI or HI.  risk factors:  male gender, mood episodes, paranoia, anxiety, chronic pain and medical issues, episodic insomnia,   Protective factors: responsibility to family members, IDs reasons to live, future oriented, supportive sister who is patient's legal guardian, fear of death and dying,
Patient has been calm, cooperative, not needing any PRNs; he is able to verbalize his needs; he attends meals and is visible on the Unit. He has not been violent or aggressive. He takes his Parkinson's medications. He denies any suicidal/homicidal ideation, intent or plan. He is future oriented - has specific plans such as which Turkmen restaurant to got o after discharge; he would like to return to his former Neurologist for follow up etc.
Patient has been calm, cooperative, not needing any PRNs; he is able to verbalize his needs; he attends meals and is visible on the Unit. He has not been violent or aggressive. He takes his Parkinson's medications. He denies any suicidal/homicidal ideation, intent or plan. He is future oriented - has specific plans such as which [place] restaurant to got o after discharge; he would like to return to his former Neurologist for follow up etc.
high risk to self as he is unable to meet his basic needs in the community in his current condition, highly impulsive behavior with limited impulse control, paranoia, limited insight and judgment, chronic medical issue impacting quality of life, poor social supports, male gender, single.
Patient has been calm, cooperative, not needing any PRNs; he is able to verbalize his needs; he attends meals and is visible on the Unit. He has not been violent or aggressive. He takes his Parkinson's medications. He denies any suicidal/homicidal ideation, intent or plan. He is future oriented - has specific plans such as which Armenian restaurant to got o after discharge; he would like to return to his former Neurologist for follow up etc.
Patient denies any SI or HI.
Patient has been calm, cooperative, not needing any PRNs; he is able to verbalize his needs; he attends meals and is visible on the Unit. He has not been violent or aggressive. He takes his Parkinson's medications. He denies any suicidal/homicidal ideation, intent or plan. He is future oriented - has specific plans such as which Thai restaurant to got o after discharge; he would like to return to his former Neurologist for follow up etc.
high risk to self as he is unable to meet his basic needs in the community in his current condition, highly impulsive behavior with limited impulse control, paranoia, limited insight and judgment, chronic medical issue impacting quality of life, poor social supports, male gender, single.
Patient denies any SI or HI.
Patient has been calm, cooperative,does need prn Ativan at times for anxeity related to his discharge; he is able to verbalize his needs; he attends meals and is visible on the Unit. He has not been violent or aggressive. He takes his Parkinson's medications. He denies any suicidal/homicidal ideation, intent or plan. He is future oriented, IDs reasons to live, discusses his [employer] beliefs with staff,
Patient is at chronic risk of harm to himself and others given risk factors that include diagnosis, medical conditions, minimal family support, lack of insight & poor impulse control. Protective factors include no substance use, no suicide attempts, IDs reasons to live.  Patient continues to deny any SI or HI
Patient denies any SI or HI.
high risk to self as he is unable to meet his basic needs in the community in his current condition, highly impulsive behavior with limited impulse control, paranoia, limited insight and judgment, chronic medical issue impacting quality of life, poor social supports, male gender, single.
Patient has been calm, cooperative, not needing any PRNs; he is able to verbalize his needs; he attends meals and is visible on the Unit. He has not been violent or aggressive. He takes his Parkinson's medications. He denies any suicidal/homicidal ideation, intent or plan. He is future oriented
Patient has been calm, cooperative, not needing any PRNs; he is able to verbalize his needs; he attends meals and is visible on the Unit. He has not been violent or aggressive. He takes his Parkinson's medications. He denies any suicidal/homicidal ideation, intent or plan. He is future oriented - has specific plans such as which [place] restaurant to got o after discharge; he would like to return to his former Neurologist for follow up etc.
Patient has been calm, cooperative, not needing any PRNs; he is able to verbalize his needs; he attends meals and is visible on the Unit. He has not been violent or aggressive. He takes his Parkinson's medications. He denies any suicidal/homicidal ideation, intent or plan. He is future oriented
high risk to self as he is unable to meet his basic needs in the community in his current condition, highly impulsive behavior with limited impulse control, paranoia, limited insight and judgment, chronic medical issue impacting quality of life, poor social supports, male gender, single.
high risk to self as he is unable to meet his basic needs in the community in his current condition, highly impulsive behavior with limited impulse control, paranoia, limited insight and judgment, chronic medical issue impacting quality of life, poor social supports, male gender, single
high risk to self as he is unable to meet his basic needs in the community in his current condition, highly impulsive behavior with limited impulse control, paranoia, limited insight and judgment, chronic medical issue impacting quality of life, poor social supports, male gender, single.
Patient denies any SI or HI.
Patient denies any SI or HI.  risk factors:  male gender, mood episodes, paranoia, anxiety, chronic pain and medical issues, episodic insomnia,   Protective factors: responsibility to family members, IDs reasons to live, future oriented, supportive sister who is patient's legal guardian, fear of death and dying,
Patient denies any SI or HI.  Risk factors:  male gender, impulsive behavior at times, chronic pain,   Protective factors: responsibility to others, IDs reasons to live, future oriented, supportive family (sister is guardian), fear of death
Patient has been calm, cooperative, not needing any PRNs; he is able to verbalize his needs; he attends meals and is visible on the Unit. He has not been violent or aggressive. He takes his Parkinson's medications. He denies any suicidal/homicidal ideation, intent or plan. He is future oriented
Patient is at chronic risk of harm to himself and others given risk factors that include diagnosis, medical conditions, minimal family support, lack of insight & poor impulse control. Protective factors include no substance use, no suicide attempts.
Patient denies any SI or HI.
Patient denies any SI or HI.
high risk to self as he is unable to meet his basic needs in the community in his current condition, highly impulsive behavior with limited impulse control, paranoia, limited insight and judgment, chronic medical issue impacting quality of life, poor social supports, male gender, single.
Patient denies any SI or HI.
high risk to self as he is unable to meet his basic needs in the community in his current condition, highly impulsive behavior with limited impulse control, paranoia, limited insight and judgment, chronic medical issue impacting quality of life, poor social supports, male gender, single.
Patient has been calm, cooperative, not needing any PRNs; he is able to verbalize his needs; he attends meals and is visible on the Unit. He has not been violent or aggressive. He takes his Parkinson's medications. He denies any suicidal/homicidal ideation, intent or plan. He is future oriented
Patient is at chronic risk of harm to himself and others given risk factors that include diagnosis, medical conditions, minimal family support, lack of insight & poor impulse control. Protective factors include no substance use, no suicide attempts.
Patient has been calm, cooperative, not needing any PRNs; he is able to verbalize his needs; he attends meals and is visible on the Unit. He has not been violent or aggressive. He takes his Parkinson's medications. He denies any suicidal/homicidal ideation, intent or plan. He is future oriented - has specific plans such as which Macedonian restaurant to got o after discharge; he would like to return to his former Neurologist for follow up etc.
high risk to self as he is unable to meet his basic needs in the community in his current condition, highly impulsive behavior with limited impulse control, paranoia, limited insight and judgment, chronic medical issue impacting quality of life, poor social supports, male gender, single.
moderate: due to chronic mental illness , agitation, mood dysregulation
Patient has been calm, cooperative, not needing any PRNs; he is able to verbalize his needs; he attends meals and is visible on the Unit. He has not been violent or aggressive. He takes his Parkinson's medications. He denies any suicidal/homicidal ideation, intent or plan. He is future oriented - has specific plans such as which Georgian restaurant to got o after discharge; he would like to return to his former Neurologist for follow up etc.
Patient denies any SI or HI.
Patient denies any SI or HI.  risk factors:  male gender, mood episodes, paranoia, anxiety, chronic pain and medical issues, episodic insomnia,   Protective factors: responsibility to family members, IDs reasons to live, future oriented, supportive sister who is patient's legal guardian, fear of death and dying,
Patient has been calm, cooperative, not needing any PRNs; he is able to verbalize his needs; he attends meals and is visible on the Unit. He has not been violent or aggressive. He takes his Parkinson's medications. He denies any suicidal/homicidal ideation, intent or plan. He is future oriented
Patient has been calm, cooperative, not needing any PRNs; he is able to verbalize his needs; he attends meals and is visible on the Unit. He has not been violent or aggressive. He takes his Parkinson's medications. He denies any suicidal/homicidal ideation, intent or plan. He is future oriented - has specific plans such as which Greek restaurant to got o after discharge; he would like to return to his former Neurologist for follow up etc.
Patient has been calm, cooperative, not needing any PRNs; he is able to verbalize his needs; he attends meals and is visible on the Unit. He has not been violent or aggressive. He takes his Parkinson's medications. He denies any suicidal/homicidal ideation, intent or plan. He is future oriented - has specific plans such as which Syriac restaurant to got o after discharge; he would like to return to his former Neurologist for follow up etc.
Patient denies any SI or HI.
Patient denies any SI or HI.
Patient has been calm, cooperative, not needing any PRNs; he is able to verbalize his needs; he attends meals and is visible on the Unit. He has not been violent or aggressive. He takes his Parkinson's medications. He denies any suicidal/homicidal ideation, intent or plan. He is future oriented - has specific plans such as which Yakut restaurant to got o after discharge; he would like to return to his former Neurologist for follow up etc.
Patient is at chronic risk of harm to himself and others given risk factors that include diagnosis, medical conditions, minimal family support, lack of insight & poor impulse control. Protective factors include no substance use, no suicide attempts.  Patient continues to deny any SI or HI
high risk to self as he is unable to meet his basic needs in the community in his current condition, highly impulsive behavior with limited impulse control, paranoia, limited insight and judgment, chronic medical issue impacting quality of life, poor social supports, male gender, single
Patient denies any SI or HI.
Patient has been calm, cooperative, not needing any PRNs; he is able to verbalize his needs; he attends meals and is visible on the Unit. He has not been violent or aggressive. He takes his Parkinson's medications. He denies any suicidal/homicidal ideation, intent or plan. He is future oriented - has specific plans such as which Icelandic restaurant to got o after discharge; he would like to return to his former Neurologist for follow up etc.
Patient denies any SI or HI.  Risk factors:  male gender, impulsive behavior at times, chronic pain,   Protective factors: responsibility to others, IDs reasons to live, future oriented, supportive family (sister is guardian), fear of death
Patient has been calm, cooperative, not needing any PRNs; he is able to verbalize his needs; he attends meals and is visible on the Unit. He has not been violent or aggressive. He takes his Parkinson's medications. He denies any suicidal/homicidal ideation, intent or plan. He is future oriented
Patient is at chronic risk of harm to himself and others given risk factors that include diagnosis, medical conditions, minimal family support, lack of insight & poor impulse control. Protective factors include no substance use, no suicide attempts, IDs reasons to live.  Patient continues to deny any SI or HI
Patient denies any SI or HI.
Patient denies any SI or HI.
Patient denies any SI or HI.  Risk factors:  male gender, impulsive behavior at times, chronic pain,   Protective factors: responsibility to others, IDs reasons to live, future oriented, supportive family (sister is guardian), fear of death
Patient denies any SI or HI.
Moderate: due to potential for violence, mood dysregulation
Patient denies any SI or HI.
Patient denies any SI or HI.  Risk factors:  male gender, mood episodes at times, impulsive behavior, chronic pain.   Protective factors:  responsibility to others, IDs reasons to live, future oriented, supportive family, fear of dying, reports he is a [employer]ri[employer] person
Patient denies any SI or HI.  Risk factors:  male gender, impulsive behavior at times, chronic pain,   Protective factors: responsibility to others, IDs reasons to live, future oriented, supportive family (sister is guardian), fear of death
Patient denies any SI or HI.  Risk factors:  male gender, mood episodes at times, impulsive behavior, chronic pain.   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
Patient denies any SI or HI.

## 2018-08-17 NOTE — PROGRESS NOTE BEHAVIORAL HEALTH - NS ED BHA MSE GENERAL APPEARANCE
Deformities present/Other/Well developed
Deformities present/Other/Well developed
Other/Deformities present/Well developed
Other/Deformities present/Well developed
Deformities present/Other/Well developed
Other/Deformities present/Well developed
Well developed/Deformities present/Other
Deformities present/Well developed/Other
Deformities present/Other/Well developed
Other/Deformities present/Well developed
Deformities present/Well developed/Other
Other/Deformities present/Well developed
Other/Deformities present/Well developed
Other/Well developed/Deformities present
Well developed/Deformities present/Other
Well developed/Other/Deformities present
Deformities present/Other/Well developed
Deformities present/Well developed
Deformities present/Well developed/Other
Well developed/Deformities present/Other
Well developed/Deformities present/Other
Other/Well developed/Deformities present
Other/Well developed/Deformities present
Deformities present/Well developed
Other
Other/Well developed/Deformities present
Well developed/Other/Deformities present
Other/Well developed/Deformities present
Well developed/Deformities present/Other
Deformities present/Well developed
Deformities present/Well developed/Other
Other/Well developed/Deformities present
Well developed/Deformities present/Other
Deformities present/Other/Well developed
Deformities present/Other/Well developed
Deformities present/Well developed
Deformities present/Well developed/Other
Other/Deformities present/Well developed
Other/Deformities present/Well developed
Well developed/Deformities present/Other
Deformities present/Well developed/Other
Deformities present/Well developed/Other
Other
Other/Deformities present/Well developed
Other/Well developed/Deformities present
Well developed/Deformities present/Other
Well developed/Other/Deformities present
Deformities present/Other/Well developed
Deformities present/Other/Well developed
Deformities present/Well developed/Other
Other/Deformities present/Well developed
Other/Well developed/Deformities present
Other/Well developed/Deformities present
Well developed/Deformities present/Other
Well developed/Other/Deformities present
Deformities present/Other/Well developed
Deformities present/Well developed/Other
Other/Deformities present/Well developed
Other/Well developed/Deformities present
Well developed/Deformities present/Other
Well developed/Other/Deformities present
Deformities present/Other/Well developed
Other/Well developed/Deformities present
Well developed/Deformities present/Other
Well developed/Other/Deformities present
Deformities present/Other/Well developed
Deformities present/Well developed/Other
Deformities present/Well developed/Other
Other/Deformities present/Well developed
Well developed/Other/Deformities present
Deformities present/Well developed/Other
Well developed/Deformities present/Other
Deformities present/Other/Well developed
Other/Well developed/Deformities present
Well developed/Deformities present/Other
Other/Well developed/Deformities present
Well developed/Deformities present/Other
Other/Well developed/Deformities present
Other/Well developed/Deformities present
Well developed/Other/Deformities present
Deformities present/Well developed/Other
Well developed/Other/Deformities present
Other/Well developed/Deformities present
Other/Well developed/Deformities present
Well developed/Deformities present/Other
Well developed/Other/Deformities present
Deformities present/Well developed/Other
Well developed/Deformities present/Other
Well developed/Other/Deformities present
Deformities present/Well developed/Other
Deformities present/Well developed/Other
Other/Well developed/Deformities present
Deformities present/Other/Well developed
Other/Deformities present/Well developed
Well developed/Deformities present
Well developed/Deformities present/Other
Well developed/Deformities present/Other
Deformities present/Other/Well developed
Other/Deformities present/Well developed
Well developed/Other/Deformities present
Well developed/Deformities present
Other/Well developed/Deformities present
Other/Well developed/Deformities present
Well developed/Deformities present/Other
Deformities present/Other/Well developed
Deformities present/Well developed/Other
Other
Other/Deformities present/Well developed
Other/Well developed/Deformities present
Well developed/Deformities present/Other
Well developed/Other/Deformities present
Deformities present/Well developed/Other
Other/Well developed/Deformities present
Well developed/Deformities present/Other
Deformities present/Other/Well developed
Deformities present/Well developed/Other
Deformities present/Well developed/Other
Well developed/Other/Deformities present
Well developed/Other/Deformities present
Other/Deformities present/Well developed
Well developed/Other/Deformities present
Deformities present/Well developed/Other
Deformities present/Well developed/Other
Well developed/Deformities present/Other
Deformities present/Other/Well developed
Deformities present/Well developed/Other
Other
Well developed/Deformities present/Other
Well developed/Deformities present/Other
Well developed/Other/Deformities present
Deformities present/Well developed/Other
Other/Deformities present/Well developed
Other/Well developed/Deformities present
Well developed/Other/Deformities present
Deformities present/Other/Well developed
Other/Deformities present/Well developed
Well developed/Other/Deformities present
Deformities present/Other/Well developed
Deformities present/Other/Well developed
Deformities present/Well developed/Other
Other/Well developed/Deformities present
Other/Well developed/Deformities present
Well developed/Deformities present/Other
Deformities present/Well developed/Other
Other/Well developed/Deformities present
Well developed/Deformities present/Other
Other/Well developed/Deformities present
Deformities present/Well developed/Other
Well developed/Deformities present/Other
Deformities present/Well developed/Other
Well developed/Deformities present/Other

## 2018-08-17 NOTE — PROGRESS NOTE BEHAVIORAL HEALTH - PRN MEDS
Tylenol and Motrin for pain
tylenol
tylenol,  motrin for back and neck pain.
Ativan prn
Tylenol and Motrin for back and neck pain
Tylenol and Motrin for pain
Ativan prn
Tylenol for pain
haldol, Ativan IM x 1 dose STAT
Ativan prn
Motrin  for pain
Tylenol and Motrin for pain
Ativan and Haldol IM for agitated and threatening behavior
Tylenol for pain
Flexeril   Tylenol   Motrin  for neck and back pain
Tylenol and Motrin and   for back and neck pain
Tylenol and Motrin and Flexeril  for back and neck pain
Tylenol for pain
Motrin  for pain
tylenol,  motrin for back and neck pain.
Tylenol for neck pain
Tylenol and Motrin for chronic pain
Motrin x1 for pain
Tylenol and Motrin for pain
Motrin and Tylenol for pain
Tylenol for pain
tylenol and maalox
haldol and Ativan IM
Tylenol and Motrin and   for back and neck pain
Tylenol for pain
motrin and tylenol for pain
Ativan prn  Motrin and Tylenol for pain
Tylenol for neck pain
Tylenol and Motrin for pain
Tylenol and Motrin for pain
Tylenol for neck pain
Ativan prn
Tylenol for pain
tylenol,  motrin for back and neck pain.
Tylenol and Motrin and Flexeril  for back and neck pain
tylenol x1 for back pain
Motrin  for pain
Tylenol and Motrin and Flexeril  for back and neck pain
tylenol, flexeril and motrin for back and neck pain.  Zyprexa IM for agitation and assaulting staff
haldol and Ativan IM
Motrin and Tylenol for pain
Tylenol for pain

## 2018-08-17 NOTE — PROGRESS NOTE BEHAVIORAL HEALTH - PRN MEDICATIONS SINCE LAST EVAL
no
yes
yes
no
yes
no
yes
yes
no
yes
no
yes
no
yes
no
yes
yes
no
yes
no
yes
no
yes
no
yes
no
no
yes
no
yes
no
no
yes
no
yes
no
yes
no
yes
no
yes
yes
no
yes
yes
no
no
yes
no
yes
no
yes
no
no
yes
yes
no
yes
yes
no
yes
yes
no
yes
no
yes
no
yes
no
yes
yes

## 2018-08-17 NOTE — PROGRESS NOTE BEHAVIORAL HEALTH - NS ED [facility] AXIS I PRIMARY CODE FT
G20
F06.30
G20
F06.30
G20
F06.30
G20
F06.30
G20
F06.30
G20

## 2018-08-17 NOTE — PROGRESS NOTE BEHAVIORAL HEALTH - NSBHCONSORIP_PSY_A_CORE
Inpatient Admission...

## 2018-08-17 NOTE — PROGRESS NOTE BEHAVIORAL HEALTH - THOUGHT PROCESS
Other/Circumstantial/Linear/Impaired reasoning
Linear/Circumstantial/Impaired reasoning/Other
Linear/Impaired reasoning/Other/Circumstantial
Impaired reasoning/Linear/Other/Circumstantial
Linear/Impaired reasoning/Other/Circumstantial
Linear/Circumstantial/Impaired reasoning/Other
Circumstantial/Impaired reasoning/Other
Circumstantial/Other/Impaired reasoning
Impaired reasoning/Other/Circumstantial
Impaired reasoning/Other/Linear/Circumstantial
Circumstantial/Impaired reasoning/Other
Linear/Circumstantial/Impaired reasoning/Other
Other/Circumstantial/Impaired reasoning
Impaired reasoning/Circumstantial/Other
Other/Circumstantial/Impaired reasoning/Linear
Other/Impaired reasoning/Circumstantial
Impaired reasoning/Circumstantial/Linear/Other
Impaired reasoning/Other/Circumstantial/Linear
Linear/Circumstantial/Impaired reasoning/Other
Circumstantial/Other/Linear/Impaired reasoning
Other
Other/Impaired reasoning/Circumstantial
Circumstantial/Impaired reasoning/Other
Impaired reasoning/Other/Circumstantial
Impaired reasoning/Other/Linear/Circumstantial
Impaired reasoning/Other/Linear/Circumstantial
Other/Linear/Impaired reasoning/Circumstantial
Circumstantial/Linear/Impaired reasoning/Other
Circumstantial/Other/Impaired reasoning/Linear
Linear/Circumstantial/Impaired reasoning/Other
Other/Circumstantial/Impaired reasoning/Linear
Other/Impaired reasoning/Linear/Circumstantial
Circumstantial/Impaired reasoning/Other
Circumstantial/Impaired reasoning/Other/Linear
Impaired reasoning/Linear/Circumstantial/Other
Linear/Circumstantial/Impaired reasoning/Other
Linear/Circumstantial/Impaired reasoning/Other
Linear/Circumstantial/Other/Impaired reasoning
Other/Impaired reasoning/Circumstantial
Linear/Impaired reasoning/Circumstantial/Other
Linear/Impaired reasoning/Other/Circumstantial
Other/Linear/Impaired reasoning/Circumstantial
Impaired reasoning/Other/Circumstantial
Linear/Circumstantial/Impaired reasoning/Other
Circumstantial/Impaired reasoning/Other
Other/Circumstantial/Impaired reasoning
Circumstantial/Linear/Impaired reasoning/Other
Impaired reasoning/Other/Circumstantial
Linear/Circumstantial/Impaired reasoning/Other
Other/Linear/Impaired reasoning/Circumstantial
Linear/Circumstantial/Impaired reasoning/Other
Circumstantial/Impaired reasoning/Other
Other/Impaired reasoning/Linear/Circumstantial
Circumstantial/Other/Linear/Impaired reasoning
Linear/Circumstantial/Impaired reasoning/Other
Circumstantial
Linear/Circumstantial/Impaired reasoning/Other
Other
Circumstantial/Impaired reasoning/Linear/Other
Circumstantial/Other
Other/Linear/Circumstantial/Impaired reasoning
Circumstantial/Impaired reasoning/Other
Impaired reasoning/Linear/Other/Circumstantial
Other
Circumstantial/Linear/Other/Impaired reasoning
Impaired reasoning/Other/Linear/Circumstantial
Linear/Impaired reasoning/Other/Circumstantial
Circumstantial/Other/Impaired reasoning/Linear
Linear/Circumstantial/Impaired reasoning/Other
Circumstantial/Linear/Impaired reasoning/Other
Circumstantial/Other/Linear/Impaired reasoning
Impaired reasoning/Circumstantial/Other
Other/Circumstantial/Impaired reasoning
Other/Circumstantial/Linear/Impaired reasoning
Other/Linear/Circumstantial/Impaired reasoning
Circumstantial/Other/Impaired reasoning
Impaired reasoning/Circumstantial/Linear/Other
Impaired reasoning/Circumstantial/Linear/Other
Impaired reasoning/Circumstantial/Other
Impaired reasoning/Linear/Other/Circumstantial
Impaired reasoning/Other/Circumstantial
Impaired reasoning/Other/Circumstantial
Impaired reasoning/Other/Circumstantial/Linear
Linear/Impaired reasoning/Circumstantial/Other
Linear/Other/Impaired reasoning/Circumstantial
Other/Circumstantial/Linear/Impaired reasoning
Other/Linear/Circumstantial/Impaired reasoning
Other/Linear/Impaired reasoning/Circumstantial
Circumstantial/Impaired reasoning/Other/Linear
Circumstantial/Linear/Impaired reasoning/Other
Impaired reasoning/Other/Linear/Circumstantial
Linear/Circumstantial/Impaired reasoning/Other
Linear/Circumstantial/Other/Impaired reasoning
Linear/Impaired reasoning/Circumstantial/Other
Linear/Other/Circumstantial/Impaired reasoning
Other/Impaired reasoning/Linear/Circumstantial
Other/Linear/Circumstantial/Impaired reasoning
Other/Linear/Impaired reasoning/Circumstantial
Circumstantial/Impaired reasoning/Linear/Other
Impaired reasoning/Circumstantial/Other/Linear
Impaired reasoning/Linear/Circumstantial/Other
Impaired reasoning/Other/Linear/Circumstantial
Linear/Circumstantial/Impaired reasoning/Other
Linear/Circumstantial/Impaired reasoning/Other
Linear/Other/Impaired reasoning/Circumstantial
Other/Circumstantial/Impaired reasoning
Other/Impaired reasoning/Linear/Circumstantial
Other/Linear/Circumstantial/Impaired reasoning
Circumstantial/Impaired reasoning/Other
Circumstantial/Impaired reasoning/Other/Linear
Circumstantial/Linear/Impaired reasoning/Other
Circumstantial/Linear/Impaired reasoning/Other
Impaired reasoning/Circumstantial/Linear/Other
Impaired reasoning/Circumstantial/Other
Impaired reasoning/Circumstantial/Other
Impaired reasoning/Linear/Other/Circumstantial
Impaired reasoning/Other/Circumstantial
Impaired reasoning/Other/Circumstantial
Impaired reasoning/Other/Linear/Circumstantial
Linear/Circumstantial/Impaired reasoning/Other
Linear/Circumstantial/Impaired reasoning/Other
Linear/Circumstantial/Other/Impaired reasoning
Linear/Impaired reasoning/Circumstantial/Other
Other/Impaired reasoning/Linear/Circumstantial
Other/Linear/Impaired reasoning/Circumstantial
Circumstantial/Impaired reasoning/Other
Circumstantial/Linear/Impaired reasoning/Other
Impaired reasoning/Other/Circumstantial
Other/Linear/Circumstantial/Impaired reasoning
Other/Linear/Circumstantial/Impaired reasoning
Impaired reasoning/Other/Circumstantial
Linear/Circumstantial/Impaired reasoning/Other
Linear/Other/Circumstantial/Impaired reasoning
Other/Circumstantial/Disorganized/Impaired reasoning
Other/Impaired reasoning/Circumstantial
Other/Linear/Impaired reasoning/Circumstantial
Linear/Circumstantial/Impaired reasoning/Other
Circumstantial/Linear/Impaired reasoning/Other
Impaired reasoning/Other/Circumstantial
Linear/Circumstantial/Other/Impaired reasoning
Linear/Circumstantial/Impaired reasoning/Other
Circumstantial/Impaired reasoning/Other
Impaired reasoning/Other/Circumstantial/Linear
Circumstantial/Other/Linear/Impaired reasoning
Circumstantial/Linear/Impaired reasoning/Other
Other/Impaired reasoning/Linear/Circumstantial
Other/Impaired reasoning/Linear/Circumstantial
Circumstantial/Impaired reasoning/Other
Impaired reasoning/Linear/Circumstantial/Other
Impaired reasoning/Other/Linear/Circumstantial
Linear/Other/Impaired reasoning/Circumstantial
Other/Linear/Circumstantial/Impaired reasoning
Impaired reasoning/Circumstantial/Other
Impaired reasoning/Linear/Other/Circumstantial
Circumstantial/Linear/Impaired reasoning/Other
Linear/Circumstantial/Impaired reasoning/Other
Impaired reasoning/Other/Linear/Circumstantial
Linear/Impaired reasoning/Circumstantial/Other
Impaired reasoning/Circumstantial/Linear/Other
Other/Circumstantial/Linear/Impaired reasoning
Impaired reasoning/Linear/Circumstantial/Other
Impaired reasoning/Other/Linear/Circumstantial
Linear/Circumstantial/Impaired reasoning/Other
Circumstantial/Impaired reasoning/Other/Linear
Circumstantial/Linear/Other/Impaired reasoning
Circumstantial/Other/Linear/Impaired reasoning
Linear/Circumstantial/Impaired reasoning/Other
Impaired reasoning/Other/Linear/Circumstantial
Circumstantial/Impaired reasoning/Other
Impaired reasoning/Linear/Other/Circumstantial
Other/Circumstantial/Impaired reasoning
Other/Circumstantial/Impaired reasoning/Linear
Other/Impaired reasoning/Linear/Circumstantial
Circumstantial/Linear/Other/Impaired reasoning
Impaired reasoning/Circumstantial/Linear/Other
Linear/Impaired reasoning/Other/Circumstantial
Other
Other/Circumstantial
Circumstantial/Linear/Impaired reasoning/Other
Other/Circumstantial/Impaired reasoning
Other/Linear/Impaired reasoning/Circumstantial
Circumstantial/Impaired reasoning/Other
Impaired reasoning/Other/Circumstantial
Impaired reasoning/Other/Linear/Circumstantial
Linear/Impaired reasoning/Other/Circumstantial
Linear/Circumstantial/Impaired reasoning/Other
Circumstantial/Impaired reasoning/Other
Impaired reasoning/Linear/Circumstantial/Other
Linear/Impaired reasoning/Other/Circumstantial
Circumstantial/Linear/Impaired reasoning/Other
Circumstantial/Other/Linear/Impaired reasoning
Impaired reasoning/Circumstantial/Other
Circumstantial/Linear/Other/Impaired reasoning
Linear/Impaired reasoning/Other/Circumstantial
Linear/Other/Circumstantial/Impaired reasoning
Other/Impaired reasoning/Linear/Circumstantial
Linear/Other/Impaired reasoning/Circumstantial
Linear/Impaired reasoning/Other/Circumstantial

## 2018-08-17 NOTE — PROGRESS NOTE BEHAVIORAL HEALTH - GAIT / STATION
Other

## 2018-08-17 NOTE — PROGRESS NOTE BEHAVIORAL HEALTH - ORIENTED TO PERSON
Yes

## 2018-08-17 NOTE — PROGRESS NOTE BEHAVIORAL HEALTH - NSBHATTESTSEENBY_PSY_A_CORE
attending Psychiatrist without NP/Trainee
NP without Attending Psychiatrist
attending Psychiatrist without NP/Trainee
NP without Attending Psychiatrist
attending Psychiatrist without NP/Trainee
NP without Attending Psychiatrist
NP without Attending Psychiatrist
attending Psychiatrist without NP/Trainee
NP without Attending Psychiatrist
attending Psychiatrist without NP/Trainee
NP without Attending Psychiatrist
attending Psychiatrist without NP/Trainee
NP without Attending Psychiatrist
attending Psychiatrist without NP/Trainee
NP without Attending Psychiatrist
attending Psychiatrist without NP/Trainee
NP without Attending Psychiatrist
NP without Attending Psychiatrist
attending Psychiatrist without NP/Trainee
NP without Attending Psychiatrist
attending Psychiatrist without NP/Trainee
attending Psychiatrist without NP/Trainee
NP without Attending Psychiatrist
attending Psychiatrist without NP/Trainee
NP without Attending Psychiatrist
NP without Attending Psychiatrist
attending Psychiatrist without NP/Trainee
NP without Attending Psychiatrist
attending Psychiatrist without NP/Trainee
NP without Attending Psychiatrist
attending Psychiatrist without NP/Trainee
NP without Attending Psychiatrist
attending Psychiatrist without NP/Trainee
NP without Attending Psychiatrist
attending Psychiatrist without NP/Trainee
attending Psychiatrist without NP/Trainee
NP without Attending Psychiatrist
attending Psychiatrist without NP/Trainee
Trainee with telephonic supervision from Attending Psychiatrist
attending Psychiatrist without NP/Trainee
NP with telephonic supervision from Attending Psychiatrist
attending Psychiatrist without NP/Trainee
attending Psychiatrist without NP/Trainee
NP without Attending Psychiatrist
attending Psychiatrist without NP/Trainee
NP without Attending Psychiatrist
attending Psychiatrist without NP/Trainee
NP without Attending Psychiatrist
attending Psychiatrist without NP/Trainee
NP without Attending Psychiatrist
attending Psychiatrist without NP/Trainee
NP without Attending Psychiatrist
attending Psychiatrist without NP/Trainee
NP without Attending Psychiatrist
attending Psychiatrist without NP/Trainee
NP without Attending Psychiatrist
attending Psychiatrist without NP/Trainee
NP without Attending Psychiatrist

## 2018-08-17 NOTE — PROGRESS NOTE BEHAVIORAL HEALTH - NS ED BHA MED ROS MUSCULOSKELETAL
No complaints
No complaints
Yes
No complaints
Yes
No complaints
Yes
Yes
No complaints
Yes
No complaints
No complaints
Yes
No complaints
Yes
No complaints
Yes
No complaints
Yes
No complaints
No complaints
Yes
No complaints
Yes
No complaints
Yes
No complaints
No complaints
Yes
Yes
No complaints
Yes
No complaints
Yes
No complaints
Yes
No complaints
Yes
No complaints
Yes
No complaints
Yes
No complaints
No complaints
Yes
No complaints
Yes
Yes
No complaints
Yes
No complaints
Yes
No complaints
Yes
No complaints
No complaints
Yes
No complaints
No complaints
Yes
No complaints
Yes
No complaints
Yes
No complaints
Yes
Yes
No complaints
Yes
Yes
No complaints

## 2018-08-17 NOTE — PROGRESS NOTE BEHAVIORAL HEALTH - ESTIMATED INTELLIGENCE
Average

## 2018-08-17 NOTE — PROGRESS NOTE BEHAVIORAL HEALTH - LANGUAGE
No abnormalities noted

## 2018-08-17 NOTE — PROGRESS NOTE BEHAVIORAL HEALTH - MUSCLE TONE / STRENGTH
Normal muscle tone/strength

## 2018-08-17 NOTE — PROGRESS NOTE BEHAVIORAL HEALTH - NSBHADMITDANGERSELF_PSY_A_CORE
unable to care for self
needs assist with ADL/unable to care for self
unable to care for self
needs assist with ADL/unable to care for self
unable to care for self
needs assist with ADL/unable to care for self
unable to care for self

## 2018-08-17 NOTE — PROGRESS NOTE BEHAVIORAL HEALTH - MOOD
Irritable
Other
Normal/Irritable
Angry/Irritable
Other/Irritable
Other
Anxious/Normal
Other
Angry
Other
Other
Other/Irritable
Depressed/Other
Other
Other/Irritable
Irritable/Other
Irritable/Other
Normal/Anxious
Other
Other
Anxious/Irritable
Normal/Other
Normal/Other
Other
Other/Irritable
Other
Anxious/Normal
Normal
Other
Anxious/Normal
Irritable/Other
Other
Other/Irritable
Angry/Irritable
Normal
Other
Irritable/Depressed
Other
Angry
Irritable
Irritable/Normal
Other
Angry
Irritable
Other
Irritable/Normal
Normal/Irritable
Other
Other/Irritable
Irritable
Normal
Other
Other/Irritable
Irritable
Normal
Other
Irritable/Other
Other
Other/Normal
Depressed/Other
Irritable/Other
Normal
Normal
Normal/Anxious
Normal/Other
Other
Other/Depressed
Other/Irritable
Other/Irritable
Angry/Irritable
Depressed/Normal/Other
Irritable/Other
Irritable/Other
Normal
Normal
Other
Other
Anxious/Normal
Irritable/Other
Other
Other
Depressed/Other
Other
Irritable/Other
Anxious/Normal
Depressed/Other
Irritable/Normal
Other/Irritable
Other
Irritable
Irritable/Normal
Other
Other
Anxious/Normal
Depressed/Irritable
Normal
Other
Other
Other/Irritable
Other
Other
Irritable/Other
Other
Other/Normal
Normal/Other
Other/Irritable
Normal
Other
Normal
Other
Normal
Other
Other
Anxious/Normal
Normal
Other
Angry
Other
Irritable
Other
Other/Depressed/Normal
Irritable/Other
Irritable/Other
Other
Normal
Other
Irritable/Other
Other
Normal/Irritable
Other
Other/Irritable
Other
Normal/Other
Other
Irritable/Other
Other
Other
Other/Normal
Other
Other
Angry
Other
Normal
Normal
Other

## 2018-08-17 NOTE — PROGRESS NOTE BEHAVIORAL HEALTH - NSBHPTASSESSDT_PSY_A_CORE
01-Dec-2017
01-Feb-2018
01-Mar-2018
02-Aug-2018
02-Jul-2018 16:01
02-May-2018 12:37
03-Jan-2018 12:47
03-May-2018 12:44
04-Dec-2017
04-Jan-2018 13:41
04-Jul-2018
04-May-2018
05-Dec-2017
05-Jul-2018 16:12
05-Mar-2018 14:30
06-Jun-2018
07-Feb-2018
07-Jun-2018
07-Mar-2018 12:00
07-May-2018
07-Nov-2017 09:45
08-Dec-2017
08-Feb-2018
08-May-2018
08-Nov-2017 09:30
09-Aug-2018 11:00
10-Apr-2018
10-Aug-2018
10-Galdnio-2018
10-Jul-2018 14:03
11-Apr-2018 14:05
11-Jun-2018 13:13
12-Apr-2018
12-Feb-2018
12-Jul-2018 14:18
12-Mar-2018
13-Jul-2018
13-Jun-2018 14:23
13-Mar-2018
13-May-2018
13-Nov-2017 10:59
14-Aug-2018 10:37
14-Dec-2017
14-Feb-2018
14-Jun-2018 13:20
14-May-2018 15:29
14-Nov-2017 12:31
15-Aug-2018
15-May-2018 14:02
15-Ruben-2018 11:37
16-Apr-2018 15:46
17-Jul-2018
17-Nov-2017 10:50
18-Apr-2018
18-Dec-2017
18-May-2018
19-Apr-2018 15:27
19-Dec-2017 12:02
19-Jan-2018
19-Jul-2018 13:47
19-Mar-2018
20-Apr-2018
20-Feb-2018
20-Jul-2018
20-Jun-2018
20-May-2018
21-Dec-2017
21-Feb-2018 00:00
21-Mar-2018
21-May-2018 11:20
21-Nov-2017 10:15
22-Dec-2017
22-Feb-2018
22-Jan-2018
22-Jun-2018
22-Nov-2017 10:00
23-Apr-2018 15:56
23-Feb-2018
23-Jul-2018
23-Mar-2018
24-Apr-2018
25-Jul-2018
25-May-2018 10:45
26-Dec-2017
26-Jul-2018
26-Mar-2018
27-Apr-2018 14:54
27-Dec-2017
27-Nov-2017 10:00
28-Feb-2018
28-Jun-2018
29-Dec-2017
29-Jan-2018
29-Jun-2018
29-Nov-2017 14:05
30-May-2018 13:20
30-Nov-2017
31-Jan-2018
05-Aug-2018
26-Jun-2018 14:32
28-Dec-2017
04-Jun-2018
23-Jan-2018
18-Jan-2018
25-Apr-2018
24-Jan-2018
16-Jul-2018 15:09
08-Jun-2018
13-Feb-2018
03-Aug-2018
18-Jul-2018 12:42
08-Aug-2018 10:45
28-Nov-2017 13:00
06-Feb-2018
16-Feb-2018 16:26
02-Mar-2018
12-Jun-2018 14:12
29-May-2018 15:12
11-May-2018 16:02
06-Jul-2018 13:53
22-Mar-2018 14:09
06-Dec-2017
09-Jan-2018
27-Feb-2018 12:58
17-Jan-2018
22-May-2018 11:15
09-Feb-2018
24-Jul-2018
02-Apr-2018 16:57
03-Apr-2018
05-Feb-2018
06-Apr-2018
07-Dec-2017
09-Mar-2018
20-Dec-2017
21-Jun-2018
26-Jan-2018
29-Mar-2018
06-Nov-2017 11:00
25-Jun-2018 14:24
30-Jul-2018
31-May-2018 15:12
06-Aug-2018
30-Jan-2018
15-Mar-2018
09-May-2018
02-Feb-2018
29-Jul-2018
01-Jun-2018 14:39
05-Jun-2018
07-Aug-2018 12:00
28-Mar-2018
24-Nov-2017 09:30
14-Mar-2018
16-Mar-2018
16-May-2018 14:24
17-Aug-2018 16:03
03-Jul-2018 15:40
12-Dec-2017
20-Mar-2018
27-Mar-2018
01-May-2018 15:03
17-May-2018 14:00
20-Nov-2017 09:30
04-Apr-2018
09-Apr-2018
11-Dec-2017
13-Apr-2018
23-May-2018
31-Jul-2018
05-Jan-2018 12:41
14-Nov-2017 12:31
16-Nov-2017 10:40
11-Jul-2018 13:49
15-Feb-2018
15-Dec-2017
08-Jan-2018
30-Mar-2018
01-Aug-2018
27-Jul-2018
12-Jan-2018
13-Aug-2018 10:30
10-May-2018 15:55
19-Jun-2018
09-Jul-2018 14:19
30-Apr-2018 14:43
11-Jan-2018
13-Dec-2017 15:23
17-Apr-2018
18-Jun-2018 15:51
24-May-2018 11:15
05-Apr-2018
26-Apr-2018 14:51
16-Aug-2018 12:14
27-Jun-2018
10-Nov-2017 09:30
11-Nov-2017 10:22
02-Jan-2018
06-Mar-2018 17:27
26-Feb-2018
25-Jan-2018

## 2018-08-17 NOTE — PROGRESS NOTE BEHAVIORAL HEALTH - [facility] MED ROS RESPIRATORY
No complaints

## 2018-08-17 NOTE — PROGRESS NOTE BEHAVIORAL HEALTH - NSBHADMITIPREASON_PSY_A_CORE
Danger to self; mental illness expected to respond to inpatient care
Danger to self; mental illness expected to respond to inpatient care
Danger to self; mental illness expected to respond to inpatient care/needs some assist with ADL, paranoid
needs some assist with ADL, paranoid/Danger to self; mental illness expected to respond to inpatient care
Danger to self; mental illness expected to respond to inpatient care/needs some assist with ADL, paranoid
needs some assist with ADL, paranoid/Danger to self; mental illness expected to respond to inpatient care
needs some assist with ADL, paranoid/Danger to self; mental illness expected to respond to inpatient care
Danger to self; mental illness expected to respond to inpatient care/needs some assist with ADL, paranoid
Danger to self; mental illness expected to respond to inpatient care
Danger to self; mental illness expected to respond to inpatient care
needs some assist with ADL, paranoid/Danger to self; mental illness expected to respond to inpatient care
Danger to self; mental illness expected to respond to inpatient care
needs some assist with ADL, paranoid/Danger to self; mental illness expected to respond to inpatient care
Danger to self; mental illness expected to respond to inpatient care/needs some assist with ADL, paranoid
Danger to self; mental illness expected to respond to inpatient care/needs some assist with ADL, paranoid
needs some assist with ADL, paranoid/Danger to self; mental illness expected to respond to inpatient care
needs some assist with ADL, paranoid/Danger to self; mental illness expected to respond to inpatient care
Danger to self; mental illness expected to respond to inpatient care/needs some assist with ADL, paranoid
needs some assist with ADL, paranoid/Danger to self; mental illness expected to respond to inpatient care
Danger to self; mental illness expected to respond to inpatient care
needs some assist with ADL, paranoid/Danger to self; mental illness expected to respond to inpatient care
Danger to self; mental illness expected to respond to inpatient care
needs some assist with ADL, paranoid/Danger to self; mental illness expected to respond to inpatient care
Danger to self; mental illness expected to respond to inpatient care/needs some assist with ADL, paranoid
needs some assist with ADL, paranoid/Danger to self; mental illness expected to respond to inpatient care
needs some assist with ADL, paranoid/Danger to self; mental illness expected to respond to inpatient care
Danger to self; mental illness expected to respond to inpatient care
needs some assist with ADL, paranoid/Danger to self; mental illness expected to respond to inpatient care
needs some assist with ADL, paranoid/Danger to self; mental illness expected to respond to inpatient care
Danger to self; mental illness expected to respond to inpatient care
Danger to self; mental illness expected to respond to inpatient care/needs some assist with ADL, paranoid
needs some assist with ADL, paranoid/Danger to self; mental illness expected to respond to inpatient care
needs some assist with ADL, paranoid/Danger to self; mental illness expected to respond to inpatient care
Danger to self; mental illness expected to respond to inpatient care/needs some assist with ADL, paranoid
needs some assist with ADL, paranoid/Danger to self; mental illness expected to respond to inpatient care
Danger to self; mental illness expected to respond to inpatient care
Danger to self; mental illness expected to respond to inpatient care
Danger to self; mental illness expected to respond to inpatient care/needs some assist with ADL, paranoid
needs some assist with ADL, paranoid/Danger to self; mental illness expected to respond to inpatient care
Danger to self; mental illness expected to respond to inpatient care
Danger to self; mental illness expected to respond to inpatient care
needs some assist with ADL, paranoid/Danger to self; mental illness expected to respond to inpatient care
Danger to self; mental illness expected to respond to inpatient care
Danger to self; mental illness expected to respond to inpatient care/needs some assist with ADL, paranoid
needs some assist with ADL, paranoid/Danger to self; mental illness expected to respond to inpatient care
Danger to self; mental illness expected to respond to inpatient care/needs some assist with ADL, paranoid
Danger to self; mental illness expected to respond to inpatient care
Danger to self; mental illness expected to respond to inpatient care
needs some assist with ADL, paranoid/Danger to self; mental illness expected to respond to inpatient care
needs some assist with ADL, paranoid/Danger to self; mental illness expected to respond to inpatient care
Danger to self; mental illness expected to respond to inpatient care
Danger to self; mental illness expected to respond to inpatient care/needs some assist with ADL, paranoid
Danger to self; mental illness expected to respond to inpatient care
Danger to self; mental illness expected to respond to inpatient care
Danger to self; mental illness expected to respond to inpatient care/needs some assist with ADL, paranoid
Danger to self; mental illness expected to respond to inpatient care/needs some assist with ADL, paranoid
needs some assist with ADL, paranoid/Danger to self; mental illness expected to respond to inpatient care
Danger to self; mental illness expected to respond to inpatient care
Danger to self; mental illness expected to respond to inpatient care/needs some assist with ADL, paranoid
needs some assist with ADL, paranoid/Danger to self; mental illness expected to respond to inpatient care
Danger to self; mental illness expected to respond to inpatient care/needs some assist with ADL, paranoid
Danger to self; mental illness expected to respond to inpatient care
Danger to self; mental illness expected to respond to inpatient care
needs some assist with ADL, paranoid/Danger to self; mental illness expected to respond to inpatient care
Danger to self; mental illness expected to respond to inpatient care
Danger to self; mental illness expected to respond to inpatient care/needs some assist with ADL, paranoid
Danger to self; mental illness expected to respond to inpatient care/needs some assist with ADL, paranoid
Danger to self; mental illness expected to respond to inpatient care
needs some assist with ADL, paranoid/Danger to self; mental illness expected to respond to inpatient care
Danger to self; mental illness expected to respond to inpatient care
Danger to self; mental illness expected to respond to inpatient care/needs some assist with ADL, paranoid
Danger to self; mental illness expected to respond to inpatient care
Danger to self; mental illness expected to respond to inpatient care
Danger to self; mental illness expected to respond to inpatient care/needs some assist with ADL, paranoid
Danger to self; mental illness expected to respond to inpatient care
needs some assist with ADL, paranoid/Danger to self; mental illness expected to respond to inpatient care
psychosis/other reason
other reason
psychosis/other reason
needs some assist with ADL, paranoid/Danger to self; mental illness expected to respond to inpatient care
other reason/psychosis
psychosis/other reason
Danger to self; mental illness expected to respond to inpatient care
Danger to self; mental illness expected to respond to inpatient care
needs some assist with ADL, paranoid/Danger to self; mental illness expected to respond to inpatient care
Danger to self; mental illness expected to respond to inpatient care
Danger to self; mental illness expected to respond to inpatient care
Danger to self; mental illness expected to respond to inpatient care/needs some assist with ADL, paranoid
Danger to self; mental illness expected to respond to inpatient care
Danger to self; mental illness expected to respond to inpatient care/needs some assist with ADL, paranoid
needs some assist with ADL, paranoid/Danger to self; mental illness expected to respond to inpatient care
Danger to self; mental illness expected to respond to inpatient care
Danger to self; mental illness expected to respond to inpatient care/needs some assist with ADL, paranoid
needs some assist with ADL, paranoid/Danger to self; mental illness expected to respond to inpatient care
Danger to self; mental illness expected to respond to inpatient care
Danger to self; mental illness expected to respond to inpatient care/needs some assist with ADL, paranoid
needs some assist with ADL, paranoid/Danger to self; mental illness expected to respond to inpatient care
Danger to self; mental illness expected to respond to inpatient care
Danger to self; mental illness expected to respond to inpatient care/needs some assist with ADL, paranoid
needs some assist with ADL, paranoid/Danger to self; mental illness expected to respond to inpatient care
Danger to self; mental illness expected to respond to inpatient care
Danger to self; mental illness expected to respond to inpatient care/needs some assist with ADL, paranoid
needs some assist with ADL, paranoid/Danger to self; mental illness expected to respond to inpatient care
Danger to self; mental illness expected to respond to inpatient care
Danger to self; mental illness expected to respond to inpatient care
needs some assist with ADL, paranoid/Danger to self; mental illness expected to respond to inpatient care
needs some assist with ADL, paranoid/Danger to self; mental illness expected to respond to inpatient care
Danger to self; mental illness expected to respond to inpatient care
Danger to self; mental illness expected to respond to inpatient care/needs some assist with ADL, paranoid
needs some assist with ADL, paranoid/Danger to self; mental illness expected to respond to inpatient care
Danger to self; mental illness expected to respond to inpatient care
Danger to self; mental illness expected to respond to inpatient care/needs some assist with ADL, paranoid
Danger to self; mental illness expected to respond to inpatient care/needs some assist with ADL, paranoid
Danger to self; mental illness expected to respond to inpatient care/see HPI
needs some assist with ADL, paranoid/Danger to self; mental illness expected to respond to inpatient care
needs some assist with ADL, paranoid/Danger to self; mental illness expected to respond to inpatient care
Danger to self; mental illness expected to respond to inpatient care/needs some assist with ADL, paranoid
needs some assist with ADL, paranoid/Danger to self; mental illness expected to respond to inpatient care
Danger to self; mental illness expected to respond to inpatient care
Danger to self; mental illness expected to respond to inpatient care
Danger to self; mental illness expected to respond to inpatient care/needs some assist with ADL, paranoid
Danger to self; mental illness expected to respond to inpatient care/needs some assist with ADL, paranoid
needs some assist with ADL, paranoid/Danger to self; mental illness expected to respond to inpatient care
needs some assist with ADL, paranoid/Danger to self; mental illness expected to respond to inpatient care
Danger to self; mental illness expected to respond to inpatient care
needs some assist with ADL, paranoid/Danger to self; mental illness expected to respond to inpatient care
Danger to self; mental illness expected to respond to inpatient care
needs some assist with ADL, paranoid/Danger to self; mental illness expected to respond to inpatient care
Danger to self; mental illness expected to respond to inpatient care/needs some assist with ADL, paranoid
needs some assist with ADL, paranoid/Danger to self; mental illness expected to respond to inpatient care
needs some assist with ADL, paranoid/Danger to self; mental illness expected to respond to inpatient care
Danger to self; mental illness expected to respond to inpatient care
Danger to self; mental illness expected to respond to inpatient care/needs some assist with ADL, paranoid
needs some assist with ADL, paranoid/Danger to self; mental illness expected to respond to inpatient care

## 2018-08-17 NOTE — PROGRESS NOTE BEHAVIORAL HEALTH - AXIS III
Early Onset Parkinson’s Disease,  severe/advanced-stage; hx of well-controlled chronic, diastolic heart failure history, asymptomatic now, ; herniated discs
Early Onset Parkinson’s Disease,  severe/advanced-stage; hx of well-controlled chronic, diastolic heart failure; herniated discs
Early Onset Parkinson’s Disease,  severe/advanced-stage; hx of well-controlled chronic, diastolic heart failure history, asymptomatic now, ; herniated discs
Early Onset Parkinson’s Disease,  severe/advanced-stage; hx of well-controlled chronic, diastolic heart failure; herniated discs
Early Onset Parkinson’s Disease,  severe/advanced-stage; hx of well-controlled chronic, diastolic heart failure history, asymptomatic now, ; herniated discs
Early Onset Parkinson’s Disease,  severe/advanced-stage; hx of well-controlled chronic, diastolic heart failure; herniated discs
Early Onset Parkinson’s Disease,  severe/advanced-stage; hx of well-controlled chronic, diastolic heart failure history, asymptomatic now, ; herniated discs
Early Onset Parkinson’s Disease,  severe/advanced-stage; hx of well-controlled chronic, diastolic heart failure; herniated discs
Early Onset Parkinson’s Disease,  severe/advanced-stage; hx of well-controlled chronic, diastolic heart failure history, asymptomatic now, ; herniated discs
Early Onset Parkinson’s Disease,  severe/advanced-stage; hx of well-controlled chronic, diastolic heart failure history, asymptomatic now, ; herniated discs
Early Onset Parkinson’s Disease,  severe/advanced-stage; hx of well-controlled chronic, diastolic heart failure; herniated discs
Early Onset Parkinson’s Disease,  severe/advanced-stage; hx of well-controlled chronic, diastolic heart failure; herniated discs
Early Onset Parkinson’s Disease,  severe/advanced-stage; hx of well-controlled chronic, diastolic heart failure history, asymptomatic now, ; herniated discs
Early Onset Parkinson’s Disease,  severe/advanced-stage; hx of well-controlled chronic, diastolic heart failure; herniated discs
Early Onset Parkinson’s Disease,  severe/advanced-stage; hx of well-controlled chronic, diastolic heart failure history, asymptomatic now, ; herniated discs

## 2018-08-17 NOTE — PROGRESS NOTE BEHAVIORAL HEALTH - AFFECT RANGE
Full
Other
Other
Constricted
Full
Other
Full
Other
Other
Full
Constricted/Other
Constricted/Other
Full
Full
Other
Full
Other
Full
Other
Other
Full
Full/Labile
Other
Full
Full
Other
Other
Full
Full
Other
Full
Other
Constricted
Other
Full
Other
Labile/Full
Full
Full
Constricted
Other/Constricted
Other
Constricted
Full
Constricted
Other
Full
Constricted/Other
Full
Full
Other
Other
Full
Full
Full/Labile
Other
Other/Constricted
Other/Constricted
Full
Labile/Full
Labile/Full
Constricted/Other
Full
Other
Constricted/Other
Constricted/Other
Full
Full/Labile
Labile/Full
Other
Full
Other
Other
Full
Full
Labile/Full
Other
Other/Constricted
Full
Other
Full
Other
Full
Other/Constricted
Full
Other
Full
Labile/Full
Other
Full
Other
Full
Full
Other
Constricted/Other
Full
Other
Full
Labile/Full
Full
Other/Constricted
Other
Full
Other
Constricted
Full
Labile/Full
Other
Full
Other
Full
Full
Other
Constricted/Other
Full
Full
Other
Full
Other
Full
Other
Other/Constricted
Full
Full
Other
Full/Labile
Full

## 2018-08-17 NOTE — PROGRESS NOTE BEHAVIORAL HEALTH - PERCEPTIONS
No abnormalities

## 2018-08-17 NOTE — PROGRESS NOTE BEHAVIORAL HEALTH - SECONDARY DX1
Personality disorder

## 2018-08-17 NOTE — PROGRESS NOTE BEHAVIORAL HEALTH - AFFECT QUALITY
Other
Irritable
Anxious/Euthymic/Other
Irritable
Euthymic
Irritable
Irritable
Other/Euthymic
Euthymic/Other
Other
Other
Irritable
Depressed
Euthymic/Other
Irritable
Other/Euthymic
Anxious
Euthymic/Irritable/Other
Irritable
Irritable
Other
Euthymic
Irritable
Other
Irritable
Other/Euthymic
Other
Irritable
Other/Euthymic
Irritable
Other
Irritable
Other
Euthymic
Other
Euthymic/Other
Irritable
Other
Euthymic
Irritable
Other
Other
Euthymic
Euthymic
Other
Other
Euthymic
Euthymic/Other
Irritable
Irritable
Other
Other/Euthymic
Irritable
Other
Euthymic
Other
Other/Euthymic
Euthymic
Euthymic/Other
Irritable
Other
Other
Other/Euthymic
Other/Euthymic
Depressed
Depressed
Euthymic
Euthymic
Euthymic/Irritable/Other
Irritable
Other
Other/Euthymic/Irritable
Other/Irritable
Euthymic/Other
Irritable
Irritable
Irritable/Other
Irritable/Other
Other
Other
Irritable
Irritable
Other
Depressed
Other
Euthymic/Other
Irritable
Euthymic
Depressed
Irritable
Euthymic
Euthymic
Other/Anxious
Euthymic
Irritable
Other
Other
Other/Euthymic
Euthymic
Irritable
Other
Euthymic
Other/Euthymic
Euthymic
Irritable
Other
Euthymic
Irritable
Other
Euthymic/Other
Other/Irritable
Euthymic/Other
Irritable
Other
Other
Euthymic/Other
Irritable
Other
Other/Euthymic
Irritable
Other
Irritable
Other
Irritable
Euthymic
Other
Other
Irritable
Other
Euthymic/Other/Anxious
Euthymic/Other/Irritable
Other/Euthymic
Euthymic
Irritable
Euthymic
Irritable
Other
Other/Euthymic
Irritable
Other
Euthymic
Other/Euthymic
Irritable
Other
Other
Euthymic
Other

## 2018-08-17 NOTE — PROGRESS NOTE BEHAVIORAL HEALTH - OTHER
anxious at times re: DC overall linear, at times episodes of being disorganized and circumstantial, concrete (chronic) but much better since initial admission paranoid at times, poor historian chronically impaired but much improved since admission Limited but has improved since admission + neck favoring one side, dressed in hospital gowns, adequate grooming and hygiene no psychomotor agitation at times (chronic and seemingly intentionaly as he can stop it when redirected). chronically limited but better since admission  more able to respond to redirection festinating gait but stable, using walker yelling at times

## 2018-08-17 NOTE — PROGRESS NOTE BEHAVIORAL HEALTH - AFFECT CONGRUENCE
Congruent

## 2018-08-17 NOTE — PROGRESS NOTE BEHAVIORAL HEALTH - NSBHADMITMEDEDU_PSY_A_CORE
yes...
no
yes...
yes...
no
yes...
no
yes...
no
yes...
no
no
yes...

## 2018-08-17 NOTE — PROGRESS NOTE BEHAVIORAL HEALTH - EYE CONTACT
Fair
Good
Fair
Good
Fair
Good
Fair
Fair
Good
Good
Fair
Good
Good
Fair
Fair
Good
Good
Fair
Fair
Good
Good
Fair
Good
Good
Fair
Good
Fair
Good
Good
Fair
Good
Fair
Good
Good
Fair
Good
Fair
Good
Fair
Good
Fair
Good
Good
Fair
Fair
Good
Fair
Good
Fair
Good
Fair
Good
Fair
Good
Fair
Good
Good
Fair
Fair
Good
Fair
Good
Fair
Good
Fair
Good
Fair
Good
Fair
Good
Fair
Fair
Good
Good
Fair
Good
Fair
Good
Fair
Good
Fair
Good
Fair
Good
Fair
Good
Fair
Good
Fair
Fair
Good
Good
Fair
Good

## 2018-08-17 NOTE — PROGRESS NOTE BEHAVIORAL HEALTH - NSBHADMITIPBHPROVIDER_PSY_A_CORE
N/A

## 2018-08-17 NOTE — PROGRESS NOTE BEHAVIORAL HEALTH - NSBHLOC_PSY_A_CORE
Alert

## 2018-08-17 NOTE — PROGRESS NOTE BEHAVIORAL HEALTH - HYGIENE
Other/Fair
Fair
Fair/Other
Other/Fair
Fair
Other/Fair
Fair/Other
Fair
Fair
Other/Fair
Other/Fair
Fair/Other
Fair
Fair/Other
Other/Fair
Fair
Fair/Other
Other/Fair
Fair
Other/Fair
Other/Fair
Fair
Other/Fair
Fair
Fair
Fair/Other
Fair
Fair/Other
Other/Fair
Other/Fair
Fair
Fair/Other
Other/Fair
Fair
Fair/Other
Other/Fair
Other/Fair
Fair
Fair/Other
Fair/Other
Other/Fair
Fair
Fair/Other
Other/Fair
Fair
Fair/Other
Other/Fair
Fair
Fair
Fair/Other
Other/Fair
Fair
Fair/Other
Fair/Other
Fair
Fair
Other/Fair
Fair
Fair
Other/Fair
Fair/Other
Fair
Fair/Other
Fair
Fair/Other
Other/Fair
Fair
Other/Fair
Fair
Other/Fair
Fair
Fair
Fair/Other
Other/Fair
Fair
Fair
Other/Fair
Other/Fair
Fair
Fair/Other
Other/Fair
Fair
Fair/Other
Other/Fair
Other/Fair
Fair
Fair
Other/Fair
Other/Fair
Fair
Fair/Other
Fair
Other/Fair
Other/Fair
Fair/Other
Fair
Other/Fair
Fair/Other
Fair
Fair
Fair/Other
Other/Fair
Fair
Fair
Fair/Other
Other/Fair
Other/Fair
Fair
Fair
Fair/Other
Fair
Fair
Other/Fair
Fair
Fair/Other
Fair
Fair/Other

## 2018-08-17 NOTE — PROGRESS NOTE BEHAVIORAL HEALTH - NSBHADMITIPOBSFT_PSY_A_CORE
routine checks
Able to verbalize concerns.
routine checks
Able to verbalize concerns.
routine checks
not a danger to self or others
routine checks
not a danger to self or to others at this time
not a danger to self or others
routine checks
routine checks
Able to verbalize concerns.
routine checks
routine checks appropriate
routine checks
Able to verbalize concerns.
routine checks
Able to verbalize concerns.
routine checks

## 2018-08-17 NOTE — PROGRESS NOTE BEHAVIORAL HEALTH - ORIENTED TO SITUATION
Yes
Other
Yes
Other
Yes
Other
Yes
Other
Yes
Other
Yes
Other
Yes
Other
Yes
Other
Yes
Other
Yes

## 2018-08-17 NOTE — PROGRESS NOTE BEHAVIORAL HEALTH - [facility] AXIS I SECONDARY1 CODE FT
F60.9

## 2018-08-17 NOTE — PROGRESS NOTE BEHAVIORAL HEALTH - BEHAVIOR
Other
Cooperative
Cooperative
Cooperative/Other
Other
Other/Uncooperative
Cooperative
Other
Cooperative
Other
Cooperative
Other
Cooperative
Hostile/Other
Other
Other
Cooperative
Cooperative/Hostile/Other
Cooperative
Other
Other/Uncooperative
Cooperative
Other
Cooperative
Other
Other/Cooperative
Cooperative
Other
Uncooperative/Other
Cooperative
Cooperative
Cooperative/Other
Cooperative/Other
Hostile/Cooperative/Other
Other
Cooperative
Cooperative
Other
Cooperative
Other
Cooperative
Cooperative/Other
Hostile/Other/Cooperative
Other
Other/Cooperative
Cooperative
Cooperative/Hostile/Other
Cooperative/Other
Other
Other/Cooperative
Cooperative
Cooperative
Other
Other
Other/Uncooperative
Uncooperative/Other
Cooperative
Cooperative/Other
Other
Other/Uncooperative
Cooperative
Cooperative
Other
Cooperative
Cooperative
Other
Cooperative
Other
Other
Other/Cooperative
Hostile/Cooperative/Other
Cooperative
Uncooperative
Cooperative
Other
Cooperative
Hostile/Cooperative/Other
Other
Other
Hostile/Other
Cooperative
Other
Cooperative
Cooperative
Other/Uncooperative
Cooperative
Cooperative
Other/Uncooperative
Cooperative/Other
Cooperative
Other
Cooperative
Cooperative/Hostile/Other
Other
Cooperative
Uncooperative/Other
Cooperative
Other
Cooperative
Other
Cooperative
Other
Cooperative
Cooperative/Other
Other
Other
Cooperative
Other
Other
Cooperative
Uncooperative/Other
Other
Other/Cooperative

## 2018-08-17 NOTE — PROGRESS NOTE BEHAVIORAL HEALTH - BODY HABITUS
Well nourished/Overweight
Overweight/Well nourished
Overweight/Well nourished
Well nourished/Overweight
Well nourished/Overweight
Overweight/Well nourished
Overweight/Well nourished
Well nourished
Well nourished/Overweight
Overweight/Well nourished
Well nourished/Overweight
Overweight/Well nourished
Well nourished
Well nourished
Well nourished/Overweight
Overweight/Well nourished
Well nourished/Overweight
Overweight/Well nourished
Well nourished
Well nourished
Well nourished/Overweight
Overweight/Well nourished
Overweight/Well nourished
Well nourished/Overweight
Overweight/Well nourished
Well nourished/Overweight
Well nourished/Overweight
Well nourished
Well nourished
Well nourished/Overweight
Well nourished/Overweight
Overweight/Well nourished
Well nourished
Well nourished/Overweight
Overweight/Well nourished
Well nourished
Well nourished/Overweight
Well nourished/Overweight
Overweight/Well nourished
Overweight/Well nourished
Well nourished
Well nourished
Well nourished/Overweight
Overweight/Well nourished
Well nourished
Well nourished/Overweight
Overweight/Well nourished
Well nourished/Overweight
Overweight/Well nourished
Well nourished
Well nourished/Overweight
Overweight/Well nourished
Well nourished/Overweight
Overweight/Well nourished
Well nourished/Overweight
Overweight/Well nourished
Well nourished/Overweight
Overweight/Well nourished
Well nourished/Overweight
Well nourished/Overweight
Overweight/Well nourished
Overweight/Well nourished
Well nourished/Overweight
Overweight/Well nourished
Overweight/Well nourished
Well nourished/Overweight
Overweight/Well nourished
Well nourished/Overweight
Overweight/Well nourished
Well nourished
Well nourished/Overweight
Well nourished
Well nourished/Overweight
Overweight/Well nourished
Overweight/Well nourished
Well nourished
Well nourished/Overweight
Well nourished
Overweight/Well nourished
Well nourished/Overweight
Well nourished/Overweight
Overweight/Well nourished
Overweight/Well nourished
Well nourished
Well nourished
Well nourished/Overweight
Overweight/Well nourished
Overweight/Well nourished
Well nourished/Overweight
Overweight/Well nourished
Well nourished/Overweight
Well nourished/Overweight
Overweight/Well nourished
Well nourished
Well nourished/Overweight
Overweight/Well nourished
Well nourished
Well nourished/Overweight
Well nourished/Overweight
Well nourished
Well nourished
Overweight/Well nourished
Well nourished/Overweight
Well nourished/Overweight
Well nourished
Well nourished/Overweight
Well nourished/Overweight
Overweight/Well nourished
Well nourished/Overweight

## 2018-08-18 ENCOUNTER — INPATIENT (INPATIENT)
Facility: HOSPITAL | Age: 53
LOS: 5 days | DRG: 207 | End: 2018-08-24
Attending: HOSPITALIST | Admitting: HOSPITALIST
Payer: MEDICARE

## 2018-08-18 VITALS
RESPIRATION RATE: 20 BRPM | DIASTOLIC BLOOD PRESSURE: 56 MMHG | HEART RATE: 114 BPM | OXYGEN SATURATION: 100 % | SYSTOLIC BLOOD PRESSURE: 123 MMHG

## 2018-08-18 VITALS
DIASTOLIC BLOOD PRESSURE: 47 MMHG | HEART RATE: 111 BPM | RESPIRATION RATE: 15 BRPM | OXYGEN SATURATION: 100 % | SYSTOLIC BLOOD PRESSURE: 107 MMHG

## 2018-08-18 DIAGNOSIS — I46.9 CARDIAC ARREST, CAUSE UNSPECIFIED: ICD-10-CM

## 2018-08-18 LAB
ALBUMIN SERPL ELPH-MCNC: 3.4 G/DL — SIGNIFICANT CHANGE UP (ref 3.3–5)
ALP SERPL-CCNC: 84 U/L — SIGNIFICANT CHANGE UP (ref 30–120)
ALT FLD-CCNC: 84 U/L DA — HIGH (ref 10–60)
ANION GAP SERPL CALC-SCNC: 30 MMOL/L — HIGH (ref 5–17)
BASE EXCESS BLDA CALC-SCNC: -24.3 MMOL/L — LOW (ref -2–2)
BILIRUB SERPL-MCNC: 0.6 MG/DL — SIGNIFICANT CHANGE UP (ref 0.2–1.2)
BLOOD GAS SOURCE: SIGNIFICANT CHANGE UP
BUN SERPL-MCNC: 29 MG/DL — HIGH (ref 7–23)
CALCIUM SERPL-MCNC: 11.8 MG/DL — HIGH (ref 8.4–10.5)
CHLORIDE SERPL-SCNC: 104 MMOL/L — SIGNIFICANT CHANGE UP (ref 96–108)
CO2 SERPL-SCNC: 10 MMOL/L — CRITICAL LOW (ref 22–31)
CREAT SERPL-MCNC: 2.73 MG/DL — HIGH (ref 0.5–1.3)
D DIMER BLD IA.RAPID-MCNC: 2696 NG/ML DDU — HIGH
GLUCOSE SERPL-MCNC: 53 MG/DL — LOW (ref 70–99)
HCO3 BLDA-SCNC: 6 MMOL/L — LOW (ref 21–29)
HCT VFR BLD CALC: 41.5 % — SIGNIFICANT CHANGE UP (ref 39–50)
HGB BLD-MCNC: 12.1 G/DL — LOW (ref 13–17)
HOROWITZ INDEX BLDA+IHG-RTO: 32 — SIGNIFICANT CHANGE UP
MAGNESIUM SERPL-MCNC: 3.5 MG/DL — HIGH (ref 1.6–2.6)
MCHC RBC-ENTMCNC: 29.2 GM/DL — LOW (ref 32–36)
MCHC RBC-ENTMCNC: 31.3 PG — SIGNIFICANT CHANGE UP (ref 27–34)
MCV RBC AUTO: 107.5 FL — HIGH (ref 80–100)
NRBC # BLD: 0 /100 WBCS — SIGNIFICANT CHANGE UP (ref 0–0)
PCO2 BLDA: 52 MMHG — HIGH (ref 32–46)
PH BLD: 6.78 — CRITICAL LOW (ref 7.35–7.45)
PHOSPHATE SERPL-MCNC: 11.7 MG/DL — HIGH (ref 2.5–4.5)
PLATELET # BLD AUTO: 337 K/UL — SIGNIFICANT CHANGE UP (ref 150–400)
PO2 BLDA: 247 MMHG — HIGH (ref 74–108)
POTASSIUM SERPL-MCNC: 5.5 MMOL/L — HIGH (ref 3.5–5.3)
POTASSIUM SERPL-SCNC: 5.5 MMOL/L — HIGH (ref 3.5–5.3)
PROT SERPL-MCNC: 6.2 G/DL — SIGNIFICANT CHANGE UP (ref 6–8.3)
RBC # BLD: 3.86 M/UL — LOW (ref 4.2–5.8)
RBC # FLD: 14 % — SIGNIFICANT CHANGE UP (ref 10.3–14.5)
SAO2 % BLDA: 97 % — HIGH (ref 92–96)
SODIUM SERPL-SCNC: 144 MMOL/L — SIGNIFICANT CHANGE UP (ref 135–145)
TROPONIN I SERPL-MCNC: 0.03 NG/ML — SIGNIFICANT CHANGE UP (ref 0.02–0.06)
WBC # BLD: 15.54 K/UL — HIGH (ref 3.8–10.5)
WBC # FLD AUTO: 15.54 K/UL — HIGH (ref 3.8–10.5)

## 2018-08-18 PROCEDURE — 73130 X-RAY EXAM OF HAND: CPT

## 2018-08-18 PROCEDURE — 71045 X-RAY EXAM CHEST 1 VIEW: CPT | Mod: 26

## 2018-08-18 PROCEDURE — 81001 URINALYSIS AUTO W/SCOPE: CPT

## 2018-08-18 PROCEDURE — 84436 ASSAY OF TOTAL THYROXINE: CPT

## 2018-08-18 PROCEDURE — 84100 ASSAY OF PHOSPHORUS: CPT

## 2018-08-18 PROCEDURE — 86803 HEPATITIS C AB TEST: CPT

## 2018-08-18 PROCEDURE — 97161 PT EVAL LOW COMPLEX 20 MIN: CPT

## 2018-08-18 PROCEDURE — 82550 ASSAY OF CK (CPK): CPT

## 2018-08-18 PROCEDURE — 72040 X-RAY EXAM NECK SPINE 2-3 VW: CPT

## 2018-08-18 PROCEDURE — 82553 CREATINE MB FRACTION: CPT

## 2018-08-18 PROCEDURE — 86780 TREPONEMA PALLIDUM: CPT

## 2018-08-18 PROCEDURE — G8978: CPT

## 2018-08-18 PROCEDURE — G8980: CPT

## 2018-08-18 PROCEDURE — 36415 COLL VENOUS BLD VENIPUNCTURE: CPT

## 2018-08-18 PROCEDURE — 97116 GAIT TRAINING THERAPY: CPT

## 2018-08-18 PROCEDURE — 87340 HEPATITIS B SURFACE AG IA: CPT

## 2018-08-18 PROCEDURE — 84443 ASSAY THYROID STIM HORMONE: CPT

## 2018-08-18 PROCEDURE — 83880 ASSAY OF NATRIURETIC PEPTIDE: CPT

## 2018-08-18 PROCEDURE — 97164 PT RE-EVAL EST PLAN CARE: CPT

## 2018-08-18 PROCEDURE — 71045 X-RAY EXAM CHEST 1 VIEW: CPT

## 2018-08-18 PROCEDURE — 97162 PT EVAL MOD COMPLEX 30 MIN: CPT

## 2018-08-18 PROCEDURE — 87086 URINE CULTURE/COLONY COUNT: CPT

## 2018-08-18 PROCEDURE — 84484 ASSAY OF TROPONIN QUANT: CPT

## 2018-08-18 PROCEDURE — 83036 HEMOGLOBIN GLYCOSYLATED A1C: CPT

## 2018-08-18 PROCEDURE — 97110 THERAPEUTIC EXERCISES: CPT

## 2018-08-18 PROCEDURE — 82803 BLOOD GASES ANY COMBINATION: CPT

## 2018-08-18 PROCEDURE — 93005 ELECTROCARDIOGRAM TRACING: CPT

## 2018-08-18 PROCEDURE — G8979: CPT

## 2018-08-18 PROCEDURE — 93010 ELECTROCARDIOGRAM REPORT: CPT

## 2018-08-18 PROCEDURE — 93970 EXTREMITY STUDY: CPT

## 2018-08-18 PROCEDURE — 80048 BASIC METABOLIC PNL TOTAL CA: CPT

## 2018-08-18 PROCEDURE — 80061 LIPID PANEL: CPT

## 2018-08-18 PROCEDURE — 97530 THERAPEUTIC ACTIVITIES: CPT

## 2018-08-18 PROCEDURE — 83735 ASSAY OF MAGNESIUM: CPT

## 2018-08-18 PROCEDURE — 84480 ASSAY TRIIODOTHYRONINE (T3): CPT

## 2018-08-18 PROCEDURE — 86706 HEP B SURFACE ANTIBODY: CPT

## 2018-08-18 PROCEDURE — 85027 COMPLETE CBC AUTOMATED: CPT

## 2018-08-18 PROCEDURE — 99223 1ST HOSP IP/OBS HIGH 75: CPT | Mod: AI

## 2018-08-18 PROCEDURE — 85379 FIBRIN DEGRADATION QUANT: CPT

## 2018-08-18 PROCEDURE — 94640 AIRWAY INHALATION TREATMENT: CPT

## 2018-08-18 PROCEDURE — 80053 COMPREHEN METABOLIC PANEL: CPT

## 2018-08-18 PROCEDURE — 87389 HIV-1 AG W/HIV-1&-2 AB AG IA: CPT

## 2018-08-18 RX ORDER — SODIUM CHLORIDE 9 MG/ML
200 INJECTION INTRAMUSCULAR; INTRAVENOUS; SUBCUTANEOUS ONCE
Qty: 0 | Refills: 0 | Status: DISCONTINUED | OUTPATIENT
Start: 2018-08-18 | End: 2018-08-18

## 2018-08-18 RX ORDER — SODIUM CHLORIDE 9 MG/ML
2000 INJECTION INTRAMUSCULAR; INTRAVENOUS; SUBCUTANEOUS ONCE
Qty: 0 | Refills: 0 | Status: COMPLETED | OUTPATIENT
Start: 2018-08-18 | End: 2018-08-19

## 2018-08-18 RX ORDER — HEPARIN SODIUM 5000 [USP'U]/ML
7500 INJECTION INTRAVENOUS; SUBCUTANEOUS EVERY 6 HOURS
Qty: 0 | Refills: 0 | Status: DISCONTINUED | OUTPATIENT
Start: 2018-08-18 | End: 2018-08-21

## 2018-08-18 RX ORDER — ENOXAPARIN SODIUM 100 MG/ML
40 INJECTION SUBCUTANEOUS EVERY 24 HOURS
Qty: 0 | Refills: 0 | Status: DISCONTINUED | OUTPATIENT
Start: 2018-08-18 | End: 2018-08-18

## 2018-08-18 RX ORDER — OLANZAPINE 15 MG/1
1 TABLET, FILM COATED ORAL
Qty: 0 | Refills: 0 | COMMUNITY

## 2018-08-18 RX ORDER — CARBIDOPA, LEVODOPA, AND ENTACAPONE 50; 200; 200 MG/1; MG/1; MG/1
1 TABLET, FILM COATED ORAL
Qty: 0 | Refills: 0 | COMMUNITY

## 2018-08-18 RX ORDER — HEPARIN SODIUM 5000 [USP'U]/ML
INJECTION INTRAVENOUS; SUBCUTANEOUS
Qty: 25000 | Refills: 0 | Status: DISCONTINUED | OUTPATIENT
Start: 2018-08-18 | End: 2018-08-21

## 2018-08-18 RX ORDER — CHLORHEXIDINE GLUCONATE 213 G/1000ML
15 SOLUTION TOPICAL
Qty: 0 | Refills: 0 | Status: DISCONTINUED | OUTPATIENT
Start: 2018-08-18 | End: 2018-08-21

## 2018-08-18 RX ORDER — CYCLOBENZAPRINE HYDROCHLORIDE 10 MG/1
1 TABLET, FILM COATED ORAL
Qty: 0 | Refills: 0 | COMMUNITY

## 2018-08-18 RX ORDER — PANTOPRAZOLE SODIUM 20 MG/1
40 TABLET, DELAYED RELEASE ORAL DAILY
Qty: 0 | Refills: 0 | Status: DISCONTINUED | OUTPATIENT
Start: 2018-08-18 | End: 2018-08-21

## 2018-08-18 RX ORDER — HEPARIN SODIUM 5000 [USP'U]/ML
3500 INJECTION INTRAVENOUS; SUBCUTANEOUS EVERY 6 HOURS
Qty: 0 | Refills: 0 | Status: DISCONTINUED | OUTPATIENT
Start: 2018-08-18 | End: 2018-08-21

## 2018-08-18 RX ORDER — HEPARIN SODIUM 5000 [USP'U]/ML
7500 INJECTION INTRAVENOUS; SUBCUTANEOUS ONCE
Qty: 0 | Refills: 0 | Status: COMPLETED | OUTPATIENT
Start: 2018-08-18 | End: 2018-08-19

## 2018-08-18 RX ORDER — NOREPINEPHRINE BITARTRATE/D5W 8 MG/250ML
0.05 PLASTIC BAG, INJECTION (ML) INTRAVENOUS
Qty: 8 | Refills: 0 | Status: DISCONTINUED | OUTPATIENT
Start: 2018-08-18 | End: 2018-08-18

## 2018-08-18 RX ORDER — HALOPERIDOL DECANOATE 100 MG/ML
1 INJECTION INTRAMUSCULAR
Qty: 0 | Refills: 0 | COMMUNITY

## 2018-08-18 RX ORDER — CARBIDOPA AND LEVODOPA 25; 100 MG/1; MG/1
1 TABLET ORAL
Qty: 0 | Refills: 0 | COMMUNITY

## 2018-08-18 RX ORDER — PIPERACILLIN AND TAZOBACTAM 4; .5 G/20ML; G/20ML
3.38 INJECTION, POWDER, LYOPHILIZED, FOR SOLUTION INTRAVENOUS ONCE
Qty: 0 | Refills: 0 | Status: COMPLETED | OUTPATIENT
Start: 2018-08-18 | End: 2018-08-19

## 2018-08-18 RX ORDER — PIPERACILLIN AND TAZOBACTAM 4; .5 G/20ML; G/20ML
3.38 INJECTION, POWDER, LYOPHILIZED, FOR SOLUTION INTRAVENOUS EVERY 8 HOURS
Qty: 0 | Refills: 0 | Status: DISCONTINUED | OUTPATIENT
Start: 2018-08-18 | End: 2018-08-21

## 2018-08-18 RX ADMIN — CARBIDOPA, LEVODOPA, AND ENTACAPONE 1 TABLET(S): 50; 200; 200 TABLET, FILM COATED ORAL at 09:42

## 2018-08-18 RX ADMIN — CARBIDOPA AND LEVODOPA 1 TABLET(S): 25; 100 TABLET ORAL at 17:38

## 2018-08-18 RX ADMIN — CARBIDOPA AND LEVODOPA 1 TABLET(S): 25; 100 TABLET ORAL at 19:44

## 2018-08-18 RX ADMIN — Medication 1 APPLICATION(S): at 13:02

## 2018-08-18 RX ADMIN — CARBIDOPA, LEVODOPA, AND ENTACAPONE 1 TABLET(S): 50; 200; 200 TABLET, FILM COATED ORAL at 14:39

## 2018-08-18 RX ADMIN — CYCLOBENZAPRINE HYDROCHLORIDE 10 MILLIGRAM(S): 10 TABLET, FILM COATED ORAL at 12:30

## 2018-08-18 RX ADMIN — CARBIDOPA AND LEVODOPA 1 TABLET(S): 25; 100 TABLET ORAL at 06:01

## 2018-08-18 RX ADMIN — Medication 2 MILLIGRAM(S): at 12:33

## 2018-08-18 RX ADMIN — Medication 325 MILLIGRAM(S): at 12:30

## 2018-08-18 RX ADMIN — CARBIDOPA AND LEVODOPA 1 TABLET(S): 25; 100 TABLET ORAL at 14:34

## 2018-08-18 RX ADMIN — CARBIDOPA AND LEVODOPA 1 TABLET(S): 25; 100 TABLET ORAL at 09:43

## 2018-08-18 RX ADMIN — Medication 2 MILLIGRAM(S): at 21:25

## 2018-08-18 RX ADMIN — CARBIDOPA, LEVODOPA, AND ENTACAPONE 1 TABLET(S): 50; 200; 200 TABLET, FILM COATED ORAL at 06:01

## 2018-08-18 NOTE — CHART NOTE - NSCHARTNOTESELECT_GEN_ALL_CORE
Event Note

## 2018-08-18 NOTE — CHART NOTE - NSCHARTNOTEFT_GEN_A_CORE
-arrived to overhead call of rapid response which turned into a code blue. patient was admitted to in patient psych center attached to [facility].    -on arrival found CPR/in progress. As patient was in a inpatient [facility] facility was not on continuous moniotr and had no IV access.   -on arrival intubated patient with glidescope, and then attempted IO access which was unsuccessful, right femoral TLC placed emergently, RN was also able to obtain PIV  -Code blue called around 930pm. patient noted to be in asytole on monitor. Total of 4-5 epi given during code. calcium and bicarb also given.  -ROSC was achived after royghly 25-30 minutes of CPR.  -post ROSC patient was noted to be in afib with RVR, given 150mg of amiodarone and returned to NSR rate controlled    CXR confirmed placement of ETT above chinmay, patient then transferred to SPCU.    -on SPCU arrival, left femoral TLC placed, and right femoral TLC placed during code was converted to a femoral line as a VBG drawn from line confirmed arterial placement.  -also, on SPCU arrival, patients neurologic exam showed non-respnsive state, no response to voice or noxious stimuli, no pupillary response to light, no corneal reflex, no cough or gag reflex, and is not overbreathing vent at the current time.       -At this time it is unclear what precipitated this event. Psych RN reports patient took a shower s usual, was agitated, re-located to the day room, remains agitated, and was taken back to his room. Nurse left room, then aid went to checka  set of vitals and found patient un-responsive in his bed. CODE called.    -post ROSC< EKG without ST elevations. D-dimer is elevated, but un-clear if this is indicative of DVT/PE or just secondary to prolonged hospitalization.  -after discussion with eICU attending, will start patient on modified hypothermia protocol, keep patient body temperature 35-36 celcius, avoid fever, blood sugar control <180  -also, will initiate a heparin gtt for full dose A/C, as two high DDX are PE/MI/NSTEMI.  -psych staff stated that he did not receive any new meds, or higher doses than usual. Was receiving haldol and ativan as needed for agitation.       PLAN:  1. -Patient currently on Full vent support  -titrate settings to maintain SaO2 >90%, or pH >7.25  -consider low tidal volume ventilation strategy w/ goal Tv 4-6 cc/kg of ideal body weight  -plateu pressure goal <30  -Peridex oral care  -aggressive pulmonary toilet  -if/when mental status improves begin SBTs    2. Start on zosyn as patient had lots of secretions in airway during intubation. WIll cover with broad spectrum anbx in case of aspiration event.    3. given poor mental status post ROSC, start on modified hypothermia protocol, EEG/Head CT/neuro consult. Blood sugar <180  -trend cardiac enzymes, send CPK.    4. Shock state: remains on levophed will continue titrating for a MAP of 65-70. Will start heparin drip with full anticoagulation nomogram, when stable send for CTA chest. Will continue with aggressive fluid resuscitation.    admitting hospitalsit has spoken with family, awaiting arrival at hospital and will update on patient's status at that time.         CRITICAL CARE TIME SPENT:  55 minutes of critical care time spent providing medical care for patient's acute illness/conditions that impairs at least one vital organ system and/or poses a high risk of imminent or life threatening deterioration in the patient's condition. It includes time spent evaluating and treating the patient's acute illness as well as time spent reviewing labs, radiology, discussing goals of care with patient and/or patient's family, and discussing the case with a multidisciplinary team, including the eICU, in an effort to prevent further life threatening deterioration or end organ damage. This time is independent of any procedures performed.

## 2018-08-18 NOTE — H&P ADULT - NSHPPHYSICALEXAM_GEN_ALL_CORE
BP  118/57    RR 27, O2 100%   GENERAL:     overweight male, unresponsive  HEAD:     atraumatic, normocephalic  EYES:     nonreactive  RESPIRATORY:     +vented breath sounds  CARDIOVASCULAR:     +tachycardic, loud s1 and s2  GASTROINTESTINAL:     soft, slightly distended, hypoactive BS  EXTREMITIES:     no clubbing or cyanosis or edema  PSYCH:     unresponsive

## 2018-08-18 NOTE — H&P ADULT - PROBLEM SELECTOR PLAN 6
IMPROVE VTE Individual Risk Assessment          RISK                                                          Points  [  ] Previous VTE                                                 3  [  ] Thrombophilia                                              2  [  ] Lower limb paralysis                                    2        (unable to hold up >15 seconds)    [  ] Current Cancer                                             2         (within 6 months)  [  ] Immobilization > 24 hrs                              1  [  ] ICU/CCU stay > 24 hours                            1  [  ] Age > 60                                                        1    IMPROVE VTE Score  0    - will be on heparin gtt for possible PE

## 2018-08-18 NOTE — H&P ADULT - ASSESSMENT
52M with long history of Parkinson's disease (has been a resident of [facility] for >1 year - [facility] with difficulty placing patient), reported hx of diastolic heart failure, herniated discs who was found unresponsive tonight.   Unclear of etiology, possible PE +/- MI.

## 2018-08-18 NOTE — PROCEDURE NOTE - NSSITEPREP_SKIN_A_CORE
chlorhexidine/Adherence to aseptic technique: hand hygiene prior to donning barriers (gown, gloves), don cap and mask, sterile drape over patient
chlorhexidine/Adherence to aseptic technique: hand hygiene prior to donning barriers (gown, gloves), don cap and mask, sterile drape over patient
chlorhexidine

## 2018-08-18 NOTE — H&P ADULT - NSHPLABSRESULTS_GEN_ALL_CORE
LABS:                        12.1<L>  15.54<H> )-----------( 337      ( 18 Aug 2018 22:12 )             41.5     144    |  104    |  29<H>  ----------------------------<  53<L>    18 Aug 2018 22:12  5.5<H>   |  10<LL>  |  2.73<H>    Ca 11.8<H>         18 Aug 2018 22:12    Phos  11.7<H>     18 Aug 2018 22:12    Mg 3.5<H>     18 Aug 2018 22:12    TPro  6.2    /  Alb  3.4    /  TBili  0.6    /  DBili  x      /  AST  x      /  ALT  84<H>  /  AlkPhos  84     18 Aug 2018 22:12    d-dimer 2696    Troponin trend:  .028  08-18 @ 22:12    EKG:  sinus tach with normal intervals

## 2018-08-18 NOTE — H&P ADULT - PROBLEM SELECTOR PLAN 1
- admitted to SPCU  - pulm/cc consult   - continue with pressors (keep MAP >60)  -attempt to call family/HCP (multiple attempts have failed, contacted both Erma @ [phone]) who is the HCP and Jayshree who is the daughter)  - will initiate hypothermia protocol  - will empirically start heparin for possible PE given significant elevation of d-dimer  - trend cardiac enzymes  - trend lactate  - maintain vent support

## 2018-08-18 NOTE — H&P ADULT - HISTORY OF PRESENT ILLNESS
52M with long history of Parkinson's disease (has been a resident of  for >1 year) with multiple admiss 52M with long history of Parkinson's disease (has been a resident of [facility] for >1 year - [facility] with difficulty placing patient), reported hx of diastolic heart failure, herniated discs who was found unresponsive tonight.  Reportedly, RN said that the patient went to take a shower around 2000 and afterwards patient started to become agitated.  Patient does have a history of agitation and flares his arms and legs often.  Patient was able to calm down and was allowed to go to the day room.  Patient again started to flare his arms and legs and even slid down to the floor.  Patient was then directed back to his room where he continued to be agitated.  Patient was then given ativan 2mg PO for agitation and then nurse was able to instruct the patient to calm down as well.  Nurse stepped outside the room and told the aide to get vitals when the patient was found to be unresponsive all of sudden.  A CODE BLUE was activated at 2132.      During the CODE BLUE, patient was found to be unresponsive.  CPR was already initiated.  Initial rhythm on monitor was asystole.  A total of 4 rounds of epinephrine was given with 2 given peripherally and 2 via femoral central line and a dose of bicarb and calcium chloride was also given in between (see code sheet).  Patient was successfully intubated and patient eventually had ROSC at 2144 with BP 96/39 with HR of 185.  Initial ABG was 6.78/52/247.  Given his tachycardia, patient was also given amio 150mg.  Patient's HR decreased to 70-80s but BP eventually dropped to SBP 40/20s.  Patient was started on levophed with improvement SBP to 90s and HR increased to 110s.  Multiple attempts to contact family member was unsuccessful.  Patient was transferred/admitted to SPCU for further management

## 2018-08-19 DIAGNOSIS — I95.9 HYPOTENSION, UNSPECIFIED: ICD-10-CM

## 2018-08-19 DIAGNOSIS — M62.82 RHABDOMYOLYSIS: ICD-10-CM

## 2018-08-19 DIAGNOSIS — Z29.9 ENCOUNTER FOR PROPHYLACTIC MEASURES, UNSPECIFIED: ICD-10-CM

## 2018-08-19 DIAGNOSIS — I50.9 HEART FAILURE, UNSPECIFIED: ICD-10-CM

## 2018-08-19 DIAGNOSIS — G20 PARKINSON'S DISEASE: ICD-10-CM

## 2018-08-19 DIAGNOSIS — R74.8 ABNORMAL LEVELS OF OTHER SERUM ENZYMES: ICD-10-CM

## 2018-08-19 DIAGNOSIS — N17.9 ACUTE KIDNEY FAILURE, UNSPECIFIED: ICD-10-CM

## 2018-08-19 DIAGNOSIS — I46.9 CARDIAC ARREST, CAUSE UNSPECIFIED: ICD-10-CM

## 2018-08-19 DIAGNOSIS — E87.2 ACIDOSIS: ICD-10-CM

## 2018-08-19 DIAGNOSIS — E83.51 HYPOCALCEMIA: ICD-10-CM

## 2018-08-19 DIAGNOSIS — R94.5 ABNORMAL RESULTS OF LIVER FUNCTION STUDIES: ICD-10-CM

## 2018-08-19 PROBLEM — M50.20 OTHER CERVICAL DISC DISPLACEMENT, UNSPECIFIED CERVICAL REGION: Chronic | Status: ACTIVE | Noted: 2017-05-12

## 2018-08-19 LAB
ALBUMIN SERPL ELPH-MCNC: 2.8 G/DL — LOW (ref 3.3–5)
ALBUMIN SERPL ELPH-MCNC: 3.4 G/DL — SIGNIFICANT CHANGE UP (ref 3.3–5)
ALP SERPL-CCNC: 77 U/L — SIGNIFICANT CHANGE UP (ref 30–120)
ALP SERPL-CCNC: 96 U/L — SIGNIFICANT CHANGE UP (ref 30–120)
ALT FLD-CCNC: 123 U/L DA — HIGH (ref 10–60)
ALT FLD-CCNC: 157 U/L DA — HIGH (ref 10–60)
ANION GAP SERPL CALC-SCNC: 14 MMOL/L — SIGNIFICANT CHANGE UP (ref 5–17)
ANION GAP SERPL CALC-SCNC: 17 MMOL/L — SIGNIFICANT CHANGE UP (ref 5–17)
ANION GAP SERPL CALC-SCNC: 20 MMOL/L — HIGH (ref 5–17)
ANION GAP SERPL CALC-SCNC: 24 MMOL/L — HIGH (ref 5–17)
APTT BLD: 103.1 SEC — HIGH (ref 27.5–37.4)
APTT BLD: 114.4 SEC — HIGH (ref 27.5–37.4)
APTT BLD: 115.3 SEC — HIGH (ref 27.5–37.4)
APTT BLD: 30.5 SEC — SIGNIFICANT CHANGE UP (ref 27.5–37.4)
AST SERPL-CCNC: 1151 U/L — HIGH (ref 10–40)
AST SERPL-CCNC: 2185 U/L — HIGH (ref 10–40)
AST SERPL-CCNC: 694 U/L — HIGH (ref 10–40)
BASE EXCESS BLDA CALC-SCNC: -12.6 MMOL/L — LOW (ref -2–2)
BASE EXCESS BLDA CALC-SCNC: -9.2 MMOL/L — LOW (ref -2–2)
BASE EXCESS BLDA CALC-SCNC: -9.7 MMOL/L — LOW (ref -2–2)
BASE EXCESS BLDA CALC-SCNC: -9.8 MMOL/L — LOW (ref -2–2)
BASOPHILS # BLD AUTO: 0 K/UL — SIGNIFICANT CHANGE UP (ref 0–0.2)
BASOPHILS NFR BLD AUTO: 0 % — SIGNIFICANT CHANGE UP (ref 0–2)
BILIRUB SERPL-MCNC: 0.5 MG/DL — SIGNIFICANT CHANGE UP (ref 0.2–1.2)
BILIRUB SERPL-MCNC: 0.6 MG/DL — SIGNIFICANT CHANGE UP (ref 0.2–1.2)
BLOOD GAS COMMENTS: SIGNIFICANT CHANGE UP
BLOOD GAS SOURCE: SIGNIFICANT CHANGE UP
BUN SERPL-MCNC: 31 MG/DL — HIGH (ref 7–23)
BUN SERPL-MCNC: 33 MG/DL — HIGH (ref 7–23)
BUN SERPL-MCNC: 35 MG/DL — HIGH (ref 7–23)
BUN SERPL-MCNC: 37 MG/DL — HIGH (ref 7–23)
CALCIUM SERPL-MCNC: 6.4 MG/DL — CRITICAL LOW (ref 8.4–10.5)
CALCIUM SERPL-MCNC: 7.2 MG/DL — LOW (ref 8.4–10.5)
CALCIUM SERPL-MCNC: 7.9 MG/DL — LOW (ref 8.4–10.5)
CALCIUM SERPL-MCNC: 8.4 MG/DL — SIGNIFICANT CHANGE UP (ref 8.4–10.5)
CHLORIDE SERPL-SCNC: 103 MMOL/L — SIGNIFICANT CHANGE UP (ref 96–108)
CHLORIDE SERPL-SCNC: 107 MMOL/L — SIGNIFICANT CHANGE UP (ref 96–108)
CHLORIDE SERPL-SCNC: 110 MMOL/L — HIGH (ref 96–108)
CHLORIDE SERPL-SCNC: 114 MMOL/L — HIGH (ref 96–108)
CK SERPL-CCNC: 5947 U/L — HIGH (ref 39–308)
CK SERPL-CCNC: CRITICAL HIGH U/L (ref 39–308)
CK SERPL-CCNC: CRITICAL HIGH U/L (ref 39–308)
CO2 SERPL-SCNC: 16 MMOL/L — LOW (ref 22–31)
CO2 SERPL-SCNC: 17 MMOL/L — LOW (ref 22–31)
CO2 SERPL-SCNC: 19 MMOL/L — LOW (ref 22–31)
CO2 SERPL-SCNC: 19 MMOL/L — LOW (ref 22–31)
CREAT SERPL-MCNC: 2.04 MG/DL — HIGH (ref 0.5–1.3)
CREAT SERPL-MCNC: 2.1 MG/DL — HIGH (ref 0.5–1.3)
CREAT SERPL-MCNC: 2.29 MG/DL — HIGH (ref 0.5–1.3)
CREAT SERPL-MCNC: 2.45 MG/DL — HIGH (ref 0.5–1.3)
EOSINOPHIL # BLD AUTO: 0 K/UL — SIGNIFICANT CHANGE UP (ref 0–0.5)
EOSINOPHIL NFR BLD AUTO: 0 % — SIGNIFICANT CHANGE UP (ref 0–6)
GLUCOSE SERPL-MCNC: 134 MG/DL — HIGH (ref 70–99)
GLUCOSE SERPL-MCNC: 140 MG/DL — HIGH (ref 70–99)
GLUCOSE SERPL-MCNC: 162 MG/DL — HIGH (ref 70–99)
GLUCOSE SERPL-MCNC: 84 MG/DL — SIGNIFICANT CHANGE UP (ref 70–99)
HBV SURFACE AB SER-ACNC: REACTIVE
HBV SURFACE AG SER-ACNC: SIGNIFICANT CHANGE UP
HCO3 BLDA-SCNC: 15 MMOL/L — LOW (ref 21–29)
HCO3 BLDA-SCNC: 17 MMOL/L — LOW (ref 21–29)
HCO3 BLDA-SCNC: 17 MMOL/L — LOW (ref 21–29)
HCO3 BLDA-SCNC: 18 MMOL/L — LOW (ref 21–29)
HCT VFR BLD CALC: 38.3 % — LOW (ref 39–50)
HCT VFR BLD CALC: 38.5 % — LOW (ref 39–50)
HCV AB S/CO SERPL IA: 0.13 S/CO — SIGNIFICANT CHANGE UP
HCV AB SERPL-IMP: SIGNIFICANT CHANGE UP
HGB BLD-MCNC: 12.2 G/DL — LOW (ref 13–17)
HGB BLD-MCNC: 12.6 G/DL — LOW (ref 13–17)
HOROWITZ INDEX BLDA+IHG-RTO: 100 — SIGNIFICANT CHANGE UP
HOROWITZ INDEX BLDA+IHG-RTO: 21 — SIGNIFICANT CHANGE UP
HOROWITZ INDEX BLDA+IHG-RTO: 60 — SIGNIFICANT CHANGE UP
HOROWITZ INDEX BLDA+IHG-RTO: 60 — SIGNIFICANT CHANGE UP
INR BLD: 1.1 RATIO — SIGNIFICANT CHANGE UP (ref 0.88–1.16)
INR BLD: 1.32 RATIO — HIGH (ref 0.88–1.16)
INR BLD: 1.33 RATIO — HIGH (ref 0.88–1.16)
LACTATE SERPL-SCNC: 1.3 MMOL/L — SIGNIFICANT CHANGE UP (ref 0.7–2)
LACTATE SERPL-SCNC: 1.4 MMOL/L — SIGNIFICANT CHANGE UP (ref 0.7–2)
LACTATE SERPL-SCNC: 1.7 MMOL/L — SIGNIFICANT CHANGE UP (ref 0.7–2)
LACTATE SERPL-SCNC: 7.5 MMOL/L — CRITICAL HIGH (ref 0.7–2)
LYMPHOCYTES # BLD AUTO: 0.45 K/UL — LOW (ref 1–3.3)
LYMPHOCYTES # BLD AUTO: 14 % — SIGNIFICANT CHANGE UP (ref 13–44)
MAGNESIUM SERPL-MCNC: 2.4 MG/DL — SIGNIFICANT CHANGE UP (ref 1.6–2.6)
MAGNESIUM SERPL-MCNC: 2.8 MG/DL — HIGH (ref 1.6–2.6)
MAGNESIUM SERPL-MCNC: 3.2 MG/DL — HIGH (ref 1.6–2.6)
MCHC RBC-ENTMCNC: 30.6 PG — SIGNIFICANT CHANGE UP (ref 27–34)
MCHC RBC-ENTMCNC: 31.5 PG — SIGNIFICANT CHANGE UP (ref 27–34)
MCHC RBC-ENTMCNC: 31.7 GM/DL — LOW (ref 32–36)
MCHC RBC-ENTMCNC: 32.9 GM/DL — SIGNIFICANT CHANGE UP (ref 32–36)
MCV RBC AUTO: 95.8 FL — SIGNIFICANT CHANGE UP (ref 80–100)
MCV RBC AUTO: 96.5 FL — SIGNIFICANT CHANGE UP (ref 80–100)
MONOCYTES # BLD AUTO: 0 K/UL — SIGNIFICANT CHANGE UP (ref 0–0.9)
MONOCYTES NFR BLD AUTO: 0 % — LOW (ref 2–14)
NEUTROPHILS # BLD AUTO: 2.63 K/UL — SIGNIFICANT CHANGE UP (ref 1.8–7.4)
NEUTROPHILS NFR BLD AUTO: 65 % — SIGNIFICANT CHANGE UP (ref 43–77)
NRBC # BLD: 0 /100 WBCS — SIGNIFICANT CHANGE UP (ref 0–0)
PCO2 BLDA: 29 MMHG — LOW (ref 32–46)
PCO2 BLDA: 31 MMHG — LOW (ref 32–46)
PCO2 BLDA: 31 MMHG — LOW (ref 32–46)
PCO2 BLDA: 32 MMHG — SIGNIFICANT CHANGE UP (ref 32–46)
PH BLD: 7.25 — LOW (ref 7.35–7.45)
PH BLD: 7.3 — LOW (ref 7.35–7.45)
PH BLD: 7.31 — LOW (ref 7.35–7.45)
PH BLD: 7.35 — SIGNIFICANT CHANGE UP (ref 7.35–7.45)
PHOSPHATE SERPL-MCNC: 10.8 MG/DL — HIGH (ref 2.5–4.5)
PHOSPHATE SERPL-MCNC: 4.2 MG/DL — SIGNIFICANT CHANGE UP (ref 2.5–4.5)
PHOSPHATE SERPL-MCNC: 4.9 MG/DL — HIGH (ref 2.5–4.5)
PHOSPHATE SERPL-MCNC: 5.6 MG/DL — HIGH (ref 2.5–4.5)
PLATELET # BLD AUTO: 268 K/UL — SIGNIFICANT CHANGE UP (ref 150–400)
PLATELET # BLD AUTO: 341 K/UL — SIGNIFICANT CHANGE UP (ref 150–400)
PO2 BLDA: 105 MMHG — SIGNIFICANT CHANGE UP (ref 74–108)
PO2 BLDA: 121 MMHG — HIGH (ref 74–108)
PO2 BLDA: 154 MMHG — HIGH (ref 74–108)
PO2 BLDA: 261 MMHG — HIGH (ref 74–108)
POTASSIUM SERPL-MCNC: 3.4 MMOL/L — LOW (ref 3.5–5.3)
POTASSIUM SERPL-MCNC: 3.6 MMOL/L — SIGNIFICANT CHANGE UP (ref 3.5–5.3)
POTASSIUM SERPL-MCNC: 4.1 MMOL/L — SIGNIFICANT CHANGE UP (ref 3.5–5.3)
POTASSIUM SERPL-MCNC: 5.3 MMOL/L — SIGNIFICANT CHANGE UP (ref 3.5–5.3)
POTASSIUM SERPL-MCNC: 7.1 MMOL/L — CRITICAL HIGH (ref 3.5–5.3)
POTASSIUM SERPL-SCNC: 3.4 MMOL/L — LOW (ref 3.5–5.3)
POTASSIUM SERPL-SCNC: 3.6 MMOL/L — SIGNIFICANT CHANGE UP (ref 3.5–5.3)
POTASSIUM SERPL-SCNC: 4.1 MMOL/L — SIGNIFICANT CHANGE UP (ref 3.5–5.3)
POTASSIUM SERPL-SCNC: 5.3 MMOL/L — SIGNIFICANT CHANGE UP (ref 3.5–5.3)
POTASSIUM SERPL-SCNC: 7.1 MMOL/L — CRITICAL HIGH (ref 3.5–5.3)
PROT SERPL-MCNC: 5.3 G/DL — LOW (ref 6–8.3)
PROT SERPL-MCNC: 6.3 G/DL — SIGNIFICANT CHANGE UP (ref 6–8.3)
PROTHROM AB SERPL-ACNC: 12 SEC — SIGNIFICANT CHANGE UP (ref 9.8–12.7)
PROTHROM AB SERPL-ACNC: 14.5 SEC — HIGH (ref 9.8–12.7)
PROTHROM AB SERPL-ACNC: 14.6 SEC — HIGH (ref 9.8–12.7)
RBC # BLD: 3.99 M/UL — LOW (ref 4.2–5.8)
RBC # BLD: 4 M/UL — LOW (ref 4.2–5.8)
RBC # FLD: 13.9 % — SIGNIFICANT CHANGE UP (ref 10.3–14.5)
RBC # FLD: 14.2 % — SIGNIFICANT CHANGE UP (ref 10.3–14.5)
SAO2 % BLDA: 97 % — HIGH (ref 92–96)
SAO2 % BLDA: 98 % — HIGH (ref 92–96)
SAO2 % BLDA: 99 % — HIGH (ref 92–96)
SAO2 % BLDA: 99 % — HIGH (ref 92–96)
SODIUM SERPL-SCNC: 143 MMOL/L — SIGNIFICANT CHANGE UP (ref 135–145)
SODIUM SERPL-SCNC: 143 MMOL/L — SIGNIFICANT CHANGE UP (ref 135–145)
SODIUM SERPL-SCNC: 147 MMOL/L — HIGH (ref 135–145)
SODIUM SERPL-SCNC: 147 MMOL/L — HIGH (ref 135–145)
T3 SERPL-MCNC: 115 NG/DL — SIGNIFICANT CHANGE UP (ref 80–200)
T4 AB SER-ACNC: 3.8 UG/DL — LOW (ref 4.6–12)
T4 AB SER-ACNC: 4.8 UG/DL — SIGNIFICANT CHANGE UP (ref 4.6–12)
TROPONIN I SERPL-MCNC: 0.9 NG/ML — HIGH (ref 0.02–0.06)
TSH SERPL-MCNC: 0.63 UIU/ML — SIGNIFICANT CHANGE UP (ref 0.27–4.2)
TSH SERPL-MCNC: 4.21 UIU/ML — HIGH (ref 0.27–4.2)
WBC # BLD: 17.63 K/UL — HIGH (ref 3.8–10.5)
WBC # BLD: 3.21 K/UL — LOW (ref 3.8–10.5)
WBC # FLD AUTO: 17.63 K/UL — HIGH (ref 3.8–10.5)
WBC # FLD AUTO: 3.21 K/UL — LOW (ref 3.8–10.5)

## 2018-08-19 PROCEDURE — 99223 1ST HOSP IP/OBS HIGH 75: CPT

## 2018-08-19 PROCEDURE — 93010 ELECTROCARDIOGRAM REPORT: CPT

## 2018-08-19 PROCEDURE — 93970 EXTREMITY STUDY: CPT | Mod: 26

## 2018-08-19 PROCEDURE — 99233 SBSQ HOSP IP/OBS HIGH 50: CPT

## 2018-08-19 RX ORDER — SODIUM CHLORIDE 9 MG/ML
1000 INJECTION, SOLUTION INTRAVENOUS
Qty: 0 | Refills: 0 | Status: DISCONTINUED | OUTPATIENT
Start: 2018-08-19 | End: 2018-08-19

## 2018-08-19 RX ORDER — DEXTROSE 50 % IN WATER 50 %
50 SYRINGE (ML) INTRAVENOUS ONCE
Qty: 0 | Refills: 0 | Status: DISCONTINUED | OUTPATIENT
Start: 2018-08-19 | End: 2018-08-19

## 2018-08-19 RX ORDER — NOREPINEPHRINE BITARTRATE/D5W 8 MG/250ML
0.05 PLASTIC BAG, INJECTION (ML) INTRAVENOUS
Qty: 8 | Refills: 0 | Status: DISCONTINUED | OUTPATIENT
Start: 2018-08-19 | End: 2018-08-20

## 2018-08-19 RX ORDER — LEVETIRACETAM 250 MG/1
1000 TABLET, FILM COATED ORAL ONCE
Qty: 0 | Refills: 0 | Status: COMPLETED | OUTPATIENT
Start: 2018-08-19 | End: 2018-08-19

## 2018-08-19 RX ORDER — CALCIUM GLUCONATE 100 MG/ML
2 VIAL (ML) INTRAVENOUS ONCE
Qty: 0 | Refills: 0 | Status: DISCONTINUED | OUTPATIENT
Start: 2018-08-19 | End: 2018-08-19

## 2018-08-19 RX ORDER — SODIUM CHLORIDE 9 MG/ML
1000 INJECTION, SOLUTION INTRAVENOUS
Qty: 0 | Refills: 0 | Status: DISCONTINUED | OUTPATIENT
Start: 2018-08-19 | End: 2018-08-24

## 2018-08-19 RX ORDER — POTASSIUM CHLORIDE 20 MEQ
10 PACKET (EA) ORAL
Qty: 0 | Refills: 0 | Status: COMPLETED | OUTPATIENT
Start: 2018-08-19 | End: 2018-08-19

## 2018-08-19 RX ORDER — GLUCAGON INJECTION, SOLUTION 0.5 MG/.1ML
1 INJECTION, SOLUTION SUBCUTANEOUS ONCE
Qty: 0 | Refills: 0 | Status: DISCONTINUED | OUTPATIENT
Start: 2018-08-19 | End: 2018-08-24

## 2018-08-19 RX ORDER — DEXTROSE 50 % IN WATER 50 %
12.5 SYRINGE (ML) INTRAVENOUS ONCE
Qty: 0 | Refills: 0 | Status: DISCONTINUED | OUTPATIENT
Start: 2018-08-19 | End: 2018-08-24

## 2018-08-19 RX ORDER — SODIUM BICARBONATE 1 MEQ/ML
100 SYRINGE (ML) INTRAVENOUS ONCE
Qty: 0 | Refills: 0 | Status: DISCONTINUED | OUTPATIENT
Start: 2018-08-19 | End: 2018-08-19

## 2018-08-19 RX ORDER — CALCIUM GLUCONATE 100 MG/ML
2 VIAL (ML) INTRAVENOUS
Qty: 0 | Refills: 0 | Status: COMPLETED | OUTPATIENT
Start: 2018-08-19 | End: 2018-08-19

## 2018-08-19 RX ORDER — SODIUM CHLORIDE 9 MG/ML
1000 INJECTION INTRAMUSCULAR; INTRAVENOUS; SUBCUTANEOUS
Qty: 0 | Refills: 0 | Status: DISCONTINUED | OUTPATIENT
Start: 2018-08-19 | End: 2018-08-19

## 2018-08-19 RX ORDER — INSULIN HUMAN 100 [IU]/ML
10 INJECTION, SOLUTION SUBCUTANEOUS ONCE
Qty: 0 | Refills: 0 | Status: DISCONTINUED | OUTPATIENT
Start: 2018-08-19 | End: 2018-08-19

## 2018-08-19 RX ORDER — SODIUM CHLORIDE 9 MG/ML
2000 INJECTION INTRAMUSCULAR; INTRAVENOUS; SUBCUTANEOUS ONCE
Qty: 0 | Refills: 0 | Status: COMPLETED | OUTPATIENT
Start: 2018-08-19 | End: 2018-08-19

## 2018-08-19 RX ORDER — ALBUTEROL 90 UG/1
2 AEROSOL, METERED ORAL EVERY 6 HOURS
Qty: 0 | Refills: 0 | Status: DISCONTINUED | OUTPATIENT
Start: 2018-08-19 | End: 2018-08-21

## 2018-08-19 RX ORDER — DEXTROSE 50 % IN WATER 50 %
25 SYRINGE (ML) INTRAVENOUS ONCE
Qty: 0 | Refills: 0 | Status: DISCONTINUED | OUTPATIENT
Start: 2018-08-19 | End: 2018-08-24

## 2018-08-19 RX ORDER — ALBUTEROL 90 UG/1
10 AEROSOL, METERED ORAL ONCE
Qty: 0 | Refills: 0 | Status: COMPLETED | OUTPATIENT
Start: 2018-08-19 | End: 2018-08-19

## 2018-08-19 RX ORDER — INSULIN LISPRO 100/ML
VIAL (ML) SUBCUTANEOUS EVERY 6 HOURS
Qty: 0 | Refills: 0 | Status: DISCONTINUED | OUTPATIENT
Start: 2018-08-19 | End: 2018-08-19

## 2018-08-19 RX ORDER — LEVETIRACETAM 250 MG/1
500 TABLET, FILM COATED ORAL EVERY 12 HOURS
Qty: 0 | Refills: 0 | Status: DISCONTINUED | OUTPATIENT
Start: 2018-08-19 | End: 2018-08-21

## 2018-08-19 RX ORDER — SODIUM CHLORIDE 9 MG/ML
1000 INJECTION INTRAMUSCULAR; INTRAVENOUS; SUBCUTANEOUS
Qty: 0 | Refills: 0 | Status: DISCONTINUED | OUTPATIENT
Start: 2018-08-19 | End: 2018-08-20

## 2018-08-19 RX ORDER — SODIUM CHLORIDE 9 MG/ML
1000 INJECTION INTRAMUSCULAR; INTRAVENOUS; SUBCUTANEOUS ONCE
Qty: 0 | Refills: 0 | Status: COMPLETED | OUTPATIENT
Start: 2018-08-19 | End: 2018-08-19

## 2018-08-19 RX ORDER — DEXTROSE 50 % IN WATER 50 %
15 SYRINGE (ML) INTRAVENOUS ONCE
Qty: 0 | Refills: 0 | Status: DISCONTINUED | OUTPATIENT
Start: 2018-08-19 | End: 2018-08-24

## 2018-08-19 RX ADMIN — HEPARIN SODIUM 7500 UNIT(S): 5000 INJECTION INTRAVENOUS; SUBCUTANEOUS at 00:44

## 2018-08-19 RX ADMIN — PANTOPRAZOLE SODIUM 40 MILLIGRAM(S): 20 TABLET, DELAYED RELEASE ORAL at 11:16

## 2018-08-19 RX ADMIN — SODIUM CHLORIDE 1000 MILLILITER(S): 9 INJECTION INTRAMUSCULAR; INTRAVENOUS; SUBCUTANEOUS at 01:30

## 2018-08-19 RX ADMIN — Medication 8.84 MICROGRAM(S)/KG/MIN: at 02:31

## 2018-08-19 RX ADMIN — Medication 8.84 MICROGRAM(S)/KG/MIN: at 16:43

## 2018-08-19 RX ADMIN — Medication 100 MILLIEQUIVALENT(S): at 14:09

## 2018-08-19 RX ADMIN — Medication 100 GRAM(S): at 21:44

## 2018-08-19 RX ADMIN — Medication 100 MILLIEQUIVALENT(S): at 15:29

## 2018-08-19 RX ADMIN — PIPERACILLIN AND TAZOBACTAM 25 GRAM(S): 4; .5 INJECTION, POWDER, LYOPHILIZED, FOR SOLUTION INTRAVENOUS at 07:57

## 2018-08-19 RX ADMIN — PIPERACILLIN AND TAZOBACTAM 25 GRAM(S): 4; .5 INJECTION, POWDER, LYOPHILIZED, FOR SOLUTION INTRAVENOUS at 15:30

## 2018-08-19 RX ADMIN — SODIUM CHLORIDE 1000 MILLILITER(S): 9 INJECTION INTRAMUSCULAR; INTRAVENOUS; SUBCUTANEOUS at 18:27

## 2018-08-19 RX ADMIN — CHLORHEXIDINE GLUCONATE 15 MILLILITER(S): 213 SOLUTION TOPICAL at 05:57

## 2018-08-19 RX ADMIN — Medication 4 MILLIGRAM(S): at 01:24

## 2018-08-19 RX ADMIN — SODIUM CHLORIDE 1000 MILLILITER(S): 9 INJECTION INTRAMUSCULAR; INTRAVENOUS; SUBCUTANEOUS at 00:13

## 2018-08-19 RX ADMIN — SODIUM CHLORIDE 100 MILLILITER(S): 9 INJECTION, SOLUTION INTRAVENOUS at 08:36

## 2018-08-19 RX ADMIN — PIPERACILLIN AND TAZOBACTAM 200 GRAM(S): 4; .5 INJECTION, POWDER, LYOPHILIZED, FOR SOLUTION INTRAVENOUS at 00:49

## 2018-08-19 RX ADMIN — SODIUM CHLORIDE 200 MILLILITER(S): 9 INJECTION INTRAMUSCULAR; INTRAVENOUS; SUBCUTANEOUS at 19:05

## 2018-08-19 RX ADMIN — HEPARIN SODIUM 1300 UNIT(S)/HR: 5000 INJECTION INTRAVENOUS; SUBCUTANEOUS at 14:06

## 2018-08-19 RX ADMIN — HEPARIN SODIUM 1700 UNIT(S)/HR: 5000 INJECTION INTRAVENOUS; SUBCUTANEOUS at 00:48

## 2018-08-19 RX ADMIN — HEPARIN SODIUM 1500 UNIT(S)/HR: 5000 INJECTION INTRAVENOUS; SUBCUTANEOUS at 07:25

## 2018-08-19 RX ADMIN — LEVETIRACETAM 400 MILLIGRAM(S): 250 TABLET, FILM COATED ORAL at 01:46

## 2018-08-19 RX ADMIN — ALBUTEROL 2.5 MILLIGRAM(S): 90 AEROSOL, METERED ORAL at 01:08

## 2018-08-19 RX ADMIN — CHLORHEXIDINE GLUCONATE 15 MILLILITER(S): 213 SOLUTION TOPICAL at 17:48

## 2018-08-19 RX ADMIN — SODIUM CHLORIDE 250 MILLILITER(S): 9 INJECTION INTRAMUSCULAR; INTRAVENOUS; SUBCUTANEOUS at 02:55

## 2018-08-19 RX ADMIN — Medication 100 GRAM(S): at 20:31

## 2018-08-19 RX ADMIN — Medication 1: at 06:04

## 2018-08-19 RX ADMIN — HEPARIN SODIUM 1100 UNIT(S)/HR: 5000 INJECTION INTRAVENOUS; SUBCUTANEOUS at 21:43

## 2018-08-19 RX ADMIN — LEVETIRACETAM 400 MILLIGRAM(S): 250 TABLET, FILM COATED ORAL at 12:26

## 2018-08-19 NOTE — PROGRESS NOTE ADULT - ASSESSMENT
Patient is 51 yo male with hx of diastolic CHF, gastroesophageal reflux disease, Herniated cervical disc, Migraine, Parkinson disease  (has been a resident of [facility] for >1 year - [facility] with difficulty placing patient), Unclear of etiology.      1. S/P Cardiac arrest.    -Unclear Etiology.  possible Seizure, Cardiac event, infection, pulmonary issues VS neurological issues  -EKG shows ischemic changes.  Trop elevated in the setting of rhabdo, renal failure, and hypotension  -TTE shows NL EF  -Still on pressor, heparin drip (elevated D-dimer), antibiotic, and IV keppra.  Continue with hypothermic protocol  -Doppler US of LE negative for DVT (unable to obtain Chest CTA due to renal function)  -Further care as per critical care, and cardiology  -Neurology, ID, and hematology consult pending    2.  Acute renal failure  -Possible Dehydration, ATN in the setting of hypotension, and rhabdo  -Renal US pending  -Continue with IVF  -Nephrology consult pending      3.  Elevated LFT.   -Possible shock liver in the setting of hypotension VS rhabdo  -Abdm US pending  -GI consult  -Monitor LFT    4.  GERD  -Continue with PPI      5. AMS.    -Patient is not responding to painful Stimuli, currently not on sedation  -Unable to obtain Head CT scan since patient is not stable to go for test as per Critical care  -Neurology consult pending.  Discussed with Dr. mcguire      6.  rhabdo  -in the setting of CPR.  Continue with IVF, and monitor CPK level        Plan of care was discussed with patient, and sister in great details, All questions were answered to their satisfaction  Seems to understand, and in agreement Patient is 53 yo male with hx of diastolic CHF, gastroesophageal reflux disease, Herniated cervical disc, Migraine, Parkinson disease  (has been a resident of [facility] for >1 year - [facility] with difficulty placing patient), Unclear of etiology.      1. S/P Cardiac arrest.    -Unclear Etiology.  possible Seizure, Cardiac event, infection, pulmonary issues VS neurological issues  -Trop elevated in the setting of rhabdo, renal failure, and hypotension  -TTE shows NL EF  -Still on pressor, heparin drip (elevated D-dimer), antibiotic, and IV keppra.  Continue with hypothermic protocol  -Doppler US of LE negative for DVT (unable to obtain Chest CTA due to renal function)  -Further care as per critical care, and cardiology  -Neurology, ID, and hematology consult pending    2.  Acute renal failure  -Possible Dehydration, ATN in the setting of hypotension, and rhabdo  -Renal US pending  -Continue with IVF  -Nephrology consult pending      3.  Elevated LFT.   -Possible shock liver in the setting of hypotension VS rhabdo  -Abdm US pending  -GI consult  -Monitor LFT    4.  GERD  -Continue with PPI      5. AMS.    -Patient is not responding to painful Stimuli, currently not on sedation  -Unable to obtain Head CT scan since patient is not stable to go for test as per Critical care  -Neurology consult pending.  Discussed with Dr. mcguire      6.  rhabdo  -in the setting of CPR.  Continue with IVF, and monitor CPK level        Plan of care was discussed with patient, and sister in great details, All questions were answered to their satisfaction  Seems to understand, and in agreement

## 2018-08-19 NOTE — PROGRESS NOTE ADULT - ASSESSMENT
52 year old male s/p cardiac arrest with ROSC, RAISSA, increased LFT's, rhabdomyolysis, elvated troponin levels and ams/ encephalopathic state.     Critical Care time: 35 mins assessing presenting problems of acute illness that poses high probability of life threatening deterioration or end organ damage/dysfunction.  Medical decision making inculding Initiating plan of care, reviewing data, reviewing radiology,direct patient bedside evaluation and interpretation of vital signs, any necessary ventilator management , discusion with multidisciplinary team, discussing goals of care with patient/family, all non inclusive of procedures

## 2018-08-19 NOTE — CONSULT NOTE ADULT - SUBJECTIVE AND OBJECTIVE BOX
Date/Time Patient Seen:  		  Referring MD:   Data Reviewed	       Patient is a 53y old  Male who presents with a chief complaint of s/p cardiac arrest (19 Aug 2018 01:06)      Subjective/HPI    in bed  seen and examined  vs and meds reviewed  intubated  s/p card arrest  sister at the BS  pt was in Psych unit    Provider Contact Note (eICU):    Provider Contact Note (eICU):  · Reason for Notifying Provider	Seizure activity  · Date and Time Provider was Notified:	19-Aug-2018 01:42  · Notification Details (SBAR):                                                 Situation	53 male prolonged cardiac arrest, achieved rosc, started on hypothermia protocol, now noted with seizure like activity  · Assessment	53 male s/p cardiac arrest/rosc, on hypothermia protocol, with seizure activity most likely 2/2 to anoxia induced cerbral edema s/p 4mg ativan ivp with response.  · Recommendations	start keppra 1000mg load then 500mg BID  Ct head when stable  repeat labs   frequent neuro checks  · Action/Treatment Ordered:	as above    Chart Note-Event Note CODE BLUE PA [Charted Location: [facility] 04] [Authored: 18-Aug-2018 22:58]- for Visit: [id number], Complete, Appended Only, Signed in Full, General      · Note Type	Event Note  CODE BLUE      -arrived to overhead call of rapid response which turned into a code blue. patient was admitted to in patient psych center attached to [facility].    -on arrival found CPR/in progress. As patient was in a inpatient pysch facility was not on continuous moniotr and had no IV access.   -on arrival intubated patient with glidescope, and then attempted IO access which was unsuccessful, right femoral TLC placed emergently, RN was also able to obtain PIV  -Code blue called around 930pm. patient noted to be in asytole on monitor. Total of 4-5 epi given during code. calcium and bicarb also given.  -ROSC was achived after royghly 25-30 minutes of CPR.  -post ROSC patient was noted to be in afib with RVR, given 150mg of amiodarone and returned to NSR rate controlled    CXR confirmed placement of ETT above chinmay, patient then transferred to SPCU.    -on SPCU arrival, left femoral TLC placed, and right femoral TLC placed during code was converted to a femoral line as a VBG drawn from line confirmed arterial placement.  -also, on SPCU arrival, patients neurologic exam showed non-respnsive state, no response to voice or noxious stimuli, no pupillary response to light, no corneal reflex, no cough or gag reflex, and is not overbreathing vent at the current time.       -At this time it is unclear what precipitated this event. Psych RN reports patient took a shower s usual, was agitated, re-located to the day room, remains agitated, and was taken back to his room. Nurse left room, then aid went to checka  set of vitals and found patient un-responsive in his bed. CODE called.    -post ROSC< EKG without ST elevations. D-dimer is elevated, but un-clear if this is indicative of DVT/PE or just secondary to prolonged hospitalization.  -after discussion with eICU attending, will start patient on modified hypothermia protocol, keep patient body temperature 35-36 celcius, avoid fever, blood sugar control <180  -also, will initiate a heparin gtt for full dose A/C, as two high DDX are PE/MI/NSTEMI.  -psych staff stated that he did not receive any new meds, or higher doses than usual. Was receiving haldol and ativan as needed for agitation.      H&P Adult [Charted Location: [facility] 04] [Authored: 18-Aug-2018 22:48]- for Visit: [id number], Complete, Revised, Signed in Full, General    History and Physical:   Source of Information	Chart(s), Patient     Language:  · Patient/Family of Limited English Proficiency	No       History of Present Illness:  Reason for Admission: unresponsiveness, possible cardiac arrest  History of Present Illness:   52M with long history of Parkinson's disease (has been a resident of [facility] for >1 year - [facility] with difficulty placing patient), reported hx of diastolic heart failure, herniated discs who was found unresponsive tonight.  Reportedly, RN said that the patient went to take a shower around 2000 and afterwards patient started to become agitated.  Patient does have a history of agitation and flares his arms and legs often.  Patient was able to calm down and was allowed to go to the day room.  Patient again started to flare his arms and legs and even slid down to the floor.  Patient was then directed back to his room where he continued to be agitated.  Patient was then given ativan 2mg PO for agitation and then nurse was able to instruct the patient to calm down as well.  Nurse stepped outside the room and told the aide to get vitals when the patient was found to be unresponsive all of sudden.  A CODE BLUE was activated at 2132.      During the CODE BLUE, patient was found to be unresponsive.  CPR was already initiated.  Initial rhythm on monitor was asystole.  A total of 4 rounds of epinephrine was given with 2 given peripherally and 2 via femoral central line and a dose of bicarb and calcium chloride was also given in between (see code sheet).  Patient was successfully intubated and patient eventually had ROSC at 2144 with BP 96/39 with HR of 185.  Initial ABG was 6.78/52/247.  Given his tachycardia, patient was also given amio 150mg.  Patient's HR decreased to 70-80s but BP eventually dropped to SBP 40/20s.  Patient was started on levophed with improvement SBP to 90s and HR increased to 110s.  Multiple attempts to contact family member was unsuccessful.  Patient was transferred/admitted to SPCU for further management         PAST MEDICAL & SURGICAL HISTORY:  Herniated cervical disc  Congestive heart failure, unspecified congestive heart failure chronicity, unspecified congestive heart failure type  Parkinson disease  Diastolic heart failure  Parkinson disease  CHF (congestive heart failure)  GERD (gastroesophageal reflux disease)  Parkinson's disease  Migraine  CHF (congestive heart failure)  Parkinson disease  No significant past surgical history  No significant past surgical history  No significant past surgical history        Medication list         MEDICATIONS  (STANDING):  chlorhexidine 0.12% Liquid 15 milliLiter(s) Swish and Spit two times a day  dextrose 5%. 1000 milliLiter(s) (50 mL/Hr) IV Continuous <Continuous>  dextrose 50% Injectable 12.5 Gram(s) IV Push once  dextrose 50% Injectable 25 Gram(s) IV Push once  dextrose 50% Injectable 25 Gram(s) IV Push once  heparin  Infusion.  Unit(s)/Hr (17 mL/Hr) IV Continuous <Continuous>  insulin lispro (HumaLOG) corrective regimen sliding scale   SubCutaneous every 6 hours  lactated ringers. 1000 milliLiter(s) (100 mL/Hr) IV Continuous <Continuous>  levETIRAcetam  IVPB 500 milliGRAM(s) IV Intermittent every 12 hours  norepinephrine Infusion 0.05 MICROgram(s)/kG/Min (8.841 mL/Hr) IV Continuous <Continuous>  pantoprazole  Injectable 40 milliGRAM(s) IV Push daily  piperacillin/tazobactam IVPB. 3.375 Gram(s) IV Intermittent every 8 hours    MEDICATIONS  (PRN):  ALBUTerol    90 MICROgram(s) HFA Inhaler 2 Puff(s) Inhalation every 6 hours PRN Shortness of Breath and/or Wheezing  dextrose 40% Gel 15 Gram(s) Oral once PRN Blood Glucose LESS THAN 70 milliGRAM(s)/deciLiter  glucagon  Injectable 1 milliGRAM(s) IntraMuscular once PRN Glucose <70 milliGRAM(s)/deciLiter  heparin  Injectable 7500 Unit(s) IV Push every 6 hours PRN For aPTT less than 40  heparin  Injectable 3500 Unit(s) IV Push every 6 hours PRN For aPTT between 40 - 57         Vitals log        ICU Vital Signs Last 24 Hrs  T(C): 35.3 (19 Aug 2018 06:00), Max: 36.8 (19 Aug 2018 00:00)  T(F): 95.5 (19 Aug 2018 06:00), Max: 98.2 (19 Aug 2018 00:00)  HR: 84 (19 Aug 2018 07:00) (84 - 114)  BP: 130/78 (19 Aug 2018 07:00) (103/57 - 142/82)  BP(mean): 91 (19 Aug 2018 07:00) (64 - 97)  ABP: 129/66 (19 Aug 2018 07:00) (121/58 - 151/69)  ABP(mean): 89 (19 Aug 2018 07:00) (77 - 99)  RR: 28 (19 Aug 2018 07:00) (15 - 29)  SpO2: 100% (19 Aug 2018 07:00) (97% - 100%)       Mode: AC/ CMV (Assist Control/ Continuous Mandatory Ventilation)  RR (machine): 28  TV (machine): 500  FiO2: 60  PEEP: 5  ITime: 1  MAP: 14  PIP: 22      Input and Output:  I&O's Detail    18 Aug 2018 07:01  -  19 Aug 2018 07:00  --------------------------------------------------------  IN:    heparin  Infusion.: 102 mL    IV PiggyBack: 200 mL    norepinephrine Infusion: 185.6 mL    sodium chloride 0.9%: 1000 mL    Sodium Chloride 0.9% IV Bolus: 4000 mL  Total IN: 5487.6 mL    OUT:    Indwelling Catheter - Urethral: 1100 mL  Total OUT: 1100 mL    Total NET: 4387.6 mL          Lab Data                        12.6   17.63 )-----------( 341      ( 19 Aug 2018 00:24 )             38.3     08-19    147<H>  |  110<H>  |  35<H>  ----------------------------<  162<H>  3.6   |  17<L>  |  2.04<H>    Ca    7.2<L>      19 Aug 2018 06:56  Phos  10.8     08-19  Mg     3.2     08-19    TPro  5.3<L>  /  Alb  2.8<L>  /  TBili  0.5  /  DBili  x   /  AST  x   /  ALT  157<H>  /  AlkPhos  77  08-19    ABG - ( 19 Aug 2018 06:02 )  pH, Arterial: x     pH, Blood: 7.30  /  pCO2: 32    /  pO2: 105   / HCO3: 17    / Base Excess: -9.8  /  SaO2: 97                CARDIAC MARKERS ( 19 Aug 2018 00:24 )  x     / x     / 5947 U/L / x     / x      CARDIAC MARKERS ( 18 Aug 2018 22:12 )  .028 ng/mL / x     / x     / x     / x            Review of Systems	      Objective     Physical Examination    heart s1s2  lung dec BS    Pertinent Lab findings & Imaging      Jennifer:  NO   Adequate UO     I&O's Detail    18 Aug 2018 07:01  -  19 Aug 2018 07:00  --------------------------------------------------------  IN:    heparin  Infusion.: 102 mL    IV PiggyBack: 200 mL    norepinephrine Infusion: 185.6 mL    sodium chloride 0.9%: 1000 mL    Sodium Chloride 0.9% IV Bolus: 4000 mL  Total IN: 5487.6 mL    OUT:    Indwelling Catheter - Urethral: 1100 mL  Total OUT: 1100 mL    Total NET: 4387.6 mL               Discussed with:     Cultures:	        Radiology

## 2018-08-19 NOTE — PROGRESS NOTE ADULT - PROBLEM SELECTOR PLAN 6
continue heparin infusion post cardiac arrest and will also cover for DVT with SD'S  continue protonix for GI  continue aspiration precautions by keeping head of bed at 30 degrees

## 2018-08-19 NOTE — CONSULT NOTE ADULT - PROBLEM SELECTOR RECOMMENDATION 2
resp failure  vent support  last ABG noted pH 7.3  will check serial labs, and parameters, A line and Central line in  proventil PRN  chest pt  suction PRN  oral hygiene and skin hygiene  on heparin suspected PE, imaging pending  work up pending  LE doppler and TTE assessment  will follow  prognosis poor

## 2018-08-19 NOTE — CONSULT NOTE ADULT - SUBJECTIVE AND OBJECTIVE BOX
REASON FOR CONSULT: CP arrest     CHIEF COMPLAINT: unresponsive, CP arrest   ( history obtained from chart )     HPI:  52M with long history of Parkinson's disease (has been a resident of [facility] for >1 year - [facility] with difficulty placing patient), reported hx of diastolic heart failure, herniated discs who was found unresponsive tonight.  Reportedly, RN said that the patient went to take a shower around  and afterwards patient started to become agitated.  Patient does have a history of agitation and flares his arms and legs often.  Patient was able to calm down and was allowed to go to the day room.  Patient again started to flare his arms and legs and even slid down to the floor.  Patient was then directed back to his room where he continued to be agitated.  Patient was then given ativan 2mg PO for agitation and then nurse was able to instruct the patient to calm down as well.  Nurse stepped outside the room and told the aide to get vitals when the patient was found to be unresponsive all of sudden.  A CODE BLUE was activated at 2132.      During the CODE BLUE, patient was found to be unresponsive.  CPR was already initiated.  Initial rhythm on monitor was asystole.  A total of 4 rounds of epinephrine was given with 2 given peripherally and 2 via femoral central line and a dose of bicarb and calcium chloride was also given in between (see code sheet).  Patient was successfully intubated and patient eventually had ROSC at 2144 with BP 96/39 with HR of 185.  Initial ABG was 6.78/52/247.  Given his tachycardia, patient was also given amio 150mg.  Patient's HR decreased to 70-80s but BP eventually dropped to SBP 40/20s.  Patient was started on levophed with improvement SBP to 90s and HR increased to 110s.  Multiple attempts to contact family member was unsuccessful.  Patient was transferred/admitted to SPCU for further management (18 Aug 2018 22:48)    11:34 AM  Patient currently unresponsive intubated , on levophed , maintain SBP ,  family at bedside ,as per sister there is no prior hx of CAD ?   but chart saying  diastolic heart failure ,       PAST MEDICAL & SURGICAL HISTORY:  Herniated cervical disc  Congestive heart failure, unspecified congestive heart failure chronicity, unspecified congestive heart failure type  Parkinson disease  Diastolic heart failure  GERD (gastroesophageal reflux disease)  Migraine  CHF (congestive heart failure)  Parkinson disease        Allergies    aspirin (Unknown)  Cheese (Unknown)  ibuprofen (Unknown)  Reglan (Swelling)    Intolerances    ibuprofen (Unknown)      SOCIAL HISTORY: non smoker     FAMILY HISTORY:  Family history of prostate cancer in father  Family history of diabetes mellitus      MEDICATIONS:  MEDICATIONS  (STANDING):  chlorhexidine 0.12% Liquid 15 milliLiter(s) Swish and Spit two times a day  dextrose 5%. 1000 milliLiter(s) (50 mL/Hr) IV Continuous <Continuous>  dextrose 50% Injectable 12.5 Gram(s) IV Push once  dextrose 50% Injectable 25 Gram(s) IV Push once  dextrose 50% Injectable 25 Gram(s) IV Push once  heparin  Infusion.  Unit(s)/Hr (17 mL/Hr) IV Continuous <Continuous>  insulin lispro (HumaLOG) corrective regimen sliding scale   SubCutaneous every 6 hours  lactated ringers. 1000 milliLiter(s) (100 mL/Hr) IV Continuous <Continuous>  levETIRAcetam  IVPB 500 milliGRAM(s) IV Intermittent every 12 hours  norepinephrine Infusion 0.05 MICROgram(s)/kG/Min (8.841 mL/Hr) IV Continuous <Continuous>  pantoprazole  Injectable 40 milliGRAM(s) IV Push daily  piperacillin/tazobactam IVPB. 3.375 Gram(s) IV Intermittent every 8 hours    MEDICATIONS  (PRN):  ALBUTerol    90 MICROgram(s) HFA Inhaler 2 Puff(s) Inhalation every 6 hours PRN Shortness of Breath and/or Wheezing  dextrose 40% Gel 15 Gram(s) Oral once PRN Blood Glucose LESS THAN 70 milliGRAM(s)/deciLiter  glucagon  Injectable 1 milliGRAM(s) IntraMuscular once PRN Glucose <70 milliGRAM(s)/deciLiter  heparin  Injectable 7500 Unit(s) IV Push every 6 hours PRN For aPTT less than 40  heparin  Injectable 3500 Unit(s) IV Push every 6 hours PRN For aPTT between 40 - 57      REVIEW OF SYSTEMS:    unresponsive as patient is intubated can not be obtained    Vital Signs Last 24 Hrs  T(C): 35.2 (19 Aug 2018 08:00), Max: 36.8 (19 Aug 2018 00:00)  T(F): 95.4 (19 Aug 2018 08:00), Max: 98.2 (19 Aug 2018 00:00)  HR: 82 (19 Aug 2018 11:00) (82 - 114)  BP: 132/83 (19 Aug 2018 10:00) (103/57 - 142/82)  BP(mean): 96 (19 Aug 2018 10:00) (64 - 97)  RR: 28 (19 Aug 2018 10:00) (15 - 29)  SpO2: 100% (19 Aug 2018 11:00) (97% - 100%)    I&O's Summary    18 Aug 2018 07:01  -  19 Aug 2018 07:00  --------------------------------------------------------  IN: 5487.6 mL / OUT: 1100 mL / NET: 4387.6 mL        PHYSICAL EXAM:    Constitutional: afebrile , intubated  HEENT: PERR, EOMI,  No oral cyananosis.  Neck:  supple,  No JVD  Respiratory: Breath sounds are clear bilaterally, No wheezing, rales or rhonchi  Cardiovascular: S1 and S2, regular rate and rhythm, no Murmurs, gallops or rubs  Gastrointestinal: Bowel Sounds present, soft, nontender.   Extremities: No peripheral edema. No clubbing or cyanosis.  Vascular: 2+ peripheral pulses  Neurological: pupils constricted equal ,non reactive   Musculoskeletal: no calf tenderness.  Skin: small bruit on left front of knee ,  lower leg       LABS: All Labs Reviewed:                        12.2   3.21  )-----------( 268      ( 19 Aug 2018 06:56 )             38.5                         12.6   17.63 )-----------( 341      ( 19 Aug 2018 00:24 )             38.3                         12.1   15.54 )-----------( 337      ( 18 Aug 2018 22:12 )             41.5     19 Aug 2018 06:56    147    |  110    |  35     ----------------------------<  162    3.6     |  17     |  2.04   19 Aug 2018 01:29    143    |  107    |  33     ----------------------------<  140    5.3     |  19     |  2.29   19 Aug 2018 00:24    143    |  103    |  31     ----------------------------<  84     7.1     |  16     |  2.45     Ca    7.2        19 Aug 2018 06:56  Ca    7.9        19 Aug 2018 01:29  Ca    8.4        19 Aug 2018 00:24  Phos  5.6       19 Aug 2018 06:56  Phos  10.8      19 Aug 2018 00:24  Phos  11.7      18 Aug 2018 22:12  Mg     2.8       19 Aug 2018 06:56  Mg     3.2       19 Aug 2018 00:24  Mg     3.5       18 Aug 2018 22:12    TPro  5.3    /  Alb  2.8    /  TBili  0.5    /  DBili  x      /  AST  x      /  ALT  157    /  AlkPhos  77     19 Aug 2018 06:56  TPro  6.3    /  Alb  3.4    /  TBili  0.6    /  DBili  0.1    /  AST  1151   /  ALT  123    /  AlkPhos  96     19 Aug 2018 00:24  TPro  6.2    /  Alb  3.4    /  TBili  0.6    /  DBili  x      /  AST  694    /  ALT  84     /  AlkPhos  84     18 Aug 2018 22:12    PT/INR - ( 19 Aug 2018 06:56 )   PT: 14.5 sec;   INR: 1.32 ratio         PTT - ( 19 Aug 2018 06:56 )  PTT:103.1 sec  CARDIAC MARKERS ( 19 Aug 2018 06:56 )  .901 ng/mL / x     / x     / x     / x      CARDIAC MARKERS ( 19 Aug 2018 00:24 )  x     / x     / 5947 U/L / x     / x      CARDIAC MARKERS ( 18 Aug 2018 22:12 )  .028 ng/mL / x     / x     / x     / x          Blood Culture:           RADIOLOGY/EK18  22:20    ectopic atrial rhythm ST T abnormality V1 to  V6  ischemia , ( EKG was done while patient is hypotensive )     18  normal sinus rhythm  mild T wave inversion V1 to V3  prolonged QT  ( patient mild T changes in ekg done 2018 )    < from: US Transthoracic Echocardiogram w/Doppler Complete (18 @ 09:37) >  Grossly normal though somewhat limited study as described   above.        < from: Xray Chest 1 View-PORTABLE IMMEDIATE (18 @ 22:17) >    ET tube is seen with the tip above the chinmay. There is no definite focal   infiltrate or pleural effusion. Azygos lobe is incidentally noted.   Osseous structures are unremarkable.    Impression: Status post ET tube placement. No focalpulmonary disease   noted.    < end of copied text >

## 2018-08-19 NOTE — CONSULT NOTE ADULT - SUBJECTIVE AND OBJECTIVE BOX
INCOMPLETE  NOTE:   Pt seen and evaluated. Full note to follow.   CONSULTATION & DOCUMENTATION IN PROGRESS    Patient is a 53y old  Male who presents with a chief complaint of s/p cardiac arrest (19 Aug 2018 01:06)      HPI:  52M with long history of Parkinson's disease (has been a resident of [facility] for >1 year - [facility] with difficulty placing patient), reported hx of diastolic heart failure, herniated discs who was found unresponsive tonight.  Reportedly, RN said that the patient went to take a shower around 2000 and afterwards patient started to become agitated.  Patient does have a history of agitation and flares his arms and legs often.  Patient was able to calm down and was allowed to go to the day room.  Patient again started to flare his arms and legs and even slid down to the floor.  Patient was then directed back to his room where he continued to be agitated.  Patient was then given ativan 2mg PO for agitation and then nurse was able to instruct the patient to calm down as well.  Nurse stepped outside the room and told the aide to get vitals when the patient was found to be unresponsive all of sudden.  A CODE BLUE was activated at 2132.      During the CODE BLUE, patient was found to be unresponsive.  CPR was already initiated.  Initial rhythm on monitor was asystole.  A total of 4 rounds of epinephrine was given with 2 given peripherally and 2 via femoral central line and a dose of bicarb and calcium chloride was also given in between (see code sheet).  Patient was successfully intubated and patient eventually had ROSC at 2144 with BP 96/39 with HR of 185.  Initial ABG was 6.78/52/247.  Given his tachycardia, patient was also given amio 150mg.  Patient's HR decreased to 70-80s but BP eventually dropped to SBP 40/20s.  Patient was started on levophed with improvement SBP to 90s and HR increased to 110s.  Multiple attempts to contact family member was unsuccessful.  Patient was transferred/admitted to [facility] for further management (18 Aug 2018 22:48)      PAST MEDICAL & SURGICAL HISTORY:  Herniated cervical disc  Congestive heart failure, unspecified congestive heart failure chronicity, unspecified congestive heart failure type  Parkinson disease  Diastolic heart failure  GERD (gastroesophageal reflux disease)  Migraine  CHF (congestive heart failure)  Parkinson disease  No significant past surgical history  No significant past surgical history      HEALTH ISSUES - PROBLEM Dx:  Elevated troponin: Elevated troponin  Hypotension: Hypotension  Preventive measure: Preventive measure  Congestive heart failure, unspecified HF chronicity, unspecified heart failure type: Congestive heart failure, unspecified HF chronicity, unspecified heart failure type  Parkinson disease: Parkinson disease  RAISSA (acute kidney injury): RAISSA (acute kidney injury)  Respiratory acidosis: Respiratory acidosis  Cardiac arrest: Cardiac arrest          FAMILY HISTORY:  No pertinent family history in first degree relatives  No pertinent family history in first degree relatives  Family history of prostate cancer in father  Family history of diabetes mellitus      [SOCIAL HISTORY: ]  smoking:    EtOH:    illicit drugs:    occupation:    marital status:    Other:       [ALLERGIES/INTOLERANCES:]  Allergies    aspirin (Unknown)  Cheese (Unknown)  ibuprofen (Unknown)  Reglan (Swelling)    Intolerances    ibuprofen (Unknown)        [MEDICATIONS]  MEDICATIONS  (STANDING):  calcium gluconate IVPB 2 Gram(s) IV Intermittent every 2 hours  chlorhexidine 0.12% Liquid 15 milliLiter(s) Swish and Spit two times a day  dextrose 5%. 1000 milliLiter(s) (50 mL/Hr) IV Continuous <Continuous>  dextrose 50% Injectable 12.5 Gram(s) IV Push once  dextrose 50% Injectable 25 Gram(s) IV Push once  dextrose 50% Injectable 25 Gram(s) IV Push once  heparin  Infusion.  Unit(s)/Hr (17 mL/Hr) IV Continuous <Continuous>  levETIRAcetam  IVPB 500 milliGRAM(s) IV Intermittent every 12 hours  norepinephrine Infusion 0.05 MICROgram(s)/kG/Min (8.841 mL/Hr) IV Continuous <Continuous>  pantoprazole  Injectable 40 milliGRAM(s) IV Push daily  piperacillin/tazobactam IVPB. 3.375 Gram(s) IV Intermittent every 8 hours  sodium chloride 0.9%. 1000 milliLiter(s) (200 mL/Hr) IV Continuous <Continuous>    MEDICATIONS  (PRN):  ALBUTerol    90 MICROgram(s) HFA Inhaler 2 Puff(s) Inhalation every 6 hours PRN Shortness of Breath and/or Wheezing  dextrose 40% Gel 15 Gram(s) Oral once PRN Blood Glucose LESS THAN 70 milliGRAM(s)/deciLiter  glucagon  Injectable 1 milliGRAM(s) IntraMuscular once PRN Glucose <70 milliGRAM(s)/deciLiter  heparin  Injectable 7500 Unit(s) IV Push every 6 hours PRN For aPTT less than 40  heparin  Injectable 3500 Unit(s) IV Push every 6 hours PRN For aPTT between 40 - 57        [REVIEW OF SYSTEMS: ]  CONSTITUTIONAL: normal   EYES: No eye pain, no visual disturbances, no discharge  ENMT:  no discharge  NECK: No pain, no stiffness  BREASTS: No pain, no masses, no nipple discharge  RESPIRATORY: No cough, no wheezing, no chills, no hemoptysis; No shortness of breath  CARDIOVASCULAR: No chest pain, no palpitations, no dizziness, no leg swelling  GASTROINTESTINAL: No abdominal or epigastric pain. No nausea, no vomiting, no hematemesis; No diarrhea , no constipation. No melena, no hematochezia.  GENITOURINARY: No dysuria, no frequency, no hematuria, no incontinence  NEUROLOGICAL: No headaches, no memory loss, no loss of strength, no numbness, no tremors  SKIN: No itching, no burning, no rashes, no lesions   LYMPH NODES: No enlarged glands  ENDOCRINE: No heat or cold intolerance; No hair loss  MUSCULOSKELETAL: No joint pain or swelling; No muscle, no back, or extremity pain  PSYCHIATRIC: No depression, no anxiety, no mood swings, no difficulty sleeping  HEME/LYMPH: No easy bruising, no bleeding gums    [VITALS SIGNS 24hrs]  Vital Signs Last 24 Hrs  T(C): 35.1 (19 Aug 2018 19:00), Max: 36.8 (19 Aug 2018 00:00)  T(F): 95.2 (19 Aug 2018 19:00), Max: 98.2 (19 Aug 2018 00:00)  HR: 99 (19 Aug 2018 19:19) (62 - 114)  BP: 143/90 (19 Aug 2018 19:00) (103/57 - 143/90)  BP(mean): 104 (19 Aug 2018 19:00) (64 - 104)  RR: 28 (19 Aug 2018 19:00) (15 - 31)  SpO2: 99% (19 Aug 2018 19:19) (97% - 100%)    [PHYSICAL EXAM]  General: adult in NAD,  WN,  WD.  HEENT: clear oropharynx, anicteric sclera, pink conjunctivae.  Neck: supple, no masses.  CV: normal S1S2, no murmur, no rubs, no gallops.  Lungs: clear to auscultation, no wheezes, no rales, no rhonchi.  Abdomen: soft, non-tender, non-distended, no hepatosplenomegaly, normal BS, no guarding.  Ext: no clubbing, no cyanosis, no edema.  Skin: no rashes,  no petechiae, no venous stasis changes.  Neuro: alert and oriented X3, no focal motor deficits.  LN: no AN.      [LABS:]                        12.2   3.21  )-----------( 268      ( 19 Aug 2018 06:56 )             38.5     CBC Full  -  ( 19 Aug 2018 06:56 )  WBC Count : 3.21 K/uL  Hemoglobin : 12.2 g/dL  Hematocrit : 38.5 %  Platelet Count - Automated : 268 K/uL  Mean Cell Volume : 96.5 fl  Mean Cell Hemoglobin : 30.6 pg  Mean Cell Hemoglobin Concentration : 31.7 gm/dL  Auto Neutrophil # : 2.63 K/uL  Auto Lymphocyte # : 0.45 K/uL  Auto Monocyte # : 0.00 K/uL  Auto Eosinophil # : 0.00 K/uL  Auto Basophil # : 0.00 K/uL  Auto Neutrophil % : 65.0 %  Auto Lymphocyte % : 14.0 %  Auto Monocyte % : 0.0 %  Auto Eosinophil % : 0.0 %  Auto Basophil % : 0.0 %    08-19    147<H>  |  114<H>  |  37<H>  ----------------------------<  134<H>  4.1   |  19<L>  |  2.10<H>    Ca    6.4<LL>      19 Aug 2018 18:45  Phos  4.9     08-19  Mg     2.4     08-19    TPro  5.3<L>  /  Alb  2.8<L>  /  TBili  0.5  /  DBili  x   /  AST  2185<H>  /  ALT  157<H>  /  AlkPhos  77  08-19    PT/INR - ( 19 Aug 2018 13:24 )   PT: 14.6 sec;   INR: 1.33 ratio         PTT - ( 19 Aug 2018 13:05 )  PTT:115.3 sec  LIVER FUNCTIONS - ( 19 Aug 2018 06:56 )  Alb: 2.8 g/dL / Pro: 5.3 g/dL / ALK PHOS: 77 U/L / ALT: 157 U/L DA / AST: 2185 U/L / GGT: x           CARDIAC MARKERS ( 19 Aug 2018 16:33 )  x     / x     / >75172 U/L / x     / x      CARDIAC MARKERS ( 19 Aug 2018 07:00 )  x     / x     / 25023 U/L / x     / x      CARDIAC MARKERS ( 19 Aug 2018 06:56 )  .901 ng/mL / x     / x     / x     / x      CARDIAC MARKERS ( 19 Aug 2018 00:24 )  x     / x     / 5947 U/L / x     / x      CARDIAC MARKERS ( 18 Aug 2018 22:12 )  .028 ng/mL / x     / x     / x     / x              WBC  TREND (5 Days)  WBC Count: 3.21 K/uL (08-19 @ 06:56)  WBC Count: 17.63 K/uL (08-19 @ 00:24)  WBC Count: 15.54 K/uL (08-18 @ 22:12)    HGB  TREND (5 Days)  Hemoglobin: 12.2 g/dL (08-19 @ 06:56)  Hemoglobin: 12.6 g/dL (08-19 @ 00:24)  Hemoglobin: 12.1 g/dL (08-18 @ 22:12)    HCT  TREND (5 Days)  Hematocrit: 38.5 % (08-19 @ 06:56)  Hematocrit: 38.3 % (08-19 @ 00:24)  Hematocrit: 41.5 % (08-18 @ 22:12)    PLT  TREND (5 Days)  Platelet Count - Automated: 268 K/uL (08-19 @ 06:56)  Platelet Count - Automated: 341 K/uL (08-19 @ 00:24)  Platelet Count - Automated: 337 K/uL (08-18 @ 22:12)        [BLOOD SMEAR INTERPRETATION: ]  RBC:   WBC:   Plts:     [RADIOLOGY & ADDITIONAL STUDIES:] INCOMPLETE  NOTE:   Pt seen and evaluated. Full note to follow.   CONSULTATION & DOCUMENTATION IN PROGRESS    Patient is a 53y old  Male who presents with a chief complaint of s/p cardiac arrest (19 Aug 2018 01:06)      HPI:  52M with long history of Parkinson's disease (has been a resident of [facility] for >1 year - [facility] with difficulty placing patient), reported hx of diastolic heart failure, herniated discs who was found unresponsive tonight.  Reportedly, RN said that the patient went to take a shower around 2000 and afterwards patient started to become agitated.  Patient does have a history of agitation and flares his arms and legs often.  Patient was able to calm down and was allowed to go to the day room.  Patient again started to flare his arms and legs and even slid down to the floor.  Patient was then directed back to his room where he continued to be agitated.  Patient was then given ativan 2mg PO for agitation and then nurse was able to instruct the patient to calm down as well.  Nurse stepped outside the room and told the aide to get vitals when the patient was found to be unresponsive all of sudden.  A CODE BLUE was activated at 2132.      During the CODE BLUE, patient was found to be unresponsive.  CPR was already initiated.  Initial rhythm on monitor was asystole.  A total of 4 rounds of epinephrine was given with 2 given peripherally and 2 via femoral central line and a dose of bicarb and calcium chloride was also given in between (see code sheet).  Patient was successfully intubated and patient eventually had ROSC at 2144 with BP 96/39 with HR of 185.  Initial ABG was 6.78/52/247.  Given his tachycardia, patient was also given amio 150mg.  Patient's HR decreased to 70-80s but BP eventually dropped to SBP 40/20s.  Patient was started on levophed with improvement SBP to 90s and HR increased to 110s.  Multiple attempts to contact family member was unsuccessful.  Patient was transferred/admitted to [facility] for further management (18 Aug 2018 22:48)        PAST MEDICAL & SURGICAL HISTORY:  Herniated cervical disc  Congestive heart failure, unspecified congestive heart failure chronicity, unspecified congestive heart failure type  Parkinson disease  Diastolic heart failure  GERD (gastroesophageal reflux disease)  Migraine  CHF (congestive heart failure)  Parkinson disease  No significant past surgical history  No significant past surgical history      HEALTH ISSUES - PROBLEM Dx:  Elevated troponin: Elevated troponin  Hypotension: Hypotension  Preventive measure: Preventive measure  Congestive heart failure, unspecified HF chronicity, unspecified heart failure type: Congestive heart failure, unspecified HF chronicity, unspecified heart failure type  Parkinson disease: Parkinson disease  RAISSA (acute kidney injury): RAISSA (acute kidney injury)  Respiratory acidosis: Respiratory acidosis  Cardiac arrest: Cardiac arrest          FAMILY HISTORY:  No pertinent family history in first degree relatives  No pertinent family history in first degree relatives  Family history of prostate cancer in father  Family history of diabetes mellitus      [SOCIAL HISTORY: ]  smoking:    EtOH:    illicit drugs:    occupation:    marital status:    Other:       [ALLERGIES/INTOLERANCES:]  Allergies    aspirin (Unknown)  Cheese (Unknown)  ibuprofen (Unknown)  Reglan (Swelling)    Intolerances    ibuprofen (Unknown)        [MEDICATIONS]  MEDICATIONS  (STANDING):  calcium gluconate IVPB 2 Gram(s) IV Intermittent every 2 hours  chlorhexidine 0.12% Liquid 15 milliLiter(s) Swish and Spit two times a day  dextrose 5%. 1000 milliLiter(s) (50 mL/Hr) IV Continuous <Continuous>  dextrose 50% Injectable 12.5 Gram(s) IV Push once  dextrose 50% Injectable 25 Gram(s) IV Push once  dextrose 50% Injectable 25 Gram(s) IV Push once  heparin  Infusion.  Unit(s)/Hr (17 mL/Hr) IV Continuous <Continuous>  levETIRAcetam  IVPB 500 milliGRAM(s) IV Intermittent every 12 hours  norepinephrine Infusion 0.05 MICROgram(s)/kG/Min (8.841 mL/Hr) IV Continuous <Continuous>  pantoprazole  Injectable 40 milliGRAM(s) IV Push daily  piperacillin/tazobactam IVPB. 3.375 Gram(s) IV Intermittent every 8 hours  sodium chloride 0.9%. 1000 milliLiter(s) (200 mL/Hr) IV Continuous <Continuous>    MEDICATIONS  (PRN):  ALBUTerol    90 MICROgram(s) HFA Inhaler 2 Puff(s) Inhalation every 6 hours PRN Shortness of Breath and/or Wheezing  dextrose 40% Gel 15 Gram(s) Oral once PRN Blood Glucose LESS THAN 70 milliGRAM(s)/deciLiter  glucagon  Injectable 1 milliGRAM(s) IntraMuscular once PRN Glucose <70 milliGRAM(s)/deciLiter  heparin  Injectable 7500 Unit(s) IV Push every 6 hours PRN For aPTT less than 40  heparin  Injectable 3500 Unit(s) IV Push every 6 hours PRN For aPTT between 40 - 57        [REVIEW OF SYSTEMS: ]  CONSTITUTIONAL: normal   EYES: No eye pain, no visual disturbances, no discharge  ENMT:  no discharge  NECK: No pain, no stiffness  BREASTS: No pain, no masses, no nipple discharge  RESPIRATORY: No cough, no wheezing, no chills, no hemoptysis; No shortness of breath  CARDIOVASCULAR: No chest pain, no palpitations, no dizziness, no leg swelling  GASTROINTESTINAL: No abdominal or epigastric pain. No nausea, no vomiting, no hematemesis; No diarrhea , no constipation. No melena, no hematochezia.  GENITOURINARY: No dysuria, no frequency, no hematuria, no incontinence  NEUROLOGICAL: No headaches, no memory loss, no loss of strength, no numbness, no tremors  SKIN: No itching, no burning, no rashes, no lesions   LYMPH NODES: No enlarged glands  ENDOCRINE: No heat or cold intolerance; No hair loss  MUSCULOSKELETAL: No joint pain or swelling; No muscle, no back, or extremity pain  PSYCHIATRIC: No depression, no anxiety, no mood swings, no difficulty sleeping  HEME/LYMPH: No easy bruising, no bleeding gums    [VITALS SIGNS 24hrs]  Vital Signs Last 24 Hrs  T(C): 35.1 (19 Aug 2018 19:00), Max: 36.8 (19 Aug 2018 00:00)  T(F): 95.2 (19 Aug 2018 19:00), Max: 98.2 (19 Aug 2018 00:00)  HR: 99 (19 Aug 2018 19:19) (62 - 114)  BP: 143/90 (19 Aug 2018 19:00) (103/57 - 143/90)  BP(mean): 104 (19 Aug 2018 19:00) (64 - 104)  RR: 28 (19 Aug 2018 19:00) (15 - 31)  SpO2: 99% (19 Aug 2018 19:19) (97% - 100%)    [PHYSICAL EXAM]  General: adult in NAD,  WN,  WD.  HEENT: clear oropharynx, anicteric sclera, pink conjunctivae.  Neck: supple, no masses.  CV: normal S1S2, no murmur, no rubs, no gallops.  Lungs: clear to auscultation, no wheezes, no rales, no rhonchi.  Abdomen: soft, non-tender, non-distended, no hepatosplenomegaly, normal BS, no guarding.  Ext: no clubbing, no cyanosis, no edema.  Skin: no rashes,  no petechiae, no venous stasis changes.  Neuro: alert and oriented X3, no focal motor deficits.  LN: no AN.      [LABS:]                        12.2   3.21  )-----------( 268      ( 19 Aug 2018 06:56 )             38.5     CBC Full  -  ( 19 Aug 2018 06:56 )  WBC Count : 3.21 K/uL  Hemoglobin : 12.2 g/dL  Hematocrit : 38.5 %  Platelet Count - Automated : 268 K/uL  Mean Cell Volume : 96.5 fl  Mean Cell Hemoglobin : 30.6 pg  Mean Cell Hemoglobin Concentration : 31.7 gm/dL  Auto Neutrophil # : 2.63 K/uL  Auto Lymphocyte # : 0.45 K/uL  Auto Monocyte # : 0.00 K/uL  Auto Eosinophil # : 0.00 K/uL  Auto Basophil # : 0.00 K/uL  Auto Neutrophil % : 65.0 %  Auto Lymphocyte % : 14.0 %  Auto Monocyte % : 0.0 %  Auto Eosinophil % : 0.0 %  Auto Basophil % : 0.0 %    08-19    147<H>  |  114<H>  |  37<H>  ----------------------------<  134<H>  4.1   |  19<L>  |  2.10<H>    Ca    6.4<LL>      19 Aug 2018 18:45  Phos  4.9     08-19  Mg     2.4     08-19    TPro  5.3<L>  /  Alb  2.8<L>  /  TBili  0.5  /  DBili  x   /  AST  2185<H>  /  ALT  157<H>  /  AlkPhos  77  08-19    PT/INR - ( 19 Aug 2018 13:24 )   PT: 14.6 sec;   INR: 1.33 ratio         PTT - ( 19 Aug 2018 13:05 )  PTT:115.3 sec  LIVER FUNCTIONS - ( 19 Aug 2018 06:56 )  Alb: 2.8 g/dL / Pro: 5.3 g/dL / ALK PHOS: 77 U/L / ALT: 157 U/L DA / AST: 2185 U/L / GGT: x           CARDIAC MARKERS ( 19 Aug 2018 16:33 )  x     / x     / >82735 U/L / x     / x      CARDIAC MARKERS ( 19 Aug 2018 07:00 )  x     / x     / 39084 U/L / x     / x      CARDIAC MARKERS ( 19 Aug 2018 06:56 )  .901 ng/mL / x     / x     / x     / x      CARDIAC MARKERS ( 19 Aug 2018 00:24 )  x     / x     / 5947 U/L / x     / x      CARDIAC MARKERS ( 18 Aug 2018 22:12 )  .028 ng/mL / x     / x     / x     / x              WBC  TREND (5 Days)  WBC Count: 3.21 K/uL (08-19 @ 06:56)  WBC Count: 17.63 K/uL (08-19 @ 00:24)  WBC Count: 15.54 K/uL (08-18 @ 22:12)    HGB  TREND (5 Days)  Hemoglobin: 12.2 g/dL (08-19 @ 06:56)  Hemoglobin: 12.6 g/dL (08-19 @ 00:24)  Hemoglobin: 12.1 g/dL (08-18 @ 22:12)    HCT  TREND (5 Days)  Hematocrit: 38.5 % (08-19 @ 06:56)  Hematocrit: 38.3 % (08-19 @ 00:24)  Hematocrit: 41.5 % (08-18 @ 22:12)    PLT  TREND (5 Days)  Platelet Count - Automated: 268 K/uL (08-19 @ 06:56)  Platelet Count - Automated: 341 K/uL (08-19 @ 00:24)  Platelet Count - Automated: 337 K/uL (08-18 @ 22:12)        [BLOOD SMEAR INTERPRETATION: ]  RBC:   WBC:   Plts:     [RADIOLOGY & ADDITIONAL STUDIES:] Patient is a 53y old  Male who presents with a chief complaint of s/p cardiac arrest (19 Aug 2018 01:06)      HPI:  52M with long history of Parkinson's disease (has been a resident of [facility] for >1 year - [facility] with difficulty placing patient), reported hx of diastolic heart failure, herniated discs who was found unresponsive tonight.  Reportedly, RN said that the patient went to take a shower around 2000 and afterwards patient started to become agitated.  Patient does have a history of agitation and flares his arms and legs often.  Patient was able to calm down and was allowed to go to the day room.  Patient again started to flare his arms and legs and even slid down to the floor.  Patient was then directed back to his room where he continued to be agitated.  Patient was then given ativan 2mg PO for agitation and then nurse was able to instruct the patient to calm down as well.  Nurse stepped outside the room and told the aide to get vitals when the patient was found to be unresponsive all of sudden.  A CODE BLUE was activated at 2132.      During the CODE BLUE, patient was found to be unresponsive.  CPR was already initiated.  Initial rhythm on monitor was asystole.  A total of 4 rounds of epinephrine was given with 2 given peripherally and 2 via femoral central line and a dose of bicarb and calcium chloride was also given in between (see code sheet).  Patient was successfully intubated and patient eventually had ROSC at 2144 with BP 96/39 with HR of 185.  Initial ABG was 6.78/52/247.  Given his tachycardia, patient was also given amio 150mg.  Patient's HR decreased to 70-80s but BP eventually dropped to SBP 40/20s.  Patient was started on levophed with improvement SBP to 90s and HR increased to 110s.  Multiple attempts to contact family member was unsuccessful.  Patient was transferred/admitted to [facility] for further management (18 Aug 2018 22:48)    heme asked to see pt for possible PE.   Seen by CCM/Pulm, cardiology and nephrology, and neurology.   Since cardiac arrest, on hypothermia protocol. Had prolonged cardiac arrest, ~ 20min.   CP trending upwards.       PAST MEDICAL & SURGICAL HISTORY:  Herniated cervical disc  Congestive heart failure, unspecified congestive heart failure chronicity, unspecified congestive heart failure type  Parkinson disease  Diastolic heart failure  GERD (gastroesophageal reflux disease)  Migraine  CHF (congestive heart failure)  Parkinson disease  No significant past surgical history  No significant past surgical history      HEALTH ISSUES - PROBLEM Dx:  Elevated troponin: Elevated troponin  Hypotension: Hypotension  Preventive measure: Preventive measure  Congestive heart failure, unspecified HF chronicity, unspecified heart failure type: Congestive heart failure, unspecified HF chronicity, unspecified heart failure type  Parkinson disease: Parkinson disease  RAISSA (acute kidney injury): RAISSA (acute kidney injury)  Respiratory acidosis: Respiratory acidosis  Cardiac arrest: Cardiac arrest          FAMILY HISTORY:  No pertinent family history in first degree relatives  No pertinent family history in first degree relatives  Family history of prostate cancer in father  Family history of diabetes mellitus      [SOCIAL HISTORY: ]  unable to obtain    [ALLERGIES/INTOLERANCES:]  Allergies    aspirin (Unknown)  Cheese (Unknown)  ibuprofen (Unknown)  Reglan (Swelling)    Intolerances    ibuprofen (Unknown)          MEDICATIONS  (STANDING):  calcium gluconate IVPB 2 Gram(s) IV Intermittent every 2 hours  chlorhexidine 0.12% Liquid 15 milliLiter(s) Swish and Spit two times a day  dextrose 5%. 1000 milliLiter(s) (50 mL/Hr) IV Continuous <Continuous>  dextrose 50% Injectable 12.5 Gram(s) IV Push once  dextrose 50% Injectable 25 Gram(s) IV Push once  dextrose 50% Injectable 25 Gram(s) IV Push once  heparin  Infusion.  Unit(s)/Hr (17 mL/Hr) IV Continuous <Continuous>  levETIRAcetam  IVPB 500 milliGRAM(s) IV Intermittent every 12 hours  norepinephrine Infusion 0.05 MICROgram(s)/kG/Min (8.841 mL/Hr) IV Continuous <Continuous>  pantoprazole  Injectable 40 milliGRAM(s) IV Push daily  piperacillin/tazobactam IVPB. 3.375 Gram(s) IV Intermittent every 8 hours  sodium chloride 0.9%. 1000 milliLiter(s) (200 mL/Hr) IV Continuous <Continuous>    MEDICATIONS  (PRN):  ALBUTerol    90 MICROgram(s) HFA Inhaler 2 Puff(s) Inhalation every 6 hours PRN Shortness of Breath and/or Wheezing  dextrose 40% Gel 15 Gram(s) Oral once PRN Blood Glucose LESS THAN 70 milliGRAM(s)/deciLiter  glucagon  Injectable 1 milliGRAM(s) IntraMuscular once PRN Glucose <70 milliGRAM(s)/deciLiter  heparin  Injectable 7500 Unit(s) IV Push every 6 hours PRN For aPTT less than 40  heparin  Injectable 3500 Unit(s) IV Push every 6 hours PRN For aPTT between 40 - 57        [REVIEW OF SYSTEMS: ]  unable to obtain    [VITALS SIGNS 24hrs]  Vital Signs Last 24 Hrs  T(C): 35.1 (19 Aug 2018 19:00), Max: 36.8 (19 Aug 2018 00:00)  T(F): 95.2 (19 Aug 2018 19:00), Max: 98.2 (19 Aug 2018 00:00)  HR: 99 (19 Aug 2018 19:19) (62 - 114)  BP: 143/90 (19 Aug 2018 19:00) (103/57 - 143/90)  BP(mean): 104 (19 Aug 2018 19:00) (64 - 104)  RR: 28 (19 Aug 2018 19:00) (15 - 31)  SpO2: 99% (19 Aug 2018 19:19) (97% - 100%)    [PHYSICAL EXAM]  General: adult in NAD,  WN,  WD, intubated  HEENT: clear oropharynx, anicteric sclera, pink conjunctivae., BL pinpoint pupil  Neck: supple, no masses.  CV: normal S1S2, no murmur, no rubs, no gallops.  Lungs: clear to auscultation, no wheezes, no rales, no rhonchi.  Abdomen: soft, non-tender, non-distended, no hepatosplenomegaly, normal BS, no guarding.  Ext: no clubbing, no cyanosis, no edema.  Skin: no rashes,  no petechiae, no venous stasis changes.  Neuro: alert and oriented X0.   LN: no SC AN.      [LABS:]                        12.2   3.21  )-----------( 268      ( 19 Aug 2018 06:56 )             38.5     CBC Full  -  ( 19 Aug 2018 06:56 )  WBC Count : 3.21 K/uL  Hemoglobin : 12.2 g/dL  Hematocrit : 38.5 %  Platelet Count - Automated : 268 K/uL  Mean Cell Volume : 96.5 fl  Mean Cell Hemoglobin : 30.6 pg  Mean Cell Hemoglobin Concentration : 31.7 gm/dL  Auto Neutrophil # : 2.63 K/uL  Auto Lymphocyte # : 0.45 K/uL  Auto Monocyte # : 0.00 K/uL  Auto Eosinophil # : 0.00 K/uL  Auto Basophil # : 0.00 K/uL  Auto Neutrophil % : 65.0 %  Auto Lymphocyte % : 14.0 %  Auto Monocyte % : 0.0 %  Auto Eosinophil % : 0.0 %  Auto Basophil % : 0.0 %    08-19  147<H>  |  114<H>  |  37<H>  ----------------------------<  134<H>  4.1   |  19<L>  |  2.10<H>    Ca    6.4<LL>      19 Aug 2018 18:45  Phos  4.9     08-19  Mg     2.4     08-19    TPro  5.3<L>  /  Alb  2.8<L>  /  TBili  0.5  /  DBili  x   /  AST  2185<H>  /  ALT  157<H>  /  AlkPhos  77  08-19    PT/INR - ( 19 Aug 2018 13:24 )   PT: 14.6 sec;   INR: 1.33 ratio         PTT - ( 19 Aug 2018 13:05 )  PTT:115.3 sec  LIVER FUNCTIONS - ( 19 Aug 2018 06:56 )  Alb: 2.8 g/dL / Pro: 5.3 g/dL / ALK PHOS: 77 U/L / ALT: 157 U/L DA / AST: 2185 U/L / GGT: x           CARDIAC MARKERS ( 19 Aug 2018 16:33 )  x     / x     / >90241 U/L / x     / x      CARDIAC MARKERS ( 19 Aug 2018 07:00 )  x     / x     / 35894 U/L / x     / x      CARDIAC MARKERS ( 19 Aug 2018 06:56 )  .901 ng/mL / x     / x     / x     / x      CARDIAC MARKERS ( 19 Aug 2018 00:24 )  x     / x     / 5947 U/L / x     / x      CARDIAC MARKERS ( 18 Aug 2018 22:12 )  .028 ng/mL / x     / x     / x     / x              WBC  TREND (5 Days)  WBC Count: 3.21 K/uL (08-19 @ 06:56)  WBC Count: 17.63 K/uL (08-19 @ 00:24)  WBC Count: 15.54 K/uL (08-18 @ 22:12)    HGB  TREND (5 Days)  Hemoglobin: 12.2 g/dL (08-19 @ 06:56)  Hemoglobin: 12.6 g/dL (08-19 @ 00:24)  Hemoglobin: 12.1 g/dL (08-18 @ 22:12)    HCT  TREND (5 Days)  Hematocrit: 38.5 % (08-19 @ 06:56)  Hematocrit: 38.3 % (08-19 @ 00:24)  Hematocrit: 41.5 % (08-18 @ 22:12)    PLT  TREND (5 Days)  Platelet Count - Automated: 268 K/uL (08-19 @ 06:56)  Platelet Count - Automated: 341 K/uL (08-19 @ 00:24)  Platelet Count - Automated: 337 K/uL (08-18 @ 22:12)          [RADIOLOGY & ADDITIONAL STUDIES:]  < from: Xray Chest 1 View-PORTABLE IMMEDIATE (08.18.18 @ 22:17) >  EXAM:  XR CHEST PORTABLE IMMED 1V                        PROCEDURE DATE:  08/18/2018    INTERPRETATION:  History: ET tube placement status post cardiac arrest    Portable supine radiograph is performed of the chest are compared to   5/12/2017.    ET tube is seen with the tip above the chinmay. There is no definite focal   infiltrate or pleural effusion. Azygos lobe is incidentally noted.   Osseous structures are unremarkable.    Impression: Status post ET tube placement. No focalpulmonary disease   noted.    BUSTER ROPER M.D.,ATTENDING RADIOLOGIST  This document has been electronically signed. Aug 19 2018 10:23AM    < end of copied text >

## 2018-08-19 NOTE — PROVIDER CONTACT NOTE (EICU) - ASSESSMENT
53 male s/p cardiac arrest/rosc, on hypothermia protocol, with seizure activity most likely 2/2 to anoxia induced cerbral edema s/p 4mg ativan ivp with response.

## 2018-08-19 NOTE — DIETITIAN INITIAL EVALUATION ADULT. - PROBLEM SELECTOR PLAN 1
- admitted to SPCU  - pulm/cc consult   - continue with pressors (keep MAP >60)  -attempt to call family/HCP (multiple attempts have failed, contacted both Erma @ [phone]) who is the HCP and Jayshree who is the daughter)  - will initiate hypothermia protocol  - will empirically start heparin for possible PE given significant elevation of d-dimer  - trend cardiac enzymes  - trend lactate  - maintain vent support

## 2018-08-19 NOTE — PROGRESS NOTE ADULT - SUBJECTIVE AND OBJECTIVE BOX
CC.  S/P cardiac arrest  HPI.  Patient is intubated.  No sedation.  Unable to obtain ROS or History due to current mentation    Vital Signs Last 24 Hrs  T(C): 35.5 (19 Aug 2018 12:54), Max: 36.8 (19 Aug 2018 00:00)  T(F): 95.9 (19 Aug 2018 12:54), Max: 98.2 (19 Aug 2018 00:00)  HR: 91 (19 Aug 2018 13:00) (82 - 114)  BP: 137/82 (19 Aug 2018 13:00) (103/57 - 142/82)  BP(mean): 98 (19 Aug 2018 13:00) (64 - 98)  RR: 28 (19 Aug 2018 13:00) (15 - 29)  SpO2: 100% (19 Aug 2018 13:00) (97% - 100%)      PHYSICAL EXAM-  GENERAL: Chronic ill appearing male  HEAD:  Atraumatic, Normocephalic  EYES: EOMI, PERRLA, conjunctiva and sclera clear  NECK: Supple, No JVD, Normal thyroid  NERVOUS SYSTEM:  unresponsicve to painful or verbral stimuli  CHEST/LUNG: Min rhonchi with good air entry.  No retractions or accessory muscle usage  HEART: Regular rate and rhythm; No murmurs, rubs, or gallops  ABDOMEN: Soft, Nontender, Nondistended; Bowel sounds present  EXTREMITIES:  2+ Peripheral Pulses, No clubbing, cyanosis, or edema  SKIN: Old ecchymosis on the left anterior chin, and left knee measuring 1 cm  abrasion noticed on the left toe, left knee, left anterior shin, and right knee measuring 1 cm  No other rash, ecchymosis, or abrasion noticed                               12.2   3.21  )-----------( 268      ( 19 Aug 2018 06:56 )             38.5     08-19    x   |  x   |  x   ----------------------------<  x   3.4<L>   |  x   |  x     Ca    7.2<L>      19 Aug 2018 06:56  Phos  4.2     08-19  Mg     2.4     08-19    TPro  5.3<L>  /  Alb  2.8<L>  /  TBili  0.5  /  DBili  x   /  AST  2185<H>  /  ALT  157<H>  /  AlkPhos  77  08-19    CARDIAC MARKERS ( 19 Aug 2018 07:00 )  x     / x     / 23094 U/L / x     / x      CARDIAC MARKERS ( 19 Aug 2018 06:56 )  .901 ng/mL / x     / x     / x     / x      CARDIAC MARKERS ( 19 Aug 2018 00:24 )  x     / x     / 5947 U/L / x     / x      CARDIAC MARKERS ( 18 Aug 2018 22:12 )  .028 ng/mL / x     / x     / x     / x              PT/INR - ( 19 Aug 2018 13:24 )   PT: 14.6 sec;   INR: 1.33 ratio         PTT - ( 19 Aug 2018 13:05 )  PTT:115.3 sec      MEDICATIONS  (STANDING):  chlorhexidine 0.12% Liquid 15 milliLiter(s) Swish and Spit two times a day  dextrose 5%. 1000 milliLiter(s) (50 mL/Hr) IV Continuous <Continuous>  dextrose 50% Injectable 12.5 Gram(s) IV Push once  dextrose 50% Injectable 25 Gram(s) IV Push once  dextrose 50% Injectable 25 Gram(s) IV Push once  heparin  Infusion.  Unit(s)/Hr (17 mL/Hr) IV Continuous <Continuous>  insulin lispro (HumaLOG) corrective regimen sliding scale   SubCutaneous every 6 hours  lactated ringers. 1000 milliLiter(s) (100 mL/Hr) IV Continuous <Continuous>  levETIRAcetam  IVPB 500 milliGRAM(s) IV Intermittent every 12 hours  norepinephrine Infusion 0.05 MICROgram(s)/kG/Min (8.841 mL/Hr) IV Continuous <Continuous>  pantoprazole  Injectable 40 milliGRAM(s) IV Push daily  piperacillin/tazobactam IVPB. 3.375 Gram(s) IV Intermittent every 8 hours  potassium chloride  10 mEq/100 mL IVPB 10 milliEquivalent(s) IV Intermittent every 1 hour    MEDICATIONS  (PRN):  ALBUTerol    90 MICROgram(s) HFA Inhaler 2 Puff(s) Inhalation every 6 hours PRN Shortness of Breath and/or Wheezing  dextrose 40% Gel 15 Gram(s) Oral once PRN Blood Glucose LESS THAN 70 milliGRAM(s)/deciLiter  glucagon  Injectable 1 milliGRAM(s) IntraMuscular once PRN Glucose <70 milliGRAM(s)/deciLiter  heparin  Injectable 7500 Unit(s) IV Push every 6 hours PRN For aPTT less than 40  heparin  Injectable 3500 Unit(s) IV Push every 6 hours PRN For aPTT between 40 - 57    Imaging Personally Reviewed:     [x ] YES  [ ] NO    Consultant(s) Notes Reviewed:  [x ] YES  [ ] NO    Care Discussed with Consultants/Other Providers [x ] YES  [ ] NO

## 2018-08-19 NOTE — PROVIDER CONTACT NOTE (EICU) - ACTION/TREATMENT ORDERED:
Case open for clinical review, spoke with Lakesha Esposito representative. Reference #: [id number]. Case open for clinical review, spoke with Lakesha Esposito representative. Reference #: [id number].

## 2018-08-19 NOTE — DIETITIAN INITIAL EVALUATION ADULT. - OTHER INFO
Pt well known to [facility] RD's from a lengthy [facility] admission. Pt with extensive pmhx as below, transferred to SCU s/p cardiac arrest on that psychiatry [facility]. [facility]. skin -intact. pt had stage II sacral pressure injury earlier in summer that healed. In view of current condition, enteral nutrition is warranted unless his wishes known by health care agent reveal otherwise. If  EN indicated, Pulmocare to goal rate of 70 ml/hr x 24 hours/day, this is assuming ~ 85% delivery as in the ICU it is not uncommon for pt to receive ~85% of needs as tf held for hygiene care, other nursing tasks, tests and procedures. TF pump study will be conducted regularly to assess adequacy of delivery and further tf recommendations to be made if pt consistently over or underfed. Pulmocare at 70 ml/hr x 24 hours with ~ 85% delivery will provide 2142 kcals , ~ 90 gm pro, additional pro needs can be met with [place] Prosource  30 ml daily via feeding tube ( for an additional 15 gm pro or 105 gm /daywhich meets midrange of assessed pro needs). Gudelia recommendations for advancement of feeds but RD aware pt on Levophed and  pts vitals may impact progression of feeds .

## 2018-08-19 NOTE — CONSULT NOTE ADULT - ASSESSMENT
[ASSESSMENT and  PLAN]  I have discussed the above plan of care with patient/family in detail. They expressed understanding of the treatment plan . Risks, benefits and alternatives discussed in detail. I have asked if they have any questions or concerns and appropriately addressed them; all questions answered to their satisfactions and in lay terms.     > xxxxxx minutes spent in direct patient care, examining and counseling patient,  reviewing  the notes, lab data/ imaging , discussion with multidisciplinary team. [ASSESSMENT and  PLAN]    54yo with neurological and psychiatric illness.   s/p cardiac arrest, with asystole. with ROSC after 20min CPR. Cause unclear.   Shock kidney and liver.   On empiric tx for possible PE, and antibiotics for possible infection.   RAISSA with inability to image to determine if has PE. Duplex LE negative.   Normal PT and PTT at time of cardiac arrest.     Mild anemia.     Leukopenia due to critical illness.     RECOMMEND:   Unfortunately little to contribute from heme perspective at current juncture.   continue heparin per protocol for PE/anticoagulation.   Imaging of brain and chest when able.   other mgmt of multiorgan failure per pulm/critical care.   Likely prognosis poor.

## 2018-08-19 NOTE — PROGRESS NOTE ADULT - SUBJECTIVE AND OBJECTIVE BOX
Neurology Follow up note(covering dr ley)    BARTOLO GREENE53yMale    HPI:  52M with long history of Parkinson's disease (has been a resident of 1[facility] for >1 year - 1[facility] with difficulty placing patient), reported hx of diastolic heart failure, herniated discs who was found unresponsive tonight.  Reportedly, RN said that the patient went to take a shower around 2000 and afterwards patient started to become agitated.  Patient does have a history of agitation and flares his arms and legs often.  Patient was able to calm down and was allowed to go to the day room.  Patient again started to flare his arms and legs and even slid down to the floor.  Patient was then directed back to his room where he continued to be agitated.  Patient was then given ativan 2mg PO for agitation and then nurse was able to instruct the patient to calm down as well.  Nurse stepped outside the room and told the aide to get vitals when the patient was found to be unresponsive all of sudden.  A CODE BLUE was activated at 2132.      During the CODE BLUE, patient was found to be unresponsive.  CPR was already initiated.  Initial rhythm on monitor was asystole.  A total of 4 rounds of epinephrine was given with 2 given peripherally and 2 via femoral central line and a dose of bicarb and calcium chloride was also given in between (see code sheet).  Patient was successfully intubated and patient eventually had ROSC at 2144 with BP 96/39 with HR of 185.  Initial ABG was 6.78/52/247.  Given his tachycardia, patient was also given amio 150mg.  Patient's HR decreased to 70-80s but BP eventually dropped to SBP 40/20s.  Patient was started on levophed with improvement SBP to 90s and HR increased to 110s.  Multiple attempts to contact family member was unsuccessful.  Patient was transferred/admitted to SPCU for further management (18 Aug 2018 22:48)      Interval History - events noted,    Patient is seen, chart was reviewed and case was discussed with the treatment team.  Pt is not in any distress.       Vital Signs Last 24 Hrs  T(C): 34.5 (19 Aug 2018 15:00), Max: 36.8 (19 Aug 2018 00:00)  T(F): 94.1 (19 Aug 2018 15:00), Max: 98.2 (19 Aug 2018 00:00)  HR: 88 (19 Aug 2018 15:00) (82 - 114)  BP: 134/85 (19 Aug 2018 15:00) (103/57 - 142/82)  BP(mean): 100 (19 Aug 2018 15:00) (64 - 100)  RR: 28 (19 Aug 2018 15:00) (15 - 31)  SpO2: 100% (19 Aug 2018 15:00) (97% - 100%)    Height (cm): 177.8 (08-19 @ 11:02)  Weight (kg): 94.3 (08-18 @ 23:51)  BMI (kg/m2): 29.8 (08-19 @ 11:02)        On Neurological Examination:    Mental Status - Pt is on hypothermia protocol with T(C) of 34.5.  PATIENT IS UNRESPONSIVE TO VERBAL AND PAINFUL STIMULI.    Speech -  NON VERBAL.    Cranial Nerves - Pupils 2 mm equal and non reactive to light,  no corneal.  Pt has no facial asymmetry.     Motor Exam - no movement of arm and leg observed.    Sensory Exam - no withdrawal to pin prick.      Deep tendon Reflexes - 1 plus all over.         Neck Supple -  Yes.     MEDICATIONS    ALBUTerol    90 MICROgram(s) HFA Inhaler 2 Puff(s) Inhalation every 6 hours PRN  chlorhexidine 0.12% Liquid 15 milliLiter(s) Swish and Spit two times a day  dextrose 40% Gel 15 Gram(s) Oral once PRN  dextrose 5%. 1000 milliLiter(s) IV Continuous <Continuous>  dextrose 50% Injectable 12.5 Gram(s) IV Push once  dextrose 50% Injectable 25 Gram(s) IV Push once  dextrose 50% Injectable 25 Gram(s) IV Push once  glucagon  Injectable 1 milliGRAM(s) IntraMuscular once PRN  heparin  Infusion.  Unit(s)/Hr IV Continuous <Continuous>  heparin  Injectable 7500 Unit(s) IV Push every 6 hours PRN  heparin  Injectable 3500 Unit(s) IV Push every 6 hours PRN  insulin lispro (HumaLOG) corrective regimen sliding scale   SubCutaneous every 6 hours  lactated ringers. 1000 milliLiter(s) IV Continuous <Continuous>  levETIRAcetam  IVPB 500 milliGRAM(s) IV Intermittent every 12 hours  norepinephrine Infusion 0.05 MICROgram(s)/kG/Min IV Continuous <Continuous>  pantoprazole  Injectable 40 milliGRAM(s) IV Push daily  piperacillin/tazobactam IVPB. 3.375 Gram(s) IV Intermittent every 8 hours      Allergies    aspirin (Unknown)  Cheese (Unknown)  ibuprofen (Unknown)  Reglan (Swelling)    Intolerances    ibuprofen (Unknown)      LABS:  CBC Full  -  ( 19 Aug 2018 06:56 )  WBC Count : 3.21 K/uL  Hemoglobin : 12.2 g/dL  Hematocrit : 38.5 %  Platelet Count - Automated : 268 K/uL  Mean Cell Volume : 96.5 fl  Mean Cell Hemoglobin : 30.6 pg  Mean Cell Hemoglobin Concentration : 31.7 gm/dL  Auto Neutrophil # : 2.63 K/uL  Auto Lymphocyte # : 0.45 K/uL  Auto Monocyte # : 0.00 K/uL  Auto Eosinophil # : 0.00 K/uL  Auto Basophil # : 0.00 K/uL  Auto Neutrophil % : 65.0 %  Auto Lymphocyte % : 14.0 %  Auto Monocyte % : 0.0 %  Auto Eosinophil % : 0.0 %  Auto Basophil % : 0.0 %      08-19    x   |  x   |  x   ----------------------------<  x   3.4<L>   |  x   |  x     Ca    7.2<L>      19 Aug 2018 06:56  Phos  4.2     08-19  Mg     2.4     08-19    TPro  5.3<L>  /  Alb  2.8<L>  /  TBili  0.5  /  DBili  x   /  AST  2185<H>  /  ALT  157<H>  /  AlkPhos  77  08-19    Hemoglobin A1C:     Vitamin B12     RADIOLOGY    ASSESSMENT AND PLAN:      anoxic encephalopathy sp cardiac arrest on hypothermia protocol.  post anoxic seizure.  parkison's   disease    continue keppra.  ct head when medically stable.  supportive care .  keep sbp over 90 and oxygen saturation over 95%.  head elevated.  Would continue to follow.
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
## 2018-08-19 NOTE — CONSULT NOTE ADULT - SUBJECTIVE AND OBJECTIVE BOX
Information from chart:  "Patient is a 53y old  Male who presents with a chief complaint of s/p cardiac arrest (19 Aug 2018 01:06)    HPI:  52M with long history of Parkinson's disease (has been a resident of [facility] for >1 year - [facility] with difficulty placing patient), reported hx of diastolic heart failure, herniated discs who was found unresponsive tonight.  Reportedly, RN said that the patient went to take a shower around  and afterwards patient started to become agitated.  Patient does have a history of agitation and flares his arms and legs often.  Patient was able to calm down and was allowed to go to the day room.  Patient again started to flare his arms and legs and even slid down to the floor.  Patient was then directed back to his room where he continued to be agitated.  Patient was then given ativan 2mg PO for agitation and then nurse was able to instruct the patient to calm down as well.  Nurse stepped outside the room and told the aide to get vitals when the patient was found to be unresponsive all of sudden.  A CODE BLUE was activated at 2132.      During the CODE BLUE, patient was found to be unresponsive.  CPR was already initiated.  Initial rhythm on monitor was asystole.  A total of 4 rounds of epinephrine was given with 2 given peripherally and 2 via femoral central line and a dose of bicarb and calcium chloride was also given in between (see code sheet).  Patient was successfully intubated and patient eventually had ROSC at 2144 with BP 96/39 with HR of 185.  Initial ABG was 6.78/52/247.  Given his tachycardia, patient was also given amio 150mg.  Patient's HR decreased to 70-80s but BP eventually dropped to SBP 40/20s.  Patient was started on levophed with improvement SBP to 90s and HR increased to 110s.  Multiple attempts to contact family member was unsuccessful.  Patient was transferred/admitted to [facility] for further management (18 Aug 2018 22:48)   "      Patient currently with increasing UO; hyperkalemia and acidosis resolved with improving creatinine trend;       PAST MEDICAL & SURGICAL HISTORY:  Herniated cervical disc  Congestive heart failure, unspecified congestive heart failure chronicity, unspecified congestive heart failure type  Parkinson disease  Diastolic heart failure  GERD (gastroesophageal reflux disease)  Migraine  CHF (congestive heart failure)  Parkinson disease  No significant past surgical history  No significant past surgical history    FAMILY HISTORY:  No pertinent family history in first degree relatives  No pertinent family history in first degree relatives  Family history of prostate cancer in father  Family history of diabetes mellitus    Allergies    aspirin (Unknown)  Cheese (Unknown)  ibuprofen (Unknown)  Reglan (Swelling)    Intolerances    ibuprofen (Unknown)    Home Medications:  Ativan 2 mg oral tablet: 1 tab(s) orally every 6 hours, As Needed  (18 Aug 2018 23:29)  cyclobenzaprine 10 mg oral tablet: 1 tab(s) orally 3 times a day, As Needed msucle spasms (18 Aug 2018 23:33)  Haldol 5 mg oral tablet: 1 tab(s) orally every 6 hours, As Needed agitation (18 Aug 2018 23:33)  Sinemet 25 mg-250 mg oral tablet: orally 5 times a day (@ 0600, 1000, 1400, 1700. 2000) (18 Aug 2018 23:30)  Stalevo 200 oral tablet: 1 tab(s) orally 3 times a day (@ 0600, 1030, 1600) (18 Aug 2018 23:31)  ZyPREXA Zydis 5 mg oral tablet, disintegrating: orally every 6 hours, As Needed agitation (18 Aug 2018 23:31)    MEDICATIONS  (STANDING):  chlorhexidine 0.12% Liquid 15 milliLiter(s) Swish and Spit two times a day  dextrose 5%. 1000 milliLiter(s) (50 mL/Hr) IV Continuous <Continuous>  dextrose 50% Injectable 12.5 Gram(s) IV Push once  dextrose 50% Injectable 25 Gram(s) IV Push once  dextrose 50% Injectable 25 Gram(s) IV Push once  heparin  Infusion.  Unit(s)/Hr (17 mL/Hr) IV Continuous <Continuous>  insulin lispro (HumaLOG) corrective regimen sliding scale   SubCutaneous every 6 hours  lactated ringers. 1000 milliLiter(s) (125 mL/Hr) IV Continuous <Continuous>  levETIRAcetam  IVPB 500 milliGRAM(s) IV Intermittent every 12 hours  norepinephrine Infusion 0.05 MICROgram(s)/kG/Min (8.841 mL/Hr) IV Continuous <Continuous>  pantoprazole  Injectable 40 milliGRAM(s) IV Push daily  piperacillin/tazobactam IVPB. 3.375 Gram(s) IV Intermittent every 8 hours    MEDICATIONS  (PRN):  ALBUTerol    90 MICROgram(s) HFA Inhaler 2 Puff(s) Inhalation every 6 hours PRN Shortness of Breath and/or Wheezing  dextrose 40% Gel 15 Gram(s) Oral once PRN Blood Glucose LESS THAN 70 milliGRAM(s)/deciLiter  glucagon  Injectable 1 milliGRAM(s) IntraMuscular once PRN Glucose <70 milliGRAM(s)/deciLiter  heparin  Injectable 7500 Unit(s) IV Push every 6 hours PRN For aPTT less than 40  heparin  Injectable 3500 Unit(s) IV Push every 6 hours PRN For aPTT between 40 - 57    Vital Signs Last 24 Hrs  T(C): 34.6 (19 Aug 2018 16:14), Max: 36.8 (19 Aug 2018 00:00)  T(F): 94.3 (19 Aug 2018 16:14), Max: 98.2 (19 Aug 2018 00:00)  HR: 62 (19 Aug 2018 16:14) (62 - 114)  BP: 134/85 (19 Aug 2018 15:00) (103/57 - 142/82)  BP(mean): 100 (19 Aug 2018 15:00) (64 - 100)  RR: 28 (19 Aug 2018 15:00) (15 - 31)  SpO2: 100% (19 Aug 2018 16:14) (97% - 100%)  Mode: AC/ CMV (Assist Control/ Continuous Mandatory Ventilation)  RR (machine): 20  TV (machine): 500  FiO2: 50  PEEP: 5  ITime: 1  MAP: 12  PIP: 23    Daily Height in cm: 177.8 (19 Aug 2018 11:02)    Daily Weight in k.8 (19 Aug 2018 15:51)    18 @ 07:  -  18 @ 07:00  --------------------------------------------------------  IN: 5487.6 mL / OUT: 1100 mL / NET: 4387.6 mL    18 @ 07:01  -  18 @ 17:05  --------------------------------------------------------  IN: 1503 mL / OUT: 800 mL / NET: 703 mL      CAPILLARY BLOOD GLUCOSE      POCT Blood Glucose.: 142 mg/dL (19 Aug 2018 11:59)  POCT Blood Glucose.: 155 mg/dL (19 Aug 2018 06:01)  POCT Blood Glucose.: 122 mg/dL (19 Aug 2018 00:47)    PHYSICAL EXAM:      T(C): 34.6 (18 @ 16:14), Max: 36.8 (18 @ 00:00)  HR: 62 (18 @ 16:14) (62 - 114)  BP: 134/85 (18 @ 15:00) (103/57 - 142/82)  RR: 28 (18 @ 15:00) (15 - 31)  SpO2: 100% (18 @ 16:14) (97% - 100%)  Wt(kg): --  Respiratory: clear anteriorly, decreased BS at bases  Cardiovascular: S1 S2  Gastrointestinal: soft NT ND +BS  Extremities:   1 edema                  x   |  x   |  x   ----------------------------<  x   3.4<L>   |  x   |  x     Ca    7.2<L>   corrected 8   19 Aug 2018 06:56  Phos  4.2       Mg     2.4         TPro  5.3<L>  /  Alb  2.8<L>  /  TBili  0.5  /  DBili  x   /  AST  2185<H>  /  ALT  157<H>  /  AlkPhos  77                            12.2   3.21  )-----------( 268      ( 19 Aug 2018 06:56 )             38.5       ABG - ( 19 Aug 2018 12:47 )  pH, Arterial: x     pH, Blood: 7.35  /  pCO2: 29    /  pO2: 154   / HCO3: 18    / Base Excess: -9.2  /  SaO2: 99                    Assessment and Plan    RAISSA suspected ischemic ATN; nonoliguric;   Indices appear to be stabilizing.  Continue current rx; pressor support, judicious IVF boluses;   Will follow. Information from chart:  "Patient is a 53y old  Male who presents with a chief complaint of s/p cardiac arrest (19 Aug 2018 01:06)    HPI:  52M with long history of Parkinson's disease (has been a resident of [facility] for >1 year - [facility] with difficulty placing patient), reported hx of diastolic heart failure, herniated discs who was found unresponsive tonight.  Reportedly, RN said that the patient went to take a shower around  and afterwards patient started to become agitated.  Patient does have a history of agitation and flares his arms and legs often.  Patient was able to calm down and was allowed to go to the day room.  Patient again started to flare his arms and legs and even slid down to the floor.  Patient was then directed back to his room where he continued to be agitated.  Patient was then given ativan 2mg PO for agitation and then nurse was able to instruct the patient to calm down as well.  Nurse stepped outside the room and told the aide to get vitals when the patient was found to be unresponsive all of sudden.  A CODE BLUE was activated at 2132.      During the CODE BLUE, patient was found to be unresponsive.  CPR was already initiated.  Initial rhythm on monitor was asystole.  A total of 4 rounds of epinephrine was given with 2 given peripherally and 2 via femoral central line and a dose of bicarb and calcium chloride was also given in between (see code sheet).  Patient was successfully intubated and patient eventually had ROSC at 2144 with BP 96/39 with HR of 185.  Initial ABG was 6.78/52/247.  Given his tachycardia, patient was also given amio 150mg.  Patient's HR decreased to 70-80s but BP eventually dropped to SBP 40/20s.  Patient was started on levophed with improvement SBP to 90s and HR increased to 110s.  Multiple attempts to contact family member was unsuccessful.  Patient was transferred/admitted to [facility] for further management (18 Aug 2018 22:48)   "      Patient currently with increasing UO; hyperkalemia and acidosis resolved with improving creatinine trend;   Noted increasing CPK trend to > 70,000      PAST MEDICAL & SURGICAL HISTORY:  Herniated cervical disc  Congestive heart failure, unspecified congestive heart failure chronicity, unspecified congestive heart failure type  Parkinson disease  Diastolic heart failure  GERD (gastroesophageal reflux disease)  Migraine  CHF (congestive heart failure)  Parkinson disease  No significant past surgical history  No significant past surgical history    FAMILY HISTORY:  No pertinent family history in first degree relatives  No pertinent family history in first degree relatives  Family history of prostate cancer in father  Family history of diabetes mellitus    Allergies    aspirin (Unknown)  Cheese (Unknown)  ibuprofen (Unknown)  Reglan (Swelling)    Intolerances    ibuprofen (Unknown)    Home Medications:  Ativan 2 mg oral tablet: 1 tab(s) orally every 6 hours, As Needed  (18 Aug 2018 23:29)  cyclobenzaprine 10 mg oral tablet: 1 tab(s) orally 3 times a day, As Needed msucle spasms (18 Aug 2018 23:33)  Haldol 5 mg oral tablet: 1 tab(s) orally every 6 hours, As Needed agitation (18 Aug 2018 23:33)  Sinemet 25 mg-250 mg oral tablet: orally 5 times a day (@ 0600, 1000, 1400, 1700. 2000) (18 Aug 2018 23:30)  Stalevo 200 oral tablet: 1 tab(s) orally 3 times a day (@ 0600, 1030, 1600) (18 Aug 2018 23:31)  ZyPREXA Zydis 5 mg oral tablet, disintegrating: orally every 6 hours, As Needed agitation (18 Aug 2018 23:31)    MEDICATIONS  (STANDING):  chlorhexidine 0.12% Liquid 15 milliLiter(s) Swish and Spit two times a day  dextrose 5%. 1000 milliLiter(s) (50 mL/Hr) IV Continuous <Continuous>  dextrose 50% Injectable 12.5 Gram(s) IV Push once  dextrose 50% Injectable 25 Gram(s) IV Push once  dextrose 50% Injectable 25 Gram(s) IV Push once  heparin  Infusion.  Unit(s)/Hr (17 mL/Hr) IV Continuous <Continuous>  insulin lispro (HumaLOG) corrective regimen sliding scale   SubCutaneous every 6 hours  lactated ringers. 1000 milliLiter(s) (125 mL/Hr) IV Continuous <Continuous>  levETIRAcetam  IVPB 500 milliGRAM(s) IV Intermittent every 12 hours  norepinephrine Infusion 0.05 MICROgram(s)/kG/Min (8.841 mL/Hr) IV Continuous <Continuous>  pantoprazole  Injectable 40 milliGRAM(s) IV Push daily  piperacillin/tazobactam IVPB. 3.375 Gram(s) IV Intermittent every 8 hours    MEDICATIONS  (PRN):  ALBUTerol    90 MICROgram(s) HFA Inhaler 2 Puff(s) Inhalation every 6 hours PRN Shortness of Breath and/or Wheezing  dextrose 40% Gel 15 Gram(s) Oral once PRN Blood Glucose LESS THAN 70 milliGRAM(s)/deciLiter  glucagon  Injectable 1 milliGRAM(s) IntraMuscular once PRN Glucose <70 milliGRAM(s)/deciLiter  heparin  Injectable 7500 Unit(s) IV Push every 6 hours PRN For aPTT less than 40  heparin  Injectable 3500 Unit(s) IV Push every 6 hours PRN For aPTT between 40 - 57    Vital Signs Last 24 Hrs  T(C): 34.6 (19 Aug 2018 16:14), Max: 36.8 (19 Aug 2018 00:00)  T(F): 94.3 (19 Aug 2018 16:14), Max: 98.2 (19 Aug 2018 00:00)  HR: 62 (19 Aug 2018 16:14) (62 - 114)  BP: 134/85 (19 Aug 2018 15:00) (103/57 - 142/82)  BP(mean): 100 (19 Aug 2018 15:00) (64 - 100)  RR: 28 (19 Aug 2018 15:00) (15 - 31)  SpO2: 100% (19 Aug 2018 16:14) (97% - 100%)  Mode: AC/ CMV (Assist Control/ Continuous Mandatory Ventilation)  RR (machine): 20  TV (machine): 500  FiO2: 50  PEEP: 5  ITime: 1  MAP: 12  PIP: 23    Daily Height in cm: 177.8 (19 Aug 2018 11:02)    Daily Weight in k.8 (19 Aug 2018 15:51)    18 @ 07:  -  18 @ 07:00  --------------------------------------------------------  IN: 5487.6 mL / OUT: 1100 mL / NET: 4387.6 mL    18 @ 07:01  -  18 @ 17:05  --------------------------------------------------------  IN: 1503 mL / OUT: 800 mL / NET: 703 mL      CAPILLARY BLOOD GLUCOSE      POCT Blood Glucose.: 142 mg/dL (19 Aug 2018 11:59)  POCT Blood Glucose.: 155 mg/dL (19 Aug 2018 06:01)  POCT Blood Glucose.: 122 mg/dL (19 Aug 2018 00:47)    PHYSICAL EXAM:      T(C): 34.6 (18 @ 16:14), Max: 36.8 (18 @ 00:00)  HR: 62 (18 @ 16:14) (62 - 114)  BP: 134/85 (18 @ 15:00) (103/57 - 142/82)  RR: 28 (18 @ 15:00) (15 - 31)  SpO2: 100% (18 @ 16:14) (97% - 100%)  Wt(kg): --  Respiratory: clear anteriorly, decreased BS at bases  Cardiovascular: S1 S2  Gastrointestinal: soft NT ND +BS  Extremities:   1 edema                  x   |  x   |  x   ----------------------------<  x   3.4<L>   |  x   |  x     Ca    7.2<L>   corrected 8   19 Aug 2018 06:56  Phos  4.2       Mg     2.4         TPro  5.3<L>  /  Alb  2.8<L>  /  TBili  0.5  /  DBili  x   /  AST  2185<H>  /  ALT  157<H>  /  AlkPhos  77                            12.2   3.21  )-----------( 268      ( 19 Aug 2018 06:56 )             38.5       ABG - ( 19 Aug 2018 12:47 )  pH, Arterial: x     pH, Blood: 7.35  /  pCO2: 29    /  pO2: 154   / HCO3: 18    / Base Excess: -9.2  /  SaO2: 99                    Assessment and Plan    RAISSA suspected ischemic ATN; nonoliguric;   Indices appear to be stabilizing but at high risk for rhabdomyolysis induced pigment injury.  Continue current rx; pressor support with IVF; follow CPK trend.  Will follow.

## 2018-08-19 NOTE — PROGRESS NOTE ADULT - SUBJECTIVE AND OBJECTIVE BOX
53y  Male  aspirin (Unknown)  Cheese (Unknown)  ibuprofen (Unknown)  ibuprofen (Unknown)  Reglan (Swelling)    CC: Patient is a 53y old  Male who presented last evening on the floor with a chief complaint of s/p cardiac arrest    HPI: 52 year old male with a history of Parkinson's disease, diastolic heart failure, herniated discs was found unresponsive last evening on the floor.  Apparently the patient went to take a shower afterwards patient started to become agitated. He has a history of agitation, continued to be agitated was then given ativan 2mg PO and then nurse was able to instruct the patient to calm down as well. She apparently stepped outside the room and told the aide to get vitals and  the patient was found to be unresponsive all of sudden.  A CODE BLUE was activated. CPR was already initiated.  Initial rhythm on monitor was asystole he was successfully intubated and patient eventually had ROSC with BP 96/39. He was transferred to SPCU for further management.      PAST MEDICAL & SURGICAL HISTORY:  Herniated cervical disc  Congestive heart failure, unspecified congestive heart failure chronicity, unspecified congestive heart failure type  Parkinson disease  Diastolic heart failure  GERD (gastroesophageal reflux disease)  Migraine  CHF (congestive heart failure)  Parkinson disease  No significant past surgical history  No significant past surgical history    FAMILY HISTORY:  No pertinent family history in first degree relatives  No pertinent family history in first degree relatives  Family history of prostate cancer in father  Family history of diabetes mellitus      Vitals   Vital Signs Last 24 Hrs  T(C): 34.9 (19 Aug 2018 20:03), Max: 36.8 (19 Aug 2018 00:00)  T(F): 94.8 (19 Aug 2018 20:03), Max: 98.2 (19 Aug 2018 00:00)  HR: 93 (19 Aug 2018 20:00) (62 - 114)  BP: 132/76 (19 Aug 2018 20:00) (103/57 - 143/90)  BP(mean): 93 (19 Aug 2018 20:00) (64 - 104)  RR: 27 (19 Aug 2018 20:00) (15 - 31)  SpO2: 98% (19 Aug 2018 20:00) (97% - 100%)  ABG - ( 19 Aug 2018 18:26 )  pH, Arterial: x     pH, Blood: 7.31  /  pCO2: 31    /  pO2: 121   / HCO3: 17    / Base Excess: -9.7  /  SaO2: 98        I&O's Summary  18 Aug 2018 07:01  -  19 Aug 2018 07:00  --------------------------------------------------------  IN: 5487.6 mL / OUT: 1100 mL / NET: 4387.6 mL    19 Aug 2018 07:01  -  19 Aug 2018 20:38  --------------------------------------------------------  IN: 3306.9 mL / OUT: 1300 mL / NET: 2006.9 mL      LABS  147<H>  |  114<H>  |  37<H>  ----------------------------<  134<H>  4.1   |  19<L>  |  2.10<H>    Ca    6.4<LL>      19 Aug 2018 18:45  Phos  4.9     08-19  Mg     2.4     08-19  TPro  5.3<L>  /  Alb  2.8<L>  /  TBili  0.5  /  DBili  x   /  AST  2185<H>  /  ALT  157<H>  /  AlkPhos  77  08-19                        12.2   3.21  )-----------( 268      ( 19 Aug 2018 06:56 )             38.5     PT/INR - ( 19 Aug 2018 13:24 )   PT: 14.6 sec;   INR: 1.33 ratio    PTT - ( 19 Aug 2018 13:05 )  PTT:115.3 sec  CARDIAC MARKERS ( 19 Aug 2018 16:33 )  x     / x     / >71397 U/L / x     / x      CARDIAC MARKERS ( 19 Aug 2018 07:00 )  x     / x     / 28701 U/L / x     / x      CARDIAC MARKERS ( 19 Aug 2018 06:56 )  .901 ng/mL / x     / x     / x     / x      CARDIAC MARKERS ( 19 Aug 2018 00:24 )  x     / x     / 5947 U/L / x     / x      CARDIAC MARKERS ( 18 Aug 2018 22:12 )  .028 ng/mL / x     / x     / x     / x        LIVER FUNCTIONS - ( 19 Aug 2018 06:56 )  Alb: 2.8 g/dL / Pro: 5.3 g/dL / ALK PHOS: 77 U/L / ALT: 157 U/L DA / AST: 2185 U/L / GGT: x           CAPILLARY BLOOD GLUCOSE  POCT Blood Glucose.: 135 mg/dL (19 Aug 2018 17:45)    VENT SETTINGS   Mode: AC/ CMV (Assist Control/ Continuous Mandatory Ventilation)  RR (machine): 20  TV (machine): 500  FiO2: 40  PEEP: 5  ITime: 1  MAP: 13  PIP: 26      Meds  MEDICATIONS  (STANDING):  calcium gluconate IVPB 2 Gram(s) IV Intermittent every 2 hours  chlorhexidine 0.12% Liquid 15 milliLiter(s) Swish and Spit two times a day  dextrose 5%. 1000 milliLiter(s) (50 mL/Hr) IV Continuous <Continuous>  dextrose 50% Injectable 12.5 Gram(s) IV Push once  dextrose 50% Injectable 25 Gram(s) IV Push once  dextrose 50% Injectable 25 Gram(s) IV Push once  heparin  Infusion.  Unit(s)/Hr (17 mL/Hr) IV Continuous <Continuous>  levETIRAcetam  IVPB 500 milliGRAM(s) IV Intermittent every 12 hours  norepinephrine Infusion 0.05 MICROgram(s)/kG/Min (8.841 mL/Hr) IV Continuous <Continuous>  pantoprazole  Injectable 40 milliGRAM(s) IV Push daily  piperacillin/tazobactam IVPB. 3.375 Gram(s) IV Intermittent every 8 hours  sodium chloride 0.9%. 1000 milliLiter(s) (200 mL/Hr) IV Continuous <Continuous>      REVIEW OF SYSTEMS:    unable to obtain due to unresponsive state and mechanical ventilation     Physicial Exam:     Constitutional: NAD, well-groomed, well-developed  HEENT: PERRLA, EOMI, no drainage or redness  Neck: No bruits; no thyromegaly or nodules,  No JVD  Back: Normal spine flexure, No CVA tenderness, No deformity or limitation of movement  Respiratory: Breath Sounds equal & clear to percussion & auscultation, no accessory muscle use  Cardiovascular: Regular rate & rhythm, normal S1, S2; no murmurs, gallops or rubs; no S3, S4  Gastrointestinal: Soft, non-tender, non distended no hepatosplenomegaly, normal bowel sounds  Extremities: No peripheral edema, No cyanosis, clubbing   Vascular: Equal and normal pulses: 2+ peripheral pulses throughout  Neurological: unresponsive to verbal and painful stimuli, normal spinal and cranial reflexes  Musculoskeletal: No joint pain, swelling or deformity; no limitation of movement  Skin: No rashes 53y  Male  aspirin (Unknown)  Cheese (Unknown)  ibuprofen (Unknown)  ibuprofen (Unknown)  Reglan (Swelling)    CC: Patient is a 53y old  Male who presented last evening on the floor with a chief complaint of s/p cardiac arrest    HPI: 52 year old male with a history of Parkinson's disease, diastolic heart failure, herniated discs was found unresponsive last evening on the psych floor were he was said to be admitted for aggressive paranoid behavior.  Apparently the patient went to take a shower afterwards patient started to become agitated. He has a history of agitation, continued to be agitated was then given ativan 2mg PO and then nurse was able to instruct the patient to calm down as well. She apparently stepped outside the room and told the aide to get vitals and  the patient was found to be unresponsive all of sudden.  A CODE BLUE was activated. CPR was already initiated.  Initial rhythm on monitor was asystole he was successfully intubated and patient eventually had ROSC with BP 96/39. He was transferred to SPCU for further management.      Tonight he remains encephalopathic not responding to painful stimuli, he was in shock for the day shift on IV pressors to support MAP but after increasing fluids this afternnon to treat rising CPK from rhabdomyolysis the blood pressure is improving and pressors are being decreased with hopes to be off by nights end.     PAST MEDICAL & SURGICAL HISTORY:  Herniated cervical disc  Congestive heart failure, unspecified congestive heart failure chronicity, unspecified congestive heart failure type  Parkinson disease  Diastolic heart failure  GERD (gastroesophageal reflux disease)  Migraine  CHF (congestive heart failure)  Parkinson disease  No significant past surgical history  No significant past surgical history    FAMILY HISTORY:  No pertinent family history in first degree relatives  No pertinent family history in first degree relatives  Family history of prostate cancer in father  Family history of diabetes mellitus      Vitals   Vital Signs Last 24 Hrs  T(C): 34.9 (19 Aug 2018 20:03), Max: 36.8 (19 Aug 2018 00:00)  T(F): 94.8 (19 Aug 2018 20:03), Max: 98.2 (19 Aug 2018 00:00)  HR: 93 (19 Aug 2018 20:00) (62 - 114)  BP: 132/76 (19 Aug 2018 20:00) (103/57 - 143/90)  BP(mean): 93 (19 Aug 2018 20:00) (64 - 104)  RR: 27 (19 Aug 2018 20:00) (15 - 31)  SpO2: 98% (19 Aug 2018 20:00) (97% - 100%)  ABG - ( 19 Aug 2018 18:26 )  pH, Arterial: x     pH, Blood: 7.31  /  pCO2: 31    /  pO2: 121   / HCO3: 17    / Base Excess: -9.7  /  SaO2: 98        I&O's Summary  18 Aug 2018 07:01  -  19 Aug 2018 07:00  --------------------------------------------------------  IN: 5487.6 mL / OUT: 1100 mL / NET: 4387.6 mL    19 Aug 2018 07:01  -  19 Aug 2018 20:38  --------------------------------------------------------  IN: 3306.9 mL / OUT: 1300 mL / NET: 2006.9 mL      LABS  147<H>  |  114<H>  |  37<H>  ----------------------------<  134<H>  4.1   |  19<L>  |  2.10<H>    Ca    6.4<LL>      19 Aug 2018 18:45  Phos  4.9     08-19  Mg     2.4     08-19  TPro  5.3<L>  /  Alb  2.8<L>  /  TBili  0.5  /  DBili  x   /  AST  2185<H>  /  ALT  157<H>  /  AlkPhos  77  08-19                        12.2   3.21  )-----------( 268      ( 19 Aug 2018 06:56 )             38.5     PT/INR - ( 19 Aug 2018 13:24 )   PT: 14.6 sec;   INR: 1.33 ratio    PTT - ( 19 Aug 2018 13:05 )  PTT:115.3 sec  CARDIAC MARKERS ( 19 Aug 2018 16:33 )  x     / x     / >90915 U/L / x     / x      CARDIAC MARKERS ( 19 Aug 2018 07:00 )  x     / x     / 16871 U/L / x     / x      CARDIAC MARKERS ( 19 Aug 2018 06:56 )  .901 ng/mL / x     / x     / x     / x      CARDIAC MARKERS ( 19 Aug 2018 00:24 )  x     / x     / 5947 U/L / x     / x      CARDIAC MARKERS ( 18 Aug 2018 22:12 )  .028 ng/mL / x     / x     / x     / x        LIVER FUNCTIONS - ( 19 Aug 2018 06:56 )  Alb: 2.8 g/dL / Pro: 5.3 g/dL / ALK PHOS: 77 U/L / ALT: 157 U/L DA / AST: 2185 U/L / GGT: x           CAPILLARY BLOOD GLUCOSE  POCT Blood Glucose.: 135 mg/dL (19 Aug 2018 17:45)    VENT SETTINGS   Mode: AC/ CMV (Assist Control/ Continuous Mandatory Ventilation)  RR (machine): 20  TV (machine): 500  FiO2: 40  PEEP: 5  ITime: 1  MAP: 13  PIP: 26      Meds  MEDICATIONS  (STANDING):  calcium gluconate IVPB 2 Gram(s) IV Intermittent every 2 hours  chlorhexidine 0.12% Liquid 15 milliLiter(s) Swish and Spit two times a day  dextrose 5%. 1000 milliLiter(s) (50 mL/Hr) IV Continuous <Continuous>  dextrose 50% Injectable 12.5 Gram(s) IV Push once  dextrose 50% Injectable 25 Gram(s) IV Push once  dextrose 50% Injectable 25 Gram(s) IV Push once  heparin  Infusion.  Unit(s)/Hr (17 mL/Hr) IV Continuous <Continuous>  levETIRAcetam  IVPB 500 milliGRAM(s) IV Intermittent every 12 hours  norepinephrine Infusion 0.05 MICROgram(s)/kG/Min (8.841 mL/Hr) IV Continuous <Continuous>  pantoprazole  Injectable 40 milliGRAM(s) IV Push daily  piperacillin/tazobactam IVPB. 3.375 Gram(s) IV Intermittent every 8 hours  sodium chloride 0.9%. 1000 milliLiter(s) (200 mL/Hr) IV Continuous <Continuous>      REVIEW OF SYSTEMS:    unable to obtain due to unresponsive state and mechanical ventilation     Physicial Exam:     Constitutional: NAD, well-groomed, well-developed  HEENT: PERRLA, EOMI, no drainage or redness  Neck: No bruits; no thyromegaly or nodules,  No JVD  Back: Normal spine flexure, No CVA tenderness, No deformity or limitation of movement  Respiratory: Breath Sounds equal & clear to percussion & auscultation, no accessory muscle use  Cardiovascular: Regular rate & rhythm, normal S1, S2; no murmurs, gallops or rubs; no S3, S4  Gastrointestinal: Soft, non-tender, non distended no hepatosplenomegaly, normal bowel sounds  Extremities: No peripheral edema, No cyanosis, clubbing   Vascular: Equal and normal pulses: 2+ peripheral pulses throughout  Neurological: unresponsive to verbal and painful stimuli, normal spinal and cranial reflexes  Musculoskeletal: No joint pain, swelling or deformity; no limitation of movement  Skin: No rashes

## 2018-08-19 NOTE — PROVIDER CONTACT NOTE (EICU) - SITUATION
53 male prolonged cardiac arrest, achieved rosc, started on hypothermia protocol, now noted with seizure like activity
Patient s/p cardiac arrest, intubated, on hypothermia protocol, GCS of 3, with no pupillary/corneal reflexes, not overbreathing set ventilator rate, triggering [facility]. Discussed with Kim VÁSQUEZ, [place] to refer.

## 2018-08-19 NOTE — PROGRESS NOTE ADULT - PROBLEM SELECTOR PLAN 3
Possible shock liver in the setting of hypotension  trend liver function tests   continue aggressive fluid hydration

## 2018-08-19 NOTE — CHART NOTE - NSCHARTNOTEFT_GEN_A_CORE
Just spoke to Erma @ [phone] who was understandably upset.  I explained to her that her brother is unresponsive and is requiring medications to keep his body going.  However, she kept on complaining about the poor treatment that her brother received in  all week and how they were not treating him well and not bathing him.  She was saying how neither she nor the patient wants to be in [facility].  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  I can only tell her that I was called in response to her brother being found unresponsive.  She kept on re-iterating about the poor treatment and then she said she is coming to [place] and the promptly hung up on me.

## 2018-08-19 NOTE — DIETITIAN INITIAL EVALUATION ADULT. - PERTINENT LABORATORY DATA
( 8-18-18) k 5.5( H) bicarb 10( L) albumin 3.4 phos 11.7( H) mg 3.5( H)  ( 8-19-18) k 3.4( L) phos 4.2 mg 2.4  na 147( H) k 3.6 cl 110( H) bicarb 17( L) glu 162( H) BUN 35( H) creatinine 2.04( H) alb 2.8( L)

## 2018-08-19 NOTE — PROGRESS NOTE ADULT - PROBLEM SELECTOR PLAN 4
There were possitive ischemic KEG changes after prolonged asystole   This is most likely due to demand ischemia  but cannot rule out ACS  Continue heparin infusion  Follow tropinin levels   monitor for ECG changes

## 2018-08-20 LAB
ALBUMIN SERPL ELPH-MCNC: 2.4 G/DL — LOW (ref 3.3–5)
ALP SERPL-CCNC: 75 U/L — SIGNIFICANT CHANGE UP (ref 30–120)
ALT FLD-CCNC: 415 U/L DA — HIGH (ref 10–60)
ANION GAP SERPL CALC-SCNC: 14 MMOL/L — SIGNIFICANT CHANGE UP (ref 5–17)
ANION GAP SERPL CALC-SCNC: 19 MMOL/L — HIGH (ref 5–17)
APPEARANCE UR: CLEAR — SIGNIFICANT CHANGE UP
APTT BLD: 54.6 SEC — HIGH (ref 27.5–37.4)
APTT BLD: 62.3 SEC — HIGH (ref 27.5–37.4)
APTT BLD: 86.5 SEC — HIGH (ref 27.5–37.4)
APTT BLD: 95.6 SEC — HIGH (ref 27.5–37.4)
AST SERPL-CCNC: 2415 U/L — HIGH (ref 10–40)
BACTERIA # UR AUTO: ABNORMAL
BASE EXCESS BLDA CALC-SCNC: -11.2 MMOL/L — LOW (ref -2–2)
BASE EXCESS BLDA CALC-SCNC: -15.5 MMOL/L — LOW (ref -2–2)
BILIRUB SERPL-MCNC: 0.5 MG/DL — SIGNIFICANT CHANGE UP (ref 0.2–1.2)
BILIRUB UR-MCNC: NEGATIVE — SIGNIFICANT CHANGE UP
BLOOD GAS COMMENTS: SIGNIFICANT CHANGE UP
BLOOD GAS SOURCE: SIGNIFICANT CHANGE UP
BLOOD GAS SOURCE: SIGNIFICANT CHANGE UP
BUN SERPL-MCNC: 37 MG/DL — HIGH (ref 7–23)
BUN SERPL-MCNC: 38 MG/DL — HIGH (ref 7–23)
CA-I BLD-SCNC: 1.02 MMOL/L — LOW (ref 1.12–1.3)
CALCIUM SERPL-MCNC: 6.9 MG/DL — LOW (ref 8.4–10.5)
CALCIUM SERPL-MCNC: 7.5 MG/DL — LOW (ref 8.4–10.5)
CHLORIDE SERPL-SCNC: 112 MMOL/L — HIGH (ref 96–108)
CHLORIDE SERPL-SCNC: 113 MMOL/L — HIGH (ref 96–108)
CK SERPL-CCNC: CRITICAL HIGH U/L (ref 39–308)
CO2 SERPL-SCNC: 16 MMOL/L — LOW (ref 22–31)
CO2 SERPL-SCNC: 18 MMOL/L — LOW (ref 22–31)
COLOR SPEC: YELLOW — SIGNIFICANT CHANGE UP
CREAT SERPL-MCNC: 2.3 MG/DL — HIGH (ref 0.5–1.3)
CREAT SERPL-MCNC: 2.53 MG/DL — HIGH (ref 0.5–1.3)
DIFF PNL FLD: ABNORMAL
EPI CELLS # UR: ABNORMAL
ERYTHROCYTE [SEDIMENTATION RATE] IN BLOOD: 29 MM/HR — HIGH (ref 0–9)
GLUCOSE SERPL-MCNC: 118 MG/DL — HIGH (ref 70–99)
GLUCOSE SERPL-MCNC: 122 MG/DL — HIGH (ref 70–99)
GLUCOSE UR QL: 100 MG/DL
GRAN CASTS # UR COMP ASSIST: ABNORMAL /LPF
HCO3 BLDA-SCNC: 13 MMOL/L — LOW (ref 21–29)
HCO3 BLDA-SCNC: 16 MMOL/L — LOW (ref 21–29)
HCT VFR BLD CALC: 37.2 % — LOW (ref 39–50)
HGB BLD-MCNC: 11.9 G/DL — LOW (ref 13–17)
HOROWITZ INDEX BLDA+IHG-RTO: 21 — SIGNIFICANT CHANGE UP
HOROWITZ INDEX BLDA+IHG-RTO: 21 — SIGNIFICANT CHANGE UP
KETONES UR-MCNC: ABNORMAL
LACTATE SERPL-SCNC: 1.5 MMOL/L — SIGNIFICANT CHANGE UP (ref 0.7–2)
LEUKOCYTE ESTERASE UR-ACNC: ABNORMAL
MAGNESIUM SERPL-MCNC: 2.1 MG/DL — SIGNIFICANT CHANGE UP (ref 1.6–2.6)
MAGNESIUM SERPL-MCNC: 2.6 MG/DL — SIGNIFICANT CHANGE UP (ref 1.6–2.6)
MCHC RBC-ENTMCNC: 30.8 PG — SIGNIFICANT CHANGE UP (ref 27–34)
MCHC RBC-ENTMCNC: 32 GM/DL — SIGNIFICANT CHANGE UP (ref 32–36)
MCV RBC AUTO: 96.4 FL — SIGNIFICANT CHANGE UP (ref 80–100)
NITRITE UR-MCNC: NEGATIVE — SIGNIFICANT CHANGE UP
NRBC # BLD: 0 /100 WBCS — SIGNIFICANT CHANGE UP (ref 0–0)
PCO2 BLDA: 24 MMHG — LOW (ref 32–46)
PCO2 BLDA: 28 MMHG — LOW (ref 32–46)
PH BLD: 7.25 — LOW (ref 7.35–7.45)
PH BLD: 7.31 — LOW (ref 7.35–7.45)
PH UR: 5 — SIGNIFICANT CHANGE UP (ref 5–8)
PHOSPHATE SERPL-MCNC: 5 MG/DL — HIGH (ref 2.5–4.5)
PHOSPHATE SERPL-MCNC: 5.1 MG/DL — HIGH (ref 2.5–4.5)
PLATELET # BLD AUTO: 186 K/UL — SIGNIFICANT CHANGE UP (ref 150–400)
PO2 BLDA: 108 MMHG — SIGNIFICANT CHANGE UP (ref 74–108)
PO2 BLDA: 110 MMHG — HIGH (ref 74–108)
POTASSIUM SERPL-MCNC: 4.3 MMOL/L — SIGNIFICANT CHANGE UP (ref 3.5–5.3)
POTASSIUM SERPL-MCNC: 4.3 MMOL/L — SIGNIFICANT CHANGE UP (ref 3.5–5.3)
POTASSIUM SERPL-SCNC: 4.3 MMOL/L — SIGNIFICANT CHANGE UP (ref 3.5–5.3)
POTASSIUM SERPL-SCNC: 4.3 MMOL/L — SIGNIFICANT CHANGE UP (ref 3.5–5.3)
PROCALCITONIN SERPL-MCNC: 38.57 NG/ML — HIGH (ref 0.02–0.1)
PROT SERPL-MCNC: 5.2 G/DL — LOW (ref 6–8.3)
PROT UR-MCNC: 500 MG/DL
RBC # BLD: 3.86 M/UL — LOW (ref 4.2–5.8)
RBC # FLD: 14.9 % — HIGH (ref 10.3–14.5)
RBC CASTS # UR COMP ASSIST: ABNORMAL /HPF (ref 0–4)
SAO2 % BLDA: 97 % — HIGH (ref 92–96)
SAO2 % BLDA: 97 % — HIGH (ref 92–96)
SODIUM SERPL-SCNC: 144 MMOL/L — SIGNIFICANT CHANGE UP (ref 135–145)
SODIUM SERPL-SCNC: 148 MMOL/L — HIGH (ref 135–145)
SP GR SPEC: 1.01 — SIGNIFICANT CHANGE UP (ref 1.01–1.02)
TROPONIN I SERPL-MCNC: 1.33 NG/ML — HIGH (ref 0.02–0.06)
UROBILINOGEN FLD QL: NEGATIVE MG/DL — SIGNIFICANT CHANGE UP
WBC # BLD: 11.56 K/UL — HIGH (ref 3.8–10.5)
WBC # FLD AUTO: 11.56 K/UL — HIGH (ref 3.8–10.5)
WBC UR QL: ABNORMAL

## 2018-08-20 PROCEDURE — 99233 SBSQ HOSP IP/OBS HIGH 50: CPT

## 2018-08-20 PROCEDURE — 76700 US EXAM ABDOM COMPLETE: CPT | Mod: 26

## 2018-08-20 PROCEDURE — 71045 X-RAY EXAM CHEST 1 VIEW: CPT | Mod: 26,77

## 2018-08-20 PROCEDURE — 71045 X-RAY EXAM CHEST 1 VIEW: CPT | Mod: 26,76

## 2018-08-20 PROCEDURE — 70450 CT HEAD/BRAIN W/O DYE: CPT | Mod: 26

## 2018-08-20 RX ORDER — CALCIUM GLUCONATE 100 MG/ML
2 VIAL (ML) INTRAVENOUS ONCE
Qty: 0 | Refills: 0 | Status: COMPLETED | OUTPATIENT
Start: 2018-08-20 | End: 2018-08-20

## 2018-08-20 RX ORDER — CARBIDOPA, LEVODOPA, AND ENTACAPONE 50; 200; 200 MG/1; MG/1; MG/1
1 TABLET, FILM COATED ORAL THREE TIMES A DAY
Qty: 0 | Refills: 0 | Status: DISCONTINUED | OUTPATIENT
Start: 2018-08-20 | End: 2018-08-21

## 2018-08-20 RX ORDER — ACETAMINOPHEN 500 MG
650 TABLET ORAL EVERY 6 HOURS
Qty: 0 | Refills: 0 | Status: DISCONTINUED | OUTPATIENT
Start: 2018-08-20 | End: 2018-08-21

## 2018-08-20 RX ORDER — CHLORHEXIDINE GLUCONATE 213 G/1000ML
1 SOLUTION TOPICAL DAILY
Qty: 0 | Refills: 0 | Status: DISCONTINUED | OUTPATIENT
Start: 2018-08-20 | End: 2018-08-21

## 2018-08-20 RX ORDER — INSULIN LISPRO 100/ML
VIAL (ML) SUBCUTANEOUS EVERY 6 HOURS
Qty: 0 | Refills: 0 | Status: DISCONTINUED | OUTPATIENT
Start: 2018-08-20 | End: 2018-08-21

## 2018-08-20 RX ORDER — CARBIDOPA AND LEVODOPA 25; 100 MG/1; MG/1
1 TABLET ORAL
Qty: 0 | Refills: 0 | Status: DISCONTINUED | OUTPATIENT
Start: 2018-08-20 | End: 2018-08-21

## 2018-08-20 RX ORDER — SODIUM CHLORIDE 9 MG/ML
1000 INJECTION, SOLUTION INTRAVENOUS
Qty: 0 | Refills: 0 | Status: DISCONTINUED | OUTPATIENT
Start: 2018-08-20 | End: 2018-08-21

## 2018-08-20 RX ADMIN — CARBIDOPA AND LEVODOPA 1 TABLET(S): 25; 100 TABLET ORAL at 18:25

## 2018-08-20 RX ADMIN — Medication 650 MILLIGRAM(S): at 16:53

## 2018-08-20 RX ADMIN — LEVETIRACETAM 400 MILLIGRAM(S): 250 TABLET, FILM COATED ORAL at 12:24

## 2018-08-20 RX ADMIN — SODIUM CHLORIDE 75 MILLILITER(S): 9 INJECTION, SOLUTION INTRAVENOUS at 12:20

## 2018-08-20 RX ADMIN — HEPARIN SODIUM 1300 UNIT(S)/HR: 5000 INJECTION INTRAVENOUS; SUBCUTANEOUS at 18:51

## 2018-08-20 RX ADMIN — HEPARIN SODIUM 1100 UNIT(S)/HR: 5000 INJECTION INTRAVENOUS; SUBCUTANEOUS at 04:17

## 2018-08-20 RX ADMIN — CHLORHEXIDINE GLUCONATE 1 APPLICATION(S): 213 SOLUTION TOPICAL at 18:25

## 2018-08-20 RX ADMIN — PIPERACILLIN AND TAZOBACTAM 25 GRAM(S): 4; .5 INJECTION, POWDER, LYOPHILIZED, FOR SOLUTION INTRAVENOUS at 17:12

## 2018-08-20 RX ADMIN — SODIUM CHLORIDE 200 MILLILITER(S): 9 INJECTION INTRAMUSCULAR; INTRAVENOUS; SUBCUTANEOUS at 01:39

## 2018-08-20 RX ADMIN — PIPERACILLIN AND TAZOBACTAM 25 GRAM(S): 4; .5 INJECTION, POWDER, LYOPHILIZED, FOR SOLUTION INTRAVENOUS at 08:50

## 2018-08-20 RX ADMIN — PIPERACILLIN AND TAZOBACTAM 25 GRAM(S): 4; .5 INJECTION, POWDER, LYOPHILIZED, FOR SOLUTION INTRAVENOUS at 01:29

## 2018-08-20 RX ADMIN — Medication 200 GRAM(S): at 12:20

## 2018-08-20 RX ADMIN — LEVETIRACETAM 400 MILLIGRAM(S): 250 TABLET, FILM COATED ORAL at 01:29

## 2018-08-20 RX ADMIN — CHLORHEXIDINE GLUCONATE 15 MILLILITER(S): 213 SOLUTION TOPICAL at 06:36

## 2018-08-20 RX ADMIN — CARBIDOPA, LEVODOPA, AND ENTACAPONE 1 TABLET(S): 50; 200; 200 TABLET, FILM COATED ORAL at 21:32

## 2018-08-20 RX ADMIN — CHLORHEXIDINE GLUCONATE 15 MILLILITER(S): 213 SOLUTION TOPICAL at 17:12

## 2018-08-20 RX ADMIN — SODIUM CHLORIDE 200 MILLILITER(S): 9 INJECTION INTRAMUSCULAR; INTRAVENOUS; SUBCUTANEOUS at 08:51

## 2018-08-20 RX ADMIN — CARBIDOPA AND LEVODOPA 1 TABLET(S): 25; 100 TABLET ORAL at 21:33

## 2018-08-20 RX ADMIN — HEPARIN SODIUM 3500 UNIT(S): 5000 INJECTION INTRAVENOUS; SUBCUTANEOUS at 12:00

## 2018-08-20 RX ADMIN — PIPERACILLIN AND TAZOBACTAM 25 GRAM(S): 4; .5 INJECTION, POWDER, LYOPHILIZED, FOR SOLUTION INTRAVENOUS at 23:40

## 2018-08-20 RX ADMIN — PANTOPRAZOLE SODIUM 40 MILLIGRAM(S): 20 TABLET, DELAYED RELEASE ORAL at 12:24

## 2018-08-20 RX ADMIN — HEPARIN SODIUM 1300 UNIT(S)/HR: 5000 INJECTION INTRAVENOUS; SUBCUTANEOUS at 12:18

## 2018-08-20 NOTE — PROCEDURE NOTE - NSPROCDETAILS_GEN_ALL_CORE
location identified, feeding tube inserted/NGT was confirmed with Ausculation, and CXR
difficult/crash intubation
sterile dressing applied/guidewire recovered/lumen(s) aspirated and flushed
guidewire recovered/lumen(s) aspirated and flushed
positive blood return obtained via catheter/Seldinger technique/ultrasound guidance/connected to a pressurized flush line/location identified, draped/prepped, sterile technique used, needle inserted/introduced/hemostasis with direct pressure, dressing applied/sutured in place/all materials/supplies accounted for at end of procedure

## 2018-08-20 NOTE — CHART NOTE - NSCHARTNOTEFT_GEN_A_CORE
pt remains critically ill  CT brain reviewed with family  current status and prognosis reviewed with family  explained likelihood of prolonged mechanical ventilation  daughter and sister wish to continue aggressive measures and full code, they are in agreement with early Trach and PEG as prognosis for weaning is very unlikely   discussed with PMD and Nursing  will follow

## 2018-08-20 NOTE — PROGRESS NOTE ADULT - ASSESSMENT
54yo with neurological and psychiatric illness with Parkinson's disease  s/p cardiac arrest, with asystole. with ROSC after 20min CPR. Cause unclear.   Shock kidney and liver.   On empiric tx for possible PE, and antibiotics for possible infection.   RAISSA with inability to image to determine if has PE. Duplex LE negative.   Normal PT and PTT at time of cardiac arrest.     RECOMMEND:     continue heparin per protocol for PE/anticoagulation with goal PTT 60-90  CT brain with edema c/w hypoxic ischemia  consider VQ scan when clinically stable in order to determine presence/absense of PE and determine length of anticoagulation  other mgmt of multiorgan failure per critical care.   prognosis overall is very poor  Unfortunately little to contribute from heme perspective; will sign off; please reconsult with any additional questions

## 2018-08-20 NOTE — PROGRESS NOTE ADULT - ASSESSMENT
Patient is 51 yo male with hx of diastolic CHF, gastroesophageal reflux disease, Herniated cervical disc, Migraine, Parkinson disease  (has been a resident of [facility] for >1 year - [facility] with difficulty placing patient), Unclear of etiology.      1. S/P Cardiac arrest.    -Unclear Etiology.  possible Seizure, Cardiac event, infection, pulmonary issues VS neurological issues  -EKG shows no ischemic changes.  Trop elevated in the setting of rhabdo, renal failure, and hypotension.  TTE shows NL EF  -Off pressors.  Continue with heparin drip (elevated D-dimer), antibiotic, and IV keppra.  hypothermic protocol was completed  -Doppler US of LE negative for DVT (unable to obtain Chest CTA due to renal function).   -Further care as per critical care, and cardiology      2.  Acute renal failure  -Possible Dehydration, ATN in the setting of hypotension, and rhabdo  -Renal US pending  -Continue with IVF with Bicard  -Monitor CPK level  -Monitor renal function  -Avoid nephrotoxin  -Further care as per Nephrology      3.  Elevated LFT.   -Possible shock liver in the setting of hypotension  -hepatitis panel and abdm US pending  -Monitor LFT  -Avoid hepatoxic medications  -Further care as per GI.      4.  GERD  -Continue with PPI      5. AMS.    -Head CT scan shows anoxic brain injury.  Patient is not responding to painful Stimuli, currently not on sedation  -Discussed finding of the CT scan with neurology who recommended hyperventilation.    -Continue with neuro checks  -Further care as per neurology    6.  rhabdo  -in the setting of CPR and anoxic brain injury.  Continue with IVF, and monitor CPK level  -Continue with IVF with Bicard additive as per nephrology  -Monitor CPK level  -Further care as per nephrology    7.  Suspect PE.    -given elevated D-dimer  -Unable to obtain Chest CTA due to current renal function  -Unable to obtain V/Q scan since patient is intubated, will not be cooperative with the test as per radiology  -Continue with heparin drip  -Hematology to follow    8.  Diet  -Will start patient on NG tube feeding    9.  Suspected aspiration pna  -CXR shows Increasing right hilar and left basilar opacification   -Continue with zosyn pending Blood culture, sputum culture, urine culture  -Further care as per ID    10.  Parkinson.  -Continue with home medications      Given his anoxic brain injury with multiple organ failure.  Prognosis seems to be poor      Plan of care was discussed with patient, and sister in great details, All questions were answered to their satisfaction  Seems to understand, and in agreement Patient is 51 yo male with hx of diastolic CHF, gastroesophageal reflux disease, Herniated cervical disc, Migraine, Parkinson disease  (has been a resident of [facility] for >1 year - [facility] with difficulty placing patient), Unclear of etiology.      1. S/P Cardiac arrest.    -Unclear Etiology.  possible Seizure, Cardiac event, infection, pulmonary issues VS neurological issues  -Trop elevated in the setting of rhabdo, renal failure, and hypotension.  TTE shows NL EF  -Off pressors.  Continue with heparin drip (elevated D-dimer), antibiotic, and IV keppra.  hypothermic protocol was completed  -Doppler US of LE negative for DVT (unable to obtain Chest CTA due to renal function).   -Further care as per critical care, and cardiology      2.  Acute renal failure  -Possible Dehydration, ATN in the setting of hypotension, and rhabdo  -Renal US pending  -Continue with IVF with Bicard  -Monitor CPK level  -Monitor renal function  -Avoid nephrotoxin  -Continue with vieyra  -Further care as per Nephrology      3.  Elevated LFT.   -Possible shock liver in the setting of hypotension  -hepatitis panel and abdm US pending  -Monitor LFT  -Avoid hepatoxic medications  -Further care as per GI.      4.  GERD  -Continue with PPI      5. AMS.    -Head CT scan shows anoxic brain injury.  Patient is not responding to painful Stimuli, currently not on sedation  -Discussed finding of the CT scan with neurology who recommended hyperventilation.    -Continue with neuro checks  -Further care as per neurology    6.  rhabdo  -in the setting of CPR and anoxic brain injury.  Continue with IVF, and monitor CPK level  -Continue with IVF with Bicard additive as per nephrology  -Monitor CPK level  -Further care as per nephrology    7.  Suspect PE.    -given elevated D-dimer  -Unable to obtain Chest CTA due to current renal function  -Unable to obtain V/Q scan since patient is intubated, will not be cooperative with the test as per radiology  -Continue with heparin drip  -Hematology to follow    8.  Diet  -Will start patient on NG tube feeding    9.  Suspected aspiration pna  -CXR shows Increasing right hilar and left basilar opacification   -Continue with zosyn pending Blood culture, sputum culture, urine culture  -Further care as per ID    10.  Parkinson.  -Continue with home medications      Given his anoxic brain injury with multiple organ failure.  Prognosis seems to be poor      Plan of care was discussed with patient, and sister in great details, All questions were answered to their satisfaction  Seems to understand, and in agreement

## 2018-08-20 NOTE — PROGRESS NOTE ADULT - SUBJECTIVE AND OBJECTIVE BOX
Subjective: Levophed dced last night. UO 150cc over past 2 hours. Unresponsive. Weak gag only. FiO2 40%. Hypothermia protocol completed.       MEDICATIONS  (STANDING):  chlorhexidine 0.12% Liquid 15 milliLiter(s) Swish and Spit two times a day  dextrose 5%. 1000 milliLiter(s) (50 mL/Hr) IV Continuous <Continuous>  dextrose 50% Injectable 12.5 Gram(s) IV Push once  dextrose 50% Injectable 25 Gram(s) IV Push once  dextrose 50% Injectable 25 Gram(s) IV Push once  heparin  Infusion.  Unit(s)/Hr (17 mL/Hr) IV Continuous <Continuous>  levETIRAcetam  IVPB 500 milliGRAM(s) IV Intermittent every 12 hours  norepinephrine Infusion 0.05 MICROgram(s)/kG/Min (8.841 mL/Hr) IV Continuous <Continuous>  pantoprazole  Injectable 40 milliGRAM(s) IV Push daily  piperacillin/tazobactam IVPB. 3.375 Gram(s) IV Intermittent every 8 hours  sodium chloride 0.9%. 1000 milliLiter(s) (200 mL/Hr) IV Continuous <Continuous>    MEDICATIONS  (PRN):  ALBUTerol    90 MICROgram(s) HFA Inhaler 2 Puff(s) Inhalation every 6 hours PRN Shortness of Breath and/or Wheezing  dextrose 40% Gel 15 Gram(s) Oral once PRN Blood Glucose LESS THAN 70 milliGRAM(s)/deciLiter  glucagon  Injectable 1 milliGRAM(s) IntraMuscular once PRN Glucose <70 milliGRAM(s)/deciLiter  heparin  Injectable 7500 Unit(s) IV Push every 6 hours PRN For aPTT less than 40  heparin  Injectable 3500 Unit(s) IV Push every 6 hours PRN For aPTT between 40 - 57          T(C): 36.8 (08-20-18 @ 07:00), Max: 36.8 (08-20-18 @ 07:00)  HR: 117 (08-20-18 @ 07:45) (62 - 117)  BP: 139/67 (08-20-18 @ 07:00) (121/81 - 145/82)  RR: 32 (08-20-18 @ 07:00) (25 - 48)  SpO2: 99% (08-20-18 @ 07:45) (98% - 100%)  Wt(kg): --    ABG - ( 20 Aug 2018 06:19 )  pH, Arterial: x     pH, Blood: 7.31  /  pCO2: 28    /  pO2: 110   / HCO3: 16    / Base Excess: -11.2 /  SaO2: 97                  I&O's Detail    19 Aug 2018 07:01  -  20 Aug 2018 07:00  --------------------------------------------------------  IN:    heparin  Infusion.: 304 mL    IV PiggyBack: 900 mL    lactated ringers.: 700 mL    lactated ringers.: 375 mL    norepinephrine Infusion: 134.2 mL    Sodium Chloride 0.9% IV Bolus: 1000 mL    sodium chloride 0.9%.: 1600 mL  Total IN: 5013.2 mL    OUT:    Indwelling Catheter - Urethral: 2350 mL  Total OUT: 2350 mL    Total NET: 2663.2 mL          Mode: AC/ CMV (Assist Control/ Continuous Mandatory Ventilation)  RR (machine): 20  TV (machine): 500  FiO2: 40  PEEP: 5  ITime: 1  MAP: 14  PIP: 30       PHYSICAL EXAM:    GENERAL: vented/FiO2 40%  EYES: EOMI, PERRLA, conjunctiva and sclera clear  NECK: Supple, no inc in JVP  CHEST/LUNG: rhonchi  HEART: S1S2  ABDOMEN: Soft  EXTREMITIES:  min edema  NEURO: unresponsive to noxious stimuli      LABS:  CBC Full  -  ( 20 Aug 2018 07:01 )  WBC Count : 11.56 K/uL  Hemoglobin : 11.9 g/dL  Hematocrit : 37.2 %  Platelet Count - Automated : 186 K/uL  Mean Cell Volume : 96.4 fl  Mean Cell Hemoglobin : 30.8 pg  Mean Cell Hemoglobin Concentration : 32.0 gm/dL  Auto Neutrophil # : x  Auto Lymphocyte # : x  Auto Monocyte # : x  Auto Eosinophil # : x  Auto Basophil # : x  Auto Neutrophil % : x  Auto Lymphocyte % : x  Auto Monocyte % : x  Auto Eosinophil % : x  Auto Basophil % : x    08-20    148<H>  |  113<H>  |  38<H>  ----------------------------<  118<H>  4.3   |  16<L>  |  2.53<H>    Ca    6.9<L>      20 Aug 2018 07:01  Phos  5.0     08-20  Mg     2.1     08-20    TPro  5.2<L>  /  Alb  2.4<L>  /  TBili  0.5  /  DBili  x   /  AST  x   /  ALT  415<H>  /  AlkPhos  75  08-20    PT/INR - ( 19 Aug 2018 13:24 )   PT: 14.6 sec;   INR: 1.33 ratio         PTT - ( 20 Aug 2018 03:31 )  PTT:86.5 sec        Impression:  * RAISSA -- ischemic ATN + pigment induced tubular injury. Cr still rising. Non-oliguric  * Rhabdo  * S/p cardio-pulm arrest. Prob anoxic encephalopathy  * Metabolic acidosis -- RAISSA, lactic, aggressive volume expansion with isotonic crystalloid  * Respiratory alkalosis  * HypoCa -- corrected Ca 7.8    Recommendations:   * Will add bicarb to IVFs to mitigate acidosis  * Hypotonic IVFs to control hyperNa till NGT is established  * Cont hemodynamic support  * Follow Cr, CPK daily  * IV Ca today

## 2018-08-20 NOTE — PROGRESS NOTE ADULT - SUBJECTIVE AND OBJECTIVE BOX
PCP:    REQUESTING PHYSICIAN:      CHIEF COMPLAINT:  Patient is a 53y old  Male who presents with a chief complaint of s/p cardiac arrest (19 Aug 2018 01:06)      HPI:  52M with long history of Parkinson's disease (has been a resident of [facility] for >1 year - [facility] with difficulty [facility] patient), reported hx of diastolic heart failure, herniated discs who was found unresponsive tonight.  Reportedly, RN said that the patient went to take a shower around 2000 and afterwards patient started to become agitated.  Patient does have a history of agitation and flares his arms and legs often.  Patient was able to calm down and was allowed to go to the day room.  Patient again started to flare his arms and legs and even slid down to the floor.  Patient was then directed back to his room where he continued to be agitated.  Patient was then given ativan 2mg PO for agitation and then nurse was able to instruct the patient to calm down as well.  Nurse stepped outside the room and told the aide to get vitals when the patient was found to be unresponsive all of sudden.  A CODE BLUE was activated at 2132.      During the CODE BLUE, patient was found to be unresponsive.  CPR was already initiated.  Initial rhythm on monitor was asystole.  A total of 4 rounds of epinephrine was given with 2 given peripherally and 2 via femoral central line and a dose of bicarb and calcium chloride was also given in between (see code sheet).  Patient was successfully intubated and patient eventually had ROSC at 2144 with BP 96/39 with HR of 185.  Initial ABG was 6.78/52/247.  Given his tachycardia, patient was also given amio 150mg.  Patient's HR decreased to 70-80s but BP eventually dropped to SBP 40/20s.  Patient was started on levophed with improvement SBP to 90s and HR increased to 110s.  Multiple attempts to contact family member was unsuccessful.  Patient was transferred/admitted to [facility] for further management (18 Aug 2018 22:48)      PAST MEDICAL & SURGICAL HISTORY:  Herniated cervical disc  Congestive heart failure, unspecified congestive heart failure chronicity, unspecified congestive heart failure type  Parkinson disease  Diastolic heart failure  GERD (gastroesophageal reflux disease)  Migraine  CHF (congestive heart failure)  Parkinson disease  No significant past surgical history  No significant past surgical history      Allergies    aspirin (Unknown)  Cheese (Unknown)  ibuprofen (Unknown)  Reglan (Swelling)    Intolerances    ibuprofen (Unknown)      SOCIAL HISTORY:    FAMILY HISTORY:  No pertinent family history in first degree relatives  No pertinent family history in first degree relatives  Family history of prostate cancer in father  Family history of diabetes mellitus      MEDICATIONS:    MEDICATIONS  (STANDING):  calcium gluconate IVPB 2 Gram(s) IV Intermittent once  chlorhexidine 0.12% Liquid 15 milliLiter(s) Swish and Spit two times a day  dextrose 5% 1000 milliLiter(s) (75 mL/Hr) IV Continuous <Continuous>  dextrose 5%. 1000 milliLiter(s) (50 mL/Hr) IV Continuous <Continuous>  dextrose 50% Injectable 12.5 Gram(s) IV Push once  dextrose 50% Injectable 25 Gram(s) IV Push once  dextrose 50% Injectable 25 Gram(s) IV Push once  heparin  Infusion.  Unit(s)/Hr (17 mL/Hr) IV Continuous <Continuous>  levETIRAcetam  IVPB 500 milliGRAM(s) IV Intermittent every 12 hours  norepinephrine Infusion 0.05 MICROgram(s)/kG/Min (8.841 mL/Hr) IV Continuous <Continuous>  pantoprazole  Injectable 40 milliGRAM(s) IV Push daily  piperacillin/tazobactam IVPB. 3.375 Gram(s) IV Intermittent every 8 hours    MEDICATIONS  (PRN):  ALBUTerol    90 MICROgram(s) HFA Inhaler 2 Puff(s) Inhalation every 6 hours PRN Shortness of Breath and/or Wheezing  dextrose 40% Gel 15 Gram(s) Oral once PRN Blood Glucose LESS THAN 70 milliGRAM(s)/deciLiter  glucagon  Injectable 1 milliGRAM(s) IntraMuscular once PRN Glucose <70 milliGRAM(s)/deciLiter  heparin  Injectable 7500 Unit(s) IV Push every 6 hours PRN For aPTT less than 40  heparin  Injectable 3500 Unit(s) IV Push every 6 hours PRN For aPTT between 40 - 57      REVIEW OF SYSTEMS:    CONSTITUTIONAL: No weakness, fevers or chills  EYES/ENT: No visual changes;  No vertigo or throat pain   NECK: No pain or stiffness  RESPIRATORY: No cough, wheezing, hemoptysis; No shortness of breath  CARDIOVASCULAR: No chest pain or palpitations  GASTROINTESTINAL: No abdominal or epigastric pain. No nausea, vomiting, or hematemesis; No diarrhea or constipation. No melena or hematochezia.  GENITOURINARY: No dysuria, frequency or hematuria  NEUROLOGICAL: No numbness or weakness  SKIN: No itching, burning, rashes, or lesions   All other review of systems is negative unless indicated above    Vital Signs Last 24 Hrs  T(C): 37.4 (20 Aug 2018 08:00), Max: 37.4 (20 Aug 2018 08:00)  T(F): 99.3 (20 Aug 2018 08:00), Max: 99.3 (20 Aug 2018 08:00)  HR: 120 (20 Aug 2018 10:29) (62 - 120)  BP: 139/64 (20 Aug 2018 08:00) (121/81 - 145/82)  BP(mean): 92 (20 Aug 2018 08:00) (86 - 104)  RR: 34 (20 Aug 2018 08:00) (25 - 48)  SpO2: 99% (20 Aug 2018 10:29) (98% - 100%)    I&O's Summary    19 Aug 2018 07:01  -  20 Aug 2018 07:00  --------------------------------------------------------  IN: 5013.2 mL / OUT: 2350 mL / NET: 2663.2 mL    20 Aug 2018 07:01  -  20 Aug 2018 10:34  --------------------------------------------------------  IN: 708 mL / OUT: 151 mL / NET: 557 mL        PHYSICAL EXAM:    Constitutional: NAD, awake and alert, well-developed  HEENT: PERR, EOMI,  No oral cyananosis.  Neck:  supple,  No JVD  Respiratory: Breath sounds are clear bilaterally, No wheezing, rales or rhonchi  Cardiovascular: S1 and S2, regular rate and rhythm, no Murmurs, gallops or rubs  Gastrointestinal: Bowel Sounds present, soft, nontender.   Extremities: No peripheral edema. No clubbing or cyanosis.  Vascular: 2+ peripheral pulses  Neurological: A/O x 3, no focal deficits  Musculoskeletal: no calf tenderness.  Skin: No rashes.      LABS: All Labs Reviewed:                        11.9   11.56 )-----------( 186      ( 20 Aug 2018 07:01 )             37.2                         12.2   3.21  )-----------( 268      ( 19 Aug 2018 06:56 )             38.5                         12.6   17.63 )-----------( 341      ( 19 Aug 2018 00:24 )             38.3     20 Aug 2018 07:01    148    |  113    |  38     ----------------------------<  118    4.3     |  16     |  2.53   20 Aug 2018 00:14    144    |  112    |  37     ----------------------------<  122    4.3     |  18     |  2.30   19 Aug 2018 18:45    147    |  114    |  37     ----------------------------<  134    4.1     |  19     |  2.10     Ca    6.9        20 Aug 2018 07:01  Ca    7.5        20 Aug 2018 00:14  Ca    6.4        19 Aug 2018 18:45  Phos  5.0       20 Aug 2018 07:01  Phos  5.1       20 Aug 2018 01:29  Phos  4.9       19 Aug 2018 18:45  Mg     2.1       20 Aug 2018 07:01  Mg     2.6       20 Aug 2018 01:29  Mg     2.4       19 Aug 2018 12:08    TPro  5.2    /  Alb  2.4    /  TBili  0.5    /  DBili  x      /  AST  2415   /  ALT  415    /  AlkPhos  75     20 Aug 2018 07:01  TPro  5.3    /  Alb  2.8    /  TBili  0.5    /  DBili  x      /  AST  2185   /  ALT  157    /  AlkPhos  77     19 Aug 2018 06:56  TPro  6.3    /  Alb  3.4    /  TBili  0.6    /  DBili  0.1    /  AST  1151   /  ALT  123    /  AlkPhos  96     19 Aug 2018 00:24    PT/INR - ( 19 Aug 2018 13:24 )   PT: 14.6 sec;   INR: 1.33 ratio         PTT - ( 20 Aug 2018 03:31 )  PTT:86.5 sec  CARDIAC MARKERS ( 20 Aug 2018 07:01 )  1.330 ng/mL / x     / x     / x     / x      CARDIAC MARKERS ( 20 Aug 2018 00:13 )  x     / x     / >81770 U/L / x     / x      CARDIAC MARKERS ( 19 Aug 2018 16:33 )  x     / x     / >00325 U/L / x     / x      CARDIAC MARKERS ( 19 Aug 2018 07:00 )  x     / x     / 90692 U/L / x     / x      CARDIAC MARKERS ( 19 Aug 2018 06:56 )  .901 ng/mL / x     / x     / x     / x      CARDIAC MARKERS ( 19 Aug 2018 00:24 )  x     / x     / 5947 U/L / x     / x      CARDIAC MARKERS ( 18 Aug 2018 22:12 )  .028 ng/mL / x     / x     / x     / x          Blood Culture:     08-19 @ 12:23  TSH: 0.63      RADIOLOGY/EKG:    nsr,inverted T wave V1-V3

## 2018-08-20 NOTE — PROGRESS NOTE ADULT - SUBJECTIVE AND OBJECTIVE BOX
Date/Time Patient Seen:  		  Referring MD:   Data Reviewed	       Patient is a 53y old  Male who presents with a chief complaint of s/p cardiac arrest (19 Aug 2018 01:06)    vs and meds reviewed    Subjective/HPI     PAST MEDICAL & SURGICAL HISTORY:  Herniated cervical disc  Congestive heart failure, unspecified congestive heart failure chronicity, unspecified congestive heart failure type  Parkinson disease  Diastolic heart failure  Parkinson disease  CHF (congestive heart failure)  GERD (gastroesophageal reflux disease)  Parkinson's disease  Migraine  CHF (congestive heart failure)  Parkinson disease  No significant past surgical history  No significant past surgical history  No significant past surgical history        Medication list         MEDICATIONS  (STANDING):  chlorhexidine 0.12% Liquid 15 milliLiter(s) Swish and Spit two times a day  dextrose 5%. 1000 milliLiter(s) (50 mL/Hr) IV Continuous <Continuous>  dextrose 50% Injectable 12.5 Gram(s) IV Push once  dextrose 50% Injectable 25 Gram(s) IV Push once  dextrose 50% Injectable 25 Gram(s) IV Push once  heparin  Infusion.  Unit(s)/Hr (17 mL/Hr) IV Continuous <Continuous>  levETIRAcetam  IVPB 500 milliGRAM(s) IV Intermittent every 12 hours  norepinephrine Infusion 0.05 MICROgram(s)/kG/Min (8.841 mL/Hr) IV Continuous <Continuous>  pantoprazole  Injectable 40 milliGRAM(s) IV Push daily  piperacillin/tazobactam IVPB. 3.375 Gram(s) IV Intermittent every 8 hours  sodium chloride 0.9%. 1000 milliLiter(s) (200 mL/Hr) IV Continuous <Continuous>    MEDICATIONS  (PRN):  ALBUTerol    90 MICROgram(s) HFA Inhaler 2 Puff(s) Inhalation every 6 hours PRN Shortness of Breath and/or Wheezing  dextrose 40% Gel 15 Gram(s) Oral once PRN Blood Glucose LESS THAN 70 milliGRAM(s)/deciLiter  glucagon  Injectable 1 milliGRAM(s) IntraMuscular once PRN Glucose <70 milliGRAM(s)/deciLiter  heparin  Injectable 7500 Unit(s) IV Push every 6 hours PRN For aPTT less than 40  heparin  Injectable 3500 Unit(s) IV Push every 6 hours PRN For aPTT between 40 - 57         Vitals log        ICU Vital Signs Last 24 Hrs  T(C): 36.8 (20 Aug 2018 07:00), Max: 36.8 (20 Aug 2018 07:00)  T(F): 98.2 (20 Aug 2018 07:00), Max: 98.2 (20 Aug 2018 07:00)  HR: 111 (20 Aug 2018 07:00) (62 - 117)  BP: 139/67 (20 Aug 2018 07:00) (121/81 - 145/82)  BP(mean): 87 (20 Aug 2018 07:00) (86 - 104)  ABP: 131/58 (20 Aug 2018 07:00) (93/76 - 163/84)  ABP(mean): 86 (20 Aug 2018 07:00) (79 - 113)  RR: 32 (20 Aug 2018 07:00) (25 - 48)  SpO2: 100% (20 Aug 2018 07:00) (98% - 100%)       Mode: AC/ CMV (Assist Control/ Continuous Mandatory Ventilation)  RR (machine): 20  TV (machine): 500  FiO2: 40  PEEP: 5  ITime: 1  MAP: 14  PIP: 26      Input and Output:  I&O's Detail    19 Aug 2018 07:01  -  20 Aug 2018 07:00  --------------------------------------------------------  IN:    heparin  Infusion.: 249 mL    IV PiggyBack: 900 mL    lactated ringers.: 700 mL    lactated ringers.: 375 mL    norepinephrine Infusion: 134.2 mL    Sodium Chloride 0.9% IV Bolus: 1000 mL    sodium chloride 0.9%.: 1600 mL  Total IN: 4958.2 mL    OUT:    Indwelling Catheter - Urethral: 2025 mL  Total OUT: 2025 mL    Total NET: 2933.2 mL          Lab Data                        11.9   11.56 )-----------( 186      ( 20 Aug 2018 07:01 )             37.2     08-20    144  |  112<H>  |  37<H>  ----------------------------<  122<H>  4.3   |  18<L>  |  2.30<H>    Ca    7.5<L>      20 Aug 2018 00:14  Phos  5.1     08-20  Mg     2.6     08-20    TPro  5.3<L>  /  Alb  2.8<L>  /  TBili  0.5  /  DBili  x   /  AST  2185<H>  /  ALT  157<H>  /  AlkPhos  77  08-19    ABG - ( 20 Aug 2018 06:19 )  pH, Arterial: x     pH, Blood: 7.31  /  pCO2: 28    /  pO2: 110   / HCO3: 16    / Base Excess: -11.2 /  SaO2: 97                CARDIAC MARKERS ( 20 Aug 2018 00:13 )  x     / x     / >78445 U/L / x     / x      CARDIAC MARKERS ( 19 Aug 2018 16:33 )  x     / x     / >12747 U/L / x     / x      CARDIAC MARKERS ( 19 Aug 2018 07:00 )  x     / x     / 90733 U/L / x     / x      CARDIAC MARKERS ( 19 Aug 2018 06:56 )  .901 ng/mL / x     / x     / x     / x      CARDIAC MARKERS ( 19 Aug 2018 00:24 )  x     / x     / 5947 U/L / x     / x      CARDIAC MARKERS ( 18 Aug 2018 22:12 )  .028 ng/mL / x     / x     / x     / x            Review of Systems	      Objective     Physical Examination    heart s1s2  lung dec BS  abd soft      Pertinent Lab findings & Imaging      Jennifer:  NO   Adequate UO     I&O's Detail    19 Aug 2018 07:01  -  20 Aug 2018 07:00  --------------------------------------------------------  IN:    heparin  Infusion.: 249 mL    IV PiggyBack: 900 mL    lactated ringers.: 700 mL    lactated ringers.: 375 mL    norepinephrine Infusion: 134.2 mL    Sodium Chloride 0.9% IV Bolus: 1000 mL    sodium chloride 0.9%.: 1600 mL  Total IN: 4958.2 mL    OUT:    Indwelling Catheter - Urethral: 2025 mL  Total OUT: 2025 mL    Total NET: 2933.2 mL               Discussed with:     Cultures:	        Radiology

## 2018-08-20 NOTE — PROCEDURE NOTE - PROCEDURE
<<-----Click on this checkbox to enter Procedure Nasogastric tube assessment  08/20/2018    Active  [email or web address]

## 2018-08-20 NOTE — PROGRESS NOTE ADULT - ASSESSMENT
PROBLEM Dx:  Hypocalcemia: Hypocalcemia  Non-traumatic rhabdomyolysis: Non-traumatic rhabdomyolysis  Elevated LFTs: Elevated LFTs  Elevated troponin: Elevated troponin  Hypotension: Hypotension  Preventive measure: Preventive measure  Congestive heart failure, unspecified HF chronicity, unspecified heart failure type: Congestive heart failure, unspecified HF chronicity, unspecified heart failure type  Parkinson disease: Parkinson disease  RAISSA (acute kidney injury): RAISSA (acute kidney injury)  Respiratory acidosis: Respiratory acidosis  Cardiac arrest: Cardiac arrest    DISCUSSION:    Problem: Cardiac arrest. Recommendation: unclear etiology ?hypotension sepsis, vs dehydration , rule out PE ,   possible anoxic encephalopathy ,   will continue to monitor , on hypothermia protocol.  improving renal function.      ·  Problem: Hypotension.  Recommendation: possible dehydration ? sepsis , continue pressors , antibiotics ,   cultures.     ·  Problem: Congestive heart failure, unspecified HF chronicity, unspecified heart failure type.  Recommendation: by hx , no clinical chf , normal echo.       ·  Problem: Elevated troponin.  Recommendation: Patient did have ischemic KEG changes in setting of hypotension after prolonged asystole , possible due to demand ischemia doubt ACS ?? Normal ventricular systolic function ,  would give ecotrin rectally.   if he recovers with Brain function,will need cardiac catheterization  discussed with MD/family    Attending Attestation:   Plan discussed with sister at bedside , hospitalist , nurse.

## 2018-08-20 NOTE — PROCEDURE NOTE - NSINDICATIONS_GEN_A_CORE
critical patient/monitoring purposes
venous access/pressors/hypertonic/irritant infusion
critical illness/venous access/cardiac arrest
cardiac arrest
feeding tube replacement

## 2018-08-20 NOTE — PROCEDURE NOTE - NSPROCNAME_GEN_A_CORE
Arterial Puncture/Cannulation
Central Line Insertion
Central Line Insertion
Tracheal Intubation
Feeding Tube Placement

## 2018-08-20 NOTE — PROVIDER CONTACT NOTE (CRITICAL VALUE NOTIFICATION) - BACKGROUND
patient s/p cardiac arrest  cpk earlier the same,. renal aware and started ns @200 ml/hr bun /cr 37.2.30

## 2018-08-20 NOTE — CONSULT NOTE ADULT - SUBJECTIVE AND OBJECTIVE BOX
Reason for Consult : Possible aspiration pneumonia , s/p cardiac arrest    HPI:  52M with long history of Parkinson's disease (has been a resident of inpatient Psych unit on [facility] for >1 year  with difficulty placing patient), reported hx of diastolic heart failure, herniated discs who was found unresponsive on the unit .  Reportedly, RN said that the patient went to take a shower around 2000 and afterwards patient started to become agitated.  Patient does have a history of agitation and flares his arms and legs often.  Patient was able to calm down and was allowed to go to the day room.  Patient again started to flare his arms and legs and even slid down to the floor.  Patient was then directed back to his room where he continued to be agitated.  Patient was then given ativan 2mg PO for agitation and then nurse was able to instruct the patient to calm down as well.  Nurse stepped outside the room and told the aide to get vitals when the patient was found to be unresponsive all of sudden.  A CODE BLUE was activated at 2132.      During the CODE BLUE, patient was found to be unresponsive.  CPR was already initiated.  Initial rhythm on monitor was asystole.  A total of 4 rounds of epinephrine was given with 2 given peripherally and 2 via femoral central line and a dose of bicarb and calcium chloride was also given in between (see code sheet).  Patient was successfully intubated and patient eventually had ROSC at 2144 with BP 96/39 with HR of 185.  Initial ABG was 6.78/52/247.  Given his tachycardia, patient was also given amiodarone  150mg.  Patient's HR decreased to 70-80s but BP eventually dropped to SBP 40/20s.  Patient was started on levophed with improvement SBP to 90s and HR increased to 110s.  Multiple attempts to contact family member was unsuccessful.  Patient was transferred/admitted to SPCU for further management     He was placed on hypothermia protocol which he completed and is now on the warming phase  . He is off pressors, currently remains on IV heparin for possible PE . he is unresponsive off sedation only has deep gag reflex . He has minimal endotracheal secretions     ID consult called for possible aspiration pneumonia , he is on IV Zosyn empirically .     Past Medical & Surgical Hx:  PAST MEDICAL & SURGICAL HISTORY:  Herniated cervical disc  Congestive heart failure, unspecified congestive heart failure chronicity, unspecified congestive heart failure type  Parkinson disease  Diastolic heart failure  GERD (gastroesophageal reflux disease)  Migraine  CHF (congestive heart failure)  Parkinson disease  No significant past surgical history  No significant past surgical history      Social History-- resident of [facility]   EtOH: unable to obtain   Tobacco: unable to obtain   Drug Use: unable to obtain       FAMILY HISTORY:  No pertinent family history in first degree relatives  No pertinent family history in first degree relatives  Family history of prostate cancer in father  Family history of diabetes mellitus      Allergies    aspirin (Unknown)  Cheese (Unknown)  ibuprofen (Unknown)  Reglan (Swelling)    Intolerances    ibuprofen (Unknown)      Home/Outpatient  Medications   Home Medications:  Ativan 2 mg oral tablet: 1 tab(s) orally every 6 hours, As Needed  (18 Aug 2018 23:29)  cyclobenzaprine 10 mg oral tablet: 1 tab(s) orally 3 times a day, As Needed msucle spasms (18 Aug 2018 23:33)  Haldol 5 mg oral tablet: 1 tab(s) orally every 6 hours, As Needed agitation (18 Aug 2018 23:33)  Sinemet 25 mg-250 mg oral tablet: orally 5 times a day (@ 0600, 1000, 1400, 1700. 2000) (18 Aug 2018 23:30)  Stalevo 200 oral tablet: 1 tab(s) orally 3 times a day (@ 0600, 1030, 1600) (18 Aug 2018 23:31)  ZyPREXA Zydis 5 mg oral tablet, disintegrating: orally every 6 hours, As Needed agitation (18 Aug 2018 23:31)      Current Inpatient Medications :    ANTIBIOTICS:   piperacillin/tazobactam IVPB. 3.375 Gram(s) IV Intermittent every 8 hours      OTHER RELEVANT MEDICATIONS :  ALBUTerol    90 MICROgram(s) HFA Inhaler 2 Puff(s) Inhalation every 6 hours PRN  chlorhexidine 0.12% Liquid 15 milliLiter(s) Swish and Spit two times a day  dextrose 40% Gel 15 Gram(s) Oral once PRN  dextrose 5%. 1000 milliLiter(s) IV Continuous <Continuous>  dextrose 50% Injectable 12.5 Gram(s) IV Push once  dextrose 50% Injectable 25 Gram(s) IV Push once  dextrose 50% Injectable 25 Gram(s) IV Push once  glucagon  Injectable 1 milliGRAM(s) IntraMuscular once PRN  heparin  Infusion.  Unit(s)/Hr IV Continuous <Continuous>  heparin  Injectable 7500 Unit(s) IV Push every 6 hours PRN  heparin  Injectable 3500 Unit(s) IV Push every 6 hours PRN  levETIRAcetam  IVPB 500 milliGRAM(s) IV Intermittent every 12 hours  norepinephrine Infusion 0.05 MICROgram(s)/kG/Min IV Continuous <Continuous>  pantoprazole  Injectable 40 milliGRAM(s) IV Push daily  sodium chloride 0.9%. 1000 milliLiter(s) IV Continuous <Continuous>          REVIEW OF SYSTEMS:    ROS:  Unable to obtain due to : unresponsive, intubated     I&O's Detail    19 Aug 2018 07:01  -  20 Aug 2018 07:00  --------------------------------------------------------  IN:    heparin  Infusion.: 249 mL    IV PiggyBack: 900 mL    lactated ringers.: 700 mL    lactated ringers.: 375 mL    norepinephrine Infusion: 134.2 mL    Sodium Chloride 0.9% IV Bolus: 1000 mL    sodium chloride 0.9%.: 1600 mL  Total IN: 4958.2 mL    OUT:    Indwelling Catheter - Urethral: 2025 mL  Total OUT: 2025 mL    Total NET: 2933.2 mL        Mode: AC/ CMV (Assist Control/ Continuous Mandatory Ventilation), RR (machine): 20, TV (machine): 500, FiO2: 40, PEEP: 5, ITime: 1, MAP: 14, PIP: 26    Physical Exam:  Vital Signs Last 24 Hrs  T(C): 36.8 (20 Aug 2018 07:00), Max: 36.8 (20 Aug 2018 07:00)  T(F): 98.2 (20 Aug 2018 07:00), Max: 98.2 (20 Aug 2018 07:00)  HR: 111 (20 Aug 2018 07:00) (62 - 117)  BP: 139/67 (20 Aug 2018 07:00) (121/81 - 145/82)  BP(mean): 87 (20 Aug 2018 07:00) (86 - 104)  RR: 32 (20 Aug 2018 07:00) (25 - 48)  SpO2: 100% (20 Aug 2018 07:00) (98% - 100%)  Height (cm): 177.8 (08-19 @ 11:02)      GEN: unresponsive , intubated   HEENT: normocephalic and atraumatic. pupils sluggish reaction . Moist mucosa. ET tube, NGT +   NECK: Supple. No carotid bruits.  No lymphadenopathy or thyromegaly.  LUNGS: Clear to auscultation.  HEART: Regular rate and rhythm without murmur.  ABDOMEN: Soft, nondistended.  Positive bowel sounds.  No hepatosplenomegaly was noted.  EXTREMITIES: Without any cyanosis, clubbing, rash, lesions or edema  NEUROLOGIC: unresponsive    SKIN: No ulceration or induration present.      Labs:             11.9   11.56  )----------(  186       ( 20 Aug 2018 07:01 )               37.2      144    |  112    |  37     ----------------------------<  122        ( 20 Aug 2018 00:14 )  4.3     |  18     |  2.30     Ca    7.5        ( 20 Aug 2018 00:14 )  Phos  5.1       ( 20 Aug 2018 01:29 )  Mg     2.6       ( 20 Aug 2018 01:29 )        PT/INR -  14.6 sec / 1.33 ratio   ( 19 Aug 2018 13:24 )       PTT -  86.5 sec   ( 20 Aug 2018 03:31 )  CAPILLARY BLOOD GLUCOSE    CARDIAC MARKERS ( 20 Aug 2018 00:13 )  x     / x     / >85903 U/L / x     / x      CARDIAC MARKERS ( 19 Aug 2018 16:33 )  x     / x     / >79840 U/L / x     / x        Lactate, Blood: 1.5 mmol/L (08-20-18 @ 01:29)  Lactate, Blood: 1.3 mmol/L (08-19-18 @ 18:45)  Lactate, Blood: 1.4 mmol/L (08-19-18 @ 12:08)  Lactate, Blood: 1.7 mmol/L (08-19-18 @ 06:56)  Lactate, Blood: 7.5 mmol/L (08-19-18 @ 00:45)    ABG - ( 20 Aug 2018 06:19 )  pH, Arterial: x     pH, Blood: 7.31  /  pCO2: 28    /  pO2: 110   / HCO3: 16    / Base Excess: -11.2 /  SaO2: 97        RECENT CULTURES:          RADIOLOGY & ADDITIONAL STUDIES:   US Duplex Venous Lower Ext Complete, Bilateral (08.19.18 @ 10:12) >    IMPRESSION:     No evidence of bilateral lower extremity deep venous thrombosis.    US Transthoracic Echocardiogram w/Doppler Complete (08.19.18 @ 09:37) >  The left atrial internal diameter is 2.7 cm. The aortic root diameter is   2.9 cm. The left ventricular end-diastolic diameter is 4.5 cm. The septum   is 1.0 cm. The posterior wall is 1.1 cm. The estimated ejection fraction   is 55%. Next    The mitral valve apparatus appears to demonstrate that the leaflets are   thin and the valve opens normally in diastole. The aortic valve though   not well seen gave the impression of opening normally in systole. The   left atrial and aortic root diameters were normal. The right atrial and   right ventricular internal diameters were normal. The left ventricular   free wall and interventricular septal thicknesses were normal. The left   ventricular internal diameters and contractility were grossly normal with   an estimated ejection fraction of approximately 55%.    On the limited color flow Doppler portion of the examination 1+ pulmonic   and trace tricuspid insufficiencies were noted. The IVC did not appear to   be dilated. The pulmonary artery pressure was measured at 15 mmHg.    Impression: Grossly normal though somewhat limited study as described   above.    Xray Chest 1 View-PORTABLE IMMEDIATE (08.18.18 @ 22:17) >  ET tube is seen with the tip above the chinmay. There is no definite focal   infiltrate or pleural effusion. Azygos lobe is incidentally noted.   Osseous structures are unremarkable.    Impression: Status post ET tube placement. No focalpulmonary disease   noted.      Assessment :     52M with long history of Parkinson's disease (has been a resident of [facility] for >1 year - [facility] with difficulty placing patient), reported hx of diastolic heart failure, herniated discs found unresponsive after periods of agitation, noted to be in cardia arrest with asystole , s/p CPR with ROSC , now intubated and on iv heparin. he was on pressors but now weaned off . He has markedly elevated CPK with elevated troponin . Etiology of cardiac arrest is unclear. Although sepsis was suspected , no Blood cs done on admission they were sent yesterday after he was on antibiotics . CT head not done yet as he was too unstable for transport . Acute CNS event as cause of unresponsiveness cannot be ruled out . he is emprically being treated for PE with IV heparin     He is being followed by cardiology , neurology , pulmonary and nephrology . He has severe rhabdomyolysis probably from prolonged CPR. he has developed RAISSA from [facility] from hypotension and rhabdomyolysis    Clinically he has no source of infection , he may have aspirated during CPR but initial CXR was negative, no repeat CXR done       Plan :   - get stat CXR, procalcitonin  - send UA and urine cs   - continue with IV zosyn for now pending sputum cs and repeat CXR  - follow ct head   - weaning as per pulmonary   - continue with IV fluids as per renal   - prognosis is guarded     Continue with present regime .  Appropriate use of antibiotics and adverse effects reviewed.      I have discussed the above plan of care with patient's family in detail. They expressed understanding of the treatment plan . Risks, benefits and alternatives discussed in detail. I have asked if they have any questions or concerns and appropriately addressed them to the best of my ability .      > 75 minutes spent in direct patient care reviewing  the notes, lab data/ imaging , discussion with multidisciplinary team. All questions were addressed and answered to the best of my capacity .    Thank you for allowing me to participate in the care of your patient .

## 2018-08-20 NOTE — CONSULT NOTE ADULT - SUBJECTIVE AND OBJECTIVE BOX
Chief Complaint:  Patient is a 53y old  Male who presents with a chief complaint of s/p cardiac arrest (19 Aug 2018 01:06)    Herniated cervical disc  Congestive heart failure, unspecified congestive heart failure chronicity, unspecified congestive heart failure type  Parkinson disease  Diastolic heart failure  Parkinson disease  CHF (congestive heart failure)  GERD (gastroesophageal reflux disease)  Parkinson's disease  Migraine  CHF (congestive heart failure)  Parkinson disease  No significant past surgical history  No significant past surgical history  No significant past surgical history     HPI:  52M with long history of Parkinson's disease (has been a resident of [facility] for >1 year - [facility] with difficulty placing patient), reported hx of diastolic heart failure, herniated discs who was found unresponsive tonight.  Reportedly, RN said that the patient went to take a shower around  and afterwards patient started to become agitated.  Patient does have a history of agitation and flares his arms and legs often.  Patient was able to calm down and was allowed to go to the day room.  Patient again started to flare his arms and legs and even slid down to the floor.  Patient was then directed back to his room where he continued to be agitated.  Patient was then given ativan 2mg PO for agitation and then nurse was able to instruct the patient to calm down as well.  Nurse stepped outside the room and told the aide to get vitals when the patient was found to be unresponsive all of sudden.  A CODE BLUE was activated at .      During the CODE BLUE, patient was found to be unresponsive.  CPR was already initiated.  Initial rhythm on monitor was asystole.  A total of 4 rounds of epinephrine was given with 2 given peripherally and 2 via femoral central line and a dose of bicarb and calcium chloride was also given in between (see code sheet).  Patient was successfully intubated and patient eventually had ROSC at 2144 with BP 96/39 with HR of 185.  Initial ABG was 6.78/52/247.  Given his tachycardia, patient was also given amio 150mg.  Patient's HR decreased to 70-80s but BP eventually dropped to SBP 40/20s.  Patient was started on levophed with improvement SBP to 90s and HR increased to 110s.  Multiple attempts to contact family member was unsuccessful.  Patient was transferred/admitted to SPCU for further management (18 Aug 2018 22:48)  pt now intubated in icu. evaluated re elevated lft      aspirin (Unknown)  Cheese (Unknown)  ibuprofen (Unknown)  ibuprofen (Unknown)  Reglan (Swelling)      ALBUTerol    90 MICROgram(s) HFA Inhaler 2 Puff(s) Inhalation every 6 hours PRN  calcium gluconate IVPB 2 Gram(s) IV Intermittent once  chlorhexidine 0.12% Liquid 15 milliLiter(s) Swish and Spit two times a day  dextrose 40% Gel 15 Gram(s) Oral once PRN  dextrose 5% 1000 milliLiter(s) IV Continuous <Continuous>  dextrose 5%. 1000 milliLiter(s) IV Continuous <Continuous>  dextrose 50% Injectable 12.5 Gram(s) IV Push once  dextrose 50% Injectable 25 Gram(s) IV Push once  dextrose 50% Injectable 25 Gram(s) IV Push once  glucagon  Injectable 1 milliGRAM(s) IntraMuscular once PRN  heparin  Infusion.  Unit(s)/Hr IV Continuous <Continuous>  heparin  Injectable 7500 Unit(s) IV Push every 6 hours PRN  heparin  Injectable 3500 Unit(s) IV Push every 6 hours PRN  levETIRAcetam  IVPB 500 milliGRAM(s) IV Intermittent every 12 hours  norepinephrine Infusion 0.05 MICROgram(s)/kG/Min IV Continuous <Continuous>  pantoprazole  Injectable 40 milliGRAM(s) IV Push daily  piperacillin/tazobactam IVPB. 3.375 Gram(s) IV Intermittent every 8 hours        FAMILY HISTORY:  No pertinent family history in first degree relatives  No pertinent family history in first degree relatives  Family history of prostate cancer in father  Family history of diabetes mellitus        Review of Systems:  unable to obtain    Physical Exam:    Vital Signs:  Vital Signs Last 24 Hrs  T(C): 37.4 (20 Aug 2018 08:00), Max: 37.4 (20 Aug 2018 08:00)  T(F): 99.3 (20 Aug 2018 08:00), Max: 99.3 (20 Aug 2018 08:00)  HR: 117 (20 Aug 2018 08:00) (62 - 117)  BP: 139/64 (20 Aug 2018 08:00) (121/81 - 145/82)  BP(mean): 92 (20 Aug 2018 08:00) (86 - 104)  RR: 34 (20 Aug 2018 08:00) (25 - 48)  SpO2: 100% (20 Aug 2018 08:00) (98% - 100%)  Daily Height in cm: 177.8 (19 Aug 2018 11:02)    Daily Weight in k.8 (19 Aug 2018 15:51)    General: white male, intubated , unresponsive  HEENT:  NC/AT,  conjunctivae clear and pink, no thyromegaly, nodules, adenopathy, no JVD  Chest:  Full & symmetric excursion, no increased effort, breath sounds clear  Cardiovascular:  Regular rhythm, S1, S2, no murmur/rub/S3/S4, no abdominal bruit, no edema  Abdomen:  Soft, non-tender, non-distended, normoactive bowel sounds,  no masses ,no hepatosplenomeagaly, no signs of chronic liver disease  Extremities:  no cyanosis,clubbing or edema  Skin:  No rash/erythema/ecchymoses/petechiae/wounds/abscess/warm/dry  Neuro/Psych:  Alert, oriented, no asterixis, no tremor, no encephalopathy    Laboratory:                            11.9   11.56 )-----------( 186      ( 20 Aug 2018 07:01 )             37.2     08-20    148<H>  |  113<H>  |  38<H>  ----------------------------<  118<H>  4.3   |  16<L>  |  2.53<H>    Ca    6.9<L>      20 Aug 2018 07:01  Phos  5.0     08-20  Mg     2.1     08-20    TPro  5.2<L>  /  Alb  2.4<L>  /  TBili  0.5  /  DBili  x   /  AST  x   /  ALT  415<H>  /  AlkPhos  75  08-20    LIVER FUNCTIONS - ( 20 Aug 2018 07:01 )  Alb: 2.4 g/dL / Pro: 5.2 g/dL / ALK PHOS: 75 U/L / ALT: 415 U/L DA / AST: x     / GGT: x           PT/INR - ( 19 Aug 2018 13:24 )   PT: 14.6 sec;   INR: 1.33 ratio         PTT - ( 20 Aug 2018 03:31 )  PTT:86.5 sec      Imaging:      Assessment:      Plan:

## 2018-08-20 NOTE — PROCEDURE NOTE - NSPOSTPRCRAD_GEN_A_CORE
post-procedure radiography performed/chest radiograph/feeding tube in correct gastric position
post procedure radiography not performed
post-procedure radiography performed

## 2018-08-20 NOTE — PROGRESS NOTE ADULT - SUBJECTIVE AND OBJECTIVE BOX
Patient seen and examined;  Chart reviewed and events noted;   intubated/sedated on pressors and antibiotics; continues on hparin drop    MEDICATIONS  (STANDING):  chlorhexidine 0.12% Liquid 15 milliLiter(s) Swish and Spit two times a day  dextrose 5% 1000 milliLiter(s) (75 mL/Hr) IV Continuous <Continuous>  heparin  Infusion.  Unit(s)/Hr (17 mL/Hr) IV Continuous <Continuous>  levETIRAcetam  IVPB 500 milliGRAM(s) IV Intermittent every 12 hours  norepinephrine Infusion 0.05 MICROgram(s)/kG/Min (8.841 mL/Hr) IV Continuous <Continuous>  pantoprazole  Injectable 40 milliGRAM(s) IV Push daily  piperacillin/tazobactam IVPB. 3.375 Gram(s) IV Intermittent every 8 hours    MEDICATIONS  (PRN):  ALBUTerol    90 MICROgram(s) HFA Inhaler 2 Puff(s) Inhalation every 6 hours PRN Shortness of Breath and/or Wheezing  dextrose 40% Gel 15 Gram(s) Oral once PRN Blood Glucose LESS THAN 70 milliGRAM(s)/deciLiter  glucagon  Injectable 1 milliGRAM(s) IntraMuscular once PRN Glucose <70 milliGRAM(s)/deciLiter  heparin  Injectable 7500 Unit(s) IV Push every 6 hours PRN For aPTT less than 40  heparin  Injectable 3500 Unit(s) IV Push every 6 hours PRN For aPTT between 40 - 57      Vital Signs Last 24 Hrs  T(C): 38.2 (20 Aug 2018 12:00), Max: 38.2 (20 Aug 2018 12:00)  T(F): 100.8 (20 Aug 2018 12:00), Max: 100.8 (20 Aug 2018 12:00)  HR: 123 (20 Aug 2018 12:00) (62 - 123)  BP: 154/79 (20 Aug 2018 12:00) (121/81 - 154/79)  BP(mean): 99 (20 Aug 2018 12:00) (86 - 104)  RR: 28 (20 Aug 2018 12:00) (25 - 48)  SpO2: 100% (20 Aug 2018 12:00) (98% - 100%)    PHYSICAL EXAM  General: adult male; intubated and sedated in NAD  HEENT: clear oropharynx, anicteric sclera, pink conjunctivae; + ET tube; + NG tube for feeds;   Neck: supple  CV: + tachycardia  Lungs: reduced breath sounds at bases  Abdomen: soft non-tender non-distended, no hepato/splenomegaly  Ext: no clubbing cyanosis or edema  Skin: no rashes and no petichiae      LABS:                        11.9   11.56 )-----------( 186      ( 20 Aug 2018 07:01 )             37.2     Hemoglobin: 11.9 g/dL (08-20 @ 07:01)  Hemoglobin: 12.2 g/dL (08-19 @ 06:56)  Hemoglobin: 12.6 g/dL (08-19 @ 00:24)  Hemoglobin: 12.1 g/dL (08-18 @ 22:12)    WBC Count: 11.56 K/uL (08-20 @ 07:01)  WBC Count: 3.21 K/uL (08-19 @ 06:56)  WBC Count: 17.63 K/uL (08-19 @ 00:24)  WBC Count: 15.54 K/uL (08-18 @ 22:12)    08-20    148<H>  |  113<H>  |  38<H>  ----------------------------<  118<H>  4.3   |  16<L>  |  2.53<H>    Ca    6.9<L>      20 Aug 2018 07:01  Phos  5.0     08-20  Mg     2.1     08-20    TPro  5.2<L>  /  Alb  2.4<L>  /  TBili  0.5  /  DBili  x   /  AST  2415<H>  /  ALT  415<H>  /  AlkPhos  75  08-20    PT/INR - ( 19 Aug 2018 13:24 )   PT: 14.6 sec;   INR: 1.33 ratio    PTT - ( 20 Aug 2018 10:59 )  PTT:54.6 sec      RADIOLOGY STUDIES:  LE dopplers negative

## 2018-08-20 NOTE — CONSULT NOTE ADULT - ASSESSMENT
53 m post cardia arrest   intubated in icu, unresponsive.   anoxic brain injury?  elevated lft- reactive, related to ischemic event.     followup remainder of liver serologes, thus far viral hep neg   check INR/PT   RUQ sono    avoid hepatotoxic medications   - Ngt for enteral feeding

## 2018-08-20 NOTE — PROGRESS NOTE ADULT - SUBJECTIVE AND OBJECTIVE BOX
CC.  S/P cardiac arrest  HPI.  Patient is intubated.  No sedation.  non-responsive to verbal or painful stimuli.  Unable to obtain ROS or History due to current mentation    ICU Vital Signs Last 24 Hrs  T(C): 38.4 (20 Aug 2018 14:00), Max: 38.4 (20 Aug 2018 14:00)  T(F): 101.1 (20 Aug 2018 14:00), Max: 101.1 (20 Aug 2018 14:00)  HR: 123 (20 Aug 2018 14:00) (62 - 123)  BP: 150/71 (20 Aug 2018 14:00) (121/81 - 154/79)  BP(mean): 93 (20 Aug 2018 14:00) (86 - 104)  ABP: 141/65 (20 Aug 2018 14:00) (93/76 - 163/84)  ABP(mean): 95 (20 Aug 2018 14:00) (82 - 113)  RR: 29 (20 Aug 2018 14:00) (25 - 48)  SpO2: 100% (20 Aug 2018 14:00) (98% - 100%)        PHYSICAL EXAM-  GENERAL: Chronic ill appearing male  HEAD:  Atraumatic, Normocephalic  EYES:  conjunctiva and sclera are clear  NECK: Supple, No JVD, Normal thyroid  NERVOUS SYSTEM:  unresponsive to painful or verbal stimuli  CHEST/LUNG: Min rhonchi with good air entry.  No retractions or accessory muscle usage  HEART: Regular rate and rhythm; No murmurs, rubs, or gallops  ABDOMEN: Soft, Nontender, Nondistended; Bowel sounds present  EXTREMITIES:  2+ Peripheral Pulses, No clubbing, cyanosis, or edema  SKIN: Old ecchymosis on the left anterior chin, and left knee measuring 1 cm  abrasion noticed on the left toe, left knee, left anterior shin, and right knee measuring 1 cm  No other rash, ecchymosis, or abrasion noticed                            11.9   11.56 )-----------( 186      ( 20 Aug 2018 07:01 )             37.2     08-20    148<H>  |  113<H>  |  38<H>  ----------------------------<  118<H>  4.3   |  16<L>  |  2.53<H>    Ca    6.9<L>      20 Aug 2018 07:01  Phos  5.0     08-20  Mg     2.1     08-20    TPro  5.2<L>  /  Alb  2.4<L>  /  TBili  0.5  /  DBili  x   /  AST  2415<H>  /  ALT  415<H>  /  AlkPhos  75  08-20    CARDIAC MARKERS ( 20 Aug 2018 07:01 )  1.330 ng/mL / x     / x     / x     / x      CARDIAC MARKERS ( 20 Aug 2018 00:13 )  x     / x     / >19661 U/L / x     / x      CARDIAC MARKERS ( 19 Aug 2018 16:33 )  x     / x     / >62951 U/L / x     / x      CARDIAC MARKERS ( 19 Aug 2018 07:00 )  x     / x     / 35266 U/L / x     / x      CARDIAC MARKERS ( 19 Aug 2018 06:56 )  .901 ng/mL / x     / x     / x     / x      CARDIAC MARKERS ( 19 Aug 2018 00:24 )  x     / x     / 5947 U/L / x     / x      CARDIAC MARKERS ( 18 Aug 2018 22:12 )  .028 ng/mL / x     / x     / x     / x        MEDICATIONS  (STANDING):  chlorhexidine 0.12% Liquid 15 milliLiter(s) Swish and Spit two times a day  chlorhexidine 2% Cloths 1 Application(s) Topical daily  dextrose 5% 1000 milliLiter(s) (75 mL/Hr) IV Continuous <Continuous>  dextrose 5%. 1000 milliLiter(s) (50 mL/Hr) IV Continuous <Continuous>  dextrose 50% Injectable 12.5 Gram(s) IV Push once  dextrose 50% Injectable 25 Gram(s) IV Push once  dextrose 50% Injectable 25 Gram(s) IV Push once  heparin  Infusion.  Unit(s)/Hr (17 mL/Hr) IV Continuous <Continuous>  levETIRAcetam  IVPB 500 milliGRAM(s) IV Intermittent every 12 hours  norepinephrine Infusion 0.05 MICROgram(s)/kG/Min (8.841 mL/Hr) IV Continuous <Continuous>  pantoprazole  Injectable 40 milliGRAM(s) IV Push daily  piperacillin/tazobactam IVPB. 3.375 Gram(s) IV Intermittent every 8 hours    MEDICATIONS  (PRN):  ALBUTerol    90 MICROgram(s) HFA Inhaler 2 Puff(s) Inhalation every 6 hours PRN Shortness of Breath and/or Wheezing  dextrose 40% Gel 15 Gram(s) Oral once PRN Blood Glucose LESS THAN 70 milliGRAM(s)/deciLiter  glucagon  Injectable 1 milliGRAM(s) IntraMuscular once PRN Glucose <70 milliGRAM(s)/deciLiter  heparin  Injectable 7500 Unit(s) IV Push every 6 hours PRN For aPTT less than 40  heparin  Injectable 3500 Unit(s) IV Push every 6 hours PRN For aPTT between 40 - 57                  Imaging Personally Reviewed:     [x ] YES  [ ] NO    Consultant(s) Notes Reviewed:  [x ] YES  [ ] NO    Care Discussed with Consultants/Other Providers [x ] YES  [ ] NO

## 2018-08-21 DIAGNOSIS — R57.0 CARDIOGENIC SHOCK: ICD-10-CM

## 2018-08-21 DIAGNOSIS — J69.0 PNEUMONITIS DUE TO INHALATION OF FOOD AND VOMIT: ICD-10-CM

## 2018-08-21 DIAGNOSIS — G93.1 ANOXIC BRAIN DAMAGE, NOT ELSEWHERE CLASSIFIED: ICD-10-CM

## 2018-08-21 DIAGNOSIS — N17.0 ACUTE KIDNEY FAILURE WITH TUBULAR NECROSIS: ICD-10-CM

## 2018-08-21 DIAGNOSIS — E87.2 ACIDOSIS: ICD-10-CM

## 2018-08-21 DIAGNOSIS — J96.01 ACUTE RESPIRATORY FAILURE WITH HYPOXIA: ICD-10-CM

## 2018-08-21 LAB
ALBUMIN SERPL ELPH-MCNC: 2.2 G/DL — LOW (ref 3.3–5)
ALP SERPL-CCNC: 86 U/L — SIGNIFICANT CHANGE UP (ref 30–120)
ALT FLD-CCNC: 150 U/L DA — HIGH (ref 10–60)
ANION GAP SERPL CALC-SCNC: 13 MMOL/L — SIGNIFICANT CHANGE UP (ref 5–17)
ANION GAP SERPL CALC-SCNC: 18 MMOL/L — HIGH (ref 5–17)
APTT BLD: 53.1 SEC — HIGH (ref 27.5–37.4)
APTT BLD: 58.9 SEC — HIGH (ref 27.5–37.4)
APTT BLD: 71.6 SEC — HIGH (ref 27.5–37.4)
AST SERPL-CCNC: 1161 U/L — HIGH (ref 10–40)
BASE EXCESS BLDA CALC-SCNC: -4.3 MMOL/L — LOW (ref -2–2)
BASOPHILS # BLD AUTO: 0 K/UL — SIGNIFICANT CHANGE UP (ref 0–0.2)
BASOPHILS NFR BLD AUTO: 0 % — SIGNIFICANT CHANGE UP (ref 0–2)
BILIRUB SERPL-MCNC: 0.6 MG/DL — SIGNIFICANT CHANGE UP (ref 0.2–1.2)
BLOOD GAS COMMENTS: SIGNIFICANT CHANGE UP
BLOOD GAS COMMENTS: SIGNIFICANT CHANGE UP
BLOOD GAS SOURCE: SIGNIFICANT CHANGE UP
BUN SERPL-MCNC: 57 MG/DL — HIGH (ref 7–23)
BUN SERPL-MCNC: 71 MG/DL — HIGH (ref 7–23)
CALCIUM SERPL-MCNC: 6.6 MG/DL — LOW (ref 8.4–10.5)
CALCIUM SERPL-MCNC: 6.8 MG/DL — LOW (ref 8.4–10.5)
CHLORIDE SERPL-SCNC: 110 MMOL/L — HIGH (ref 96–108)
CHLORIDE SERPL-SCNC: 111 MMOL/L — HIGH (ref 96–108)
CK SERPL-CCNC: CRITICAL HIGH U/L (ref 39–308)
CO2 SERPL-SCNC: 18 MMOL/L — LOW (ref 22–31)
CO2 SERPL-SCNC: 21 MMOL/L — LOW (ref 22–31)
CREAT SERPL-MCNC: 4.89 MG/DL — HIGH (ref 0.5–1.3)
CREAT SERPL-MCNC: 6 MG/DL — HIGH (ref 0.5–1.3)
CRP SERPL-MCNC: 15.76 MG/DL — HIGH (ref 0–0.4)
EOSINOPHIL # BLD AUTO: 0 K/UL — SIGNIFICANT CHANGE UP (ref 0–0.5)
EOSINOPHIL NFR BLD AUTO: 0 % — SIGNIFICANT CHANGE UP (ref 0–6)
GLUCOSE SERPL-MCNC: 146 MG/DL — HIGH (ref 70–99)
GLUCOSE SERPL-MCNC: 146 MG/DL — HIGH (ref 70–99)
GRAM STN FLD: SIGNIFICANT CHANGE UP
HCO3 BLDA-SCNC: 21 MMOL/L — SIGNIFICANT CHANGE UP (ref 21–29)
HCT VFR BLD CALC: 29.7 % — LOW (ref 39–50)
HCT VFR BLD CALC: 32.3 % — LOW (ref 39–50)
HGB BLD-MCNC: 10.2 G/DL — LOW (ref 13–17)
HGB BLD-MCNC: 9.8 G/DL — LOW (ref 13–17)
HOROWITZ INDEX BLDA+IHG-RTO: 40 — SIGNIFICANT CHANGE UP
LYMPHOCYTES # BLD AUTO: 1.11 K/UL — SIGNIFICANT CHANGE UP (ref 1–3.3)
LYMPHOCYTES # BLD AUTO: 8 % — LOW (ref 13–44)
MAGNESIUM SERPL-MCNC: 2.2 MG/DL — SIGNIFICANT CHANGE UP (ref 1.6–2.6)
MANUAL SMEAR VERIFICATION: SIGNIFICANT CHANGE UP
MCHC RBC-ENTMCNC: 30.3 PG — SIGNIFICANT CHANGE UP (ref 27–34)
MCHC RBC-ENTMCNC: 31.2 PG — SIGNIFICANT CHANGE UP (ref 27–34)
MCHC RBC-ENTMCNC: 31.6 GM/DL — LOW (ref 32–36)
MCHC RBC-ENTMCNC: 33 GM/DL — SIGNIFICANT CHANGE UP (ref 32–36)
MCV RBC AUTO: 94.6 FL — SIGNIFICANT CHANGE UP (ref 80–100)
MCV RBC AUTO: 95.8 FL — SIGNIFICANT CHANGE UP (ref 80–100)
MONOCYTES # BLD AUTO: 0.97 K/UL — HIGH (ref 0–0.9)
MONOCYTES NFR BLD AUTO: 7 % — SIGNIFICANT CHANGE UP (ref 2–14)
NEUTROPHILS # BLD AUTO: 11.76 K/UL — HIGH (ref 1.8–7.4)
NEUTROPHILS NFR BLD AUTO: 80 % — HIGH (ref 43–77)
NEUTS BAND # BLD: 5 % — SIGNIFICANT CHANGE UP (ref 0–8)
NRBC # BLD: 0 /100 WBCS — SIGNIFICANT CHANGE UP (ref 0–0)
NRBC # BLD: 0 — SIGNIFICANT CHANGE UP
NRBC # BLD: SIGNIFICANT CHANGE UP /100 WBCS (ref 0–0)
PCO2 BLDA: 30 MMHG — LOW (ref 32–46)
PH BLD: 7.42 — SIGNIFICANT CHANGE UP (ref 7.35–7.45)
PHOSPHATE SERPL-MCNC: 4.4 MG/DL — SIGNIFICANT CHANGE UP (ref 2.5–4.5)
PLAT MORPH BLD: NORMAL — SIGNIFICANT CHANGE UP
PLATELET # BLD AUTO: 160 K/UL — SIGNIFICANT CHANGE UP (ref 150–400)
PLATELET # BLD AUTO: 178 K/UL — SIGNIFICANT CHANGE UP (ref 150–400)
PO2 BLDA: 109 MMHG — HIGH (ref 74–108)
POTASSIUM SERPL-MCNC: 4 MMOL/L — SIGNIFICANT CHANGE UP (ref 3.5–5.3)
POTASSIUM SERPL-MCNC: 4.1 MMOL/L — SIGNIFICANT CHANGE UP (ref 3.5–5.3)
POTASSIUM SERPL-SCNC: 4 MMOL/L — SIGNIFICANT CHANGE UP (ref 3.5–5.3)
POTASSIUM SERPL-SCNC: 4.1 MMOL/L — SIGNIFICANT CHANGE UP (ref 3.5–5.3)
PROT SERPL-MCNC: 5.3 G/DL — LOW (ref 6–8.3)
RBC # BLD: 3.14 M/UL — LOW (ref 4.2–5.8)
RBC # BLD: 3.37 M/UL — LOW (ref 4.2–5.8)
RBC # FLD: 15.5 % — HIGH (ref 10.3–14.5)
RBC # FLD: 15.7 % — HIGH (ref 10.3–14.5)
RBC BLD AUTO: NORMAL — SIGNIFICANT CHANGE UP
SAO2 % BLDA: 99 % — HIGH (ref 92–96)
SODIUM SERPL-SCNC: 144 MMOL/L — SIGNIFICANT CHANGE UP (ref 135–145)
SODIUM SERPL-SCNC: 147 MMOL/L — HIGH (ref 135–145)
SPECIMEN SOURCE: SIGNIFICANT CHANGE UP
WBC # BLD: 13.04 K/UL — HIGH (ref 3.8–10.5)
WBC # BLD: 13.84 K/UL — HIGH (ref 3.8–10.5)
WBC # FLD AUTO: 13.04 K/UL — HIGH (ref 3.8–10.5)
WBC # FLD AUTO: 13.84 K/UL — HIGH (ref 3.8–10.5)

## 2018-08-21 PROCEDURE — 99233 SBSQ HOSP IP/OBS HIGH 50: CPT

## 2018-08-21 PROCEDURE — 99497 ADVNCD CARE PLAN 30 MIN: CPT | Mod: 25

## 2018-08-21 RX ORDER — CALCIUM GLUCONATE 100 MG/ML
2 VIAL (ML) INTRAVENOUS ONCE
Qty: 0 | Refills: 0 | Status: COMPLETED | OUTPATIENT
Start: 2018-08-21 | End: 2018-08-21

## 2018-08-21 RX ORDER — CARBIDOPA AND LEVODOPA 25; 100 MG/1; MG/1
1 TABLET ORAL
Qty: 0 | Refills: 0 | Status: DISCONTINUED | OUTPATIENT
Start: 2018-08-21 | End: 2018-08-24

## 2018-08-21 RX ORDER — SODIUM CHLORIDE 9 MG/ML
500 INJECTION INTRAMUSCULAR; INTRAVENOUS; SUBCUTANEOUS ONCE
Qty: 0 | Refills: 0 | Status: COMPLETED | OUTPATIENT
Start: 2018-08-21 | End: 2018-08-21

## 2018-08-21 RX ORDER — CARBIDOPA, LEVODOPA, AND ENTACAPONE 50; 200; 200 MG/1; MG/1; MG/1
1 TABLET, FILM COATED ORAL THREE TIMES A DAY
Qty: 0 | Refills: 0 | Status: DISCONTINUED | OUTPATIENT
Start: 2018-08-21 | End: 2018-08-24

## 2018-08-21 RX ADMIN — LEVETIRACETAM 400 MILLIGRAM(S): 250 TABLET, FILM COATED ORAL at 00:21

## 2018-08-21 RX ADMIN — CARBIDOPA, LEVODOPA, AND ENTACAPONE 1 TABLET(S): 50; 200; 200 TABLET, FILM COATED ORAL at 23:30

## 2018-08-21 RX ADMIN — PANTOPRAZOLE SODIUM 40 MILLIGRAM(S): 20 TABLET, DELAYED RELEASE ORAL at 12:33

## 2018-08-21 RX ADMIN — Medication 650 MILLIGRAM(S): at 00:21

## 2018-08-21 RX ADMIN — SODIUM CHLORIDE 75 MILLILITER(S): 9 INJECTION, SOLUTION INTRAVENOUS at 17:06

## 2018-08-21 RX ADMIN — CARBIDOPA, LEVODOPA, AND ENTACAPONE 1 TABLET(S): 50; 200; 200 TABLET, FILM COATED ORAL at 05:34

## 2018-08-21 RX ADMIN — Medication 100 GRAM(S): at 10:23

## 2018-08-21 RX ADMIN — SODIUM CHLORIDE 75 MILLILITER(S): 9 INJECTION, SOLUTION INTRAVENOUS at 00:21

## 2018-08-21 RX ADMIN — CHLORHEXIDINE GLUCONATE 1 APPLICATION(S): 213 SOLUTION TOPICAL at 12:33

## 2018-08-21 RX ADMIN — CARBIDOPA AND LEVODOPA 1 TABLET(S): 25; 100 TABLET ORAL at 15:21

## 2018-08-21 RX ADMIN — Medication 650 MILLIGRAM(S): at 05:35

## 2018-08-21 RX ADMIN — HEPARIN SODIUM 1500 UNIT(S)/HR: 5000 INJECTION INTRAVENOUS; SUBCUTANEOUS at 01:28

## 2018-08-21 RX ADMIN — CHLORHEXIDINE GLUCONATE 15 MILLILITER(S): 213 SOLUTION TOPICAL at 17:06

## 2018-08-21 RX ADMIN — CHLORHEXIDINE GLUCONATE 15 MILLILITER(S): 213 SOLUTION TOPICAL at 05:34

## 2018-08-21 RX ADMIN — CARBIDOPA AND LEVODOPA 1 TABLET(S): 25; 100 TABLET ORAL at 10:23

## 2018-08-21 RX ADMIN — HEPARIN SODIUM 1500 UNIT(S)/HR: 5000 INJECTION INTRAVENOUS; SUBCUTANEOUS at 16:20

## 2018-08-21 RX ADMIN — SODIUM CHLORIDE 500 MILLILITER(S): 9 INJECTION INTRAMUSCULAR; INTRAVENOUS; SUBCUTANEOUS at 18:32

## 2018-08-21 RX ADMIN — PIPERACILLIN AND TAZOBACTAM 25 GRAM(S): 4; .5 INJECTION, POWDER, LYOPHILIZED, FOR SOLUTION INTRAVENOUS at 08:59

## 2018-08-21 RX ADMIN — CARBIDOPA AND LEVODOPA 1 TABLET(S): 25; 100 TABLET ORAL at 05:34

## 2018-08-21 RX ADMIN — CARBIDOPA AND LEVODOPA 1 TABLET(S): 25; 100 TABLET ORAL at 21:30

## 2018-08-21 RX ADMIN — CARBIDOPA AND LEVODOPA 1 TABLET(S): 25; 100 TABLET ORAL at 16:41

## 2018-08-21 RX ADMIN — PIPERACILLIN AND TAZOBACTAM 25 GRAM(S): 4; .5 INJECTION, POWDER, LYOPHILIZED, FOR SOLUTION INTRAVENOUS at 16:41

## 2018-08-21 RX ADMIN — LEVETIRACETAM 400 MILLIGRAM(S): 250 TABLET, FILM COATED ORAL at 12:33

## 2018-08-21 RX ADMIN — CARBIDOPA, LEVODOPA, AND ENTACAPONE 1 TABLET(S): 50; 200; 200 TABLET, FILM COATED ORAL at 15:21

## 2018-08-21 RX ADMIN — HEPARIN SODIUM 1500 UNIT(S)/HR: 5000 INJECTION INTRAVENOUS; SUBCUTANEOUS at 09:00

## 2018-08-21 RX ADMIN — SODIUM CHLORIDE 75 MILLILITER(S): 9 INJECTION, SOLUTION INTRAVENOUS at 15:24

## 2018-08-21 RX ADMIN — HEPARIN SODIUM 3500 UNIT(S): 5000 INJECTION INTRAVENOUS; SUBCUTANEOUS at 01:27

## 2018-08-21 NOTE — PROGRESS NOTE ADULT - SUBJECTIVE AND OBJECTIVE BOX
CC.  S/P cardiac arrest  HPI.  Patient is intubated.  No sedation.  non-responsive to verbal or painful stimuli.  Unable to obtain ROS or History due to current mentation    ICU Vital Signs Last 24 Hrs  T(C): 38.4 (21 Aug 2018 12:44), Max: 38.6 (21 Aug 2018 08:25)  T(F): 101.1 (21 Aug 2018 12:44), Max: 101.5 (21 Aug 2018 08:25)  HR: 105 (21 Aug 2018 11:12) (105 - 123)  BP: 154/79 (21 Aug 2018 11:00) (144/79 - 156/80)  BP(mean): 100 (21 Aug 2018 11:00) (90 - 102)  ABP: 154/71 (21 Aug 2018 06:00) (134/61 - 160/80)  ABP(mean): 100 (21 Aug 2018 06:00) (89 - 110)  RR: 26 (21 Aug 2018 11:00) (23 - 29)  SpO2: 99% (21 Aug 2018 11:12) (97% - 100%)          PHYSICAL EXAM-  GENERAL: Chronic ill appearing male  HEAD:  Atraumatic, Normocephalic  EYES:  conjunctiva and sclera are clear  NECK: Supple, No JVD, Normal thyroid  NERVOUS SYSTEM:  unresponsive to painful or verbal stimuli  CHEST/LUNG: Min rhonchi with good air entry.  No retractions or accessory muscle usage  HEART: Regular rate and rhythm; No murmurs, rubs, or gallops  ABDOMEN: Soft, Nontender, Nondistended; Bowel sounds present  EXTREMITIES:  2+ Peripheral Pulses, No clubbing, cyanosis, or edema  SKIN:  no rash noticed                            10.2   13.84 )-----------( 178      ( 21 Aug 2018 06:59 )             32.3     08-21    147<H>  |  111<H>  |  57<H>  ----------------------------<  146<H>  4.0   |  18<L>  |  4.89<H>    Ca    6.6<L>      21 Aug 2018 06:59  Phos  4.4     08-21  Mg     2.2     08-21    TPro  5.3<L>  /  Alb  2.2<L>  /  TBili  0.6  /  DBili  x   /  AST  1161<H>  /  ALT  150<H>  /  AlkPhos  86  08-21    CARDIAC MARKERS ( 21 Aug 2018 06:59 )  x     / x     / 50674 U/L / x     / x      CARDIAC MARKERS ( 20 Aug 2018 07:01 )  1.330 ng/mL / x     / x     / x     / x      CARDIAC MARKERS ( 20 Aug 2018 00:13 )  x     / x     / >84219 U/L / x     / x      CARDIAC MARKERS ( 19 Aug 2018 16:33 )  x     / x     / >86949 U/L / x     / x            Urinalysis Basic - ( 20 Aug 2018 17:04 )    Color: Yellow / Appearance: Clear / S.015 / pH: x  Gluc: x / Ketone: Small  / Bili: Negative / Urobili: Negative mg/dL   Blood: x / Protein: 500 mg/dL / Nitrite: Negative   Leuk Esterase: Moderate / RBC: 25-50 /HPF / WBC 11-25   Sq Epi: x / Non Sq Epi: Moderate / Bacteria: Moderate      PTT - ( 21 Aug 2018 08:10 )  PTT:71.6 sec      MEDICATIONS  (STANDING):  carbidopa/levodopa  25/250 1 Tablet(s) Oral <User Schedule>  carbidopa/levodopa/entacapone 50/200/200 1 Tablet(s) Oral three times a day  chlorhexidine 0.12% Liquid 15 milliLiter(s) Swish and Spit two times a day  chlorhexidine 2% Cloths 1 Application(s) Topical daily  dextrose 5% 1000 milliLiter(s) (75 mL/Hr) IV Continuous <Continuous>  dextrose 5%. 1000 milliLiter(s) (50 mL/Hr) IV Continuous <Continuous>  dextrose 50% Injectable 12.5 Gram(s) IV Push once  dextrose 50% Injectable 25 Gram(s) IV Push once  dextrose 50% Injectable 25 Gram(s) IV Push once  heparin  Infusion.  Unit(s)/Hr (17 mL/Hr) IV Continuous <Continuous>  insulin lispro (HumaLOG) corrective regimen sliding scale   SubCutaneous every 6 hours  levETIRAcetam  IVPB 500 milliGRAM(s) IV Intermittent every 12 hours  pantoprazole  Injectable 40 milliGRAM(s) IV Push daily  piperacillin/tazobactam IVPB. 3.375 Gram(s) IV Intermittent every 8 hours    MEDICATIONS  (PRN):  acetaminophen   Tablet 650 milliGRAM(s) Oral every 6 hours PRN For Temp greater than 38 C (100.4 F)  ALBUTerol    90 MICROgram(s) HFA Inhaler 2 Puff(s) Inhalation every 6 hours PRN Shortness of Breath and/or Wheezing  dextrose 40% Gel 15 Gram(s) Oral once PRN Blood Glucose LESS THAN 70 milliGRAM(s)/deciLiter  glucagon  Injectable 1 milliGRAM(s) IntraMuscular once PRN Glucose <70 milliGRAM(s)/deciLiter  heparin  Injectable 7500 Unit(s) IV Push every 6 hours PRN For aPTT less than 40  heparin  Injectable 3500 Unit(s) IV Push every 6 hours PRN For aPTT between 40 - 57                          Imaging Personally Reviewed:     [x ] YES  [ ] NO    Consultant(s) Notes Reviewed:  [x ] YES  [ ] NO    Care Discussed with Consultants/Other Providers [x ] YES  [ ] NO

## 2018-08-21 NOTE — PROGRESS NOTE ADULT - ASSESSMENT
53 m post cardia arrest   intubated in icu, unresponsive.   anoxic brain injury?  elevated lft- reactive, related to ischemic event.     followup remainder of liver serologes, thus far viral hep neg   check INR/PT   RUQ sono steatosis/hepatomegaly    avoid hepatotoxic medications   - Ngt for enteral feeding   family suggesting comfort measures   can followup as needed.

## 2018-08-21 NOTE — PROGRESS NOTE ADULT - SUBJECTIVE AND OBJECTIVE BOX
PCP:    REQUESTING PHYSICIAN:      CHIEF COMPLAINT:  Patient is a 53y old  Male who presents with a chief complaint of s/p cardiac arrest (19 Aug 2018 01:06)      HPI:  52M with long history of Parkinson's disease (has been a resident of [facility] for >1 year - [facility] with difficulty [facility] patient), reported hx of diastolic heart failure, herniated discs who was found unresponsive tonight.  Reportedly, RN said that the patient went to take a shower around 2000 and afterwards patient started to become agitated.  Patient does have a history of agitation and flares his arms and legs often.  Patient was able to calm down and was allowed to go to the day room.  Patient again started to flare his arms and legs and even slid down to the floor.  Patient was then directed back to his room where he continued to be agitated.  Patient was then given ativan 2mg PO for agitation and then nurse was able to instruct the patient to calm down as well.  Nurse stepped outside the room and told the aide to get vitals when the patient was found to be unresponsive all of sudden.  A CODE BLUE was activated at 2132.      During the CODE BLUE, patient was found to be unresponsive.  CPR was already initiated.  Initial rhythm on monitor was asystole.  A total of 4 rounds of epinephrine was given with 2 given peripherally and 2 via femoral central line and a dose of bicarb and calcium chloride was also given in between (see code sheet).  Patient was successfully intubated and patient eventually had ROSC at 2144 with BP 96/39 with HR of 185.  Initial ABG was 6.78/52/247.  Given his tachycardia, patient was also given amio 150mg.  Patient's HR decreased to 70-80s but BP eventually dropped to SBP 40/20s.  Patient was started on levophed with improvement SBP to 90s and HR increased to 110s.  Multiple attempts to contact family member was unsuccessful.  Patient was transferred/admitted to [facility] for further management (18 Aug 2018 22:48)      PAST MEDICAL & SURGICAL HISTORY:  Herniated cervical disc  Congestive heart failure, unspecified congestive heart failure chronicity, unspecified congestive heart failure type  Parkinson disease  Diastolic heart failure  GERD (gastroesophageal reflux disease)  Migraine  CHF (congestive heart failure)  Parkinson disease  No significant past surgical history  No significant past surgical history      Allergies    aspirin (Unknown)  Cheese (Unknown)  ibuprofen (Unknown)  Reglan (Swelling)    Intolerances    ibuprofen (Unknown)      SOCIAL HISTORY:    FAMILY HISTORY:  No pertinent family history in first degree relatives  No pertinent family history in first degree relatives  Family history of prostate cancer in father  Family history of diabetes mellitus      MEDICATIONS:    MEDICATIONS  (STANDING):  calcium gluconate IVPB 2 Gram(s) IV Intermittent once  chlorhexidine 0.12% Liquid 15 milliLiter(s) Swish and Spit two times a day  dextrose 5% 1000 milliLiter(s) (75 mL/Hr) IV Continuous <Continuous>  dextrose 5%. 1000 milliLiter(s) (50 mL/Hr) IV Continuous <Continuous>  dextrose 50% Injectable 12.5 Gram(s) IV Push once  dextrose 50% Injectable 25 Gram(s) IV Push once  dextrose 50% Injectable 25 Gram(s) IV Push once  heparin  Infusion.  Unit(s)/Hr (17 mL/Hr) IV Continuous <Continuous>  levETIRAcetam  IVPB 500 milliGRAM(s) IV Intermittent every 12 hours  norepinephrine Infusion 0.05 MICROgram(s)/kG/Min (8.841 mL/Hr) IV Continuous <Continuous>  pantoprazole  Injectable 40 milliGRAM(s) IV Push daily  piperacillin/tazobactam IVPB. 3.375 Gram(s) IV Intermittent every 8 hours    MEDICATIONS  (PRN):  ALBUTerol    90 MICROgram(s) HFA Inhaler 2 Puff(s) Inhalation every 6 hours PRN Shortness of Breath and/or Wheezing  dextrose 40% Gel 15 Gram(s) Oral once PRN Blood Glucose LESS THAN 70 milliGRAM(s)/deciLiter  glucagon  Injectable 1 milliGRAM(s) IntraMuscular once PRN Glucose <70 milliGRAM(s)/deciLiter  heparin  Injectable 7500 Unit(s) IV Push every 6 hours PRN For aPTT less than 40  heparin  Injectable 3500 Unit(s) IV Push every 6 hours PRN For aPTT between 40 - 57      REVIEW OF SYSTEMS:    CONSTITUTIONAL: No weakness, fevers or chills  EYES/ENT: No visual changes;  No vertigo or throat pain   NECK: No pain or stiffness  RESPIRATORY: No cough, wheezing, hemoptysis; No shortness of breath  CARDIOVASCULAR: No chest pain or palpitations  GASTROINTESTINAL: No abdominal or epigastric pain. No nausea, vomiting, or hematemesis; No diarrhea or constipation. No melena or hematochezia.  GENITOURINARY: No dysuria, frequency or hematuria  NEUROLOGICAL: No numbness or weakness  SKIN: No itching, burning, rashes, or lesions   All other review of systems is negative unless indicated above    Vital Signs Last 24 Hrs  T(C): 37.4 (20 Aug 2018 08:00), Max: 37.4 (20 Aug 2018 08:00)  T(F): 99.3 (20 Aug 2018 08:00), Max: 99.3 (20 Aug 2018 08:00)  HR: 120 (20 Aug 2018 10:29) (62 - 120)  BP: 139/64 (20 Aug 2018 08:00) (121/81 - 145/82)  BP(mean): 92 (20 Aug 2018 08:00) (86 - 104)  RR: 34 (20 Aug 2018 08:00) (25 - 48)  SpO2: 99% (20 Aug 2018 10:29) (98% - 100%)    I&O's Summary    19 Aug 2018 07:01  -  20 Aug 2018 07:00  --------------------------------------------------------  IN: 5013.2 mL / OUT: 2350 mL / NET: 2663.2 mL    20 Aug 2018 07:01  -  20 Aug 2018 10:34  --------------------------------------------------------  IN: 708 mL / OUT: 151 mL / NET: 557 mL        PHYSICAL EXAM:    Constitutional: NAD, awake and alert, well-developed  HEENT: PERR, EOMI,  No oral cyananosis.  Neck:  supple,  No JVD  Respiratory: Breath sounds are clear bilaterally, No wheezing, rales or rhonchi  Cardiovascular: S1 and S2, regular rate and rhythm, no Murmurs, gallops or rubs  Gastrointestinal: Bowel Sounds present, soft, nontender.   Extremities: No peripheral edema. No clubbing or cyanosis.  Vascular: 2+ peripheral pulses  Neurological: A/O x 3, no focal deficits  Musculoskeletal: no calf tenderness.  Skin: No rashes.      LABS: All Labs Reviewed:                        11.9   11.56 )-----------( 186      ( 20 Aug 2018 07:01 )             37.2                         12.2   3.21  )-----------( 268      ( 19 Aug 2018 06:56 )             38.5                         12.6   17.63 )-----------( 341      ( 19 Aug 2018 00:24 )             38.3     20 Aug 2018 07:01    148    |  113    |  38     ----------------------------<  118    4.3     |  16     |  2.53   20 Aug 2018 00:14    144    |  112    |  37     ----------------------------<  122    4.3     |  18     |  2.30   19 Aug 2018 18:45    147    |  114    |  37     ----------------------------<  134    4.1     |  19     |  2.10     Ca    6.9        20 Aug 2018 07:01  Ca    7.5        20 Aug 2018 00:14  Ca    6.4        19 Aug 2018 18:45  Phos  5.0       20 Aug 2018 07:01  Phos  5.1       20 Aug 2018 01:29  Phos  4.9       19 Aug 2018 18:45  Mg     2.1       20 Aug 2018 07:01  Mg     2.6       20 Aug 2018 01:29  Mg     2.4       19 Aug 2018 12:08    TPro  5.2    /  Alb  2.4    /  TBili  0.5    /  DBili  x      /  AST  2415   /  ALT  415    /  AlkPhos  75     20 Aug 2018 07:01  TPro  5.3    /  Alb  2.8    /  TBili  0.5    /  DBili  x      /  AST  2185   /  ALT  157    /  AlkPhos  77     19 Aug 2018 06:56  TPro  6.3    /  Alb  3.4    /  TBili  0.6    /  DBili  0.1    /  AST  1151   /  ALT  123    /  AlkPhos  96     19 Aug 2018 00:24    PT/INR - ( 19 Aug 2018 13:24 )   PT: 14.6 sec;   INR: 1.33 ratio         PTT - ( 20 Aug 2018 03:31 )  PTT:86.5 sec  CARDIAC MARKERS ( 20 Aug 2018 07:01 )  1.330 ng/mL / x     / x     / x     / x      CARDIAC MARKERS ( 20 Aug 2018 00:13 )  x     / x     / >15123 U/L / x     / x      CARDIAC MARKERS ( 19 Aug 2018 16:33 )  x     / x     / >15530 U/L / x     / x      CARDIAC MARKERS ( 19 Aug 2018 07:00 )  x     / x     / 74918 U/L / x     / x      CARDIAC MARKERS ( 19 Aug 2018 06:56 )  .901 ng/mL / x     / x     / x     / x      CARDIAC MARKERS ( 19 Aug 2018 00:24 )  x     / x     / 5947 U/L / x     / x      CARDIAC MARKERS ( 18 Aug 2018 22:12 )  .028 ng/mL / x     / x     / x     / x          Blood Culture:     08-19 @ 12:23  TSH: 0.63      RADIOLOGY/EKG:    nsr,inverted T wave V1-V3

## 2018-08-21 NOTE — CONSULT NOTE ADULT - SUBJECTIVE AND OBJECTIVE BOX
Patient with anoxic brain injury.  Doctors and family have requested a trach tube.  Reason for Consult/Chief Complaint:    HEALTH ISSUES - PROBLEM Dx:  Hypocalcemia: Hypocalcemia  Non-traumatic rhabdomyolysis: Non-traumatic rhabdomyolysis  Elevated LFTs: Elevated LFTs  Elevated troponin: Elevated troponin  Hypotension: Hypotension  Preventive measure: Preventive measure  Congestive heart failure, unspecified HF chronicity, unspecified heart failure type: Congestive heart failure, unspecified HF chronicity, unspecified heart failure type  Parkinson disease: Parkinson disease  RAISSA (acute kidney injury): RAISSA (acute kidney injury)  Respiratory acidosis: Respiratory acidosis  Cardiac arrest: Cardiac arrest        PAST MEDICAL & SURGICAL HISTORY:  Herniated cervical disc  Congestive heart failure, unspecified congestive heart failure chronicity, unspecified congestive heart failure type  Parkinson disease  Diastolic heart failure  GERD (gastroesophageal reflux disease)  Migraine  CHF (congestive heart failure)  Parkinson disease  No significant past surgical history  No significant past surgical history    Physical Exam  Vital Signs Last 24 Hrs  T(C): 38.6 (21 Aug 2018 08:25), Max: 38.6 (21 Aug 2018 08:25)  T(F): 101.5 (21 Aug 2018 08:25), Max: 101.5 (21 Aug 2018 08:25)  HR: 106 (21 Aug 2018 08:00) (105 - 123)  BP: 156/80 (21 Aug 2018 08:00) (144/75 - 156/80)  BP(mean): 100 (21 Aug 2018 08:00) (90 - 102)  RR: 24 (21 Aug 2018 08:00) (23 - 29)  SpO2: 100% (21 Aug 2018 08:00) (97% - 100%)    General: well developed, well nourished patient intrubated, not responsive. Head and Face: no swellings, lesions, tumors of masses. No tenderness to palpation or percussion. Facial strenth is normal.    Ears: external auditory canals, Tympanic membranes and middle ear space appeared normal bilaterally, normal hearing to speech. inspection of outer ear normal.   Nose: no signs of infection, bleeding or polyps. septum fairly midline. no signs of infection, polyps or bleeding.   Oral cavity / Oropharynx: normal mucosa. no lesions, tumors, masses or signs of infection. good palate mobility.   Neck: no lesions tumors, masses or swollen glands.   Thyroid - no nodules or thyromegaly noted.         MEDICATIONS  (STANDING):  carbidopa/levodopa  25/250 1 Tablet(s) Oral <User Schedule>  carbidopa/levodopa/entacapone 50/200/200 1 Tablet(s) Oral three times a day  chlorhexidine 0.12% Liquid 15 milliLiter(s) Swish and Spit two times a day  chlorhexidine 2% Cloths 1 Application(s) Topical daily  dextrose 5% 1000 milliLiter(s) (75 mL/Hr) IV Continuous <Continuous>  dextrose 5%. 1000 milliLiter(s) (50 mL/Hr) IV Continuous <Continuous>  dextrose 50% Injectable 12.5 Gram(s) IV Push once  dextrose 50% Injectable 25 Gram(s) IV Push once  dextrose 50% Injectable 25 Gram(s) IV Push once  heparin  Infusion.  Unit(s)/Hr (17 mL/Hr) IV Continuous <Continuous>  insulin lispro (HumaLOG) corrective regimen sliding scale   SubCutaneous every 6 hours  levETIRAcetam  IVPB 500 milliGRAM(s) IV Intermittent every 12 hours  pantoprazole  Injectable 40 milliGRAM(s) IV Push daily  piperacillin/tazobactam IVPB. 3.375 Gram(s) IV Intermittent every 8 hours    ld irregularity and heaped up mucosa in the post-cricoid region.     Radiologic studies:                          10.2   13.84 )-----------( 178      ( 21 Aug 2018 06:59 )             32.3

## 2018-08-21 NOTE — PROGRESS NOTE ADULT - SUBJECTIVE AND OBJECTIVE BOX
Date/Time Patient Seen:  		  Referring MD:   Data Reviewed	       Patient is a 53y old  Male who presents with a chief complaint of s/p cardiac arrest (19 Aug 2018 01:06)  vs and meds reviewed  family at BS      Subjective/HPI     PAST MEDICAL & SURGICAL HISTORY:  Herniated cervical disc  Congestive heart failure, unspecified congestive heart failure chronicity, unspecified congestive heart failure type  Parkinson disease  Diastolic heart failure  Parkinson disease  CHF (congestive heart failure)  GERD (gastroesophageal reflux disease)  Parkinson's disease  Migraine  CHF (congestive heart failure)  Parkinson disease  No significant past surgical history  No significant past surgical history  No significant past surgical history        Medication list         MEDICATIONS  (STANDING):  carbidopa/levodopa  25/250 1 Tablet(s) Oral <User Schedule>  carbidopa/levodopa/entacapone 50/200/200 1 Tablet(s) Oral three times a day  chlorhexidine 0.12% Liquid 15 milliLiter(s) Swish and Spit two times a day  chlorhexidine 2% Cloths 1 Application(s) Topical daily  dextrose 5% 1000 milliLiter(s) (75 mL/Hr) IV Continuous <Continuous>  dextrose 5%. 1000 milliLiter(s) (50 mL/Hr) IV Continuous <Continuous>  dextrose 50% Injectable 12.5 Gram(s) IV Push once  dextrose 50% Injectable 25 Gram(s) IV Push once  dextrose 50% Injectable 25 Gram(s) IV Push once  heparin  Infusion.  Unit(s)/Hr (17 mL/Hr) IV Continuous <Continuous>  insulin lispro (HumaLOG) corrective regimen sliding scale   SubCutaneous every 6 hours  levETIRAcetam  IVPB 500 milliGRAM(s) IV Intermittent every 12 hours  pantoprazole  Injectable 40 milliGRAM(s) IV Push daily  piperacillin/tazobactam IVPB. 3.375 Gram(s) IV Intermittent every 8 hours    MEDICATIONS  (PRN):  acetaminophen   Tablet 650 milliGRAM(s) Oral every 6 hours PRN For Temp greater than 38 C (100.4 F)  acetaminophen  Suppository 650 milliGRAM(s) Rectal every 6 hours PRN For Temp greater than 38 C (100.4 F)  ALBUTerol    90 MICROgram(s) HFA Inhaler 2 Puff(s) Inhalation every 6 hours PRN Shortness of Breath and/or Wheezing  dextrose 40% Gel 15 Gram(s) Oral once PRN Blood Glucose LESS THAN 70 milliGRAM(s)/deciLiter  glucagon  Injectable 1 milliGRAM(s) IntraMuscular once PRN Glucose <70 milliGRAM(s)/deciLiter  heparin  Injectable 7500 Unit(s) IV Push every 6 hours PRN For aPTT less than 40  heparin  Injectable 3500 Unit(s) IV Push every 6 hours PRN For aPTT between 40 - 57         Vitals log        ICU Vital Signs Last 24 Hrs  T(C): 38.5 (21 Aug 2018 06:48), Max: 38.5 (20 Aug 2018 16:02)  T(F): 101.3 (21 Aug 2018 06:48), Max: 101.3 (20 Aug 2018 16:02)  HR: 106 (21 Aug 2018 07:05) (106 - 123)  BP: 152/81 (21 Aug 2018 07:05) (139/64 - 154/79)  BP(mean): 100 (21 Aug 2018 07:05) (90 - 102)  ABP: 154/71 (21 Aug 2018 06:00) (134/61 - 160/80)  ABP(mean): 100 (21 Aug 2018 06:00) (89 - 110)  RR: 25 (21 Aug 2018 07:05) (23 - 34)  SpO2: 100% (21 Aug 2018 07:05) (97% - 100%)       Mode: AC/ CMV (Assist Control/ Continuous Mandatory Ventilation)  RR (machine): 20  TV (machine): 500  FiO2: 40  PEEP: 5  ITime: 1  MAP: 10  PIP: 21      Input and Output:  I&O's Detail    20 Aug 2018 07:01  -  21 Aug 2018 07:00  --------------------------------------------------------  IN:    dextrose 5%: 1575 mL    Enteral Tube Flush: 300 mL    heparin  Infusion.: 316 mL    IV PiggyBack: 425 mL    ns in tub fed  [id number]: 320 mL    sodium chloride 0.9%: 600 mL  Total IN: 3536 mL    OUT:    Indwelling Catheter - Urethral: 681 mL  Total OUT: 681 mL    Total NET: 2855 mL          Lab Data                        10.2   13.84 )-----------( 178      ( 21 Aug 2018 06:59 )             32.3     08-20    148<H>  |  113<H>  |  38<H>  ----------------------------<  118<H>  4.3   |  16<L>  |  2.53<H>    Ca    6.9<L>      20 Aug 2018 07:01  Phos  5.0     08-20  Mg     2.1     08-20    TPro  5.2<L>  /  Alb  2.4<L>  /  TBili  0.5  /  DBili  x   /  AST  2415<H>  /  ALT  415<H>  /  AlkPhos  75  08-20    ABG - ( 21 Aug 2018 06:17 )  pH, Arterial: x     pH, Blood: 7.42  /  pCO2: 30    /  pO2: 109   / HCO3: 21    / Base Excess: -4.3  /  SaO2: 99                CARDIAC MARKERS ( 20 Aug 2018 07:01 )  1.330 ng/mL / x     / x     / x     / x      CARDIAC MARKERS ( 20 Aug 2018 00:13 )  x     / x     / >10614 U/L / x     / x      CARDIAC MARKERS ( 19 Aug 2018 16:33 )  x     / x     / >24704 U/L / x     / x            Review of Systems	      Objective     Physical Examination    heart s1s2  lung dec BS  abd soft      Pertinent Lab findings & Imaging      Jennifer:  NO   Adequate UO     I&O's Detail    20 Aug 2018 07:01  -  21 Aug 2018 07:00  --------------------------------------------------------  IN:    dextrose 5%: 1575 mL    Enteral Tube Flush: 300 mL    heparin  Infusion.: 316 mL    IV PiggyBack: 425 mL    ns in tub fed  [id number]: 320 mL    sodium chloride 0.9%: 600 mL  Total IN: 3536 mL    OUT:    Indwelling Catheter - Urethral: 681 mL  Total OUT: 681 mL    Total NET: 2855 mL               Discussed with:     Cultures:	        Radiology

## 2018-08-21 NOTE — PROGRESS NOTE ADULT - SUBJECTIVE AND OBJECTIVE BOX
Subjective:      MEDICATIONS  (STANDING):  carbidopa/levodopa  25/250 1 Tablet(s) Oral <User Schedule>  carbidopa/levodopa/entacapone 50/200/200 1 Tablet(s) Oral three times a day  chlorhexidine 0.12% Liquid 15 milliLiter(s) Swish and Spit two times a day  chlorhexidine 2% Cloths 1 Application(s) Topical daily  dextrose 5% 1000 milliLiter(s) (75 mL/Hr) IV Continuous <Continuous>  dextrose 5%. 1000 milliLiter(s) (50 mL/Hr) IV Continuous <Continuous>  dextrose 50% Injectable 12.5 Gram(s) IV Push once  dextrose 50% Injectable 25 Gram(s) IV Push once  dextrose 50% Injectable 25 Gram(s) IV Push once  heparin  Infusion.  Unit(s)/Hr (17 mL/Hr) IV Continuous <Continuous>  insulin lispro (HumaLOG) corrective regimen sliding scale   SubCutaneous every 6 hours  levETIRAcetam  IVPB 500 milliGRAM(s) IV Intermittent every 12 hours  pantoprazole  Injectable 40 milliGRAM(s) IV Push daily  piperacillin/tazobactam IVPB. 3.375 Gram(s) IV Intermittent every 8 hours    MEDICATIONS  (PRN):  acetaminophen   Tablet 650 milliGRAM(s) Oral every 6 hours PRN For Temp greater than 38 C (100.4 F)  ALBUTerol    90 MICROgram(s) HFA Inhaler 2 Puff(s) Inhalation every 6 hours PRN Shortness of Breath and/or Wheezing  dextrose 40% Gel 15 Gram(s) Oral once PRN Blood Glucose LESS THAN 70 milliGRAM(s)/deciLiter  glucagon  Injectable 1 milliGRAM(s) IntraMuscular once PRN Glucose <70 milliGRAM(s)/deciLiter  heparin  Injectable 7500 Unit(s) IV Push every 6 hours PRN For aPTT less than 40  heparin  Injectable 3500 Unit(s) IV Push every 6 hours PRN For aPTT between 40 - 57          T(C): 38.6 (18 @ 08:25), Max: 38.6 (18 @ 08:25)  HR: 106 (18 @ 08:00) (105 - 123)  BP: 156/80 (18 @ 08:00) (144/79 - 156/80)  RR: 24 (18 @ 08:00) (23 - 29)  SpO2: 100% (18 @ 08:00) (97% - 100%)  Wt(kg): --    ABG - ( 21 Aug 2018 06:17 )  pH, Arterial: x     pH, Blood: 7.42  /  pCO2: 30    /  pO2: 109   / HCO3: 21    / Base Excess: -4.3  /  SaO2: 99                  I&O's Detail    20 Aug 2018 07:01  -  21 Aug 2018 07:00  --------------------------------------------------------  IN:    dextrose 5%: 1575 mL    Enteral Tube Flush: 300 mL    heparin  Infusion.: 316 mL    IV PiggyBack: 425 mL    ns in tub fed  [id number]: 320 mL    sodium chloride 0.9%: 600 mL  Total IN: 3536 mL    OUT:    Indwelling Catheter - Urethral: 681 mL  Total OUT: 681 mL    Total NET: 2855 mL      21 Aug 2018 07:01  -  21 Aug 2018 09:11  --------------------------------------------------------  IN:    dextrose 5%: 150 mL    Enteral Tube Flush: 50 mL    heparin  Infusion.: 30 mL    ns in tub fed  [id number]: 80 mL  Total IN: 310 mL    OUT:  Total OUT: 0 mL    Total NET: 310 mL          Mode: AC/ CMV (Assist Control/ Continuous Mandatory Ventilation)  RR (machine): 20  TV (machine): 500  FiO2: 40  PEEP: 5  ITime: 1  MAP: 10  PIP: 22       PHYSICAL EXAM:    GENERAL: vented/FiO2 40%  EYES: EOMI, PERRLA, conjunctiva and sclera clear  NECK: Supple, no inc in JVP  CHEST/LUNG: rhonchi  HEART: S1S2  ABDOMEN: Soft  EXTREMITIES:  min edema  NEURO: unresponsive to noxious stimuli        LABS:  CBC Full  -  ( 21 Aug 2018 06:59 )  WBC Count : 13.84 K/uL  Hemoglobin : 10.2 g/dL  Hematocrit : 32.3 %  Platelet Count - Automated : 178 K/uL  Mean Cell Volume : 95.8 fl  Mean Cell Hemoglobin : 30.3 pg  Mean Cell Hemoglobin Concentration : 31.6 gm/dL  Auto Neutrophil # : 11.76 K/uL  Auto Lymphocyte # : 1.11 K/uL  Auto Monocyte # : 0.97 K/uL  Auto Eosinophil # : 0.00 K/uL  Auto Basophil # : 0.00 K/uL  Auto Neutrophil % : 80.0 %  Auto Lymphocyte % : 8.0 %  Auto Monocyte % : 7.0 %  Auto Eosinophil % : 0.0 %  Auto Basophil % : 0.0 %        147<H>  |  111<H>  |  57<H>  ----------------------------<  146<H>  4.0   |  18<L>  |  4.89<H>    Ca    6.6<L>      21 Aug 2018 06:59  Phos  4.4       Mg     2.2         TPro  5.3<L>  /  Alb  2.2<L>  /  TBili  0.6  /  DBili  x   /  AST  1161<H>  /  ALT  150<H>  /  AlkPhos  86      PT/INR - ( 19 Aug 2018 13:24 )   PT: 14.6 sec;   INR: 1.33 ratio         PTT - ( 21 Aug 2018 08:10 )  PTT:71.6 sec  Urinalysis Basic - ( 20 Aug 2018 17:04 )    Color: Yellow / Appearance: Clear / S.015 / pH: x  Gluc: x / Ketone: Small  / Bili: Negative / Urobili: Negative mg/dL   Blood: x / Protein: 500 mg/dL / Nitrite: Negative   Leuk Esterase: Moderate / RBC: 25-50 /HPF / WBC 11-25   Sq Epi: x / Non Sq Epi: Moderate / Bacteria: Moderate      Culture Results:   No growth to date. ( @ 19:43)  Culture Results:   No growth to date. ( @ 19:43)          Impression:  * RAISSA -- ischemic ATN + pigment induced tubular injury. Cr still rising. Oliguric  * Rhabdo  * S/p cardio-pulm arrest. Prob anoxic encephalopathy  * Metabolic acidosis -- RAISSA, lactic, aggressive volume expansion with isotonic crystalloid  * Respiratory alkalosis  * HypoCa -- corrected Ca 8.0    Recommendations:   * Cont D5W with bicarb  * Hypotonic IVFs to mitigate hyperNa  * IV lasix PRN incr WOB, FiO2 requirements  * Follow Cr, CPK daily  * IV Ca today  * At high risk for dialytic requirement next 48h. Please arrange transfer to [facility]

## 2018-08-21 NOTE — PROGRESS NOTE ADULT - SUBJECTIVE AND OBJECTIVE BOX
ID Progress note     Name: BARTOLO GREENE  Age: 53y  Gender: Male  MRN: [id number]    Interval History-- Events noted, remains unresponsive off sedation , intubated . Now febrile placed on hypothermia blanket . Results of ct head noted . Started on NGT feeds   Notes reviewed    Past Medical History--  Herniated cervical disc  Congestive heart failure, unspecified congestive heart failure chronicity, unspecified congestive heart failure type  Parkinson disease  Diastolic heart failure  Parkinson disease  CHF (congestive heart failure)  GERD (gastroesophageal reflux disease)  Parkinson's disease  Migraine  CHF (congestive heart failure)  Parkinson disease  No significant past surgical history  No significant past surgical history  No significant past surgical history      For details regarding the patient's social history, family history, and other miscellaneous elements, please refer the initial infectious diseases consultation and/or the admitting history and physical examination for this admission.    Allergies--  Allergies    aspirin (Unknown)  Cheese (Unknown)  ibuprofen (Unknown)  Reglan (Swelling)    Intolerances    ibuprofen (Unknown)      Medications--  Antibiotics:  piperacillin/tazobactam IVPB. 3.375 Gram(s) IV Intermittent every 8 hours    Immunologic:    Other:  acetaminophen   Tablet PRN  acetaminophen  Suppository PRN  ALBUTerol    90 MICROgram(s) HFA Inhaler PRN  carbidopa/levodopa  25/250  carbidopa/levodopa/entacapone 50/200/200  chlorhexidine 0.12% Liquid  chlorhexidine 2% Cloths  dextrose 40% Gel PRN  dextrose 5%  dextrose 5%.  dextrose 50% Injectable  dextrose 50% Injectable  dextrose 50% Injectable  glucagon  Injectable PRN  heparin  Infusion.  heparin  Injectable PRN  heparin  Injectable PRN  insulin lispro (HumaLOG) corrective regimen sliding scale  levETIRAcetam  IVPB  pantoprazole  Injectable      Review of Systems--  Review of systems unable to be obtained secondary to clinical condition.    Physical Examination--    Vital Signs: T(F): 101.3 (08-21-18 @ 06:48), Max: 101.3 (08-20-18 @ 16:02)  HR: 106 (08-21-18 @ 07:05)  BP: 152/81 (08-21-18 @ 07:05)  RR: 25 (08-21-18 @ 07:05)  SpO2: 100% (08-21-18 @ 07:05)  Wt(kg): --  General: intubated, unresponsive   HEENT: AT/NC. PERRL. EOMI. Anicteric. Conjunctiva pink and moist. ET- t and NGT  Neck: Not rigid. No sense of mass.  Nodes: None palpable.  Lungs: Coarse breath sounds , NO rales, wheezing or rhonchi  Heart: Regular rate and rhythm. No Murmur. No rub. No gallop. No palpable thrill.  Abdomen: Bowel sounds present and normoactive. Soft. Nondistended.  No sense of mass. No organomegaly.  Back: No spinal tenderness. No costovertebral angle tenderness.   Extremities: No cyanosis or clubbing. No edema.   Skin: Warm. Dry. Good turgor. No rash. No vasculitic stigmata.  Psychiatric: Appropriate affect and mood for situation.         Laboratory Studies--  CBC                        10.2   13.84 )-----------( 178      ( 21 Aug 2018 06:59 )             32.3       Chemistries  08-20    148<H>  |  113<H>  |  38<H>  ----------------------------<  118<H>  4.3   |  16<L>  |  2.53<H>    Ca    6.9<L>      20 Aug 2018 07:01  Phos  5.0     08-20  Mg     2.1     08-20    TPro  5.2<L>  /  Alb  2.4<L>  /  TBili  0.5  /  DBili  x   /  AST  2415<H>  /  ALT  415<H>  /  AlkPhos  75  08-20    Procalcitonin, Serum (08.20.18 @ 16:54)    Procalcitonin, Serum: 38.57:     Sedimentation Rate, Erythrocyte (08.20.18 @ 10:59)    Sedimentation Rate, Erythrocyte: 29 mm/Hr    Lactate, Blood: 1.5 mmol/L (08-20-18 @ 01:29)  Lactate, Blood: 1.3 mmol/L (08-19-18 @ 18:45)  Lactate, Blood: 1.4 mmol/L (08-19-18 @ 12:08)      Culture Data    Culture - Sputum (collected 21 Aug 2018 00:39)  Source: .Sputum Sputum  Gram Stain (21 Aug 2018 04:13):    Moderate polymorphonuclear leukocytes seen per low power field    Moderate Squamous epithelial cells seen per low power field    Few Gram Variable Coccobacilli  seen per oil power field    Culture - Blood (collected 19 Aug 2018 19:43)  Source: .Blood Blood-Peripheral  Preliminary Report (20 Aug 2018 20:01):    No growth to date.    Culture - Blood (collected 19 Aug 2018 19:43)  Source: .Blood Blood-Peripheral  Preliminary Report (20 Aug 2018 20:01):    No growth to date.          Radiology:  Xray Chest 1 View-PORTABLE IMMEDIATE (08.20.18 @ 16:28) >    IMPRESSION: Right arm PICC line tip in the SVC. No pneumothorax. Stable   obscuration of the left hemidiaphragm representing atelectasis, effusion,   and/or pneumonia.       US Abdomen Complete (08.20.18 @ 14:30) >  IMPRESSION:     Mild hepatomegaly with liver measuring up to 18.4 cm.  Right pleural effusion  Trace ascites.    CT Head No Cont (08.20.18 @ 11:48) >  INTRACRANIAL FINDINGS: There is diffuse loss of gray-white   differentiation and effacement of sulci. No evidence for recent   hemorrhage. No midline shift. The ventricles are normal in size.There is   no abnormal extra axial collection.    EXTRACRANIAL FINDINGS: No extracalvarial soft tissue swelling. No   depressed calvarial fracture. The orbital contents are unremarkable.   Small fluid levels in the sphenoid sinuses. The mastoid air cells are   clear.    IMPRESSION:     No acute intracranial hemorrhage.    Diffuse cerebral edema secondary to hypoxic ischemic injury in the   setting of cardiac arrest.    Assessment--    52M with long history of Parkinson's disease (has been a resident of  for >1 year - [facility] with difficulty placing patient), reported hx of diastolic heart failure, herniated discs found unresponsive after periods of agitation, noted to be in cardia arrest with asystole , s/p CPR with ROSC , now intubated and on iv heparin. he was on pressors but now weaned off . He has markedly elevated CPK with elevated troponin . Etiology of cardiac arrest is unclear. Although sepsis was suspected , no Blood cs done on admission they were sent yesterday after he was on antibiotics . CT head not done yet as he was too unstable for transport . Acute CNS event as cause of unresponsiveness cannot be ruled out . he is emprically being treated for PE with IV heparin     He is being followed by cardiology , neurology , pulmonary and nephrology . He has severe rhabdomyolysis probably from prolonged CPR. he has developed RAISSA from ATN from hypotension and rhabdomyolysis    Clinically he has no source of infection , he may have aspirated during CPR but initial CXR was negative, CXR shows right effusion atelectasis    He has probably anoxic brain injury with cerebral edema following cardiac arrest . Fevers may be central origin       Plan :   - continue with IV zosyn for now pending sputum cs and repeat CXR  -  weaning as per pulmonary   - continue with IV fluids as per renal   - prognosis is poor   - neurology follow up   - check Pbnp     Continue with present regime .  Appropriate use of antibiotics and adverse effects reviewed.      I have discussed the above plan of care with patient's family in detail. They expressed understanding of the treatment plan . Risks, benefits and alternatives discussed in detail. I have asked if they have any questions or concerns and appropriately addressed them to the best of my ability .      > 35 minutes spent in direct patient care reviewing  the notes, lab data/ imaging , discussion with multidisciplinary team. All questions were addressed and answered to the best of my capacity .    Thank you for allowing me to participate in the care of your patient .        Albina Mix MD  [phone]

## 2018-08-21 NOTE — PROGRESS NOTE ADULT - SUBJECTIVE AND OBJECTIVE BOX
Neurology follow up note    BARTOLO [address]      Interval History:    Patient intubated no sedation seen with family     MEDICATIONS    acetaminophen   Tablet 650 milliGRAM(s) Oral every 6 hours PRN  ALBUTerol    90 MICROgram(s) HFA Inhaler 2 Puff(s) Inhalation every 6 hours PRN  calcium gluconate IVPB 2 Gram(s) IV Intermittent once  carbidopa/levodopa  25/250 1 Tablet(s) Oral <User Schedule>  carbidopa/levodopa/entacapone 50/200/200 1 Tablet(s) Oral three times a day  chlorhexidine 0.12% Liquid 15 milliLiter(s) Swish and Spit two times a day  chlorhexidine 2% Cloths 1 Application(s) Topical daily  dextrose 40% Gel 15 Gram(s) Oral once PRN  dextrose 5% 1000 milliLiter(s) IV Continuous <Continuous>  dextrose 5%. 1000 milliLiter(s) IV Continuous <Continuous>  dextrose 50% Injectable 12.5 Gram(s) IV Push once  dextrose 50% Injectable 25 Gram(s) IV Push once  dextrose 50% Injectable 25 Gram(s) IV Push once  glucagon  Injectable 1 milliGRAM(s) IntraMuscular once PRN  heparin  Infusion.  Unit(s)/Hr IV Continuous <Continuous>  heparin  Injectable 7500 Unit(s) IV Push every 6 hours PRN  heparin  Injectable 3500 Unit(s) IV Push every 6 hours PRN  insulin lispro (HumaLOG) corrective regimen sliding scale   SubCutaneous every 6 hours  levETIRAcetam  IVPB 500 milliGRAM(s) IV Intermittent every 12 hours  pantoprazole  Injectable 40 milliGRAM(s) IV Push daily  piperacillin/tazobactam IVPB. 3.375 Gram(s) IV Intermittent every 8 hours      Allergies    aspirin (Unknown)  Cheese (Unknown)  ibuprofen (Unknown)  Reglan (Swelling)    Intolerances    ibuprofen (Unknown)          Vital Signs Last 24 Hrs  T(C): 38.6 (21 Aug 2018 08:25), Max: 38.6 (21 Aug 2018 08:25)  T(F): 101.5 (21 Aug 2018 08:25), Max: 101.5 (21 Aug 2018 08:25)  HR: 106 (21 Aug 2018 10:00) (105 - 123)  BP: 150/78 (21 Aug 2018 10:00) (144/79 - 156/80)  BP(mean): 97 (21 Aug 2018 10:00) (90 - 102)  RR: 26 (21 Aug 2018 10:00) (23 - 29)  SpO2: 98% (21 Aug 2018 10:00) (97% - 100%)      REVIEW OF SYSTEMS: intubated        On Neurological Examination:    Mental Status - Patient is a Unresponsive  .     Does not follow commands    Speech -   intubated                   Cranial Nerves - Pupils 3 mm equal not reactive to light,     Motor Exam -     With stimuli No movement of all 4 extremities    Muscle tone - is normal all over.  No asymmetry is seen.      GENERAL Exam: Nontoxic , No Acute Distress   	  HEENT:  normocephalic, atraumatic  		  LUNGS: intubated   	  HEART: Normal S1S2   No murmur RRR        	  GI/ ABDOMEN:  Soft  Non tender    EXTREMITIES:   No Edema  No Clubbing  No Cyanosis   	   SKIN: Normal  No Ecchymosis               LABS:  CBC Full  -  ( 21 Aug 2018 06:59 )  WBC Count : 13.84 K/uL  Hemoglobin : 10.2 g/dL  Hematocrit : 32.3 %  Platelet Count - Automated : 178 K/uL  Mean Cell Volume : 95.8 fl  Mean Cell Hemoglobin : 30.3 pg  Mean Cell Hemoglobin Concentration : 31.6 gm/dL  Auto Neutrophil # : 11.76 K/uL  Auto Lymphocyte # : 1.11 K/uL  Auto Monocyte # : 0.97 K/uL  Auto Eosinophil # : 0.00 K/uL  Auto Basophil # : 0.00 K/uL  Auto Neutrophil % : 80.0 %  Auto Lymphocyte % : 8.0 %  Auto Monocyte % : 7.0 %  Auto Eosinophil % : 0.0 %  Auto Basophil % : 0.0 %    Urinalysis Basic - ( 20 Aug 2018 17:04 )    Color: Yellow / Appearance: Clear / S.015 / pH: x  Gluc: x / Ketone: Small  / Bili: Negative / Urobili: Negative mg/dL   Blood: x / Protein: 500 mg/dL / Nitrite: Negative   Leuk Esterase: Moderate / RBC: 25-50 /HPF / WBC 11-25   Sq Epi: x / Non Sq Epi: Moderate / Bacteria: Moderate      -    147<H>  |  111<H>  |  57<H>  ----------------------------<  146<H>  4.0   |  18<L>  |  4.89<H>    Ca    6.6<L>      21 Aug 2018 06:59  Phos  4.4     08-  Mg     2.2     08-    TPro  5.3<L>  /  Alb  2.2<L>  /  TBili  0.6  /  DBili  x   /  AST  1161<H>  /  ALT  150<H>  /  AlkPhos  86  08-    Hemoglobin A1C:     LIVER FUNCTIONS - ( 21 Aug 2018 06:59 )  Alb: 2.2 g/dL / Pro: 5.3 g/dL / ALK PHOS: 86 U/L / ALT: 150 U/L DA / AST: 1161 U/L / GGT: x           Vitamin B12   PT/INR - ( 19 Aug 2018 13:24 )   PT: 14.6 sec;   INR: 1.33 ratio         PTT - ( 21 Aug 2018 08:10 )  PTT:71.6 sec      RADIOLOGY    ASSESSMENT AND PLAN    1- S/P CARDIAC ARREST - CT noted Diffuse cerebral edema --  AT present not  brain dead do to does overbreaths vent   keep SBP above 100  monitor electrolytes   monitor renal function    2- H/O Parkinson continue home medications     spoke to family at bedside since 3 days later no change consider comfort care    jenna    Jayshree        Advanced care planning was discussed with family.    Greater than 45 minutes spent in direct patient care reviewing  the notes, lab data/ imaging , discussion with multidisciplinary team.

## 2018-08-21 NOTE — CONSULT NOTE ADULT - CONSULT REASON
lft elevation
Possible aspiration pneumonia , s/p cardiac arrest
RAISSA
Trach
Unresponsive  asystole
card arrest  shock  resp failure
possible PE

## 2018-08-21 NOTE — CONSULT NOTE ADULT - PROBLEM SELECTOR RECOMMENDATION 9
extensive card hx  s/p card arrest and shock  on pressors and IVF  keep MAP > 60  I and O  serial labs  serial PE  hypothermia protocol  s/p ROSC  anoxic brain injury suspected   supportive ICU care  spoke with sister  prognosis POOR  pall care jud and SHERON discussion
unclear etiology ?hypotension sepsis, vs dehydration , rule out PE ,   possible anoxic encephalopathy , will continue to monitor , on hypothermia protocol.  improving renal function
Tracheotomy.  discussed with family who requested this procedure.

## 2018-08-21 NOTE — PROGRESS NOTE ADULT - ASSESSMENT
52 yo male, PMHx CHF, Parkinson's, who was residing at in-patient Psychiatric facility at [facility] ([facility]), who presented s/p asystolic cardiopulmonary arrest of unclear etiology r/o PE vs ACS, with metabolic lactic acidosis, ATN, rhabdomyolysis, shock likely cardiogenic, transaminitis, anoxic encephalopathy likely related to hypoperfusion during prolonged down time during cardiac arrest and shock; persistently febrile r/o sepsis from aspiration PNA vs neurogenic fever.    Per discussion with patient's HCP, patient now comfort care with the following modifications:  - Will continue mechanical ventilation, awaiting the arrival of more family to remove ventilatory assistance  - Continue tube feeds  - No pain meds per family request  - All for natural death    Discussed in detail with FAHAD Garza; Dr. Medina, Dr. Grossman, and [facility] Liz notified of plan. 52 yo male, PMHx CHF, Parkinson's, who was residing at in-patient Psychiatric facility at [facility] ([facility]), who presented s/p asystolic cardiopulmonary arrest of unclear etiology r/o PE vs ACS, with metabolic lactic acidosis, ATN, rhabdomyolysis, shock likely cardiogenic, transaminitis, anoxic encephalopathy likely related to hypoperfusion during prolonged down time during cardiac arrest and shock; persistently febrile r/o sepsis from aspiration PNA vs neurogenic fever.    Per discussion with patient's HCP, patient now comfort care with the following modifications:  - Will continue mechanical ventilation, awaiting the arrival of more family to remove ventilatory assistance  - Continue tube feeds, and Parkinson's medications  - No antibiotics  - No hemodialysis  - No pain meds per family request  - All for natural death    Discussed in detail with FAHAD Garza; Dr. Medina, Dr. Grossman, and [facility] Liz notified of plan. 52 yo male, PMHx CHF, Parkinson's, who was residing at in-patient Psychiatric facility at [facility] ([facility]), who presented s/p asystolic cardiopulmonary arrest of unclear etiology r/o PE vs ACS, with metabolic lactic acidosis, ATN, rhabdomyolysis, shock likely cardiogenic, transaminitis, anoxic encephalopathy likely related to hypoperfusion during prolonged down time during cardiac arrest and shock; persistently febrile r/o sepsis from aspiration PNA vs neurogenic fever.    Per discussion with patient's HCP, patient now comfort care with the following modifications:  - Will continue mechanical ventilation, awaiting the arrival of more family to remove ventilatory assistance  - Continue tube feeds, and Parkinson's medications  - No antibiotics  - No hemodialysis  - No pain meds per family request  - No blood draws  - All for natural death    Discussed in detail with FAHAD Garza; Dr. Medina, Dr. Grossman, and [facility]ICU Attending Dr. Liz notified of plan.

## 2018-08-21 NOTE — PROGRESS NOTE ADULT - SUBJECTIVE AND OBJECTIVE BOX
Chief Complaint:        INTERVAL HPI/OVERNIGHT EVENTS:  remains intubated   unresponsive   family at bedside.       MEDICATIONS  (STANDING):  carbidopa/levodopa  25/250 1 Tablet(s) Oral <User Schedule>  carbidopa/levodopa/entacapone 50/200/200 1 Tablet(s) Oral three times a day  chlorhexidine 0.12% Liquid 15 milliLiter(s) Swish and Spit two times a day  chlorhexidine 2% Cloths 1 Application(s) Topical daily  dextrose 5% 1000 milliLiter(s) (75 mL/Hr) IV Continuous <Continuous>  dextrose 5%. 1000 milliLiter(s) (50 mL/Hr) IV Continuous <Continuous>  dextrose 50% Injectable 12.5 Gram(s) IV Push once  dextrose 50% Injectable 25 Gram(s) IV Push once  dextrose 50% Injectable 25 Gram(s) IV Push once  heparin  Infusion.  Unit(s)/Hr (17 mL/Hr) IV Continuous <Continuous>  insulin lispro (HumaLOG) corrective regimen sliding scale   SubCutaneous every 6 hours  levETIRAcetam  IVPB 500 milliGRAM(s) IV Intermittent every 12 hours  pantoprazole  Injectable 40 milliGRAM(s) IV Push daily  piperacillin/tazobactam IVPB. 3.375 Gram(s) IV Intermittent every 8 hours    MEDICATIONS  (PRN):  acetaminophen   Tablet 650 milliGRAM(s) Oral every 6 hours PRN For Temp greater than 38 C (100.4 F)  ALBUTerol    90 MICROgram(s) HFA Inhaler 2 Puff(s) Inhalation every 6 hours PRN Shortness of Breath and/or Wheezing  dextrose 40% Gel 15 Gram(s) Oral once PRN Blood Glucose LESS THAN 70 milliGRAM(s)/deciLiter  glucagon  Injectable 1 milliGRAM(s) IntraMuscular once PRN Glucose <70 milliGRAM(s)/deciLiter  heparin  Injectable 7500 Unit(s) IV Push every 6 hours PRN For aPTT less than 40  heparin  Injectable 3500 Unit(s) IV Push every 6 hours PRN For aPTT between 40 - 57            Vital Signs Last 24 Hrs  T(C): 38.4 (21 Aug 2018 12:44), Max: 38.6 (21 Aug 2018 08:25)  T(F): 101.1 (21 Aug 2018 12:44), Max: 101.5 (21 Aug 2018 08:25)  HR: 105 (21 Aug 2018 14:00) (105 - 123)  BP: 155/78 (21 Aug 2018 14:00) (144/79 - 156/80)  BP(mean): 100 (21 Aug 2018 14:00) (90 - 102)  RR: 29 (21 Aug 2018 14:00) (23 - 29)  SpO2: 100% (21 Aug 2018 14:00) (97% - 100%)    Physical exam:  abd soft, non tender   cv s1s2  chest air entry          LABS:                        10.2   13.84 )-----------( 178      ( 21 Aug 2018 06:59 )             32.3     08-21    147<H>  |  111<H>  |  57<H>  ----------------------------<  146<H>  4.0   |  18<L>  |  4.89<H>    Ca    6.6<L>      21 Aug 2018 06:59  Phos  4.4     08-21  Mg     2.2     08-21    TPro  5.3<L>  /  Alb  2.2<L>  /  TBili  0.6  /  DBili  x   /  AST  1161<H>  /  ALT  150<H>  /  AlkPhos  86  08-21    PTT - ( 21 Aug 2018 08:10 )  PTT:71.6 sec      RADIOLOGY & ADDITIONAL TESTS:

## 2018-08-21 NOTE — PROGRESS NOTE ADULT - SUBJECTIVE AND OBJECTIVE BOX
Patient is a 53y old  Male who presents with a chief complaint of s/p cardiac arrest (19 Aug 2018 01:06)      BRIEF HOSPITAL COURSE: 52 yo male, PMHx CHF, Parkinson's, who was residing at in-patient Psychiatric facility at [facility] (), who presented s/p asystolic cardiopulmonary arrest with prolonged down time of unclear etiology r/o PE vs ACS, metabolic lactic acidosis, ATN, rhabdomyolysis, shock, transaminitis, anoxic encephalopathy, persistently febrile r/o sepsis from aspiration PNA vs neurogenic fever.    Events last 24 hours: Patient noted to have MOLST form signed by HCP stating "comfort measures, no HD, trial of abx and IV fluids". Discussed goals of care in depth with patient's sister / HCP and daughter at bedside. Patient's sister verbalized that her goal is to see him be comfortable, and no longer suffer. She does not want to draw blood / check labs, or continue medications, as "there is no point, it's not going to change anything", and does not want to add any pain medications "because he's not in pain". Treatment options were discussed, and she expressed she would like to continue with mechanical ventilation and tube feeds for now, until other family members arrive in 2-3 days, at which time they will likely discontinue mechanical ventilation as well. She also expressed that she would like to allow for natural death to occur, and should it occur sooner we are not to resuscitate him. This conversation was had in the presence of FAHAD Garza, who also had a similar conversation following where patient's sister continued to express that she feels the patient is suffering at this time, and they are suffering watching him like this, and do not want to continue to cause further suffering.    PAST MEDICAL & SURGICAL HISTORY:  Herniated cervical disc  Congestive heart failure, unspecified congestive heart failure chronicity, unspecified congestive heart failure type  Parkinson disease  Diastolic heart failure  GERD (gastroesophageal reflux disease)  Migraine  CHF (congestive heart failure)  Parkinson disease  No significant past surgical history  No significant past surgical history      Review of Systems: Due to altered mental status/intubation, subjective information was not able to be obtained from the patient. History was obtained, to the extent possible, from review of the chart and collateral sources of information.      Medications:  dextrose 40% Gel 15 Gram(s) Oral once PRN  dextrose 50% Injectable 12.5 Gram(s) IV Push once  dextrose 50% Injectable 25 Gram(s) IV Push once  dextrose 50% Injectable 25 Gram(s) IV Push once  glucagon  Injectable 1 milliGRAM(s) IntraMuscular once PRN  dextrose 5% 1000 milliLiter(s) IV Continuous <Continuous>  dextrose 5%. 1000 milliLiter(s) IV Continuous <Continuous>      Mode: AC/ CMV (Assist Control/ Continuous Mandatory Ventilation)  RR (machine): 20  TV (machine): 500  FiO2: 40  PEEP: 5  ITime: 1  MAP: 10  PIP: 25      ICU Vital Signs Last 24 Hrs  T(C): 38.8 (21 Aug 2018 20:00), Max: 38.8 (21 Aug 2018 20:00)  T(F): 101.8 (21 Aug 2018 20:00), Max: 101.8 (21 Aug 2018 20:00)  HR: 104 (21 Aug 2018 18:00) (103 - 111)  BP: 157/79 (21 Aug 2018 18:00) (144/79 - 158/82)  BP(mean): 102 (21 Aug 2018 18:00) (96 - 103)  ABP: 154/71 (21 Aug 2018 06:00) (150/72 - 158/75)  ABP(mean): 100 (21 Aug 2018 06:00) (100 - 106)  RR: 20 (21 Aug 2018 18:00) (20 - 29)  SpO2: 100% (21 Aug 2018 18:00) (98% - 100%)      ABG - ( 21 Aug 2018 06:17 )  pH, Arterial: x     pH, Blood: 7.42  /  pCO2: 30    /  pO2: 109   / HCO3: 21    / Base Excess: -4.3  /  SaO2: 99          I&O's Detail    20 Aug 2018 07:01  -  21 Aug 2018 07:00  --------------------------------------------------------  IN:    dextrose 5%: 1575 mL    Enteral Tube Flush: 300 mL    heparin  Infusion.: 316 mL    IV PiggyBack: 425 mL    ns in tub fed  [id number]: 320 mL    sodium chloride 0.9%: 600 mL  Total IN: 3536 mL    OUT:    Indwelling Catheter - Urethral: 681 mL  Total OUT: 681 mL    Total NET: 3895 mL      21 Aug 2018 07:01  -  21 Aug 2018 22:32  --------------------------------------------------------  IN:    dextrose 5%: 900 mL    Enteral Tube Flush: 300 mL    heparin  Infusion.: 180 mL    IV PiggyBack: 700 mL    ns in tub fed  [id number]: 480 mL  Total IN: 2560 mL    OUT:    Indwelling Catheter - Urethral: 150 mL  Total OUT: 150 mL    Total NET: 2410 mL            LABS:                        9.8    13.04 )-----------( 160      ( 21 Aug 2018 18:42 )             29.7     08-    144  |  110<H>  |  71<H>  ----------------------------<  146<H>  4.1   |  21<L>  |  6.00<H>    Ca    6.8<L>      21 Aug 2018 18:42  Phos  4.4     -  Mg     2.2     08-    TPro  5.3<L>  /  Alb  2.2<L>  /  TBili  0.6  /  DBili  x   /  AST  1161<H>  /  ALT  150<H>  /  AlkPhos  86  08-21      CARDIAC MARKERS ( 21 Aug 2018 06:59 )  x     / x     / 69771 U/L / x     / x      CARDIAC MARKERS ( 20 Aug 2018 07:01 )  1.330 ng/mL / x     / x     / x     / x      CARDIAC MARKERS ( 20 Aug 2018 00:13 )  x     / x     / >64367 U/L / x     / x          CAPILLARY BLOOD GLUCOSE      POCT Blood Glucose.: 115 mg/dL (21 Aug 2018 17:04)    PTT - ( 21 Aug 2018 14:59 )  PTT:58.9 sec  Urinalysis Basic - ( 20 Aug 2018 17:04 )    Color: Yellow / Appearance: Clear / S.015 / pH: x  Gluc: x / Ketone: Small  / Bili: Negative / Urobili: Negative mg/dL   Blood: x / Protein: 500 mg/dL / Nitrite: Negative   Leuk Esterase: Moderate / RBC: 25-50 /HPF / WBC 11-25   Sq Epi: x / Non Sq Epi: Moderate / Bacteria: Moderate      CULTURES:  Culture Results:   Normal Respiratory Windy present ( @ 00:39)  Culture Results:   No growth to date. ( @ 19:43)  Culture Results:   No growth to date. ( @ 19:43)      Physical Examination:    General: No acute distress.      PULM: Intubated    CVS: Sinus tachycardia    NEURO: RASS -5          RADIOLOGY: < from: Xray Chest 1 View-PORTABLE IMMEDIATE (18 @ 16:28) >  EXAM:  XR CHEST PORTABLE IMMED 1V                          PROCEDURE DATE:  2018      INTERPRETATION:  XR CHEST PORTABLE IMMED 1V    Single AP view    HISTORY:  s/p PICC line for  Poor intravenous access    Comparison:  Chest x-ray from the same day    Right arm PICC line tip in the SVC. No pneumothorax.    The tip of the endotracheal tube is above the chinmay. The NG tube is in   stomach.    Normal heart size. Stable obscuration of the left hemidiaphragm.    IMPRESSION: Right arm PICC line tip in the SVC. No pneumothorax. Stable   obscuration of the left hemidiaphragm representing atelectasis, effusion,   and/or pneumonia.      JT TONY M.D., ATTENDING RADIOLOGIST  This document has been electronicallysigned. Aug 20 2018  4:32PM            INVASIVE LINES: R PICC   INDWELLING PIERRE: Y  VTE PROPHYLAXIS: X   CAM ICU: +  CODE STATUS: DNR / DNI, COMFORT MEASURES ONLY    CRITICAL CARE TIME SPENT: 40 minutes assessing presenting problems of acute illness, which pose high probability of life threatening deterioration or end organ damage/dysfunction, as well as medical decision making including initiating plan of care, reviewing data, reviewing radiologic exams, discussing with multidisciplinary team, non-inclusive of procedures performed, discussing goals of care with patient/family

## 2018-08-21 NOTE — PROGRESS NOTE ADULT - ASSESSMENT
Patient is 53 yo male with hx of diastolic CHF, gastroesophageal reflux disease, Herniated cervical disc, Migraine, Parkinson disease  (has been a resident of [facility] for >1 year - [facility] with difficulty placing patient), Unclear of etiology.      1. S/P Cardiac arrest.    -Unclear Etiology.  possible Seizure, Cardiac event, infection, pulmonary issues VS neurological issues  -Trop elevated in the setting of rhabdo, renal failure, and hypotension.  TTE shows NL EF  -Off pressors.  Continue with heparin drip (elevated D-dimer), antibiotic, and IV keppra.  hypothermic protocol was completed  -Doppler US of LE negative for DVT (unable to obtain Chest CTA due to renal function).   -Further care as per critical care, and cardiology      2.  Acute renal failure  -Possible Dehydration, ATN in the setting of hypotension, and rhabdo  -Renal US  shows no hydronephrosis  -Continue with IVF with Bicard  -Monitor CPK level trending down  -Renal function worsen  -Avoid nephrotoxin  -Continue with vieyra  -Further care as per Nephrology  -Monitor renal function  -Family does not want dialysis or transfer to other facility.  risks, and benefits discussed with sister in details using simple words.  seems to understand    3.  Elevated LFT.   -Possible shock liver in the setting of hypotension  -hepatitis panel pending.  abdm US shows hepatosplenomegaly  -Monitor LFT  -Avoid hepatoxic medications  -Further care as per GI.      4.  GERD  -Continue with PPI      5. AMS.    -Head CT scan shows anoxic brain injury.  Patient is not responding to painful Stimuli, currently not on sedation  -Continue with neuro checks  -Further care as per neurology    6.  rhabdo  -in the setting of CPR and anoxic brain injury.  Continue with IVF, and monitor CPK level  -Continue with IVF with Bicard additive as per nephrology  -Monitor CPK level  -Further care as per nephrology    7.  Suspect PE.    -given elevated D-dimer  -Unable to obtain Chest CTA due to current renal function  -Unable to obtain V/Q scan since patient is intubated, will not be cooperative with the test as per radiology  -Continue with heparin drip  - Pulmonary/critical care and Hematology to follow    8.  Diet  -Continue with tube feeding    9.  Suspected aspiration pna  -CXR shows Increasing right hilar and left basilar opacification   -Continue with zosyn pending Blood culture, sputum culture, urine culture  -Further care as per ID    10.  Parkinson.  -Continue with home medications      Given his anoxic brain injury with multiple organ failure.  Prognosis seems to be poor    After discussing case in great details including prognosis using simple word with sister/HCP.  HCP seems to understands all options, and decided on DNR/No re-inbutation/no dialysis/no feeding tube.  Barbara Hager present during conversion.   [facility] forum was sign      Plan of care was discussed with patient, and sister in great details, All questions were answered to their satisfaction  Seems to understand, and in agreement Patient is 51 yo male with hx of diastolic CHF, gastroesophageal reflux disease, Herniated cervical disc, Migraine, Parkinson disease  (has been a resident of [facility] for >1 year - [facility] with difficulty placing patient), Unclear of etiology.      1. S/P Cardiac arrest.    -Unclear Etiology.  possible Seizure, Cardiac event, infection, pulmonary issues VS neurological issues  -Trop elevated in the setting of rhabdo, renal failure, and hypotension.  TTE shows NL EF  -Off pressors.  Continue with heparin drip (elevated D-dimer), antibiotic, and IV keppra.  hypothermic protocol was completed  -Doppler US of LE negative for DVT (unable to obtain Chest CTA due to renal function).   -Further care as per critical care, and cardiology      2.  Acute renal failure  -Possible Dehydration, ATN in the setting of hypotension, and rhabdo  -Renal US  shows no hydronephrosis  -Continue with IVF with Bicard  -Monitor CPK level trending down  -Renal function worsen  -Avoid nephrotoxin  -Continue with vieyra  -Further care as per Nephrology  -Monitor renal function  -Family does not want dialysis or transfer to other facility.  risks, and benefits discussed with sister in details using simple words.  seems to understand    3.  Elevated LFT.   -Possible shock liver in the setting of hypotension  -hepatitis panel pending.  abdm US shows hepatosplenomegaly  -Monitor LFT  -Avoid hepatoxic medications  -Further care as per GI.      4.  GERD  -Continue with PPI      5. AMS.    -Head CT scan shows anoxic brain injury.  Patient is not responding to painful Stimuli, currently not on sedation  -Continue with neuro checks  -Further care as per neurology    6.  rhabdo  -in the setting of CPR and anoxic brain injury.  Continue with IVF, and monitor CPK level  -Continue with IVF with Bicard additive as per nephrology  -Monitor CPK level  -Further care as per nephrology    7.  Suspect PE.    -given elevated D-dimer  -Unable to obtain Chest CTA due to current renal function  -Unable to obtain V/Q scan since patient is intubated, will not be cooperative with the test as per radiology  -Continue with heparin drip  - Pulmonary/critical care and Hematology to follow    8.  Diet  -Continue with tube feeding    9.  Suspected aspiration pna  -CXR shows Increasing right hilar and left basilar opacification   -Continue with zosyn pending Blood culture, sputum culture, urine culture  -Further care as per ID    10.  Parkinson.  -Continue with home medications      Given his anoxic brain injury with multiple organ failure.  Prognosis seems to be poor    After discussing case in great details including prognosis using simple word with sister/HCP.  HCP seems to understands all options, and decided on DNR/No re-inbutation/no dialysis/no feeding tube/no trach.  Barbara Hager present during conversion.   [facility] forum was sign  HCP would like to continue with current medications      Plan of care was discussed with patient, and sister in great details, All questions were answered to their satisfaction  Seems to understand, and in agreement

## 2018-08-22 DIAGNOSIS — K72.00 ACUTE AND SUBACUTE HEPATIC FAILURE WITHOUT COMA: ICD-10-CM

## 2018-08-22 LAB
CULTURE RESULTS: NO GROWTH — SIGNIFICANT CHANGE UP
CULTURE RESULTS: SIGNIFICANT CHANGE UP
SPECIMEN SOURCE: SIGNIFICANT CHANGE UP
SPECIMEN SOURCE: SIGNIFICANT CHANGE UP

## 2018-08-22 PROCEDURE — 99233 SBSQ HOSP IP/OBS HIGH 50: CPT

## 2018-08-22 PROCEDURE — 99232 SBSQ HOSP IP/OBS MODERATE 35: CPT

## 2018-08-22 RX ORDER — MORPHINE SULFATE 50 MG/1
2 CAPSULE, EXTENDED RELEASE ORAL
Qty: 0 | Refills: 0 | Status: DISCONTINUED | OUTPATIENT
Start: 2018-08-22 | End: 2018-08-24

## 2018-08-22 RX ORDER — ATROPINE SULFATE 1 %
1 DROPS OPHTHALMIC (EYE)
Qty: 0 | Refills: 0 | Status: DISCONTINUED | OUTPATIENT
Start: 2018-08-22 | End: 2018-08-24

## 2018-08-22 RX ADMIN — Medication 1 DROP(S): at 10:01

## 2018-08-22 RX ADMIN — CARBIDOPA, LEVODOPA, AND ENTACAPONE 1 TABLET(S): 50; 200; 200 TABLET, FILM COATED ORAL at 22:24

## 2018-08-22 RX ADMIN — CARBIDOPA AND LEVODOPA 1 TABLET(S): 25; 100 TABLET ORAL at 13:23

## 2018-08-22 RX ADMIN — CARBIDOPA, LEVODOPA, AND ENTACAPONE 1 TABLET(S): 50; 200; 200 TABLET, FILM COATED ORAL at 06:14

## 2018-08-22 RX ADMIN — CARBIDOPA AND LEVODOPA 1 TABLET(S): 25; 100 TABLET ORAL at 21:25

## 2018-08-22 RX ADMIN — CARBIDOPA AND LEVODOPA 1 TABLET(S): 25; 100 TABLET ORAL at 10:02

## 2018-08-22 RX ADMIN — CARBIDOPA, LEVODOPA, AND ENTACAPONE 1 TABLET(S): 50; 200; 200 TABLET, FILM COATED ORAL at 13:23

## 2018-08-22 RX ADMIN — CARBIDOPA AND LEVODOPA 1 TABLET(S): 25; 100 TABLET ORAL at 06:14

## 2018-08-22 RX ADMIN — CARBIDOPA AND LEVODOPA 1 TABLET(S): 25; 100 TABLET ORAL at 17:49

## 2018-08-22 NOTE — PROGRESS NOTE ADULT - SUBJECTIVE AND OBJECTIVE BOX
Neurology follow up note    [facility] [address]      Interval History:    Patient intubated no sedation seen with family     MEDICATIONS    artificial  tears Solution 1 Drop(s) Both EYES three times a day PRN  atropine 1% Ophthalmic Solution for SubLingual Use 1 Drop(s) SubLingual four times a day PRN  carbidopa/levodopa  25/250 1 Tablet(s) Oral <User Schedule>  carbidopa/levodopa/entacapone 50/200/200 1 Tablet(s) Oral three times a day  dextrose 40% Gel 15 Gram(s) Oral once PRN  dextrose 5%. 1000 milliLiter(s) IV Continuous <Continuous>  dextrose 50% Injectable 12.5 Gram(s) IV Push once  dextrose 50% Injectable 25 Gram(s) IV Push once  dextrose 50% Injectable 25 Gram(s) IV Push once  glucagon  Injectable 1 milliGRAM(s) IntraMuscular once PRN  LORazepam   Injectable 1 milliGRAM(s) IV Push every 1 hour PRN  morphine  - Injectable 2 milliGRAM(s) IV Push every 1 hour PRN      Allergies    aspirin (Unknown)  Cheese (Unknown)  ibuprofen (Unknown)  Reglan (Swelling)    Intolerances    ibuprofen (Unknown)          Vital Signs Last 24 Hrs  T(C): 39.5 (22 Aug 2018 08:21), Max: 40 (22 Aug 2018 04:00)  T(F): 103.1 (22 Aug 2018 08:21), Max: 104 (22 Aug 2018 04:00)  HR: 103 (22 Aug 2018 12:00) (103 - 118)  BP: 136/76 (22 Aug 2018 12:00) (136/76 - 170/82)  BP(mean): 92 (22 Aug 2018 12:00) (86 - 105)  RR: 20 (22 Aug 2018 12:00) (20 - 29)  SpO2: 99% (22 Aug 2018 12:00) (98% - 100%)      REVIEW OF SYSTEMS: intubated        On Neurological Examination:    Mental Status - Patient is a Unresponsive  .     Does not follow commands    Speech -   intubated                   Cranial Nerves - Pupils 3 mm equal not reactive to light,  no blink bilateral to VT    Motor Exam -     With painful stimuli No movement of all 4 extremities    Muscle tone - is normal all over.  No asymmetry is seen.      GENERAL Exam: Nontoxic , No Acute Distress   	  HEENT:  normocephalic, atraumatic  		  LUNGS: intubated   	  HEART: Normal S1S2   No murmur RRR        	  GI/ ABDOMEN:  Soft  Non tender    EXTREMITIES:  hand Edema   	   SKIN: Normal  No Ecchymosis             LABS:  CBC Full  -  ( 21 Aug 2018 18:42 )  WBC Count : 13.04 K/uL  Hemoglobin : 9.8 g/dL  Hematocrit : 29.7 %  Platelet Count - Automated : 160 K/uL  Mean Cell Volume : 94.6 fl  Mean Cell Hemoglobin : 31.2 pg  Mean Cell Hemoglobin Concentration : 33.0 gm/dL  Auto Neutrophil # : x  Auto Lymphocyte # : x  Auto Monocyte # : x  Auto Eosinophil # : x  Auto Basophil # : x  Auto Neutrophil % : x  Auto Lymphocyte % : x  Auto Monocyte % : x  Auto Eosinophil % : x  Auto Basophil % : x    Urinalysis Basic - ( 20 Aug 2018 17:04 )    Color: Yellow / Appearance: Clear / S.015 / pH: x  Gluc: x / Ketone: Small  / Bili: Negative / Urobili: Negative mg/dL   Blood: x / Protein: 500 mg/dL / Nitrite: Negative   Leuk Esterase: Moderate / RBC: 25-50 /HPF / WBC 11-25   Sq Epi: x / Non Sq Epi: Moderate / Bacteria: Moderate      08-    144  |  110<H>  |  71<H>  ----------------------------<  146<H>  4.1   |  21<L>  |  6.00<H>    Ca    6.8<L>      21 Aug 2018 18:42  Phos  4.4     -  Mg     2.2     -    TPro  5.3<L>  /  Alb  2.2<L>  /  TBili  0.6  /  DBili  x   /  AST  1161<H>  /  ALT  150<H>  /  AlkPhos  86  -    Hemoglobin A1C:     LIVER FUNCTIONS - ( 21 Aug 2018 06:59 )  Alb: 2.2 g/dL / Pro: 5.3 g/dL / ALK PHOS: 86 U/L / ALT: 150 U/L DA / AST: 1161 U/L / GGT: x           Vitamin B12   PTT - ( 21 Aug 2018 14:59 )  PTT:58.9 sec      RADIOLOGY    ASSESSMENT AND PLAN    1- S/P CARDIAC ARREST - CT noted Diffuse cerebral edema --  AT present not  brain dead do to does overbreaths vent spoke to staff  keep SBP above 100  monitor electrolytes   monitor renal function    2- H/O Parkinson continue home medications     spoke to family at bedside 18 appears to be waiting for other family members possible terminal wean     jenna    Jayshree      Advanced care planning was discussed with family.    Greater than 45 minutes spent in direct patient care reviewing  the notes, lab data/ imaging , discussion with multidisciplinary team.

## 2018-08-22 NOTE — PROGRESS NOTE ADULT - PROBLEM SELECTOR PROBLEM 2
Acute respiratory failure with hypoxia and hypercapnia
Anoxic brain injury
RAISSA (acute kidney injury)

## 2018-08-22 NOTE — PROGRESS NOTE ADULT - SUBJECTIVE AND OBJECTIVE BOX
Date/Time Patient Seen:  		  Referring MD:   Data Reviewed	       Patient is a 53y old  Male who presents with a chief complaint of s/p cardiac arrest (19 Aug 2018 01:06)  vs and meds reviewed      Subjective/HPI     PAST MEDICAL & SURGICAL HISTORY:  Herniated cervical disc  Congestive heart failure, unspecified congestive heart failure chronicity, unspecified congestive heart failure type  Parkinson disease  Diastolic heart failure  Parkinson disease  CHF (congestive heart failure)  GERD (gastroesophageal reflux disease)  Parkinson's disease  Migraine  CHF (congestive heart failure)  Parkinson disease  No significant past surgical history  No significant past surgical history  No significant past surgical history        Medication list         MEDICATIONS  (STANDING):  carbidopa/levodopa  25/250 1 Tablet(s) Oral <User Schedule>  carbidopa/levodopa/entacapone 50/200/200 1 Tablet(s) Oral three times a day  dextrose 5%. 1000 milliLiter(s) (50 mL/Hr) IV Continuous <Continuous>  dextrose 50% Injectable 12.5 Gram(s) IV Push once  dextrose 50% Injectable 25 Gram(s) IV Push once  dextrose 50% Injectable 25 Gram(s) IV Push once    MEDICATIONS  (PRN):  artificial  tears Solution 1 Drop(s) Both EYES three times a day PRN Dry Eyes  atropine 1% Ophthalmic Solution for SubLingual Use 1 Drop(s) SubLingual four times a day PRN oral secretions  dextrose 40% Gel 15 Gram(s) Oral once PRN Blood Glucose LESS THAN 70 milliGRAM(s)/deciLiter  glucagon  Injectable 1 milliGRAM(s) IntraMuscular once PRN Glucose <70 milliGRAM(s)/deciLiter  LORazepam   Injectable 1 milliGRAM(s) IV Push every 1 hour PRN dyspnea, anxiety, distress  morphine  - Injectable 2 milliGRAM(s) IV Push every 1 hour PRN dyspnea, distress,         Vitals log        ICU Vital Signs Last 24 Hrs  T(C): 40 (22 Aug 2018 04:00), Max: 40 (22 Aug 2018 04:00)  T(F): 104 (22 Aug 2018 04:00), Max: 104 (22 Aug 2018 04:00)  HR: 115 (22 Aug 2018 07:34) (103 - 118)  BP: 153/73 (22 Aug 2018 06:01) (146/75 - 170/82)  BP(mean): 92 (22 Aug 2018 06:01) (92 - 105)  ABP: --  ABP(mean): --  RR: 23 (22 Aug 2018 06:01) (20 - 29)  SpO2: 99% (22 Aug 2018 07:34) (98% - 100%)       Mode: AC/ CMV (Assist Control/ Continuous Mandatory Ventilation)  RR (machine): 20  TV (machine): 500  FiO2: 40  PEEP: 5  ITime: 1  MAP: 14  PIP: 34      Input and Output:  I&O's Detail    21 Aug 2018 07:01  -  22 Aug 2018 07:00  --------------------------------------------------------  IN:    dextrose 5%: 1050 mL    Enteral Tube Flush: 600 mL    heparin  Infusion.: 210 mL    IV PiggyBack: 700 mL    ns in tub fed  [id number]: 960 mL  Total IN: 3520 mL    OUT:    Indwelling Catheter - Urethral: 300 mL  Total OUT: 300 mL    Total NET: 3220 mL          Lab Data                        9.8    13.04 )-----------( 160      ( 21 Aug 2018 18:42 )             29.7     08-21    144  |  110<H>  |  71<H>  ----------------------------<  146<H>  4.1   |  21<L>  |  6.00<H>    Ca    6.8<L>      21 Aug 2018 18:42  Phos  4.4     08-21  Mg     2.2     08-21    TPro  5.3<L>  /  Alb  2.2<L>  /  TBili  0.6  /  DBili  x   /  AST  1161<H>  /  ALT  150<H>  /  AlkPhos  86  08-21    ABG - ( 21 Aug 2018 06:17 )  pH, Arterial: x     pH, Blood: 7.42  /  pCO2: 30    /  pO2: 109   / HCO3: 21    / Base Excess: -4.3  /  SaO2: 99                CARDIAC MARKERS ( 21 Aug 2018 06:59 )  x     / x     / 29231 U/L / x     / x            Review of Systems	      Objective     Physical Examination    heart s1s2  lung dec BS    Pertinent Lab findings & Imaging      Jennifer:  NO   Adequate UO     I&O's Detail    21 Aug 2018 07:01  -  22 Aug 2018 07:00  --------------------------------------------------------  IN:    dextrose 5%: 1050 mL    Enteral Tube Flush: 600 mL    heparin  Infusion.: 210 mL    IV PiggyBack: 700 mL    ns in tub fed  [id number]: 960 mL  Total IN: 3520 mL    OUT:    Indwelling Catheter - Urethral: 300 mL  Total OUT: 300 mL    Total NET: 3220 mL               Discussed with:     Cultures:	        Radiology

## 2018-08-22 NOTE — PROGRESS NOTE ADULT - SUBJECTIVE AND OBJECTIVE BOX
PCP:    REQUESTING PHYSICIAN:      CHIEF COMPLAINT:  Patient is a 53y old  Male who presents with a chief complaint of s/p cardiac arrest (19 Aug 2018 01:06)      HPI:  52M with long history of Parkinson's disease (has been a resident [facility] for >1 year - [facility] with difficulty [facility]), reported hx of diastolic heart failure, herniated discs who was found unresponsive tonight.  Reportedly, RN said that the patient went to take a shower around  and afterwards patient started to become agitated.  Patient does have a history of agitation and flares his arms and legs often.  Patient was able to calm down and was allowed to go to the day room.  Patient again started to flare his arms and legs and even slid down to the floor.  Patient was then directed back to his room where he continued to be agitated.  Patient was then given ativan 2mg PO for agitation and then nurse was able to instruct the patient to calm down as well.  Nurse stepped outside the room and told the aide to get vitals when the patient was found to be unresponsive all of sudden.  A CODE BLUE was activated at 2132.      During the CODE BLUE, patient was found to be unresponsive.  CPR was already initiated.  Initial rhythm on monitor was asystole.  A total of 4 rounds of epinephrine was given with 2 given peripherally and 2 via femoral central line and a dose of bicarb and calcium chloride was also given in between (see code sheet).  Patient was successfully intubated and patient eventually had ROSC at 2144 with BP 96/39 with HR of 185.  Initial ABG was 6.78/52/247.  Given his tachycardia, patient was also given amio 150mg.  Patient's HR decreased to 70-80s but BP eventually dropped to SBP 40/20s.  Patient was started on levophed with improvement SBP to 90s and HR increased to 110s.  Multiple attempts to contact family member was unsuccessful.  Patient was transferred/admitted to [facility] for further management (18 Aug 2018 22:48)      PAST MEDICAL & SURGICAL HISTORY:  Herniated cervical disc  Congestive heart failure, unspecified congestive heart failure chronicity, unspecified congestive heart failure type  Parkinson disease  Diastolic heart failure  GERD (gastroesophageal reflux disease)  Migraine  CHF (congestive heart failure)  Parkinson disease  No significant past surgical history  No significant past surgical history      Allergies    aspirin (Unknown)  Cheese (Unknown)  ibuprofen (Unknown)  Reglan (Swelling)    Intolerances    ibuprofen (Unknown)      SOCIAL HISTORY:    FAMILY HISTORY:  No pertinent family history in first degree relatives  No pertinent family history in first degree relatives  Family history of prostate cancer in father  Family history of diabetes mellitus      MEDICATIONS:  MEDICATIONS  (STANDING):  carbidopa/levodopa  25/250 1 Tablet(s) Oral <User Schedule>  carbidopa/levodopa/entacapone 50/200/200 1 Tablet(s) Oral three times a day  dextrose 5%. 1000 milliLiter(s) (50 mL/Hr) IV Continuous <Continuous>  dextrose 50% Injectable 12.5 Gram(s) IV Push once  dextrose 50% Injectable 25 Gram(s) IV Push once  dextrose 50% Injectable 25 Gram(s) IV Push once    MEDICATIONS  (PRN):  artificial  tears Solution 1 Drop(s) Both EYES three times a day PRN Dry Eyes  atropine 1% Ophthalmic Solution for SubLingual Use 1 Drop(s) SubLingual four times a day PRN oral secretions  dextrose 40% Gel 15 Gram(s) Oral once PRN Blood Glucose LESS THAN 70 milliGRAM(s)/deciLiter  glucagon  Injectable 1 milliGRAM(s) IntraMuscular once PRN Glucose <70 milliGRAM(s)/deciLiter  LORazepam   Injectable 1 milliGRAM(s) IV Push every 1 hour PRN dyspnea, anxiety, distress  morphine  - Injectable 2 milliGRAM(s) IV Push every 1 hour PRN dyspnea, distress,        REVIEW OF SYSTEMS:    CONSTITUTIONAL: No weakness, fevers or chills  EYES/ENT: No visual changes;  No vertigo or throat pain   NECK: No pain or stiffness  RESPIRATORY: No cough, wheezing, hemoptysis; No shortness of breath  CARDIOVASCULAR: No chest pain or palpitations  GASTROINTESTINAL: No abdominal or epigastric pain. No nausea, vomiting, or hematemesis; No diarrhea or constipation. No melena or hematochezia.  GENITOURINARY: No dysuria, frequency or hematuria  NEUROLOGICAL: No numbness or weakness  SKIN: No itching, burning, rashes, or lesions   All other review of systems is negative unless indicated above    Vital Signs Last 24 HrsICU Vital Signs Last 24 Hrs  T(C): 39.5 (22 Aug 2018 08:21), Max: 40 (22 Aug 2018 04:00)  T(F): 103.1 (22 Aug 2018 08:21), Max: 104 (22 Aug 2018 04:00)  HR: 113 (22 Aug 2018 08:00) (103 - 118)  BP: 141/68 (22 Aug 2018 08:00) (141/68 - 170/82)  BP(mean): 86 (22 Aug 2018 08:00) (86 - 105)  ABP: --  ABP(mean): --  RR: 21 (22 Aug 2018 08:00) (20 - 29)  SpO2: 98% (22 Aug 2018 08:00) (98% - 100%)      I&O's Summary    19 Aug 2018 07:01  -  20 Aug 2018 07:00  --------------------------------------------------------  IN: 5013.2 mL / OUT: 2350 mL / NET: 2663.2 mL    20 Aug 2018 07:01  -  20 Aug 2018 10:34  --------------------------------------------------------  IN: 708 mL / OUT: 151 mL / NET: 557 mL        PHYSICAL EXAM:    Constitutional: NAD, awake and alert, well-developed  HEENT: PERR, EOMI,  No oral cyananosis.  Neck:  supple,  No JVD  Respiratory: Breath sounds are clear bilaterally, No wheezing, rales or rhonchi  Cardiovascular: S1 and S2, regular rate and rhythm, no Murmurs, gallops or rubs  Gastrointestinal: Bowel Sounds present, soft, nontender.   Extremities: No peripheral edema. No clubbing or cyanosis.  Vascular: 2+ peripheral pulses  Neurological: A/O x 3, no focal deficits  Musculoskeletal: no calf tenderness.  Skin: No rashes.      LABS: All Labs Reviewed:        ABG - ( 21 Aug 2018 06:17 )  pH, Arterial: x     pH, Blood: 7.42  /  pCO2: 30    /  pO2: 109   / HCO3: 21    / Base Excess: -4.3  /  SaO2: 99                   CBC Full  -  ( 21 Aug 2018 18:42 )  WBC Count : 13.04 K/uL  Hemoglobin : 9.8 g/dL  Hematocrit : 29.7 %  Platelet Count - Automated : 160 K/uL  Mean Cell Volume : 94.6 fl  Mean Cell Hemoglobin : 31.2 pg  Mean Cell Hemoglobin Concentration : 33.0 gm/dL  Auto Neutrophil # : x  Auto Lymphocyte # : x  Auto Monocyte # : x  Auto Eosinophil # : x  Auto Basophil # : x  Auto Neutrophil % : x  Auto Lymphocyte % : x  Auto Monocyte % : x  Auto Eosinophil % : x  Auto Basophil % : x          144  |  110<H>  |  71<H>  ----------------------------<  146<H>  4.1   |  21<L>  |  6.00<H>    Ca    6.8<L>      21 Aug 2018 18:42  Phos  4.4       Mg     2.2         TPro  5.3<L>  /  Alb  2.2<L>  /  TBili  0.6  /  DBili  x   /  AST  1161<H>  /  ALT  150<H>  /  AlkPhos  86        CARDIAC MARKERS ( 21 Aug 2018 06:59 )  x     / x     / 12126 U/L / x     / x            I&O's Summary    21 Aug 2018 07:  -  22 Aug 2018 07:00  --------------------------------------------------------  IN: 3520 mL / OUT: 300 mL / NET: 3220 mL    22 Aug 2018 07:  -  22 Aug 2018 09:14  --------------------------------------------------------  IN: 130 mL / OUT: 0 mL / NET: 130 mL        CAPILLARY BLOOD GLUCOSE      POCT Blood Glucose.: 115 mg/dL (21 Aug 2018 17:04)  POCT Blood Glucose.: 134 mg/dL (21 Aug 2018 12:42)      LIVER FUNCTIONS - ( 21 Aug 2018 06:59 )  Alb: 2.2 g/dL / Pro: 5.3 g/dL / ALK PHOS: 86 U/L / ALT: 150 U/L DA / AST: 1161 U/L / GGT: x             PTT - ( 21 Aug 2018 14:59 )  PTT:58.9 sec    Urinalysis Basic - ( 20 Aug 2018 17:04 )    Color: Yellow / Appearance: Clear / S.015 / pH: x  Gluc: x / Ketone: Small  / Bili: Negative / Urobili: Negative mg/dL   Blood: x / Protein: 500 mg/dL / Nitrite: Negative   Leuk Esterase: Moderate / RBC: 25-50 /HPF / WBC 11-25   Sq Epi: x / Non Sq Epi: Moderate / Bacteria: Moderate      Ca    6.9        20 Aug 2018 07:01  Ca    7.5        20 Aug 2018 00:14  Ca    6.4        19 Aug 2018 18:45  Phos  5.0       20 Aug 2018 07:01  Phos  5.1       20 Aug 2018 01:29  Phos  4.9       19 Aug 2018 18:45  Mg     2.1       20 Aug 2018 07:01  Mg     2.6       20 Aug 2018 01:29  Mg     2.4       19 Aug 2018 12:08    TPro  5.2    /  Alb  2.4    /  TBili  0.5    /  DBili  x      /  AST  2415   /  ALT  415    /  AlkPhos  75     20 Aug 2018 07:01  TPro  5.3    /  Alb  2.8    /  TBili  0.5    /  DBili  x      /  AST  2185   /  ALT  157    /  AlkPhos  77     19 Aug 2018 06:56  TPro  6.3    /  Alb  3.4    /  TBili  0.6    /  DBili  0.1    /  AST  1151   /  ALT  123    /  AlkPhos  96     19 Aug 2018 00:24    PT/INR - ( 19 Aug 2018 13:24 )   PT: 14.6 sec;   INR: 1.33 ratio         PTT - ( 20 Aug 2018 03:31 )  PTT:86.5 sec  CARDIAC MARKERS ( 20 Aug 2018 07:01 )  1.330 ng/mL / x     / x     / x     / x      CARDIAC MARKERS ( 20 Aug 2018 00:13 )  x     / x     / >28357 U/L / x     / x      CARDIAC MARKERS ( 19 Aug 2018 16:33 )  x     / x     / >18949 U/L / x     / x      CARDIAC MARKERS ( 19 Aug 2018 07:00 )  x     / x     / 18061 U/L / x     / x      CARDIAC MARKERS ( 19 Aug 2018 06:56 )  .901 ng/mL / x     / x     / x     / x      CARDIAC MARKERS ( 19 Aug 2018 00:24 )  x     / x     / 5947 U/L / x     / x      CARDIAC MARKERS ( 18 Aug 2018 22:12 )  .028 ng/mL / x     / x     / x     / x          Blood Culture:      @ 12:23  TSH: 0.63      RADIOLOGY/EKG:    nsr,inverted T wave V1-V3

## 2018-08-22 NOTE — CHART NOTE - NSCHARTNOTEFT_GEN_A_CORE
Assessment:     Pt admitted from 1N s/p cardiac arrest. Pt was intubated and NGT started. Per MD notes, pt family wants comfort care but still wants to continue NGT feeding and requesting trach.  Recommended RD recommendation from initial assessment Pulmocare to goal 70 ml/hr. Diet rx     Factors impacting intake: [ ] none [ ] nausea  [ ] vomiting [ ] diarrhea [ ] constipation  [ ]chewing problems [ ] swallowing issues  [ ] other:     Diet Presciption: Diet, NPO with Tube Feed:   Tube Feeding Modality: Nasogastric  Jevity 1.2 [place]  Total Volume for 24 Hours (mL): 960  Continuous  Starting Tube Feed Rate {mL per Hour}: 10  Increase Tube Feed Rate by (mL): 10     Every 4 hours  Until Goal Tube Feed Rate (mL per Hour): 40  Tube Feed Duration (in Hours): 24  Tube Feed Start Time: 16:00 (08-20-18 @ 14:34)    Intake:     Current Weight: Weight (kg): 94.3 (08-18 @ 23:51)  % Weight Change    Pertinent Medications: MEDICATIONS  (STANDING):  carbidopa/levodopa  25/250 1 Tablet(s) Oral <User Schedule>  carbidopa/levodopa/entacapone 50/200/200 1 Tablet(s) Oral three times a day  dextrose 5%. 1000 milliLiter(s) (50 mL/Hr) IV Continuous <Continuous>  dextrose 50% Injectable 12.5 Gram(s) IV Push once  dextrose 50% Injectable 25 Gram(s) IV Push once  dextrose 50% Injectable 25 Gram(s) IV Push once    MEDICATIONS  (PRN):  artificial  tears Solution 1 Drop(s) Both EYES three times a day PRN Dry Eyes  atropine 1% Ophthalmic Solution for SubLingual Use 1 Drop(s) SubLingual four times a day PRN oral secretions  dextrose 40% Gel 15 Gram(s) Oral once PRN Blood Glucose LESS THAN 70 milliGRAM(s)/deciLiter  glucagon  Injectable 1 milliGRAM(s) IntraMuscular once PRN Glucose <70 milliGRAM(s)/deciLiter  LORazepam   Injectable 1 milliGRAM(s) IV Push every 1 hour PRN dyspnea, anxiety, distress  morphine  - Injectable 2 milliGRAM(s) IV Push every 1 hour PRN dyspnea, distress,    Pertinent Labs: 08-21 Na144 mmol/L Glu 146 mg/dL<H> K+ 4.1 mmol/L Cr  6.00 mg/dL<H> BUN 71 mg/dL<H> 08-21 Phos 4.4 mg/dL 08-21 Alb 2.2 g/dL<L>     CAPILLARY BLOOD GLUCOSE      POCT Blood Glucose.: 115 mg/dL (21 Aug 2018 17:04)  POCT Blood Glucose.: 134 mg/dL (21 Aug 2018 12:42)    Skin:     Estimated Needs:   [ ] no change since previous assessment  [ ] recalculated:     Previous Nutrition Diagnosis:   [ ] Inadequate Energy Intake [ ]Inadequate Oral Intake [ ] Excessive Energy Intake   [ ] Underweight [ ] Increased Nutrient Needs [ ] Overweight/Obesity   [ ] Altered GI Function [ ] Unintended Weight Loss [ ] Food & Nutrition Related Knowledge Deficit [ ] Malnutrition     Nutrition Diagnosis is [ ] ongoing  [ ] resolved [ ] not applicable     New Nutrition Diagnosis: [ ] not applicable       Interventions:   Recommend  [ ] Change Diet To:  [ ] Nutrition Supplement  [ ] Nutrition Support  [ ] Other:     Monitoring and Evaluation:   [ ] PO intake [ x ] Tolerance to diet prescription [ x ] weights [ x ] labs[ x ] follow up per protocol  [ ] other: Assessment:     Pt admitted from 1N s/p cardiac arrest. Pt was intubated and NGT started. Per MD notes, pt family wants comfort care but still wants to continue NGT feeding and requesting trach.  Recommended RD recommendation from initial assessment Pulmocare to goal 70 ml/hr. Diet rx Jevity 1.2 to goal 40ml/hr which does not meet estimated needs. Given that family still wants to continue enteral feeding, recommend increasing goal rate to 75 ml/hr to meet needs. per MD notes, prognosis poor. Noted worsening renal labs.    Factors impacting intake: [ ] none [ ] nausea  [ ] vomiting [ ] diarrhea [ ] constipation  [ ]chewing problems [ ] swallowing issues  [ ] other:     Diet Presciption: Diet, NPO with Tube Feed:   Tube Feeding Modality: Nasogastric  Jevity 1.2 [place]  Total Volume for 24 Hours (mL): 960  Continuous  Starting Tube Feed Rate {mL per Hour}: 10  Increase Tube Feed Rate by (mL): 10     Every 4 hours  Until Goal Tube Feed Rate (mL per Hour): 40  Tube Feed Duration (in Hours): 24  Tube Feed Start Time: 16:00 (08-20-18 @ 14:34)    Intake:     Current Weight: Weight (kg): 94.3 (08-18 @ 23:51)  % Weight Change    Pertinent Medications: MEDICATIONS  (STANDING):  carbidopa/levodopa  25/250 1 Tablet(s) Oral <User Schedule>  carbidopa/levodopa/entacapone 50/200/200 1 Tablet(s) Oral three times a day  dextrose 5%. 1000 milliLiter(s) (50 mL/Hr) IV Continuous <Continuous>  dextrose 50% Injectable 12.5 Gram(s) IV Push once  dextrose 50% Injectable 25 Gram(s) IV Push once  dextrose 50% Injectable 25 Gram(s) IV Push once    MEDICATIONS  (PRN):  artificial  tears Solution 1 Drop(s) Both EYES three times a day PRN Dry Eyes  atropine 1% Ophthalmic Solution for SubLingual Use 1 Drop(s) SubLingual four times a day PRN oral secretions  dextrose 40% Gel 15 Gram(s) Oral once PRN Blood Glucose LESS THAN 70 milliGRAM(s)/deciLiter  glucagon  Injectable 1 milliGRAM(s) IntraMuscular once PRN Glucose <70 milliGRAM(s)/deciLiter  LORazepam   Injectable 1 milliGRAM(s) IV Push every 1 hour PRN dyspnea, anxiety, distress  morphine  - Injectable 2 milliGRAM(s) IV Push every 1 hour PRN dyspnea, distress,    Pertinent Labs: 08-21 Na144 mmol/L Glu 146 mg/dL<H> K+ 4.1 mmol/L Cr  6.00 mg/dL<H> BUN 71 mg/dL<H> 08-21 Phos 4.4 mg/dL 08-21 Alb 2.2 g/dL<L>     CAPILLARY BLOOD GLUCOSE      POCT Blood Glucose.: 115 mg/dL (21 Aug 2018 17:04)  POCT Blood Glucose.: 134 mg/dL (21 Aug 2018 12:42)    Skin:     Estimated Needs:   [ ] no change since previous assessment  [ ] recalculated:     Previous Nutrition Diagnosis:   [ ] Inadequate Energy Intake [ ]Inadequate Oral Intake [ ] Excessive Energy Intake   [ ] Underweight [ ] Increased Nutrient Needs [ ] Overweight/Obesity   [ ] Altered GI Function [ ] Unintended Weight Loss [ ] Food & Nutrition Related Knowledge Deficit [ ] Malnutrition     Nutrition Diagnosis is [ ] ongoing  [ ] resolved [ ] not applicable     New Nutrition Diagnosis: [ ] not applicable       Interventions:   Recommend  [ ] Change Diet To:  [ ] Nutrition Supplement  [ ] Nutrition Support  [ ] Other:     Monitoring and Evaluation:   [ ] PO intake [ x ] Tolerance to diet prescription [ x ] weights [ x ] labs[ x ] follow up per protocol  [ ] other: Assessment:     Pt admitted from 1N s/p cardiac arrest. Pt was intubated and NGT started. Per MD notes, pt family wants comfort care but still wants to continue NGT feeding and requesting trach.  Recommended RD recommendation from initial assessment Pulmocare to goal 70 ml/hr. Diet rx Jevity 1.2 to goal 40ml/hr which does not meet estimated needs. Given that family still wants to continue enteral feeding, recommend increasing goal rate to 75 ml/hr to meet needs.  However, per MD notes, prognosis poor. Pt c elevated LFT's, CRP and worsening renal labs and H/H. Goal is to maximize comfort. RD remains available.    Factors impacting intake: [ ] none [ ] nausea  [ ] vomiting [ ] diarrhea [ ] constipation  [ ]chewing problems [ ] swallowing issues  [ ] other:     Diet Presciption: Diet, NPO with Tube Feed:   Tube Feeding Modality: Nasogastric  Jevity 1.2 [place]  Total Volume for 24 Hours (mL): 960  Continuous  Starting Tube Feed Rate {mL per Hour}: 10  Increase Tube Feed Rate by (mL): 10     Every 4 hours  Until Goal Tube Feed Rate (mL per Hour): 40  Tube Feed Duration (in Hours): 24  Tube Feed Start Time: 16:00 (08-20-18 @ 14:34)    Intake:     Current Weight: Weight (kg): 94.3 (08-18 @ 23:51)  % Weight Change    Pertinent Medications: MEDICATIONS  (STANDING):  carbidopa/levodopa  25/250 1 Tablet(s) Oral <User Schedule>  carbidopa/levodopa/entacapone 50/200/200 1 Tablet(s) Oral three times a day  dextrose 5%. 1000 milliLiter(s) (50 mL/Hr) IV Continuous <Continuous>  dextrose 50% Injectable 12.5 Gram(s) IV Push once  dextrose 50% Injectable 25 Gram(s) IV Push once  dextrose 50% Injectable 25 Gram(s) IV Push once    MEDICATIONS  (PRN):  artificial  tears Solution 1 Drop(s) Both EYES three times a day PRN Dry Eyes  atropine 1% Ophthalmic Solution for SubLingual Use 1 Drop(s) SubLingual four times a day PRN oral secretions  dextrose 40% Gel 15 Gram(s) Oral once PRN Blood Glucose LESS THAN 70 milliGRAM(s)/deciLiter  glucagon  Injectable 1 milliGRAM(s) IntraMuscular once PRN Glucose <70 milliGRAM(s)/deciLiter  LORazepam   Injectable 1 milliGRAM(s) IV Push every 1 hour PRN dyspnea, anxiety, distress  morphine  - Injectable 2 milliGRAM(s) IV Push every 1 hour PRN dyspnea, distress,    Pertinent Labs: 08-21 Na144 mmol/L Glu 146 mg/dL<H> K+ 4.1 mmol/L Cr  6.00 mg/dL<H> BUN 71 mg/dL<H> 08-21 Phos 4.4 mg/dL 08-21 Alb 2.2 g/dL<L>     CAPILLARY BLOOD GLUCOSE      POCT Blood Glucose.: 115 mg/dL (21 Aug 2018 17:04)  POCT Blood Glucose.: 134 mg/dL (21 Aug 2018 12:42)    Skin:     Estimated Needs:   [ ] no change since previous assessment  [ ] recalculated:     Previous Nutrition Diagnosis:   [ ] Inadequate Energy Intake [ ]Inadequate Oral Intake [ ] Excessive Energy Intake   [ ] Underweight [ ] Increased Nutrient Needs [ ] Overweight/Obesity   [ ] Altered GI Function [ ] Unintended Weight Loss [ ] Food & Nutrition Related Knowledge Deficit [ ] Malnutrition     Nutrition Diagnosis is [ ] ongoing  [ ] resolved [ ] not applicable     New Nutrition Diagnosis: [ ] not applicable       Interventions:   Recommend  [ ] Change Diet To:  [ ] Nutrition Supplement  [ ] Nutrition Support  [ ] Other:     Monitoring and Evaluation:   [ ] PO intake [ x ] Tolerance to diet prescription [ x ] weights [ x ] labs[ x ] follow up per protocol  [ ] other: Assessment:     Pt admitted from 1N s/p cardiac arrest. Pt was intubated and NGT started. Per MD notes, pt family wants comfort care but still wants to continue NGT feeding.  Recommended RD recommendation from initial assessment Pulmocare to goal 70 ml/hr. Diet rx Jevity 1.2 to goal 40ml/hr which does not meet estimated needs. Given that family still wants to continue enteral feeding, recommend increasing goal rate to 75 ml/hr to meet needs.  However, per MD notes, prognosis poor. Pt c elevated LFT's, CRP and worsening renal labs and H/H. Goal is to maximize comfort. RD remains available.    Factors impacting intake: [ ] none [ ] nausea  [ ] vomiting [ ] diarrhea [ ] constipation  [ ]chewing problems [ ] swallowing issues  [ ] other:     Diet Presciption: Diet, NPO with Tube Feed:   Tube Feeding Modality: Nasogastric  Jevity 1.2 [place]  Total Volume for 24 Hours (mL): 960  Continuous  Starting Tube Feed Rate {mL per Hour}: 10  Increase Tube Feed Rate by (mL): 10     Every 4 hours  Until Goal Tube Feed Rate (mL per Hour): 40  Tube Feed Duration (in Hours): 24  Tube Feed Start Time: 16:00 (08-20-18 @ 14:34)    Intake:     Current Weight: Weight (kg): 94.3 (08-18 @ 23:51)  % Weight Change    Pertinent Medications: MEDICATIONS  (STANDING):  carbidopa/levodopa  25/250 1 Tablet(s) Oral <User Schedule>  carbidopa/levodopa/entacapone 50/200/200 1 Tablet(s) Oral three times a day  dextrose 5%. 1000 milliLiter(s) (50 mL/Hr) IV Continuous <Continuous>  dextrose 50% Injectable 12.5 Gram(s) IV Push once  dextrose 50% Injectable 25 Gram(s) IV Push once  dextrose 50% Injectable 25 Gram(s) IV Push once    MEDICATIONS  (PRN):  artificial  tears Solution 1 Drop(s) Both EYES three times a day PRN Dry Eyes  atropine 1% Ophthalmic Solution for SubLingual Use 1 Drop(s) SubLingual four times a day PRN oral secretions  dextrose 40% Gel 15 Gram(s) Oral once PRN Blood Glucose LESS THAN 70 milliGRAM(s)/deciLiter  glucagon  Injectable 1 milliGRAM(s) IntraMuscular once PRN Glucose <70 milliGRAM(s)/deciLiter  LORazepam   Injectable 1 milliGRAM(s) IV Push every 1 hour PRN dyspnea, anxiety, distress  morphine  - Injectable 2 milliGRAM(s) IV Push every 1 hour PRN dyspnea, distress,    Pertinent Labs: 08-21 Na144 mmol/L Glu 146 mg/dL<H> K+ 4.1 mmol/L Cr  6.00 mg/dL<H> BUN 71 mg/dL<H> 08-21 Phos 4.4 mg/dL 08-21 Alb 2.2 g/dL<L>     CAPILLARY BLOOD GLUCOSE      POCT Blood Glucose.: 115 mg/dL (21 Aug 2018 17:04)  POCT Blood Glucose.: 134 mg/dL (21 Aug 2018 12:42)    Skin:     Estimated Needs:   [ ] no change since previous assessment  [ ] recalculated:     Previous Nutrition Diagnosis:   [ ] Inadequate Energy Intake [ ]Inadequate Oral Intake [ ] Excessive Energy Intake   [ ] Underweight [ ] Increased Nutrient Needs [ ] Overweight/Obesity   [ ] Altered GI Function [ ] Unintended Weight Loss [ ] Food & Nutrition Related Knowledge Deficit [ ] Malnutrition     Nutrition Diagnosis is [ ] ongoing  [ ] resolved [ ] not applicable     New Nutrition Diagnosis: [ ] not applicable       Interventions:   Recommend  [ ] Change Diet To:  [ ] Nutrition Supplement  [ ] Nutrition Support  [ ] Other:     Monitoring and Evaluation:   [ ] PO intake [ x ] Tolerance to diet prescription [ x ] weights [ x ] labs[ x ] follow up per protocol  [ ] other:

## 2018-08-22 NOTE — PROGRESS NOTE ADULT - SUBJECTIVE AND OBJECTIVE BOX
CC.  S/P cardiac arrest  HPI.  Patient is intubated.  No sedation.  non-responsive to verbal or painful stimuli.  Unable to obtain ROS or History due to current mentation        ICU Vital Signs Last 24 Hrs  T(C): 37.8 (22 Aug 2018 12:47), Max: 40 (22 Aug 2018 04:00)  T(F): 100.1 (22 Aug 2018 12:47), Max: 104 (22 Aug 2018 04:00)  HR: 98 (22 Aug 2018 15:32) (98 - 118)  BP: 142/87 (22 Aug 2018 14:00) (136/76 - 170/82)  BP(mean): 102 (22 Aug 2018 14:00) (86 - 105)  ABP: --  ABP(mean): --  RR: 20 (22 Aug 2018 14:00) (20 - 28)  SpO2: 99% (22 Aug 2018 15:32) (98% - 100%)    PHYSICAL EXAM-  GENERAL: Chronic ill appearing male  HEAD:  Atraumatic, Normocephalic  EYES:  conjunctiva and sclera are clear  NECK: Supple, No JVD, Normal thyroid  NERVOUS SYSTEM:  unresponsive to painful or verbal stimuli  CHEST/LUNG: Min rhonchi with good air entry.  No retractions or accessory muscle usage  HEART: Regular rate and rhythm; No murmurs, rubs, or gallops  ABDOMEN: Soft, Nontender, Nondistended; Bowel sounds present  EXTREMITIES:  2+ Peripheral Pulses, No clubbing, cyanosis, or edema  SKIN:  no rash noticed                              9.8    13.04 )-----------( 160      ( 21 Aug 2018 18:42 )             29.7     08-21    144  |  110<H>  |  71<H>  ----------------------------<  146<H>  4.1   |  21<L>  |  6.00<H>    Ca    6.8<L>      21 Aug 2018 18:42  Phos  4.4     08-  Mg     2.2     08-21    TPro  5.3<L>  /  Alb  2.2<L>  /  TBili  0.6  /  DBili  x   /  AST  1161<H>  /  ALT  150<H>  /  AlkPhos  86  08-21    CARDIAC MARKERS ( 21 Aug 2018 06:59 )  x     / x     / 25182 U/L / x     / x            Urinalysis Basic - ( 20 Aug 2018 17:04 )    Color: Yellow / Appearance: Clear / S.015 / pH: x  Gluc: x / Ketone: Small  / Bili: Negative / Urobili: Negative mg/dL   Blood: x / Protein: 500 mg/dL / Nitrite: Negative   Leuk Esterase: Moderate / RBC: 25-50 /HPF / WBC 11-25   Sq Epi: x / Non Sq Epi: Moderate / Bacteria: Moderate      PTT - ( 21 Aug 2018 14:59 )  PTT:58.9 sec  MEDICATIONS  (STANDING):  carbidopa/levodopa  25/250 1 Tablet(s) Oral <User Schedule>  carbidopa/levodopa/entacapone 50/200/200 1 Tablet(s) Oral three times a day  dextrose 5%. 1000 milliLiter(s) (50 mL/Hr) IV Continuous <Continuous>  dextrose 50% Injectable 12.5 Gram(s) IV Push once  dextrose 50% Injectable 25 Gram(s) IV Push once  dextrose 50% Injectable 25 Gram(s) IV Push once    MEDICATIONS  (PRN):  artificial  tears Solution 1 Drop(s) Both EYES three times a day PRN Dry Eyes  atropine 1% Ophthalmic Solution for SubLingual Use 1 Drop(s) SubLingual four times a day PRN oral secretions  dextrose 40% Gel 15 Gram(s) Oral once PRN Blood Glucose LESS THAN 70 milliGRAM(s)/deciLiter  glucagon  Injectable 1 milliGRAM(s) IntraMuscular once PRN Glucose <70 milliGRAM(s)/deciLiter  LORazepam   Injectable 1 milliGRAM(s) IV Push every 1 hour PRN dyspnea, anxiety, distress  morphine  - Injectable 2 milliGRAM(s) IV Push every 1 hour PRN dyspnea, distress,                          Imaging Personally Reviewed:     [x ] YES  [ ] No medical contraindication for discharge    Consultant(s) Notes Reviewed:  [x ] YES  [ ] NO    Care Discussed with Consultants/Other Providers [x ] YES  [ ] NO

## 2018-08-22 NOTE — PROGRESS NOTE ADULT - SUBJECTIVE AND OBJECTIVE BOX
Patient is a 53y old  Male who presents with a chief complaint of s/p cardiac arrest (19 Aug 2018 01:06)      BRIEF HOSPITAL COURSE: 52 y/o M with a h/o CHF, Parkinson's, who was residing at in-patient Psychiatric facility at [facility] (), who presented s/p asystolic cardiopulmonary arrest of unclear etiology with prolonged down time, r/o PE vs ACS, metabolic lactic acidosis, ATN, rhabdomyolysis, shock, transaminitis, anoxic encephalopathy, persistently febrile r/o sepsis from aspiration PNA vs neurogenic fever. MOLST form in place signed by HCP stating "comfort measures, no HD, trial of abx and IV fluids". Family now requesting no further blood draws or medical treatment. Tube feeds and Parkinson's medications okay to continue. Likely to remove ventilator support after additional family members arrive in a few days. DNR.    Events last 24 hours: Patient remains on full ventilator support. Mental status remains unchanged. Currently hemodynamically stable. Low grade fever. Family at bedside.      PAST MEDICAL & SURGICAL HISTORY:  Herniated cervical disc  Congestive heart failure, unspecified congestive heart failure chronicity, unspecified congestive heart failure type  Parkinson disease  Diastolic heart failure  GERD (gastroesophageal reflux disease)  Migraine  CHF (congestive heart failure)  Parkinson disease  No significant past surgical history  No significant past surgical history      Review of Systems:  Unable to obtain as patient is intubated and unresponsive.      Medications:    carbidopa/levodopa  25/250 1 Tablet(s) Oral <User Schedule>  carbidopa/levodopa/entacapone 50/200/200 1 Tablet(s) Oral three times a day  LORazepam   Injectable 1 milliGRAM(s) IV Push every 1 hour PRN  morphine  - Injectable 2 milliGRAM(s) IV Push every 1 hour PRN  dextrose 40% Gel 15 Gram(s) Oral once PRN  dextrose 50% Injectable 12.5 Gram(s) IV Push once  dextrose 50% Injectable 25 Gram(s) IV Push once  dextrose 50% Injectable 25 Gram(s) IV Push once  glucagon  Injectable 1 milliGRAM(s) IntraMuscular once PRN  dextrose 5%. 1000 milliLiter(s) IV Continuous <Continuous>  artificial  tears Solution 1 Drop(s) Both EYES three times a day PRN  atropine 1% Ophthalmic Solution for SubLingual Use 1 Drop(s) SubLingual four times a day PRN        Mode: AC/ CMV (Assist Control/ Continuous Mandatory Ventilation)  RR (machine): 20  TV (machine): 500  FiO2: 40  PEEP: 5  ITime: 1  MAP: 14  PIP: 28      ICU Vital Signs Last 24 Hrs  T(C): 37.4 (22 Aug 2018 15:32), Max: 40 (22 Aug 2018 04:00)  T(F): 99.3 (22 Aug 2018 15:32), Max: 104 (22 Aug 2018 04:00)  HR: 97 (22 Aug 2018 20:00) (96 - 118)  BP: 135/77 (22 Aug 2018 20:00) (135/77 - 170/82)  BP(mean): 93 (22 Aug 2018 20:00) (86 - 105)  ABP: --  ABP(mean): --  RR: 20 (22 Aug 2018 20:00) (20 - 26)  SpO2: 100% (22 Aug 2018 20:00) (98% - 100%)      ABG - ( 21 Aug 2018 06:17 )  pH, Arterial: x     pH, Blood: 7.42  /  pCO2: 30    /  pO2: 109   / HCO3: 21    / Base Excess: -4.3  /  SaO2: 99                  I&O's Detail    21 Aug 2018 07:01  -  22 Aug 2018 07:00  --------------------------------------------------------  IN:    dextrose 5%: 1050 mL    Enteral Tube Flush: 600 mL    heparin  Infusion.: 210 mL    IV PiggyBack: 700 mL    ns in tub fed  [id number]: 960 mL  Total IN: 3520 mL    OUT:    Indwelling Catheter - Urethral: 300 mL  Total OUT: 300 mL    Total NET: 3220 mL      22 Aug 2018 07:01  -  22 Aug 2018 21:40  --------------------------------------------------------  IN:    Enteral Tube Flush: 350 mL    ns in tub fed  [id number]: 560 mL  Total IN: 910 mL    OUT:    Indwelling Catheter - Urethral: 10 mL  Total OUT: 10 mL    Total NET: 900 mL            LABS:                        9.8    13.04 )-----------( 160      ( 21 Aug 2018 18:42 )             29.7     08-21    144  |  110<H>  |  71<H>  ----------------------------<  146<H>  4.1   |  21<L>  |  6.00<H>    Ca    6.8<L>      21 Aug 2018 18:42  Phos  4.4     08-21  Mg     2.2     08-21    TPro  5.3<L>  /  Alb  2.2<L>  /  TBili  0.6  /  DBili  x   /  AST  1161<H>  /  ALT  150<H>  /  AlkPhos  86  08-21      CARDIAC MARKERS ( 21 Aug 2018 06:59 )  x     / x     / 78155 U/L / x     / x          CAPILLARY BLOOD GLUCOSE      POCT Blood Glucose.: 115 mg/dL (21 Aug 2018 17:04)    PTT - ( 21 Aug 2018 14:59 )  PTT:58.9 sec    CULTURES:  Culture Results:   No growth (08-21-18 @ 03:01)  Culture Results:   Normal Respiratory Windy present (08-21-18 @ 00:39)  Culture Results:   No growth to date. (08-19-18 @ 19:43)  Culture Results:   No growth to date. (08-19-18 @ 19:43)      Physical Examination:    General: No acute distress.  unresponsive, intubated    HEENT: Pupils equal, nonreactive to light.  Symmetric.    PULM: Clear to auscultation bilaterally, no significant sputum production    CVS: Regular rate and rhythm, no murmurs, rubs, or gallops    ABD: Soft, nondistended, nontender, normoactive bowel sounds, no masses    EXT: No edema, nontender    SKIN: Warm and well perfused, no rashes noted.    NEURO: unresponsive, does not follow commands, does not withdraw to pain, no corneal reflexes, no spontaneous movments      RADIOLOGY:     < from: Xray Chest 1 View-PORTABLE IMMEDIATE (08.20.18 @ 16:28) >  Right arm PICC line tip in the SVC. No pneumothorax.    The tip of the endotracheal tube is above the chinmay. The NG tube is in   stomach.    Normal heart size. Stable obscuration of the left hemidiaphragm.    IMPRESSION: Right arm PICC line tip in the SVC. No pneumothorax. Stable   obscuration of the left hemidiaphragm representing atelectasis, effusion,   and/or pneumonia.          CRITICAL CARE TIME SPENT: 31 mins  Evaluating/treating patient, reviewing data/labs/imaging, discussing case with multidisciplinary team, discussing plan/goals of care with patient/family. Non-inclusive of procedure time.

## 2018-08-22 NOTE — PROGRESS NOTE ADULT - SUBJECTIVE AND OBJECTIVE BOX
ID Progress note     Name: BARTOLO GREENE  Age: 53y  Gender: Male  MRN: [id number]    Interval History-- Events noted , placed on comfort care measures only as requested by Family.  Patient noted to have MOLST form signed by HCP stating "comfort measures, no HD, trial of abx and IV fluids " HCP would like to continue with mechanical ventilation and tube feeds for now, until other family members arrive in 2-3 days, at which time they will likely discontinue mechanical ventilation as well.  Notes reviewed    Past Medical History--  Herniated cervical disc  Congestive heart failure, unspecified congestive heart failure chronicity, unspecified congestive heart failure type  Parkinson disease  Diastolic heart failure  Parkinson disease  CHF (congestive heart failure)  GERD (gastroesophageal reflux disease)  Parkinson's disease  Migraine  CHF (congestive heart failure)  Parkinson disease  No significant past surgical history  No significant past surgical history  No significant past surgical history      For details regarding the patient's social history, family history, and other miscellaneous elements, please refer the initial infectious diseases consultation and/or the admitting history and physical examination for this admission.    Allergies--  Allergies    aspirin (Unknown)  Cheese (Unknown)  ibuprofen (Unknown)  Reglan (Swelling)    Intolerances    ibuprofen (Unknown)      Medications--  Antibiotics:    Immunologic:    Other:  carbidopa/levodopa  25/250  carbidopa/levodopa/entacapone 50/200/200  dextrose 40% Gel PRN  dextrose 5%.  dextrose 50% Injectable  dextrose 50% Injectable  dextrose 50% Injectable  glucagon  Injectable PRN      Review of Systems--  Review of systems unable to be obtained secondary to clinical condition.    Physical Examination--    Vital Signs: T(F): 104 (08-22-18 @ 04:00), Max: 104 (08-22-18 @ 04:00)  HR: 116 (08-22-18 @ 06:01)  BP: 153/73 (08-22-18 @ 06:01)  RR: 23 (08-22-18 @ 06:01)  SpO2: 100% (08-22-18 @ 06:01)  Wt(kg): --  General: intubated, unresponsive   HEENT: AT/NC. PERRL. EOMI. Anicteric. Conjunctiva pink and moist. ET- t and NGT  Neck: Not rigid. No sense of mass.  Nodes: None palpable.  Lungs: Coarse breath sounds , NO rales, wheezing or rhonchi  Heart: Regular rate and rhythm. No Murmur. No rub. No gallop. No palpable thrill.  Abdomen: Bowel sounds present and normoactive. Soft. Nondistended.  No sense of mass. No organomegaly.  Back: No spinal tenderness. No costovertebral angle tenderness.   Extremities: No cyanosis or clubbing. No edema.   Skin: Warm. Dry. Good turgor. No rash. No vasculitic stigmata.  Psychiatric: Appropriate affect and mood for situation.       Laboratory Studies--  CBC                        9.8    13.04 )-----------( 160      ( 21 Aug 2018 18:42 )             29.7       Chemistries  08-21    144  |  110<H>  |  71<H>  ----------------------------<  146<H>  4.1   |  21<L>  |  6.00<H>    Ca    6.8<L>      21 Aug 2018 18:42  Phos  4.4     08-21  Mg     2.2     08-21    TPro  5.3<L>  /  Alb  2.2<L>  /  TBili  0.6  /  DBili  x   /  AST  1161<H>  /  ALT  150<H>  /  AlkPhos  86  08-21      Culture Data    Culture - Urine (collected 21 Aug 2018 03:01)  Source: .Urine Catheterized  Final Report (22 Aug 2018 05:59):    No growth    Culture - Sputum (collected 21 Aug 2018 00:39)  Source: .Sputum Sputum  Gram Stain (21 Aug 2018 04:13):    Moderate polymorphonuclear leukocytes seen per low power field    Moderate Squamous epithelial cells seen per low power field    Few Gram Variable Coccobacilli  seen per oil power field  Preliminary Report (21 Aug 2018 18:25):    Normal Respiratory Windy present    Culture - Blood (collected 19 Aug 2018 19:43)  Source: .Blood Blood-Peripheral  Preliminary Report (20 Aug 2018 20:01):    No growth to date.    Culture - Blood (collected 19 Aug 2018 19:43)  Source: .Blood Blood-Peripheral  Preliminary Report (20 Aug 2018 20:01):    No growth to date.    Assessment--  54 yo male, PMHx CHF, Parkinson's, who was residing at in-patient Psychiatric facility at [facility] (), who presented s/p asystolic cardiopulmonary arrest of unclear etiology r/o PE vs ACS, with metabolic lactic acidosis, ATN, rhabdomyolysis, shock likely cardiogenic, transaminitis, anoxic encephalopathy likely related to hypoperfusion during prolonged down time during cardiac arrest and shock; persistently febrile r/o sepsis from aspiration PNA vs neurogenic fever.    Per discussion with patient's HCP, patient now comfort care with the following modifications:  - Will continue mechanical ventilation, awaiting the arrival of more family to remove ventilatory assistance  - Continue tube feeds, and Parkinson's medications  - No antibiotics  - No hemodialysis  - No pain meds per family request  - No blood draws  - All for natural death    Continue with present regime .  Appropriate use of antibiotics and adverse effects reviewed.      I have discussed the above plan of care with patient's family in detail. They expressed understanding of the treatment plan . Risks, benefits and alternatives discussed in detail. I have asked if they have any questions or concerns and appropriately addressed them to the best of my ability .      > 35 minutes spent in direct patient care reviewing  the notes, lab data/ imaging , discussion with multidisciplinary team. All questions were addressed and answered to the best of my capacity .    As he is on comfort measures only and  all antibiotics are stopped , I will respectfully sign off .    Thank you for allowing me to participate in the care of your patient .        Albina Mix MD  [phone]

## 2018-08-22 NOTE — PROGRESS NOTE ADULT - ASSESSMENT
Patient is 51 yo male with hx of diastolic CHF, gastroesophageal reflux disease, Herniated cervical disc, Migraine, Parkinson disease  presenting S/P Cardiac arrest of unknown Etiology, with Acute renal failure with ATN, Shock liver, Anoxic brain injury, Rhabdomyolysis, Suspected PE, Suspected aspiration pneumonia, , and Parkinson.      After discussing case with family (daughter and sister/HCP) in great details with multiple providers (including myself) including treatment options, prognosis, and risks& benefit of each option.  Family opted for   -No trach, dialysis, IVF, IV antibiotic, lab draw, trach, peg tube or further imaging  -Family is planning for terminal extubation with complete CMO in 2-3 days when other family member arrives    -Family is able to verbalize understanding of Current medical condition, and Options in their own words Patient is 53 yo male with hx of diastolic CHF, gastroesophageal reflux disease, Herniated cervical disc, Migraine, Parkinson disease  presenting S/P Cardiac arrest of unknown Etiology, with Acute renal failure with ATN, Shock liver, Anoxic brain injury, Rhabdomyolysis, Suspected PE, Suspected aspiration pneumonia, , and Parkinson.      After discussing case with family (daughter and sister/HCP) in great details with multiple providers (including myself) including treatment options, prognosis, and risks& benefit of each option.  Family opted for   -No trach, dialysis, IVF, IV antibiotic, lab draw, trach, peg tube or further imaging  -Continue with currentTube feeding, and parkinson medications  -Family is planning for terminal extubation with complete CMO in 2-3 days when other family member arrives    -Family is able to verbalize understanding of Current medical condition, and Options in their own words Patient is 53 yo male with hx of diastolic CHF, gastroesophageal reflux disease, Herniated cervical disc, Migraine, Parkinson disease  presenting S/P Cardiac arrest of unknown Etiology, with  Acute renal failure with ATN, Shock liver, Anoxic brain injury, Rhabdomyolysis, Suspected PE, and Suspected aspiration pneumonia.        After discussing case with family (daughter and sister/HCP) in great details with multiple providers (including myself) including treatment options, prognosis, and risks& benefit of each option.  Family opted for   -No trach, dialysis, IVF, IV antibiotic, lab draw, peg tube feeding, further blood test or further imaging  -Continue with current Tube feeding via NGT, and parkinson's medications  -Family is planning for terminal extubation with complete Comfort measure only  in 2-3 days when other family member arrives

## 2018-08-22 NOTE — PROGRESS NOTE ADULT - ASSESSMENT
PROBLEM Dx:  Hypocalcemia: Hypocalcemia  Non-traumatic rhabdomyolysis: Non-traumatic rhabdomyolysis  Elevated LFTs: Elevated LFTs  Elevated troponin: Elevated troponin  Hypotension: Hypotension  Preventive measure: Preventive measure  Congestive heart failure, unspecified HF chronicity, unspecified heart failure type: Congestive heart failure, unspecified HF chronicity, unspecified heart failure type  Parkinson disease: Parkinson disease  RAISSA (acute kidney injury): RAISSA (acute kidney injury)  Respiratory acidosis: Respiratory acidosis  Cardiac arrest: Cardiac arrest    DISCUSSION:    Problem: Cardiac arrest. Recommendation: unclear etiology ?hypotension sepsis, vs dehydration , rule out PE ,   possible anoxic encephalopathy ,   will continue to monitor , on hypothermia protocol.  improving renal function.      ·  Problem: Hypotension.  Recommendation: possible dehydration ? sepsis , continue pressors , antibiotics ,   cultures.     ·  Problem: Congestive heart failure, unspecified HF chronicity, unspecified heart failure type.  Recommendation: by hx , no clinical chf , normal echo.       ·  Problem: Elevated troponin.  Recommendation: Patient did have ischemic ekg changes in setting of hypotension after prolonged asystole , possible due to demand ischemia doubt ACS ?? Normal ventricular systolic function ,  would give ecotrin rectally.     ANOXIC ENCEPHALOPATHY-UNRESPONSIVE  FAMILY AGREES TO TERMINALLY WEAN  WILL SIGN OFF AT THIS TIME    Attending Attestation:   Plan discussed with sister at bedside , hospitalist , nurse.

## 2018-08-22 NOTE — PROGRESS NOTE ADULT - ASSESSMENT
52 y/o M with a h/o CHF, Parkinson's, with cardiac arrest, acute respiratory failure, anoxic brain injury, cardiogenic shock, RAISSA, shock liver, aspiration pneumonia.    At this point in time it is the wish of the patient's family to continue with comfort care measures as they plan to withdraw ventilator support when additional family members arrive. 52 y/o M with a h/o CHF, Parkinson's, with cardiac arrest, acute respiratory failure, anoxic brain injury, cardiogenic shock, RAISSA, shock liver, aspiration pneumonia.    At this point in time it is the wish of the patient's family to continue with comfort care measures as they plan to withdraw ventilator support when additional family members arrive. At the family's request there are no routine narcotics/anxiolytics, however, will use IV morphine and IV ativan as needed to suppress any discomfort, distress, or agitation. Will continue mechanical ventilation with full support for now, titrating to maintain SaO2 > 92%. Suctioning PRN. Atropine PRN for secretion management. HOB elevated > 30 degrees. Continue Sinemet and Stalevo routinely as well as tube feeds. Family does not wish to pursue trach/PEG, hemodialysis, antibiotic therapy, IVF, or any further imaging. Will continue to monitor hemodynamic status and will allow for natural death if decompensation occurs.

## 2018-08-23 PROCEDURE — 99232 SBSQ HOSP IP/OBS MODERATE 35: CPT

## 2018-08-23 RX ADMIN — CARBIDOPA AND LEVODOPA 1 TABLET(S): 25; 100 TABLET ORAL at 09:48

## 2018-08-23 RX ADMIN — CARBIDOPA, LEVODOPA, AND ENTACAPONE 1 TABLET(S): 50; 200; 200 TABLET, FILM COATED ORAL at 21:51

## 2018-08-23 RX ADMIN — CARBIDOPA AND LEVODOPA 1 TABLET(S): 25; 100 TABLET ORAL at 16:59

## 2018-08-23 RX ADMIN — CARBIDOPA, LEVODOPA, AND ENTACAPONE 1 TABLET(S): 50; 200; 200 TABLET, FILM COATED ORAL at 05:54

## 2018-08-23 RX ADMIN — CARBIDOPA AND LEVODOPA 1 TABLET(S): 25; 100 TABLET ORAL at 21:48

## 2018-08-23 RX ADMIN — CARBIDOPA, LEVODOPA, AND ENTACAPONE 1 TABLET(S): 50; 200; 200 TABLET, FILM COATED ORAL at 13:15

## 2018-08-23 RX ADMIN — CARBIDOPA AND LEVODOPA 1 TABLET(S): 25; 100 TABLET ORAL at 05:54

## 2018-08-23 RX ADMIN — CARBIDOPA AND LEVODOPA 1 TABLET(S): 25; 100 TABLET ORAL at 13:15

## 2018-08-23 NOTE — PROGRESS NOTE ADULT - SUBJECTIVE AND OBJECTIVE BOX
Date/Time Patient Seen:  		  Referring MD:   Data Reviewed	       Patient is a 53y old  Male who presents with a chief complaint of s/p cardiac arrest (19 Aug 2018 01:06)    vs and meds reviewed    Subjective/HPI     PAST MEDICAL & SURGICAL HISTORY:  Herniated cervical disc  Congestive heart failure, unspecified congestive heart failure chronicity, unspecified congestive heart failure type  Parkinson disease  Diastolic heart failure  Parkinson disease  CHF (congestive heart failure)  GERD (gastroesophageal reflux disease)  Parkinson's disease  Migraine  CHF (congestive heart failure)  Parkinson disease  No significant past surgical history  No significant past surgical history  No significant past surgical history        Medication list         MEDICATIONS  (STANDING):  carbidopa/levodopa  25/250 1 Tablet(s) Oral <User Schedule>  carbidopa/levodopa/entacapone 50/200/200 1 Tablet(s) Oral three times a day  dextrose 5%. 1000 milliLiter(s) (50 mL/Hr) IV Continuous <Continuous>  dextrose 50% Injectable 12.5 Gram(s) IV Push once  dextrose 50% Injectable 25 Gram(s) IV Push once  dextrose 50% Injectable 25 Gram(s) IV Push once    MEDICATIONS  (PRN):  artificial  tears Solution 1 Drop(s) Both EYES three times a day PRN Dry Eyes  atropine 1% Ophthalmic Solution for SubLingual Use 1 Drop(s) SubLingual four times a day PRN oral secretions  dextrose 40% Gel 15 Gram(s) Oral once PRN Blood Glucose LESS THAN 70 milliGRAM(s)/deciLiter  glucagon  Injectable 1 milliGRAM(s) IntraMuscular once PRN Glucose <70 milliGRAM(s)/deciLiter  LORazepam   Injectable 1 milliGRAM(s) IV Push every 1 hour PRN dyspnea, anxiety, distress  morphine  - Injectable 2 milliGRAM(s) IV Push every 1 hour PRN dyspnea, distress,         Vitals log        ICU Vital Signs Last 24 Hrs  T(C): 36.9 (23 Aug 2018 04:56), Max: 39.5 (22 Aug 2018 08:21)  T(F): 98.4 (23 Aug 2018 04:56), Max: 103.1 (22 Aug 2018 08:21)  HR: 85 (23 Aug 2018 07:54) (75 - 114)  BP: 98/55 (23 Aug 2018 05:55) (98/55 - 146/82)  BP(mean): 67 (23 Aug 2018 05:55) (67 - 102)  ABP: --  ABP(mean): --  RR: 20 (23 Aug 2018 05:55) (19 - 21)  SpO2: 100% (23 Aug 2018 07:54) (98% - 100%)       Mode: AC/ CMV (Assist Control/ Continuous Mandatory Ventilation)  RR (machine): 20  TV (machine): 500  FiO2: 40  PEEP: 5  ITime: 1  MAP: 10  PIP: 24      Input and Output:  I&O's Detail    22 Aug 2018 07:01  -  23 Aug 2018 07:00  --------------------------------------------------------  IN:    Enteral Tube Flush: 600 mL    ns in tub fed  [id number]: 960 mL  Total IN: 1560 mL    OUT:    Indwelling Catheter - Urethral: 110 mL  Total OUT: 110 mL    Total NET: 1450 mL          Lab Data                        9.8    13.04 )-----------( 160      ( 21 Aug 2018 18:42 )             29.7     08-21    144  |  110<H>  |  71<H>  ----------------------------<  146<H>  4.1   |  21<L>  |  6.00<H>    Ca    6.8<L>      21 Aug 2018 18:42              Review of Systems	      Objective     Physical Examination    heart s1s2  lung dec BS  abd soft      Pertinent Lab findings & Imaging      Jennifer:  NO   Adequate UO     I&O's Detail    22 Aug 2018 07:01  -  23 Aug 2018 07:00  --------------------------------------------------------  IN:    Enteral Tube Flush: 600 mL    ns in tub fed  [id number]: 960 mL  Total IN: 1560 mL    OUT:    Indwelling Catheter - Urethral: 110 mL  Total OUT: 110 mL    Total NET: 1450 mL               Discussed with:     Cultures:	        Radiology

## 2018-08-23 NOTE — PROGRESS NOTE ADULT - ASSESSMENT
Patient is 53 yo male with hx of diastolic CHF, gastroesophageal reflux disease, Herniated cervical disc, Migraine, Parkinson disease  presenting S/P Cardiac arrest of unknown Etiology, with  Acute renal failure with ATN, Shock liver, Anoxic brain injury, Rhabdomyolysis, Suspected PE, and Suspected aspiration pneumonia.        After discussing case with family (daughter and sister/HCP) in great details with multiple providers (including myself) including treatment options, prognosis, and risks& benefit of each option.  Family opted for   -No trach, dialysis, IVF, IV antibiotic, lab draw, trach, peg tube further blood test or further imaging  -Continue with current Tube feeding via NGT, and parkinson's medications  -Family is planning for terminal extubation with complete Comfort measure only.  in 2-3 days when other family member arrives    -Family is able to verbalize understanding of Current medical condition, and Options in their own words    -Family is requesting autopsy at [place] which family is going to set it up.  Discussed case with medical director who informed us to notify medical examiner first and notify them with family's wishes. Patient is 53 yo male with hx of diastolic CHF, gastroesophageal reflux disease, Herniated cervical disc, Migraine, Parkinson disease  presenting S/P Cardiac arrest of unknown Etiology, with  Acute renal failure with ATN, Shock liver, Anoxic brain injury, Rhabdomyolysis, Suspected PE, and Suspected aspiration pneumonia.        After discussing case with family (daughter and sister/HCP) in great details with multiple providers (including myself) including treatment options, prognosis, and risks& benefit of each option.  Family opted for   -No trach, dialysis, IVF, IV antibiotic, lab draw, peg tube feeding, further blood test or further imaging  -Continue with current Tube feeding via NGT, and parkinson's medications  -Family is planning for terminal extubation with complete Comfort measure only  in 2-3 days when other family member arrives    -Family is able to verbalize understanding of Current medical condition, and Options in their own words    -Family is requesting autopsy at [place] which is family is going to set it up.  Discussed case with medical director who suggested to notify medical examiner first and notify them with family's wishes.

## 2018-08-23 NOTE — PROGRESS NOTE ADULT - SUBJECTIVE AND OBJECTIVE BOX
CC.  S/P cardiac arrest  HPI.  Patient is intubated.  No sedation.  non-responsive to verbal or painful stimuli.  Unable to obtain ROS or History due to current mentation          Vital Signs Last 24 Hrs  T(C): 36.2 (23 Aug 2018 08:36), Max: 37.4 (22 Aug 2018 15:32)  T(F): 97.1 (23 Aug 2018 08:36), Max: 99.3 (22 Aug 2018 15:32)  HR: 74 (23 Aug 2018 13:43) (74 - 108)  BP: 103/52 (23 Aug 2018 12:00) (98/55 - 146/82)  BP(mean): 67 (23 Aug 2018 12:00) (67 - 99)  RR: 20 (23 Aug 2018 12:00) (19 - 21)  SpO2: 100% (23 Aug 2018 13:43) (98% - 100%)    PHYSICAL EXAM-  GENERAL: Chronic ill appearing male  HEAD:  Atraumatic, Normocephalic  EYES:  conjunctiva and sclera are clear  NECK: Supple, No JVD, Normal thyroid  NERVOUS SYSTEM:  unresponsive to painful or verbal stimuli  CHEST/LUNG: Min rhonchi with good air entry.  No retractions or accessory muscle usage  HEART: Regular rate and rhythm; No murmurs, rubs, or gallops  ABDOMEN: Soft, Nontender, Nondistended; Bowel sounds present  EXTREMITIES: No edema  SKIN:  no rash noticed                   Imaging Personally Reviewed:     [x ] YES  [ ] No medical contraindication for discharge    Consultant(s) Notes Reviewed:  [x ] YES  [ ] NO    Care Discussed with Consultants/Other Providers [x ] YES  [ ] NO CC.  S/P cardiac arrest  HPI.  Patient is intubated.  No sedation.  non-responsive to verbal or painful stimuli.  Unable to obtain ROS or History due to current mentation          Vital Signs Last 24 Hrs  T(C): 36.2 (23 Aug 2018 08:36), Max: 37.4 (22 Aug 2018 15:32)  T(F): 97.1 (23 Aug 2018 08:36), Max: 99.3 (22 Aug 2018 15:32)  HR: 74 (23 Aug 2018 13:43) (74 - 108)  BP: 103/52 (23 Aug 2018 12:00) (98/55 - 146/82)  BP(mean): 67 (23 Aug 2018 12:00) (67 - 99)  RR: 20 (23 Aug 2018 12:00) (19 - 21)  SpO2: 100% (23 Aug 2018 13:43) (98% - 100%)    PHYSICAL EXAM-  GENERAL: Chronic ill appearing male  HEAD:  Atraumatic, Normocephalic  EYES:  conjunctiva and sclera are clear  NECK: Supple, No JVD, Normal thyroid  NERVOUS SYSTEM:  unresponsive to painful or verbal stimuli  CHEST/LUNG: Min rhonchi with good air entry.  No retractions or accessory muscle usage  HEART: Regular rate and rhythm; No murmurs, rubs, or gallops  ABDOMEN: Soft, Nontender, Nondistended; Bowel sounds present  EXTREMITIES: No edema  SKIN:  no rash noticed    MEDICATIONS  (STANDING):  carbidopa/levodopa  25/250 1 Tablet(s) Oral <User Schedule>  carbidopa/levodopa/entacapone 50/200/200 1 Tablet(s) Oral three times a day  dextrose 5%. 1000 milliLiter(s) (50 mL/Hr) IV Continuous <Continuous>  dextrose 50% Injectable 12.5 Gram(s) IV Push once  dextrose 50% Injectable 25 Gram(s) IV Push once  dextrose 50% Injectable 25 Gram(s) IV Push once    MEDICATIONS  (PRN):  artificial  tears Solution 1 Drop(s) Both EYES three times a day PRN Dry Eyes  atropine 1% Ophthalmic Solution for SubLingual Use 1 Drop(s) SubLingual four times a day PRN oral secretions  dextrose 40% Gel 15 Gram(s) Oral once PRN Blood Glucose LESS THAN 70 milliGRAM(s)/deciLiter  glucagon  Injectable 1 milliGRAM(s) IntraMuscular once PRN Glucose <70 milliGRAM(s)/deciLiter  LORazepam   Injectable 1 milliGRAM(s) IV Push every 1 hour PRN dyspnea, anxiety, distress  morphine  - Injectable 2 milliGRAM(s) IV Push every 1 hour PRN dyspnea, distress,                     Imaging Personally Reviewed:     [x ] YES  [ ] No medical contraindication for discharge    Consultant(s) Notes Reviewed:  [x ] YES  [ ] NO    Care Discussed with Consultants/Other Providers [x ] YES  [ ] NO

## 2018-08-23 NOTE — PROGRESS NOTE ADULT - SUBJECTIVE AND OBJECTIVE BOX
Neurology follow up note    BARTOLO [address]      Interval History:      Patient intubated no sedation seen with family     MEDICATIONS    artificial  tears Solution 1 Drop(s) Both EYES three times a day PRN  atropine 1% Ophthalmic Solution for SubLingual Use 1 Drop(s) SubLingual four times a day PRN  carbidopa/levodopa  25/250 1 Tablet(s) Oral <User Schedule>  carbidopa/levodopa/entacapone 50/200/200 1 Tablet(s) Oral three times a day  dextrose 40% Gel 15 Gram(s) Oral once PRN  dextrose 5%. 1000 milliLiter(s) IV Continuous <Continuous>  dextrose 50% Injectable 12.5 Gram(s) IV Push once  dextrose 50% Injectable 25 Gram(s) IV Push once  dextrose 50% Injectable 25 Gram(s) IV Push once  glucagon  Injectable 1 milliGRAM(s) IntraMuscular once PRN  LORazepam   Injectable 1 milliGRAM(s) IV Push every 1 hour PRN  morphine  - Injectable 2 milliGRAM(s) IV Push every 1 hour PRN      Allergies    aspirin (Unknown)  Cheese (Unknown)  ibuprofen (Unknown)  Reglan (Swelling)    Intolerances    ibuprofen (Unknown)          Vital Signs Last 24 Hrs  T(C): 36.2 (23 Aug 2018 08:36), Max: 37.4 (22 Aug 2018 15:32)  T(F): 97.1 (23 Aug 2018 08:36), Max: 99.3 (22 Aug 2018 15:32)  HR: 74 (23 Aug 2018 13:43) (74 - 108)  BP: 103/52 (23 Aug 2018 12:00) (98/55 - 146/82)  BP(mean): 67 (23 Aug 2018 12:00) (67 - 102)  RR: 20 (23 Aug 2018 12:00) (19 - 21)  SpO2: 100% (23 Aug 2018 13:43) (98% - 100%)      REVIEW OF SYSTEMS: intubated    On Neurological Examination:    Mental Status - Patient is a Unresponsive  .     Does not follow commands    Speech - intubated                   Cranial Nerves - Pupils 4 mm equal not reactive to light,  no blink bilateral to VT    Motor Exam -     With painful stimuli No movement of all 4 extremities    plantar bilateral mute    Muscle tone - is normal all over.  No asymmetry is seen.      GENERAL Exam: Nontoxic , No Acute Distress   	  HEENT:  normocephalic, atraumatic  		  LUNGS: intubated   	  HEART: Normal S1S2   No murmur RRR        	  GI/ ABDOMEN:  Soft  Non tender    EXTREMITIES:  hand Edema   	   SKIN: Normal  No Ecchymosis                LABS:  CBC Full  -  ( 21 Aug 2018 18:42 )  WBC Count : 13.04 K/uL  Hemoglobin : 9.8 g/dL  Hematocrit : 29.7 %  Platelet Count - Automated : 160 K/uL  Mean Cell Volume : 94.6 fl  Mean Cell Hemoglobin : 31.2 pg  Mean Cell Hemoglobin Concentration : 33.0 gm/dL  Auto Neutrophil # : x  Auto Lymphocyte # : x  Auto Monocyte # : x  Auto Eosinophil # : x  Auto Basophil # : x  Auto Neutrophil % : x  Auto Lymphocyte % : x  Auto Monocyte % : x  Auto Eosinophil % : x  Auto Basophil % : x      08-21    144  |  110<H>  |  71<H>  ----------------------------<  146<H>  4.1   |  21<L>  |  6.00<H>    Ca    6.8<L>      21 Aug 2018 18:42      Hemoglobin A1C:       Vitamin B12   PTT - ( 21 Aug 2018 14:59 )  PTT:58.9 sec      RADIOLOGY        ASSESSMENT AND PLAN    1- S/P CARDIAC ARREST - CT noted Diffuse cerebral edema --   spoke to staff now appears to be matching vent  keep SBP above 100  monitor electrolytes   monitor renal function    2- H/O Parkinson continue home medications     spoke to family at bedside 8/23/18 appears to be waiting for other family members before terminal wean     jenna    Jayshree      Patient does not appear to have any higher cortical function     Advanced care planning was discussed with family.    Greater than 40 minutes spent in direct patient care reviewing  the notes, lab data/ imaging , discussion with multidisciplinary team.

## 2018-08-24 VITALS — OXYGEN SATURATION: 100 % | HEART RATE: 78 BPM | RESPIRATION RATE: 20 BRPM

## 2018-08-24 LAB
CULTURE RESULTS: SIGNIFICANT CHANGE UP
CULTURE RESULTS: SIGNIFICANT CHANGE UP
SPECIMEN SOURCE: SIGNIFICANT CHANGE UP
SPECIMEN SOURCE: SIGNIFICANT CHANGE UP

## 2018-08-24 PROCEDURE — 99232 SBSQ HOSP IP/OBS MODERATE 35: CPT

## 2018-08-24 RX ORDER — MORPHINE SULFATE 50 MG/1
1 CAPSULE, EXTENDED RELEASE ORAL
Qty: 100 | Refills: 0 | Status: DISCONTINUED | OUTPATIENT
Start: 2018-08-24 | End: 2018-08-24

## 2018-08-24 RX ORDER — HYDROMORPHONE HYDROCHLORIDE 2 MG/ML
2 INJECTION INTRAMUSCULAR; INTRAVENOUS; SUBCUTANEOUS
Qty: 0 | Refills: 0 | Status: DISCONTINUED | OUTPATIENT
Start: 2018-08-24 | End: 2018-08-24

## 2018-08-24 RX ADMIN — CARBIDOPA AND LEVODOPA 1 TABLET(S): 25; 100 TABLET ORAL at 05:47

## 2018-08-24 RX ADMIN — MORPHINE SULFATE 2 MILLIGRAM(S): 50 CAPSULE, EXTENDED RELEASE ORAL at 14:13

## 2018-08-24 RX ADMIN — CARBIDOPA AND LEVODOPA 1 TABLET(S): 25; 100 TABLET ORAL at 10:24

## 2018-08-24 RX ADMIN — CARBIDOPA, LEVODOPA, AND ENTACAPONE 1 TABLET(S): 50; 200; 200 TABLET, FILM COATED ORAL at 05:47

## 2018-08-24 NOTE — CHART NOTE - NSCHARTNOTEFT_GEN_A_CORE
Pronounce note  I was called to pronounce patient.  Upon exam Patient did not respond to verbal or physical stimuli. Absent heart and breath sounds after prolong ausculation for 5 minutes.  Absent carotid, femorals, and  peripheral pulses. Pupils are fixed and dilated. Patient pronounced dead on 8/24/2018 at 2.52pm.  Family (Sister/daughter) at bedside. Pronounce note  I was called to pronounce patient.  Upon exam Patient did not respond to verbal or physical stimuli. Absent heart and breath sounds after prolong ausculation for 5 minutes.  Absent carotid, femorals, and  peripheral pulses. Pupils are fixed and dilated. Patient pronounced dead on 8/24/2018 at 2.52pm.  Family (Sister/daughter) at bedside.    discussed case with medical examiner Endy PAIGE (dr. Arvizu) who denied the case.  Family requesting autopsy.  Paper workup was signed by sister and body to be transferred to [facility] for Autopsy.

## 2018-08-24 NOTE — PROGRESS NOTE ADULT - PROBLEM SELECTOR PLAN 1
card arrest  limited measures  vent support for now  family is planning for terminal extubation and vent liberation with goals of Comfort care in mind  discussed with sister at the bedside this am  cont supportive care and regimen for now  monitor for pain and or distress  will follow  prognosis POOR  pt is DNR   sister would like to pursue Autopsy
card arrest  resp failure  prognosis poor  anoxic brain injury on clinical exam and CT imaging  discussed trach and peg with family, they are ok with proceeding to trach and peg  supportive ICU care  emp ABX  serial labs, serial PE, monitor I and O, oral hygiene, skin hygiene, TF nutrition  prognosis very poor  pt is full code, s/p Hypothermia protocol  will follow
card arrest  shock  vent support  oral and skin care  pt is DNR now  anoxic brain injury - family aware  planning for terminal extubation when family gathers  discussed with daughter and sister  prognosis poor  supportive measures in SPCU
family decided on minimal care geared towards comfort and sx management  will add Morphine and Ativan PRN for pain, distress, anxiety, agitation  will add Atropine SL for oral secretions PRN  will add Artificial Tears PRN for dry eyes  will need to discuss with family extubation and terminal wean  prognosis POOR  pt is DNR DNI  will follow
s/p card arrest, likely anoxic brain injury, neuro eval noted  rewarming in process, coming off hypothermia protocol  off vasopressors  I and O  IVF  serial labs  serial PE  prognosis very poor  CT head when off Hypothermia   on emp Heparin gtt for poss VTE, LE dopplers neg  emp ABX  ID eval  oral and skin hygiene  supportive ICU care  monitor vs and HD and Sat  keep MAP > 60  keep sat > 88 pct  vent support  chest pt  suction PRN
unclear etiology   Possibly secondary to septic source for hypotension or intravascular depleted state   Current mental status presents the possibility of  anoxic encephalopathy   Will continue to monitor neuro checks  Should continue hypothermia protocol.

## 2018-08-24 NOTE — PROGRESS NOTE ADULT - PROVIDER SPECIALTY LIST ADULT
Cardiology
Critical Care
Gastroenterology
Heme/Onc
Hospitalist
Infectious Disease
Nephrology
Neurology
Pulmonology
Critical Care
Infectious Disease
MICU
Nephrology
Hospitalist

## 2018-08-24 NOTE — DISCHARGE NOTE FOR THE EXPIRED PATIENT - HOSPITAL COURSE
Patient is 51 yo male with hx of diastolic CHF, gastroesophageal reflux disease, Herniated cervical disc, Migraine, Parkinson disease  presenting S/P Cardiac arrest of unknown Etiology, with  Acute renal failure with ATN, Shock liver, Anoxic brain injury, Rhabdomyolysis, Suspected PE, and Suspected aspiration pneumonia.        After discussing case with family (daughter and sister/HCP) in great details with multiple providers (including myself) including treatment options, prognosis, and risks& benefit of each option.  Family opted for CMO, and terminal extubation.  family wishes was honored.  Patient passed away peacefully.  Medical examiner refused the case.  patient is setup to be transferred to [facility] for autopsy

## 2018-08-24 NOTE — PROGRESS NOTE ADULT - SUBJECTIVE AND OBJECTIVE BOX
Date/Time Patient Seen:  		  Referring MD:   Data Reviewed	       Patient is a 53y old  Male who presents with a chief complaint of s/p cardiac arrest (19 Aug 2018 01:06)  vs and meds reviewed      Subjective/HPI     PAST MEDICAL & SURGICAL HISTORY:  Herniated cervical disc  Congestive heart failure, unspecified congestive heart failure chronicity, unspecified congestive heart failure type  Parkinson disease  Diastolic heart failure  Parkinson disease  CHF (congestive heart failure)  GERD (gastroesophageal reflux disease)  Parkinson's disease  Migraine  CHF (congestive heart failure)  Parkinson disease  No significant past surgical history  No significant past surgical history  No significant past surgical history        Medication list         MEDICATIONS  (STANDING):  carbidopa/levodopa  25/250 1 Tablet(s) Oral <User Schedule>  carbidopa/levodopa/entacapone 50/200/200 1 Tablet(s) Oral three times a day  dextrose 5%. 1000 milliLiter(s) (50 mL/Hr) IV Continuous <Continuous>  dextrose 50% Injectable 12.5 Gram(s) IV Push once  dextrose 50% Injectable 25 Gram(s) IV Push once  dextrose 50% Injectable 25 Gram(s) IV Push once    MEDICATIONS  (PRN):  artificial  tears Solution 1 Drop(s) Both EYES three times a day PRN Dry Eyes  atropine 1% Ophthalmic Solution for SubLingual Use 1 Drop(s) SubLingual four times a day PRN oral secretions  dextrose 40% Gel 15 Gram(s) Oral once PRN Blood Glucose LESS THAN 70 milliGRAM(s)/deciLiter  glucagon  Injectable 1 milliGRAM(s) IntraMuscular once PRN Glucose <70 milliGRAM(s)/deciLiter  LORazepam   Injectable 1 milliGRAM(s) IV Push every 1 hour PRN dyspnea, anxiety, distress  morphine  - Injectable 2 milliGRAM(s) IV Push every 1 hour PRN dyspnea, distress,         Vitals log        ICU Vital Signs Last 24 Hrs  T(C): 36.6 (24 Aug 2018 07:00), Max: 36.7 (23 Aug 2018 16:06)  T(F): 97.8 (24 Aug 2018 07:00), Max: 98.1 (23 Aug 2018 16:06)  HR: 72 (24 Aug 2018 08:00) (68 - 76)  BP: 95/49 (24 Aug 2018 08:00) (93/49 - 106/57)  BP(mean): 63 (24 Aug 2018 08:00) (62 - 72)  ABP: --  ABP(mean): --  RR: 20 (24 Aug 2018 08:00) (18 - 20)  SpO2: 100% (24 Aug 2018 08:00) (98% - 100%)       Mode: AC/ CMV (Assist Control/ Continuous Mandatory Ventilation)  RR (machine): 20  TV (machine): 500  FiO2: 40  PEEP: 5  ITime: 1  MAP: 12  PIP: 25      Input and Output:  I&O's Detail    23 Aug 2018 07:01  -  24 Aug 2018 07:00  --------------------------------------------------------  IN:    Enteral Tube Flush: 350 mL    ns in tub fed  [id number]: 560 mL  Total IN: 910 mL    OUT:    Indwelling Catheter - Urethral: 50 mL  Total OUT: 50 mL    Total NET: 860 mL          Lab Data                  Review of Systems	      Objective     Physical Examination      heart s1s2  lung dec BS  abd soft    Pertinent Lab findings & Imaging      Jennifer:  NO   Adequate UO     I&O's Detail    23 Aug 2018 07:01  -  24 Aug 2018 07:00  --------------------------------------------------------  IN:    Enteral Tube Flush: 350 mL    ns in tub fed  [id number]: 560 mL  Total IN: 910 mL    OUT:    Indwelling Catheter - Urethral: 50 mL  Total OUT: 50 mL    Total NET: 860 mL               Discussed with:     Cultures:	        Radiology

## 2018-08-24 NOTE — DISCHARGE NOTE FOR THE EXPIRED PATIENT - SECONDARY DIAGNOSIS.
Acute respiratory failure with hypoxia and hypercapnia Aspiration pneumonitis Anoxic brain injury Elevated LFTs Elevated troponin CHF (congestive heart failure)

## 2018-08-24 NOTE — PROGRESS NOTE ADULT - SUBJECTIVE AND OBJECTIVE BOX
Neurology follow up note    BARTOLO [address]      Interval History:    Patient intubated no sedation seen with family     MEDICATIONS    artificial  tears Solution 1 Drop(s) Both EYES three times a day PRN  atropine 1% Ophthalmic Solution for SubLingual Use 1 Drop(s) SubLingual four times a day PRN  carbidopa/levodopa  25/250 1 Tablet(s) Oral <User Schedule>  carbidopa/levodopa/entacapone 50/200/200 1 Tablet(s) Oral three times a day  dextrose 40% Gel 15 Gram(s) Oral once PRN  dextrose 5%. 1000 milliLiter(s) IV Continuous <Continuous>  dextrose 50% Injectable 12.5 Gram(s) IV Push once  dextrose 50% Injectable 25 Gram(s) IV Push once  dextrose 50% Injectable 25 Gram(s) IV Push once  glucagon  Injectable 1 milliGRAM(s) IntraMuscular once PRN  LORazepam   Injectable 1 milliGRAM(s) IV Push every 1 hour PRN  morphine  - Injectable 2 milliGRAM(s) IV Push every 1 hour PRN      Allergies    aspirin (Unknown)  Cheese (Unknown)  ibuprofen (Unknown)  Reglan (Swelling)    Intolerances    ibuprofen (Unknown)          Vital Signs Last 24 Hrs  T(C): 36.6 (24 Aug 2018 07:00), Max: 36.7 (23 Aug 2018 16:06)  T(F): 97.8 (24 Aug 2018 07:00), Max: 98.1 (23 Aug 2018 16:06)  HR: 75 (24 Aug 2018 11:17) (68 - 75)  BP: 95/49 (24 Aug 2018 08:00) (93/49 - 106/57)  BP(mean): 63 (24 Aug 2018 08:00) (62 - 72)  RR: 20 (24 Aug 2018 08:00) (18 - 20)  SpO2: 99% (24 Aug 2018 11:17) (98% - 100%)    REVIEW OF SYSTEMS: intubated    On Neurological Examination:    Mental Status - Patient is a Unresponsive  .     Does not follow commands    Speech - intubated                   Cranial Nerves - Pupils 4 mm equal not reactive to light no change spoke to staff    Motor Exam -     not tested    Muscle tone - is normal all over.  No asymmetry is seen.      GENERAL Exam: Nontoxic , No Acute Distress   	  HEENT:  normocephalic, atraumatic  		  LUNGS: intubated   	  HEART: Normal S1S2   No murmur RRR        	  GI/ ABDOMEN:  Soft  Non tender    EXTREMITIES:  hand Edema   	   SKIN: Normal  No Ecchymosis               LABS:            Hemoglobin A1C:       Vitamin B12         RADIOLOGY    ASSESSMENT AND PLAN    1- S/P CARDIAC ARREST - CT noted Diffuse cerebral edema --   spoke to staff now appears to be matching vent    2- H/O Parkinson continue home medications as per family     spoke to family at bedside 8/24/18 appears to be waiting for other family members before terminal wean     jenna    Jayshree      Patient does not appear to have any higher cortical function     Advanced care planning was discussed with family they was wean today will sign off     Greater than 20 minutes spent in direct patient care reviewing  the notes, lab data/ imaging , discussion with multidisciplinary team.

## 2018-08-24 NOTE — CHART NOTE - NSCHARTNOTESELECT_GEN_ALL_CORE
Event Note/CODE BLUE
Event Note
Event Note/Critical Care Medicine Addendum
Nut[employer] Services

## 2018-08-24 NOTE — PROGRESS NOTE ADULT - ASSESSMENT
Patient is 51 yo male with hx of diastolic CHF, gastroesophageal reflux disease, Herniated cervical disc, Migraine, Parkinson disease  presenting S/P Cardiac arrest of unknown Etiology, with  Acute renal failure with ATN, Shock liver, Anoxic brain injury, Rhabdomyolysis, Suspected PE, and Suspected aspiration pneumonia.        After discussing case with family (daughter and sister/HCP) in great details with multiple providers (including myself) including treatment options, prognosis, and risks& benefit of each option.  Family opted for   -No trach, dialysis, IVF, IV antibiotic, lab draw, peg tube feeding, further blood test or further imaging  -Continue with current Tube feeding via NGT, and parkinson's medications  -Family is planning for terminal extubation with complete Comfort measure only  in 2-3 days when other family member arrives    -Family is able to verbalize understanding of Current medical condition, and Options in their own words.  Nursing supervisor (Paz Palmer) presenting during conversation Patient is 51 yo male with hx of diastolic CHF, gastroesophageal reflux disease, Herniated cervical disc, Migraine, Parkinson disease  presenting S/P Cardiac arrest of unknown Etiology, with  Acute renal failure with ATN, Shock liver, Anoxic brain injury, Rhabdomyolysis, Suspected PE, and Suspected aspiration pneumonia.        After discussing case with family (daughter and sister/HCP) in great details with multiple providers (including myself) including treatment options, prognosis, and risks& benefit of each option.  Family opted for   -No trach, dialysis, IVF, IV antibiotic, lab draw, peg tube feeding, further blood test or further imaging  -Continue with current Tube feeding via NGT, and parkinson's medications  -Family is planning for terminal extubation with complete Comfort measure when family arrives    -Family is able to verbalize understanding of Current medical condition, and Options in their own words.  Nursing supervisor (Paz Palmer) presenting during conversation

## 2018-08-24 NOTE — CHART NOTE - NSCHARTNOTEFT_GEN_A_CORE
I was called to talk to the family    Sister reports that all family came to see him already, and requesting terminal extubation, and complete CMO understanding what it involves in details.  All questions were answered.    -Case was discussed with sister in the morning in great details.  All questions were answered.  Seems to understand, and wants CMO.    -CMO, and terminal wean were confirmed again with family during this meeting.   -Nursing supervisor present during encounter.

## 2018-08-24 NOTE — PROGRESS NOTE ADULT - NSHPATTENDINGPLANDISCUSS_GEN_ALL_CORE
Dr. Genaro Grossman
PT/RN/Nephrology/CC/GI/Neurology/hematology/ID
PT/RN/Nephrology/CC/GI/Neurology/hematology
PT/RN/Nephrology/CC/GI/Neurology/hematology/ID

## 2018-08-24 NOTE — PROGRESS NOTE ADULT - SUBJECTIVE AND OBJECTIVE BOX
CC.  S/P cardiac arrest  HPI.  Patient is intubated.  No sedation.  non-responsive to verbal or painful stimuli.  Unable to obtain ROS or History due to current mentation        Vital Signs Last 24 Hrs  T(C): 36.6 (24 Aug 2018 04:08), Max: 36.7 (23 Aug 2018 16:06)  T(F): 97.8 (24 Aug 2018 04:08), Max: 98.1 (23 Aug 2018 16:06)  HR: 72 (24 Aug 2018 08:00) (68 - 76)  BP: 95/49 (24 Aug 2018 08:00) (93/49 - 106/57)  BP(mean): 63 (24 Aug 2018 08:00) (62 - 72)  RR: 20 (24 Aug 2018 08:00) (18 - 20)  SpO2: 100% (24 Aug 2018 08:00) (98% - 100%)    PHYSICAL EXAM-  GENERAL: Chronic ill appearing male  HEAD:  Atraumatic, Normocephalic  EYES:  conjunctiva and sclera are clear  NECK: Supple, No JVD, Normal thyroid  NERVOUS SYSTEM:  unresponsive to painful or verbal stimuli  CHEST/LUNG: Min rhonchi with good air entry.  No retractions or accessory muscle usage  HEART: Regular rate and rhythm; No murmurs, rubs, or gallops  ABDOMEN: Soft, Nontender, Nondistended; Bowel sounds present  EXTREMITIES: No edema  SKIN:  no rash noticed    MEDICATIONS  (STANDING):  carbidopa/levodopa  25/250 1 Tablet(s) Oral <User Schedule>  carbidopa/levodopa/entacapone 50/200/200 1 Tablet(s) Oral three times a day  dextrose 5%. 1000 milliLiter(s) (50 mL/Hr) IV Continuous <Continuous>  dextrose 50% Injectable 12.5 Gram(s) IV Push once  dextrose 50% Injectable 25 Gram(s) IV Push once  dextrose 50% Injectable 25 Gram(s) IV Push once    MEDICATIONS  (PRN):  artificial  tears Solution 1 Drop(s) Both EYES three times a day PRN Dry Eyes  atropine 1% Ophthalmic Solution for SubLingual Use 1 Drop(s) SubLingual four times a day PRN oral secretions  dextrose 40% Gel 15 Gram(s) Oral once PRN Blood Glucose LESS THAN 70 milliGRAM(s)/deciLiter  glucagon  Injectable 1 milliGRAM(s) IntraMuscular once PRN Glucose <70 milliGRAM(s)/deciLiter  LORazepam   Injectable 1 milliGRAM(s) IV Push every 1 hour PRN dyspnea, anxiety, distress  morphine  - Injectable 2 milliGRAM(s) IV Push every 1 hour PRN dyspnea, distress,                     Imaging Personally Reviewed:     [x ] YES  [ ] No medical contraindication for discharge    Consultant(s) Notes Reviewed:  [x ] YES  [ ] NO    Care Discussed with Consultants/Other Providers [x ] YES  [ ] NO

## 2018-08-24 NOTE — PROGRESS NOTE ADULT - PROBLEM SELECTOR PROBLEM 1
Cardiac arrest
Acute respiratory failure with hypoxia and hypercapnia
Cardiac arrest
Cardiac arrest

## 2018-08-29 ENCOUNTER — RESULT REVIEW (OUTPATIENT)
Age: 53
End: 2018-08-29

## 2018-10-24 PROCEDURE — 80053 COMPREHEN METABOLIC PANEL: CPT

## 2018-10-24 PROCEDURE — 71045 X-RAY EXAM CHEST 1 VIEW: CPT

## 2018-10-24 PROCEDURE — 86140 C-REACTIVE PROTEIN: CPT

## 2018-10-24 PROCEDURE — 93306 TTE W/DOPPLER COMPLETE: CPT

## 2018-10-24 PROCEDURE — 87389 HIV-1 AG W/HIV-1&-2 AB AG IA: CPT

## 2018-10-24 PROCEDURE — 94002 VENT MGMT INPAT INIT DAY: CPT

## 2018-10-24 PROCEDURE — 84443 ASSAY THYROID STIM HORMONE: CPT

## 2018-10-24 PROCEDURE — 36415 COLL VENOUS BLD VENIPUNCTURE: CPT

## 2018-10-24 PROCEDURE — 82803 BLOOD GASES ANY COMBINATION: CPT

## 2018-10-24 PROCEDURE — C1751: CPT

## 2018-10-24 PROCEDURE — 80048 BASIC METABOLIC PNL TOTAL CA: CPT

## 2018-10-24 PROCEDURE — 85610 PROTHROMBIN TIME: CPT

## 2018-10-24 PROCEDURE — 70450 CT HEAD/BRAIN W/O DYE: CPT

## 2018-10-24 PROCEDURE — 36600 WITHDRAWAL OF ARTERIAL BLOOD: CPT

## 2018-10-24 PROCEDURE — 82248 BILIRUBIN DIRECT: CPT

## 2018-10-24 PROCEDURE — 87070 CULTURE OTHR SPECIMN AEROBIC: CPT

## 2018-10-24 PROCEDURE — 93970 EXTREMITY STUDY: CPT

## 2018-10-24 PROCEDURE — 92950 HEART/LUNG RESUSCITATION CPR: CPT

## 2018-10-24 PROCEDURE — 83880 ASSAY OF NATRIURETIC PEPTIDE: CPT

## 2018-10-24 PROCEDURE — 88341 IMHCHEM/IMCYTCHM EA ADD ANTB: CPT

## 2018-10-24 PROCEDURE — 93005 ELECTROCARDIOGRAM TRACING: CPT

## 2018-10-24 PROCEDURE — 84436 ASSAY OF TOTAL THYROXINE: CPT

## 2018-10-24 PROCEDURE — 85730 THROMBOPLASTIN TIME PARTIAL: CPT

## 2018-10-24 PROCEDURE — 84100 ASSAY OF PHOSPHORUS: CPT

## 2018-10-24 PROCEDURE — 87040 BLOOD CULTURE FOR BACTERIA: CPT

## 2018-10-24 PROCEDURE — 87086 URINE CULTURE/COLONY COUNT: CPT

## 2018-10-24 PROCEDURE — 82962 GLUCOSE BLOOD TEST: CPT

## 2018-10-24 PROCEDURE — 82550 ASSAY OF CK (CPK): CPT

## 2018-10-24 PROCEDURE — 87340 HEPATITIS B SURFACE AG IA: CPT

## 2018-10-24 PROCEDURE — 83605 ASSAY OF LACTIC ACID: CPT

## 2018-10-24 PROCEDURE — 84132 ASSAY OF SERUM POTASSIUM: CPT

## 2018-10-24 PROCEDURE — 94640 AIRWAY INHALATION TREATMENT: CPT

## 2018-10-24 PROCEDURE — 82330 ASSAY OF CALCIUM: CPT

## 2018-10-24 PROCEDURE — 86706 HEP B SURFACE ANTIBODY: CPT

## 2018-10-24 PROCEDURE — 86803 HEPATITIS C AB TEST: CPT

## 2018-10-24 PROCEDURE — 84145 PROCALCITONIN (PCT): CPT

## 2018-10-24 PROCEDURE — 76700 US EXAM ABDOM COMPLETE: CPT

## 2018-10-24 PROCEDURE — 36569 INSJ PICC 5 YR+ W/O IMAGING: CPT

## 2018-10-24 PROCEDURE — 85652 RBC SED RATE AUTOMATED: CPT

## 2018-10-24 PROCEDURE — 84484 ASSAY OF TROPONIN QUANT: CPT

## 2018-10-24 PROCEDURE — 85027 COMPLETE CBC AUTOMATED: CPT

## 2018-10-24 PROCEDURE — 84480 ASSAY TRIIODOTHYRONINE (T3): CPT

## 2018-10-24 PROCEDURE — 83735 ASSAY OF MAGNESIUM: CPT

## 2018-10-24 PROCEDURE — 81001 URINALYSIS AUTO W/SCOPE: CPT

## 2018-10-24 PROCEDURE — 94003 VENT MGMT INPAT SUBQ DAY: CPT

## 2018-10-24 PROCEDURE — 94799 UNLISTED PULMONARY SVC/PX: CPT

## 2018-12-21 NOTE — ED PROVIDER NOTE - CPE EDP GASTRO NORM
Stephanie Julian was scheduled on 12/31/18 at 1:30 for a 30 minute appointment, for evaluation of the spreading rash.  She agrees to keep the previously scheduled appointment (1/9 at 11:15) for total skin exam.  Stephanie wants me to pass along her thanks for the opportunity to schedule.   normal...

## 2019-02-19 NOTE — PROCEDURE NOTE - PRACTITIONER PERFORMING THE TIME OUT
Adult Mental Health Partial Hospitalization Group Therapy Progress Note       Client Initial Individualized Goals for Treatment: Learn coping skills for symptom management for anxiety and depression stabilization.     Initial Treatment suggestions for the client during the time between Diagnostic Assessment and completion of the Individualized Treatment Plan:  Follow Safety Plan   Ask for more information, support and/or assistance as needed.  [facility]  Report increases or changes in symptoms to staff.  Report any personal safety concerns to staff.   Take medications as prescribed.  Report medication changes and/or side effects to staff.  Attend and participate in groups as scheduled or notify staff if unable to do so.  Report any use of substances to staff as this may impact your symptoms and/or personal safety.  Notify staff if you have any other issues that need to be addressed. This may include any current abuse / neglect / exploitation or other vulnerability.  Follow recommendations of your treatment team and discuss concerns if not in agreement.    Area of Treatment Focus:  Symptom Management and Develop / Improve Independent Living Skills    Therapeutic Interventions/Treatment Strategies:  Support, Feedback, Structured Activity, Problem Solving, Clarification and Education    Response to Treatment Strategies:  Accepted Feedback, Listened, Focused on Goals, Attentive, Accepted Support and Alert    Name of Group:  OT life skills  Date: 2/14/19  Time: 1:00-1:50  Group Participants: 4    Description and Outcome: Alexi attended and participated in a structured life skills psychoeducation group where intervention focuses on experiential learning, developing through practice, and generalizing taught skills. Through the use of supported social interactions, structured therapeutic and functional tasks in context, group members work 
towards stabilizing and managing mental health symptoms for improved participation and function in valued roles, routines, relationships, and independent living.       Skilled Intervention:Topic today is on self-regulation skills: sensorimotor mindfulness experiential. Group members were provided psychoeducation and review of the definitions of mindfulness practice and introduced to the concept of integrating internal and external sensory input for symptom management and improved distress tolerance. In addition, psychoeducation on the positive benefits that adding intentional mindful movement has on the nervous system for self-regulation. Skilled intervention included the facilitation of two different integrated sensorimotor mindfulness practices (fine motor and gross motor) to gain self-awareness around preferences and what they found to be effective strategies to help them self-regulate. Group members were led through a reflection and sharing portion to complete the experiential practice.    Presentation and Assessment: Alexi presents with contiinued constricted affect and endorses feeling anxious at onset of session. She reports the experiential practices helped her calm down and verbalized understanding how mindful sensorimotor engagement can help quiet racing thoughts. She would benefit from additional opportunities to practice the content to be able to generalize it to her everyday life with increased intentionality, consistency, and efficacy in support of her psychiatric recovery.     Progress towards ITP goals: Alexi worked towards her initial treatment goals today. Will continue to monitor and assess and develop life skills treatment goals with her in subsequent sessions.    Is this a Weekly Review of the Progress on the Treatment Plan?  NO.           
Bobby Zelaya
Bobby Zelaya
yes

## 2019-03-08 NOTE — PROGRESS NOTE BEHAVIORAL HEALTH - ABNORMAL MOVEMENTS
Other
Tremors
Other
Statement Selected
Tremors
Other
Tremors
Other
Tremors

## 2019-09-20 NOTE — ED ADULT NURSE NOTE - RESPIRATORY ASSESSMENT
Orthopaedic Surgery - Office Note  Fernanda Tierney (70 y o  female)   : 1956   MRN: [id number]  Encounter Date: 2019    Chief Complaint   Patient presents with    Left Shoulder - Pain       Assessment / Plan  Left shoulder pain, adhesive capsulitis    · CSI of left glenohumeral joint was performed  · Home exercise program reviewed  · reviewed a self directed home stretching program  · use heat to decrease pain   · If no improvement at 1 month follow up, we will order an MRI to question rotator cuff tear  Return in about 1 month (around 10/20/2019)  History of Present Illness  Fernanda Tierney is a 70 y o  female who presents for initial evaluation of left shoulder pain  She reports her pain started about 3 months ago with insidious onset  She reports her pain is a severe intermittent pain that is dull and achy  Her pain is located in her neck down her arm past the elbow  She is unable to sleep because of the pain  She states because of her lack of range of motion she had to her cut her hair short because she was unable to put her hair in a pony tail  She presents with x-rays obtained on 2019 which were unremarkable  She has completed 3 weeks of physical therapy which she states had increased her pain  She reports a history of adhesive capsulitis in the right shoulder and she states that it does feel similar to what she experienced  She denies any further injury or trauma to her left shoulder  She denies any distal paresthesias  Review of Systems  MSK:positive for arthralgias, myalgias and joint swelling    Physical Exam  /80   Pulse 70   Ht 5' 1" (1 549 m)   Wt 76 2 kg (168 lb)   BMI 31 74 kg/m²   Cons: Appears well  No apparent distress  Psych: Alert  Oriented x3  Mood and affect normal   Eyes: PERRLA, EOMI  Resp: Normal effort  No audible wheezing or stridor  CV: Palpable pulse  No discernable arrhythmia  No LE edema    Lymph:  No palpable cervical, axillary, or inguinal lymphadenopathy  Skin: Warm  No palpable masses  No visible lesions  Neuro: Normal muscle tone  Normal and symmetric DTR's  Left Shoulder Exam  Alignment / Posture:  Normal shoulder posture  Inspection:  No swelling  No edema  No erythema  No ecchymosis  No muscle atrophy  No deformity  Palpation:  Severe tenderness at Glenohumeral percent and trapezius  ROM:  Shoulder FE 90  Shoulder ER 30  Shoulder IR Gluteal fold  Strength:  Supraspinatus 4/5  Infraspinatus 4/5  Subscapularis 4/5  Biceps 4/5  Stability:  No objective shoulder instability  Tests: (+) Camara  Neurovascular:  Sensation intact in Ax/R/M/U nerve distributions  2+ radial pulse  Cervical Spine Exam  Alignment:  Normal cervical alignment  Inspection:  No swelling  No edema  No erythema  No ecchymosis  No muscle atrophy  No deformity  Palpation:  No tenderness  ROM:  Normal neck ROM  Strength:  Supraspinatus 4/5  Infraspinatus 4/5  Subscapularis 4/5  Biceps 4/5  Tests:  No pertinent positive or negative tests  Neurovascular:  Sensation intact in Ax/R/M/U nerve distributions  2+ radial pulse  Gait:  Normal     Studies Reviewed  XR of left shoulder - Obtained on August 9, 2019  Demonstrated no dislocation, fracture or other obvious osseous abnormalities    The    Large joint arthrocentesis: R glenohumeral  Date/Time: 9/20/2019 11:01 AM  Consent given by: patient  Site marked: site marked  Timeout: Immediately prior to procedure a time out was called to verify the correct patient, procedure, equipment, support staff and site/side marked as required   Supporting Documentation  Indications: pain and diagnostic evaluation   Procedure Details  Location: shoulder - R glenohumeral  Preparation: Patient was prepped and draped in the usual sterile fashion  Needle size: 22 G  Ultrasound guidance: no  Approach: anterior  Medications administered: 4 mL bupivacaine 0 25 %; 1 mL methylPREDNISolone acetate 40 mg/mL; 4 mL bupivacaine (PF) 0 75 %    Patient tolerance: patient tolerated the procedure well with no immediate complications  Dressing:  Sterile dressing applied             Medical, Surgical, Family, and Social History  The patient's medical history, family history, and social history, were reviewed and updated as appropriate  Past Medical History:   Diagnosis Date    Abdominal discomfort     Anemia     Arthritis     Bronchitis     Chronic pain disorder     Dyslipidemia     Last Assessed 2012    GERD (gastroesophageal reflux disease)     Headache, migraine     Hypotension     Low back pain     Low back pain     Rheumatoid arthritis ([address] )     Thyroid nodule 2019       Past Surgical History:   Procedure Laterality Date    CATARACT EXTRACTION       SECTION      ESOPHAGOGASTRODUODENOSCOPY N/A 2018    Procedure: ESOPHAGOGASTRODUODENOSCOPY (EGD) with push enteroscopy and bx;  Surgeon: Haley Lucio DO;  Location: [facility]; Service: Gastroenterology    EYE SURGERY      NERVE SURGERY Right     Hand    NM EDG US EXAM SURGICAL ALTER STOM DUODENUM/JEJUNUM N/A 3/6/2019    Procedure: LINEAR ENDOSCOPIC U/S;  Surgeon: Melvi Chang MD;  Location: [facility];   Service: Gastroenterology    UPPER GASTROINTESTINAL ENDOSCOPY         Family History   Problem Relation Age of Onset    Hypertension Mother     Other Mother         Dyslipidemia    Diabetes Father         Mellitus    No Known Problems Brother     No Known Problems Brother     Cancer Neg Hx     Heart disease Neg Hx     Stroke Neg Hx        Social History     Occupational History    Occupation: retired   Tobacco Use    Smoking status: Former Smoker     Last attempt to quit: 1985     Years since quittin 7    Smokeless tobacco: Never Used   Substance and Sexual Activity    Alcohol use: Yes     Comment: occasional  Maybe 5x per year    Drug use: No    Sexual activity: Yes     Partners: Male     Birth control/protection: Post-menopausal       Allergies   Allergen Reactions    Erythromycin Rash         Current Outpatient Medications:     acetaminophen (TYLENOL) 325 mg tablet, Take 2 tablets (650 mg total) by mouth every 6 (six) hours as needed for mild pain or fever, Disp: 30 tablet, Rfl: 0    Cholecalciferol (VITAMIN D3) 5000 units CAPS, Take by mouth, Disp: , Rfl:     cyclobenzaprine (FLEXERIL) 5 mg tablet, Take 5 mg by mouth daily at bedtime as needed , Disp: , Rfl:     docusate sodium (COLACE) 100 mg capsule, Take 1 capsule (100 mg total) by mouth daily, Disp: 30 capsule, Rfl: 5    ibuprofen (MOTRIN) 800 mg tablet, every 8 (eight) hours as needed , Disp: , Rfl:     LORazepam (ATIVAN) 1 mg tablet, 0 5 mg tab 20-30 mins before scan, may repeat in 30 minutes if needed for anxiety, Disp: 1 tablet, Rfl: 0    Magnesium 400 MG CAPS, Take 400 mg by mouth daily, Disp: , Rfl:     naproxen (NAPROSYN) 500 mg tablet, Take one tab up to twice a day, no more than 3 times a week, Disp: 20 tablet, Rfl: 3    omega-3-acid ethyl esters (LOVAZA) 1 g capsule, Take 2 g by mouth daily , Disp: , Rfl:     ondansetron (ZOFRAN-ODT) 4 mg disintegrating tablet, Take 1 tablet (4 mg total) by mouth every 6 (six) hours as needed for nausea or vomiting, Disp: 10 tablet, Rfl: 0    pantoprazole (PROTONIX) 40 mg tablet, TAKE ONE TABLET BY MOUTH ONCE DAILY, Disp: 30 tablet, Rfl: 5    polyethylene glycol (MIRALAX) 17 g packet, Take 17 g by mouth daily, Disp: 30 each, Rfl: 5    pyridoxine (B-6) 500 MG tablet, Take 400 mg by mouth daily, Disp: , Rfl:     Riboflavin (B2 PO), Take by mouth, Disp: , Rfl:     rizatriptan (MAXALT) 10 MG tablet, Take 1 tablet (10 mg total) by mouth once as needed for migraine May repeat in 2 hours if needed  Max 2/24 hours, 9/month , Disp: 9 tablet, Rfl: 3    UNKNOWN TO PATIENT, COMPOUNDED MEDICATION  Lidocaine 5% and Diclofenac 3%  Apply 1-3 grams to the affected area 2-4 times per day as needed  , Disp: , Rfl:       Lady Fernandez Palangio    Scribe Attestation    I,:   Debra Zuniga am acting as a scribe while in the presence of the attending physician :        I,:   Johnny Morin MD personally performed the services described in this documentation    as scribed in my presence : WDL

## 2020-02-10 NOTE — PROGRESS NOTE BEHAVIORAL HEALTH - OTHER
+ neck favoring one side, dressed in hospital gowns, adequate grooming and hygiene. +  psychomotor agitation at times (chronic and seemingly intentionaly as he can stop it when redirected). superficially cooperative low end of fair chronically limited festinating gait but stable at times louder "ok" at times labile, at times euthymic at times disorganized and circumstantial at times paranoia towards certain staff Limited Birth Control Pills Pregnancy And Lactation Text: This medication should be avoided if pregnant and for the first 30 days post-partum.

## 2020-08-27 NOTE — PATIENT PROFILE ADULT. - FUNCTIONAL SCREEN CURRENT LEVEL: EATING, MLM
(4) completely dependent
Initiate Treatment: Ultralight dew cream bid \\nBP 5% wash bid
Detail Level: Detailed

## 2020-09-23 NOTE — ED ADULT NURSE NOTE - ED STAT RN HANDOFF DETAILS
[FreeTextEntry1] : 53-year-old male with long-standing Crohn's disease since after undergoing colonoscopy revealing a colonic stricture.  I had an extensive discussion with the patient revolving around the management of colonic Crohn's disease and a high frequency of recurrence.  However due to his young age and active profession, I believe that the best course of action would be to undergo a segmental resection.\par \par I had an extensive discussion with the patient regarding the risks and benefits of an anterior resection/left colectomy.\par The risks discussed include but are not limited to, bleeding, ureteral injury, injury to other organs or blood vessels in the abdomen, delayed perforation secondary to a thermal injury.  thromboembolic events, wound infection, and anastomotic leak which may require emergent surgery and creation of a colostomy, as well as sexual and urinary complications from nerve injury.  Furthermore, the post-operative requirements for discharge were discussed which are passing flatus (bowel movement is not necessary), tolerating solid food, and adequate pain control without intravenous medication.\par 
Report given to [facility]

## 2021-10-29 NOTE — PROGRESS NOTE BEHAVIORAL HEALTH - NS ED BHA MED ROS ALLERGIC IMMUNOLOGIC
No complaints
English
No complaints

## 2021-12-03 NOTE — PROGRESS NOTE BEHAVIORAL HEALTH - SUMMARY
36.8 53 yo male with early onset Parkinson’s disease x 11 years, discharged from 4 prior nursing homes in 8 months due to his behaviors, hx of refusing medications,  transferred to [facility] [address] on 6/12/17 where he manifested severe mood lability, paranoia and confabulation with poor insight and judgment including making > 40 [facility] complaints. 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 Patient was accepted to [facility] in [place] and discharged from [facility] on 7/12/17.  Upon getting to [facility], Patient refused to go into the facility and became combative. He was thus transferred back to [facility] ED which resulted in Service Line Chiefs’ of Service involvement given the history/issues and ultimate transfer to [address]. Patient has been on [address] since. UPDATE: [facility] declined to accept Patient; awaiting review/answer to other places applied to. Court that was scheduled on 12/19/17 was deferred for a later date in January as Patient refused to go. His niece from [place] did not call back/contact Treatment Team.

## 2022-08-02 NOTE — PROGRESS NOTE BEHAVIORAL HEALTH - SUMMARY
96.1 53 yo male with early onset Parkinson’s disease x 11 years, discharged from 4 prior nursing homes in 8 months due to his behaviors, hx of refusing medications,  transferred to [facility] [address] on 6/12/17 where he manifested severe mood lability, paranoia and confabulation with poor insight and judgment including making > 40 [facility] center complaints. 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 Patient was accepted to [facility] in [place] and discharged from [facility] on 7/12/17.  Upon getting to [facility], Patient refused to go into the facility and became combative. He was thus transferred back to [facility] ED which resulted in Service Line Chiefs’ of Service involvement given the history/issues and ultimate transfer to [address]. Patient has been on [address] since. UPDATE: [facility] declined to accept Patient. Hundreds of nursing home applications wee sent out and all declined to accept patient. Court scheduled on 12/19/17 was deferred as Patient refused to go; he then again refused to go to the new Court date. Continuing to search for placement which have been unsuccessful; family does not want him living with them.  Neurological facility in [place] has accepted patient, and legal guardian is involved in transfer issues. Court hearing regarding Patient's guardianship was 3/27/18 and his sister was granted temporary guardianship. Court hearing for retention occurred on 3/28/18 and the hospital was granted an additional 60 days.

## 2022-12-04 NOTE — PROGRESS NOTE BEHAVIORAL HEALTH - NSBHFUPINTERVALHXFT_PSY_A_CORE
Pt transferred to [facility] with RN and NA. Report was called and all questions were answered. Dual skin assessment was done. New RN was in room with pt.    Chart reviewed; patient seen.  Case discussed in tx team meeting. No significant interval events  are reported.   Patient refusing psychiatric medications ( took only his Parkinson’s medications), continues with increased paranoia.   No Rx SE are noted or reported. Patient is labile at times, and more paranoid today.  Patient is maintaining behavioral control at this time, and reports he feels ok.  Sister has not obtained apartment for patient, and is saying she would like patient in SNF

## 2022-12-08 NOTE — GOALS OF CARE CONVERSATION - PERSONAL ADVANCE DIRECTIVE - NS PRO AD PATIENT TYPE
legal guardianship surrogate for medical Dutasteride Pregnancy And Lactation Text: This medication is absolutely contraindicated in women, especially during pregnancy and breast feeding. Feminization of male fetuses is possible if taking while pregnant.

## 2022-12-16 NOTE — ED PROVIDER NOTE - CPE EDP NEURO NORM
Internal Medicine   Hospitalist   Progress Note    2022   3:07 PM    Name:  Katherine Guillory  MRN:    [id number]     IP Day: 0     Admit Date: 12/15/2022  5:28 PM  PCP: Marcie Cowan MD    Code Status:  Full Code    Assessment and Plan: Active Problems/ diagnosis:     Dyspnea/tachypnea  Respiratory alkalosis in the setting of hyperventilation- ? Anxiety  Less likely COPD exacerbation-reported history of chronic bronchitis, no respiratory tests but will need outpatient PFT  HTN  Diabetes-non-insulin-dependent-with hyperglycemia    Plan  We will start the patient on DuoNeb and steroids for today and reassess tomorrow. Pulmonary to see the patient. Sliding scale insulin, hypoglycemia protocol, oral diabetic medication also ordered. Will need outpatient pulmonary follow-up and PFTs  Repeat ABG improved  Presentation not consistent with a pulmonary embolism. Negative troponin and low BNP, imaging not suggestive of CHF. Also presentation not suggestive of CHF  DVT PPx     7 pm- 7 am, please contact on call Hospitalist for any needs     Subjective:      no new events. Admits to dyspnea with exertion. Physical Examination:      Vitals:  BP (!) 128/56   Pulse 75   Temp 97.5 °F (36.4 °C) (Oral)   Resp (!) 32   Ht 5' 4\" (1.626 m)   Wt 200 lb (90.7 kg)   SpO2 99%   BMI 34.33 kg/m²   Temp (24hrs), Av.8 °F (36.6 °C), Min:97.5 °F (36.4 °C), Max:98.2 °F (36.8 °C)      General appearance: alert, cooperative and no distress  Mental Status: oriented to person, place and time and normal affect  Lungs: clear to auscultation bilaterally, normal effort.   Heart: regular rate and rhythm, no murmur  Abdomen: soft, nontender, nondistended, bowel sounds present, no masses  Extremities: no edema, redness, tenderness in the calves  Skin: no gross lesions, rashes    Data:     Labs:  Recent Labs     12/15/22  1745 12/15/22  1904 22  0425 22  0756   WBC 5.8  --   --  7.7   HGB 9.1*   < > 13.6 12.8   PLT 220  --   --  319    < > = values in this interval not displayed.      Recent Labs     12/15/22  1745 12/15/22  1904 12/16/22  0425     --   --    K 3.5  --   --    CL 99  --   --    CO2 29  --   --    BUN 18  --   --    CREATININE 0.62 0.7 0.6   GLUCOSE 109*  --   --      Recent Labs     12/15/22  1745   AST 20   ALT 17   BILITOT 0.3   ALKPHOS 108       Current Facility-Administered Medications   Medication Dose Route Frequency Provider Last Rate Last Admin    enoxaparin (LOVENOX) injection 40 mg  40 mg SubCUTAneous Daily Luvenia Res D Sedar, DO   40 mg at 12/16/22 1035    ipratropium-albuterol (DUONEB) nebulizer solution 1 ampule  1 ampule Inhalation Q4H PRN Hayley Cole Sedar, DO        hydrALAZINE (APRESOLINE) injection 10 mg  10 mg IntraVENous Q4H PRN Luvenia Res D Sedar, DO        glucose chewable tablet 16 g  4 tablet Oral PRN Luvenia Res D Sedar, DO        dextrose bolus 10% 125 mL  125 mL IntraVENous PRN Luvenia Res D Sedar, DO        Or    dextrose bolus 10% 250 mL  250 mL IntraVENous PRN Luvenia Res D Sedar, DO        glucagon (rDNA) injection 1 mg  1 mg SubCUTAneous PRN Luvenia Res D Sedar, DO        dextrose 10 % infusion   IntraVENous Continuous PRN Luvenia Res D Sedar, DO        insulin lispro (HUMALOG) injection vial 0-4 Units  0-4 Units SubCUTAneous TID  Hayley Galvan Sedar, DO   2 Units at 12/16/22 1215    insulin lispro (HUMALOG) injection vial 0-4 Units  0-4 Units SubCUTAneous Nightly Kirit D Sedar, DO        albuterol (PROVENTIL) nebulizer solution 2.5 mg  2.5 mg Nebulization Q6H PRN Venessa Vasquez, DO        lisinopril (PRINIVIL;ZESTRIL) tablet 40 mg  40 mg Oral Daily Carlos Garcia, DO   40 mg at 12/16/22 1213    metFORMIN (GLUCOPHAGE) tablet 1,000 mg  1,000 mg Oral BID Carlos Garcia, DO   1,000 mg at 12/16/22 1213    pravastatin (PRAVACHOL) tablet 40 mg  40 mg Oral Nightly Carlos Garcia DO        [START ON 12/17/2022] glipiZIDE (GLUCOTROL) tablet 5 mg  5 mg Oral DARA Vasquez DO        ipratropium-albuterol (DUONEB) nebulizer solution 1 ampule  1 ampule Inhalation Q4H WA Eric Holliday DO   1 ampule at 12/16/22 1310    predniSONE (DELTASONE) tablet 40 mg  40 mg Oral Daily Eric Holliday DO   40 mg at 12/16/22 1218       Additional work up or/and treatment plan may be added today or then after based on clinical progression. I am managing a portion of pt care. Some medical issues are handled by other specialists. Additional work up and treatment should be done in out pt setting by pt PCP and other out pt providers. In addition to examining and evaluating pt, I spent additional time explaining care, normaland abnormal findings, and treatment plan. All of pt questions were answered. Counseling, diet and education were provided. Case will be discussed with nursing staff when appropriate. Family will be updated if and when appropriate.        Electronically signed by Eric Holliday DO on 12/16/2022 at 3:07 PM normal...

## 2023-05-02 NOTE — PROGRESS NOTE BEHAVIORAL HEALTH - DETAILS
chronic neck and back pain, which is relieved by ice packs at times skin is healing +; likely candidiasis type irritation no

## 2023-08-22 NOTE — PROCEDURE NOTE - NSINFORMCONSENT_GEN_A_CORE
This was an emergent procedure.
This was an emergent procedure.
CODE BLUE/This was an emergent procedure.
This was an emergent procedure./CODE Blue
sister/Benefits, risks, and possible complications of procedure explained to patient/caregiver who verbalized understanding and gave verbal consent.
Cosentyx Counseling:  I discussed with the patient the risks of Cosentyx including but not limited to worsening of Crohn's disease, immunosuppression, allergic reactions and infections.  The patient understands that monitoring is required including a PPD at baseline and must alert us or the primary physician if symptoms of infection or other concerning signs are noted.

## 2023-08-30 NOTE — PROGRESS NOTE BEHAVIORAL HEALTH - OTHER
Bexarotene Pregnancy And Lactation Text: This medication is Pregnancy Category X and should not be given to women who are pregnant or may become pregnant. This medication should not be used if you are breast feeding. varying (chronic) oscillating between anger, irritability, annoyance and then euthymic (baseline); friendly with Writer at times linear, at times disorganized and circumstantial at times paranoia towards certain staff (chronic) Limited + neck favoring one side, dressed in hospital gowns, adequate grooming and hygiene no psychomotor agitation at times (chronic and seemingly intentionaly as he can stop it when redirected). low end of fair, using sense of humor at times chronically limited festinating gait but stable at times louder varying

## 2023-11-07 NOTE — ED BEHAVIORAL HEALTH ASSESSMENT NOTE - HOMICIDALITY / AGGRESSION (CURRENT/PAST)
PROVIDER:[TOKEN:[[id number]],SCHEDULEDAPPT:[12/01/2023]],PROVIDER:[TOKEN:[[id number]],FOLLOWUP:[2 weeks]] PROVIDER:[TOKEN:[[id number]],SCHEDULEDAPPT:[12/01/2023],SCHEDULEDAPPTTIME:[09:40 AM],ESTABLISHEDPATIENT:[T]],PROVIDER:[TOKEN:[[id number]],SCHEDULEDAPPT:[11/21/2023],SCHEDULEDAPPTTIME:[08:45 AM]],PROVIDER:[TOKEN:[[id number]],SCHEDULEDAPPT:[12/06/2023],SCHEDULEDAPPTTIME:[02:30 PM],ESTABLISHEDPATIENT:[T]],PROVIDER:[TOKEN:[[id number]],SCHEDULEDAPPT:[12/18/2023],SCHEDULEDAPPTTIME:[03:20 PM],ESTABLISHEDPATIENT:[T]] PROVIDER:[TOKEN:[[id number]],SCHEDULEDAPPT:[12/01/2023],SCHEDULEDAPPTTIME:[09:40 AM],ESTABLISHEDPATIENT:[T]],PROVIDER:[TOKEN:[[id number]],SCHEDULEDAPPT:[11/21/2023],SCHEDULEDAPPTTIME:[08:45 AM]],PROVIDER:[TOKEN:[[id number]],SCHEDULEDAPPT:[11/08/2023],SCHEDULEDAPPTTIME:[09:45 AM],ESTABLISHEDPATIENT:[T]],PROVIDER:[TOKEN:[[id number]],SCHEDULEDAPPT:[12/18/2023],SCHEDULEDAPPTTIME:[03:20 PM],ESTABLISHEDPATIENT:[T]] Yes

## 2023-12-18 NOTE — PROGRESS NOTE BEHAVIORAL HEALTH - DETAILS
Addendum (12/21/23) - reviewed labs from [facility]. H/h and platelets WNL. Cr 0.83. LFTs normal.      VASCULAR MEDICINE CLINIC - F/u VISIT (ANTICOAGULATION)  09/14/23     Rina Maher is a 31 y.o. female who presents today for f/u.     Subjective    HPI:w/ a hx of LLE distal femoral DVT and left ankle tanya and in the setting of hetero FVL.  F/u u/s in Oct showed resolution of DVT.  Taking Eliquis 5 mg BID.  Has been adherent to taking.  Cost is affordable.  She has nose bleeds approx 3 time per week which lasts a few minutes.  Does not bleed excessively but somewhat bothersome.  Does bruise easily.  No other problems with bleeding.  Avoids NSAIDs/ASA.  On depo provera per SDM w/ gynecologist..  No current plans for pregnancy.  No ETOH.  No SOB or CP.  Has justice LE swelling w/ a hx of lymphedema.   Has discussed trmt options with her PCP.  Occas has sharp nerve type pain to her LEs.  Resumed usual activities for most part.  Avoids prolonged sedentary periods.  BMI 49.5.  Has an allergy to ASA.  Has completed 6 mo of therapy.  States her parents were tested for FVL and are both negative.  Prefers to stay on OAC for fear of VTE/CVA.    Past Medical History:   Diagnosis Date    Allergic rhinitis 6/27/2011    Anxiety 5/13/2011    BMI 45.0-49.9, adult ([facility]) 5/13/2011    BMI 50.0-59.9, adult ([facility]) 5/13/2011    Chronic pain 06/24/2019    thoracic    Dysmenorrhea 3/25/2015    Heart burn     treated with omeprazole.     History of anemia     Hypertension     Migraine headache 3/25/2015    Obesity, morbid (more than 100 lbs over ideal weight or BMI > 40) ([facility]) 8/30/2011    Pain     back     Psychiatric disorder     depression and anxiety    Sacral fracture ([facility]) 9/21/2012    Thoracic vertebral fracture ([facility]) 9/21/2012        Past Surgical History:   Procedure Laterality Date    RI LAP, CANDACE RESTRICT PROC, LONGITUDINAL GAS*  2/19/2020    Procedure: GASTRECTOMY, SLEEVE, LAPAROSCOPIC;  Surgeon: Abel Coleman M.D.;  Location:  SURGERY [facility];  Service: General    NV LAP,BILIARY TRACT,UNLISTED  2/19/2020    Procedure: BIOPSY, LIVER, LAPAROSCOPIC;  Surgeon: Abel Coleman M.D.;  Location: SURGERY [facility];  Service: General    COLONOSCOPY  6/26/2019    Procedure: COLONOSCOPY;  Surgeon: Be Gould M.D.;  Location: SURGERY [facility];  Service: Gastroenterology    DENTAL EXTRACTION(S)  2017    molar    MASS EXCISION ORTHO  10/6/2011    Performed by CARLOS MACHUCA at SURGERY [facility]    DANY BY LAPAROSCOPY  10/21/2008    COLONOSCOPY  2003    MYRINGOTOMY  1990        Family History   Problem Relation Age of Onset    Hypertension Mother     Hyperlipidemia Mother     Hypertension Father     Heart Disease Other     Stroke Other     Cancer Other         Social History     Tobacco Use    Smoking status: Never    Smokeless tobacco: Never   Substance Use Topics    Alcohol use: No    Drug use: No        Current Outpatient Medications on File Prior to Visit   Medication Sig Dispense Refill    lisinopril (PRINIVIL) 10 MG Tab Take 10 mg by mouth every day.      apixaban (ELIQUIS) 5mg Tab Take 1 Tablet by mouth 2 times a day. (Patient taking differently: Take 2.5 mg by mouth 2 times a day.) 60 Tablet 3    acetaminophen (TYLENOL) 500 MG Tab Take 1,000 mg by mouth every evening.      Multiple Vitamins-Minerals (BARIATRIC FUSION) Chew Tab Chew 1 Tablet 3 times a day.      tizanidine (ZANAFLEX) 2 MG tablet Take 2 mg by mouth 2 times a day as needed. Indications: Muscle Spasticity, Musculoskeletal Pain      sumatriptan (IMITREX) 100 MG tablet Take 100 mg by mouth one time as needed for Migraine.      pregabalin (LYRICA) 50 MG capsule Take 150 mg by mouth every evening.      Lactobacillus Rhamnosus, GG, (CULTURELLE PO) Take 1 Capsule by mouth every evening.      topiramate (TOPAMAX) 100 MG Tab Take  mg by mouth 2 times a day. Pt takes 100MG in AM and 50MG in the evening      hydroCHLOROthiazide (HYDRODIURIL) 12.5 MG  tablet Take 12.5 mg by mouth every day.      fluticasone (FLONASE) 50 MCG/ACT nasal spray Spray 1 Spray in nose every bedtime.      sertraline (ZOLOFT) 100 MG Tab Take 200 mg by mouth at bedtime.      cetirizine (KLS ALLER-TRESSA) 10 MG Tab Take 10 mg by mouth every bedtime.      atenolol (TENORMIN) 25 MG Tab Take 25 mg by mouth every bedtime.      omeprazole (PRILOSEC) 20 MG delayed-release capsule Take 20 mg by mouth every morning.      MedroxyPROGESTERone Acetate (DEPO-PROVERA IM) Inject 1 Each into the shoulder, thigh, or buttocks every 3 months. Last dose 5/25/2023       No current facility-administered medications on file prior to visit.      Allergies:  Cat hair extract, Vicodin [hydrocodone-acetaminophen], Other environmental, Sulfa drugs, Tape, Hydrocodone-acetaminophen, and Tree nuts food allergy     DIET AND EXERCISE:  Diet: small frequent meals s/p gastric sleeve  Exercise: no regular exercise program     Review of Systems   HENT:  Positive for nosebleeds.    Respiratory:  Negative for hemoptysis and shortness of breath.    Cardiovascular:  Positive for leg swelling. Negative for chest pain, palpitations and claudication.   Gastrointestinal:  Negative for blood in stool and melena.   Genitourinary:  Negative for hematuria.   Musculoskeletal:  Positive for back pain and joint pain.   Neurological:  Negative for seizures and loss of consciousness.   Endo/Heme/Allergies:  Does not bruise/bleed easily.            Objective       Objective:     Vitals:    12/20/23 0924   BP: 101/69   Pulse: 77        Physical Exam  Constitutional:       Appearance: She is obese.   Cardiovascular:      Rate and Rhythm: Normal rate.      Heart sounds: Normal heart sounds.   Pulmonary:      Effort: Pulmonary effort is normal.      Breath sounds: Normal breath sounds.   Musculoskeletal:      Right lower leg: Edema present.      Left lower leg: Edema present.      Comments: Aneudy non pitting lower extremity swelling from calf through  "ankle. Non tender with palpation. Crease line at ankle.   Skin:     General: Skin is warm and dry.      Comments: No discoloration, lesions or ulcers to justice LEs.   Neurological:      General: No focal deficit present.      Mental Status: She is alert.   Psychiatric:         Mood and Affect: Mood normal.         Behavior: Behavior normal.         Thought Content: Thought content normal.         Judgment: Judgment normal.          DATA REVIEW    Lab Results   Component Value Date/Time    CHOLSTRLTOT 157 05/22/2015 11:53 AM    LDL 80 05/22/2015 11:53 AM    HDL 46 05/22/2015 11:53 AM    TRIGLYCERIDE 155 (H) 05/22/2015 11:53 AM       Lab Results   Component Value Date/Time    SODIUM 140 06/20/2023 05:45 PM    POTASSIUM 3.5 (L) 06/20/2023 05:45 PM    CHLORIDE 107 06/20/2023 05:45 PM    CO2 20 06/20/2023 05:45 PM    GLUCOSE 87 06/20/2023 05:45 PM    BUN 15 06/20/2023 05:45 PM    CREATININE 0.84 06/20/2023 05:45 PM     Lab Results   Component Value Date/Time    ALKPHOSPHAT 65 06/20/2023 05:45 PM    ASTSGOT 20 06/20/2023 05:45 PM    ALTSGPT 25 06/20/2023 05:45 PM    TBILIRUBIN 0.3 06/20/2023 05:45 PM       INR   Date Value Ref Range Status   06/20/2023 1.06 0.87 - 1.13 Final     Comment:     Reference range:  INR - Non-therapeutic Reference Range: 0.87-1.13  INR - Therapeutic Reference Range: 2.0-4.0       No results found for: \"POCINR\"     6/20/23 LLE venous duplex:  1.  Acute/subacute thrombus within the mid to distal left femoral vein and within the gastrocnemius vein.   2.  The left posterior tibial peroneal veins are not delineated.   3.  Nonocclusive thrombus within the mid to distal left greater saphenous vein.    10/19/23 LLE venous duplex:  1.  No evidence of left lower extremity deep venous thrombosis.  2.  Previously seen thrombus is no longer definitely visualized.    Medical Decision Making:  Today's Assessment / Status / Plan:     1. Deep vein thrombosis (DVT) of proximal lower extremity, unspecified " chronicity, unspecified laterality (HCC)  CBC WITHOUT DIFFERENTIAL    Comp Metabolic Panel      2. Factor V Leiden (HCC)  CBC WITHOUT DIFFERENTIAL    Comp Metabolic Panel           Indication for anticoagulation: LLE femoral DVT, hetero FVL.    Anti-Platelet/Anticoagulant Discussion:  Discussed the risks and benefits of OAC in this setting. Her DVT was femoral. Occurred after minor ortho injury and in the setting of hetero FVL. F/u scan showed resolution of DVT. She has completed 6 mo of therapy. Ongoing risks aside from FVL is obesity. Fortunately she has tolerated Eliquis w/o any major bleeding problems but her epistaxis is ongoing despite using a humidifier. Her risk of bleeding is ~1% annually (HAS BLED score =0) which is low risk. Discussed the option to reduce to Eliquis 2.5 mg BID versus stopping OAC therapy.   After much discussion and w/ shared decision-making and pt preference, she will continue Eliquis but we will reduce to extended therapy dose option. She does have concern for future VTEs and cannot take ASA, so she is okay at this point w/ indef therapy and annual reassessment. Stressed the importance of close surveillance for s/sx of abnormal/prolonged bleeding. Continue humidifier use and saline nasal spray. Pt to gently blow her nose. Also asked that she let all her providers know she has a hx of a DVT and that she takes Eliquis, michael if having any surgeries or hospitalizations. Continue to avoid tobacco, and prolonged periods of immobility. In general we recommend avoidance of all OCPs, however, she has already had a risk vs benefit discussion with her doctor. Recommend getting up and walking every 1-2 hours, doing foot pumps and wearing compression stockings during long travel. Also recommended regular compression stocking use or wrapping ace bandage around her legs snuggly. Pt will discuss age appropriate cancer screenings with his PCP as a small % of VTE can be caused by an underlying malignancy.  She was advised that any future pregnancy should be planned at which time we would need to switch to pregnancy safe anticoagulant. Advised to avoid pregnancy while taking Eliquis. She verbalizes understanding.    Will hold off sending rx for 2.5 mg tabs as she has a full bottle of 5 mg tablets and feels comfortable cutting tablets in half with her pill cutter.    Anti-Coagulation Plan:  - began taking Eliquis 2.5 mg (1/2 tablet) by mouth twice daily  - go to the ER for shortness of breath, chest pain, pain with deep inhalation, worsening leg swelling and/or pain in calf or leg   - avoid sedentary periods  - let all your providers know you take this medication  - don't stop this medication without permission from your doctor or our clinic  -  refills before you run out  - continue complete avoidance of tobacco products  - avoid hormonal therapies including estrogen or testosterone-containing meds, or raloxifene or tamoxifene (commonly used for osteoporosis)  - avoid Aspirin and anti-inflammatories (eg. Advil, ibuprofen, Aleve, naproxen, etc) while anticoagulated   - avoid skiing or other dangerous activities to reduce risk of head injury and brain bleeds  - elevate legs as much as possible, use compression stockings/socks during the daytime and with extended travel  - if any bleeding lasting 30min without stopping, please seek care with your PCP, urgent care, or ED  - if having any invasive procedure, please make sure the doctor knows of your history of blood clots and current anticoagulation status  - recommended to see your PCP to discuss if you need age-appropriate cancer screenings as a small % of blood clots may be caused by an underlying malignancy  - reversal agents for most blood thinners are now available and used if you have major bleeding     Smoking: continue complete avoidance of all tobacco products     Physical Activity: goal is 30 min of moderate activity 5 times per week     Weight Management  and Nutrition: Low sodium diet like the DASH diet.    Instructed to follow-up with PCP for remainder of adult medical needs: yes  We will partner with other provider in the management of established vascular disease and cardiometabolic risk factors    Studies to Be Obtained: none  Labs to Be Obtained: cbc, cmp/bmp every 6 mo while taking doac (due Feb 2024 - given lab requisitions)    Follow up in: 3 months    Carmen DE LEON           c/o right leg swelling (chronic)

## 2024-01-16 NOTE — ED BEHAVIORAL HEALTH ASSESSMENT NOTE - NS ED BHA [employer]OGRAPH[employer] MEDICAL RECORD REVIEWED CONSENT OBTAINED YN
Received Refill PA request via [employer]  for EZETIMIBE 10MG TABLETS. (Quantity:30, Day Supply:30)     Insurance: MARSHAL   Member ID:  [id number]  BIN: [id number]  PCN: [id number]  Group: [id number]     Ran Test claim via [employer] [facility]   Pays for a $0/30DS copay. Pt was already onboarded and verbally consented to fill through [facility]. Will release Rx to [facility].    ARISTIDES Hayward, PhT  Pharmacy Liaison (Rx Coordinator)  P: [phone]  1/16/2024 2:46 PM          
Yes

## 2024-01-29 NOTE — PROGRESS NOTE BEHAVIORAL HEALTH - NSBHADMITMEDEDUDETAILS_A_CORE FT
Detail Level: Detailed continue current management.  Psychoeducation again done re: benefits and potential SE of current Rx with patient saying "ok"

## 2024-03-28 NOTE — PROGRESS NOTE BEHAVIORAL HEALTH - NSBHFUPINTERVALHXFT_PSY_A_CORE
[facility] Patient seen for paranoia, disorganized behavior. Chart reviewed. No significant interval events. talked about wanting to go live with his sister and hiring 2 HHAs. Same usual complaints otherwise including smell of floor cleaning solution, temperature in this room etc. No Repair - Repaired With Adjacent Surgical Defect Text (Leave Blank If You Do Not Want): After the excision the defect was repaired concurrently with another surgical defect which was in close approximation.

## 2024-11-25 NOTE — PROGRESS NOTE BEHAVIORAL HEALTH - NSBHFUPINTERVALHXFT_PSY_A_CORE
Patient seen for paranoia, disorganized behavior. Chart reviewed.  Significant interval events: Patient refused his medications this morning and was yelling at his nurse; He agreed to take some in the AM and some later. He had a Court hearing today but was agitated and cursed out staff and the EMS worker as he was too unstable to be transported. His hearing was adjourned. Patient has the same psychiatric clinical presentation and is very paranoid about the staff. He says he is being abused  and his statements are very paranoid and illogical. He has a delusional grandiosity not based on reality. He also now states that he wasn't supposed to take 2 clozapine pills when he has been taking it for the past few days.  He continues to be encouraged to elevate his legs for the LE edema which he usually does not do but he does independently get up and walk around the unit, in and out of his room throughout the day. Vaccine status unknown

## 2025-01-22 NOTE — PROGRESS NOTE BEHAVIORAL HEALTH - OTHER
Writer called [facility] lab to get an estimate on when results will be through for patient's Serdexmethylphen-Dexmethylphen as they have not been received and are not on [employer] portal.    First call was lost, second call writer spoke with Chelly. She stated that the results were waiting to be connected to sample, and that they should be through \"by Friday if not by tomorrow\"    Routed to KERWIN Contreras as FYI   + neck favoring one side, dressed in hospital gowns, adequate grooming and hygiene no psychomotor agitation at times (chronic and seemingly intentionaly as he can stop it when redirected). appropriate, needs staff assist at times needs  staff assist at times chronically limited but better since admission  more able to respond to redirection festinating gait but stable, using walker reports "happy" at times, some anxiety re: wound care overall linear, at times episodes of being disorganized and circumstantial, concrete (chronic) but much better since initial admission less paranoid today chronically impaired but much improved since admission Limited

## 2025-02-18 NOTE — PROGRESS NOTE ADULT - SUBJECTIVE AND OBJECTIVE BOX
Goal Outcome Evaluation:  Plan of Care Reviewed With: patient        Progress: improving  Outcome Evaluation: Pt is a 81 y/o female admitted to [facility] 2/2 weakness and some diarrhea.  At [facility], pt was (I) for ADLs, however, recently pt has been steadily declining in overall function, requiring more assist than usual in addition to episodes of incontinence. Pt reports using rwx for short distance and power w/c for longer distances to dinning trent. Today, pt presents with deficits in act tolerance/ endurance, balance, and strength. Pt reqs Sba for supine to sit  EOB but Min A to return to supine. S/u for seated ADLs mostly but Dep for LB ADLs. Pt also Mod A for STS from EOB supported but unable to take steps. Pt would benefit from skilled OT to maximize patient safety and promote (I) w/ ADLs and xfers. Rec SNF at d/c. before possibly returning to group home if able. OT will continue to monitor.    Anticipated Discharge Disposition (OT): skilled nursing facility                         Neurology Follow up note(covering dr love)    BARTOLO GREENE53yMale    HPI:  52M with long history of Parkinson's disease (has been a resident of [facility] for >1 year - [facility] with difficulty placing patient), reported hx of diastolic heart failure, herniated discs who was found unresponsive tonight.  Reportedly, RN said that the patient went to take a shower around 2000 and afterwards patient started to become agitated.  Patient does have a history of agitation and flares his arms and legs often.  Patient was able to calm down and was allowed to go to the day room.  Patient again started to flare his arms and legs and even slid down to the floor.  Patient was then directed back to his room where he continued to be agitated.  Patient was then given ativan 2mg PO for agitation and then nurse was able to instruct the patient to calm down as well.  Nurse stepped outside the room and told the aide to get vitals when the patient was found to be unresponsive all of sudden.  A CODE BLUE was activated at 2132.      During the CODE BLUE, patient was found to be unresponsive.  CPR was already initiated.  Initial rhythm on monitor was asystole.  A total of 4 rounds of epinephrine was given with 2 given peripherally and 2 via femoral central line and a dose of bicarb and calcium chloride was also given in between (see code sheet).  Patient was successfully intubated and patient eventually had ROSC at 2144 with BP 96/39 with HR of 185.  Initial ABG was 6.78/52/247.  Given his tachycardia, patient was also given amio 150mg.  Patient's HR decreased to 70-80s but BP eventually dropped to SBP 40/20s.  Patient was started on levophed with improvement SBP to 90s and HR increased to 110s.  Multiple attempts to contact family member was unsuccessful.  Patient was transferred/admitted to [facility] for further management (18 Aug 2018 22:48)      Interval History - no seizure reported.    Patient is seen, chart was reviewed and case was discussed with the treatment team.  Pt is not in any distress.       Vital Signs Last 24 Hrs  T(C): 38.5 (20 Aug 2018 17:00), Max: 38.5 (20 Aug 2018 16:02)  T(F): 101.3 (20 Aug 2018 17:00), Max: 101.3 (20 Aug 2018 16:02)  HR: 120 (20 Aug 2018 18:07) (90 - 123)  BP: 148/68 (20 Aug 2018 18:00) (123/74 - 154/79)  BP(mean): 90 (20 Aug 2018 18:00) (86 - 102)  RR: 26 (20 Aug 2018 18:00) (25 - 48)  SpO2: 99% (20 Aug 2018 18:07) (97% - 100%)        On Neurological Examination:    Mental Status -   PATIENT IS UNRESPONSIVE TO VERBAL AND PAINFUL STIMULI.    Speech -  NON VERBAL.    Cranial Nerves - Pupils 2 mm equal and non reactive to light,  NO CORNEAL , NO DOLL'S.  NO GAG TODAY BUT ASSIST TO VENT NOTED.  Pt has no facial asymmetry.     Motor Exam - no movement of arm and leg observed.    Sensory Exam - no withdrawal to pin prick.    Deep tendon Reflexes - 1 plus all over.   PLANTAR SILENT.      Neck Supple -  Yes.     MEDICATIONS    ALBUTerol    90 MICROgram(s) HFA Inhaler 2 Puff(s) Inhalation every 6 hours PRN  chlorhexidine 0.12% Liquid 15 milliLiter(s) Swish and Spit two times a day  dextrose 40% Gel 15 Gram(s) Oral once PRN  dextrose 5%. 1000 milliLiter(s) IV Continuous <Continuous>  dextrose 50% Injectable 12.5 Gram(s) IV Push once  dextrose 50% Injectable 25 Gram(s) IV Push once  dextrose 50% Injectable 25 Gram(s) IV Push once  glucagon  Injectable 1 milliGRAM(s) IntraMuscular once PRN  heparin  Infusion.  Unit(s)/Hr IV Continuous <Continuous>  heparin  Injectable 7500 Unit(s) IV Push every 6 hours PRN  heparin  Injectable 3500 Unit(s) IV Push every 6 hours PRN  insulin lispro (HumaLOG) corrective regimen sliding scale   SubCutaneous every 6 hours  lactated ringers. 1000 milliLiter(s) IV Continuous <Continuous>  levETIRAcetam  IVPB 500 milliGRAM(s) IV Intermittent every 12 hours  norepinephrine Infusion 0.05 MICROgram(s)/kG/Min IV Continuous <Continuous>  pantoprazole  Injectable 40 milliGRAM(s) IV Push daily  piperacillin/tazobactam IVPB. 3.375 Gram(s) IV Intermittent every 8 hours      Allergies    aspirin (Unknown)  Cheese (Unknown)  ibuprofen (Unknown)  Reglan (Swelling)    Intolerances    ibuprofen (Unknown)      LABS:  CBC Full  -  ( 19 Aug 2018 06:56 )  WBC Count : 3.21 K/uL  Hemoglobin : 12.2 g/dL  Hematocrit : 38.5 %  Platelet Count - Automated : 268 K/uL  Mean Cell Volume : 96.5 fl  Mean Cell Hemoglobin : 30.6 pg  Mean Cell Hemoglobin Concentration : 31.7 gm/dL  Auto Neutrophil # : 2.63 K/uL  Auto Lymphocyte # : 0.45 K/uL  Auto Monocyte # : 0.00 K/uL  Auto Eosinophil # : 0.00 K/uL  Auto Basophil # : 0.00 K/uL  Auto Neutrophil % : 65.0 %  Auto Lymphocyte % : 14.0 %  Auto Monocyte % : 0.0 %  Auto Eosinophil % : 0.0 %  Auto Basophil % : 0.0 %      08-19    x   |  x   |  x   ----------------------------<  x   3.4<L>   |  x   |  x     Ca    7.2<L>      19 Aug 2018 06:56  Phos  4.2     08-19  Mg     2.4     08-19    TPro  5.3<L>  /  Alb  2.8<L>  /  TBili  0.5  /  DBili  x   /  AST  2185<H>  /  ALT  157<H>  /  AlkPhos  77  08-19    Hemoglobin A1C:     Vitamin B12     RADIOLOGY  < from: CT Head No Cont (08.20.18 @ 11:48) >    EXAM:  CT BRAIN                                  PROCEDURE DATE:  08/20/2018          INTERPRETATION:  CT BRAIN    HISTORY:  S/P cardiac arrest for 20 minutes    TECHNIQUE: CT of the head was performed without intravenous contrast.   Multiplanar reformatted images were then generated from the axial   acquired data.  This study was performed using automatic exposure control   (radiation dose reduction software) to obtain a diagnostic image quality   scan with patient dose as low as reasonably achievable.    COMPARISON: Head CT 5/25/2016    FINDINGS:     INTRACRANIAL FINDINGS: There is diffuse loss of gray-white   differentiation and effacement of sulci. No evidence for recent   hemorrhage. No midline shift. The ventricles are normal in size.There is   no abnormal extra axial collection.    EXTRACRANIAL FINDINGS: No extracalvarial soft tissue swelling. No   depressed calvarial fracture. The orbital contents are unremarkable.   Small fluid levels in the sphenoid sinuses. The mastoid air cells are   clear.    IMPRESSION:     No acute intracranial hemorrhage.    Diffuse cerebral edema secondary to hypoxic ischemic injury in the   setting of cardiac arrest.            JT TONY M.D., ATTENDING RADIOLOGIST  This document has been electronically signed. Aug 20 2018 12:13PM          ASSESSMENT AND PLAN:      SEVERE ANOXIC ENCEPHALOPATHY   SP CARDIAC ARREST OFF HYPOTHERMIA BLANKET   GENERALIZED SEIZURE DUE ANOXIC ENCEPHALOPATHY.  PD.    PROGNOSIS POOR FOR ANY MEANINGFUL NEUROLOGICAL  RECOVERY.  DW PATIENT SISTER JOSE AT BED SIDE.  ALSO DW DR PEREZ AND NURSING STAFF.  CONTINUE KEPPRA.  SUPPORTIVE CARE.  KEEP HEAD ELEVATED.  MAINTAIN SBP OVER 90 MM AND PULSE OXYMETRY OVER 95.  DR LOVE TO FOLLOW TOMORROW.

## 2025-04-08 NOTE — PROGRESS NOTE BEHAVIORAL HEALTH - OTHER
----- Message from Valentina GRANT sent at 2025  1:26 PM CDT -----  Regarding: ORDER FOR CONTINUATION OF CARE-OUT PATIENT THERAPY  Patient has an appointment scheduled for outpatient physical/occupational therapy on 25 @ 10:45am.       Current order has  as of 3/29, Please submit an updated order for continuation of care.    Thank you!     Valentina Rapp   + neck favoring one side, dressed in hospital gowns, adequate grooming and hygiene no psychomotor agitation at times (chronic and seemingly intentionaly as he can stop it when redirected). cooperative today at times low end of fair, using sense of humor at times chronically limited festinating gait but stable at times louder at times euthymic & friendly, at times irritable and upset (baseline) oscillating between anger, irritability, annoyance and then euthymic (baseline); friendly with Writer at times linear, at times disorganized and circumstantial at times paranoia towards certain staff (chronic) Limited
